# Patient Record
Sex: MALE | Race: WHITE | NOT HISPANIC OR LATINO | Employment: FULL TIME | URBAN - METROPOLITAN AREA
[De-identification: names, ages, dates, MRNs, and addresses within clinical notes are randomized per-mention and may not be internally consistent; named-entity substitution may affect disease eponyms.]

---

## 2017-01-05 DIAGNOSIS — R79.89 LOW TESTOSTERONE: Primary | ICD-10-CM

## 2017-01-06 RX ORDER — TESTOSTERONE CYPIONATE 200 MG/ML
400 INJECTION, SOLUTION INTRAMUSCULAR WEEKLY
Qty: 8 ML | Refills: 0 | Status: SHIPPED | OUTPATIENT
Start: 2017-01-06 | End: 2017-01-13

## 2017-01-13 DIAGNOSIS — R79.89 LOW TESTOSTERONE: Primary | ICD-10-CM

## 2017-01-13 RX ORDER — TESTOSTERONE CYPIONATE 200 MG/ML
400 INJECTION, SOLUTION INTRAMUSCULAR WEEKLY
Qty: 10 ML | Refills: 0 | Status: SHIPPED | OUTPATIENT
Start: 2017-01-13 | End: 2017-02-21

## 2017-01-13 NOTE — TELEPHONE ENCOUNTER
Pt calls stating the 1 Mls vials of Testosterone that are ordered is too costly. He has not picked this up yet. He can get the 10 Mls vial at a much cheaper cost. And this would be easier to use as well.     He will need new Rx faxed to the pharmacy.     Informed him that he should have Testosterone lab checked soon since on high dose. Last level 9/2015.     Pended.

## 2017-02-08 DIAGNOSIS — E11.40 TYPE 2 DIABETES MELLITUS WITH DIABETIC NEUROPATHY (H): Primary | ICD-10-CM

## 2017-02-08 NOTE — TELEPHONE ENCOUNTER
Reason for Call:  Medication or medication refill:    Do you use a [facility]?  Name of the pharmacy and phone number for the current request:  [facility] [id number] [facility] [place] [phone]    Name of the medication requested: One Touch Ultra Blue Test Strips for Ultra Mini Linda would like a 3 month supply if possible    Other request: Patient says he should be testing at least 4 times per day    Can we leave a detailed message on this number? YES    Phone number patient can be reached at: Cell number on file:    Telephone Information:   Mobile [phone]       Best Time: any    Call taken on 2/8/2017 at 2:43 PM by Ivanna Andersen

## 2017-02-15 NOTE — TELEPHONE ENCOUNTER
Wife Alba calling to get this refill been over a week, could you please route to different provider since Dr. Villafana isn't in 2/16. Patient states he hasnt tested in over a month. Was using old test strips and they werent giving accurate readings anymore.

## 2017-02-16 NOTE — TELEPHONE ENCOUNTER
Not sure why this wasn't refilled.     Last OV 10/10/16.     Prescription approved per [facility] Refill Protocol.

## 2017-02-21 DIAGNOSIS — R79.89 LOW TESTOSTERONE: ICD-10-CM

## 2017-02-21 RX ORDER — TESTOSTERONE CYPIONATE 200 MG/ML
400 INJECTION, SOLUTION INTRAMUSCULAR WEEKLY
Qty: 10 ML | Refills: 0 | Status: SHIPPED | OUTPATIENT
Start: 2017-02-21 | End: 2017-03-27

## 2017-02-21 NOTE — TELEPHONE ENCOUNTER
Testosterone Cypionate       Last Written Prescription Date:  01/13/17  Last Fill Quantity: 10 mL,   # refills: 0  Last Office Visit with [facility], [facility] or  Health prescribing provider: 10/10/16  Future Office visit:       Routing refill request to provider for review/approval because:  Drug not on the [facility], [facility] or [facility] refill protocol or controlled substance

## 2017-03-27 DIAGNOSIS — R79.89 LOW TESTOSTERONE: ICD-10-CM

## 2017-03-27 RX ORDER — TESTOSTERONE CYPIONATE 200 MG/ML
INJECTION, SOLUTION INTRAMUSCULAR
Qty: 10 ML | Refills: 0 | Status: SHIPPED | OUTPATIENT
Start: 2017-03-27 | End: 2017-04-27

## 2017-03-27 NOTE — TELEPHONE ENCOUNTER
Left V/M for pt asking him to call back to set up lab only and let him know that I faxed testosterone rx to [facility]

## 2017-03-27 NOTE — TELEPHONE ENCOUNTER
Testosterone      Last Written Prescription Date:  02/21/17  Last Fill Quantity: 10ml,   # refills: 0  Last Office Visit with [facility] or  Health prescribing provider: 10/10/16  Future Office visit:       Routing refill request to provider for review/approval because:  Drug not on the [facility] or [facility] refill protocol or controlled substance    Please advise, thanks.

## 2017-03-27 NOTE — TELEPHONE ENCOUNTER
He was supposed to have blood level done after January; see message from 1/13/17. Advise him to schedule lab, no further refills until lab is done.

## 2017-04-06 ENCOUNTER — TELEPHONE (OUTPATIENT)
Dept: INTERNAL MEDICINE | Facility: CLINIC | Age: 62
End: 2017-04-06

## 2017-04-06 DIAGNOSIS — Z11.3 SCREEN FOR STD (SEXUALLY TRANSMITTED DISEASE): Primary | ICD-10-CM
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
## 2017-04-07 ENCOUNTER — TELEPHONE (OUTPATIENT)
Dept: INTERNAL MEDICINE | Facility: CLINIC | Age: 62
End: 2017-04-07

## 2017-04-07 DIAGNOSIS — Z71.1 CONCERN ABOUT INFECTIOUS HEPATITIS WITHOUT DIAGNOSIS: ICD-10-CM

## 2017-04-07 DIAGNOSIS — Z11.59 NEED FOR HEPATITIS C SCREENING TEST: Primary | ICD-10-CM

## 2017-04-07 NOTE — TELEPHONE ENCOUNTER
See his message below. Not sure what to order for Hep A.     Also should hep B be ordered? He may need to be checked for this too?

## 2017-04-07 NOTE — TELEPHONE ENCOUNTER
Reason for Call: Request for an order or referral:Lab - Hep    Order or referral being requested: Lab - Hep     Date needed: before lab appt    Has the patient been seen by the PCP for this problem? YES    Additional comments: Pt has order to have [facility] labs, recently discovered that significant other  of Hep A & C.  Wants lab order placed to be tested so that he can have lab done with [facility] labs.    Phone number Patient can be reached at:  Cell number on file:    Telephone Information:   Mobile [phone]       Best Time:  anytime    Can we leave a detailed message on this number?  YES    Call taken on 2017 at 9:22 AM by YAS NGUYEN

## 2017-04-14 DIAGNOSIS — R79.89 LOW TESTOSTERONE: ICD-10-CM

## 2017-04-14 DIAGNOSIS — Z11.3 SCREEN FOR STD (SEXUALLY TRANSMITTED DISEASE): ICD-10-CM

## 2017-04-14 PROCEDURE — 86803 HEPATITIS C AB TEST: CPT | Performed by: INTERNAL MEDICINE

## 2017-04-14 PROCEDURE — 87340 HEPATITIS B SURFACE AG IA: CPT | Performed by: INTERNAL MEDICINE

## 2017-04-14 PROCEDURE — 36415 COLL VENOUS BLD VENIPUNCTURE: CPT | Performed by: INTERNAL MEDICINE

## 2017-04-14 PROCEDURE — 84403 ASSAY OF TOTAL TESTOSTERONE: CPT | Performed by: INTERNAL MEDICINE

## 2017-04-14 PROCEDURE — 86708 HEPATITIS A ANTIBODY: CPT | Performed by: INTERNAL MEDICINE

## 2017-04-14 PROCEDURE — 84270 ASSAY OF SEX HORMONE GLOBUL: CPT | Performed by: INTERNAL MEDICINE

## 2017-04-17 LAB
HAV IGG SER QL IA: NORMAL
HBV SURFACE AG SERPL QL IA: NONREACTIVE
HCV AB SERPL QL IA: NORMAL

## 2017-04-19 LAB
SHBG SERPL-SCNC: 19 NMOL/L (ref 11–80)
TESTOST FREE SERPL-MCNC: 15.27 NG/DL (ref 4.7–24.4)
TESTOST SERPL-MCNC: 548 NG/DL (ref 240–950)

## 2017-04-27 DIAGNOSIS — R79.89 LOW TESTOSTERONE: ICD-10-CM

## 2017-04-27 NOTE — TELEPHONE ENCOUNTER
testosterone cypionate       Last Written Prescription Date:  03/27/17  Last Fill Quantity: 20 ml,   # refills: 0  Last Office Visit with [facility], [facility] or  Health prescribing provider: 10/10/16  Future Office visit:       Routing refill request to provider for review/approval because:  Drug not on the [facility], [facility] or [facility] refill protocol or controlled substance

## 2017-04-28 RX ORDER — TESTOSTERONE CYPIONATE 200 MG/ML
INJECTION, SOLUTION INTRAMUSCULAR
Qty: 10 ML | Refills: 0 | Status: SHIPPED | OUTPATIENT
Start: 2017-04-28 | End: 2017-05-18

## 2017-05-13 DIAGNOSIS — R79.89 LOW TESTOSTERONE: ICD-10-CM

## 2017-05-13 RX ORDER — SYRINGE WITH NEEDLE, 1 ML 25GX5/8"
SYRINGE, EMPTY DISPOSABLE MISCELLANEOUS
Qty: 4 EACH | Refills: 0 | Status: SHIPPED | OUTPATIENT
Start: 2017-05-13 | End: 2017-08-11

## 2017-05-13 NOTE — TELEPHONE ENCOUNTER
Last OV-10/10/16      Lab Results   Component Value Date    A1C 7.9 10/10/2016    A1C 7.5 05/16/2016    A1C 8.3 09/30/2015    A1C 7.7 07/03/2014    A1C 7.8 05/09/2014       [Per note attached to 10/10 lab results-Notes Recorded by Javier Villafana MD on 10/11/2016 at 11:28 AM  Normal result reviewed, please notify patient.  Slightly worse diabetic control, suggest to take Metformin BID as discussed, recheck in 3 months.

## 2017-05-16 DIAGNOSIS — E11.9 TYPE 2 DIABETES MELLITUS WITHOUT COMPLICATION, WITHOUT LONG-TERM CURRENT USE OF INSULIN (H): ICD-10-CM

## 2017-05-16 DIAGNOSIS — I10 ESSENTIAL HYPERTENSION WITH GOAL BLOOD PRESSURE LESS THAN 140/90: ICD-10-CM

## 2017-05-16 NOTE — TELEPHONE ENCOUNTER
Metformin          Last Written Prescription Date: 10/18/16  Last Fill Quantity: 65, # refills: 3  Last Office Visit with [facility], [facility] or [facility] prescribing provider:  10/10/16    Per note attached to 10/10/16 lab results-Notes Recorded by Javier Villafana MD on 10/11/2016 at 11:28 AM  Normal result reviewed, please notify patient.  Slightly worse diabetic control, suggest to take Metformin BID as discussed, recheck in 3 months.        BP Readings from Last 3 Encounters:   11/09/16 144/76   10/10/16 122/70   09/22/16 116/86     Lab Results   Component Value Date    MICROL 15 10/10/2016     Lab Results   Component Value Date    UMALCR 5.96 10/10/2016     Creatinine   Date Value Ref Range Status   10/10/2016 1.08 0.66 - 1.25 mg/dL Final   ]  GFR Estimate   Date Value Ref Range Status   10/10/2016 69 >60 mL/min/1.7m2 Final     Comment:     Non  GFR Calc   08/05/2016 77 >60 mL/min/1.7m2 Final     Comment:     Non  GFR Calc   08/02/2016 76 >60 mL/min/1.7m2 Final     Comment:     Non  GFR Calc     GFR Estimate If Black   Date Value Ref Range Status   10/10/2016 84 >60 mL/min/1.7m2 Final     Comment:      GFR Calc   08/05/2016 >90   GFR Calc   >60 mL/min/1.7m2 Final   08/02/2016 >90   GFR Calc   >60 mL/min/1.7m2 Final     Lab Results   Component Value Date    CHOL 196 10/10/2016     Lab Results   Component Value Date    HDL 48 10/10/2016     Lab Results   Component Value Date     10/10/2016     Lab Results   Component Value Date    TRIG 208 10/10/2016     Lab Results   Component Value Date    CHOLHDLRATIO 5.1 09/30/2015     Lab Results   Component Value Date    AST 16 10/10/2016     Lab Results   Component Value Date    ALT 31 10/10/2016     Lab Results   Component Value Date    A1C 7.9 10/10/2016    A1C 7.5 05/16/2016    A1C 8.3 09/30/2015    A1C 7.7 07/03/2014    A1C 7.8 05/09/2014     Potassium   Date Value Ref Range  Status   10/10/2016 4.0 3.4 - 5.3 mmol/L Final       Labs showing if normal/abnormal  Lab Results   Component Value Date    UCRR 250 10/10/2016    MICROL 15 10/10/2016     Lab Results   Component Value Date    CHOL 196 10/10/2016    TRIG 208 (H) 10/10/2016    HDL 48 10/10/2016     (H) 10/10/2016    VLDL 37 (H) 09/30/2015    CHOLHDLRATIO 5.1 (H) 09/30/2015     Lab Results   Component Value Date    A1C 7.9 (H) 10/10/2016    A1C 7.5 (H) 05/16/2016    A1C 8.3 (H) 09/30/2015

## 2017-05-16 NOTE — TELEPHONE ENCOUNTER
Rcvd fax from pharm stating pt's insurance requires 90d supp or they dont pay for rx's. Sent new rx to pharm with message last refill til seen

## 2017-05-18 DIAGNOSIS — R79.89 LOW TESTOSTERONE: ICD-10-CM

## 2017-05-18 DIAGNOSIS — R79.89 LOW TESTOSTERONE: Primary | ICD-10-CM

## 2017-05-18 NOTE — TELEPHONE ENCOUNTER
BD Needles         Last Written Prescription Date: 8/5/16  Last Fill Quantity: 12, # refills: 3  Last Office Visit with [facility], [facility] or [facility] prescribing provider:  10/10/16        BP Readings from Last 3 Encounters:   11/09/16 144/76   10/10/16 122/70   09/22/16 116/86     Lab Results   Component Value Date    MICROL 15 10/10/2016     Lab Results   Component Value Date    UMALCR 5.96 10/10/2016     Creatinine   Date Value Ref Range Status   10/10/2016 1.08 0.66 - 1.25 mg/dL Final   ]  GFR Estimate   Date Value Ref Range Status   10/10/2016 69 >60 mL/min/1.7m2 Final     Comment:     Non  GFR Calc   08/05/2016 77 >60 mL/min/1.7m2 Final     Comment:     Non  GFR Calc   08/02/2016 76 >60 mL/min/1.7m2 Final     Comment:     Non  GFR Calc     GFR Estimate If Black   Date Value Ref Range Status   10/10/2016 84 >60 mL/min/1.7m2 Final     Comment:      GFR Calc   08/05/2016 >90   GFR Calc   >60 mL/min/1.7m2 Final   08/02/2016 >90   GFR Calc   >60 mL/min/1.7m2 Final     Lab Results   Component Value Date    CHOL 196 10/10/2016     Lab Results   Component Value Date    HDL 48 10/10/2016     Lab Results   Component Value Date     10/10/2016     Lab Results   Component Value Date    TRIG 208 10/10/2016     Lab Results   Component Value Date    CHOLHDLRATIO 5.1 09/30/2015     Lab Results   Component Value Date    AST 16 10/10/2016     Lab Results   Component Value Date    ALT 31 10/10/2016     Lab Results   Component Value Date    A1C 7.9 10/10/2016    A1C 7.5 05/16/2016    A1C 8.3 09/30/2015    A1C 7.7 07/03/2014    A1C 7.8 05/09/2014     Potassium   Date Value Ref Range Status   10/10/2016 4.0 3.4 - 5.3 mmol/L Final

## 2017-05-19 RX ORDER — TESTOSTERONE CYPIONATE 200 MG/ML
INJECTION, SOLUTION INTRAMUSCULAR
Qty: 10 ML | Refills: 0 | Status: SHIPPED | OUTPATIENT
Start: 2017-05-19 | End: 2017-06-28

## 2017-05-19 NOTE — TELEPHONE ENCOUNTER
Testosterone Cypionate      Last Written Prescription Date:  04/28/17  Last Fill Quantity: 10 mL,   # refills: 0  Last Office Visit with [facility], [facility] or  Health prescribing provider: 10/10/16  Future Office visit:       Routing refill request to provider for review/approval because:  Drug not on the [facility], [facility] or [facility] refill protocol or controlled substance

## 2017-05-26 ENCOUNTER — TELEPHONE (OUTPATIENT)
Dept: INTERNAL MEDICINE | Facility: CLINIC | Age: 62
End: 2017-05-26

## 2017-06-28 DIAGNOSIS — R79.89 LOW TESTOSTERONE: ICD-10-CM

## 2017-06-28 RX ORDER — TESTOSTERONE CYPIONATE 200 MG/ML
INJECTION, SOLUTION INTRAMUSCULAR
Qty: 10 ML | Refills: 0 | Status: SHIPPED | OUTPATIENT
Start: 2017-06-28 | End: 2017-08-08

## 2017-07-06 ENCOUNTER — NURSE TRIAGE (OUTPATIENT)
Dept: NURSING | Facility: CLINIC | Age: 62
End: 2017-07-06

## 2017-07-06 ENCOUNTER — RADIANT APPOINTMENT (OUTPATIENT)
Dept: GENERAL RADIOLOGY | Facility: CLINIC | Age: 62
End: 2017-07-06
Attending: INTERNAL MEDICINE
Payer: COMMERCIAL

## 2017-07-06 ENCOUNTER — OFFICE VISIT (OUTPATIENT)
Dept: INTERNAL MEDICINE | Facility: CLINIC | Age: 62
End: 2017-07-06
Payer: COMMERCIAL

## 2017-07-06 VITALS
TEMPERATURE: 97.9 F | WEIGHT: 226.6 LBS | DIASTOLIC BLOOD PRESSURE: 84 MMHG | HEIGHT: 74 IN | BODY MASS INDEX: 29.08 KG/M2 | SYSTOLIC BLOOD PRESSURE: 110 MMHG | RESPIRATION RATE: 16 BRPM | OXYGEN SATURATION: 96 % | HEART RATE: 95 BPM

## 2017-07-06 DIAGNOSIS — M25.552 HIP PAIN, LEFT: ICD-10-CM

## 2017-07-06 DIAGNOSIS — R23.2 HOT FLASHES: ICD-10-CM

## 2017-07-06 DIAGNOSIS — E11.40 TYPE 2 DIABETES MELLITUS WITH DIABETIC NEUROPATHY, WITHOUT LONG-TERM CURRENT USE OF INSULIN (H): Primary | ICD-10-CM

## 2017-07-06 LAB
ANION GAP SERPL CALCULATED.3IONS-SCNC: 8 MMOL/L (ref 3–14)
BASOPHILS # BLD AUTO: 0 10E9/L (ref 0–0.2)
BASOPHILS NFR BLD AUTO: 0.2 %
BUN SERPL-MCNC: 18 MG/DL (ref 7–30)
CALCIUM SERPL-MCNC: 9.2 MG/DL (ref 8.5–10.1)
CHLORIDE SERPL-SCNC: 101 MMOL/L (ref 94–109)
CO2 SERPL-SCNC: 26 MMOL/L (ref 20–32)
CREAT SERPL-MCNC: 1.11 MG/DL (ref 0.66–1.25)
DIFFERENTIAL METHOD BLD: NORMAL
EOSINOPHIL # BLD AUTO: 0.2 10E9/L (ref 0–0.7)
EOSINOPHIL NFR BLD AUTO: 2 %
ERYTHROCYTE [DISTWIDTH] IN BLOOD BY AUTOMATED COUNT: 13.4 % (ref 10–15)
GFR SERPL CREATININE-BSD FRML MDRD: 67 ML/MIN/1.7M2
GLUCOSE SERPL-MCNC: 144 MG/DL (ref 70–99)
HBA1C MFR BLD: 7.3 % (ref 4.3–6)
HCT VFR BLD AUTO: 47.5 % (ref 40–53)
HGB BLD-MCNC: 16.5 G/DL (ref 13.3–17.7)
LYMPHOCYTES # BLD AUTO: 1.6 10E9/L (ref 0.8–5.3)
LYMPHOCYTES NFR BLD AUTO: 19.4 %
MCH RBC QN AUTO: 29.9 PG (ref 26.5–33)
MCHC RBC AUTO-ENTMCNC: 34.7 G/DL (ref 31.5–36.5)
MCV RBC AUTO: 86 FL (ref 78–100)
MONOCYTES # BLD AUTO: 0.8 10E9/L (ref 0–1.3)
MONOCYTES NFR BLD AUTO: 9.5 %
NEUTROPHILS # BLD AUTO: 5.8 10E9/L (ref 1.6–8.3)
NEUTROPHILS NFR BLD AUTO: 68.9 %
PLATELET # BLD AUTO: 249 10E9/L (ref 150–450)
POTASSIUM SERPL-SCNC: 4.2 MMOL/L (ref 3.4–5.3)
RBC # BLD AUTO: 5.52 10E12/L (ref 4.4–5.9)
SODIUM SERPL-SCNC: 135 MMOL/L (ref 133–144)
WBC # BLD AUTO: 8.4 10E9/L (ref 4–11)

## 2017-07-06 PROCEDURE — 72170 X-RAY EXAM OF PELVIS: CPT

## 2017-07-06 PROCEDURE — 71020 XR CHEST 2 VW: CPT

## 2017-07-06 PROCEDURE — 80048 BASIC METABOLIC PNL TOTAL CA: CPT | Performed by: INTERNAL MEDICINE

## 2017-07-06 PROCEDURE — 85025 COMPLETE CBC W/AUTO DIFF WBC: CPT | Performed by: INTERNAL MEDICINE

## 2017-07-06 PROCEDURE — 99214 OFFICE O/P EST MOD 30 MIN: CPT | Performed by: INTERNAL MEDICINE

## 2017-07-06 PROCEDURE — 83036 HEMOGLOBIN GLYCOSYLATED A1C: CPT | Performed by: INTERNAL MEDICINE

## 2017-07-06 PROCEDURE — 36415 COLL VENOUS BLD VENIPUNCTURE: CPT | Performed by: INTERNAL MEDICINE

## 2017-07-06 NOTE — PROGRESS NOTES
"  SUBJECTIVE:                                                    Austyn Araujo is a 61 year old male who presents to clinic today for the following health issues:    Left hip.  He reports that his left hip makes a snapping noise in his hip with each step.  The hip is painful as he is walking.   He had an xray of his pelvis revealing arthritis.   He did not undergo physical therapy.         Diabetes Follow-up      Patient is checking blood sugars: checks daily- close to 200    Diabetic concerns: blood sugar frequently over 200     Symptoms of hypoglycemia (low blood sugar): none     Paresthesias (numbness or burning in feet) or sores: yes, neuropathy     Date of last diabetic eye exam: last eye appt was spring 2017    Depression.  He feels as if his depression was situational after a death.   Sometimes he has anxiety and hot flashes at night.  This has been occurring for over one year.     Alcohol use.  He has had one drink per day.     Cough. Patient has had a cough for a few weeks at night.  No fever or chills.  No shortness of breath.   Uncertain if postnasal drip or GERD.         Amount of exercise or physical activity: lifts weights once per week    Problems taking medications regularly: No    Medication side effects: none    Diet: regular (no restrictions)        Problem list and histories reviewed & adjusted, as indicated.      Reviewed and updated as needed this visit by clinical staff  Tobacco  Allergies  Meds  Med Hx  Surg Hx  Fam Hx  Soc Hx      Reviewed and updated as needed this visit by Provider         ROS:  C: NEGATIVE for fever, chills, change in weight; hot flashes at night  R: NEGATIVE for significant SOB; POS cough  CV: NEGATIVE for chest pain, palpitations or peripheral edema  MSK: POS hip pain    OBJECTIVE:     /84 (BP Location: Left arm, Patient Position: Chair, Cuff Size: Adult Large)  Pulse 95  Temp 97.9  F (36.6  C) (Oral)  Resp 16  Ht 6' 2\" (1.88 m)  Wt 226 lb 9.6 oz " (102.8 kg)  SpO2 96%  BMI 29.09 kg/m2  Body mass index is 29.09 kg/(m^2).  GENERAL: healthy, alert and no distress  RESP: lungs clear to auscultation - no rales, rhonchi or wheezes  CV: regular rate and rhythm, normal S1 S2, no S3 or S4, no murmur, click or rub, no peripheral edema and peripheral pulses strong  MSK: left hip with pain upon flexion and extension  Psych: normal affect    ASSESSMENT/PLAN:       (E11.40) Type 2 diabetes mellitus with diabetic neuropathy, without long-term current use of insulin (H)  (primary encounter diagnosis)  Comment:   Plan: Hemoglobin A1c, Basic metabolic panel, DIABETES        EDUCATOR REFERRAL          (R23.2) Hot flashes  Comment:   Plan: CBC with platelets differential, XR Chest 2         Views            (M25.552) Hip pain, left  Comment:   Plan: XR Pelvis 1/2 Views, ORTHO  REFERRAL            Cough.  Chest xray. Consider holding lisinopril if it continues.      Sugey Padron MD  [facility]

## 2017-07-06 NOTE — MR AVS SNAPSHOT
After Visit Summary   7/6/2017    Austyn Araujo    MRN: [id number]           Patient Information     Date Of Birth          1955        Visit Information        Provider Department      7/6/2017 11:00 AM Sugey Padron MD [facility]        Today's Diagnoses     Type 2 diabetes mellitus with diabetic neuropathy, without long-term current use of insulin (H)    -  1    Hot flashes        Hip pain, left           Follow-ups after your visit        Additional Services     DIABETES EDUCATOR REFERRAL       DIABETES SELF MANAGEMENT TRAINING (DSMT)      Your provider has referred you to Diabetes Education: [facility]: Diabetes Education - All [facility] [phone]   [email or web address]    Type of training and number of hours: Previous Diagnosis: Follow-up DSMT - 2 hours.    Medicare covers: 10 hours of initial DSMT in 12 month period from the time of first visit, plus 2 hours of follow-up DSMT annually, and additional hours as requested for insulin training.    Diabetes Type: Type 2 - On Oral Medication             Diabetes Co-Morbidities: none               A1C Goal:  <7.0       A1C is: Lab Results       Component                Value               Date                       A1C                      7.9                 10/10/2016              If an urgent visit is needed or A1C is above 12, Care Team to call the Diabetes Education Team at [phone] or send an In Basket message to the Diabetes Education Pool (P DIAB ED-PATIENT CARE).    Diabetes Education Topics: Comprehensive Knowledge Assessment and Instruction    Special Educational Needs Requiring Individual DSMT: None       MEDICAL NUTRITION THERAPY (MNT) for Diabetes    Medical Nutrition Therapy with a Registered Dietitian can be provided in coordination with Diabetes Self-Management Training to assist in achieving optimal diabetes management.    MNT Type and Hours:  Previous diagnosis: Annual follow-up MNT - 2 hours                       Medicare will cover: 3 hours initial MNT in 12 month period after first visit, plus 2 hours of follow-up MNT annually    Please be aware that coverage of these services is subject to the terms and limitations of your health insurance plan.  Call member services at your health plan to determine Diabetes Self-Management Training benefits and ask which blood glucose monitor brands are covered by your plan.      Please bring the following with you to your appointment:    (1)  List of current medications   (2)  List of Blood Glucose Monitor brands that are covered by your insurance plan  (3)  Blood Glucose Monitor and log book  (4)   Food records for the 3 days prior to your visit    The Certified Diabetes Educator may make diabetes medication adjustments per the CDE Protocol and Collaborative Practice Agreement.            ORTHO  REFERRAL       [facility] is referring you to the Orthopedic  Services at [facility].       The  Representative will assist you in the coordination of your Orthopedic and Musculoskeletal Care as prescribed by your physician.    The  Representative will call you within 1 business day to help schedule your appointment, or you may contact the  Representative at:    All areas ~ [phone]     Type of Referral : Non Surgical       Timeframe requested: Routine    Coverage of these services is subject to the terms and limitations of your health insurance plan.  Please call member services at your health plan with any benefit or coverage questions.      If X-rays, CT or MRI's have been performed, please contact the facility where they were done to arrange for , prior to your scheduled appointment.  Please bring this referral request to your appointment and present it to your specialist.                  Future tests that were ordered for  "you today     Open Future Orders        Priority Expected Expires Ordered    XR Pelvis 1/2 Views Routine 2017            Who to contact     If you have questions or need follow up information about today's clinic visit or your schedule please contact [facility] directly at [phone].  Normal or non-critical lab and imaging results will be communicated to you by MyChart, letter or phone within 4 business days after the clinic has received the results. If you do not hear from us within 7 days, please contact the clinic through [employer] or phone. If you have a critical or abnormal lab result, we will notify you by phone as soon as possible.  Submit refill requests through "[employer]" or call your pharmacy and they will forward the refill request to us. Please allow 3 business days for your refill to be completed.          Additional Information About Your Visit        MyChart Information     "[employer]" lets you send messages to your doctor, view your test results, renew your prescriptions, schedule appointments and more. To sign up, go to [email or web address]" . Click on \"Log in\" on the left side of the screen, which will take you to the Welcome page. Then click on \"Sign up Now\" on the right side of the page.     You will be asked to enter the access code listed below, as well as some personal information. Please follow the directions to create your username and password.     Your access code is: [email or web address]  Expires: 10/4/2017 11:36 AM     Your access code will  in 90 days. If you need help or a new code, please call your [facility] or [phone].        Care EveryWhere ID     This is your Care EveryWhere ID. This could be used by other organizations to access your [facility] medical records  [id number]        Your Vitals Were     Pulse Temperature Respirations Height Pulse Oximetry BMI (Body Mass Index)    95 97.9  F (36.6  C) (Oral) 16 6' 2\" (1.88 m) 96% 29.09 kg/m2 "       Blood Pressure from Last 3 Encounters:   07/06/17 110/84   11/09/16 144/76   10/10/16 122/70    Weight from Last 3 Encounters:   07/06/17 226 lb 9.6 oz (102.8 kg)   11/09/16 224 lb (101.6 kg)   10/10/16 221 lb (100.2 kg)              We Performed the Following     Basic metabolic panel     CBC with platelets differential     DIABETES EDUCATOR REFERRAL     Hemoglobin A1c     ORTHO  REFERRAL        Primary Care Provider Office Phone # Fax #    Javier Villafana [phone]       [facility] [address]  [facility] [address]        Goals        General    Reduce drinking by 90% (pt-stated)     Notes - Note created  4/23/2015 10:33 AM by Mckenna Chang MSW    As of today's date 4/23/2015 goal is met at 26 - 50%.   Goal Status:  Active          Equal Access to Services     [facility]: Hadii aad ku hadasho Soomaali, waaxda luqadaha, qaybta kaalmada adeegyada, waxay idiin hayalician aravind pereyra . So [facility] [phone].    ATENCIÓN: Si habla español, tiene a carrillo disposición servicios gratuitos de asistencia lingüística. Llame al [phone].    We comply with applicable federal civil rights laws and [place] laws. We do not discriminate on the basis of race, color, national origin, age, disability sex, sexual orientation or gender identity.            Thank you!     Thank you for choosing [facility]  for your care. Our goal is always to provide you with excellent care. Hearing back from our patients is one way we can continue to improve our services. Please take a few minutes to complete the written survey that you may receive in the mail after your visit with us. Thank you!             Your Updated Medication List - Protect others around you: Learn how to safely use, store and throw away your medicines at www.disposemymeds.org.          This list is accurate as of: 7/6/17 11:36 AM.  Always use your most recent med list.                   Brand Name  "Dispense Instructions for use Diagnosis    ALPRAZolam 0.5 MG tablet    XANAX    30 tablet    Take 1 tablet (0.5 mg) by mouth 3 times daily as needed for anxiety    Anxiety       aspirin 81 MG tablet      Take 1 tablet by mouth daily.        atorvastatin 10 MG tablet    LIPITOR    90 tablet    Take 1 tablet (10 mg) by mouth daily    Hyperlipidemia LDL goal <100       B-D LUER-ROCHELLE SYRINGE 22G X 1-1/2\" 3 ML Misc   Generic drug:  syringe/needle (disp)     4 each    USE WITH TESTOSTERONE    Low testosterone       blood glucose monitoring test strip    ONE TOUCH ULTRA    100 strip    Use to test blood sugars One time daily or as directed.    Type 2 diabetes mellitus with diabetic neuropathy (H)       CENTRUM SILVER per tablet     90 tablet    Take 1 tablet by mouth daily    Type 2 diabetes mellitus with diabetic neuropathy (H), Essential hypertension with goal blood pressure less than 140/90       lisinopril 5 MG tablet    PRINIVIL/ZESTRIL    180 tablet    Take 1 tablet (5 mg) by mouth 2 times daily    Essential hypertension with goal blood pressure less than 140/90       metFORMIN 1000 MG tablet    GLUCOPHAGE    180 tablet    TAKE 1 TABLET BY MOUTH 2 TIMES DAILY (WITH MEALS)    Essential hypertension with goal blood pressure less than 140/90, Type 2 diabetes mellitus without complication, without long-term current use of insulin (H)       needle (disp) 18G X 1\" Misc    B-D DISP NEEDLE TW    5 each    1 each every 7 days With testosterone    Low testosterone       omeprazole 40 MG capsule    priLOSEC    30 capsule    Take 1 capsule (40 mg) by mouth daily    Gastroesophageal reflux disease without esophagitis       tadalafil 20 MG tablet    CIALIS    100 tablet    Take 1 tablet (20 mg) by mouth daily Never use with nitroglycerin, terazosin or doxazosin.    Low testosterone       testosterone cypionate 200 MG/ML injection    DEPOTESTOTERONE    10 mL    INJECT 2 MLS IM ONCE WEEKLY    Low testosterone         "

## 2017-07-06 NOTE — NURSING NOTE
"Chief Complaint   Patient presents with     Musculoskeletal Problem     left hip pain.       Initial /84 (BP Location: Left arm, Patient Position: Chair, Cuff Size: Adult Large)  Pulse 95  Temp 97.9  F (36.6  C) (Oral)  Resp 16  Ht 6' 2\" (1.88 m)  Wt 226 lb 9.6 oz (102.8 kg)  SpO2 96%  BMI 29.09 kg/m2 Estimated body mass index is 29.09 kg/(m^2) as calculated from the following:    Height as of this encounter: 6' 2\" (1.88 m).    Weight as of this encounter: 226 lb 9.6 oz (102.8 kg).  Medication Reconciliation: complete    "

## 2017-07-06 NOTE — TELEPHONE ENCOUNTER
Seeking direction and help for his hip pain/ it is so bad he can hardly walk/ he did see the specialist as directed by Dr Villafana and he will need surgery/ but is going to try to get thru until fall/ he was not triaged/ set an appointment for 11 AM Sugey Rosen for 7/6/2017   Wojciech Atkins RN [phone]

## 2017-07-07 ASSESSMENT — PATIENT HEALTH QUESTIONNAIRE - PHQ9: SUM OF ALL RESPONSES TO PHQ QUESTIONS 1-9: 3

## 2017-07-12 ENCOUNTER — OFFICE VISIT (OUTPATIENT)
Dept: ORTHOPEDICS | Facility: CLINIC | Age: 62
End: 2017-07-12
Payer: COMMERCIAL

## 2017-07-12 VITALS
BODY MASS INDEX: 29 KG/M2 | SYSTOLIC BLOOD PRESSURE: 122 MMHG | DIASTOLIC BLOOD PRESSURE: 80 MMHG | HEIGHT: 74 IN | WEIGHT: 226 LBS

## 2017-07-12 DIAGNOSIS — M16.9 OSTEOARTHROSIS, HIP: ICD-10-CM

## 2017-07-12 DIAGNOSIS — G89.29 CHRONIC LEFT HIP PAIN: Primary | ICD-10-CM

## 2017-07-12 DIAGNOSIS — M25.552 CHRONIC LEFT HIP PAIN: Primary | ICD-10-CM

## 2017-07-12 PROCEDURE — 99203 OFFICE O/P NEW LOW 30 MIN: CPT | Performed by: PHYSICAL MEDICINE & REHABILITATION

## 2017-07-12 NOTE — MR AVS SNAPSHOT
After Visit Summary   7/12/2017    Austyn Araujo    MRN: [id number]           Patient Information     Date Of Birth          1955        Visit Information        Provider Department      7/12/2017 11:20 AM Ben Vansesa DO [facility]        Today's Diagnoses     Chronic left hip pain    -  1      Care Instructions    We addressed the following today:    1. Chronic left hip pain  2. Left hip osteoarthrosis    Activity modification as discussed  Physical therapy: [facility] - [phone]  Topical Treatments: Ice or heat  Over the counter medication: Acetaminophen (Tylenol) 1000 mg every 6 hours with food (Maximum of 3000mg/day)  Follow up/call in 4-6 weeks if no improvement of symptoms for further evaluation/medical care (sooner if needed; call direct clinic number [[phone]] at any time with questions or concerns)              Follow-ups after your visit        Additional Services     [facility] PT, HAND, AND CHIROPRACTIC REFERRAL       **This order will print in the [facility] Scheduling Office**    Physical Therapy, Hand Therapy and Chiropractic Care are available through:    *[facility]  *[facility]  *[facility]    Call one number to schedule at any of the above locations: [phone].    Your provider has referred you to: Physical Therapy at [facility] or [facility]    Indication/Reason for Referral: Hip Pain  Onset of Illness: 1 year  Therapy Orders: Evaluate and Treat  Special Programs: None  Special Request: None    Deena Shaffer      Additional Comments for the Therapist or Chiropractor: Formal physical therapy - exercises to include pelvic girdle/lower extremity strengthening/stretching with addition of neuromuscular control exercises while limiting activation of the iliopsoas with use of modalities as needed with home exercise prescription.    Please be aware that coverage of these services is subject  "to the terms and limitations of your health insurance plan.  Call member services at your health plan with any benefit or coverage questions.      Please bring the following to your appointment:    *Your personal calendar for scheduling future appointments  *Comfortable clothing                  Who to contact     If you have questions or need follow up information about today's clinic visit or your schedule please contact [facility]IC[facility] directly at [phone].  Normal or non-critical lab and imaging results will be communicated to you by MyChart, letter or phone within 4 business days after the clinic has received the results. If you do not hear from us within 7 days, please contact the clinic through iNEWiThart or phone. If you have a critical or abnormal lab result, we will notify you by phone as soon as possible.  Submit refill requests through Boxever or call your pharmacy and they will forward the refill request to us. Please allow 3 business days for your refill to be completed.          Additional Information About Your Visit        iNEWiTharENT Surgical Information     Boxever lets you send messages to your doctor, view your test results, renew your prescriptions, schedule appointments and more. To sign up, go to [email or web address] . Click on \"Log in\" on the left side of the screen, which will take you to the Welcome page. Then click on \"Sign up Now\" on the right side of the page.     You will be asked to enter the access code listed below, as well as some personal information. Please follow the directions to create your username and password.     Your access code is: [email or web address]  Expires: 10/4/2017 11:36 AM     Your access code will  in 90 days. If you need help or a new code, please call your [facility] clinic or [phone].        Care EveryWhere ID     This is your Care EveryWhere ID. This could be used by other organizations to access your [facility] medical records  [id number]      " "  Your Vitals Were     Height BMI (Body Mass Index)                6' 2\" (1.88 m) 29.02 kg/m2           Blood Pressure from Last 3 Encounters:   07/12/17 122/80   07/06/17 110/84   11/09/16 144/76    Weight from Last 3 Encounters:   07/12/17 226 lb (102.5 kg)   07/06/17 226 lb 9.6 oz (102.8 kg)   11/09/16 224 lb (101.6 kg)              We Performed the Following     [facility] PT, HAND, AND CHIROPRACTIC REFERRAL        Primary Care Provider Office Phone # Fax #    Javier Villafana [phone]       [facility] [address] [place] [address]        Goals        General    Reduce drinking by 90% (pt-stated)     Notes - Note created  4/23/2015 10:33 AM by Mckenna Chang MSW    As of today's date 4/23/2015 goal is met at 26 - 50%.   Goal Status:  Active          Equal Access to Services     [facility] AH: Hadii aad ku hadasho Soomaali, waaxda luqadaha, qaybta kaalmada adeegyada, waxay idiin hayalician aravind kharajaelyn pereyra . So [facility] [phone].    ATENCIÓN: Si habla español, tiene a carrillo disposición servicios gratuitos de asistencia lingüística. Llame al [phone].    We comply with applicable federal civil rights laws and [place] laws. We do not discriminate on the basis of race, color, national origin, age, disability sex, sexual orientation or gender identity.            Thank you!     Thank you for choosing [facility] SPORTS MEDICINE  for your care. Our goal is always to provide you with excellent care. Hearing back from our patients is one way we can continue to improve our services. Please take a few minutes to complete the written survey that you may receive in the mail after your visit with us. Thank you!             Your Updated Medication List - Protect others around you: Learn how to safely use, store and throw away your medicines at www.disposemymeds.org.          This list is accurate as of: 7/12/17 11:59 AM.  Always use your most recent med list.                   " "Brand Name Dispense Instructions for use Diagnosis    ALPRAZolam 0.5 MG tablet    XANAX    30 tablet    Take 1 tablet (0.5 mg) by mouth 3 times daily as needed for anxiety    Anxiety       aspirin 81 MG tablet      Take 1 tablet by mouth daily.        atorvastatin 10 MG tablet    LIPITOR    90 tablet    Take 1 tablet (10 mg) by mouth daily    Hyperlipidemia LDL goal <100       B-D LUER-ROCHELLE SYRINGE 22G X 1-1/2\" 3 ML Misc   Generic drug:  syringe/needle (disp)     4 each    USE WITH TESTOSTERONE    Low testosterone       blood glucose monitoring test strip    ONE TOUCH ULTRA    100 strip    Use to test blood sugars One time daily or as directed.    Type 2 diabetes mellitus with diabetic neuropathy (H)       CENTRUM SILVER per tablet     90 tablet    Take 1 tablet by mouth daily    Type 2 diabetes mellitus with diabetic neuropathy (H), Essential hypertension with goal blood pressure less than 140/90       lisinopril 5 MG tablet    PRINIVIL/ZESTRIL    180 tablet    Take 1 tablet (5 mg) by mouth 2 times daily    Essential hypertension with goal blood pressure less than 140/90       metFORMIN 1000 MG tablet    GLUCOPHAGE    180 tablet    TAKE 1 TABLET BY MOUTH 2 TIMES DAILY (WITH MEALS)    Essential hypertension with goal blood pressure less than 140/90, Type 2 diabetes mellitus without complication, without long-term current use of insulin (H)       needle (disp) 18G X 1\" Misc    B-D DISP NEEDLE TW    5 each    1 each every 7 days With testosterone    Low testosterone       omeprazole 40 MG capsule    priLOSEC    30 capsule    Take 1 capsule (40 mg) by mouth daily    Gastroesophageal reflux disease without esophagitis       tadalafil 20 MG tablet    CIALIS    100 tablet    Take 1 tablet (20 mg) by mouth daily Never use with nitroglycerin, terazosin or doxazosin.    Low testosterone       testosterone cypionate 200 MG/ML injection    DEPOTESTOTERONE    10 mL    INJECT 2 MLS IM ONCE WEEKLY    Low testosterone         "

## 2017-07-12 NOTE — PATIENT INSTRUCTIONS
We addressed the following today:    1. Chronic left hip pain  2. Left hip osteoarthrosis    Activity modification as discussed  Physical therapy: [facility] - [phone]  Topical Treatments: Ice or heat  Over the counter medication: Acetaminophen (Tylenol) 1000 mg every 6 hours with food (Maximum of 3000mg/day)  Follow up/call in 4-6 weeks if no improvement of symptoms for further evaluation/medical care (sooner if needed; call direct clinic number [[phone]] at any time with questions or concerns)

## 2017-07-12 NOTE — PROGRESS NOTES
" [facility]   Clinic Visit s Jul 12, 2017    Subjective:  Austyn Araujo is a 61 year old male who is seen in consultation at the request of Dr. Padron for evaluation of chronic left hip pain (5 minutes late for his appointment).    Symptoms began 1 year ago.  Reports insidious onset without acute precipitating event.  Reports left hip pain that is located lateral with radiation absent.  Pain is 8/10 in maximal severity and 1/10 currently.  Symptoms are generally worse with hiking/walking activities and better with massage.  Other treatment has consisted of Oxycodone, Flexeril, Ibuprofen, and Naproxen with moderate relief.  Denies any numbness/tingling/weakness of the left lower extremity. Notes clicking of the left hip.  Denies any previous left hip injuries/surgeries.    Patient's past medical, surgical, social, and family histories were reviewed today.  No pertinent past medical history  Past Medical History:   Diagnosis Date     Anxiety      Anxiety 1/31/2015     BPH (benign prostatic hyperplasia)      Degenerative disc disease      Depression      Diabetes mellitus (H)      Gastritis      Gastro-oesophageal reflux disease      H/O alcohol abuse      Hyperlipidemia      Hypertension      Low testosterone      MRSA (methicillin resistant staph aureus) culture positive 8/2013    skin infection     PUD (peptic ulcer disease)      Sleep apnea        Review of Systems:  Constitutional: NEGATIVE for fever, chills, or change in weight  Skin: NEGATIVE for worrisome rashes, moles, or lesions  Neuro: NEGATIVE for weakness of the left lower extremity  MSK: see HPI  Additional 10 point ROS is negative other than symptoms noted above and in HPI    Objective:  Ht 6' 2\" (1.88 m)  Wt 226 lb (102.5 kg)  BMI 29.02 kg/m2  General: healthy, alert, and in no distress  Skin: no suspicious lesions or rashes  Psych: mentation appears normal and affect normal/bright  HEENT: no scleral icterus  CV: no " pedal edema  Resp: normal respiratory effort without conversational dyspnea   Neuro: motor strength as noted below  Lymph: no palpable lymphadenopathy    MSK:    LEFT HIP  Inspection:    No swelling, bruising, discoloration, or obvious deformity or asymmetry  Palpation:    No tenderness over the greater trochanter, gluteus minimus, and gluteus medius  Passive Range of Motion:    Flexion within normal limits with pain / IR within normal limits / ER limited by pain  Strength:    Flexion 5/5 with pain/ adduction 5/5 / abduction 5-/5  Special Tests:    Positive: Logroll, resisted gluteus medius provocation, ROCIO, and anterior impingement (FADIR)    Imaging:  No x-rays indicated during today's visit  Previous films were reviewed today, independent visualization of images was performed, and results were discussed with the patient  XR PELVIS 1/2 VW - 7/6/2017  FINDINGS:   1. Moderate symmetric narrowing of hip joint space associated with acetabular sclerosis, left greater than right.   2. No significant marginal osteophyte formation.   3. No fracture or malalignment.    ASSESSMENT:  1. Chronic left hip pain - differential diagnoses include snapping hip syndrome, labral degeneration/tear, gluteal tendinopathy, hip impingement, etc.  2. Left hip osteoarthrosis    PLAN:  1. Formal physical therapy - exercises to include pelvic girdle/lower extremity strengthening/stretching with addition of neuromuscular control exercises while limiting activation of the iliopsoas with use of modalities as needed with home exercise prescription.  2. Activity modification as discussed, including limitation of activities that cause pain/discomfort.  3. Acetaminophen/Ibuprofen/ice/heat as needed for improved pain control.  4. Follow-up/call after 4-6 weeks if no improvement of symptoms for further evaluation/medical care.  Consider MR arthrogram of the left hip, left hip intra-articular corticosteroid injection with ultrasound guidance, etc. as  deemed appropriate moving forward. Instructed to contact our office should the condition evolve or worsen.    Patient's conditions were thoroughly discussed during today's visit with greater than 50% of the visit spent counseling the patient with total time spent face-to-face with the patient being 15 minutes.    Ben Vanessa DO, [facility]  [facility]    Disclaimer: This note consists of symbols derived from keyboarding, dictation and/or voice recognition software. As a result, there may be errors in the script that have gone undetected. Please consider this when interpreting information found in this chart.

## 2017-07-12 NOTE — Clinical Note
Dear Austyn Jama saw me at [facility] on Jul 12, 2017.  Please refer to the visit note at your convenience and feel free to contact me should you have any questions.  Sincerely,  Ben Vanessa DO, [facility]

## 2017-07-13 ENCOUNTER — TELEPHONE (OUTPATIENT)
Dept: INTERNAL MEDICINE | Facility: CLINIC | Age: 62
End: 2017-07-13

## 2017-07-13 DIAGNOSIS — R79.89 LOW TESTOSTERONE: ICD-10-CM

## 2017-07-13 RX ORDER — TADALAFIL 20 MG/1
20 TABLET ORAL DAILY
Qty: 100 TABLET | Refills: 0 | Status: SHIPPED | OUTPATIENT
Start: 2017-07-13 | End: 2017-11-17

## 2017-07-13 NOTE — TELEPHONE ENCOUNTER
Pt calling.  States his Cialis rx was faxed to the wrong [facility].    Advised pt to call his pharm and get the fax # and call clinic back with that #.    Encounter left open.  (Rx was done per LC.)

## 2017-07-13 NOTE — TELEPHONE ENCOUNTER
Tadalafil      Last Written Prescription Date: 7/28/16  Last Fill Quantity: 100,  # refills: 0   Last Office Visit with [facility], [facility] or [facility] prescribing provider: 7/6/17

## 2017-07-14 NOTE — TELEPHONE ENCOUNTER
[facility] is called: [facility]  Tel: [phone]  Fax: [phone]  Best # to call patient back is [phone]  Okay to leave a message  Call anytime

## 2017-07-24 ENCOUNTER — THERAPY VISIT (OUTPATIENT)
Dept: PHYSICAL THERAPY | Facility: CLINIC | Age: 62
End: 2017-07-24
Payer: COMMERCIAL

## 2017-07-24 DIAGNOSIS — M25.552 HIP PAIN, LEFT: Primary | ICD-10-CM

## 2017-07-24 DIAGNOSIS — M67.952 TENDINOPATHY OF LEFT GLUTEUS MEDIUS: ICD-10-CM

## 2017-07-24 PROCEDURE — 97161 PT EVAL LOW COMPLEX 20 MIN: CPT | Mod: GP | Performed by: PHYSICAL THERAPIST

## 2017-07-24 PROCEDURE — 97110 THERAPEUTIC EXERCISES: CPT | Mod: GP | Performed by: PHYSICAL THERAPIST

## 2017-07-24 ASSESSMENT — ACTIVITIES OF DAILY LIVING (ADL)
LIGHT_TO_MODERATE_WORK: SLIGHT DIFFICULTY
HOS_ADL_COUNT: 16
SITTING_FOR_15_MINUTES: NO DIFFICULTY AT ALL
TWISTING/PIVOTING_ON_INVOLVED_LEG: MODERATE DIFFICULTY
STEPPING_UP_AND_DOWN_CURBS: SLIGHT DIFFICULTY
GOING_UP_1_FLIGHT_OF_STAIRS: MODERATE DIFFICULTY
PUTTING_ON_SOCKS_AND_SHOES: SLIGHT DIFFICULTY
WALKING_INITIALLY: NO DIFFICULTY AT ALL
HOS_ADL_ITEM_SCORE_TOTAL: 38
ROLLING_OVER_IN_BED: SLIGHT DIFFICULTY
HEAVY_WORK: EXTREME DIFFICULTY
DEEP_SQUATTING: SLIGHT DIFFICULTY
RECREATIONAL_ACTIVITIES: EXTREME DIFFICULTY
GETTING_INTO_AND_OUT_OF_AN_AVERAGE_CAR: SLIGHT DIFFICULTY
WALKING_UP_STEEP_HILLS: MODERATE DIFFICULTY
WALKING_DOWN_STEEP_HILLS: MODERATE DIFFICULTY
WALKING_15_MINUTES_OR_GREATER: MODERATE DIFFICULTY
STANDING_FOR_15_MINUTES: MODERATE DIFFICULTY
WALKING_APPROXIMATELY_10_MINUTES: SLIGHT DIFFICULTY
GOING_DOWN_1_FLIGHT_OF_STAIRS: MODERATE DIFFICULTY
HOS_ADL_HIGHEST_POTENTIAL_SCORE: 64
HOS_ADL_SCORE(%): 59.38

## 2017-07-24 NOTE — MR AVS SNAPSHOT
After Visit Summary   7/24/2017    Austyn Araujo    MRN: [id number]           Patient Information     Date Of Birth          1955        Visit Information        Provider Department      7/24/2017 12:00 PM Schoenecker, Jon, PT [facility]        Today's Diagnoses     Hip pain, left    -  1    Tendinopathy of left gluteus medius           Follow-ups after your visit        Your next 10 appointments already scheduled     Aug 07, 2017 11:50 AM CDT   BERT Extremity with DAVIN Siegel PT ([facility]  )    [address] [address] [address]   [phone]            Aug 07, 2017  5:00 PM CDT   Evaluation with [place] [facility] ([facility])    [address] [place] [address] [id number] [phone]            Aug 14, 2017 11:50 AM CDT   BERT Extremity with DAVIN Siegel PT ([facility]  )    [id number] [place] [address]   [phone]              Who to contact     If you have questions or need follow up information about today's clinic visit or your schedule please contact [facility] directly at [phone].  Normal or non-critical lab and imaging results will be communicated to you by MyChart, letter or phone within 4 business days after the clinic has received the results. If you do not hear from us within 7 days, please contact the clinic through MyChart or phone. If you have a critical or abnormal lab result, we will notify you by phone as soon as possible.  Submit refill requests through [employer] or call your pharmacy and they will forward the refill request to us. Please allow 3 business days for your refill to be completed.          Additional Information About Your Visit        Arch[employer] Information     [employer] lets you send messages to your doctor, view your test results, renew your prescriptions,  "schedule appointments and more. To sign up, go to [email or web address] . Click on \"Log in\" on the left side of the screen, which will take you to the Welcome page. Then click on \"Sign up Now\" on the right side of the page.     You will be asked to enter the access code listed below, as well as some personal information. Please follow the directions to create your username and password.     Your access code is: [email or web address]  Expires: 10/4/2017 11:36 AM     Your access code will  in 90 days. If you need help or a new code, please call your [facility] clinic or [phone].        Care EveryWhere ID     This is your Care EveryWhere ID. This could be used by other organizations to access your [facility] medical records  [id number]         Blood Pressure from Last 3 Encounters:   17 122/80   17 110/84   16 144/76    Weight from Last 3 Encounters:   17 102.5 kg (226 lb)   17 102.8 kg (226 lb 9.6 oz)   16 101.6 kg (224 lb)              We Performed the Following     HC PT EVAL, LOW COMPLEXITY     [facility]ERT INITIAL EVAL REPORT     THERAPEUTIC EXERCISES        Primary Care Provider Office Phone # Fax #    Javier Villafana [phone]       [facility] [address] [place] [address]        Goals        General    Reduce drinking by 90% (pt-stated)     Notes - Note created  2015 10:33 AM by Mckenna Chang MSW    As of today's date 2015 goal is met at 26 - 50%.   Goal Status:  Active          Equal Access to Services     [facility] AH: Hadii anni Baltazar, waaxda luqadaha, qaybta kaalmada teena schulz. So [facility] [phone].    ATENCIÓN: Si habla español, tiene a carrillo disposición servicios gratuitos de asistencia lingüística. Llame al [phone].    We comply with applicable federal civil rights laws and [place] laws. We do not discriminate on the basis of race, color, national origin, age, " "disability sex, sexual orientation or gender identity.            Thank you!     Thank you for choosing [facility]  for your care. Our goal is always to provide you with excellent care. Hearing back from our patients is one way we can continue to improve our services. Please take a few minutes to complete the written survey that you may receive in the mail after your visit with us. Thank you!             Your Updated Medication List - Protect others around you: Learn how to safely use, store and throw away your medicines at www.disposemymeds.org.          This list is accurate as of: 7/24/17  1:06 PM.  Always use your most recent med list.                   Brand Name Dispense Instructions for use Diagnosis    ALPRAZolam 0.5 MG tablet    XANAX    30 tablet    Take 1 tablet (0.5 mg) by mouth 3 times daily as needed for anxiety    Anxiety       aspirin 81 MG tablet      Take 1 tablet by mouth daily.        atorvastatin 10 MG tablet    LIPITOR    90 tablet    Take 1 tablet (10 mg) by mouth daily    Hyperlipidemia LDL goal <100       B-D LUER-ROCHELLE SYRINGE 22G X 1-1/2\" 3 ML Misc   Generic drug:  syringe/needle (disp)     4 each    USE WITH TESTOSTERONE    Low testosterone       blood glucose monitoring test strip    ONE TOUCH ULTRA    100 strip    Use to test blood sugars One time daily or as directed.    Type 2 diabetes mellitus with diabetic neuropathy (H)       CENTRUM SILVER per tablet     90 tablet    Take 1 tablet by mouth daily    Type 2 diabetes mellitus with diabetic neuropathy (H), Essential hypertension with goal blood pressure less than 140/90       lisinopril 5 MG tablet    PRINIVIL/ZESTRIL    180 tablet    Take 1 tablet (5 mg) by mouth 2 times daily    Essential hypertension with goal blood pressure less than 140/90       metFORMIN 1000 MG tablet    GLUCOPHAGE    180 tablet    TAKE 1 TABLET BY MOUTH 2 TIMES DAILY (WITH MEALS)    Essential hypertension with goal blood pressure less than 140/90, Type " "2 diabetes mellitus without complication, without long-term current use of insulin (H)       needle (disp) 18G X 1\" Misc    B-D DISP NEEDLE TW    5 each    1 each every 7 days With testosterone    Low testosterone       omeprazole 40 MG capsule    priLOSEC    30 capsule    Take 1 capsule (40 mg) by mouth daily    Gastroesophageal reflux disease without esophagitis       tadalafil 20 MG tablet    CIALIS    100 tablet    Take 1 tablet (20 mg) by mouth daily Never use with nitroglycerin, terazosin or doxazosin.    Low testosterone       testosterone cypionate 200 MG/ML injection    DEPOTESTOTERONE    10 mL    INJECT 2 MLS IM ONCE WEEKLY    Low testosterone         "

## 2017-07-24 NOTE — PROGRESS NOTES
Subjective:  Physical Therapy Initial Evaluation    Therapist Impression:   Austyn Araujo is a 61 year old male patient presenting to Physical Therapy with: Lateral hip and groin pain. These impairments limit their ability to ambulate without pain. Skilled PT services are necessary in order to reduce impairments and improve independent function.    Therapist impression: L hip OA and L gluteus medius tendinopathy    Precautions/Restrictions/MD instructions: Evaluate and treat       Subjective:    DOI/onset: One year ago  History of current symptoms: Insidious onset. Pain starts at lateral hip and snaps. Then it works its way down into the groin. Back problems still exist (mid and lower back) but he has had this for a long time.   Primary pain location: Groin pain, and also lateral hip pain  Quality/radiation: achy pain can sometimes go down front of thigh.  Exacerbated by: Riding motor cycle, Stairs, up and moving around, hiking; lateral hip pain also when crossing left leg over the right.  Relieved by: Rest, pain medication (has old OxyContin that he uses when it gets really bad), Aleve (6 at a time)  Pain: He denies having painful cough/sneeze, saddle anaesthesia, severe night pain, peripheral motor   deficit, recent bowel/bladder change, recent vision change, ringing in the ears or pain with swallowing. Pain is rated 2/10 Currently, 0/10 at best, and 9/10 at worst.  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    Patient's goals: Able to go hiking - can currently only walk ~one block  General health as reported by patient: good.     Previous functional status:  fully functional prior to pain onset/injury.     HPI                     Objective:  HIP:    Lumbar Screen: Hands to mid shin (tight hamstrings); has on and off back pain seemingly unrelated to hip.    SL Balance:   R: 30+ sec   L: 30+ sec     Functional:   - Squat: no pain with squat. Decreased control with single leg squat bilat - no pain    PROM: (* indicates patient's pain)   L  R   Flexion 120 (groin tightness) 120   Extension 10 degrees 10 degrees   Abduction Limited, groin pulling Limited, groin pulling   IR (supine 90-90) Decreased to 30 each side Decreased to 30 each side   ER (supine 90-90) wnl wnl     Strength:   L R   HIP     Flex 5 5   Ext 3+ 3+   Add (0-45-90 degrees: supine) 4+ 4+   Abd 4 4 (pain)   KNEE     Flex     Ext 5 5   -Specific painful motions: abduction in supine butterfly position very painful with minimal resistance    Special tests:   L R   Breezy's     Kobe     ROCIO  (+)   FADIR  (+)   SLR To 40 (tight hamstrings) To 40 (tight hamstrings)   Hamstring 90-90     Log Roll     SIJ Compression         Palpation: TTP over L greater trochanter        System    Physical Exam    General     ROS    Assessment/Plan:      Patient is a 61 year old male with left side hip complaints.    Patient has the following significant findings with corresponding treatment plan.                Diagnosis 1:  L hip OA and gluteal tendonopathy  Pain -  hot/cold therapy, manual therapy, self management, education and home program  Decreased ROM/flexibility - manual therapy, therapeutic exercise and home program  Decreased joint mobility - manual therapy, therapeutic exercise and home program  Decreased strength - therapeutic exercise, therapeutic activities and home program  Decreased proprioception - neuro re-education, therapeutic activities and home program  Impaired muscle performance - neuro re-education and home program  Decreased function - therapeutic activities and home program    Therapy Evaluation Codes:   1) History comprised of:   Personal factors that impact  the plan of care:      Past/current experiences and Time since onset of symptoms. Anxiety   Comorbidity factors that impact the plan of care are:      Depression, High blood pressure, Osteoarthritis and Pain at night/rest.     Medications impacting care: Anti-inflammatory and Pain.  2) Examination of Body Systems comprised of:   Body structures and functions that impact the plan of care:      Hip.   Activity limitations that impact the plan of care are:      Bending, Squatting/kneeling, Stairs, Standing, Walking, Working and Sleeping.  3) Clinical presentation characteristics are:   Stable/Uncomplicated.  4) Decision-Making    Low complexity using standardized patient assessment instrument and/or measureable assessment of functional outcome.  Cumulative Therapy Evaluation is: Low complexity.    Previous and current functional limitations:  (See Goal Flow Sheet for this information)    Short term and Long term goals: (See Goal Flow Sheet for this information)     Communication ability:  Patient appears to be able to clearly communicate and understand verbal and written communication and follow directions correctly.  Treatment Explanation - The following has been discussed with the patient:   RX ordered/plan of care  Anticipated outcomes  Possible risks and side effects  This patient would benefit from PT intervention to resume normal activities.   Rehab potential is good.    Frequency:  1 X week, once daily  Duration:  for 6 weeks  Discharge Plan:  Achieve all LTG.  Independent in home treatment program.  Reach maximal therapeutic benefit.    Please refer to the daily flowsheet for treatment today, total treatment time and time spent performing 1:1 timed codes.

## 2017-08-07 ENCOUNTER — THERAPY VISIT (OUTPATIENT)
Dept: PHYSICAL THERAPY | Facility: CLINIC | Age: 62
End: 2017-08-07
Payer: COMMERCIAL

## 2017-08-07 ENCOUNTER — HOSPITAL ENCOUNTER (OUTPATIENT)
Dept: BEHAVIORAL HEALTH | Facility: CLINIC | Age: 62
Discharge: HOME | End: 2017-08-07
Attending: SOCIAL WORKER | Admitting: SOCIAL WORKER
Payer: COMMERCIAL

## 2017-08-07 DIAGNOSIS — M25.552 HIP PAIN, LEFT: ICD-10-CM

## 2017-08-07 DIAGNOSIS — M67.952 TENDINOPATHY OF LEFT GLUTEUS MEDIUS: ICD-10-CM

## 2017-08-07 PROCEDURE — H0001 ALCOHOL AND/OR DRUG ASSESS: HCPCS

## 2017-08-07 PROCEDURE — 97530 THERAPEUTIC ACTIVITIES: CPT | Mod: GP | Performed by: PHYSICAL THERAPIST

## 2017-08-07 PROCEDURE — 97110 THERAPEUTIC EXERCISES: CPT | Mod: GP | Performed by: PHYSICAL THERAPIST

## 2017-08-07 ASSESSMENT — ANXIETY QUESTIONNAIRES
3. WORRYING TOO MUCH ABOUT DIFFERENT THINGS: SEVERAL DAYS
IF YOU CHECKED OFF ANY PROBLEMS ON THIS QUESTIONNAIRE, HOW DIFFICULT HAVE THESE PROBLEMS MADE IT FOR YOU TO DO YOUR WORK, TAKE CARE OF THINGS AT HOME, OR GET ALONG WITH OTHER PEOPLE: SOMEWHAT DIFFICULT
7. FEELING AFRAID AS IF SOMETHING AWFUL MIGHT HAPPEN: MORE THAN HALF THE DAYS
5. BEING SO RESTLESS THAT IT IS HARD TO SIT STILL: NOT AT ALL
4. TROUBLE RELAXING: SEVERAL DAYS
1. FEELING NERVOUS, ANXIOUS, OR ON EDGE: SEVERAL DAYS
2. NOT BEING ABLE TO STOP OR CONTROL WORRYING: SEVERAL DAYS
6. BECOMING EASILY ANNOYED OR IRRITABLE: SEVERAL DAYS
GAD7 TOTAL SCORE: 7

## 2017-08-07 ASSESSMENT — PAIN SCALES - GENERAL: PAINLEVEL: SEVERE PAIN (6)

## 2017-08-07 ASSESSMENT — PATIENT HEALTH QUESTIONNAIRE - PHQ9: SUM OF ALL RESPONSES TO PHQ QUESTIONS 1-9: 11

## 2017-08-07 NOTE — PROGRESS NOTES
Rule 25 Assessment  Background Information   1. Date of Assessment Request 7/19/17 2. Date of Assessment  8/7/17 3. Date Service Authorized     4.   NOEMI Dodson   5.  Phone Number   [phone] 6. Referent  Self 7. Assessment Site  [facility]     8. Client Name   Austyn Araujo 9. Date of Birth  1955 Age  61 year old 10. Gender  male  11. PMI/ Insurance No.  [id number]   12. Client's Primary Language:  English 13. Do you require special accommodations, such as an  or assistance with written material? No   14. Current Address: [address] [place] [id number]   15. Client Phone Numbers: [phone] (home) [phone] (work)     16. Tell me what has happened to bring you here today.    I drink every day and it has become excessive.  About 1.5 months my daughter moved out and then my wife moved out and she has now pretty much gave me an ultimatum to get some help.    17. Have you had other rule 25 assessments?     No    DIMENSION I - Acute Intoxication /Withdrawal Potential   1. Chemical use most recent 12 months outside a facility and other significant use history (client self-report)              X = Primary Drug Used   Age of First Use Most Recent Pattern of Use and Duration   Need enough information to show pattern (both frequency and amounts) and to show tolerance for each chemical that has a diagnosis   Date of last use and time, if needed   Withdrawal Potential? Requiring special care Method of use  (oral, smoked, snort, IV, etc)   x   Alcohol     unsp  past year: 1L/day weekdays, 1.75L/wknd night.  1 year ago: 2 months no use.  Past 2-3 years: same  Past 5 years: daily drinking.   8/7/17  2am yes oral      Marijuana/  Hashish   unsp  Few times/year unknown no smoke      Cocaine/Crack     N/A  20 years ago         Meth/  Amphetamines   N/A           Heroin     N/A           Other Opiates/  Synthetics   N/A           Inhalants      N/A           Benzodiazepines     N/A           Hallucinogens     N/A           Barbiturates/  Sedatives/  Hypnotics N/A           Over-the-Counter Drugs   N/A           Other     N/A           Nicotine     N/A          2. Do you use greater amounts of alcohol/other drugs to feel intoxicated or achieve the desired effect?  Yes.  Or use the same amount and get less of an effect?  Yes.  Example: Past year started buy it by the case and have noticed that case doesn't last me a week.    3A. Have you ever been to detox?     No    3B. When was the first time?     NA    3C. How many times since then?     NA    3D. Date of most recent detox:     NA    4.  Withdrawal symptoms: Have you had any of the following withdrawal symptoms?  Past 12 months Recent (past 30 days)   High Blood Pressure Sweating (Rapid Pulse)  Shaky / Jittery / Tremors  Unable to Sleep  Agitation  Fatigue / Extremely Tired  Sad / Depressed Feeling  Muscle Aches  Vivid / Unpleasant Dreams  Irritability  Dizziness  Diarrhea  Anxiety / Worried     's Visual Observations and Symptoms: No visible withdrawal symptoms at this time    Based on the above information, is withdrawal likely to require attention as part of treatment participation?  No    Dimension I Ratings   Acute intoxication/Withdrawal potential - The placing authority must use the criteria in Dimension I to determine a client s acute intoxication and withdrawal potential.    RISK DESCRIPTIONS - Severity ratin Client displays full functioning with good ability to tolerate and cope with withdrawal discomfort. No signs or symptoms of intoxication or withdrawal or resolving signs or symptoms.    REASONS SEVERITY WAS ASSIGNED (What about the amount of the person s use and date of most recent use and history of withdrawal problems suggests the potential of withdrawal symptoms requiring professional assistance? )     Client reported use within 24 hours of interview but denied any current  discomfort.  No signs/symptoms of intoxication or withdrawal observed.  Did discuss detox needs and services.         DIMENSION II - Biomedical Complications and Conditions   1. Do you have any current health/medical conditions?(Include any infectious diseases, allergies, or chronic or acute pain, history of chronic conditions)       Yes.   Illnesses/Medical Conditions you are receiving care for:   Past Medical History:   Diagnosis Date     Anxiety      Anxiety 1/31/2015     BPH (benign prostatic hyperplasia)      Degenerative disc disease      Depression      Diabetes mellitus (H)      Gastritis      Gastro-oesophageal reflux disease      H/O alcohol abuse      Hyperlipidemia      Hypertension      Low testosterone      MRSA (methicillin resistant staph aureus) culture positive 8/2013    skin infection     PUD (peptic ulcer disease)      Sleep apnea    .    2. Do you have a health care provider? When was your most recent appointment? What concerns were identified?     [facility] [place].    3. If indicated by answers to items 1 or 2: How do you deal with these concerns? Is that working for you? If you are not receiving care for this problem, why not?      medications    4A. List current medication(s) including over-the-counter or herbal supplements--including pain management:     Current Outpatient Prescriptions   Medication     metFORMIN (GLUCOPHAGE) 1000 MG tablet     lisinopril (PRINIVIL,ZESTRIL) 5 MG tablet     atorvastatin (LIPITOR) 10 MG tablet     omeprazole (PRILOSEC) 40 MG capsule     ALPRAZolam (XANAX) 0.5 MG tablet     Multiple Vitamins-Minerals (CENTRUM SILVER) per tablet     aspirin 81 MG tablet       4B. Do you follow current medical recommendations/take medications as prescribed?     Yes    4C. When did you last take your medication?     8/7/17    5. Has a health care provider/healer ever recommended that you reduce or quit alcohol/drug use?     Yes    6. Are you pregnant?     No    7. Have  you had any injuries, assaults/violence towards you, accidents, health related issues, overdose(s) or hospitalizations related to your use of alcohol or other drugs:     Yes, explain: 2x most recent 1 year ago [facility] for pancreatitis    8. Do you have any specific physical needs/accommodations? No    Dimension II Ratings   Biomedical Conditions and Complications - The placing authority must use the criteria in Dimension II to determine a client s biomedical conditions and complications.   RISK DESCRIPTIONS - Severity ratin Client tolerates and nils with physical discomfort and is able to get the services that the client needs.    REASONS SEVERITY WAS ASSIGNED (What physical/medical problems does this person have that would inhibit his or her ability to participate in treatment? What issues does he or she have that require assistance to address?)    Client has a primary care provider and is able to seek services as needed.  He does have high blood pressure and diabetes, managed with medications.  He denied any current concern or discomfort.         DIMENSION III - Emotional, Behavioral, Cognitive Conditions and Complications   1. (Optional) Tell me what it was like growing up in your family. (substance use, mental health, discipline, abuse, support)     Raised by mom and dad, dad drank but not heavy.  Middle child, 2 brothers, 1 sister.  Youngest brother committed suicide and drank.      2. When was the last time that you had significant problems...  A. with feeling very trapped, lonely, sad, blue, depressed or hopeless  about the future? Past Month    B. with sleep trouble, such as bad dreams, sleeping restlessly, or falling  asleep during the day? Past Month    C. with feeling very anxious, nervous, tense, scared, panicked, or like  something bad was going to happen? Past Month    D. with becoming very distressed and upset when something reminded  you of the past? Never    E. with thinking about  ending your life or committing suicide? Past Month    3. When was the last time that you did the following things two or more times?  A. Lied or conned to get things you wanted or to avoid having to do  something? Never    B. Had a hard time paying attention at school, work, or home? Past Month    C. Had a hard time listening to instructions at school, work, or home? 2 - 12 months ago    D. Were a bully or threatened other people? Never    E. Started physical fights with other people? Never    Note: These questions are from the Global Appraisal of Individual Needs--Short Screener. Any item marked  past month  or  2 to 12 months ago  will be scored with a severity rating of at least 2.     For each item that has occurred in the past month or past year ask follow up questions to determine how often the person has felt this way or has the behavior occurred? How recently? How has it affected their daily living? And, whether they were using or in withdrawal at the time?    Drinking, relationship.  2E: When you come home to an empty house, you have those thoughts but no intent or plan.    4A. If the person has answered item 2E with  in the past year  or  the past month , ask about frequency and history of suicide in the family or someone close and whether they were under the influence.     Denies any current ideation or plan or intent to harm self.    Any history of suicide in your family? Or someone close to you?     Yes, explain: brother and had a girlfriend    4B. If the person answered item 2E  in the past month  ask about  intent, plan, means and access and any other follow-up information  to determine imminent risk. Document any actions taken to intervene  on any identified imminent risk.      WILNER signed for wife.    5A. Have you ever been diagnosed with a mental health problem?     Yes, If yes explain: have had bouts of depression and do get anxiety.    5B. Are you receiving care for any mental health issues? If  yes, what is the focus of that care or treatment?  Are you satisfied with the service? Most recent appointment?  How has it been helpful?     RX for Alprazolam (PRN), take about once a month.     6. Have you been prescribed medications for emotional/psychological problems?     Yes.  6B. Current mental health medication(s) If these medications are listed for Dimension II, reference item II-5. See Dim2.   6C. Are you taking your medications as instructed?  yes.    7. Does your MH provider know about your use?     NA    8A. Have you ever been verbally, emotionally, physically or sexually abused?      No     Follow up questions to learn current risk, continuing emotional impact.      NA    8B. Have you received counseling for abuse?      N/A    9. Have you ever experienced or been part of a group that experienced community violence, historical trauma, rape or assault?     No    10A. :    No    11. Do you have problems with any of the following things in your daily life?    Concentration, Remembering, In relationships with others and Reading, writing, calculating    Note: If the person has any of the above problems, follow up with items 12, 13, and 14. If none of the issues in item 11 are a problem for the person, skip to item 15.    Concerns with my memory.    12. Have you been diagnosed with traumatic brain injury or Alzheimer s?  No    13. If the answer to #12 is no, ask the following questions:    Have you ever hit your head or been hit on the head? No    Were you ever seen in the Emergency Room, hospital or by a doctor because of an injury to your head? No    Have you had any significant illness that affected your brain (brain tumor, meningitis, West Nile Virus, stroke or seizure, heart attack, near drowning or near suffocation)? No    14. If the answer to #12 is yes, ask if any of the problems identified in #11 occurred since the head injury or loss of oxygen. NA    15A. Highest grade of school completed:      College graduate    15B. Do you have a learning disability? No    15C. Did you ever have tutoring in Math or English? No    15D. Have you ever been diagnosed with Fetal Alcohol Effects or Fetal Alcohol Syndrome? No    16. If yes to item 15 B, C, or D: How has this affected your use or been affected by your use?     NA    Dimension III Ratings   Emotional/Behavioral/Cognitive - The placing authority must use the criteria in Dimension III to determine a client s emotional, behavioral, and cognitive conditions and complications.   RISK DESCRIPTIONS - Severity ratin Client has difficulty with impulse control and lacks coping skills. Client has thoughts of suicide or harm to others without means; however, the thoughts may interfere with participation in some treatment activities. Client has difficulty functioning in significant life areas. Client has moderate symptoms of emotional, behavioral, or cognitive problems. Client is able to participate in most treatment activities.    REASONS SEVERITY WAS ASSIGNED - What current issues might with thinking, feelings or behavior pose barriers to participation in a treatment program? What coping skills or other assets does the person have to offset those issues? Are these problems that can be initially accommodated by a treatment provider? If not, what specialized skills or attributes must a provider have?    Client reports anxiety and has had depression symptoms.  Current stressors include separation from wife and daughter, loneliness.  He does report passive suicidal thoughts but denies any intent or plan.  PHQ-9 score 11, would recommend individual therapy following maintaining abstinence.         DIMENSION IV - Readiness for Change   1. You ve told me what brought you here today. (first section) What do you think the problem really is?     I've always felt like it's too much and it is a problem and that I need to quit, but now it is a physical craving and the urge is  stronger.    2. Tell me how things are going. Ask enough questions to determine whether the person has use related problems or assets that can be built upon in the following areas: Family/friends/relationships; Legal; Financial; Emotional; Educational; Recreational/ leisure; Vocational/employment; Living arrangements (DSM)      There has always been financial issues    3. What activities have you engaged in when using alcohol/other drugs that could be hazardous to you or others (i.e. driving a car/motorcycle/boat, operating machinery, unsafe sex, sharing needles for drugs or tattoos, etc     Driving.    4. How much time do you spend getting, using or getting over using alcohol or drugs? (DSM)     Daily.    5. Reasons for drinking/drug use (Use the space below to record answers. It may not be necessary to ask each item.)  Like the feeling Yes   Trying to forget problems No   To cope with stress Yes   To relieve physical pain No   To cope with anxiety Yes   To cope with depression Yes   To relax or unwind Yes   Makes it easier to talk with people No   Partner encourages use No   Most friends drink or use Yes   To cope with family problems Yes   Afraid of withdrawal symptoms/to feel better Yes   Other (specify)  N/A     A. What concerns other people about your alcohol or drug use/Has anyone told you that you use too much? What did they say? (DSM)     My wife thinks I get angrier when I drink.    B. What did you think about that/ do you think you have a problem with alcohol or drug use?     It's affecting my health and my memory.  There are definitely signs it is affecting me physically.    6. What changes are you willing to make? What substance are you willing to stop using? How are you going to do that? Have you tried that before? What interfered with your success with that goal?      I know it will kill me if I don't stop.  I don't always know why I drink, it just feels better when I do.    7. What would be helpful  to you in making this change?     I think I have the will power to do it I just need to the encouragement.    Dimension IV Ratings   Readiness for Change - The placing authority must use the criteria in Dimension IV to determine a client s readiness for change.   RISK DESCRIPTIONS - Severity ratin Client displays verbal compliance, but lacks consistent behaviors; has low motivation for change; and is passively involved in treatment.    REASONS SEVERITY WAS ASSIGNED - (What information did the person provide that supports your assessment of his or her readiness to change? How aware is the person of problems caused by continued use? How willing is she or he to make changes? What does the person feel would be helpful? What has the person been able to do without help?)      Client was compliant and cooperative, he expresses desire to stop drinking but does have external pressure from family.         DIMENSION V - Relapse, Continued Use, and Continued Problem Potential   1. In what ways have you tried to control, cut-down or quit your use? If you have had periods of sobriety, how did you accomplish that? What was helpful? What happened to prevent you from continuing your sobriety? (DSM)     A year ago, after the hospital stay I quit on my own for a couple months.  Health was motivation but I started doing more things to keep myself occupied and not thinking about it.    2. Have you experienced cravings? If yes, ask follow up questions to determine if the person recognizes triggers and if the person has had any success in dealing with them.     Used to drink for fun and now it has become a powerful craving to get home and drink.    3. Have you been treated for alcohol/other drug abuse/dependence?     No    4. Support group participation: Have you/do you attend support group meetings to reduce/stop your alcohol/drug use? How recently? What was your experience? Are you willing to restart? If the person has not  participated, is he or she willing?     No, I think I went to a couple AA a few years back.    5. What would assist you in staying sober/straight?     support    Dimension V Ratings   Relapse/Continued Use/Continued problem potential - The placing authority must use the criteria in Dimension V to determine a client s relapse, continued use, and continued problem potential.   RISK DESCRIPTIONS - Severity ratin No awareness of the negative impact of mental health problems or substance abuse. No coping skills to arrest mental health or addiction illnesses, or prevent relapse.    REASONS SEVERITY WAS ASSIGNED - (What information did the person provide that indicates his or her understanding of relapse issues? What about the person s experience indicates how prone he or she is to relapse? What coping skills does the person have that decrease relapse potential?)      Client has been drinking daily, has withdrawal concerns and has been unable to stop on his own.  He does have cravings and despite consequences continues to drink.  He lacks daily sober living skills.         DIMENSION VI - Recovery Environment   1. Are you employed/attending school? Tell me about that.     , 17 years, full-time.  No job concerns.    2A. Describe a typical day; evening for you. Work, school, social, leisure, volunteer, spiritual practices. Include time spent obtaining, using, recovering from drugs or alcohol. (DSM)     Get up early and at work by 5am, put in an 11-12 hour day pretty typical and then I get home and like to kick back and wind down and have a cocktail.  Putzing in the garage or watching tv.     2B. How often do you spend more time than you planned using or use more than you planned? (DSM)     It has gotten to the point where I will drink a cocktail for breakfast.    3. How important is using to your social connections? Do many of your family or friends use?     Most of my friends drink but have had  comments that I need to slow down because I drink more than they do.    4A. Are you currently in a significant relationship?     Yes.  4B. How long? 20 years    4C. Sexual Orientation:     Heterosexual    5A. Who do you live with?      Wife and daughter, but they have left the home so right now alone.    5B. Tell me about their alcohol/drug use and mental health issues.     NA    5C. Are you concerned for your safety there? No    5D. Are you concerned about the safety of anyone else who lives with you? No    6A. Do you have children who live with you?     No    6B. Do you have children who do not live with you?     Yes.  (Ask follow up questions to learn where the children are, who has custody and what the person s relationship and responsibility is with these children and what hopes the person has for his or her future with these children.) 3 daughters (32, 30, 17)    7A. Who supports you in making changes in your alcohol or drug use? What are they willing to do to support you? Who is upset or angry about you making changes in your alcohol or drug use? How big a problem is this for you?      Wife.    7B. This table is provided to record information about the person s relationships and available support It is not necessary to ask each item; only to get a comprehensive picture of their support system.  How often can you count on the following people when you need someone?   Partner / Spouse Usually supportive   Parent(s)/Aunt(s)/Uncle(s)/Grandparents N/A   Sibling(s)/Cousin(s) Usually supportive   Child(fiordaliza) Usually supportive   Other relative(s) Never supportive   Friend(s)/neighbor(s) Usually supportive   Child(fiordaliza) s father(s)/mother(s) N/A   Support group member(s) N/A   Community of z[place] members N/A   /counselor/therapist/healer N/A   Other (specify) N/A     8A. What is your current living situation?     Independent living    8B. What is your long term plan for where you will be living?      n/a    8C. Tell me about your living environment/neighborhood? Ask enough follow up questions to determine safety, criminal activity, availability of alcohol and drugs, supportive or antagonistic to the person making changes.      No concerns reported.    9. Criminal justice history: Gather current/recent history and any significant history related to substance use--Arrests? Convictions? Circumstances? Alcohol or drug involvement? Sentences? Still on probation or parole? Expectations of the court? Current court order? Any sex offenses - lifetime? What level? (DSM)    2x DWI, most recent 15 years.    10. What obstacles exist to participating in treatment? (Time off work, childcare, funding, transportation, pending correction time, living situation)     None    Dimension VI Ratings   Recovery environment - The placing authority must use the criteria in Dimension VI to determine a client s recovery environment.   RISK DESCRIPTIONS - Severity ratin Client is engaged in structured, meaningful activity, but peers, family, significant other, and living environment are unsupportive, or there is criminal justice involvement by the client or among the client s peers, significant others, or in the client s living environment.    REASONS SEVERITY WAS ASSIGNED - (What support does the person have for making changes? What structure/stability does the person have in his or her daily life that will increase the likelihood that changes can be sustained? What problems exist in the person s environment that will jeopardize getting/staying clean and sober?)     Client reports he is employed full time and denies any job concerns. He is , but wife has left the home along with his daughter.  He also has two adult daughters out of the home and he reports family and friends have expressed concern with his drinking.  He denies any legal issues.  He lacks any meaningful daily activity.         Client Choice/Exceptions   Would you  like services specific to language, age, gender, culture, Mormonism preference, race, ethnicity, sexual orientation or disability?  No    What particular treatment choices and options would you like to have? outpatient    Do you have a preference for a particular treatment program? [facility]    Criteria for Diagnosis     Criteria for Diagnosis  DSM-5 Criteria for Substance Use Disorder  Instructions: Determine whether the client currently meets the criteria for Substance Use Disorder using the diagnostic criteria in the DSM-V pp.[phone]. Current means during the most recent 12 months outside a facility that controls access to substances    Category of Substance Severity (ICD-10 Code / DSM 5 Code)     Alcohol Use Disorder Severe  (10.20) (303.90)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

## 2017-08-07 NOTE — PROGRESS NOTES
[facility]  [address] [place] [address], [address]  [place] [address]                 ADULT CD ASSESSMENT      Additional Clinical Questions - Outpatient    Patient Name: Austyn Araujo  Cell Phone:   Home: [phone] (home) [phone] (work)   Mobile:   Telephone Information:   Mobile [phone]       Email:  [email or web address]  Emergency Contact: Alba Gayle   Tel: [phone]    ________________________________________________________________________      The patient is      With which race do you identify? White    Please list your family members and if they are living or , i.e. (grandparents, parents, step-parents, adoptive parents, number of siblings, half-siblings, etc.)     Mother    Father    No Step-mother   NA No Step-father NA   Maternal Grandmother    Fraternal Grandmother    Maternal Grandfather     Fraternal Grandfather    1 Sister(s) Living 2 Brother(s)   Living and    No Half-sister(s)   NA No Half-brother(s) NA             Who raised you? (parents, grandparents, adoptive parents, step-parents, etc.)    Both Parents    Have any of your family members or significant others had problems with mental illness or substance abuse?  Please explain.    Youngest brother    Do you have any children or Stepchildren? Yes, please explain: Nicolasa (32), Ramya (30), Betsy (17)    Are you being investigated by Child Protection Services? No    Do you have a child protection worker, probation office or ? No    How would you describe your current finances?  Some money problems    If you are having problems, (unpaid bills, bankruptcy, IRS problems) please explain:  Yes, please explain: unpaid bills    If working or a student are you able to function appropriately in that setting? Yes    Describe your preferred learning style:  by watching someone else demonstrate    What personal strengths do you have  that can help you get sober?  God and willpower    Do you currently self-administer your medications?  Yes    Have you ever:    Had to lie to people important to you about how much you sinclair?     No     Felt the need to bet more and more money?      No     Attempted treatment for a gambling problem?        No     Touched or fondled someone else inappropriately, or forced them to have sex with you against their will?       No     Are you or have you ever been a registered sex offender?        No     Is there any history of sexual abuse in your family?        No     [place] obsessed by your sexual behavior (having sex with many partners, masturbating often, using pornography often?        No     Received therapy or stayed in the hospital for mental health problems?        No     Hurt yourself (cutting, burning or hitting yourself)?        No     Purged, binged or restricted yourself as a way to control your weight?      No       Are you on a special diet?       Yes, If yes explain: I watch my sugars and carbs       Do you have any concerns regarding your nutritional status?        No       Have you had any appetite changes in the last 3 months?        No       Have you had any weight loss or weight gain in the last 3 months?  If yes, how much gain or loss:     If weight patient gains more than 10 lbs or loses more than 10 lbs, refer to program RN /  Attending Physician for assessment.    No        Was the patient informed of BMI?         No     Do you have any dental problems?        Yes, If yes explain: need crowns     Lived through any trauma or stressful events?        Yes, If yes explain: death of my brother     In the past month, have you had any of the following symptoms related to the trauma listed above? (Dreams, intense memories, flashbacks, physical reactions, etc.)         No     Believed that people are spying on you, or that someone was plotting against you or trying to hurt you?       No     Believed that  someone was reading your mind or could hear your thoughts or that you could actually read someone's mind, hear what another person was thinking?       No     Believed that someone or some force outside of yourself put thoughts in your mind that were not your own, or made you act in a way that was not your usual self?  Or have you ever thought you were possessed?         No     Believed that you were being sent special messages through the TV, radio or newspaper?         No     [place] things other people couldn't hear, such as voices?         No     Had visions when you were awake?  Or have you ever seen things other people couldn't see?       No         Suicide Screening Questions:    1. Are you feeling hopeless about the present/future?   Yes, If yes explain: no, but currently  and concerned about divorce   2. Have you ever had thoughts about taking your life?   No   3. When did you have these thoughts? NA   4. Do you have any current intent or active desire to take your life?   No   5. Do you have a plan to take your life?    No   6. Have you ever made a suicide attempt?   No   7. Do you have access to pills, guns or other methods to kill yourself?   Yes, If yes explain: I own guns       Risk Status - Use as Guide/Example    Ideation - Active  Thoughts of suicide Intent to follow  Through on suicide Plan for completing  suicide    Yes No Yes No Yes No   Emergent X  X  X    Urgent / Non-Emergent X  X   X   Non-Urgent X   X  X   No Current / Active Risk (Past 6 Months)  X  X  X   Austyn Araujo Yes No No       Additional Risk Factors: A recent death of someone close to the patient and/or unresolved grief and loss issues  A recent loss that was significant to the patient, i.e. loss of job, loss of home, divorce, break-up, etc.  Alcohol abuse   Protective Factors:  Having people in his/her life that would prevent the patient from considering committing suicide (i.e. young children, spouse, parents,  "etc.)  Having cultural, Synagogue or spiritual beliefs that discourage suicide     Risk Status:    Emergent? No  Urgent / Non-Emergent?  No  Present / Non- Urgent? Yes, Collaborate with patient / client to develop \"Patient Safety Plan\", Referral to PCP or psychiatrist and Continuous monitoring, assessment and intervention      No Current Risk? See above    Additional information to support suicide risk rating: See Above    Mental Status Assessment    Physical Appearance/Attire:  Appears stated age and Attire appropriate to age/situation  Hygiene:  well groomed  Eye Contact:  at examiner  Speech:  regular  Speech Volume:  regular  Speech Quality: fluid  Cognitive/Perceptual:  reality based  Cognition:  memory intact   Judgment:  intact  Insight:  intact  Orientation:  time, place, person and situation  Thought:  logical   Hallucinations:  none  General Behavioral Tone:  cooperative  Psychomotor Activity:  no problem noted  Gait:  no problem  Mood:  appropriate  Affect:  congruence/appropriate    Counselor Notes: NA    Criteria for Diagnosis  DSM-5 Criteria for Substance Abuse    303.90 (F10.20) Alcohol Use Disorder Severe    LEVEL OF CARE    Intoxication and Withdrawal: 0  Biomedical:  1  Emotional and Behavioral:  2  Readiness to Change:  2  Relapse Potential: 4  Recovery Environmental:  2    Initial problem list:    The patient has poor coping skills    Patient/Client is willing to follow treatment recommendations.  Yes  Maryan Smith [facility]       Vulnerable Adult Checklist for OUTPATIENTS     1.  Do you have a physical, emotional or mental infirmity or dysfunction?       No    2.  Does this issue impair your ability to provide for your own care without help, including providing yourself with food, shelter, clothing, healthcare or supervision?       No    3.  Because of this issue, I need assistance to protect myself from maltreatment by others.      No    Based on the above information:    This person is not " a functional Vulnerable Adult according to [place] Statute 626.5572 subdivision 21.

## 2017-08-07 NOTE — MR AVS SNAPSHOT
"              After Visit Summary   8/7/2017    Austyn Araujo    MRN: [id number]           Patient Information     Date Of Birth          1955        Visit Information        Provider Department      8/7/2017 11:50 AM Randy Shaw PT [facility]        Today's Diagnoses     Hip pain, left        Tendinopathy of left gluteus medius           Follow-ups after your visit        Your next 10 appointments already scheduled     Aug 07, 2017  5:00 PM CDT   Evaluation with [facility])    [address] [address]   [phone]            Aug 14, 2017 11:50 AM CDT   [facility] with [facility] PT ([facility]  )    [address] [facility]  [address]  [facility] [address]   [phone]              Who to contact     If you have questions or need follow up information about today's clinic visit or your schedule please contact [facility] directly at [phone].  Normal or non-critical lab and imaging results will be communicated to you by Swoopohart, letter or phone within 4 business days after the clinic has received the results. If you do not hear from us within 7 days, please contact the clinic through Swoopohart or phone. If you have a critical or abnormal lab result, we will notify you by phone as soon as possible.  Submit refill requests through [employer] or call your pharmacy and they will forward the refill request to us. Please allow 3 business days for your refill to be completed.          Additional Information About Your Visit        MyChart Information     [employer] lets you send messages to your doctor, view your test results, renew your prescriptions, schedule appointments and more. To sign up, go to [email or web address] . Click on \"Log in\" on the left side of the screen, which will take you to the Welcome page. Then click on \"Sign up Now\" on the " right side of the page.     You will be asked to enter the access code listed below, as well as some personal information. Please follow the directions to create your username and password.     Your access code is: [email or web address]  Expires: 10/4/2017 11:36 AM     Your access code will  in 90 days. If you need help or a new code, please call your [facility] or [phone].        Care EveryWhere ID     This is your Care EveryWhere ID. This could be used by other organizations to access your [facility] medical records  [id number]         Blood Pressure from Last 3 Encounters:   17 122/80   17 110/84   16 144/76    Weight from Last 3 Encounters:   17 102.5 kg (226 lb)   17 102.8 kg (226 lb 9.6 oz)   16 101.6 kg (224 lb)              We Performed the Following     Therapeutic Activities     Therapeutic Exercises        Primary Care Provider Office Phone # Fax #    Javier Villafana [phone]       [facility] [address] [facility] [address]        Goals        General    Reduce drinking by 90% (pt-stated)     Notes - Note created  2015 10:33 AM by Mckenna Chang MSW    As of today's date 2015 goal is met at 26 - 50%.   Goal Status:  Active          Equal Access to Services     [facility] AH: Hadii aad ku hadasho Soomaali, waaxda luqadaha, qaybta kaalmada adeegyada, teena pereyra . So [facility] [phone].    ATENCIÓN: Si habla español, tiene a carrillo disposición servicios gratuitos de asistencia lingüística. Llame al [phone].    We comply with applicable federal civil rights laws and [place] laws. We do not discriminate on the basis of race, color, national origin, age, disability sex, sexual orientation or gender identity.            Thank you!     Thank you for choosing [facility]  for your care. Our goal is always to provide you with excellent care. Hearing back from our patients is one  "way we can continue to improve our services. Please take a few minutes to complete the written survey that you may receive in the mail after your visit with us. Thank you!             Your Updated Medication List - Protect others around you: Learn how to safely use, store and throw away your medicines at www.disposemymeds.org.          This list is accurate as of: 8/7/17 12:41 PM.  Always use your most recent med list.                   Brand Name Dispense Instructions for use Diagnosis    ALPRAZolam 0.5 MG tablet    XANAX    30 tablet    Take 1 tablet (0.5 mg) by mouth 3 times daily as needed for anxiety    Anxiety       aspirin 81 MG tablet      Take 1 tablet by mouth daily.        atorvastatin 10 MG tablet    LIPITOR    90 tablet    Take 1 tablet (10 mg) by mouth daily    Hyperlipidemia LDL goal <100       B-D LUER-ROCHELLE SYRINGE 22G X 1-1/2\" 3 ML Misc   Generic drug:  syringe/needle (disp)     4 each    USE WITH TESTOSTERONE    Low testosterone       blood glucose monitoring test strip    ONE TOUCH ULTRA    100 strip    Use to test blood sugars One time daily or as directed.    Type 2 diabetes mellitus with diabetic neuropathy (H)       CENTRUM SILVER per tablet     90 tablet    Take 1 tablet by mouth daily    Type 2 diabetes mellitus with diabetic neuropathy (H), Essential hypertension with goal blood pressure less than 140/90       lisinopril 5 MG tablet    PRINIVIL/ZESTRIL    180 tablet    Take 1 tablet (5 mg) by mouth 2 times daily    Essential hypertension with goal blood pressure less than 140/90       metFORMIN 1000 MG tablet    GLUCOPHAGE    180 tablet    TAKE 1 TABLET BY MOUTH 2 TIMES DAILY (WITH MEALS)    Essential hypertension with goal blood pressure less than 140/90, Type 2 diabetes mellitus without complication, without long-term current use of insulin (H)       needle (disp) 18G X 1\" Misc    B-D DISP NEEDLE TW    5 each    1 each every 7 days With testosterone    Low testosterone       omeprazole 40 " MG capsule    priLOSEC    30 capsule    Take 1 capsule (40 mg) by mouth daily    Gastroesophageal reflux disease without esophagitis       tadalafil 20 MG tablet    CIALIS    100 tablet    Take 1 tablet (20 mg) by mouth daily Never use with nitroglycerin, terazosin or doxazosin.    Low testosterone       testosterone cypionate 200 MG/ML injection    DEPOTESTOTERONE    10 mL    INJECT 2 MLS IM ONCE WEEKLY    Low testosterone

## 2017-08-07 NOTE — PROGRESS NOTES
Subjective:    HPI                    Objective:    System    Physical Exam    General     ROS    Assessment/Plan:      SUBJECTIVE  Subjective changes as noted by pt:  Pain is intermittent. Pt noted increased pain yesterday which has decreased by today     Current pain level: 2/10     Changes in function:  Pt notes pain with ambulation greater than 30 minutes     Adverse reaction to treatment or activity:  None    OBJECTIVE  Changes in objective findings:  Pt remains tender to palpation left greater trochanter. Pelvic stabilizers remain weak.         ASSESSMENT  Austyn continues to require intervention to meet STG and LTG's: PT  Patient is progressing as expected.  Response to therapy has shown lack of Improvement in pain .Progress made towards STG/LTG?  Yes,     PLAN  Current treatment program is being advanced to more complex exercises.    PTA/ATC plan:  N/A    Please refer to the daily flowsheet for treatment today, total treatment time and time spent performing 1:1 timed codes.

## 2017-08-08 DIAGNOSIS — R79.89 LOW TESTOSTERONE: ICD-10-CM

## 2017-08-08 RX ORDER — TESTOSTERONE CYPIONATE 200 MG/ML
INJECTION, SOLUTION INTRAMUSCULAR
Qty: 10 ML | Refills: 0 | Status: SHIPPED | OUTPATIENT
Start: 2017-08-08 | End: 2017-09-28

## 2017-08-08 ASSESSMENT — ANXIETY QUESTIONNAIRES: GAD7 TOTAL SCORE: 7
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
## 2017-08-08 NOTE — TELEPHONE ENCOUNTER
Testosterone      Last Written Prescription Date:  06/28/17  Last Fill Quantity: 10ml,   # refills: 0  Last Office Visit with [facility], [facility] or [facility] prescribing provider: 07/06/17  Future Office visit:       Routing refill request to provider for review/approval because:  Drug not on the [facility], [facility] or [facility] refill protocol or controlled substance    CSA in epic. Please advise, thanks.

## 2017-08-11 DIAGNOSIS — R79.89 LOW TESTOSTERONE: ICD-10-CM

## 2017-08-11 NOTE — TELEPHONE ENCOUNTER
"B-D LUER-ROCHELLE SYRINGE 22G X 1-1/2\" 3 ML          Last Written Prescription Date: 05/13/17  Last Fill Quantity: 4, # refills: 0  Last Office Visit with [facility], [email or web address] or [facility] prescribing provider:  07/06/17        BP Readings from Last 3 Encounters:   07/12/17 122/80   07/06/17 110/84   11/09/16 144/76     Lab Results   Component Value Date    MICROL 15 10/10/2016     Lab Results   Component Value Date    UMALCR 5.96 10/10/2016     Creatinine   Date Value Ref Range Status   07/06/2017 1.11 0.66 - 1.25 mg/dL Final   ]  GFR Estimate   Date Value Ref Range Status   07/06/2017 67 >60 mL/min/1.7m2 Final     Comment:     Non  GFR Calc   10/10/2016 69 >60 mL/min/1.7m2 Final     Comment:     Non  GFR Calc   08/05/2016 77 >60 mL/min/1.7m2 Final     Comment:     Non  GFR Calc     GFR Estimate If Black   Date Value Ref Range Status   07/06/2017 81 >60 mL/min/1.7m2 Final     Comment:      GFR Calc   10/10/2016 84 >60 mL/min/1.7m2 Final     Comment:      GFR Calc   08/05/2016 >90   GFR Calc   >60 mL/min/1.7m2 Final     Lab Results   Component Value Date    CHOL 196 10/10/2016     Lab Results   Component Value Date    HDL 48 10/10/2016     Lab Results   Component Value Date     10/10/2016     Lab Results   Component Value Date    TRIG 208 10/10/2016     Lab Results   Component Value Date    CHOLHDLRATIO 5.1 09/30/2015     Lab Results   Component Value Date    AST 16 10/10/2016     Lab Results   Component Value Date    ALT 31 10/10/2016     Lab Results   Component Value Date    A1C 7.3 07/06/2017    A1C 7.9 10/10/2016    A1C 7.5 05/16/2016    A1C 8.3 09/30/2015    A1C 7.7 07/03/2014     Potassium   Date Value Ref Range Status   07/06/2017 4.2 3.4 - 5.3 mmol/L Final       "

## 2017-08-12 DIAGNOSIS — E11.9 TYPE 2 DIABETES MELLITUS WITHOUT COMPLICATION, WITHOUT LONG-TERM CURRENT USE OF INSULIN (H): ICD-10-CM

## 2017-08-12 DIAGNOSIS — I10 ESSENTIAL HYPERTENSION WITH GOAL BLOOD PRESSURE LESS THAN 140/90: ICD-10-CM

## 2017-08-12 RX ORDER — SYRINGE WITH NEEDLE, 1 ML 25GX5/8"
SYRINGE, EMPTY DISPOSABLE MISCELLANEOUS
Qty: 4 EACH | Refills: 3 | Status: SHIPPED | OUTPATIENT
Start: 2017-08-12 | End: 2017-11-29

## 2017-08-12 NOTE — TELEPHONE ENCOUNTER
For testosterone injections. Prescription approved per [facility] Refill Protocol.

## 2017-08-14 ENCOUNTER — THERAPY VISIT (OUTPATIENT)
Dept: PHYSICAL THERAPY | Facility: CLINIC | Age: 62
End: 2017-08-14
Payer: COMMERCIAL

## 2017-08-14 DIAGNOSIS — M67.952 TENDINOPATHY OF LEFT GLUTEUS MEDIUS: ICD-10-CM

## 2017-08-14 DIAGNOSIS — M25.552 HIP PAIN, LEFT: ICD-10-CM

## 2017-08-14 PROCEDURE — 97530 THERAPEUTIC ACTIVITIES: CPT | Mod: GP | Performed by: PHYSICAL THERAPIST

## 2017-08-14 PROCEDURE — 97110 THERAPEUTIC EXERCISES: CPT | Mod: GP | Performed by: PHYSICAL THERAPIST

## 2017-08-14 NOTE — TELEPHONE ENCOUNTER
Metformin          Last Written Prescription Date: 5/16/17  Last Fill Quantity: 180, # refills: 0  Last Office Visit with [facility], [facility] or [facility] prescribing provider:  7/6/17        BP Readings from Last 3 Encounters:   07/12/17 122/80   07/06/17 110/84   11/09/16 144/76     Lab Results   Component Value Date    MICROL 15 10/10/2016     Lab Results   Component Value Date    UMALCR 5.96 10/10/2016     Creatinine   Date Value Ref Range Status   07/06/2017 1.11 0.66 - 1.25 mg/dL Final   ]  GFR Estimate   Date Value Ref Range Status   07/06/2017 67 >60 mL/min/1.7m2 Final     Comment:     Non  GFR Calc   10/10/2016 69 >60 mL/min/1.7m2 Final     Comment:     Non  GFR Calc   08/05/2016 77 >60 mL/min/1.7m2 Final     Comment:     Non  GFR Calc     GFR Estimate If Black   Date Value Ref Range Status   07/06/2017 81 >60 mL/min/1.7m2 Final     Comment:      GFR Calc   10/10/2016 84 >60 mL/min/1.7m2 Final     Comment:      GFR Calc   08/05/2016 >90   GFR Calc   >60 mL/min/1.7m2 Final     Lab Results   Component Value Date    CHOL 196 10/10/2016     Lab Results   Component Value Date    HDL 48 10/10/2016     Lab Results   Component Value Date     10/10/2016     Lab Results   Component Value Date    TRIG 208 10/10/2016     Lab Results   Component Value Date    CHOLHDLRATIO 5.1 09/30/2015     Lab Results   Component Value Date    AST 16 10/10/2016     Lab Results   Component Value Date    ALT 31 10/10/2016     Lab Results   Component Value Date    A1C 7.3 07/06/2017    A1C 7.9 10/10/2016    A1C 7.5 05/16/2016    A1C 8.3 09/30/2015    A1C 7.7 07/03/2014     Potassium   Date Value Ref Range Status   07/06/2017 4.2 3.4 - 5.3 mmol/L Final       Labs showing if normal/abnormal  Lab Results   Component Value Date    UCRR 250 10/10/2016    MICROL 15 10/10/2016     Lab Results   Component Value Date    CHOL 196 10/10/2016    TRIG 208 (H)  10/10/2016    HDL 48 10/10/2016     (H) 10/10/2016    VLDL 37 (H) 09/30/2015    CHOLHDLRATIO 5.1 (H) 09/30/2015     Lab Results   Component Value Date    A1C 7.3 (H) 07/06/2017    A1C 7.9 (H) 10/10/2016    A1C 7.5 (H) 05/16/2016

## 2017-08-14 NOTE — MR AVS SNAPSHOT
"              After Visit Summary   8/14/2017    Austyn Araujo    MRN: [id number]           Patient Information     Date Of Birth          1955        Visit Information        Provider Department      8/14/2017 11:50 AM Randy Shaw, PT [facility]        Today's Diagnoses     Hip pain, left        Tendinopathy of left gluteus medius           Follow-ups after your visit        Your next 10 appointments already scheduled     Aug 30, 2017 10:30 AM CDT   Diabetic Education with [facility]   [facility] ([facility])    [address]  [facility] [id number] [phone]              Who to contact     If you have questions or need follow up information about today's clinic visit or your schedule please contact [facility] directly at [phone].  Normal or non-critical lab and imaging results will be communicated to you by [facility], letter or phone within 4 business days after the clinic has received the results. If you do not hear from us within 7 days, please contact the clinic through MyChart or phone. If you have a critical or abnormal lab result, we will notify you by phone as soon as possible.  Submit refill requests through [employer] or call your pharmacy and they will forward the refill request to us. Please allow 3 business days for your refill to be completed.          Additional Information About Your Visit        MyChart Information     [employer] lets you send messages to your doctor, view your test results, renew your prescriptions, schedule appointments and more. To sign up, go to [email or web address] . Click on \"Log in\" on the left side of the screen, which will take you to the Welcome page. Then click on \"Sign up Now\" on the right side of the page.     You will be asked to enter the access code listed below, as well as some personal information. Please follow the directions to create your username and password.   "   Your access code is: [email or web address]  Expires: 10/4/2017 11:36 AM     Your access code will  in 90 days. If you need help or a new code, please call your [facility] clinic or [phone].        Care EveryWhere ID     This is your Care EveryWhere ID. This could be used by other organizations to access your [facility] medical records  [id number]         Blood Pressure from Last 3 Encounters:   17 122/80   17 110/84   16 144/76    Weight from Last 3 Encounters:   17 102.5 kg (226 lb)   17 102.8 kg (226 lb 9.6 oz)   16 101.6 kg (224 lb)              We Performed the Following     Therapeutic Activities     Therapeutic Exercises        Primary Care Provider Office Phone # Fax #    Javier Villafana [phone]       [address]  [facility] [address]        Goals        General    Reduce drinking by 90% (pt-stated)     Notes - Note created  2015 10:33 AM by Mckenna Chang MSW    As of today's date 2015 goal is met at 26 - 50%.   Goal Status:  Active          Equal Access to Services     BOUBACAR TAYLOR AH: Hadii aad ku hadasho Soomaali, waaxda luqadaha, qaybta kaalmada adeegyada, waxay idiin hayaan jairoeg kharajaelyn lapatti . So [facility] [phone].    ATENCIÓN: Si habla español, tiene a carrillo disposición servicios gratuitos de asistencia lingüística. Llame al [phone].    We comply with applicable federal civil rights laws and [place] laws. We do not discriminate on the basis of race, color, national origin, age, disability sex, sexual orientation or gender identity.            Thank you!     Thank you for choosing [facility]  for your care. Our goal is always to provide you with excellent care. Hearing back from our patients is one way we can continue to improve our services. Please take a few minutes to complete the written survey that you may receive in the mail after your visit with us. Thank you!             Your Updated Medication List -  "Protect others around you: Learn how to safely use, store and throw away your medicines at www.disposemymeds.org.          This list is accurate as of: 8/14/17 12:32 PM.  Always use your most recent med list.                   Brand Name Dispense Instructions for use Diagnosis    ALPRAZolam 0.5 MG tablet    XANAX    30 tablet    Take 1 tablet (0.5 mg) by mouth 3 times daily as needed for anxiety    Anxiety       aspirin 81 MG tablet      Take 1 tablet by mouth daily.        atorvastatin 10 MG tablet    LIPITOR    90 tablet    Take 1 tablet (10 mg) by mouth daily    Hyperlipidemia LDL goal <100       B-D LUER-ROCHELLE SYRINGE 22G X 1-1/2\" 3 ML Misc   Generic drug:  syringe/needle (disp)     4 each    USE WITH TESTOSTERONE    Low testosterone       blood glucose monitoring test strip    ONE TOUCH ULTRA    100 strip    Use to test blood sugars One time daily or as directed.    Type 2 diabetes mellitus with diabetic neuropathy (H)       CENTRUM SILVER per tablet     90 tablet    Take 1 tablet by mouth daily    Type 2 diabetes mellitus with diabetic neuropathy (H), Essential hypertension with goal blood pressure less than 140/90       lisinopril 5 MG tablet    PRINIVIL/ZESTRIL    180 tablet    Take 1 tablet (5 mg) by mouth 2 times daily    Essential hypertension with goal blood pressure less than 140/90       metFORMIN 1000 MG tablet    GLUCOPHAGE    180 tablet    Take 1 tablet (1,000 mg) by mouth 2 times daily (with meals)    Essential hypertension with goal blood pressure less than 140/90, Type 2 diabetes mellitus without complication, without long-term current use of insulin (H)       needle (disp) 18G X 1\" Misc    B-D DISP NEEDLE TW    5 each    1 each every 7 days With testosterone    Low testosterone       omeprazole 40 MG capsule    priLOSEC    30 capsule    Take 1 capsule (40 mg) by mouth daily    Gastroesophageal reflux disease without esophagitis       tadalafil 20 MG tablet    CIALIS    100 tablet    Take 1 tablet " (20 mg) by mouth daily Never use with nitroglycerin, terazosin or doxazosin.    Low testosterone       testosterone cypionate 200 MG/ML injection    DEPOTESTOTERONE    10 mL    INJECT 2 MLS IM ONCE WEEKLY    Low testosterone

## 2017-08-14 NOTE — PROGRESS NOTES
Subjective:    HPI                    Objective:    System    Physical Exam    General     ROS    Assessment/Plan:      SUBJECTIVE  Subjective changes as noted by pt:  Pt still notes intermittent pain. Today is feeling good     Current pain level: 1/10     Changes in function:  Pt notes increased ease with ambulation     Adverse reaction to treatment or activity:  None    OBJECTIVE  Changes in objective findings:  Mild crepitation with clam exercise. Fair balance. Improved strength hip abductors. Pt noted decreased pain with long axis distraction    ASSESSMENT  Austyn continues to require intervention to meet STG and LTG's: PT  Patient's symptoms are resolving.  Response to therapy has shown an improvement in  function  Progress made towards STG/LTG?  Yes,     PLAN  Current treatment program is being advanced to more complex exercises.    PTA/ATC plan:  N/A    Please refer to the daily flowsheet for treatment today, total treatment time and time spent performing 1:1 timed codes.

## 2017-08-30 ENCOUNTER — ALLIED HEALTH/NURSE VISIT (OUTPATIENT)
Dept: EDUCATION SERVICES | Facility: CLINIC | Age: 62
End: 2017-08-30
Payer: COMMERCIAL

## 2017-08-30 VITALS — BODY MASS INDEX: 29.04 KG/M2 | WEIGHT: 226.2 LBS

## 2017-08-30 DIAGNOSIS — E11.43 TYPE 2 DIABETES MELLITUS WITH PERIPHERAL AUTONOMIC NEUROPATHY (H): Primary | ICD-10-CM

## 2017-08-30 PROCEDURE — G0108 DIAB MANAGE TRN  PER INDIV: HCPCS

## 2017-08-30 NOTE — PROGRESS NOTES
Diabetes Self Management Training: Individual Review Visit    Austyn Araujo presents today for education and evaluation of glucose control related to Type 2 diabetes.    He is accompanied by self    Patient's diabetes management related comments/concerns: needs review    Patient's emotional response to diabetes: expresses readiness to learn and concern for health and well-being    Patient would like this visit to be focused around the following diabetes-related behaviors and goals: Assistance with making lifestyle changes    ASSESSMENT:  Patient Problem List and Family Medical History reviewed for relevant medical history, current medical status, and diabetes risk factors.    Current Diabetes Management per Patient:  Taking diabetes medications?   yes:     Diabetes Medication(s)     Biguanides Sig    metFORMIN (GLUCOPHAGE) 1000 MG tablet Take 1 tablet (1,000 mg) by mouth 2 times daily (with meals)          *Abbreviated insulin dose documentation key: Insulin Trade Name (qifnisked-dbfel-atlajr-bedtime) - i.e. Humalog 5-5-5-0 (Humalog 5 units at breakfast, 5 units at lunch, and 5 units at dinner).    Past Diabetes Education: No    Patient glucose self monitoring as follows: 1-2 times a week.   BG meter: One Touch Ultra Mini meter  BG results: not available - brought work meter which doesn't have any current tests    BG values are: unable to assess  Patient's most recent   Lab Results   Component Value Date    A1C 7.3 07/06/2017    is not meeting goal of <7.0    Nutrition:  Patient eats 2-3 meals per day.    Breakfast - 2 white english muffins with 2 sausage patties, 2 slices cheddar cheese, 16 oz skim milk  Lunch - fried chicken (2 legs, 2 wings), 16 oz milk OR Mckenna's chili   Dinner - skips OR cereal OR pb toast OR frozen pizza (5 slices), 16 milk  Snacks - nuts, candy bar (salted nut roll)    Beverages: 2 cocktails, water    Cultural/Sabianist diet restrictions: No     Biggest Challenge to Healthy Eating:  "portion control and knowing what to eat    Physical Activity:    PT for hip     Diabetes Risk Factors:  family history, age over 45 years, hypertension, hyperlipidemia, overweight/obesity and inactivity    Diabetes Complications:  Not discussed today.    Vitals:  Wt 102.6 kg (226 lb 3.2 oz)  BMI 29.04 kg/m2  Estimated body mass index is 29.04 kg/(m^2) as calculated from the following:    Height as of 7/12/17: 1.88 m (6' 2\").    Weight as of this encounter: 102.6 kg (226 lb 3.2 oz).   Last 3 BP:   BP Readings from Last 3 Encounters:   07/12/17 122/80   07/06/17 110/84   11/09/16 144/76       History   Smoking Status     Former Smoker     Types: Cigarettes   Smokeless Tobacco     Never Used       Labs:  Lab Results   Component Value Date    A1C 7.3 07/06/2017     Lab Results   Component Value Date     07/06/2017     Lab Results   Component Value Date     10/10/2016     HDL Cholesterol   Date Value Ref Range Status   10/10/2016 48 >39 mg/dL Final   ]  GFR Estimate   Date Value Ref Range Status   07/06/2017 67 >60 mL/min/1.7m2 Final     Comment:     Non  GFR Calc     GFR Estimate If Black   Date Value Ref Range Status   07/06/2017 81 >60 mL/min/1.7m2 Final     Comment:      GFR Calc     Lab Results   Component Value Date    CR 1.11 07/06/2017     No results found for: MICROALBUMIN    Socio/Economic Considerations:    Support system: not assessed    Health Beliefs and Attitudes:   Patient Activation Measure Survey Score:  SARA Score (Last Two) 5/16/2014   SARA Raw Score 35   Activation Score 45.2   SARA Level 1       Stage of Change: PREPARATION (Decided to change - considering how)      Diabetes knowledge and skills assessment:     Patient is knowledgeable in diabetes management concepts related to: Taking Medication    Patient needs further education on the following diabetes management concepts: Healthy Eating, Being Active, Monitoring, Problem Solving and Reducing " Risks    Barriers to Learning Assessment: No Barriers identified    Based on learning assessment above, most appropriate setting for further diabetes education would be: Group class or Individual setting.    INTERVENTION:   Education provided today on:  AADE Self-Care Behaviors:  Healthy Eating: carbohydrate counting, consistency in amount, composition, and timing of food intake, heart healthy diet, eating out and label reading  Being Active: relationship to blood glucose  Monitoring: log and interpret results, individual blood glucose targets and frequency of monitoring  Taking Medication: action of prescribed medication    Opportunities for ongoing education and support in diabetes-self management were discussed.    Pt verbalized understanding of concepts discussed and recommendations provided today.       Education Materials Provided:  [place] Understanding Diabetes Booklet, Safe Disposal Options for Needles & Syringes and BG Log Sheet    PLAN:  See Patient Instructions for co-developed, patient-stated behavior change goals.  AVS printed and provided to patient today.    FOLLOW-UP:  Follow-up as needed.   Follow-up yearly for diabetes education review.    Ongoing plan for education and support: Written resources (magazines, books, etc.), Follow-up visit with diabetes educator in annually and Follow-up with primary care provider    ANGELICA Guzman CDE    Time Spent: 60 minutes  Encounter Type: Individual    Any diabetes medication dose changes were made via the CDE Protocol and Collaborative Practice Agreement with the patient's referring provider. A copy of this encounter was shared with the provider.

## 2017-08-30 NOTE — PATIENT INSTRUCTIONS
MY DIABETES TODAY:    1)  Goal A1C is under <7.0  Mine is:      Lab Results   Component Value Date    A1C 7.3 07/06/2017       2)  Goal LDL (bad cholesterol) under 100  (measured at least yearly)- I am currently at:   Lab Results   Component Value Date     10/10/2016       3)  Goal blood pressure under 140/90- mine was Data Unavailable today    Care Plan:  See Patient Instructions for co-developed, patient-stated behavior change goals.    Follow up:  Call ([phone]), e-mail ([email or web address]), or send [email or web address] message with questions, concerns or if follow-up is needed.  Follow-up for annual diabetes education review in 1 year or sooner, if needed.     Bring blood glucose meter and logbook with you to all doctor and follow-up appointments.     [place] [facility] for the [facility] Area:  For Your Diabetes or Nutrition Education Appointments Call:  [phone]   For Diabetes or Nutrition Related Questions Call or Email:   [phone]  [email or web address]  Fax: [phone]   If you need a medication refill please contact your pharmacy. Please allow 3 business days for your refills to be completed.     Instructions for emailing the Diabetes Educators    If you need to communicate a non-urgent message to a Diabetes Educator via email, please send to [email or web address].    Please follow the following email guidelines:    Subject line: Secure: your clinic name (example: Secure: [facility])  In the email please include: First name, middle initial, last name and date of birth.    We will be in touch with you within one (1) business day.

## 2017-08-30 NOTE — MR AVS SNAPSHOT
After Visit Summary   8/30/2017    Austyn Araujo    MRN: [id number]           Patient Information     Date Of Birth          1955        Visit Information        Provider Department      8/30/2017 10:30 AM [facility] DIABETIC ED RESOURCE [facility]        Today's Diagnoses     Type 2 diabetes mellitus with peripheral autonomic neuropathy (H)    -  1      Care Instructions    MY DIABETES TODAY:    1)  Goal A1C is under <7.0  Mine is:      Lab Results   Component Value Date    A1C 7.3 07/06/2017       2)  Goal LDL (bad cholesterol) under 100  (measured at least yearly)- I am currently at:   Lab Results   Component Value Date     10/10/2016       3)  Goal blood pressure under 140/90- mine was Data Unavailable today    Care Plan:  See Patient Instructions for co-developed, patient-stated behavior change goals.    Follow up:  Call ([phone]), e-mail ([email or web address]), or send [email or web address] message with questions, concerns or if follow-up is needed.  Follow-up for annual diabetes education review in 1 year or sooner, if needed.     Bring blood glucose meter and logbook with you to all doctor and follow-up appointments.     [place] [facility] Nutrition Services for the [facility] Area:  For Your Diabetes or Nutrition Education Appointments Call:  [phone]   For Diabetes or Nutrition Related Questions Call or Email:   [phone]  [email or web address]  Fax: [phone]   If you need a medication refill please contact your pharmacy. Please allow 3 business days for your refills to be completed.     Instructions for emailing the Diabetes Educators    If you need to communicate a non-urgent message to a Diabetes Educator via email, please send to [email or web address].    Please follow the following email guidelines:    Subject line: Secure: your clinic name (example: Secure: [facility])  In the email please include: First name, middle initial, last name and  "date of birth.    We will be in touch with you within one (1) business day.          Follow-ups after your visit        Who to contact     If you have questions or need follow up information about today's clinic visit or your schedule please contact [facility] directly at [phone].  Normal or non-critical lab and imaging results will be communicated to you by MyChart, letter or phone within 4 business days after the clinic has received the results. If you do not hear from us within 7 days, please contact the clinic through MyChart or phone. If you have a critical or abnormal lab result, we will notify you by phone as soon as possible.  Submit refill requests through [employer] or call your pharmacy and they will forward the refill request to us. Please allow 3 business days for your refill to be completed.          Additional Information About Your Visit        MyChart Information     [employer] lets you send messages to your doctor, view your test results, renew your prescriptions, schedule appointments and more. To sign up, go to [email or web address] . Click on \"Log in\" on the left side of the screen, which will take you to the Welcome page. Then click on \"Sign up Now\" on the right side of the page.     You will be asked to enter the access code listed below, as well as some personal information. Please follow the directions to create your username and password.     Your access code is: [email or web address]  Expires: 10/4/2017 11:36 AM     Your access code will  in 90 days. If you need help or a new code, please call your [facility] or [phone].        Care EveryWhere ID     This is your Care EveryWhere ID. This could be used by other organizations to access your [place] [facility] medical records  [id number]        Your Vitals Were     BMI (Body Mass Index)                   29.04 kg/m2            Blood Pressure from Last 3 Encounters:   17 122/80   17 110/84   16 144/76    " Weight from Last 3 Encounters:   08/30/17 102.6 kg (226 lb 3.2 oz)   07/12/17 102.5 kg (226 lb)   07/06/17 102.8 kg (226 lb 9.6 oz)              We Performed the Following     DIABETES EDUCATION - Individual  []        Primary Care Provider Office Phone # Fax #    Javier Villafana [phone]       [address] [facility] [address]        Goals        General    Reduce drinking by 90% (pt-stated)     Notes - Note created  4/23/2015 10:33 AM by Mckenna Chang MSW    As of today's date 4/23/2015 goal is met at 26 - 50%.   Goal Status:  Active          Equal Access to Services     [facility] : Hadii aad ku hadasho Somartha, waaxda luqadaha, qaybta kaalmada adeegyada, teena pereyra . So [facility] [phone].    ATENCIÓN: Si habla español, tiene a carrillo disposición servicios gratuitos de asistencia lingüística. Llame al [phone].    We comply with applicable federal civil rights laws and [place] laws. We do not discriminate on the basis of race, color, national origin, age, disability sex, sexual orientation or gender identity.            Thank you!     Thank you for choosing [facility]  for your care. Our goal is always to provide you with excellent care. Hearing back from our patients is one way we can continue to improve our services. Please take a few minutes to complete the written survey that you may receive in the mail after your visit with us. Thank you!             Your Updated Medication List - Protect others around you: Learn how to safely use, store and throw away your medicines at www.disposemymeds.org.          This list is accurate as of: 8/30/17 12:16 PM.  Always use your most recent med list.                   Brand Name Dispense Instructions for use Diagnosis    ALPRAZolam 0.5 MG tablet    XANAX    30 tablet    Take 1 tablet (0.5 mg) by mouth 3 times daily as needed for anxiety    Anxiety       aspirin 81 MG tablet      Take 1  "tablet by mouth daily.        atorvastatin 10 MG tablet    LIPITOR    90 tablet    Take 1 tablet (10 mg) by mouth daily    Hyperlipidemia LDL goal <100       B-D LUER-ROCHELLE SYRINGE 22G X 1-1/2\" 3 ML Misc   Generic drug:  syringe/needle (disp)     4 each    USE WITH TESTOSTERONE    Low testosterone       blood glucose monitoring test strip    ONE TOUCH ULTRA    100 strip    Use to test blood sugars One time daily or as directed.    Type 2 diabetes mellitus with diabetic neuropathy (H)       CENTRUM SILVER per tablet     90 tablet    Take 1 tablet by mouth daily    Type 2 diabetes mellitus with diabetic neuropathy (H), Essential hypertension with goal blood pressure less than 140/90       lisinopril 5 MG tablet    PRINIVIL/ZESTRIL    180 tablet    Take 1 tablet (5 mg) by mouth 2 times daily    Essential hypertension with goal blood pressure less than 140/90       metFORMIN 1000 MG tablet    GLUCOPHAGE    180 tablet    Take 1 tablet (1,000 mg) by mouth 2 times daily (with meals)    Essential hypertension with goal blood pressure less than 140/90, Type 2 diabetes mellitus without complication, without long-term current use of insulin (H)       needle (disp) 18G X 1\" Misc    B-D DISP NEEDLE TW    5 each    1 each every 7 days With testosterone    Low testosterone       omeprazole 40 MG capsule    priLOSEC    30 capsule    Take 1 capsule (40 mg) by mouth daily    Gastroesophageal reflux disease without esophagitis       tadalafil 20 MG tablet    CIALIS    100 tablet    Take 1 tablet (20 mg) by mouth daily Never use with nitroglycerin, terazosin or doxazosin.    Low testosterone       testosterone cypionate 200 MG/ML injection    DEPOTESTOTERONE    10 mL    INJECT 2 MLS IM ONCE WEEKLY    Low testosterone         "

## 2017-09-18 PROBLEM — M25.552 HIP PAIN, LEFT: Status: RESOLVED | Noted: 2017-07-24 | Resolved: 2017-09-18

## 2017-09-18 PROBLEM — M67.952 TENDINOPATHY OF LEFT GLUTEUS MEDIUS: Status: RESOLVED | Noted: 2017-07-24 | Resolved: 2017-09-18

## 2017-09-18 NOTE — PROGRESS NOTES
Subjective:SUBJECTIVE  Subjective changes as noted by pt:  Pt still notes intermittent pain. Today is feeling good     Current pain level: 1/10     Changes in function:  Pt notes increased ease with ambulation     Adverse reaction to treatment or activity:  None     OBJECTIVE  Changes in objective findings:  Mild crepitation with clam exercise. Fair balance. Improved strength hip abductors. Pt noted decreased pain with long axis distraction    HPI                    Objective:    System    Physical Exam    General     ROS    Assessment/Plan:      ASSESSMENT/PLAN  Updated problem list and treatment plan: Diagnosis 1:  Left hip pain  Pain -  hot/cold therapy, self management, education and home program  Decreased ROM/flexibility - therapeutic exercise, therapeutic activity and home program  Decreased strength - therapeutic exercise, therapeutic activities and home program  Progress toward STG/LTGs have been made:  Yes,   Assessment of Progress: The patient's condition is improving.  Self Management Plans:  Patient has been instructed in a home treatment program.  I have re-evaluated this patient and find that the nature, scope, duration and intensity of the therapy is appropriate for the medical condition of the patient.  Austyn continues to require the following intervention to meet STG and LT's:  PT    Recommendations:  Pt has not returned for treatment since 8-14-17. Pt discharged at this time.    Please refer to the daily flowsheet for treatment today, total treatment time and time spent performing 1:1 timed codes.

## 2017-09-28 DIAGNOSIS — R79.89 LOW TESTOSTERONE: ICD-10-CM

## 2017-10-02 RX ORDER — TESTOSTERONE CYPIONATE 200 MG/ML
INJECTION, SOLUTION INTRAMUSCULAR
Qty: 10 ML | Refills: 0 | Status: SHIPPED | OUTPATIENT
Start: 2017-10-02 | End: 2017-11-05

## 2017-10-12 DIAGNOSIS — K21.9 GASTROESOPHAGEAL REFLUX DISEASE WITHOUT ESOPHAGITIS: ICD-10-CM

## 2017-10-12 RX ORDER — OMEPRAZOLE 40 MG/1
CAPSULE, DELAYED RELEASE ORAL
Qty: 30 CAPSULE | Refills: 3 | Status: SHIPPED | OUTPATIENT
Start: 2017-10-12 | End: 2018-01-07

## 2017-10-12 NOTE — TELEPHONE ENCOUNTER
Omeprazole      Last Written Prescription Date: 11/02/16  Last Fill Quantity: 30,  # refills: 10   Last Office Visit with G, [facility] or [facility] prescribing provider: 07/06/17 Nereida

## 2017-10-20 DIAGNOSIS — R79.89 LOW TESTOSTERONE: ICD-10-CM

## 2017-10-23 RX ORDER — TESTOSTERONE CYPIONATE 200 MG/ML
INJECTION, SOLUTION INTRAMUSCULAR
Qty: 10 ML | Refills: 0 | OUTPATIENT
Start: 2017-10-23

## 2017-11-04 DIAGNOSIS — E78.5 HYPERLIPIDEMIA LDL GOAL <100: ICD-10-CM

## 2017-11-04 NOTE — LETTER
[facility]  [address], [address]  [place], [place]  [address]                                            TEL:[phone]  FAX:[phone]      Austyn DELPHINE [facility]  [address] [facility] [address]      November 6, 2017    Dear Austyn,    We have received a refill request from your pharmacy, however, we were only able to provide a one time fill because you are due for fasting cholesterol check. This is a fasting lab; Nothing to eat or drink for 10-12 hours, however, you may have water and 1 cup of black coffee or tea.    Please call [phone] to schedule a LAB ONLY appointment before you are due for your next refill.        Sincerely,      Javier Villafana M.D./ Jaylin PARSONS

## 2017-11-05 DIAGNOSIS — R79.89 LOW TESTOSTERONE: ICD-10-CM

## 2017-11-06 ENCOUNTER — TELEPHONE (OUTPATIENT)
Dept: INTERNAL MEDICINE | Facility: CLINIC | Age: 62
End: 2017-11-06

## 2017-11-06 DIAGNOSIS — R79.89 LOW TESTOSTERONE: ICD-10-CM

## 2017-11-06 RX ORDER — TESTOSTERONE CYPIONATE 200 MG/ML
INJECTION, SOLUTION INTRAMUSCULAR
Qty: 10 ML | Refills: 0 | Status: SHIPPED | OUTPATIENT
Start: 2017-11-06 | End: 2017-12-17

## 2017-11-06 RX ORDER — ATORVASTATIN CALCIUM 10 MG/1
TABLET, FILM COATED ORAL
Qty: 30 TABLET | Refills: 0 | Status: SHIPPED | OUTPATIENT
Start: 2017-11-06 | End: 2017-11-29

## 2017-11-06 RX ORDER — TESTOSTERONE CYPIONATE 200 MG/ML
200 INJECTION, SOLUTION INTRAMUSCULAR WEEKLY
Qty: 10 ML | Refills: 0 | Status: CANCELLED | OUTPATIENT
Start: 2017-11-06

## 2017-11-06 NOTE — TELEPHONE ENCOUNTER
Lipitor  Medication is being filled for 1 time refill only due to:  Patient needs labs FLP.   Letter sent to patient to schedule lab only appointment.   Labs placed.

## 2017-11-06 NOTE — TELEPHONE ENCOUNTER
Pt calls, asks if he can get more than a 1 month supply of Testosterone so he doesn't have to go to the pharmacy as often.     Prescription was refilled today, pt was hoping to reach us before it was refilled. Pt asks if he could have quantity increased to 3 month supply and have one refill added to it so the pharmacy doesn't have to contact us for each fill. He states there have been times when he has had to go without medication for a week while waiting for the refill.

## 2017-11-17 DIAGNOSIS — R79.89 LOW TESTOSTERONE: ICD-10-CM

## 2017-11-20 RX ORDER — TADALAFIL 20 MG/1
20 TABLET ORAL DAILY
Qty: 100 TABLET | Refills: 0 | Status: SHIPPED | OUTPATIENT
Start: 2017-11-20 | End: 2018-01-16

## 2017-11-20 NOTE — TELEPHONE ENCOUNTER
Requested Prescriptions   Pending Prescriptions Disp Refills     tadalafil (CIALIS) 20 MG tablet 100 tablet 0     Sig: Take 1 tablet (20 mg) by mouth daily Never use with nitroglycerin, terazosin or doxazosin.    Erectile Dysfuction Protocol Failed    11/17/2017  1:28 PM       Failed - Absence of nitrates on medication list       Passed - Absence of Alpha Blockers on Med list       Passed - Recent or future visit with authorizing provider    Patient had office visit in the last year or has a visit in the next 30 days with authorizing provider.  See chart review.              Passed - Patient is age 18 or older

## 2017-11-29 ENCOUNTER — RADIANT APPOINTMENT (OUTPATIENT)
Dept: GENERAL RADIOLOGY | Facility: CLINIC | Age: 62
End: 2017-11-29
Attending: INTERNAL MEDICINE
Payer: COMMERCIAL

## 2017-11-29 ENCOUNTER — OFFICE VISIT (OUTPATIENT)
Dept: INTERNAL MEDICINE | Facility: CLINIC | Age: 62
End: 2017-11-29
Payer: COMMERCIAL

## 2017-11-29 VITALS
OXYGEN SATURATION: 98 % | HEIGHT: 74 IN | TEMPERATURE: 97.7 F | SYSTOLIC BLOOD PRESSURE: 124 MMHG | WEIGHT: 218 LBS | HEART RATE: 79 BPM | DIASTOLIC BLOOD PRESSURE: 84 MMHG | BODY MASS INDEX: 27.98 KG/M2

## 2017-11-29 DIAGNOSIS — R79.89 LOW TESTOSTERONE: ICD-10-CM

## 2017-11-29 DIAGNOSIS — I72.8: ICD-10-CM

## 2017-11-29 DIAGNOSIS — M54.89 MIDLINE BACK PAIN, UNSPECIFIED BACK LOCATION, UNSPECIFIED CHRONICITY: ICD-10-CM

## 2017-11-29 DIAGNOSIS — Z00.00 ROUTINE GENERAL MEDICAL EXAMINATION AT A HEALTH CARE FACILITY: Primary | ICD-10-CM

## 2017-11-29 DIAGNOSIS — E78.5 HYPERLIPIDEMIA LDL GOAL <100: ICD-10-CM

## 2017-11-29 DIAGNOSIS — N40.1 BENIGN PROSTATIC HYPERPLASIA WITH URINARY FREQUENCY: ICD-10-CM

## 2017-11-29 DIAGNOSIS — R35.0 BENIGN PROSTATIC HYPERPLASIA WITH URINARY FREQUENCY: ICD-10-CM

## 2017-11-29 DIAGNOSIS — I10 BENIGN ESSENTIAL HYPERTENSION: ICD-10-CM

## 2017-11-29 DIAGNOSIS — E11.43 TYPE 2 DIABETES MELLITUS WITH PERIPHERAL AUTONOMIC NEUROPATHY (H): ICD-10-CM

## 2017-11-29 DIAGNOSIS — R97.20 ELEVATED PROSTATE SPECIFIC ANTIGEN (PSA): ICD-10-CM

## 2017-11-29 LAB
ALBUMIN UR-MCNC: NEGATIVE MG/DL
APPEARANCE UR: CLEAR
BILIRUB UR QL STRIP: NEGATIVE
COLOR UR AUTO: YELLOW
ERYTHROCYTE [DISTWIDTH] IN BLOOD BY AUTOMATED COUNT: 14.3 % (ref 10–15)
ERYTHROCYTE [SEDIMENTATION RATE] IN BLOOD BY WESTERGREN METHOD: 4 MM/H (ref 0–20)
GLUCOSE UR STRIP-MCNC: 250 MG/DL
HBA1C MFR BLD: 8.5 % (ref 4.3–6)
HCT VFR BLD AUTO: 48 % (ref 40–53)
HGB BLD-MCNC: 16.6 G/DL (ref 13.3–17.7)
HGB UR QL STRIP: NEGATIVE
KETONES UR STRIP-MCNC: NEGATIVE MG/DL
LEUKOCYTE ESTERASE UR QL STRIP: NEGATIVE
MCH RBC QN AUTO: 28.2 PG (ref 26.5–33)
MCHC RBC AUTO-ENTMCNC: 34.6 G/DL (ref 31.5–36.5)
MCV RBC AUTO: 82 FL (ref 78–100)
NITRATE UR QL: NEGATIVE
PH UR STRIP: 7 PH (ref 5–7)
PLATELET # BLD AUTO: 265 10E9/L (ref 150–450)
RBC # BLD AUTO: 5.88 10E12/L (ref 4.4–5.9)
SOURCE: ABNORMAL
SP GR UR STRIP: 1.02 (ref 1–1.03)
UROBILINOGEN UR STRIP-ACNC: 0.2 EU/DL (ref 0.2–1)
WBC # BLD AUTO: 8 10E9/L (ref 4–11)

## 2017-11-29 PROCEDURE — 72100 X-RAY EXAM L-S SPINE 2/3 VWS: CPT

## 2017-11-29 PROCEDURE — 82043 UR ALBUMIN QUANTITATIVE: CPT | Performed by: INTERNAL MEDICINE

## 2017-11-29 PROCEDURE — 85027 COMPLETE CBC AUTOMATED: CPT | Performed by: INTERNAL MEDICINE

## 2017-11-29 PROCEDURE — 85652 RBC SED RATE AUTOMATED: CPT | Performed by: INTERNAL MEDICINE

## 2017-11-29 PROCEDURE — 84403 ASSAY OF TOTAL TESTOSTERONE: CPT | Performed by: INTERNAL MEDICINE

## 2017-11-29 PROCEDURE — 80053 COMPREHEN METABOLIC PANEL: CPT | Performed by: INTERNAL MEDICINE

## 2017-11-29 PROCEDURE — 36415 COLL VENOUS BLD VENIPUNCTURE: CPT | Performed by: INTERNAL MEDICINE

## 2017-11-29 PROCEDURE — G0103 PSA SCREENING: HCPCS | Performed by: INTERNAL MEDICINE

## 2017-11-29 PROCEDURE — 83036 HEMOGLOBIN GLYCOSYLATED A1C: CPT | Performed by: INTERNAL MEDICINE

## 2017-11-29 PROCEDURE — 80061 LIPID PANEL: CPT | Performed by: INTERNAL MEDICINE

## 2017-11-29 PROCEDURE — 99396 PREV VISIT EST AGE 40-64: CPT | Performed by: INTERNAL MEDICINE

## 2017-11-29 PROCEDURE — 84270 ASSAY OF SEX HORMONE GLOBUL: CPT | Performed by: INTERNAL MEDICINE

## 2017-11-29 PROCEDURE — 81003 URINALYSIS AUTO W/O SCOPE: CPT | Performed by: INTERNAL MEDICINE

## 2017-11-29 PROCEDURE — 84443 ASSAY THYROID STIM HORMONE: CPT | Performed by: INTERNAL MEDICINE

## 2017-11-29 PROCEDURE — 72070 X-RAY EXAM THORAC SPINE 2VWS: CPT

## 2017-11-29 RX ORDER — LISINOPRIL 10 MG/1
10 TABLET ORAL DAILY
Qty: 90 TABLET | Refills: 3 | Status: SHIPPED | OUTPATIENT
Start: 2017-11-29 | End: 2019-01-07

## 2017-11-29 RX ORDER — TAMSULOSIN HYDROCHLORIDE 0.4 MG/1
0.4 CAPSULE ORAL DAILY
Qty: 90 CAPSULE | Refills: 3 | Status: SHIPPED | OUTPATIENT
Start: 2017-11-29 | End: 2019-01-07

## 2017-11-29 NOTE — NURSING NOTE
"Chief Complaint   Patient presents with     Physical     Fasting Px, F/U on meds       Initial /84  Pulse 79  Temp 97.7  F (36.5  C) (Oral)  Ht 6' 2\" (1.88 m)  Wt 218 lb (98.9 kg)  SpO2 98%  BMI 27.99 kg/m2 Estimated body mass index is 27.99 kg/(m^2) as calculated from the following:    Height as of this encounter: 6' 2\" (1.88 m).    Weight as of this encounter: 218 lb (98.9 kg).  Medication Reconciliation: complete   Basia Claudio MA      "

## 2017-11-29 NOTE — PROGRESS NOTES
SUBJECTIVE:   CC: Austyn Araujo is an 61 year old male who presents for preventative health visit.     Healthy Habits:    Do you get at least three servings of calcium containing foods daily (dairy, green leafy vegetables, etc.)? yes    Amount of exercise or daily activities, outside of work: few days a week    Problems taking medications regularly No    Medication side effects: No    Have you had an eye exam in the past two years? yes    Do you see a dentist twice per year? yes    Do you have sleep apnea, excessive snoring or daytime drowsiness?yes, sleep Apnea ( has C-pap machine)    Has h/o HTN. on medical treatment. BP has been controlled. No side effects from medications. No CP, HA, dizziness. good compliance with medications and low salt diet.  Has H/O hyperlipidemia. On medical treatment and diet. No side effects. No muscle weakness, myalgias or upset stomach.   Has H/O DM. On diet , exercise and medications. Blood sugars are controlled. No parestesias. No hypoglycemias.  Has h/o low testosterone. On injections.       PROBLEMS TO ADD ON...    Has h/o anxiety and depression. On medical treatment, controlled, no side effects. No depressive symptoms or suicidal ideation.      Today's PHQ-2 Score: 2  PHQ-2 ( 1999 [employer]) 11/29/2017 11/29/2017   Q1: Little interest or pleasure in doing things 1 0   Q2: Feeling down, depressed or hopeless 1 0   PHQ-2 Score 2 0         Abuse: Current or Past(Physical, Sexual or Emotional)- No  Do you feel safe in your environment - Yes  Social History   Substance Use Topics     Smoking status: Former Smoker     Types: Cigarettes     Smokeless tobacco: Never Used     Alcohol use No      Comment: 14 drinks weekly, quit on 08/2017     The patient does not drink >3 drinks per day nor >7 drinks per week.    Last PSA:   PSA   Date Value Ref Range Status   09/30/2015 2.97 0 - 4 ug/L Final       Reviewed orders with patient. Reviewed health maintenance and updated orders accordingly -  Yes  Labs reviewed in EPIC  BP Readings from Last 3 Encounters:   11/29/17 124/84   07/12/17 122/80   07/06/17 110/84    Wt Readings from Last 3 Encounters:   11/29/17 218 lb (98.9 kg)   08/30/17 226 lb 3.2 oz (102.6 kg)   07/12/17 226 lb (102.5 kg)                  Patient Active Problem List   Diagnosis     Advanced directives, counseling/discussion     Type 2 diabetes mellitus with peripheral autonomic neuropathy (H)     GERD (gastroesophageal reflux disease)     PUD (peptic ulcer disease)     Low testosterone     HTN (hypertension)     Mild major depression (H)     Hyperlipidemia LDL goal <100     Erectile dysfunction     BPH (benign prostatic hyperplasia)     Peripheral neuropathy     Cellulitis     Hypertension goal BP (blood pressure) < 140/90     MRSA (methicillin resistant staph aureus) culture positive     Anxiety     Health Care Home     Type 2 diabetes mellitus with diabetic neuropathy (H)     Hemoglobin A1c less than 7.0%     Abdominal pain     Pancreatitis     Controlled substance agreement signed     Past Surgical History:   Procedure Laterality Date     COLONOSCOPY  11/11/2013    Procedure: COMBINED COLONOSCOPY, SINGLE BIOPSY/POLYPECTOMY BY BIOPSY;  Colonoscopy/ polypectomy x2 by cold forceps    ;  Surgeon: Juan Carlos Bellamy MD;  Location:  GI     ESOPHAGOSCOPY, GASTROSCOPY, DUODENOSCOPY (EGD), COMBINED N/A 9/22/2016    Procedure: COMBINED ESOPHAGOSCOPY, GASTROSCOPY, DUODENOSCOPY (EGD), BIOPSY SINGLE OR MULTIPLE;  Surgeon: Juan Carlos Barraza MD;  Location:  GI     ORTHOPEDIC SURGERY         Social History   Substance Use Topics     Smoking status: Former Smoker     Types: Cigarettes     Smokeless tobacco: Never Used     Alcohol use No      Comment: 14 drinks weekly, quit on 08/2017     Family History   Problem Relation Age of Onset     Pancreatic Cancer Mother      Pancreatic Cancer Paternal Grandfather      DIABETES Brother      Depression Brother      Suicide Brother      Substance Abuse  "Brother      Dementia Father      Parkinsonism Father      Family History Negative No family hx of      Colon Cancer No family hx of      Unknown/Adopted No family hx of      Anxiety Disorder No family hx of      Schizophrenia No family hx of      Bipolar Disorder No family hx of      Ambika Disease No family hx of      Autism Spectrum Disorder No family hx of      Intellectual Disability (Mental Retardation) No family hx of      MENTAL ILLNESS No family hx of                Reviewed and updated as needed this visit by clinical staffTobacco  Allergies  Meds  Med Hx  Surg Hx  Fam Hx  Soc Hx        Reviewed and updated as needed this visit by Provider              ROS:  C: NEGATIVE for fever, chills, change in weight  I: NEGATIVE for worrisome rashes, moles or lesions  E: NEGATIVE for vision changes or irritation  ENT: NEGATIVE for ear, mouth and throat problems  R: NEGATIVE for significant cough or SOB  CV: NEGATIVE for chest pain, palpitations or peripheral edema  GI: NEGATIVE for nausea, abdominal pain, heartburn, or change in bowel habits   male: negative for dysuria, hematuria, decreased urinary stream, erectile dysfunction, urethral discharge  M: NEGATIVE for significant arthralgias or myalgia  N: NEGATIVE for weakness, dizziness or paresthesias  P: NEGATIVE for changes in mood or affect    OBJECTIVE:   /84  Pulse 79  Temp 97.7  F (36.5  C) (Oral)  Ht 6' 2\" (1.88 m)  Wt 218 lb (98.9 kg)  SpO2 98%  BMI 27.99 kg/m2  EXAM:  GENERAL: healthy, alert and no distress  EYES: Eyes grossly normal to inspection, PERRL and conjunctivae and sclerae normal  HENT: ear canals and TM's normal, nose and mouth without ulcers or lesions  NECK: no adenopathy, no asymmetry, masses, or scars and thyroid normal to palpation  RESP: lungs clear to auscultation - no rales, rhonchi or wheezes  CV: regular rate and rhythm, normal S1 S2, no S3 or S4, no murmur, click or rub, no peripheral edema and peripheral pulses " "strong  ABDOMEN: soft, nontender, no hepatosplenomegaly, no masses and bowel sounds normal  MS: no gross musculoskeletal defects noted, no edema  SKIN: no suspicious lesions or rashes  Right temporal area palpatory soft lump   NEURO: Normal strength and tone, mentation intact and speech normal  PSYCH: mentation appears normal, affect normal/bright    ASSESSMENT/PLAN:       ICD-10-CM    1. Routine general medical examination at a health care facility Z00.00 Comprehensive metabolic panel     Erythrocyte sedimentation rate auto     Lipid panel reflex to direct LDL Fasting     Prostate spec antigen screen     TSH with free T4 reflex     CBC with platelets     *UA reflex to Microscopic   2. Benign essential hypertension I10 lisinopril (PRINIVIL/ZESTRIL) 10 MG tablet   3. Benign prostatic hyperplasia with urinary frequency N40.1 tamsulosin (FLOMAX) 0.4 MG capsule    R35.0    4. Midline back pain, unspecified back location, unspecified chronicity M54.89 XR Lumbar Spine 2/3 Views     XR Thoracic Spine 2 Views   5. Low testosterone E34.9 Testosterone Free and Total   6. Type 2 diabetes mellitus with peripheral autonomic neuropathy (H) E11.43 Hemoglobin A1c     Albumin Random Urine Quantitative with Creat Ratio       COUNSELING:  Reviewed preventive health counseling, as reflected in patient instructions       Regular exercise       Healthy diet/nutrition       Vision screening       Hearing screening       Colon cancer screening       Prostate cancer screening           reports that he has quit smoking. His smoking use included Cigarettes. He has never used smokeless tobacco.      Estimated body mass index is 27.99 kg/(m^2) as calculated from the following:    Height as of this encounter: 6' 2\" (1.88 m).    Weight as of this encounter: 218 lb (98.9 kg).         Counseling Resources:  ATP IV Guidelines  Pooled Cohorts Equation Calculator  FRAX Risk Assessment  ICSI Preventive Guidelines  Dietary Guidelines for Americans, " 2010  USDA's MyPlate  ASA Prophylaxis  Lung CA Screening    Javier Villafana MD  [facility]

## 2017-11-29 NOTE — MR AVS SNAPSHOT
After Visit Summary   11/29/2017    Austyn Araujo    MRN: [id number]           Patient Information     Date Of Birth          1955        Visit Information        Provider Department      11/29/2017 8:40 AM Javier Villafana MD [facility]        Today's Diagnoses     Benign essential hypertension    -  1    Benign prostatic hyperplasia with urinary frequency        Midline back pain, unspecified back location, unspecified chronicity        Routine general medical examination at a health care facility          Care Instructions      Preventive Health Recommendations  Male Ages 50 - 64    Yearly exam:             See your health care provider every year in order to  o   Review health changes.   o   Discuss preventive care.    o   Review your medicines if your doctor has prescribed any.     Have a cholesterol test every 5 years, or more frequently if you are at risk for high cholesterol/heart disease.     Have a diabetes test (fasting glucose) every three years. If you are at risk for diabetes, you should have this test more often.     Have a colonoscopy at age 50, or have a yearly FIT test (stool test). These exams will check for colon cancer.      Talk with your health care provider about whether or not a prostate cancer screening test (PSA) is right for you.    You should be tested each year for STDs (sexually transmitted diseases), if you re at risk.     Shots: Get a flu shot each year. Get a tetanus shot every 10 years.     Nutrition:    Eat at least 5 servings of fruits and vegetables daily.     Eat whole-grain bread, whole-wheat pasta and brown rice instead of white grains and rice.     Talk to your provider about Calcium and Vitamin D.     Lifestyle    Exercise for at least 150 minutes a week (30 minutes a day, 5 days a week). This will help you control your weight and prevent disease.     Limit alcohol to one drink per day.     No smoking.     Wear sunscreen to prevent  "skin cancer.     See your dentist every six months for an exam and cleaning.     See your eye doctor every 1 to 2 years.            Follow-ups after your visit        Future tests that were ordered for you today     Open Future Orders        Priority Expected Expires Ordered    XR Thoracic Spine 2 Views Routine 2017    XR Lumbar Spine 2/3 Views Routine 2017            Who to contact     If you have questions or need follow up information about today's clinic visit or your schedule please contact [facility] directly at [phone].  Normal or non-critical lab and imaging results will be communicated to you by [employer], letter or phone within 4 business days after the clinic has received the results. If you do not hear from us within 7 days, please contact the clinic through [employer] or phone. If you have a critical or abnormal lab result, we will notify you by phone as soon as possible.  Submit refill requests through [employer] or call your pharmacy and they will forward the refill request to us. Please allow 3 business days for your refill to be completed.          Additional Information About Your Visit        MyChart Information     [employer] lets you send messages to your doctor, view your test results, renew your prescriptions, schedule appointments and more. To sign up, go to [email or web address] . Click on \"Log in\" on the left side of the screen, which will take you to the Welcome page. Then click on \"Sign up Now\" on the right side of the page.     You will be asked to enter the access code listed below, as well as some personal information. Please follow the directions to create your username and password.     Your access code is: [email or web address]  Expires: 2018  9:28 AM     Your access code will  in 90 days. If you need help or a new code, please call your [facility] or [phone].        Care EveryWhere ID     This is your " "Care EveryWhere ID. This could be used by other organizations to access your [facility] medical records  [id number]        Your Vitals Were     Pulse Temperature Height Pulse Oximetry BMI (Body Mass Index)       79 97.7  F (36.5  C) (Oral) 6' 2\" (1.88 m) 98% 27.99 kg/m2        Blood Pressure from Last 3 Encounters:   11/29/17 124/84   07/12/17 122/80   07/06/17 110/84    Weight from Last 3 Encounters:   11/29/17 218 lb (98.9 kg)   08/30/17 226 lb 3.2 oz (102.6 kg)   07/12/17 226 lb (102.5 kg)              We Performed the Following     *UA reflex to Microscopic     CBC with platelets     Comprehensive metabolic panel     Erythrocyte sedimentation rate auto     Lipid panel reflex to direct LDL Fasting     Prostate spec antigen screen     TSH with free T4 reflex          Today's Medication Changes          These changes are accurate as of: 11/29/17  9:28 AM.  If you have any questions, ask your nurse or doctor.               Start taking these medicines.        Dose/Directions    tamsulosin 0.4 MG capsule   Commonly known as:  FLOMAX   Used for:  Benign prostatic hyperplasia with urinary frequency   Started by:  Javier Villafana MD        Dose:  0.4 mg   Take 1 capsule (0.4 mg) by mouth daily Take at bedtime   Quantity:  90 capsule   Refills:  3         These medicines have changed or have updated prescriptions.        Dose/Directions    * lisinopril 5 MG tablet   Commonly known as:  PRINIVIL/ZESTRIL   This may have changed:  Another medication with the same name was added. Make sure you understand how and when to take each.   Used for:  Essential hypertension with goal blood pressure less than 140/90   Changed by:  Javier Villafana MD        Dose:  5 mg   Take 1 tablet (5 mg) by mouth 2 times daily   Quantity:  180 tablet   Refills:  3       * lisinopril 10 MG tablet   Commonly known as:  PRINIVIL/ZESTRIL   This may have changed:  You were already taking a medication with the same name, and this prescription " was added. Make sure you understand how and when to take each.   Used for:  Benign essential hypertension   Changed by:  Javier Villafana MD        Dose:  10 mg   Take 1 tablet (10 mg) by mouth daily   Quantity:  90 tablet   Refills:  3       * Notice:  This list has 2 medication(s) that are the same as other medications prescribed for you. Read the directions carefully, and ask your doctor or other care provider to review them with you.         Where to get your medicines      These medications were sent to [facility]/pharmacy #[id number] - [place], [place] - [address] [address]  [address], [place] [address]    Hours:  [facility] Phone:  [phone]     lisinopril 10 MG tablet    tamsulosin 0.4 MG capsule                Primary Care Provider Office Phone # Fax #    Javier Villafana [phone]       [address] [facility] [address]        Goals        General    Reduce drinking by 90% (pt-stated)     Notes - Note created  4/23/2015 10:33 AM by Mckenna Chang MSW    As of today's date 4/23/2015 goal is met at 26 - 50%.   Goal Status:  Active          Equal Access to Services     [facility] AH: Hadii aad ku hadasho Soomaali, waaxda luqadaha, qaybta kaalmada adeegyada, teena garcia hayalician aravind pereyra . So [facility] [phone].    ATENCIÓN: Si habla español, tiene a carrillo disposición servicios gratuitos de asistencia lingüística. [facility] [phone].    We comply with applicable federal civil rights laws and [place] laws. We do not discriminate on the basis of race, color, national origin, age, disability, sex, sexual orientation, or gender identity.            Thank you!     Thank you for choosing [facility]  for your care. Our goal is always to provide you with excellent care. Hearing back from our patients is one way we can continue to improve our services. Please take a few minutes to complete the written survey that you may receive in the mail  "after your visit with us. Thank you!             Your Updated Medication List - Protect others around you: Learn how to safely use, store and throw away your medicines at www.disposemymeds.org.          This list is accurate as of: 11/29/17  9:28 AM.  Always use your most recent med list.                   Brand Name Dispense Instructions for use Diagnosis    ALPRAZolam 0.5 MG tablet    XANAX    30 tablet    Take 1 tablet (0.5 mg) by mouth 3 times daily as needed for anxiety    Anxiety       aspirin 81 MG tablet      Take 1 tablet by mouth daily.        atorvastatin 10 MG tablet    LIPITOR    30 tablet    TAKE 1 TABLET BY MOUTH ONCE DAILY    Hyperlipidemia LDL goal <100       B-D LUER-ROCHELLE SYRINGE 22G X 1-1/2\" 3 ML Misc   Generic drug:  syringe/needle (disp)     4 each    USE WITH TESTOSTERONE    Low testosterone       blood glucose monitoring test strip    ONETOUCH ULTRA    100 strip    Use to test blood sugars One time daily or as directed.    Type 2 diabetes mellitus with diabetic neuropathy (H)       CENTRUM SILVER per tablet     90 tablet    Take 1 tablet by mouth daily    Type 2 diabetes mellitus with diabetic neuropathy (H), Essential hypertension with goal blood pressure less than 140/90       * lisinopril 5 MG tablet    PRINIVIL/ZESTRIL    180 tablet    Take 1 tablet (5 mg) by mouth 2 times daily    Essential hypertension with goal blood pressure less than 140/90       * lisinopril 10 MG tablet    PRINIVIL/ZESTRIL    90 tablet    Take 1 tablet (10 mg) by mouth daily    Benign essential hypertension       metFORMIN 1000 MG tablet    GLUCOPHAGE    180 tablet    Take 1 tablet (1,000 mg) by mouth 2 times daily (with meals)    Essential hypertension with goal blood pressure less than 140/90, Type 2 diabetes mellitus without complication, without long-term current use of insulin (H)       needle (disp) 18G X 1\" Misc    B-D DISP NEEDLE TW    5 each    1 each every 7 days With testosterone    Low testosterone       " omeprazole 40 MG capsule    priLOSEC    30 capsule    TAKE 1 CAPSULE BY MOUTH ONCE DAILY    Gastroesophageal reflux disease without esophagitis       tadalafil 20 MG tablet    CIALIS    100 tablet    Take 1 tablet (20 mg) by mouth daily Never use with nitroglycerin, terazosin or doxazosin.    Low testosterone       tamsulosin 0.4 MG capsule    FLOMAX    90 capsule    Take 1 capsule (0.4 mg) by mouth daily Take at bedtime    Benign prostatic hyperplasia with urinary frequency       testosterone cypionate 200 MG/ML injection    DEPOTESTOTERONE    10 mL    INJECT 2 MLS IM ONCE WEEKLY    Low testosterone       * Notice:  This list has 2 medication(s) that are the same as other medications prescribed for you. Read the directions carefully, and ask your doctor or other care provider to review them with you.

## 2017-11-30 LAB
ALBUMIN SERPL-MCNC: 4.2 G/DL (ref 3.4–5)
ALP SERPL-CCNC: 73 U/L (ref 40–150)
ALT SERPL W P-5'-P-CCNC: 30 U/L (ref 0–70)
ANION GAP SERPL CALCULATED.3IONS-SCNC: 9 MMOL/L (ref 3–14)
AST SERPL W P-5'-P-CCNC: 16 U/L (ref 0–45)
BILIRUB SERPL-MCNC: 0.8 MG/DL (ref 0.2–1.3)
BUN SERPL-MCNC: 16 MG/DL (ref 7–30)
CALCIUM SERPL-MCNC: 9.5 MG/DL (ref 8.5–10.1)
CHLORIDE SERPL-SCNC: 101 MMOL/L (ref 94–109)
CHOLEST SERPL-MCNC: 168 MG/DL
CO2 SERPL-SCNC: 26 MMOL/L (ref 20–32)
CREAT SERPL-MCNC: 0.99 MG/DL (ref 0.66–1.25)
CREAT UR-MCNC: 175 MG/DL
GFR SERPL CREATININE-BSD FRML MDRD: 76 ML/MIN/1.7M2
GLUCOSE SERPL-MCNC: 151 MG/DL (ref 70–99)
HDLC SERPL-MCNC: 39 MG/DL
LDLC SERPL CALC-MCNC: 105 MG/DL
MICROALBUMIN UR-MCNC: 11 MG/L
MICROALBUMIN/CREAT UR: 6.51 MG/G CR (ref 0–17)
NONHDLC SERPL-MCNC: 129 MG/DL
POTASSIUM SERPL-SCNC: 4.2 MMOL/L (ref 3.4–5.3)
PROT SERPL-MCNC: 7.7 G/DL (ref 6.8–8.8)
PSA SERPL-ACNC: 4.39 UG/L (ref 0–4)
SODIUM SERPL-SCNC: 136 MMOL/L (ref 133–144)
TRIGL SERPL-MCNC: 120 MG/DL
TSH SERPL DL<=0.005 MIU/L-ACNC: 0.94 MU/L (ref 0.4–4)

## 2017-12-01 RX ORDER — ATORVASTATIN CALCIUM 10 MG/1
TABLET, FILM COATED ORAL
Qty: 30 TABLET | Refills: 0 | Status: SHIPPED | OUTPATIENT
Start: 2017-12-01 | End: 2017-12-03

## 2017-12-01 RX ORDER — SYRINGE WITH NEEDLE, 1 ML 25GX5/8"
SYRINGE, EMPTY DISPOSABLE MISCELLANEOUS
Qty: 4 EACH | Refills: 3 | Status: SHIPPED | OUTPATIENT
Start: 2017-12-01 | End: 2018-03-30

## 2017-12-01 ASSESSMENT — ANXIETY QUESTIONNAIRES
7. FEELING AFRAID AS IF SOMETHING AWFUL MIGHT HAPPEN: NOT AT ALL
5. BEING SO RESTLESS THAT IT IS HARD TO SIT STILL: NOT AT ALL
6. BECOMING EASILY ANNOYED OR IRRITABLE: NOT AT ALL
IF YOU CHECKED OFF ANY PROBLEMS ON THIS QUESTIONNAIRE, HOW DIFFICULT HAVE THESE PROBLEMS MADE IT FOR YOU TO DO YOUR WORK, TAKE CARE OF THINGS AT HOME, OR GET ALONG WITH OTHER PEOPLE: SOMEWHAT DIFFICULT
1. FEELING NERVOUS, ANXIOUS, OR ON EDGE: SEVERAL DAYS
3. WORRYING TOO MUCH ABOUT DIFFERENT THINGS: SEVERAL DAYS
2. NOT BEING ABLE TO STOP OR CONTROL WORRYING: NOT AT ALL
GAD7 TOTAL SCORE: 2

## 2017-12-01 ASSESSMENT — PATIENT HEALTH QUESTIONNAIRE - PHQ9
5. POOR APPETITE OR OVEREATING: NOT AT ALL
SUM OF ALL RESPONSES TO PHQ QUESTIONS 1-9: 6

## 2017-12-01 NOTE — TELEPHONE ENCOUNTER
Routing refill request to provider for review/approval because:  Labs out of range:  11/29/17 LDL     Please advise, thanks.

## 2017-12-02 ASSESSMENT — ANXIETY QUESTIONNAIRES: GAD7 TOTAL SCORE: 2

## 2017-12-03 DIAGNOSIS — E78.5 HYPERLIPIDEMIA LDL GOAL <100: ICD-10-CM

## 2017-12-05 LAB
SHBG SERPL-SCNC: 19 NMOL/L (ref 11–80)
TESTOST FREE SERPL-MCNC: 26.78 NG/DL (ref 4.7–24.4)
TESTOST SERPL-MCNC: 892 NG/DL (ref 240–950)

## 2017-12-05 RX ORDER — GLIMEPIRIDE 1 MG/1
1 TABLET ORAL
Qty: 90 TABLET | Refills: 1 | Status: SHIPPED | OUTPATIENT
Start: 2017-12-05 | End: 2018-08-13

## 2017-12-07 RX ORDER — ATORVASTATIN CALCIUM 10 MG/1
TABLET, FILM COATED ORAL
Qty: 90 TABLET | Refills: 1 | Status: SHIPPED | OUTPATIENT
Start: 2017-12-07 | End: 2018-07-11

## 2017-12-07 NOTE — TELEPHONE ENCOUNTER
Recent Labs   Lab Test  11/29/17   0939  10/10/16   1601   09/30/15   0804  05/09/14   0817   CHOL  168  196   < >  278*  161   HDL  39*  48   < >  54  49   LDL  105*  106*   < >  187*  93   TRIG  120  208*   < >  187*  96   CHOLHDLRATIO   --    --    --   5.1*  3.3    < > = values in this interval not displayed.       Pharm req 90 day supply.    Prescription approved per [facility] Refill Protocol.

## 2017-12-17 DIAGNOSIS — R79.89 LOW TESTOSTERONE: ICD-10-CM

## 2017-12-18 RX ORDER — TESTOSTERONE CYPIONATE 200 MG/ML
INJECTION, SOLUTION INTRAMUSCULAR
Qty: 10 ML | Refills: 0 | Status: SHIPPED | OUTPATIENT
Start: 2017-12-18 | End: 2018-01-25

## 2018-01-03 ENCOUNTER — TELEPHONE (OUTPATIENT)
Dept: INTERNAL MEDICINE | Facility: CLINIC | Age: 63
End: 2018-01-03

## 2018-01-03 DIAGNOSIS — M51.369 DDD (DEGENERATIVE DISC DISEASE), LUMBAR: Primary | ICD-10-CM

## 2018-01-03 NOTE — TELEPHONE ENCOUNTER
Patient calling asking for a closer location for epidural injection. [facility] is too far for pt as he lives in [place], asking for [facility] location if possible.  Provider please review and advise. Thank you.

## 2018-01-04 ENCOUNTER — TELEPHONE (OUTPATIENT)
Dept: PALLIATIVE MEDICINE | Facility: CLINIC | Age: 63
End: 2018-01-04

## 2018-01-04 DIAGNOSIS — M54.42 LEFT-SIDED LOW BACK PAIN WITH LEFT-SIDED SCIATICA, UNSPECIFIED CHRONICITY: Primary | ICD-10-CM

## 2018-01-04 NOTE — TELEPHONE ENCOUNTER
Pre-screening Questions for Radiology Injections:    Injection to be done at which interventional clinic site? [facility]    Procedure ordered by Dr. Benson    Procedure ordered? Lumbar Epidural Steroid Injection    What insurance would patient like us to bill for this procedure? [place] [employer]      Worker's comp or MVA (motor vehicle accident) -Any injection DO NOT SCHEDULE and route to Rae Mancuso.      [employer] insurance - For SI joint injections, DO NOT SCHEDULE and route Kavitha Siddiqui.      [employer]- MBB's must be scheduled at LEAST two weeks apart      [employer] - Any injection besides hip/shoulder/knee joint DO NOT SCHEDULE and route to Kavitha Siddiqui. She will obtain PA and call pt back to schedule procedure or notify pt of denial.       [employer]-PA REQUIRED FOR NON-CANDICE OR Joint injections    Any chance of pregnancy? NO   If YES, do NOT schedule and route to RN pool    Is an  needed? No     Patient has a drive home? (mandatory) YES: INFORMED    Is patient taking any blood thinners (plavix, coumadin, jantoven, warfarin, heparin, pradaxa or dabigatran )? No   If hold needed, do NOT schedule, route to RN pool     Is patient taking any aspirin products? Yes - Pt takes 81mg daily; instructed to hold 0 day(s) prior to procedure.      If more than 325mg/day do NOT schedule; route to RN pool     For CERVICAL procedures, hold all aspirin products for 6 days.      Does the patient have a bleeding or clotting disorder? No     If YES, okay to schedule AND route to RN nurse pool    **For any patients with platelet count <100, must be forwarded to provider**    Is patient diabetic?  Yes  If YES, have them bring their glucometer.    Does patient have an active infection or treated for one within the past week? No     Is patient currently taking any antibiotics?  No     For patients on chronic, preventative, or prophylactic antibiotics, procedures may be scheduled.     For patients on antibiotics  for active or recent infection:    Mae Torres Burton, Snitzer-antibiotic course must have been completed for 4 days    Dr. Vanessa-antibiotic course must have been completed for 7 days    Is patient currently taking any steroid medications? (i.e. Prednisone, Medrol)  No     For patients on steroid medications:    Mae Torres Burton, Snitzer-steroid course must have been completed for 4 days    -steroid course must have been completed for 7 days    Reviewed with patient:  If you are started on any steroids or antibiotics between now and your appointment, you must contact us because it may affect our ability to perform your procedure.  Yes    Is patient actively being treated for cancer or immunocompromised? No  If YES, do NOT schedule and route to RN pool     Are you able to get on and off an exam table with minimal or no assistance? Yes  If NO, do NOT schedule and route to RN pool    Are you able to roll over and lay on your stomach with minimal or no assistance? Yes  If NO, do NOT schedule and route to RN pool     Any allergies to contrast dye, iodine, shellfish, or numbing and steroid medications? No  If YES, route to RN pool AND add allergy information to appointment notes    Allergies: Review of patient's allergies indicates no known allergies.      Has the patient had a flu shot or any other vaccinations within 7 days before or after the procedure.  No     Does patient have an MRI/CT?  NO  (SI joint, hip injections, lumbar sympathetic blocks, and stellate ganglion blocks do not require an MRI)    Was the MRI done w/in the last 3 years?  NA    Was MRI done at [facility]? No      If not, where was it done? N/A       If MRI was not done at [facility], [facility] or [facility] do NOT schedule and route to nursing.  If pt has an imaging disc, the injection may be scheduled but pt has to bring disc to appt. If they show up w/out disc the injection cannot be done    Reminders (please tell  patient if applicable):       Instructed pt to arrive 30 minutes early for IV start if this is for a cervical procedure, ALL sympathetic (stellate ganglion, hypogastric, or lumbar sympathetic block) and all sedation procedures (RFA, spinal cord stimulation trials).  Not Applicable   -IVs are not routinely placed for Dr. Kirk cervical cases   -Dr. Whitfield: IVs for cervical ESIs and cervical TBDs (not CMBBs/facet inj)      If NPO for sedation, informed patient that it is okay to take medications with sips of water (except if they are to hold blood thinners).  Not Applicable   *DO take blood pressure medication if it is prescribed*      If this is for a cervical CANDICE, informed patient that aspirin needs to be held for 6 days.   Not Applicable      For all patients not having spinal cord stimulator (SCS) trials or radiofrequency ablations (RFAs), informed patient:    IV sedation is not provided for this procedure.  If you feel that an oral anti-anxiety medication is needed, you can discuss this further with your referring provider or primary care provider.  The Pain Clinic provider will discuss specifics of what the procedure includes at your appointment.  Most procedures last 10-20 minutes.  We use numbing medications to help with any discomfort during the procedure.  Not Applicable      Do not schedule procedures requiring IV placement in the first appointment of the day or first appointment after lunch. N/A      For patients 85 or older we recommend having an adult stay w/ them for the remainder of the day.   N/A    Does the patient have any questions?  NO  Julia Boateng  [facility]

## 2018-01-07 DIAGNOSIS — K21.9 GASTROESOPHAGEAL REFLUX DISEASE WITHOUT ESOPHAGITIS: ICD-10-CM

## 2018-01-08 ENCOUNTER — TELEPHONE (OUTPATIENT)
Dept: INTERNAL MEDICINE | Facility: CLINIC | Age: 63
End: 2018-01-08

## 2018-01-08 DIAGNOSIS — R79.89 LOW TESTOSTERONE: ICD-10-CM

## 2018-01-08 NOTE — TELEPHONE ENCOUNTER
Dr. Villafana, a lumbar MRI is needed in order for pt to have an lumbar epidural steroid injection.  Please order.      Keep encounter open.    Becky Turner, RN-BSN  [facility]

## 2018-01-08 NOTE — LETTER
[facility]  [address], [address]  [place], [place]  [address]                                            TEL:[phone]  FAX:[phone]      Austyn Araujo  [address] [facility] [address]      January 16, 2018

## 2018-01-10 RX ORDER — OMEPRAZOLE 40 MG/1
40 CAPSULE, DELAYED RELEASE ORAL DAILY
Qty: 90 CAPSULE | Refills: 3 | Status: SHIPPED | OUTPATIENT
Start: 2018-01-10 | End: 2019-01-06

## 2018-01-10 RX ORDER — TADALAFIL 20 MG/1
20 TABLET ORAL DAILY
Qty: 100 TABLET | Refills: 0 | Status: CANCELLED | OUTPATIENT
Start: 2018-01-10

## 2018-01-10 NOTE — TELEPHONE ENCOUNTER
Pt states he never received the Cialis refill from November.     Call to [facility]. They were unable to reach pt, so they never sent out the order.     Call to pt and advised. He will call them and order it from the November Rx.

## 2018-01-10 NOTE — TELEPHONE ENCOUNTER
"Requested Prescriptions   Pending Prescriptions Disp Refills     tadalafil (CIALIS) 20 MG tablet 100 tablet 0     Sig: Take 1 tablet (20 mg) by mouth daily Never use with nitroglycerin, terazosin or doxazosin.    Erectile Dysfuction Protocol Passed    1/8/2018 12:20 PM       Passed - Absence of nitrates on medication list       Passed - Absence of Alpha Blockers on Med list       Passed - Recent or future visit with authorizing provider    Patient had office visit in the last year or has a visit in the next 30 days with authorizing provider.  See \"Patient Info\" tab in inbasket, or \"Choose Columns\" in Meds & Orders section of the refill encounter.              Passed - Patient is age 18 or older          Routing refill request to provider for review/approval because:  Since directions too long, need to be local print, signed by PCP, and then fax to pharm.    Please advise, thanks.        "

## 2018-01-10 NOTE — TELEPHONE ENCOUNTER
Pt calling.  Very frustrated that only a lumbar MRI was ordered.  States he has mid-back pain as well.    Please place MRI for mid-back.

## 2018-01-11 DIAGNOSIS — R97.20 ELEVATED PROSTATE SPECIFIC ANTIGEN (PSA): ICD-10-CM

## 2018-01-11 DIAGNOSIS — R35.0 URINARY FREQUENCY: Primary | ICD-10-CM

## 2018-01-16 RX ORDER — TADALAFIL 20 MG/1
20 TABLET ORAL DAILY
Qty: 100 TABLET | Refills: 0 | Status: SHIPPED | OUTPATIENT
Start: 2018-01-16 | End: 2018-07-16

## 2018-01-16 NOTE — TELEPHONE ENCOUNTER
Patient asking if can come to clinic to clinic to  a copy of the Rx to e-mail it to the company himself. Would need a signed copy for patient.   Provider please review and advise. Thank you.

## 2018-01-22 ENCOUNTER — HOSPITAL ENCOUNTER (OUTPATIENT)
Dept: MRI IMAGING | Facility: CLINIC | Age: 63
End: 2018-01-22
Attending: INTERNAL MEDICINE
Payer: COMMERCIAL

## 2018-01-22 ENCOUNTER — HOSPITAL ENCOUNTER (OUTPATIENT)
Dept: MRI IMAGING | Facility: CLINIC | Age: 63
Discharge: HOME | End: 2018-01-22
Attending: INTERNAL MEDICINE | Admitting: INTERNAL MEDICINE
Payer: COMMERCIAL

## 2018-01-22 DIAGNOSIS — M54.42 LEFT-SIDED LOW BACK PAIN WITH LEFT-SIDED SCIATICA, UNSPECIFIED CHRONICITY: ICD-10-CM

## 2018-01-22 PROCEDURE — 72146 MRI CHEST SPINE W/O DYE: CPT

## 2018-01-22 PROCEDURE — 72148 MRI LUMBAR SPINE W/O DYE: CPT

## 2018-01-25 DIAGNOSIS — R79.89 LOW TESTOSTERONE: ICD-10-CM

## 2018-01-30 RX ORDER — TESTOSTERONE CYPIONATE 200 MG/ML
INJECTION, SOLUTION INTRAMUSCULAR
Qty: 10 ML | Refills: 0 | Status: SHIPPED | OUTPATIENT
Start: 2018-01-30 | End: 2018-03-22

## 2018-01-30 NOTE — TELEPHONE ENCOUNTER
testosterone      Last Written Prescription Date:  12/18/17  Last Fill Quantity: 10,   # refills: 0  Last Office Visit: 11/29/*17  Future Office visit:       Routing refill request to provider for review/approval because:  Drug not on the [facility], [facility] or [facility] refill protocol or controlled substance

## 2018-01-31 DIAGNOSIS — R79.89 LOW TESTOSTERONE: ICD-10-CM

## 2018-01-31 RX ORDER — TESTOSTERONE CYPIONATE 200 MG/ML
INJECTION, SOLUTION INTRAMUSCULAR
Qty: 10 ML | Refills: 0 | OUTPATIENT
Start: 2018-01-31

## 2018-01-31 NOTE — TELEPHONE ENCOUNTER
Rx faxed to [facility] in [place]. Called Pt and informed of MD message below.  Basia Claudio MA

## 2018-03-22 DIAGNOSIS — R79.89 LOW TESTOSTERONE: ICD-10-CM

## 2018-03-26 RX ORDER — TESTOSTERONE CYPIONATE 200 MG/ML
INJECTION, SOLUTION INTRAMUSCULAR
Qty: 10 ML | Refills: 0 | Status: SHIPPED | OUTPATIENT
Start: 2018-03-26 | End: 2018-05-22

## 2018-03-26 RX ORDER — NEEDLES, DISPOSABLE 25GX5/8"
NEEDLE, DISPOSABLE MISCELLANEOUS
Qty: 25 EACH | Refills: 6 | Status: SHIPPED | OUTPATIENT
Start: 2018-03-26 | End: 2019-09-13

## 2018-03-26 NOTE — TELEPHONE ENCOUNTER
Approved and faxed to [facility] in [place].     Price Hart MA

## 2018-03-26 NOTE — TELEPHONE ENCOUNTER
"Requested Prescriptions   Pending Prescriptions Disp Refills     BD HYPODERMIC NEEDLE 18G X 1\" MISC [Pharmacy Med Name: BD NEEDLES 18GX1\"]  6     Sig: USE WITH TESTOSTERONE    There is no refill protocol information for this order      Last Written Prescription Date:  5/18/17  Last Fill Quantity: 5,   # refills: 0  Last Office Visit: 11/29/17  Future Office visit:       Routing refill request to provider for review/approval because:  Drug not on the [facility], [facility] or [facility] refill protocol or controlled substance       testosterone cypionate (DEPOTESTOTERONE) 200 MG/ML injection [Pharmacy Med Name: TESTOSTERON CYP 2,000 MG/10 ML] 10 mL 0     Sig: INJECT 2 MLS IM ONCE WEEKLY    There is no refill protocol information for this order      Testosterone      Last Written Prescription Date:  1/30/18  Last Fill Quantity: 10ml,   # refills: 0  Last Office Visit: 11/29/17  Future Office visit:       Routing refill request to provider for review/approval because:  Drug not on the [facility], [facility] or [facility] refill protocol or controlled substance          "

## 2018-03-30 DIAGNOSIS — R79.89 LOW TESTOSTERONE: ICD-10-CM

## 2018-03-30 RX ORDER — SYRINGE WITH NEEDLE, 1 ML 25GX5/8"
SYRINGE, EMPTY DISPOSABLE MISCELLANEOUS
Qty: 12 EACH | Refills: 3 | Status: SHIPPED | OUTPATIENT
Start: 2018-03-30 | End: 2019-01-16

## 2018-03-30 NOTE — TELEPHONE ENCOUNTER
"Requested Prescriptions   Pending Prescriptions Disp Refills     B-D LUER-ROCHELLE SYRINGE 22G X 1-1/2\" 3 ML MISC [Pharmacy Med Name: BD 3 ML SYRINGE 25MG6-2/2\"]  3     Sig: USE WITH TESTOSTERONE    There is no refill protocol information for this order          Prescription approved per [facility] Refill Protocol.    "

## 2018-04-12 DIAGNOSIS — E11.43 TYPE 2 DIABETES MELLITUS WITH PERIPHERAL AUTONOMIC NEUROPATHY (H): ICD-10-CM

## 2018-04-12 DIAGNOSIS — E11.9 TYPE 2 DIABETES MELLITUS WITHOUT COMPLICATION, WITHOUT LONG-TERM CURRENT USE OF INSULIN (H): ICD-10-CM

## 2018-04-12 DIAGNOSIS — I10 ESSENTIAL HYPERTENSION WITH GOAL BLOOD PRESSURE LESS THAN 140/90: ICD-10-CM

## 2018-04-13 RX ORDER — GLIMEPIRIDE 1 MG/1
TABLET ORAL
Qty: 90 TABLET | Refills: 1 | OUTPATIENT
Start: 2018-04-13

## 2018-04-25 ENCOUNTER — OFFICE VISIT (OUTPATIENT)
Dept: INTERNAL MEDICINE | Facility: CLINIC | Age: 63
End: 2018-04-25
Payer: COMMERCIAL

## 2018-04-25 VITALS
HEART RATE: 78 BPM | OXYGEN SATURATION: 99 % | BODY MASS INDEX: 28.9 KG/M2 | WEIGHT: 225.2 LBS | RESPIRATION RATE: 16 BRPM | SYSTOLIC BLOOD PRESSURE: 108 MMHG | TEMPERATURE: 97.3 F | DIASTOLIC BLOOD PRESSURE: 78 MMHG | HEIGHT: 74 IN

## 2018-04-25 DIAGNOSIS — Z12.5 SCREENING FOR PROSTATE CANCER: ICD-10-CM

## 2018-04-25 DIAGNOSIS — I10 ESSENTIAL HYPERTENSION: ICD-10-CM

## 2018-04-25 DIAGNOSIS — E11.43 TYPE 2 DIABETES MELLITUS WITH PERIPHERAL AUTONOMIC NEUROPATHY (H): Primary | ICD-10-CM

## 2018-04-25 DIAGNOSIS — R97.20 ELEVATED PROSTATE SPECIFIC ANTIGEN (PSA): ICD-10-CM

## 2018-04-25 DIAGNOSIS — G44.219 EPISODIC TENSION-TYPE HEADACHE, NOT INTRACTABLE: ICD-10-CM

## 2018-04-25 DIAGNOSIS — M79.641 PAIN IN BOTH HANDS: ICD-10-CM

## 2018-04-25 DIAGNOSIS — R07.9 CHEST PAIN, UNSPECIFIED TYPE: ICD-10-CM

## 2018-04-25 DIAGNOSIS — M79.642 PAIN IN BOTH HANDS: ICD-10-CM

## 2018-04-25 LAB
ERYTHROCYTE [SEDIMENTATION RATE] IN BLOOD BY WESTERGREN METHOD: 4 MM/H (ref 0–20)
HBA1C MFR BLD: 8.6 % (ref 0–5.6)

## 2018-04-25 PROCEDURE — 85652 RBC SED RATE AUTOMATED: CPT | Performed by: INTERNAL MEDICINE

## 2018-04-25 PROCEDURE — 36415 COLL VENOUS BLD VENIPUNCTURE: CPT | Performed by: INTERNAL MEDICINE

## 2018-04-25 PROCEDURE — 83036 HEMOGLOBIN GLYCOSYLATED A1C: CPT | Performed by: INTERNAL MEDICINE

## 2018-04-25 PROCEDURE — 86200 CCP ANTIBODY: CPT | Performed by: INTERNAL MEDICINE

## 2018-04-25 PROCEDURE — 86431 RHEUMATOID FACTOR QUANT: CPT | Performed by: INTERNAL MEDICINE

## 2018-04-25 PROCEDURE — G0103 PSA SCREENING: HCPCS | Performed by: INTERNAL MEDICINE

## 2018-04-25 PROCEDURE — 99215 OFFICE O/P EST HI 40 MIN: CPT | Performed by: INTERNAL MEDICINE

## 2018-04-25 ASSESSMENT — PAIN SCALES - GENERAL: PAINLEVEL: NO PAIN (0)

## 2018-04-25 NOTE — MR AVS SNAPSHOT
"              After Visit Summary   4/25/2018    Austyn Araujo    MRN: [id number]           Patient Information     Date Of Birth          1955        Visit Information        Provider Department      4/25/2018 11:00 AM Javier Villafana MD [facility]        Today's Diagnoses     Type 2 diabetes mellitus with peripheral autonomic neuropathy (H)    -  1    Chest pain, unspecified type        Elevated prostate specific antigen (PSA)        Episodic tension-type headache, not intractable        Screening for prostate cancer        Pain in both hands           Follow-ups after your visit        Future tests that were ordered for you today     Open Future Orders        Priority Expected Expires Ordered    Exercise Stress Echocardiogram Routine  4/25/2019 4/25/2018            Who to contact     If you have questions or need follow up information about today's clinic visit or your schedule please contact [facility] directly at [phone].  Normal or non-critical lab and imaging results will be communicated to you by MyChart, letter or phone within 4 business days after the clinic has received the results. If you do not hear from us within 7 days, please contact the clinic through MyChart or phone. If you have a critical or abnormal lab result, we will notify you by phone as soon as possible.  Submit refill requests through [employer] or call your pharmacy and they will forward the refill request to us. Please allow 3 business days for your refill to be completed.          Additional Information About Your Visit        Care EveryWhere ID     This is your Care EveryWhere ID. This could be used by other organizations to access your [place] medical records  [id number]        Your Vitals Were     Pulse Temperature Respirations Height Pulse Oximetry BMI (Body Mass Index)    78 97.3  F (36.3  C) (Oral) 16 6' 2\" (1.88 m) 99% 28.91 kg/m2       Blood Pressure from Last 3 Encounters: "   04/25/18 108/78   11/29/17 124/84   07/12/17 122/80    Weight from Last 3 Encounters:   04/25/18 225 lb 3.2 oz (102.2 kg)   11/29/17 218 lb (98.9 kg)   08/30/17 226 lb 3.2 oz (102.6 kg)              We Performed the Following     Cyclic Citrullinated Peptide Antibody IgG     Erythrocyte sedimentation rate auto     Hemoglobin A1c     Prostate spec antigen screen     Rheumatoid factor          Today's Medication Changes          These changes are accurate as of 4/25/18 11:40 AM.  If you have any questions, ask your nurse or doctor.               These medicines have changed or have updated prescriptions.        Dose/Directions    lisinopril 10 MG tablet   Commonly known as:  PRINIVIL/ZESTRIL   This may have changed:  Another medication with the same name was removed. Continue taking this medication, and follow the directions you see here.   Used for:  Benign essential hypertension   Changed by:  Javier Villafana MD        Dose:  10 mg   Take 1 tablet (10 mg) by mouth daily   Quantity:  90 tablet   Refills:  3                Primary Care Provider Office Phone # Fax #    Javier Villafana [phone]       [address] [facility] [address]        Goals        General    Reduce drinking by 90% (pt-stated)     Notes - Note created  4/23/2015 10:33 AM by Mckenna Chang MSW    As of today's date 4/23/2015 goal is met at 26 - 50%.   Goal Status:  Active          Equal Access to Services     [facility] AH: Hadii anni yi hadasho Somartha, waaxda luqadaha, qaybta kaalmada adepaigeda, teena pereyra . So [facility] [phone].    ATENCIÓN: Si habla español, tiene a carrillo disposición servicios gratuitos de asistencia lingüística. Llame al [phone].    We comply with applicable federal civil rights laws and [place] laws. We do not discriminate on the basis of race, color, national origin, age, disability, sex, sexual orientation, or gender identity.            Thank you!      "Thank you for choosing [facility]  for your care. Our goal is always to provide you with excellent care. Hearing back from our patients is one way we can continue to improve our services. Please take a few minutes to complete the written survey that you may receive in the mail after your visit with us. Thank you!             Your Updated Medication List - Protect others around you: Learn how to safely use, store and throw away your medicines at www.disposemymeds.org.          This list is accurate as of 4/25/18 11:40 AM.  Always use your most recent med list.                   Brand Name Dispense Instructions for use Diagnosis    ALPRAZolam 0.5 MG tablet    XANAX    30 tablet    Take 1 tablet (0.5 mg) by mouth 3 times daily as needed for anxiety    Anxiety       aspirin 81 MG tablet      Take 1 tablet by mouth daily.        atorvastatin 10 MG tablet    LIPITOR    90 tablet    TAKE 1 TABLET BY MOUTH ONCE DAILY    Hyperlipidemia LDL goal <100       B-D LUER-ROCHELLE SYRINGE 22G X 1-1/2\" 3 ML Misc   Generic drug:  syringe/needle (disp)     12 each    USE WITH TESTOSTERONE    Low testosterone       BD HYPODERMIC NEEDLE 18G X 1\" Misc   Generic drug:  needle (disp)     25 each    USE WITH TESTOSTERONE    Low testosterone       blood glucose monitoring test strip    ONETOUCH ULTRA    100 strip    Use to test blood sugars One time daily or as directed.    Type 2 diabetes mellitus with diabetic neuropathy (H)       CENTRUM SILVER per tablet     90 tablet    Take 1 tablet by mouth daily    Type 2 diabetes mellitus with diabetic neuropathy (H), Essential hypertension with goal blood pressure less than 140/90       glimepiride 1 MG tablet    AMARYL    90 tablet    Take 1 tablet (1 mg) by mouth every morning (before breakfast)    Type 2 diabetes mellitus with peripheral autonomic neuropathy (H)       lisinopril 10 MG tablet    PRINIVIL/ZESTRIL    90 tablet    Take 1 tablet (10 mg) by mouth daily    Benign essential " hypertension       metFORMIN 1000 MG tablet    GLUCOPHAGE    180 tablet    TAKE 1 TABLET (1,000 MG) BY MOUTH 2 TIMES DAILY (WITH MEALS)    Essential hypertension with goal blood pressure less than 140/90, Type 2 diabetes mellitus without complication, without long-term current use of insulin (H)       omeprazole 40 MG capsule    priLOSEC    90 capsule    Take 1 capsule (40 mg) by mouth daily    Gastroesophageal reflux disease without esophagitis       tadalafil 20 MG tablet    CIALIS    100 tablet    Take 1 tablet (20 mg) by mouth daily Never use with nitroglycerin, terazosin or doxazosin.    Low testosterone       tamsulosin 0.4 MG capsule    FLOMAX    90 capsule    Take 1 capsule (0.4 mg) by mouth daily Take at bedtime    Benign prostatic hyperplasia with urinary frequency       testosterone cypionate 200 MG/ML injection    DEPOTESTOTERONE    10 mL    INJECT 2 MLS IM ONCE WEEKLY    Low testosterone

## 2018-04-25 NOTE — NURSING NOTE
"Chief Complaint   Patient presents with     RECHECK     Diabetes       Initial /78 (BP Location: Left arm, Patient Position: Chair, Cuff Size: Adult Large)  Pulse 78  Temp 97.3  F (36.3  C) (Oral)  Resp 16  Ht 6' 2\" (1.88 m)  Wt 225 lb 3.2 oz (102.2 kg)  SpO2 99%  BMI 28.91 kg/m2 Estimated body mass index is 28.91 kg/(m^2) as calculated from the following:    Height as of this encounter: 6' 2\" (1.88 m).    Weight as of this encounter: 225 lb 3.2 oz (102.2 kg).  Medication Reconciliation: complete    "

## 2018-04-25 NOTE — PROGRESS NOTES
SUBJECTIVE:   Austyn Araujo is a 62 year old male who presents to clinic today for the following health issues:    Diabetes Follow-up    Has H/O DM. On diet , exercise and PO medications. Blood sugars are not well controlled. Has LE , feet parestesias. No hypoglycemias. Started on Glimepiride , still high glucose to 200. On Metformin also.       .Patient is checking blood sugars: 1 times a week    Diabetic concerns: None     Symptoms of hypoglycemia (low blood sugar): none     Paresthesias (numbness or burning in feet) or sores: No     Date of last diabetic eye exam: 3-    BP Readings from Last 2 Encounters:   04/25/18 108/78   11/29/17 124/84     Hemoglobin A1C (%)   Date Value   11/29/2017 8.5 (H)   07/06/2017 7.3 (H)     LDL Cholesterol Calculated (mg/dL)   Date Value   11/29/2017 105 (H)   10/10/2016 106 (H)       Amount of exercise or physical activity: None    Problems taking medications regularly: No    Medication side effects: none    Diet: diabetic    Has h/o HTN. on medical treatment. BP has been controlled. No side effects from medications. No CP, HA, dizziness. good compliance with medications and low salt diet.    Has h/o elevated PSA, no symptoms of dysuria, no nocturia.   Needs recheck.     Concern for an enlarged vein on the right temple and increased HA frequency.   Has had chest pain with activity.   Has h/o anxiety and depression.  controlled, no side effects. No depressive symptoms or suicidal ideation.    Concern for hands and fingers pain and stiffness.       Problem list and histories reviewed & adjusted, as indicated.  Additional history: as documented    Patient Active Problem List   Diagnosis     Advanced directives, counseling/discussion     Type 2 diabetes mellitus with peripheral autonomic neuropathy (H)     GERD (gastroesophageal reflux disease)     PUD (peptic ulcer disease)     Low testosterone     HTN (hypertension)     Mild major depression (H)     Hyperlipidemia LDL  goal <100     Erectile dysfunction     BPH (benign prostatic hyperplasia)     Peripheral neuropathy     Cellulitis     Hypertension goal BP (blood pressure) < 140/90     MRSA (methicillin resistant staph aureus) culture positive     Anxiety     Health Care Home     Type 2 diabetes mellitus with diabetic neuropathy (H)     Hemoglobin A1c less than 7.0%     Abdominal pain     Pancreatitis     Controlled substance agreement signed     Past Surgical History:   Procedure Laterality Date     COLONOSCOPY  11/11/2013    Procedure: COMBINED COLONOSCOPY, SINGLE BIOPSY/POLYPECTOMY BY BIOPSY;  Colonoscopy/ polypectomy x2 by cold forceps    ;  Surgeon: Juan Carlos Bellamy MD;  Location:  GI     ESOPHAGOSCOPY, GASTROSCOPY, DUODENOSCOPY (EGD), COMBINED N/A 9/22/2016    Procedure: COMBINED ESOPHAGOSCOPY, GASTROSCOPY, DUODENOSCOPY (EGD), BIOPSY SINGLE OR MULTIPLE;  Surgeon: Juan Carlos Barraza MD;  Location:  GI     ORTHOPEDIC SURGERY         Social History   Substance Use Topics     Smoking status: Former Smoker     Types: Cigarettes     Smokeless tobacco: Never Used     Alcohol use No      Comment: 14 drinks weekly, quit on 08/2017     Family History   Problem Relation Age of Onset     Pancreatic Cancer Mother      Pancreatic Cancer Paternal Grandfather      DIABETES Brother      Depression Brother      Suicide Brother      Substance Abuse Brother      Dementia Father      Parkinsonism Father      Family History Negative No family hx of      Colon Cancer No family hx of      Unknown/Adopted No family hx of      Anxiety Disorder No family hx of      Schizophrenia No family hx of      Bipolar Disorder No family hx of      [place] Disease No family hx of      Autism Spectrum Disorder No family hx of      Intellectual Disability (Mental Retardation) No family hx of      MENTAL ILLNESS No family hx of          Current Outpatient Prescriptions   Medication Sig Dispense Refill     ALPRAZolam (XANAX) 0.5 MG tablet Take 1 tablet  "(0.5 mg) by mouth 3 times daily as needed for anxiety 30 tablet 0     aspirin 81 MG tablet Take 1 tablet by mouth daily.       atorvastatin (LIPITOR) 10 MG tablet TAKE 1 TABLET BY MOUTH ONCE DAILY 90 tablet 1     B-D LUER-ROCHELLE SYRINGE 22G X 1-1/2\" 3 ML MISC USE WITH TESTOSTERONE 12 each 3     BD HYPODERMIC NEEDLE 18G X 1\" MISC USE WITH TESTOSTERONE 25 each 6     blood glucose monitoring (ONE TOUCH ULTRA) test strip Use to test blood sugars One time daily or as directed. 100 strip 0     glimepiride (AMARYL) 1 MG tablet Take 1 tablet (1 mg) by mouth every morning (before breakfast) 90 tablet 1     lisinopril (PRINIVIL/ZESTRIL) 10 MG tablet Take 1 tablet (10 mg) by mouth daily 90 tablet 3     metFORMIN (GLUCOPHAGE) 1000 MG tablet TAKE 1 TABLET (1,000 MG) BY MOUTH 2 TIMES DAILY (WITH MEALS) 180 tablet 0     Multiple Vitamins-Minerals (CENTRUM SILVER) per tablet Take 1 tablet by mouth daily 90 tablet 3     omeprazole (PRILOSEC) 40 MG capsule Take 1 capsule (40 mg) by mouth daily 90 capsule 3     tadalafil (CIALIS) 20 MG tablet Take 1 tablet (20 mg) by mouth daily Never use with nitroglycerin, terazosin or doxazosin. 100 tablet 0     tamsulosin (FLOMAX) 0.4 MG capsule Take 1 capsule (0.4 mg) by mouth daily Take at bedtime 90 capsule 3     testosterone cypionate (DEPOTESTOTERONE) 200 MG/ML injection INJECT 2 MLS IM ONCE WEEKLY 10 mL 0     [DISCONTINUED] lisinopril (PRINIVIL,ZESTRIL) 5 MG tablet Take 1 tablet (5 mg) by mouth 2 times daily 180 tablet 3       Reviewed and updated as needed this visit by clinical staff  Tobacco  Allergies  Meds       Reviewed and updated as needed this visit by Provider         ROS:  Constitutional, HEENT, cardiovascular, pulmonary, GI, , musculoskeletal, neuro, skin, endocrine and psych systems are negative, except as otherwise noted.    OBJECTIVE:     /78 (BP Location: Left arm, Patient Position: Chair, Cuff Size: Adult Large)  Pulse 78  Temp 97.3  F (36.3  C) (Oral)  Resp 16  " "Ht 6' 2\" (1.88 m)  Wt 225 lb 3.2 oz (102.2 kg)  SpO2 99%  BMI 28.91 kg/m2  Body mass index is 28.91 kg/(m^2).   GENERAL: healthy, alert and no distress  EYES: Eyes grossly normal to inspection, PERRL and conjunctivae and sclerae normal  NECK: no adenopathy, no asymmetry, masses, or scars and thyroid normal to palpation  RESP: lungs clear to auscultation - no rales, rhonchi or wheezes  CV: regular rate and rhythm, normal S1 S2, no S3 or S4, no murmur, click or rub, no peripheral edema and peripheral pulses strong  ABDOMEN: soft, nontender, no hepatosplenomegaly, no masses and bowel sounds normal  MS: no gross musculoskeletal defects noted, no edema  Enlarged right temporal vessel, pulsatile, non tender     Diagnostic Test Results:  none     ASSESSMENT/PLAN:     Problem List Items Addressed This Visit     Type 2 diabetes mellitus with peripheral autonomic neuropathy (H) - Primary    Relevant Orders    Hemoglobin A1c (Completed)    HTN (hypertension)      Other Visit Diagnoses     Chest pain, unspecified type        Relevant Orders    Exercise Stress Echocardiogram    Elevated prostate specific antigen (PSA)        Relevant Orders    Prostate spec antigen screen (Completed)    Episodic tension-type headache, not intractable        Relevant Orders    Erythrocyte sedimentation rate auto (Completed)    Screening for prostate cancer        Relevant Orders    Prostate spec antigen screen (Completed)    Pain in both hands        Relevant Orders    Cyclic Citrullinated Peptide Antibody IgG (Completed)    Rheumatoid factor (Completed)           Assess lab work   Will need increased treatment for DM   Assess stress test   Assess for possible TA, may need biopsy   Assess for inflammatory arthritis     Follow-Up:with results     Javier Villafana MD  [facility]  "

## 2018-04-26 LAB
CCP AB SER IA-ACNC: 1 U/ML
PSA SERPL-ACNC: 5.05 UG/L (ref 0–4)
RHEUMATOID FACT SER NEPH-ACNC: <20 IU/ML (ref 0–20)

## 2018-05-08 ENCOUNTER — TELEPHONE (OUTPATIENT)
Dept: INTERNAL MEDICINE | Facility: CLINIC | Age: 63
End: 2018-05-08

## 2018-05-08 DIAGNOSIS — G44.219 EPISODIC TENSION-TYPE HEADACHE, NOT INTRACTABLE: Primary | ICD-10-CM

## 2018-05-08 NOTE — TELEPHONE ENCOUNTER
Pt states the veins/arteries on the side of his right temple are tender and stick out. There is another one forming.   Causing a headache.     He is asking about his lab work that he had done.     Advised him that the lab was negative.     Sed rate was normal.     Please advise. OV was on 4/25.     He has a URI for 2 weeks. Head congestion, sinus congestion, productive sometimes, wheezing at night. Has to sleep sitting up. Tight chest.

## 2018-05-08 NOTE — TELEPHONE ENCOUNTER
If he has HA related to the enlarged blood vessel suggest to do an MRI of the head,   For his congestion and cough to try OTC Mucinex, nasal spray Flonase, if not better will needs to recheck with me .

## 2018-05-09 NOTE — TELEPHONE ENCOUNTER
Spoke to pt and let him know that PCP placed MRI order and gave him tel # for radiology, he will call to set this up. Pt is wondering if he needs an abx or inhaler of some sort as URI s/s are lasting longer then he's used to colds lasting. He noted some wheezing that concerned him. PCP please advise next step.

## 2018-05-10 NOTE — TELEPHONE ENCOUNTER
Spoke to patient he is doing much better. His pain score is 2- he want to postpone the MRI for now but will definitely reconsider if the headaches sx continue.

## 2018-05-14 DIAGNOSIS — E11.40 TYPE 2 DIABETES MELLITUS WITH DIABETIC NEUROPATHY (H): ICD-10-CM

## 2018-05-14 DIAGNOSIS — E11.43 TYPE 2 DIABETES MELLITUS WITH PERIPHERAL AUTONOMIC NEUROPATHY (H): Primary | ICD-10-CM

## 2018-05-14 RX ORDER — BLOOD SUGAR DIAGNOSTIC
STRIP MISCELLANEOUS
Qty: 100 EACH | Refills: 3 | Status: SHIPPED | OUTPATIENT
Start: 2018-05-14 | End: 2019-08-01

## 2018-05-14 RX ORDER — LANCING DEVICE
EACH MISCELLANEOUS
Qty: 100 EACH | Refills: 3 | Status: SHIPPED | OUTPATIENT
Start: 2018-05-14

## 2018-05-14 NOTE — TELEPHONE ENCOUNTER
"Requested Prescriptions   Pending Prescriptions Disp Refills     PHARMACIST CHOICE LANCETS MISC [Pharmacy Med Name: *PHARMACIST CHOICE* 31G LANC]  PRN     Sig: TEST BLOOD SUGARS 5 TIMES DAILY    Diabetic Supplies Protocol Passed    5/14/2018  8:41 AM       Passed - Patient is 18 years of age or older       Passed - Recent (6 mo) or future (30 days) visit within the authorizing provider's specialty    Patient had office visit in the last 6 months or has a visit in the next 30 days with authorizing provider.  See \"Patient Info\" tab in inbasket, or \"Choose Columns\" in Meds & Orders section of the refill encounter.    Last OV: 04/25/18        PRODIGY NO CODING BLOOD GLUC test strip [Pharmacy Med Name: PRODIGY NO CODING CODING STRP]  PRN     Sig: TEST BLOOD SUGARS 5 TIMES DAILY    Diabetic Supplies Protocol Passed    5/14/2018  8:41 AM       Passed - Patient is 18 years of age or older       Passed - Recent (6 mo) or future (30 days) visit within the authorizing provider's specialty    Patient had office visit in the last 6 months or has a visit in the next 30 days with authorizing provider.  See \"Patient Info\" tab in inbasket, or \"Choose Columns\" in Meds & Orders section of the refill encounter.          Routing refill request to provider for review/approval because:  Last rx sent for test strips has daily testing.  Requested rx is for 5 times daily.    Please advise, thanks.        "

## 2018-05-18 DIAGNOSIS — R79.89 LOW TESTOSTERONE: ICD-10-CM

## 2018-05-21 RX ORDER — SYRINGE WITH NEEDLE, 1 ML 25GX5/8"
SYRINGE, EMPTY DISPOSABLE MISCELLANEOUS
Refills: 3 | OUTPATIENT
Start: 2018-05-21

## 2018-05-21 RX ORDER — NEEDLES, DISPOSABLE 25GX5/8"
NEEDLE, DISPOSABLE MISCELLANEOUS
Refills: 6 | OUTPATIENT
Start: 2018-05-21

## 2018-05-21 NOTE — TELEPHONE ENCOUNTER
"Duplicate   Requested Prescriptions   Pending Prescriptions Disp Refills     BD HYPODERMIC NEEDLE 18G X 1\" MISC [Pharmacy Med Name: BD NEEDLES 18GX1\"]  6     Sig: USE WITH TESTOSTERONE    There is no refill protocol information for this order        B-D LUER-ROCHELLE SYRINGE 22G X 1-1/2\" 3 ML MISC [Pharmacy Med Name: BD 3 ML SYRINGE 49HE1-6/2\"]  3     Sig: USE WITH TESTOSTERONE    There is no refill protocol information for this order          "

## 2018-05-22 DIAGNOSIS — R79.89 LOW TESTOSTERONE: ICD-10-CM

## 2018-05-22 RX ORDER — TESTOSTERONE CYPIONATE 200 MG/ML
INJECTION, SOLUTION INTRAMUSCULAR
Qty: 10 ML | Refills: 0 | Status: SHIPPED | OUTPATIENT
Start: 2018-05-22 | End: 2018-06-25

## 2018-05-22 NOTE — TELEPHONE ENCOUNTER
testosterone cypionate       Last Written Prescription Date:  03/26/18  Last Fill Quantity: 10 mL,   # refills: 0  Last Office Visit: 04/25/18  Future Office visit:       Routing refill request to provider for review/approval because:  Drug not on the [facility], [facility] or [facility] refill protocol or controlled substance

## 2018-05-23 ENCOUNTER — HOSPITAL ENCOUNTER (OUTPATIENT)
Dept: CARDIOLOGY | Facility: CLINIC | Age: 63
Discharge: HOME | End: 2018-05-23
Attending: INTERNAL MEDICINE | Admitting: INTERNAL MEDICINE
Payer: COMMERCIAL

## 2018-05-23 DIAGNOSIS — R07.9 CHEST PAIN, UNSPECIFIED TYPE: ICD-10-CM

## 2018-05-23 PROCEDURE — 93321 DOPPLER ECHO F-UP/LMTD STD: CPT | Mod: TC

## 2018-05-23 PROCEDURE — 93350 STRESS TTE ONLY: CPT | Mod: 26 | Performed by: INTERNAL MEDICINE

## 2018-05-23 PROCEDURE — 93018 CV STRESS TEST I&R ONLY: CPT | Performed by: INTERNAL MEDICINE

## 2018-05-23 PROCEDURE — 93325 DOPPLER ECHO COLOR FLOW MAPG: CPT | Mod: 26 | Performed by: INTERNAL MEDICINE

## 2018-05-23 PROCEDURE — 93321 DOPPLER ECHO F-UP/LMTD STD: CPT | Mod: 26 | Performed by: INTERNAL MEDICINE

## 2018-05-23 PROCEDURE — 93016 CV STRESS TEST SUPVJ ONLY: CPT | Performed by: INTERNAL MEDICINE

## 2018-05-29 ENCOUNTER — HOSPITAL ENCOUNTER (OUTPATIENT)
Dept: MRI IMAGING | Facility: CLINIC | Age: 63
Discharge: HOME | End: 2018-05-29
Attending: INTERNAL MEDICINE | Admitting: INTERNAL MEDICINE
Payer: COMMERCIAL

## 2018-05-29 DIAGNOSIS — G44.219 EPISODIC TENSION-TYPE HEADACHE, NOT INTRACTABLE: ICD-10-CM

## 2018-05-29 PROCEDURE — 70551 MRI BRAIN STEM W/O DYE: CPT

## 2018-06-25 ENCOUNTER — TELEPHONE (OUTPATIENT)
Dept: INTERNAL MEDICINE | Facility: CLINIC | Age: 63
End: 2018-06-25

## 2018-06-25 DIAGNOSIS — R79.89 LOW TESTOSTERONE: ICD-10-CM

## 2018-06-25 NOTE — TELEPHONE ENCOUNTER
SAMANTHA Puente this is the coverage:  A, B - Valerie BETTENCOURT, D, E - Dar  F, H, I, J - Kacy MARTINEZ, K, L - Jose M NAYLOR, N - Thuan  O, P, Q, R - Nereida  S - Lona  T, U, V, W, X, Y, Z - Krishna

## 2018-06-26 RX ORDER — TESTOSTERONE CYPIONATE 200 MG/ML
INJECTION, SOLUTION INTRAMUSCULAR
Qty: 10 ML | Refills: 0 | Status: SHIPPED | OUTPATIENT
Start: 2018-06-26 | End: 2018-10-05

## 2018-06-26 NOTE — TELEPHONE ENCOUNTER
Last refill- 5/22/18-#10ml    Last OV-4/25/18      Requested Prescriptions   Pending Prescriptions Disp Refills     testosterone cypionate (DEPOTESTOTERONE) 200 MG/ML injection [Pharmacy Med Name: TESTOSTERON CYP 2,000 MG/10 ML] 10 mL 0     Sig: INJECT 2MLS INTRAMUSCULARLY ONCE WEEKLY    There is no refill protocol information for this order

## 2018-07-11 DIAGNOSIS — E78.5 HYPERLIPIDEMIA LDL GOAL <100: ICD-10-CM

## 2018-07-11 RX ORDER — ATORVASTATIN CALCIUM 10 MG/1
TABLET, FILM COATED ORAL
Qty: 90 TABLET | Refills: 1 | Status: SHIPPED | OUTPATIENT
Start: 2018-07-11 | End: 2018-10-18

## 2018-07-11 NOTE — TELEPHONE ENCOUNTER
"Requested Prescriptions   Pending Prescriptions Disp Refills     atorvastatin (LIPITOR) 10 MG tablet [Pharmacy Med Name: ATORVASTATIN 10 MG TABLET]  Last Written Prescription Date:  12/7/2017  Last Fill Quantity: 90,  # refills: 1   Last office visit: 4/25/2018 with prescribing provider:     Future Office Visit:   90 tablet 1     Sig: TAKE 1 TABLET BY MOUTH ONCE DAILY    Statins Protocol Passed    7/11/2018  4:20 AM       Passed - LDL on file in past 12 months    Recent Labs   Lab Test  11/29/17   0939   LDL  105*            Passed - No abnormal creatine kinase in past 12 months    Recent Labs   Lab Test  10/10/16   1601   CKT  199               Passed - Recent (12 mo) or future (30 days) visit within the authorizing provider's specialty    Patient had office visit in the last 12 months or has a visit in the next 30 days with authorizing provider or within the authorizing provider's specialty.  See \"Patient Info\" tab in inbasket, or \"Choose Columns\" in Meds & Orders section of the refill encounter.           Passed - Patient is age 18 or older        "

## 2018-07-16 DIAGNOSIS — R79.89 LOW TESTOSTERONE: ICD-10-CM

## 2018-07-16 NOTE — TELEPHONE ENCOUNTER
"Requested Prescriptions   Pending Prescriptions Disp Refills     tadalafil (CIALIS) 20 MG tablet 100 tablet 0     Sig: Take 1 tablet (20 mg) by mouth daily Never use with nitroglycerin, terazosin or doxazosin.    Erectile Dysfuction Protocol Failed    7/16/2018  1:01 PM       Failed - Absence of Alpha Blockers on Med list       Passed - Absence of nitrates on medication list       Passed - Recent (12 mo) or future (30 days) visit within the authorizing provider's specialty    Patient had office visit in the last 12 months or has a visit in the next 30 days with authorizing provider or within the authorizing provider's specialty.  See \"Patient Info\" tab in inbasket, or \"Choose Columns\" in Meds & Orders section of the refill encounter.           Passed - Patient is age 18 or older          Last Written Prescription Date:  01/16/18  Last Fill Quantity: 100,  # refills: 0   Last office visit: 4/25/2018 with prescribing provider:  04/25/18   Future Office Visit:      "

## 2018-07-23 RX ORDER — TADALAFIL 20 MG/1
20 TABLET ORAL DAILY
Qty: 100 TABLET | Refills: 0 | Status: SHIPPED | OUTPATIENT
Start: 2018-07-23 | End: 2018-07-31

## 2018-07-25 DIAGNOSIS — I10 ESSENTIAL HYPERTENSION WITH GOAL BLOOD PRESSURE LESS THAN 140/90: ICD-10-CM

## 2018-07-25 DIAGNOSIS — R79.89 LOW TESTOSTERONE: ICD-10-CM

## 2018-07-25 DIAGNOSIS — E11.9 TYPE 2 DIABETES MELLITUS WITHOUT COMPLICATION, WITHOUT LONG-TERM CURRENT USE OF INSULIN (H): ICD-10-CM

## 2018-07-25 NOTE — TELEPHONE ENCOUNTER
"Requested Prescriptions   Pending Prescriptions Disp Refills     metFORMIN (GLUCOPHAGE) 1000 MG tablet [Pharmacy Med Name: METFORMIN HCL 1,000 MG TABLET] 180 tablet 0    Last Written Prescription Date:  04/13/2018  Last Fill Quantity: 180,  # refills: 0   Last office visit: 4/25/2018 with prescribing provider:     Future Office Visit:   Sig: TAKE 1 TABLET BY MOUTH 2 TIMES DAILY (WITH MEALS) ( NEEDS TO BE SEEN )    Biguanide Agents Failed    7/25/2018  2:03 AM       Failed - Patient has documented A1c within the specified period of time.    If HgbA1C is 8 or greater, it needs to be on file within the past 3 months.  If less than 8, must be on file within the past 6 months.     Recent Labs   Lab Test  04/25/18   1137   A1C  8.6*            Passed - Blood pressure less than 140/90 in past 6 months    BP Readings from Last 3 Encounters:   04/25/18 108/78   11/29/17 124/84   07/12/17 122/80                Passed - Patient has documented LDL within the past 12 mos.    Recent Labs   Lab Test  11/29/17   0939   LDL  105*            Passed - Patient has had a Microalbumin in the past 12 mos.    Recent Labs   Lab Test  11/29/17   0939   MICROL  11   UMALCR  6.51            Passed - Patient is age 10 or older       Passed - Patient's CR is NOT>1.4 OR Patient's EGFR is NOT<45 within past 12 mos.    Recent Labs   Lab Test  11/29/17   0939   GFRESTIMATED  76   GFRESTBLACK  >90       Recent Labs   Lab Test  11/29/17   0939   CR  0.99            Passed - Patient does NOT have a diagnosis of CHF.       Passed - Recent (6 mo) or future (30 days) visit within the authorizing provider's specialty    Patient had office visit in the last 6 months or has a visit in the next 30 days with authorizing provider or within the authorizing provider's specialty.  See \"Patient Info\" tab in inbasket, or \"Choose Columns\" in Meds & Orders section of the refill encounter.            "

## 2018-07-30 NOTE — TELEPHONE ENCOUNTER
Received call from May with [facility] requesting update on patient's prescription request for tadalafil. Informed it was sent to another pharmacy and indicated patient would need to contact us directly if wanting to change.

## 2018-07-31 RX ORDER — TADALAFIL 20 MG/1
20 TABLET ORAL DAILY
Qty: 100 TABLET | Refills: 0 | Status: SHIPPED | OUTPATIENT
Start: 2018-07-31 | End: 2018-08-01

## 2018-07-31 NOTE — TELEPHONE ENCOUNTER
Call received from patient stating that every month we sent the prescription to the wrong pharmacy. Patient is requesting a new prescription for him to  and he will fax it himself.

## 2018-07-31 NOTE — TELEPHONE ENCOUNTER
Call received from patient checking on below. Advised need A1c. Last done end of April and patient was to repeat in 3 months. Ordered. Lab only appointment scheduled. Patient states he has enough medication left for awhile.

## 2018-08-01 DIAGNOSIS — E11.9 TYPE 2 DIABETES MELLITUS WITHOUT COMPLICATION, WITHOUT LONG-TERM CURRENT USE OF INSULIN (H): ICD-10-CM

## 2018-08-01 DIAGNOSIS — M54.50 BILATERAL LOW BACK PAIN WITHOUT SCIATICA, UNSPECIFIED CHRONICITY: Primary | ICD-10-CM

## 2018-08-01 LAB — HBA1C MFR BLD: 8.2 % (ref 0–5.6)

## 2018-08-01 PROCEDURE — 36415 COLL VENOUS BLD VENIPUNCTURE: CPT | Performed by: INTERNAL MEDICINE

## 2018-08-01 PROCEDURE — 83036 HEMOGLOBIN GLYCOSYLATED A1C: CPT | Performed by: INTERNAL MEDICINE

## 2018-08-01 RX ORDER — TADALAFIL 20 MG/1
20 TABLET ORAL DAILY
Qty: 100 TABLET | Refills: 1 | Status: SHIPPED | OUTPATIENT
Start: 2018-08-01 | End: 2019-07-05

## 2018-08-01 NOTE — TELEPHONE ENCOUNTER
Patient in clinic to pick-up prescription, unable to locate. Dr. Villafana printed another prescription for tadalafil and signed. Gave to patient in lobby.

## 2018-08-13 ENCOUNTER — TELEPHONE (OUTPATIENT)
Dept: INTERNAL MEDICINE | Facility: CLINIC | Age: 63
End: 2018-08-13

## 2018-08-13 DIAGNOSIS — R07.9 ACUTE CHEST PAIN: Primary | ICD-10-CM

## 2018-08-13 DIAGNOSIS — E11.43 TYPE 2 DIABETES MELLITUS WITH PERIPHERAL AUTONOMIC NEUROPATHY (H): ICD-10-CM

## 2018-08-13 NOTE — TELEPHONE ENCOUNTER
Call back from patient. Patient frustrated that he did not get a response sooner. Advised that MD had been seeing patient's this morning. Advised that if patient feels symptoms are urgent he should always go to ED. Advised on MD message.

## 2018-08-13 NOTE — TELEPHONE ENCOUNTER
Patient complains of intermittent chest pains for several months.  Now experiencing mid chest pains about every other day lasting for 2+ hours.  Unable to identify any pattern related to meals, activity, position.  Now getting to the point that he is unable to work, today rates pain 7/10.    Pain not helped by TUMs and milk.  Has nausea, no vomiting, slight SOB, some diaphoresis, increased pain with inspiration.  Patient states this morning he has had the pain x2 hours and is subsiding as we speak.  States he had stress test done in May that was normal, see below.    Notes Recorded by Javier Villafana MD on 5/25/2018 at 9:49 AM  Normal result reviewed, please notify patient.  Negative EKG and ECHO data for blocked arteries, but he had chest pain during the test.   If his symptoms persist - chest pain with exertion, suggest to see cardiology.              Patient states he is ready for a referral to cardiology.  LAKESHIA Alonzo R.N.

## 2018-08-14 DIAGNOSIS — E11.40 TYPE 2 DIABETES MELLITUS WITH DIABETIC NEUROPATHY (H): ICD-10-CM

## 2018-08-14 NOTE — TELEPHONE ENCOUNTER
"Requested Prescriptions   Pending Prescriptions Disp Refills     glimepiride (AMARYL) 1 MG tablet [Pharmacy Med Name: GLIMEPIRIDE 1 MG TABLET]  Last Written Prescription Date:  12/5/2017  Last Fill Quantity: 90,  # refills: 1   Last office visit: 4/25/2018 with prescribing provider:     Future Office Visit:   90 tablet 1     Sig: TAKE 1 TABLET BY MOUTH EVERY MORNING BEFORE BREAKFAST    Sulfonylurea Agents Passed    8/13/2018  6:05 PM       Passed - Blood pressure less than 140/90 in past 6 months    BP Readings from Last 3 Encounters:   04/25/18 108/78   11/29/17 124/84   07/12/17 122/80                Passed - Patient has documented LDL within the past 12 mos.    Recent Labs   Lab Test  11/29/17   0939   LDL  105*            Passed - Patient has had a Microalbumin in the past 12 mos.    Recent Labs   Lab Test  11/29/17   0939   MICROL  11   UMALCR  6.51            Passed - Patient has documented A1c within the specified period of time.    If HgbA1C is 8 or greater, it needs to be on file within the past 3 months.  If less than 8, must be on file within the past 6 months.     Recent Labs   Lab Test  08/01/18   1135   A1C  8.2*            Passed - Patient is age 18 or older       Passed - Patient has a recent creatinine (normal) within the past 12 mos.    Recent Labs   Lab Test  11/29/17   0939   CR  0.99            Passed - Recent (6 mo) or future (30 days) visit within the authorizing provider's specialty    Patient had office visit in the last 6 months or has a visit in the next 30 days with authorizing provider or within the authorizing provider's specialty.  See \"Patient Info\" tab in inbasket, or \"Choose Columns\" in Meds & Orders section of the refill encounter.            "

## 2018-08-15 ENCOUNTER — OFFICE VISIT (OUTPATIENT)
Dept: ORTHOPEDICS | Facility: CLINIC | Age: 63
End: 2018-08-15
Payer: COMMERCIAL

## 2018-08-15 VITALS
HEIGHT: 74 IN | DIASTOLIC BLOOD PRESSURE: 88 MMHG | WEIGHT: 220 LBS | BODY MASS INDEX: 28.23 KG/M2 | SYSTOLIC BLOOD PRESSURE: 124 MMHG

## 2018-08-15 DIAGNOSIS — M16.12 PRIMARY OSTEOARTHRITIS OF LEFT HIP: Primary | ICD-10-CM

## 2018-08-15 DIAGNOSIS — M25.852 HIP IMPINGEMENT SYNDROME, LEFT: ICD-10-CM

## 2018-08-15 PROCEDURE — 99213 OFFICE O/P EST LOW 20 MIN: CPT | Mod: 25 | Performed by: FAMILY MEDICINE

## 2018-08-15 PROCEDURE — 20611 DRAIN/INJ JOINT/BURSA W/US: CPT | Mod: LT | Performed by: FAMILY MEDICINE

## 2018-08-15 RX ADMIN — METHYLPREDNISOLONE ACETATE 40 MG: 40 INJECTION, SUSPENSION INTRA-ARTICULAR; INTRALESIONAL; INTRAMUSCULAR; SOFT TISSUE at 12:10

## 2018-08-15 NOTE — MR AVS SNAPSHOT
After Visit Summary   8/15/2018    Austyn Araujo    MRN: [id number]           Patient Information     Date Of Birth          1955        Visit Information        Provider Department      8/15/2018 11:20 AM Lesly Paniagua,  [facility]        Today's Diagnoses     Primary osteoarthritis of left hip    -  1    Hip impingement syndrome, left          Care Instructions    1. Primary osteoarthritis of left hip    2. Hip impingement syndrome, left      Reviewed xrays  Steroid injection of the left hip: intra-articular  was performed today in clinic  - Ok to shower  - No bathtub, hot tub or swimming for 2 days  - The lidocaine (what is giving you pain relief right now) will likely stop working in 1-2 hours.  You will then have pain again, similar to before you received the injection. The corticosteroid will not start working until approximately 1-2 weeks from now.  Can safely repeat in 4 months or anytime thereafter  If you get less than 4 months relief can consider PRP (platelet rich plasma) injection.  This costs $450/injection and would recommend 2 injections spaced 6 weeks apart.  Can return to Dr. Jolly at any point if interested in hip replacement    Follow up as needed.           Follow-ups after your visit        Your next 10 appointments already scheduled     Sep 12, 2018 11:15 AM CDT   New Visit with Haja Estevez MD   [place] [address]   [phone]              Who to contact     If you have questions or need follow up information about today's clinic visit or your schedule please contact [facility] directly at [phone].  Normal or non-critical lab and imaging results will be communicated to you by MyChart, letter or phone within 4 business days after the clinic has received the results. If you do not hear from  "us within 7 days, please contact the clinic through [employer] or phone. If you have a critical or abnormal lab result, we will notify you by phone as soon as possible.  Submit refill requests through [employer] or call your pharmacy and they will forward the refill request to us. Please allow 3 business days for your refill to be completed.          Additional Information About Your Visit        [employer] Information     [employer] lets you send messages to your doctor, view your test results, renew your prescriptions, schedule appointments and more. To sign up, go to [email or web address] . Click on \"Log in\" on the left side of the screen, which will take you to the Welcome page. Then click on \"Sign up Now\" on the right side of the page.     You will be asked to enter the access code listed below, as well as some personal information. Please follow the directions to create your username and password.     Your access code is: [email or web address]  Expires: 2018 12:25 PM     Your access code will  in 90 days. If you need help or a new code, please call your [place] clinic or [phone].        Care EveryWhere ID     This is your Care EveryWhere ID. This could be used by other organizations to access your [place] medical records  [id number]        Your Vitals Were     Height BMI (Body Mass Index)                6' 2\" (1.88 m) 28.25 kg/m2           Blood Pressure from Last 3 Encounters:   08/15/18 124/88   18 108/78   17 124/84    Weight from Last 3 Encounters:   08/15/18 220 lb (99.8 kg)   18 225 lb 3.2 oz (102.2 kg)   17 218 lb (98.9 kg)              We Performed the Following     Large Joint Injection/Arthocentesis        Primary Care Provider Office Phone # Fax #    Javier Villafana [phone]       [address]  [facility] [address]        Goals        General    Reduce drinking by 90% (pt-stated)     Notes - Note created  2015 10:33 AM by Mckenna Chang MSW " "   As of today's date 4/23/2015 goal is met at 26 - 50%.   Goal Status:  Active          Equal Access to Services     HAYDEE TAYLOR : Hadii anni yi ching Baltazar, waelidada luqleann, qaybta kaestradada jazz, teena englanddimas connie. So [facility] [phone].    ATENCIÓN: Si habla español, tiene a carrillo disposición servicios gratuitos de asistencia lingüística. Llame al [phone].    We comply with applicable federal civil rights laws and [place] laws. We do not discriminate on the basis of race, color, national origin, age, disability, sex, sexual orientation, or gender identity.            Thank you!     Thank you for choosing [facility]  for your care. Our goal is always to provide you with excellent care. Hearing back from our patients is one way we can continue to improve our services. Please take a few minutes to complete the written survey that you may receive in the mail after your visit with us. Thank you!             Your Updated Medication List - Protect others around you: Learn how to safely use, store and throw away your medicines at www.disposemymeds.org.          This list is accurate as of 8/15/18 12:25 PM.  Always use your most recent med list.                   Brand Name Dispense Instructions for use Diagnosis    ALPRAZolam 0.5 MG tablet    XANAX    30 tablet    Take 1 tablet (0.5 mg) by mouth 3 times daily as needed for anxiety    Anxiety       aspirin 81 MG tablet      Take 1 tablet by mouth daily.        atorvastatin 10 MG tablet    LIPITOR    90 tablet    TAKE 1 TABLET BY MOUTH ONCE DAILY    Hyperlipidemia LDL goal <100       B-D LUER-ROCHELLE SYRINGE 22G X 1-1/2\" 3 ML Misc   Generic drug:  syringe/needle (disp)     12 each    USE WITH TESTOSTERONE    Low testosterone       BD HYPODERMIC NEEDLE 18G X 1\" Misc   Generic drug:  needle (disp)     25 each    USE WITH TESTOSTERONE    Low testosterone       CENTRUM SILVER per tablet     90 tablet    Take 1 tablet by " mouth daily    Type 2 diabetes mellitus with diabetic neuropathy (H), Essential hypertension with goal blood pressure less than 140/90       glimepiride 1 MG tablet    AMARYL    90 tablet    Take 1 tablet (1 mg) by mouth every morning (before breakfast)    Type 2 diabetes mellitus with peripheral autonomic neuropathy (H)       lisinopril 10 MG tablet    PRINIVIL/ZESTRIL    90 tablet    Take 1 tablet (10 mg) by mouth daily    Benign essential hypertension       metFORMIN 1000 MG tablet    GLUCOPHAGE    180 tablet    Take 1 tablet (1,000 mg) by mouth 2 times daily (with meals)    Essential hypertension with goal blood pressure less than 140/90, Type 2 diabetes mellitus without complication, without long-term current use of insulin (H)       omeprazole 40 MG capsule    priLOSEC    90 capsule    Take 1 capsule (40 mg) by mouth daily    Gastroesophageal reflux disease without esophagitis       PHARMACIST CHOICE LANCETS Misc     100 each    TEST BLOOD SUGARS 5 TIMES DAILY    Type 2 diabetes mellitus with peripheral autonomic neuropathy (H)       PRODIGY NO CODING BLOOD GLUC test strip   Generic drug:  blood glucose monitoring     100 each    TEST BLOOD SUGARS 5 TIMES DAILY    Type 2 diabetes mellitus with peripheral autonomic neuropathy (H)       tadalafil 20 MG tablet    CIALIS    100 tablet    Take 1 tablet (20 mg) by mouth daily Never use with nitroglycerin, terazosin or doxazosin.    Low testosterone       tamsulosin 0.4 MG capsule    FLOMAX    90 capsule    Take 1 capsule (0.4 mg) by mouth daily Take at bedtime    Benign prostatic hyperplasia with urinary frequency       testosterone cypionate 200 MG/ML injection    DEPOTESTOTERONE    10 mL    INJECT 2MLS INTRAMUSCULARLY ONCE WEEKLY    Low testosterone

## 2018-08-15 NOTE — PATIENT INSTRUCTIONS
1. Primary osteoarthritis of left hip    2. Hip impingement syndrome, left      Reviewed xrays  Steroid injection of the left hip: intra-articular  was performed today in clinic  - Ok to shower  - No bathtub, hot tub or swimming for 2 days  - The lidocaine (what is giving you pain relief right now) will likely stop working in 1-2 hours.  You will then have pain again, similar to before you received the injection. The corticosteroid will not start working until approximately 1-2 weeks from now.  Can safely repeat in 4 months or anytime thereafter  If you get less than 4 months relief can consider PRP (platelet rich plasma) injection.  This costs $450/injection and would recommend 2 injections spaced 6 weeks apart.  Can return to Dr. Jolly at any point if interested in hip replacement    Follow up as needed.

## 2018-08-15 NOTE — PROGRESS NOTES
"ASSESSMENT & PLAN    1. Primary osteoarthritis of left hip    2. Hip impingement syndrome, left      Reviewed xrays -moderate to advanced arthritis with coexisting cam deformity/hip impingement  Steroid injection of the left hip: intra-articular  was performed today in clinic  Can safely repeat in 4 months or anytime thereafter  If you get less than 4 months relief can consider PRP (platelet rich plasma) injection.  This costs $450/injection and would recommend 2 injections spaced 6 weeks apart.  Can return to Dr. Jolly at any point if interested in hip replacement    Follow up as needed.     -----    SUBJECTIVE:  Austyn Araujo is a 62 year old male who is seen in follow-up for left hip pain.They were last seen July 12, 2017 by Dr. Ben Vanessa.  He was referred to physical therapy and completed 3 sessions since his last visit.  He also discussed with Dr. Vanessa possible future left hip injections which he would like to revisit today.    Since their last visit reports 0% - (About the same as last time). They indicate that their current pain level is 2/10. Pain is in the deep lateral hip and groin. Pain is worse with walking/hiking and lifting. They have tried Aleve (6 tabs at a time), other medications: Oxycodone/Acetaminophen (Percocet) and physical therapy (3 visits).      The patient is seen by themselves.    Patient's past medical, surgical, social, and family histories were reviewed today and no changes are noted.    REVIEW OF SYSTEMS:  Constitutional: NEGATIVE for fever, chills, change in weight  Skin: NEGATIVE for worrisome rashes, moles or lesions  GI/: NEGATIVE for bowel or bladder changes  Neuro: NEGATIVE for weakness, dizziness or paresthesias    OBJECTIVE:  /88  Ht 6' 2\" (1.88 m)  Wt 220 lb (99.8 kg)  BMI 28.25 kg/m2   General: healthy, alert and in no distress  HEENT: no scleral icterus or conjunctival erythema  Skin: no suspicious lesions or rash. No jaundice.  CV: regular rhythm by palpation, " no pedal edema  Resp: normal respiratory effort without conversational dyspnea   Psych: normal mood and affect  Gait: normal steady gait with appropriate coordination and balance  Neuro: normal light touch sensory exam of the extremities.    MSK:  LEFT HIP  Inspection:    No obvious deformity, snapping the IT band along the lateral hip  Active Range of Motion:     Flexion limited by pain,  IR limited by pain / ER  limited by pain  Strength:    Flexion 5-/5 /  adduction 5-/5 / abduction 5-/5  Special Tests:    Positive: Logroll, anterior impingement (FADIR)    Independent visualization of the below image:  XR PELVIS 1/2 VW 7/6/2017 11:59 AM     HISTORY: Left hip pain.     COMPARISON: 10/10/2016     FINDINGS: Moderate symmetric narrowing of hip joint space associated  with acetabular sclerosis. No significant marginal osteophyte  formation. No fracture or malalignment.         IMPRESSION: Mild osteoarthritis at the hips, similar to 2016.     FATMATA PARRY MD    Large Joint Injection/Arthocentesis  Date/Time: 8/15/2018 12:10 PM  Performed by: BECKY NEWBERRY  Authorized by: BECKY NEWBERRY     Indications:  Pain and osteoarthritis  Needle Size:  22 G  Guidance: ultrasound    Approach:  Anterior  Location:  Hip  Site:  L hip joint  Medications:  40 mg methylPREDNISolone acetate 40 MG/ML  Outcome:  Tolerated well, no immediate complications  Procedure discussed: discussed risks, benefits, and alternatives    Consent Given by:  Patient  Timeout: timeout called immediately prior to procedure    Prep: patient was prepped and draped in usual sterile fashion       Pain noted to be a 2/10 before completion of the procedure and 0/10 after completion of the procedure.          Patient's conditions were thoroughly discussed during today's visit with greater than 50% of the visit spent counseling the patient with total time spent face-to-face with the patient being 25 minutes with injection taking 5 minutes.    Becky ESPIANL  DO TIFFANY Paniagua  [facility]

## 2018-08-15 NOTE — LETTER
8/15/2018         RE: Austyn Araujo  [address] [facility] [address]        Dear Colleague,    Thank you for referring your patient, Austyn Araujo, to the [facility]. Please see a copy of my visit note below.    ASSESSMENT & PLAN    1. Primary osteoarthritis of left hip    2. Hip impingement syndrome, left      Reviewed xrays -moderate to advanced arthritis with coexisting cam deformity/hip impingement  Steroid injection of the left hip: intra-articular  was performed today in clinic  Can safely repeat in 4 months or anytime thereafter  If you get less than 4 months relief can consider PRP (platelet rich plasma) injection.  This costs $450/injection and would recommend 2 injections spaced 6 weeks apart.  Can return to Dr. Jolly at any point if interested in hip replacement    Follow up as needed.     -----    SUBJECTIVE:  Austyn Araujo is a 62 year old male who is seen in follow-up for left hip pain.They were last seen July 12, 2017 by Dr. Ben Vanessa.  He was referred to physical therapy and completed 3 sessions since his last visit.  He also discussed with Dr. Vanessa possible future left hip injections which he would like to revisit today.    Since their last visit reports 0% - (About the same as last time). They indicate that their current pain level is 2/10. Pain is in the deep lateral hip and groin. Pain is worse with walking/hiking and lifting. They have tried Aleve (6 tabs at a time), other medications: Oxycodone/Acetaminophen (Percocet) and physical therapy (3 visits).      The patient is seen by themselves.    Patient's past medical, surgical, social, and family histories were reviewed today and no changes are noted.    REVIEW OF SYSTEMS:  Constitutional: NEGATIVE for fever, chills, change in weight  Skin: NEGATIVE for worrisome rashes, moles or lesions  GI/: NEGATIVE for bowel or bladder changes  Neuro: NEGATIVE for weakness, dizziness or  "paresthesias    OBJECTIVE:  /88  Ht 6' 2\" (1.88 m)  Wt 220 lb (99.8 kg)  BMI 28.25 kg/m2   General: healthy, alert and in no distress  HEENT: no scleral icterus or conjunctival erythema  Skin: no suspicious lesions or rash. No jaundice.  CV: regular rhythm by palpation, no pedal edema  Resp: normal respiratory effort without conversational dyspnea   Psych: normal mood and affect  Gait: normal steady gait with appropriate coordination and balance  Neuro: normal light touch sensory exam of the extremities.    MSK:  LEFT HIP  Inspection:    No obvious deformity, snapping the IT band along the lateral hip  Active Range of Motion:     Flexion limited by pain,  IR limited by pain / ER  limited by pain  Strength:    Flexion 5-/5 /  adduction 5-/5 / abduction 5-/5  Special Tests:    Positive: Logroll, anterior impingement (FADIR)    Independent visualization of the below image:  XR PELVIS 1/2 VW 7/6/2017 11:59 AM     HISTORY: Left hip pain.     COMPARISON: 10/10/2016     FINDINGS: Moderate symmetric narrowing of hip joint space associated  with acetabular sclerosis. No significant marginal osteophyte  formation. No fracture or malalignment.         IMPRESSION: Mild osteoarthritis at the hips, similar to 2016.     FATMATA PARRY MD    Large Joint Injection/Arthocentesis  Date/Time: 8/15/2018 12:10 PM  Performed by: BECKY NEWBERRY  Authorized by: BCEKY NEWBERRY     Indications:  Pain and osteoarthritis  Needle Size:  22 G  Guidance: ultrasound    Approach:  Anterior  Location:  Hip  Site:  L hip joint  Medications:  40 mg methylPREDNISolone acetate 40 MG/ML  Outcome:  Tolerated well, no immediate complications  Procedure discussed: discussed risks, benefits, and alternatives    Consent Given by:  Patient  Timeout: timeout called immediately prior to procedure    Prep: patient was prepped and draped in usual sterile fashion       Pain noted to be a 2/10 before completion of the procedure and 0/10 after " completion of the procedure.          Patient's conditions were thoroughly discussed during today's visit with greater than 50% of the visit spent counseling the patient with total time spent face-to-face with the patient being 25 minutes with injection taking 5 minutes.    Lesly Paniagua, DO [facility]          Again, thank you for allowing me to participate in the care of your patient.        Sincerely,        Lesly Paniagua, DO

## 2018-08-15 NOTE — TELEPHONE ENCOUNTER
"Requested Prescriptions   Pending Prescriptions Disp Refills     ONETOUCH ULTRA test strip [Pharmacy Med Name: ONE TOUCH ULTRA TEST STRIPS]  Last Written Prescription Date:  historical  Last Fill Quantity: ,  # refills:    Last office visit: 4/25/2018 with prescribing provider:     Future Office Visit:   100 strip 0     Sig: USE TO TEST BLOOD SUGARS ONE TIME DAILY OR AS DIRECTED.    Diabetic Supplies Protocol Passed    8/14/2018  8:39 PM       Passed - Patient is 18 years of age or older       Passed - Recent (6 mo) or future (30 days) visit within the authorizing provider's specialty    Patient had office visit in the last 6 months or has a visit in the next 30 days with authorizing provider.  See \"Patient Info\" tab in inbasket, or \"Choose Columns\" in Meds & Orders section of the refill encounter.            "

## 2018-08-16 RX ORDER — GLIMEPIRIDE 1 MG/1
2 TABLET ORAL
Qty: 180 TABLET | Refills: 0 | Status: SHIPPED | OUTPATIENT
Start: 2018-08-16 | End: 2018-11-15

## 2018-08-16 RX ORDER — METHYLPREDNISOLONE ACETATE 40 MG/ML
40 INJECTION, SUSPENSION INTRA-ARTICULAR; INTRALESIONAL; INTRAMUSCULAR; SOFT TISSUE
Status: DISCONTINUED | OUTPATIENT
Start: 2018-08-15 | End: 2019-02-05

## 2018-08-16 NOTE — TELEPHONE ENCOUNTER
Medication is being filled for 1 time refill only due to:  per result note 8-1-18: Slightly better diabetic control. Still needs to work on diet , exercise. Suggest to increase Glimepiride to 2 mg daily. Recheck in 3 months labs     Rx sent for Amaryl 1mg take 2 tabs daily.    Future a1c in chart.

## 2018-09-11 DIAGNOSIS — R07.9 CHEST PAIN: Primary | ICD-10-CM

## 2018-09-12 ENCOUNTER — OFFICE VISIT (OUTPATIENT)
Dept: CARDIOLOGY | Facility: CLINIC | Age: 63
End: 2018-09-12
Attending: INTERNAL MEDICINE
Payer: COMMERCIAL

## 2018-09-12 VITALS
OXYGEN SATURATION: 96 % | SYSTOLIC BLOOD PRESSURE: 120 MMHG | HEART RATE: 89 BPM | DIASTOLIC BLOOD PRESSURE: 60 MMHG | BODY MASS INDEX: 28.58 KG/M2 | WEIGHT: 222.6 LBS

## 2018-09-12 DIAGNOSIS — R07.1 CHEST PAIN ON BREATHING: ICD-10-CM

## 2018-09-12 PROCEDURE — 99203 OFFICE O/P NEW LOW 30 MIN: CPT | Mod: 25 | Performed by: INTERNAL MEDICINE

## 2018-09-12 PROCEDURE — 93000 ELECTROCARDIOGRAM COMPLETE: CPT | Performed by: INTERNAL MEDICINE

## 2018-09-12 NOTE — LETTER
9/12/2018    Javier Villafana MD  [address] [facility] [address]    RE: Austyn Araujo       Dear Colleague,    I had the pleasure of seeing Austyn Araujo in the [facility].    HISTORY:    Austyn Araujo is a pleasant 62-year-old male with notable cardiac risk factors including long-standing type 2 diabetes, pretension, hyperlipidemia, hypertension, and a history of smoking which he gave up 15 years ago.  He has developed chest pain over the last year and was asked to see me for further evaluation.    Austyn reports that greater than one half year ago he noticed the onset of chest discomfort.  He describes this as an aching sensation but also uses of the description of sharp.  It is located in his mid chest area at about the nipple line and does not radiate elsewhere.  These episodes tend to be random in nature, only rarely occurring at night.  They can last several hours at a time.  He has checked his pulse when he has these and it is normal.  He is also tried using antacids and it does not help.  Initially he does not have associated symptoms but eventually he often feels nauseated with this but does not have diaphoresis or dyspnea.  He acknowledges that he does not exercise on a regular basis because of some orthopedic issues, but in general these episodes of chest pain do not seem to be associated with activity.  He also states quite clearly that there is a pleuritic component.  His episodes tend to occur mostly in the morning.  He does report that he was under a large amount of stress at work and he thinks that when he was under stress his pain was more frequent, more prolonged, and more severe, and now that his job duties have quieted down his frequency of pain has considerably decreased.    Austyn had a stress echo done which showed no ischemic changes by ECG or echo, but this was the one time that he did develop chest pain with exertion.  He states that  this is the most exertion he has done in the last 6 months.  His episodes tend to occur mostly in the morning.    Mentioned above, the patient has a number of cardiac risk factors.  He has a long-standing history of type 2 diabetes as well as of hypertension well controlled with medications.  He also has hyperlipidemia for which she is on atorvastatin 10 mg per day with an LDL cholesterol of 105 last measured about a year ago.  He is an ex-smoker having quit 15 years ago.  He has a concerning family history with his father suffering an MI in his 30s.    3.  Denies any episodes of syncope or near syncope, strokelike symptoms, PND/orthopnea, or exertional chest pain.  He does have some mild orthostatic dizziness.  He denies peripheral edema or claudication.  He does not exercise regularly because of back pain.  He has no complaints about his stamina in general.    Resting ECG today is normal.      ASSESSMENT/PLAN:    1.  Atypical chest pain.  Characteristics of this chest pain strongly suggests that it is not of cardiac origin including the fact that is pleuritic, nonexertional, does not radiate elsewhere, and is associated with a normal stress ECG and no echo changes with exercise.  Nevertheless, he has significant cardiac risk factors.  Although his pain generally is not associated with activity he did have that once with activity and this happened to be at the time that he did his stress echo.  I think the probability that this represents ischemia is small but should be investigated further.  To this end I have ordered a CT angiogram.  If that study is negative no further cardiac evaluation is necessary.  He is quite insistent that he like to know what is causing his pain.  It does not really sound gastrointestinal but more likely is musculoskeletal, although difficult to prove.  2.  Hypertension.  Very well controlled with current medications, continue same.    3.  Hyperlipidemia.  Currently using atorvastatin 10  "mg per day with an LDL  of 105.  If we see any significant degree of coronary disease or aggressive lipid management will be recommended.    Thank you for asked me to participate in your patient's care.  Please do not hesitate to call if I can be of further assistance.    Orders Placed This Encounter   Procedures     CT Angiogram coronary artery     No orders of the defined types were placed in this encounter.    There are no discontinued medications.    10 year ASCVD risk: The 10-year ASCVD risk score (Chuck VLADIMIR Jr, et al., 2013) is: 22.8%    Values used to calculate the score:      Age: 62 years      Sex: Male      Is Non- : No      Diabetic: Yes      Tobacco smoker: No      Systolic Blood Pressure: 120 mmHg      Is BP treated: Yes      HDL Cholesterol: 39 mg/dL      Total Cholesterol: 168 mg/dL    Encounter Diagnosis   Name Primary?     Chest pain on breathing        CURRENT MEDICATIONS:  Current Outpatient Prescriptions   Medication Sig Dispense Refill     ALPRAZolam (XANAX) 0.5 MG tablet Take 1 tablet (0.5 mg) by mouth 3 times daily as needed for anxiety 30 tablet 0     aspirin 81 MG tablet Take 1 tablet by mouth daily.       atorvastatin (LIPITOR) 10 MG tablet TAKE 1 TABLET BY MOUTH ONCE DAILY 90 tablet 1     B-D LUER-ROCHELLE SYRINGE 22G X 1-1/2\" 3 ML MISC USE WITH TESTOSTERONE 12 each 3     BD HYPODERMIC NEEDLE 18G X 1\" MISC USE WITH TESTOSTERONE 25 each 6     glimepiride (AMARYL) 1 MG tablet Take 2 tablets (2 mg) by mouth every morning (before breakfast) 180 tablet 0     lisinopril (PRINIVIL/ZESTRIL) 10 MG tablet Take 1 tablet (10 mg) by mouth daily 90 tablet 3     metFORMIN (GLUCOPHAGE) 1000 MG tablet Take 1 tablet (1,000 mg) by mouth 2 times daily (with meals) 180 tablet 1     Multiple Vitamins-Minerals (CENTRUM SILVER) per tablet Take 1 tablet by mouth daily 90 tablet 3     omeprazole (PRILOSEC) 40 MG capsule Take 1 capsule (40 mg) by mouth daily 90 capsule 3     ONETOUCH ULTRA test " strip USE TO TEST BLOOD SUGARS ONE TIME DAILY OR AS DIRECTED. 100 strip 3     PHARMACIST CHOICE LANCETS MISC TEST BLOOD SUGARS 5 TIMES DAILY 100 each 3     PRODIGY NO CODING BLOOD GLUC test strip TEST BLOOD SUGARS 5 TIMES DAILY 100 each 3     tadalafil (CIALIS) 20 MG tablet Take 1 tablet (20 mg) by mouth daily Never use with nitroglycerin, terazosin or doxazosin. 100 tablet 1     tamsulosin (FLOMAX) 0.4 MG capsule Take 1 capsule (0.4 mg) by mouth daily Take at bedtime 90 capsule 3     testosterone cypionate (DEPOTESTOTERONE) 200 MG/ML injection INJECT 2MLS INTRAMUSCULARLY ONCE WEEKLY 10 mL 0       ALLERGIES   No Known Allergies    PAST MEDICAL HISTORY:  Past Medical History:   Diagnosis Date     Anxiety      Anxiety 1/31/2015     BPH (benign prostatic hyperplasia)      Degenerative disc disease      Depression      Diabetes mellitus (H)      Gastritis      Gastro-oesophageal reflux disease      H/O alcohol abuse      Hyperlipidemia      Hypertension      Low testosterone      MRSA (methicillin resistant staph aureus) culture positive 8/2013    skin infection     PUD (peptic ulcer disease)      Sleep apnea        PAST SURGICAL HISTORY:  Past Surgical History:   Procedure Laterality Date     COLONOSCOPY  11/11/2013    Procedure: COMBINED COLONOSCOPY, SINGLE BIOPSY/POLYPECTOMY BY BIOPSY;  Colonoscopy/ polypectomy x2 by cold forceps    ;  Surgeon: Juan Carlos Bellamy MD;  Location:  GI     ESOPHAGOSCOPY, GASTROSCOPY, DUODENOSCOPY (EGD), COMBINED N/A 9/22/2016    Procedure: COMBINED ESOPHAGOSCOPY, GASTROSCOPY, DUODENOSCOPY (EGD), BIOPSY SINGLE OR MULTIPLE;  Surgeon: Juan Carlos Barraza MD;  Location:  GI     ORTHOPEDIC SURGERY         FAMILY HISTORY:  Family History   Problem Relation Age of Onset     Pancreatic Cancer Mother      Pancreatic Cancer Paternal Grandfather      Diabetes Brother      Depression Brother      Suicide Brother      Substance Abuse Brother      Dementia Father      Parkinsonism Father       Family History Negative No family hx of      Colon Cancer No family hx of      Unknown/Adopted No family hx of      Anxiety Disorder No family hx of      Schizophrenia No family hx of      Bipolar Disorder No family hx of      Major Disease No family hx of      Autism Spectrum Disorder No family hx of      Intellectual Disability (Mental Retardation) No family hx of      Mental Illness No family hx of        SOCIAL HISTORY:  Social History     Social History     Marital status:      Spouse name: N/A     Number of children: N/A     Years of education: N/A     Social History Main Topics     Smoking status: Former Smoker     Types: Cigarettes     Smokeless tobacco: Never Used     Alcohol use No      Comment: 14 drinks weekly, quit on 08/2017     Drug use: No     Sexual activity: Yes     Partners: Female     Other Topics Concern     None     Social History Narrative       Review of Systems:  Skin:  Negative     Eyes:  Positive for glasses  ENT:  Positive for nasal congestion  Respiratory:  Positive for cough  Cardiovascular:    Positive for;chest pain;fatigue;lightheadedness;dizziness  Gastroenterology: Positive for nausea  Genitourinary:  Negative    Musculoskeletal:  Positive for back pain;arthritis  Neurologic:  Negative    Psychiatric:  Positive for anxiety  Heme/Lymph/Imm:  Negative    Endocrine:  Positive for diabetes    Physical Exam:  Vitals: /60 (BP Location: Right arm, Patient Position: Chair, Cuff Size: Adult Regular)  Pulse 89  Wt 101 kg (222 lb 9.6 oz)  SpO2 96%  BMI 28.58 kg/m2    Constitutional:  cooperative, alert and oriented, well developed, well nourished, in no acute distress overweight      Skin:  warm and dry to the touch        Head:  normocephalic        Eyes:  no xanthalasma        ENT:  no pallor or cyanosis, dentition good        Neck:  carotid pulses are full and equal bilaterally, JVP normal, no carotid bruit        Chest:  normal breath sounds, clear to auscultation,  normal A-P diameter, normal symmetry, normal respiratory excursion, no use of accessory muscles        Cardiac: regular rhythm, normal S1/S2, no S3 or S4, apical impulse not displaced, no murmurs, gallops or rubs   distant heart sounds              Abdomen:  abdomen soft;BS normoactive        Vascular: pulses full and equal                                      Extremities and Back:  no edema        Neurological:  no gross motor deficits          Recent Lab Results:  LIPID RESULTS:  Lab Results   Component Value Date    CHOL 168 11/29/2017    HDL 39 (L) 11/29/2017     (H) 11/29/2017    TRIG 120 11/29/2017    CHOLHDLRATIO 5.1 (H) 09/30/2015       LIVER ENZYME RESULTS:  Lab Results   Component Value Date    AST 16 11/29/2017    ALT 30 11/29/2017       CBC RESULTS:  Lab Results   Component Value Date    WBC 8.0 11/29/2017    RBC 5.88 11/29/2017    HGB 16.6 11/29/2017    HCT 48.0 11/29/2017    MCV 82 11/29/2017    MCH 28.2 11/29/2017    MCHC 34.6 11/29/2017    RDW 14.3 11/29/2017     11/29/2017       BMP RESULTS:  Lab Results   Component Value Date     11/29/2017    POTASSIUM 4.2 11/29/2017    CHLORIDE 101 11/29/2017    CO2 26 11/29/2017    ANIONGAP 9 11/29/2017     (H) 11/29/2017    BUN 16 11/29/2017    CR 0.99 11/29/2017    GFRESTIMATED 76 11/29/2017    GFRESTBLACK >90 11/29/2017    MANINDER 9.5 11/29/2017        A1C RESULTS:  Lab Results   Component Value Date    A1C 8.2 (H) 08/01/2018       INR RESULTS:  Lab Results   Component Value Date    INR 1.08 07/31/2016    INR 1.01 12/22/2014         Thank you for allowing me to participate in the care of your patient.    Sincerely,     Haja Estevez MD     [facility]

## 2018-09-12 NOTE — PROGRESS NOTES
HISTORY:    Austyn Araujo is a pleasant 62-year-old male with notable cardiac risk factors including long-standing type 2 diabetes, pretension, hyperlipidemia, hypertension, and a history of smoking which he gave up 15 years ago.  He has developed chest pain over the last year and was asked to see me for further evaluation.    Austyn reports that greater than one half year ago he noticed the onset of chest discomfort.  He describes this as an aching sensation but also uses of the description of sharp.  It is located in his mid chest area at about the nipple line and does not radiate elsewhere.  These episodes tend to be random in nature, only rarely occurring at night.  They can last several hours at a time.  He has checked his pulse when he has these and it is normal.  He is also tried using antacids and it does not help.  Initially he does not have associated symptoms but eventually he often feels nauseated with this but does not have diaphoresis or dyspnea.  He acknowledges that he does not exercise on a regular basis because of some orthopedic issues, but in general these episodes of chest pain do not seem to be associated with activity.  He also states quite clearly that there is a pleuritic component.  His episodes tend to occur mostly in the morning.  He does report that he was under a large amount of stress at work and he thinks that when he was under stress his pain was more frequent, more prolonged, and more severe, and now that his job duties have quieted down his frequency of pain has considerably decreased.    Austyn had a stress echo done which showed no ischemic changes by ECG or echo, but this was the one time that he did develop chest pain with exertion.  He states that this is the most exertion he has done in the last 6 months.  His episodes tend to occur mostly in the morning.    Mentioned above, the patient has a number of cardiac risk factors.  He has a long-standing history of type 2  diabetes as well as of hypertension well controlled with medications.  He also has hyperlipidemia for which she is on atorvastatin 10 mg per day with an LDL cholesterol of 105 last measured about a year ago.  He is an ex-smoker having quit 15 years ago.  He has a concerning family history with his father suffering an MI in his 30s.    3.  Denies any episodes of syncope or near syncope, strokelike symptoms, PND/orthopnea, or exertional chest pain.  He does have some mild orthostatic dizziness.  He denies peripheral edema or claudication.  He does not exercise regularly because of back pain.  He has no complaints about his stamina in general.    Resting ECG today is normal.      ASSESSMENT/PLAN:    1.  Atypical chest pain.  Characteristics of this chest pain strongly suggests that it is not of cardiac origin including the fact that is pleuritic, nonexertional, does not radiate elsewhere, and is associated with a normal stress ECG and no echo changes with exercise.  Nevertheless, he has significant cardiac risk factors.  Although his pain generally is not associated with activity he did have that once with activity and this happened to be at the time that he did his stress echo.  I think the probability that this represents ischemia is small but should be investigated further.  To this end I have ordered a CT angiogram.  If that study is negative no further cardiac evaluation is necessary.  He is quite insistent that he like to know what is causing his pain.  It does not really sound gastrointestinal but more likely is musculoskeletal, although difficult to prove.  2.  Hypertension.  Very well controlled with current medications, continue same.    3.  Hyperlipidemia.  Currently using atorvastatin 10 mg per day with an LDL  of 105.  If we see any significant degree of coronary disease or aggressive lipid management will be recommended.    Thank you for asked me to participate in your patient's care.  Please do not  "hesitate to call if I can be of further assistance.    Orders Placed This Encounter   Procedures     CT Angiogram coronary artery     No orders of the defined types were placed in this encounter.    There are no discontinued medications.    10 year ASCVD risk: The 10-year ASCVD risk score (Chuckhannah GILBERT Jr, et al., 2013) is: 22.8%    Values used to calculate the score:      Age: 62 years      Sex: Male      Is Non- : No      Diabetic: Yes      Tobacco smoker: No      Systolic Blood Pressure: 120 mmHg      Is BP treated: Yes      HDL Cholesterol: 39 mg/dL      Total Cholesterol: 168 mg/dL    Encounter Diagnosis   Name Primary?     Chest pain on breathing        CURRENT MEDICATIONS:  Current Outpatient Prescriptions   Medication Sig Dispense Refill     ALPRAZolam (XANAX) 0.5 MG tablet Take 1 tablet (0.5 mg) by mouth 3 times daily as needed for anxiety 30 tablet 0     aspirin 81 MG tablet Take 1 tablet by mouth daily.       atorvastatin (LIPITOR) 10 MG tablet TAKE 1 TABLET BY MOUTH ONCE DAILY 90 tablet 1     B-D LUER-ROCHELLE SYRINGE 22G X 1-1/2\" 3 ML MISC USE WITH TESTOSTERONE 12 each 3     BD HYPODERMIC NEEDLE 18G X 1\" MISC USE WITH TESTOSTERONE 25 each 6     glimepiride (AMARYL) 1 MG tablet Take 2 tablets (2 mg) by mouth every morning (before breakfast) 180 tablet 0     lisinopril (PRINIVIL/ZESTRIL) 10 MG tablet Take 1 tablet (10 mg) by mouth daily 90 tablet 3     metFORMIN (GLUCOPHAGE) 1000 MG tablet Take 1 tablet (1,000 mg) by mouth 2 times daily (with meals) 180 tablet 1     Multiple Vitamins-Minerals (CENTRUM SILVER) per tablet Take 1 tablet by mouth daily 90 tablet 3     omeprazole (PRILOSEC) 40 MG capsule Take 1 capsule (40 mg) by mouth daily 90 capsule 3     ONETOUCH ULTRA test strip USE TO TEST BLOOD SUGARS ONE TIME DAILY OR AS DIRECTED. 100 strip 3     PHARMACIST CHOICE LANCETS MISC TEST BLOOD SUGARS 5 TIMES DAILY 100 each 3     PRODIGY NO CODING BLOOD GLUC test strip TEST BLOOD SUGARS 5 TIMES " DAILY 100 each 3     tadalafil (CIALIS) 20 MG tablet Take 1 tablet (20 mg) by mouth daily Never use with nitroglycerin, terazosin or doxazosin. 100 tablet 1     tamsulosin (FLOMAX) 0.4 MG capsule Take 1 capsule (0.4 mg) by mouth daily Take at bedtime 90 capsule 3     testosterone cypionate (DEPOTESTOTERONE) 200 MG/ML injection INJECT 2MLS INTRAMUSCULARLY ONCE WEEKLY 10 mL 0       ALLERGIES   No Known Allergies    PAST MEDICAL HISTORY:  Past Medical History:   Diagnosis Date     Anxiety      Anxiety 1/31/2015     BPH (benign prostatic hyperplasia)      Degenerative disc disease      Depression      Diabetes mellitus (H)      Gastritis      Gastro-oesophageal reflux disease      H/O alcohol abuse      Hyperlipidemia      Hypertension      Low testosterone      MRSA (methicillin resistant staph aureus) culture positive 8/2013    skin infection     PUD (peptic ulcer disease)      Sleep apnea        PAST SURGICAL HISTORY:  Past Surgical History:   Procedure Laterality Date     COLONOSCOPY  11/11/2013    Procedure: COMBINED COLONOSCOPY, SINGLE BIOPSY/POLYPECTOMY BY BIOPSY;  Colonoscopy/ polypectomy x2 by cold forceps    ;  Surgeon: Juan Carlos Bellamy MD;  Location:  GI     ESOPHAGOSCOPY, GASTROSCOPY, DUODENOSCOPY (EGD), COMBINED N/A 9/22/2016    Procedure: COMBINED ESOPHAGOSCOPY, GASTROSCOPY, DUODENOSCOPY (EGD), BIOPSY SINGLE OR MULTIPLE;  Surgeon: Juan Carlos Barraza MD;  Location:  GI     ORTHOPEDIC SURGERY         FAMILY HISTORY:  Family History   Problem Relation Age of Onset     Pancreatic Cancer Mother      Pancreatic Cancer Paternal Grandfather      Diabetes Brother      Depression Brother      Suicide Brother      Substance Abuse Brother      Dementia Father      Parkinsonism Father      Family History Negative No family hx of      Colon Cancer No family hx of      Unknown/Adopted No family hx of      Anxiety Disorder No family hx of      Schizophrenia No family hx of      Bipolar Disorder No family hx of       Morrison Disease No family hx of      Autism Spectrum Disorder No family hx of      Intellectual Disability (Mental Retardation) No family hx of      Mental Illness No family hx of        SOCIAL HISTORY:  Social History     Social History     Marital status:      Spouse name: N/A     Number of children: N/A     Years of education: N/A     Social History Main Topics     Smoking status: Former Smoker     Types: Cigarettes     Smokeless tobacco: Never Used     Alcohol use No      Comment: 14 drinks weekly, quit on 08/2017     Drug use: No     Sexual activity: Yes     Partners: Female     Other Topics Concern     None     Social History Narrative       Review of Systems:  Skin:  Negative     Eyes:  Positive for glasses  ENT:  Positive for nasal congestion  Respiratory:  Positive for cough  Cardiovascular:    Positive for;chest pain;fatigue;lightheadedness;dizziness  Gastroenterology: Positive for nausea  Genitourinary:  Negative    Musculoskeletal:  Positive for back pain;arthritis  Neurologic:  Negative    Psychiatric:  Positive for anxiety  Heme/Lymph/Imm:  Negative    Endocrine:  Positive for diabetes    Physical Exam:  Vitals: /60 (BP Location: Right arm, Patient Position: Chair, Cuff Size: Adult Regular)  Pulse 89  Wt 101 kg (222 lb 9.6 oz)  SpO2 96%  BMI 28.58 kg/m2    Constitutional:  cooperative, alert and oriented, well developed, well nourished, in no acute distress overweight      Skin:  warm and dry to the touch        Head:  normocephalic        Eyes:  no xanthalasma        ENT:  no pallor or cyanosis, dentition good        Neck:  carotid pulses are full and equal bilaterally, JVP normal, no carotid bruit        Chest:  normal breath sounds, clear to auscultation, normal A-P diameter, normal symmetry, normal respiratory excursion, no use of accessory muscles        Cardiac: regular rhythm, normal S1/S2, no S3 or S4, apical impulse not displaced, no murmurs, gallops or rubs   distant  heart sounds              Abdomen:  abdomen soft;BS normoactive        Vascular: pulses full and equal                                      Extremities and Back:  no edema        Neurological:  no gross motor deficits          Recent Lab Results:  LIPID RESULTS:  Lab Results   Component Value Date    CHOL 168 11/29/2017    HDL 39 (L) 11/29/2017     (H) 11/29/2017    TRIG 120 11/29/2017    CHOLHDLRATIO 5.1 (H) 09/30/2015       LIVER ENZYME RESULTS:  Lab Results   Component Value Date    AST 16 11/29/2017    ALT 30 11/29/2017       CBC RESULTS:  Lab Results   Component Value Date    WBC 8.0 11/29/2017    RBC 5.88 11/29/2017    HGB 16.6 11/29/2017    HCT 48.0 11/29/2017    MCV 82 11/29/2017    MCH 28.2 11/29/2017    MCHC 34.6 11/29/2017    RDW 14.3 11/29/2017     11/29/2017       BMP RESULTS:  Lab Results   Component Value Date     11/29/2017    POTASSIUM 4.2 11/29/2017    CHLORIDE 101 11/29/2017    CO2 26 11/29/2017    ANIONGAP 9 11/29/2017     (H) 11/29/2017    BUN 16 11/29/2017    CR 0.99 11/29/2017    GFRESTIMATED 76 11/29/2017    GFRESTBLACK >90 11/29/2017    MANINDER 9.5 11/29/2017        A1C RESULTS:  Lab Results   Component Value Date    A1C 8.2 (H) 08/01/2018       INR RESULTS:  Lab Results   Component Value Date    INR 1.08 07/31/2016    INR 1.01 12/22/2014         Haja Estevez MD, FACC    CC  Javier Villafana MD  [address] [place] [address]

## 2018-09-12 NOTE — MR AVS SNAPSHOT
"              After Visit Summary   9/12/2018    Austyn Araujo    MRN: [id number]           Patient Information     Date Of Birth          1955        Visit Information        Provider Department      9/12/2018 11:15 AM Haja Estevez MD [facility]        Today's Diagnoses     Chest pain on breathing           Follow-ups after your visit        Future tests that were ordered for you today     Open Future Orders        Priority Expected Expires Ordered    CT Angiogram coronary artery Routine 9/13/2018 9/12/2019 9/12/2018            Who to contact     If you have questions or need follow up information about today's clinic visit or your schedule please contact [facility] directly at [phone].  Normal or non-critical lab and imaging results will be communicated to you by [employer], letter or phone within 4 business days after the clinic has received the results. If you do not hear from us within 7 days, please contact the clinic through [employer] or phone. If you have a critical or abnormal lab result, we will notify you by phone as soon as possible.  Submit refill requests through [employer] or call your pharmacy and they will forward the refill request to us. Please allow 3 business days for your refill to be completed.          Additional Information About Your Visit        [employer] Information     [employer] lets you send messages to your doctor, view your test results, renew your prescriptions, schedule appointments and more. To sign up, go to [email or web address] . Click on \"Log in\" on the left side of the screen, which will take you to the Welcome page. Then click on \"Sign up Now\" on the right side of the page.     You will be asked to enter the access code listed below, as well as some personal information. Please follow the directions to create your username and password.     Your access code is: [email or web address]  Expires: " 2018 12:25 PM     Your access code will  in 90 days. If you need help or a new code, please call your [place] clinic or [phone].        Care EveryWhere ID     This is your Care EveryWhere ID. This could be used by other organizations to access your [facility] [place] medical records  [id number]        Your Vitals Were     Pulse Pulse Oximetry BMI (Body Mass Index)             89 96% 28.58 kg/m2          Blood Pressure from Last 3 Encounters:   18 120/60   08/15/18 124/88   18 108/78    Weight from Last 3 Encounters:   18 101 kg (222 lb 9.6 oz)   08/15/18 99.8 kg (220 lb)   18 102.2 kg (225 lb 3.2 oz)              We Performed the Following     EKG 12-lead complete with read (future- to be scheduled)        Primary Care Provider Office Phone # Fax #    Javier Villafana [phone]       [address] [facility] [address]        Goals        General    Reduce drinking by 90% (pt-stated)     Notes - Note created  2015 10:33 AM by Mckenna Chang MSW    As of today's date 2015 goal is met at 26 - 50%.   Goal Status:  Active          Equal Access to Services     HAYDEE TAYLOR AH: Hadii aad ku hadasho Soomaali, waaxda luqadaha, qaybta kaalmada adeegyada, teena pereyra . So [facility] [phone].    ATENCIÓN: Si habla español, tiene a carrillo disposición servicios gratuitos de asistencia lingüística. Llame al [phone].    We comply with applicable federal civil rights laws and [place] laws. We do not discriminate on the basis of race, color, national origin, age, disability, sex, sexual orientation, or gender identity.            Thank you!     Thank you for choosing [facility]   [facility]  for your care. Our goal is always to provide you with excellent care. Hearing back from our patients is one way we can continue to improve our services. Please take a few minutes to complete the written survey that you may  "receive in the mail after your visit with us. Thank you!             Your Updated Medication List - Protect others around you: Learn how to safely use, store and throw away your medicines at www.disposemymeds.org.          This list is accurate as of 9/12/18 12:16 PM.  Always use your most recent med list.                   Brand Name Dispense Instructions for use Diagnosis    ALPRAZolam 0.5 MG tablet    XANAX    30 tablet    Take 1 tablet (0.5 mg) by mouth 3 times daily as needed for anxiety    Anxiety       aspirin 81 MG tablet      Take 1 tablet by mouth daily.        atorvastatin 10 MG tablet    LIPITOR    90 tablet    TAKE 1 TABLET BY MOUTH ONCE DAILY    Hyperlipidemia LDL goal <100       B-D LUER-ROCHELLE SYRINGE 22G X 1-1/2\" 3 ML Misc   Generic drug:  syringe/needle (disp)     12 each    USE WITH TESTOSTERONE    Low testosterone       BD HYPODERMIC NEEDLE 18G X 1\" Misc   Generic drug:  needle (disp)     25 each    USE WITH TESTOSTERONE    Low testosterone       CENTRUM SILVER per tablet     90 tablet    Take 1 tablet by mouth daily    Type 2 diabetes mellitus with diabetic neuropathy (H), Essential hypertension with goal blood pressure less than 140/90       glimepiride 1 MG tablet    AMARYL    180 tablet    Take 2 tablets (2 mg) by mouth every morning (before breakfast)    Type 2 diabetes mellitus with peripheral autonomic neuropathy (H)       lisinopril 10 MG tablet    PRINIVIL/ZESTRIL    90 tablet    Take 1 tablet (10 mg) by mouth daily    Benign essential hypertension       metFORMIN 1000 MG tablet    GLUCOPHAGE    180 tablet    Take 1 tablet (1,000 mg) by mouth 2 times daily (with meals)    Essential hypertension with goal blood pressure less than 140/90, Type 2 diabetes mellitus without complication, without long-term current use of insulin (H)       omeprazole 40 MG capsule    priLOSEC    90 capsule    Take 1 capsule (40 mg) by mouth daily    Gastroesophageal reflux disease without esophagitis       " PHARMACIST CHOICE LANCETS Misc     100 each    TEST BLOOD SUGARS 5 TIMES DAILY    Type 2 diabetes mellitus with peripheral autonomic neuropathy (H)       * PRODIGY NO CODING BLOOD GLUC test strip   Generic drug:  blood glucose monitoring     100 each    TEST BLOOD SUGARS 5 TIMES DAILY    Type 2 diabetes mellitus with peripheral autonomic neuropathy (H)       * ONETOUCH ULTRA test strip   Generic drug:  blood glucose monitoring     100 strip    USE TO TEST BLOOD SUGARS ONE TIME DAILY OR AS DIRECTED.    Type 2 diabetes mellitus with diabetic neuropathy (H)       tadalafil 20 MG tablet    CIALIS    100 tablet    Take 1 tablet (20 mg) by mouth daily Never use with nitroglycerin, terazosin or doxazosin.    Low testosterone       tamsulosin 0.4 MG capsule    FLOMAX    90 capsule    Take 1 capsule (0.4 mg) by mouth daily Take at bedtime    Benign prostatic hyperplasia with urinary frequency       testosterone cypionate 200 MG/ML injection    DEPOTESTOTERONE    10 mL    INJECT 2MLS INTRAMUSCULARLY ONCE WEEKLY    Low testosterone       * Notice:  This list has 2 medication(s) that are the same as other medications prescribed for you. Read the directions carefully, and ask your doctor or other care provider to review them with you.

## 2018-10-05 DIAGNOSIS — R79.89 LOW TESTOSTERONE: ICD-10-CM

## 2018-10-05 NOTE — TELEPHONE ENCOUNTER
Requested Prescriptions   Pending Prescriptions Disp Refills     testosterone cypionate (DEPOTESTOSTERONE) 200 MG/ML injection [Pharmacy Med Name: TESTOSTERON CYP 2,000 MG/10 ML] 10 mL 0     Sig: INJECT 2MLS INTRAMUSCULARLY ONCE WEEKLY    There is no refill protocol information for this order      Last Written Prescription Date:  06/26/2018  Last Fill Quantity: 10 mL,  # refills: 0   Last office visit: 4/25/2018 with prescribing provider:     Future Office Visit:

## 2018-10-09 ENCOUNTER — TELEPHONE (OUTPATIENT)
Dept: CARDIOLOGY | Facility: CLINIC | Age: 63
End: 2018-10-09

## 2018-10-09 DIAGNOSIS — I10 BENIGN ESSENTIAL HYPERTENSION: Primary | ICD-10-CM

## 2018-10-09 RX ORDER — TESTOSTERONE CYPIONATE 200 MG/ML
INJECTION, SOLUTION INTRAMUSCULAR
Qty: 10 ML | Refills: 0 | Status: SHIPPED | OUTPATIENT
Start: 2018-10-09 | End: 2018-11-01

## 2018-10-09 NOTE — TELEPHONE ENCOUNTER
"Patient recently saw Dr. Estevez. CT angio coronary ordered. Patient received a letter in the mail from his insurance company stating that the CT angio coronary will not be covered.      Called [employer] Matt Pérez denied prior auth. Case Number [id number]. Spoke to Rosetta PARSONS at [facility]. Reviewed reasoning for CT angio coronary.  \"See below\"    Dr. Estevez's last OV notes states.......    Atypical chest pain. Characteristics of this chest pain strongly suggests that it is not of cardiac origin including the fact that is pleuritic, nonexertional, does not radiate elsewhere, and is associated with a normal stress ECG and no echo changes with exercise. Nevertheless, he has significant cardiac risk factors.  Although his pain generally is not associated with activity he did have that once with activity and this happened to be at the time that he did his stress echo.  I think the probability that this represents ischemia is small but should be investigated further. To this end I have ordered a CT angiogram.  If that study is negative no further cardiac evaluation is necessary.      Information will be sent to MD to review. They will contact the clinic to let us know if the prior auth is approved.     CT angio coronary is scheduled today at 2 pm. We will leave it as scheduled. If the clinic does not hear from [facility] by 1 pm. CT angio coronary will be cancelled and a message will be sent to Dr. Estevez to review.     Patient notified of plan. Patient had no questions.     Suellen PARSONS             "

## 2018-10-09 NOTE — TELEPHONE ENCOUNTER
Refill request for Testosterone.     Last OV 4/25/18.     Last refill on 6/26/18 for 10 ML.     Routing refill request to provider for review/approval because:  Drug not on the [facility] refill protocol

## 2018-10-15 NOTE — TELEPHONE ENCOUNTER
Pt called, left vm asking if he should come to CT angio tomorrow (10/16/18) or not.   Called pt back, advised not sure if Prior Auth was approved or status of this. RN who was working on this is out today. To be safe, cancelled CT angio for tomorrow. Will route this to RN Karena to follow-up on this and check status. Pt would like a call back once we know what the status is so he can r/s his CT angio.   Sumeet PARSONS

## 2018-10-16 ENCOUNTER — HOSPITAL ENCOUNTER (OUTPATIENT)
Dept: CARDIOLOGY | Facility: CLINIC | Age: 63
Discharge: HOME | End: 2018-10-16
Attending: INTERNAL MEDICINE | Admitting: INTERNAL MEDICINE
Payer: COMMERCIAL

## 2018-10-16 VITALS — HEART RATE: 56 BPM | DIASTOLIC BLOOD PRESSURE: 70 MMHG | SYSTOLIC BLOOD PRESSURE: 102 MMHG

## 2018-10-16 DIAGNOSIS — R07.1 CHEST PAIN ON BREATHING: ICD-10-CM

## 2018-10-16 LAB
CREAT BLD-MCNC: 0.9 MG/DL (ref 0.66–1.25)
GFR SERPL CREATININE-BSD FRML MDRD: 85 ML/MIN/1.7M2

## 2018-10-16 PROCEDURE — 25000125 ZZHC RX 250: Performed by: INTERNAL MEDICINE

## 2018-10-16 PROCEDURE — 75574 CT ANGIO HRT W/3D IMAGE: CPT | Mod: 26 | Performed by: INTERNAL MEDICINE

## 2018-10-16 PROCEDURE — 82565 ASSAY OF CREATININE: CPT

## 2018-10-16 PROCEDURE — 25000132 ZZH RX MED GY IP 250 OP 250 PS 637: Performed by: INTERNAL MEDICINE

## 2018-10-16 PROCEDURE — 25000128 H RX IP 250 OP 636: Performed by: INTERNAL MEDICINE

## 2018-10-16 PROCEDURE — 75574 CT ANGIO HRT W/3D IMAGE: CPT

## 2018-10-16 RX ORDER — METOPROLOL TARTRATE 50 MG
50-100 TABLET ORAL
Status: COMPLETED | OUTPATIENT
Start: 2018-10-16 | End: 2018-10-16

## 2018-10-16 RX ORDER — ONDANSETRON 2 MG/ML
4 INJECTION INTRAMUSCULAR; INTRAVENOUS
Status: DISCONTINUED | OUTPATIENT
Start: 2018-10-16 | End: 2018-10-17 | Stop reason: HOSPADM

## 2018-10-16 RX ORDER — DIPHENHYDRAMINE HYDROCHLORIDE 50 MG/ML
25-50 INJECTION INTRAMUSCULAR; INTRAVENOUS
Status: DISCONTINUED | OUTPATIENT
Start: 2018-10-16 | End: 2018-10-17 | Stop reason: HOSPADM

## 2018-10-16 RX ORDER — METHYLPREDNISOLONE SODIUM SUCCINATE 125 MG/2ML
125 INJECTION, POWDER, LYOPHILIZED, FOR SOLUTION INTRAMUSCULAR; INTRAVENOUS
Status: DISCONTINUED | OUTPATIENT
Start: 2018-10-16 | End: 2018-10-17 | Stop reason: HOSPADM

## 2018-10-16 RX ORDER — DIPHENHYDRAMINE HCL 25 MG
25 CAPSULE ORAL
Status: DISCONTINUED | OUTPATIENT
Start: 2018-10-16 | End: 2018-10-17 | Stop reason: HOSPADM

## 2018-10-16 RX ORDER — METOPROLOL TARTRATE 1 MG/ML
5-15 INJECTION, SOLUTION INTRAVENOUS
Status: DISCONTINUED | OUTPATIENT
Start: 2018-10-16 | End: 2018-10-17 | Stop reason: HOSPADM

## 2018-10-16 RX ORDER — IOPAMIDOL 755 MG/ML
500 INJECTION, SOLUTION INTRAVASCULAR ONCE
Status: COMPLETED | OUTPATIENT
Start: 2018-10-16 | End: 2018-10-16

## 2018-10-16 RX ORDER — ACYCLOVIR 200 MG/1
0-1 CAPSULE ORAL
Status: DISCONTINUED | OUTPATIENT
Start: 2018-10-16 | End: 2018-10-17 | Stop reason: HOSPADM

## 2018-10-16 RX ORDER — NITROGLYCERIN 0.4 MG/1
0.4 TABLET SUBLINGUAL
Status: DISCONTINUED | OUTPATIENT
Start: 2018-10-16 | End: 2018-10-17 | Stop reason: HOSPADM

## 2018-10-16 RX ADMIN — IOPAMIDOL 120 ML: 755 INJECTION, SOLUTION INTRAVENOUS at 14:25

## 2018-10-16 RX ADMIN — METOPROLOL TARTRATE 5 MG: 5 INJECTION, SOLUTION INTRAVENOUS at 14:15

## 2018-10-16 RX ADMIN — NITROGLYCERIN 0.4 MG: 0.4 TABLET SUBLINGUAL at 14:20

## 2018-10-16 RX ADMIN — METOPROLOL TARTRATE 100 MG: 50 TABLET ORAL at 13:06

## 2018-10-16 RX ADMIN — SODIUM CHLORIDE 100 ML: 9 INJECTION, SOLUTION INTRAVENOUS at 14:25

## 2018-10-16 NOTE — TELEPHONE ENCOUNTER
Called Matt. ([phone]).    Prior auth approved    Prior auth number: [id number]    Dates approved: 09-27-18 to 12-26-18.     CT angio coronary was put back on for today at 1 pm.     Called patient, patient was not available. I left patient a message to return my call. Suellen PARSONS

## 2018-10-18 ENCOUNTER — TELEPHONE (OUTPATIENT)
Dept: CARDIOLOGY | Facility: CLINIC | Age: 63
End: 2018-10-18

## 2018-10-18 DIAGNOSIS — R07.9 CHEST PAIN: ICD-10-CM

## 2018-10-18 DIAGNOSIS — E78.5 HYPERLIPIDEMIA LDL GOAL <100: ICD-10-CM

## 2018-10-18 DIAGNOSIS — I25.10 CAD IN NATIVE ARTERY: Primary | ICD-10-CM

## 2018-10-18 RX ORDER — NITROGLYCERIN 0.4 MG/1
TABLET SUBLINGUAL
Qty: 25 TABLET | Refills: 3 | Status: SHIPPED | OUTPATIENT
Start: 2018-10-18 | End: 2019-09-05

## 2018-10-18 RX ORDER — ATORVASTATIN CALCIUM 40 MG/1
40 TABLET, FILM COATED ORAL DAILY
Qty: 90 TABLET | Refills: 0 | Status: SHIPPED | OUTPATIENT
Start: 2018-10-18 | End: 2019-01-15

## 2018-10-18 NOTE — TELEPHONE ENCOUNTER
CT angiogram coronary (10-16-18)     1. Total Agatston score 77.5, placing the patient in the 53rd percentile when compared to age and gender matched control group.      2. Diffuse coronary artery disease. There are multiple mild to moderate stenoses in the proximal and mid left anterior descending artery. The first diagonal is a small vessel and I cannot completely occluded a severe stenosis at its ostium. Circumflex is free of  significant disease. Luminal irregularities throughout the length of the right coronary artery    Notes Recorded by Haja Estevez MD on 10/17/2018 at 10:03 AM  Increase Lipitor to 40 mg/day, f/u labs 6-8 weeks    Send him a prescription for nitroglycerin and explain it's use. I see Cialis on his med list so make certain he understands that Cialis is long acting so he cannot use the ntg until at least 48 hours after cialis, and shouldn't use cialis until 24 hour after taking ntg.    Please have him come in to see me to discuss the results.  I still suspect that his chest pain is noncardiac, but cannot exclude angina based on angio (small branch with possible disease--too small to repair).  ---------------------------------------------------------------------------------------------------------------------    Called patient, patient was not available. I left patient a message to return my call. Suellen PARSONS

## 2018-10-18 NOTE — LETTER
"  October 18, 2018       TO: Austyn Araujo   [address] [facility] [address]       Dear Mr. Araujo,    I have included your CT Angiogram Coronary Artery Results. You have a small branch with possible disease that is too small to repair.     Recommendations    Please increase your Atorvastatin to 40 mg a day. A prescription was sent to your pharmacy.     Dr. Estevez would like you to take Nitroglycerin as directed for your chest pain. A prescription was sent to your pharmacy. Place one tablet under the tongue, you may take one tablet every 5 minutes. Maximum dose 3 tablets in 15 minutes. If you do not have relief after the first tablet, call 911.     In regards to Cialis. Cialis is long acting,  Do not take Nitroglycerin until at least 48 hours after taking your Cialis.  Do not take Cialis until 24 hours after taking Nitroglycerin.        Sincerely,    Karena \"Jan\" RN/Dr. Estevez   [facility]             "

## 2018-10-18 NOTE — TELEPHONE ENCOUNTER
Patient returned my call. Reviewed results and recommendations (Results and recommendation were mailed to patient, per his request). Patient had no questions. Sent in prescription for Nitroglycerin 0.4 mg SL and Atorvastatin 40 mg a day. FLP/ALT scheduled for 12-12-18. Patient is scheduled to see Dr. Estevez on 11-06-18.     TGarbers RN

## 2018-11-01 DIAGNOSIS — R79.89 LOW TESTOSTERONE: ICD-10-CM

## 2018-11-01 NOTE — TELEPHONE ENCOUNTER
Requested Prescriptions   Pending Prescriptions Disp Refills     testosterone cypionate (DEPOTESTOSTERONE) 200 MG/ML injection [Pharmacy Med Name: TESTOSTERON CYP 2,000 MG/10 ML] 10 mL 0    Last Written Prescription Date:  10/09/2018  Last Fill Quantity: 10 mL,  # refills: 0   Last office visit: 4/25/2018 with prescribing provider:     Future Office Visit:   Next 5 appointments (look out 90 days)     Nov 06, 2018 12:15 PM CST   Return Visit with Haja Estevez MD   [facility])    [address]  [facility] [address] [phone]                Sig: INJECT 2 ML INTO THE MUSCLE ONCE WEEKLY    Androgen Agents Failed    11/1/2018  4:03 AM       Failed - Refills for this classification require provider review       Failed - Recent (6 mo) or future (30 days) visit within the authorizing provider's specialty       Passed - Patient is of age 12 and older       Passed - Lipid panel on file in past 12 mos    Recent Labs   Lab Test  11/29/17   0939   09/30/15   0804   CHOL  168   < >  278*   TRIG  120   < >  187*   HDL  39*   < >  54   LDL  105*   < >  187*   NHDL  129   < >   --    VLDL   --    --   37*   CHOLHDLRATIO   --    --   5.1*    < > = values in this interval not displayed.              Passed - ALT on file within past 12 mos    Recent Labs   Lab Test  11/29/17 0939   ALT  30            Passed - Medication is active on med list       Passed - HCT less than 54% on file within past 12 mos    Recent Labs   Lab Test  11/29/17 0939   HCT  48.0            Passed - Serum Testosterone on file within past 12 mos    Recent Labs   Lab Test  11/29/17 0939   TESTOSTTOTAL  892            Passed - Serum PSA on file within past 12 mos    Lab Results   Component Value Date    PSA 5.05 04/25/2018            Passed - Blood pressure under 140/90 in past 6 months    BP Readings from Last 3 Encounters:   10/16/18 102/70   09/12/18 120/60    08/15/18 124/88                Passed - Patient is not pregnant       Passed - No positive pregnancy test on file within past 12 mos       Passed - AST on file within past 12 mos    Recent Labs   Lab Test  11/29/17   0939   AST  16

## 2018-11-05 RX ORDER — TESTOSTERONE CYPIONATE 200 MG/ML
INJECTION, SOLUTION INTRAMUSCULAR
Qty: 10 ML | Refills: 0 | Status: SHIPPED | OUTPATIENT
Start: 2018-11-05 | End: 2018-12-18

## 2018-11-06 ENCOUNTER — OFFICE VISIT (OUTPATIENT)
Dept: CARDIOLOGY | Facility: CLINIC | Age: 63
End: 2018-11-06
Payer: COMMERCIAL

## 2018-11-06 VITALS
SYSTOLIC BLOOD PRESSURE: 110 MMHG | BODY MASS INDEX: 29.29 KG/M2 | HEIGHT: 74 IN | DIASTOLIC BLOOD PRESSURE: 68 MMHG | WEIGHT: 228.2 LBS | HEART RATE: 76 BPM

## 2018-11-06 DIAGNOSIS — I25.10 CAD IN NATIVE ARTERY: ICD-10-CM

## 2018-11-06 DIAGNOSIS — E78.5 HYPERLIPIDEMIA LDL GOAL <100: ICD-10-CM

## 2018-11-06 PROCEDURE — 99214 OFFICE O/P EST MOD 30 MIN: CPT | Performed by: INTERNAL MEDICINE

## 2018-11-06 NOTE — LETTER
11/6/2018    Javier Villafana MD  [address] [facility] [address]    RE: Austyn Araujo       Dear Colleague,    I had the pleasure of seeing Austyn Araujo in the [facility].    HISTORY:    Austyn Araujo is a pleasant 62-year-old male with a long-standing history of type 2 diabetes, hyperlipidemia, hypertension, and remote history of smoking 15 years ago. He was seen in cardiology consultation 2 months ago complaints of chest discomfort. Details of the chest discomfort are outlined in my previous insult note from September 12.    Since our last visit Austyn reports that he has had no further prolonged or severe episodes of chest discomfort. He continues to have some mild intermittent pleuritic chest pain which occurs randomly but has now been rather brief. No rhinorrhea is into these episodes and he has never identified any triggers. I gave him a prescription for nitroglycerin but he has not used it.    A stress echo had been done prior to our initial consult and was negative, and although I did not feel that this was very likely to represent ischemic chest pain I felt a more definitive study was indicated given his multiple risk factors. A CT angiogram was done and I reviewed those results with him today. He has calcium score is at about the 50th percentile and he was noted to have mild to moderate diffuse coronary disease with no obstructive lesions. I explained that this indicates that his chest pain is not due to coronary ischemia, but with unit to aggressively treat all risk factors to try to prevent progression of his CAD.    ASSESSMENT/PLAN:    1.  Chest pain, musculoskeletal in origin. Improved. No further cardiac evaluation planned. Characteristics of the pain does not suggest other specific pathology.  2. Hyperlipidemia. Dose of Lipitor recently increased from 10 mg to 40 mg per day. Follow-up labs planned in about a month. I am hoping his primary care  "physician will take over management of this in the long-term.  3. Hypertension. Well controlled with current medications, no changes needed.  4. Coronary artery disease. Nonobstructive but still diffuse. Aggressive risk factor management is indicated. Consider repeat stress testing in 3-5 years.    Thank you for inviting me to participate in your patient's care. Please don't hesitate to call if I can be of further assistance. 20 minutes face-to-face time today, greater than 50% counseling.    No orders of the defined types were placed in this encounter.    No orders of the defined types were placed in this encounter.    There are no discontinued medications.    10 year ASCVD risk: The 10-year ASCVD risk score (Chuckhannah GILBERT Jr, et al., 2013) is: 19.9%    Values used to calculate the score:      Age: 62 years      Sex: Male      Is Non- : No      Diabetic: Yes      Tobacco smoker: No      Systolic Blood Pressure: 110 mmHg      Is BP treated: Yes      HDL Cholesterol: 39 mg/dL      Total Cholesterol: 168 mg/dL    Encounter Diagnoses   Name Primary?     Hyperlipidemia LDL goal <100      CAD in native artery        CURRENT MEDICATIONS:  Current Outpatient Prescriptions   Medication Sig Dispense Refill     ALPRAZolam (XANAX) 0.5 MG tablet Take 1 tablet (0.5 mg) by mouth 3 times daily as needed for anxiety 30 tablet 0     aspirin 81 MG tablet Take 1 tablet by mouth daily.       atorvastatin (LIPITOR) 40 MG tablet Take 1 tablet (40 mg) by mouth daily 90 tablet 0     B-D LUER-ROCHELLE SYRINGE 22G X 1-1/2\" 3 ML MISC USE WITH TESTOSTERONE 12 each 3     BD HYPODERMIC NEEDLE 18G X 1\" MISC USE WITH TESTOSTERONE 25 each 6     glimepiride (AMARYL) 1 MG tablet Take 2 tablets (2 mg) by mouth every morning (before breakfast) 180 tablet 0     lisinopril (PRINIVIL/ZESTRIL) 10 MG tablet Take 1 tablet (10 mg) by mouth daily 90 tablet 3     metFORMIN (GLUCOPHAGE) 1000 MG tablet Take 1 tablet (1,000 mg) by mouth 2 times daily " (with meals) 180 tablet 1     Multiple Vitamins-Minerals (CENTRUM SILVER) per tablet Take 1 tablet by mouth daily 90 tablet 3     nitroGLYcerin (NITROSTAT) 0.4 MG sublingual tablet For chest pain place 1 tablet under the tongue every 5 minutes for 3 doses. If symptoms persist 5 minutes after 1st dose call 911. 25 tablet 3     omeprazole (PRILOSEC) 40 MG capsule Take 1 capsule (40 mg) by mouth daily 90 capsule 3     ONETOUCH ULTRA test strip USE TO TEST BLOOD SUGARS ONE TIME DAILY OR AS DIRECTED. 100 strip 3     PHARMACIST CHOICE LANCETS MISC TEST BLOOD SUGARS 5 TIMES DAILY 100 each 3     PRODIGY NO CODING BLOOD GLUC test strip TEST BLOOD SUGARS 5 TIMES DAILY 100 each 3     tadalafil (CIALIS) 20 MG tablet Take 1 tablet (20 mg) by mouth daily Never use with nitroglycerin, terazosin or doxazosin. 100 tablet 1     tamsulosin (FLOMAX) 0.4 MG capsule Take 1 capsule (0.4 mg) by mouth daily Take at bedtime 90 capsule 3     testosterone cypionate (DEPOTESTOSTERONE) 200 MG/ML injection INJECT 2 ML INTO THE MUSCLE ONCE WEEKLY 10 mL 0       ALLERGIES   No Known Allergies    PAST MEDICAL HISTORY:  Past Medical History:   Diagnosis Date     Anxiety      Anxiety 1/31/2015     BPH (benign prostatic hyperplasia)      Degenerative disc disease      Depression      Diabetes mellitus (H)      Gastritis      Gastro-oesophageal reflux disease      H/O alcohol abuse      Hyperlipidemia      Hypertension      Low testosterone      MRSA (methicillin resistant staph aureus) culture positive 8/2013    skin infection     PUD (peptic ulcer disease)      Sleep apnea        PAST SURGICAL HISTORY:  Past Surgical History:   Procedure Laterality Date     COLONOSCOPY  11/11/2013    Procedure: COMBINED COLONOSCOPY, SINGLE BIOPSY/POLYPECTOMY BY BIOPSY;  Colonoscopy/ polypectomy x2 by cold forceps    ;  Surgeon: Juan Carlos Bellamy MD;  Location:  GI     ESOPHAGOSCOPY, GASTROSCOPY, DUODENOSCOPY (EGD), COMBINED N/A 9/22/2016    Procedure: COMBINED  "ESOPHAGOSCOPY, GASTROSCOPY, DUODENOSCOPY (EGD), BIOPSY SINGLE OR MULTIPLE;  Surgeon: Juan Carlos Barraza MD;  Location:  GI     ORTHOPEDIC SURGERY         FAMILY HISTORY:  Family History   Problem Relation Age of Onset     Pancreatic Cancer Mother      Pancreatic Cancer Paternal Grandfather      Diabetes Brother      Depression Brother      Suicide Brother      Substance Abuse Brother      Dementia Father      Parkinsonism Father      Family History Negative No family hx of      Colon Cancer No family hx of      Unknown/Adopted No family hx of      Anxiety Disorder No family hx of      Schizophrenia No family hx of      Bipolar Disorder No family hx of      [place] Disease No family hx of      Autism Spectrum Disorder No family hx of      Intellectual Disability (Mental Retardation) No family hx of      Mental Illness No family hx of        SOCIAL HISTORY:  Social History     Social History     Marital status:      Spouse name: N/A     Number of children: N/A     Years of education: N/A     Social History Main Topics     Smoking status: Former Smoker     Types: Cigarettes     Smokeless tobacco: Never Used     Alcohol use No      Comment: 14 drinks weekly, quit on 08/2017     Drug use: No     Sexual activity: Yes     Partners: Female     Other Topics Concern     None     Social History Narrative       Review of Systems:  Skin:  Negative     Eyes:  Positive for glasses  ENT:  Positive for nasal congestion  Respiratory:  Negative    Cardiovascular:    Positive for;chest pain;fatigue  Gastroenterology: Positive for heartburn  Genitourinary:  Negative    Musculoskeletal:  Positive for back pain;arthritis  Neurologic:  Positive for headaches  Psychiatric:  Positive for sleep disturbances  Heme/Lymph/Imm:  Negative    Endocrine:  Positive for diabetes    Physical Exam:  Vitals: /68 (BP Location: Right arm, Patient Position: Sitting, Cuff Size: Adult Large)  Pulse 76  Ht 1.88 m (6' 2\")  Wt 103.5 kg (228 " lb 3.2 oz)  BMI 29.3 kg/m2    Constitutional:           Skin:           Head:           Eyes:           ENT:           Neck:           Chest:           Cardiac:                    Abdomen:           Vascular:                                        Extremities and Back:           Neurological:             Recent Lab Results:  LIPID RESULTS:  Lab Results   Component Value Date    CHOL 168 11/29/2017    HDL 39 (L) 11/29/2017     (H) 11/29/2017    TRIG 120 11/29/2017    CHOLHDLRATIO 5.1 (H) 09/30/2015       LIVER ENZYME RESULTS:  Lab Results   Component Value Date    AST 16 11/29/2017    ALT 30 11/29/2017       CBC RESULTS:  Lab Results   Component Value Date    WBC 8.0 11/29/2017    RBC 5.88 11/29/2017    HGB 16.6 11/29/2017    HCT 48.0 11/29/2017    MCV 82 11/29/2017    MCH 28.2 11/29/2017    MCHC 34.6 11/29/2017    RDW 14.3 11/29/2017     11/29/2017       BMP RESULTS:  Lab Results   Component Value Date     11/29/2017    POTASSIUM 4.2 11/29/2017    CHLORIDE 101 11/29/2017    CO2 26 11/29/2017    ANIONGAP 9 11/29/2017     (H) 11/29/2017    BUN 16 11/29/2017    CR 0.99 11/29/2017    GFRESTIMATED 85 10/16/2018    GFRESTBLACK >90 10/16/2018    MANINDER 9.5 11/29/2017        A1C RESULTS:  Lab Results   Component Value Date    A1C 8.2 (H) 08/01/2018       INR RESULTS:  Lab Results   Component Value Date    INR 1.08 07/31/2016    INR 1.01 12/22/2014           Thank you for allowing me to participate in the care of your patient.    Sincerely,     Haja Estevez MD     [facility]

## 2018-11-06 NOTE — LETTER
11/6/2018    Javier Villafana MD  [address] [facility] [address]    RE: Austyn Araujo       Dear Colleague,    I had the pleasure of seeing Austyn Araujo in the [facility].    HISTORY:    Austyn Araujo is a pleasant 62-year-old male with a long-standing history of type 2 diabetes, hyperlipidemia, hypertension, and remote history of smoking 15 years ago. He was seen in cardiology consultation 2 months ago complaints of chest discomfort. Details of the chest discomfort are outlined in my previous insult note from September 12.    Since our last visit Austyn reports that he has had no further prolonged or severe episodes of chest discomfort. He continues to have some mild intermittent pleuritic chest pain which occurs randomly but has now been rather brief. No rhinorrhea is into these episodes and he has never identified any triggers. I gave him a prescription for nitroglycerin but he has not used it.    A stress echo had been done prior to our initial consult and was negative, and although I did not feel that this was very likely to represent ischemic chest pain I felt a more definitive study was indicated given his multiple risk factors. A CT angiogram was done and I reviewed those results with him today. He has calcium score is at about the 50th percentile and he was noted to have mild to moderate diffuse coronary disease with no obstructive lesions. I explained that this indicates that his chest pain is not due to coronary ischemia, but with unit to aggressively treat all risk factors to try to prevent progression of his CAD.    ASSESSMENT/PLAN:    1.  Chest pain, musculoskeletal in origin. Improved. No further cardiac evaluation planned. Characteristics of the pain does not suggest other specific pathology.  2. Hyperlipidemia. Dose of Lipitor recently increased from 10 mg to 40 mg per day. Follow-up labs planned in about a month. I am hoping his primary care  "physician will take over management of this in the long-term.  3. Hypertension. Well controlled with current medications, no changes needed.  4. Coronary artery disease. Nonobstructive but still diffuse. Aggressive risk factor management is indicated. Consider repeat stress testing in 3-5 years.    Thank you for inviting me to participate in your patient's care. Please don't hesitate to call if I can be of further assistance. 20 minutes face-to-face time today, greater than 50% counseling.    No orders of the defined types were placed in this encounter.    No orders of the defined types were placed in this encounter.    There are no discontinued medications.    10 year ASCVD risk: The 10-year ASCVD risk score (Chuckhannah GILBERT Jr, et al., 2013) is: 19.9%    Values used to calculate the score:      Age: 62 years      Sex: Male      Is Non- : No      Diabetic: Yes      Tobacco smoker: No      Systolic Blood Pressure: 110 mmHg      Is BP treated: Yes      HDL Cholesterol: 39 mg/dL      Total Cholesterol: 168 mg/dL    Encounter Diagnoses   Name Primary?     Hyperlipidemia LDL goal <100      CAD in native artery        CURRENT MEDICATIONS:  Current Outpatient Prescriptions   Medication Sig Dispense Refill     ALPRAZolam (XANAX) 0.5 MG tablet Take 1 tablet (0.5 mg) by mouth 3 times daily as needed for anxiety 30 tablet 0     aspirin 81 MG tablet Take 1 tablet by mouth daily.       atorvastatin (LIPITOR) 40 MG tablet Take 1 tablet (40 mg) by mouth daily 90 tablet 0     B-D LUER-ROCHELLE SYRINGE 22G X 1-1/2\" 3 ML MISC USE WITH TESTOSTERONE 12 each 3     BD HYPODERMIC NEEDLE 18G X 1\" MISC USE WITH TESTOSTERONE 25 each 6     glimepiride (AMARYL) 1 MG tablet Take 2 tablets (2 mg) by mouth every morning (before breakfast) 180 tablet 0     lisinopril (PRINIVIL/ZESTRIL) 10 MG tablet Take 1 tablet (10 mg) by mouth daily 90 tablet 3     metFORMIN (GLUCOPHAGE) 1000 MG tablet Take 1 tablet (1,000 mg) by mouth 2 times daily " (with meals) 180 tablet 1     Multiple Vitamins-Minerals (CENTRUM SILVER) per tablet Take 1 tablet by mouth daily 90 tablet 3     nitroGLYcerin (NITROSTAT) 0.4 MG sublingual tablet For chest pain place 1 tablet under the tongue every 5 minutes for 3 doses. If symptoms persist 5 minutes after 1st dose call 911. 25 tablet 3     omeprazole (PRILOSEC) 40 MG capsule Take 1 capsule (40 mg) by mouth daily 90 capsule 3     ONETOUCH ULTRA test strip USE TO TEST BLOOD SUGARS ONE TIME DAILY OR AS DIRECTED. 100 strip 3     PHARMACIST CHOICE LANCETS MISC TEST BLOOD SUGARS 5 TIMES DAILY 100 each 3     PRODIGY NO CODING BLOOD GLUC test strip TEST BLOOD SUGARS 5 TIMES DAILY 100 each 3     tadalafil (CIALIS) 20 MG tablet Take 1 tablet (20 mg) by mouth daily Never use with nitroglycerin, terazosin or doxazosin. 100 tablet 1     tamsulosin (FLOMAX) 0.4 MG capsule Take 1 capsule (0.4 mg) by mouth daily Take at bedtime 90 capsule 3     testosterone cypionate (DEPOTESTOSTERONE) 200 MG/ML injection INJECT 2 ML INTO THE MUSCLE ONCE WEEKLY 10 mL 0       ALLERGIES   No Known Allergies    PAST MEDICAL HISTORY:  Past Medical History:   Diagnosis Date     Anxiety      Anxiety 1/31/2015     BPH (benign prostatic hyperplasia)      Degenerative disc disease      Depression      Diabetes mellitus (H)      Gastritis      Gastro-oesophageal reflux disease      H/O alcohol abuse      Hyperlipidemia      Hypertension      Low testosterone      MRSA (methicillin resistant staph aureus) culture positive 8/2013    skin infection     PUD (peptic ulcer disease)      Sleep apnea        PAST SURGICAL HISTORY:  Past Surgical History:   Procedure Laterality Date     COLONOSCOPY  11/11/2013    Procedure: COMBINED COLONOSCOPY, SINGLE BIOPSY/POLYPECTOMY BY BIOPSY;  Colonoscopy/ polypectomy x2 by cold forceps    ;  Surgeon: Juan Carlos Bellamy MD;  Location:  GI     ESOPHAGOSCOPY, GASTROSCOPY, DUODENOSCOPY (EGD), COMBINED N/A 9/22/2016    Procedure: COMBINED  "ESOPHAGOSCOPY, GASTROSCOPY, DUODENOSCOPY (EGD), BIOPSY SINGLE OR MULTIPLE;  Surgeon: Juan Carlos Barraza MD;  Location:  GI     ORTHOPEDIC SURGERY         FAMILY HISTORY:  Family History   Problem Relation Age of Onset     Pancreatic Cancer Mother      Pancreatic Cancer Paternal Grandfather      Diabetes Brother      Depression Brother      Suicide Brother      Substance Abuse Brother      Dementia Father      Parkinsonism Father      Family History Negative No family hx of      Colon Cancer No family hx of      Unknown/Adopted No family hx of      Anxiety Disorder No family hx of      Schizophrenia No family hx of      Bipolar Disorder No family hx of      [place] Disease No family hx of      Autism Spectrum Disorder No family hx of      Intellectual Disability (Mental Retardation) No family hx of      Mental Illness No family hx of        SOCIAL HISTORY:  Social History     Social History     Marital status:      Spouse name: N/A     Number of children: N/A     Years of education: N/A     Social History Main Topics     Smoking status: Former Smoker     Types: Cigarettes     Smokeless tobacco: Never Used     Alcohol use No      Comment: 14 drinks weekly, quit on 08/2017     Drug use: No     Sexual activity: Yes     Partners: Female     Other Topics Concern     None     Social History Narrative       Review of Systems:  Skin:  Negative     Eyes:  Positive for glasses  ENT:  Positive for nasal congestion  Respiratory:  Negative    Cardiovascular:    Positive for;chest pain;fatigue  Gastroenterology: Positive for heartburn  Genitourinary:  Negative    Musculoskeletal:  Positive for back pain;arthritis  Neurologic:  Positive for headaches  Psychiatric:  Positive for sleep disturbances  Heme/Lymph/Imm:  Negative    Endocrine:  Positive for diabetes    Physical Exam:  Vitals: /68 (BP Location: Right arm, Patient Position: Sitting, Cuff Size: Adult Large)  Pulse 76  Ht 1.88 m (6' 2\")  Wt 103.5 kg (228 " lb 3.2 oz)  BMI 29.3 kg/m2    Constitutional:           Skin:           Head:           Eyes:           ENT:           Neck:           Chest:           Cardiac:                    Abdomen:           Vascular:                                        Extremities and Back:           Neurological:             Recent Lab Results:  LIPID RESULTS:  Lab Results   Component Value Date    CHOL 168 11/29/2017    HDL 39 (L) 11/29/2017     (H) 11/29/2017    TRIG 120 11/29/2017    CHOLHDLRATIO 5.1 (H) 09/30/2015       LIVER ENZYME RESULTS:  Lab Results   Component Value Date    AST 16 11/29/2017    ALT 30 11/29/2017       CBC RESULTS:  Lab Results   Component Value Date    WBC 8.0 11/29/2017    RBC 5.88 11/29/2017    HGB 16.6 11/29/2017    HCT 48.0 11/29/2017    MCV 82 11/29/2017    MCH 28.2 11/29/2017    MCHC 34.6 11/29/2017    RDW 14.3 11/29/2017     11/29/2017       BMP RESULTS:  Lab Results   Component Value Date     11/29/2017    POTASSIUM 4.2 11/29/2017    CHLORIDE 101 11/29/2017    CO2 26 11/29/2017    ANIONGAP 9 11/29/2017     (H) 11/29/2017    BUN 16 11/29/2017    CR 0.99 11/29/2017    GFRESTIMATED 85 10/16/2018    GFRESTBLACK >90 10/16/2018    MANINDER 9.5 11/29/2017        A1C RESULTS:  Lab Results   Component Value Date    A1C 8.2 (H) 08/01/2018       INR RESULTS:  Lab Results   Component Value Date    INR 1.08 07/31/2016    INR 1.01 12/22/2014         Haja Estevez MD, [facility]    CC  No referring provider defined for this encounter.                    Thank you for allowing me to participate in the care of your patient.      Sincerely,     Haja Estevez MD     [facility]    cc:   No referring provider defined for this encounter.

## 2018-11-06 NOTE — PROGRESS NOTES
HISTORY:    Austyn Araujo is a pleasant 62-year-old male with a long-standing history of type 2 diabetes, hyperlipidemia, hypertension, and remote history of smoking 15 years ago. He was seen in cardiology consultation 2 months ago complaints of chest discomfort. Details of the chest discomfort are outlined in my previous insult note from September 12.    Since our last visit Austyn reports that he has had no further prolonged or severe episodes of chest discomfort. He continues to have some mild intermittent pleuritic chest pain which occurs randomly but has now been rather brief. No rhinorrhea is into these episodes and he has never identified any triggers. I gave him a prescription for nitroglycerin but he has not used it.    A stress echo had been done prior to our initial consult and was negative, and although I did not feel that this was very likely to represent ischemic chest pain I felt a more definitive study was indicated given his multiple risk factors. A CT angiogram was done and I reviewed those results with him today. He has calcium score is at about the 50th percentile and he was noted to have mild to moderate diffuse coronary disease with no obstructive lesions. I explained that this indicates that his chest pain is not due to coronary ischemia, but with unit to aggressively treat all risk factors to try to prevent progression of his CAD.    ASSESSMENT/PLAN:    1.  Chest pain, musculoskeletal in origin. Improved. No further cardiac evaluation planned. Characteristics of the pain does not suggest other specific pathology.  2. Hyperlipidemia. Dose of Lipitor recently increased from 10 mg to 40 mg per day. Follow-up labs planned in about a month. I am hoping his primary care physician will take over management of this in the long-term.  3. Hypertension. Well controlled with current medications, no changes needed.  4. Coronary artery disease. Nonobstructive but still diffuse. Aggressive risk factor  "management is indicated. Consider repeat stress testing in 3-5 years.    Thank you for inviting me to participate in your patient's care. Please don't hesitate to call if I can be of further assistance. 20 minutes face-to-face time today, greater than 50% counseling.    No orders of the defined types were placed in this encounter.    No orders of the defined types were placed in this encounter.    There are no discontinued medications.    10 year ASCVD risk: The 10-year ASCVD risk score (Chuck VLADIMIR Jr, et al., 2013) is: 19.9%    Values used to calculate the score:      Age: 62 years      Sex: Male      Is Non- : No      Diabetic: Yes      Tobacco smoker: No      Systolic Blood Pressure: 110 mmHg      Is BP treated: Yes      HDL Cholesterol: 39 mg/dL      Total Cholesterol: 168 mg/dL    Encounter Diagnoses   Name Primary?     Hyperlipidemia LDL goal <100      CAD in native artery        CURRENT MEDICATIONS:  Current Outpatient Prescriptions   Medication Sig Dispense Refill     ALPRAZolam (XANAX) 0.5 MG tablet Take 1 tablet (0.5 mg) by mouth 3 times daily as needed for anxiety 30 tablet 0     aspirin 81 MG tablet Take 1 tablet by mouth daily.       atorvastatin (LIPITOR) 40 MG tablet Take 1 tablet (40 mg) by mouth daily 90 tablet 0     B-D LUER-ROCHELLE SYRINGE 22G X 1-1/2\" 3 ML MISC USE WITH TESTOSTERONE 12 each 3     BD HYPODERMIC NEEDLE 18G X 1\" MISC USE WITH TESTOSTERONE 25 each 6     glimepiride (AMARYL) 1 MG tablet Take 2 tablets (2 mg) by mouth every morning (before breakfast) 180 tablet 0     lisinopril (PRINIVIL/ZESTRIL) 10 MG tablet Take 1 tablet (10 mg) by mouth daily 90 tablet 3     metFORMIN (GLUCOPHAGE) 1000 MG tablet Take 1 tablet (1,000 mg) by mouth 2 times daily (with meals) 180 tablet 1     Multiple Vitamins-Minerals (CENTRUM SILVER) per tablet Take 1 tablet by mouth daily 90 tablet 3     nitroGLYcerin (NITROSTAT) 0.4 MG sublingual tablet For chest pain place 1 tablet under the tongue " every 5 minutes for 3 doses. If symptoms persist 5 minutes after 1st dose call 911. 25 tablet 3     omeprazole (PRILOSEC) 40 MG capsule Take 1 capsule (40 mg) by mouth daily 90 capsule 3     ONETOUCH ULTRA test strip USE TO TEST BLOOD SUGARS ONE TIME DAILY OR AS DIRECTED. 100 strip 3     PHARMACIST CHOICE LANCETS MISC TEST BLOOD SUGARS 5 TIMES DAILY 100 each 3     PRODIGY NO CODING BLOOD GLUC test strip TEST BLOOD SUGARS 5 TIMES DAILY 100 each 3     tadalafil (CIALIS) 20 MG tablet Take 1 tablet (20 mg) by mouth daily Never use with nitroglycerin, terazosin or doxazosin. 100 tablet 1     tamsulosin (FLOMAX) 0.4 MG capsule Take 1 capsule (0.4 mg) by mouth daily Take at bedtime 90 capsule 3     testosterone cypionate (DEPOTESTOSTERONE) 200 MG/ML injection INJECT 2 ML INTO THE MUSCLE ONCE WEEKLY 10 mL 0       ALLERGIES   No Known Allergies    PAST MEDICAL HISTORY:  Past Medical History:   Diagnosis Date     Anxiety      Anxiety 1/31/2015     BPH (benign prostatic hyperplasia)      Degenerative disc disease      Depression      Diabetes mellitus (H)      Gastritis      Gastro-oesophageal reflux disease      H/O alcohol abuse      Hyperlipidemia      Hypertension      Low testosterone      MRSA (methicillin resistant staph aureus) culture positive 8/2013    skin infection     PUD (peptic ulcer disease)      Sleep apnea        PAST SURGICAL HISTORY:  Past Surgical History:   Procedure Laterality Date     COLONOSCOPY  11/11/2013    Procedure: COMBINED COLONOSCOPY, SINGLE BIOPSY/POLYPECTOMY BY BIOPSY;  Colonoscopy/ polypectomy x2 by cold forceps    ;  Surgeon: Juan Carlos Bellamy MD;  Location:  GI     ESOPHAGOSCOPY, GASTROSCOPY, DUODENOSCOPY (EGD), COMBINED N/A 9/22/2016    Procedure: COMBINED ESOPHAGOSCOPY, GASTROSCOPY, DUODENOSCOPY (EGD), BIOPSY SINGLE OR MULTIPLE;  Surgeon: Juan Carlos Barraza MD;  Location:  GI     ORTHOPEDIC SURGERY         FAMILY HISTORY:  Family History   Problem Relation Age of Onset      "Pancreatic Cancer Mother      Pancreatic Cancer Paternal Grandfather      Diabetes Brother      Depression Brother      Suicide Brother      Substance Abuse Brother      Dementia Father      Parkinsonism Father      Family History Negative No family hx of      Colon Cancer No family hx of      Unknown/Adopted No family hx of      Anxiety Disorder No family hx of      Schizophrenia No family hx of      Bipolar Disorder No family hx of      [place] Disease No family hx of      Autism Spectrum Disorder No family hx of      Intellectual Disability (Mental Retardation) No family hx of      Mental Illness No family hx of        SOCIAL HISTORY:  Social History     Social History     Marital status:      Spouse name: N/A     Number of children: N/A     Years of education: N/A     Social History Main Topics     Smoking status: Former Smoker     Types: Cigarettes     Smokeless tobacco: Never Used     Alcohol use No      Comment: 14 drinks weekly, quit on 08/2017     Drug use: No     Sexual activity: Yes     Partners: Female     Other Topics Concern     None     Social History Narrative       Review of Systems:  Skin:  Negative     Eyes:  Positive for glasses  ENT:  Positive for nasal congestion  Respiratory:  Negative    Cardiovascular:    Positive for;chest pain;fatigue  Gastroenterology: Positive for heartburn  Genitourinary:  Negative    Musculoskeletal:  Positive for back pain;arthritis  Neurologic:  Positive for headaches  Psychiatric:  Positive for sleep disturbances  Heme/Lymph/Imm:  Negative    Endocrine:  Positive for diabetes    Physical Exam:  Vitals: /68 (BP Location: Right arm, Patient Position: Sitting, Cuff Size: Adult Large)  Pulse 76  Ht 1.88 m (6' 2\")  Wt 103.5 kg (228 lb 3.2 oz)  BMI 29.3 kg/m2    Constitutional:           Skin:           Head:           Eyes:           ENT:           Neck:           Chest:           Cardiac:                    Abdomen:           Vascular:                "                         Extremities and Back:           Neurological:             Recent Lab Results:  LIPID RESULTS:  Lab Results   Component Value Date    CHOL 168 11/29/2017    HDL 39 (L) 11/29/2017     (H) 11/29/2017    TRIG 120 11/29/2017    CHOLHDLRATIO 5.1 (H) 09/30/2015       LIVER ENZYME RESULTS:  Lab Results   Component Value Date    AST 16 11/29/2017    ALT 30 11/29/2017       CBC RESULTS:  Lab Results   Component Value Date    WBC 8.0 11/29/2017    RBC 5.88 11/29/2017    HGB 16.6 11/29/2017    HCT 48.0 11/29/2017    MCV 82 11/29/2017    MCH 28.2 11/29/2017    MCHC 34.6 11/29/2017    RDW 14.3 11/29/2017     11/29/2017       BMP RESULTS:  Lab Results   Component Value Date     11/29/2017    POTASSIUM 4.2 11/29/2017    CHLORIDE 101 11/29/2017    CO2 26 11/29/2017    ANIONGAP 9 11/29/2017     (H) 11/29/2017    BUN 16 11/29/2017    CR 0.99 11/29/2017    GFRESTIMATED 85 10/16/2018    GFRESTBLACK >90 10/16/2018    MANINDER 9.5 11/29/2017        A1C RESULTS:  Lab Results   Component Value Date    A1C 8.2 (H) 08/01/2018       INR RESULTS:  Lab Results   Component Value Date    INR 1.08 07/31/2016    INR 1.01 12/22/2014         Haja Estevez MD, [facility]    CC  No referring provider defined for this encounter.

## 2018-11-06 NOTE — MR AVS SNAPSHOT
After Visit Summary   11/6/2018    Austyn Araujo    MRN: [id number]           Patient Information     Date Of Birth          1955        Visit Information        Provider Department      11/6/2018 12:15 PM Haja Estevez MD [facility]        Today's Diagnoses     Hyperlipidemia LDL goal <100        CAD in native artery           Follow-ups after your visit        Your next 10 appointments already scheduled     Dec 13, 2018  7:30 AM CST   LAB with RU LAB   [facility] [place] ([facility])    [address]  [facility] [address] [phone]           Please do not eat 10-12 hours before your appointment if you are coming in fasting for labs on lipids, cholesterol, or glucose (sugar). This does not apply to pregnant women. Water, hot tea and black coffee (with nothing added) are okay. Do not drink other fluids, diet soda or chew gum.              Who to contact     If you have questions or need follow up information about today's clinic visit or your schedule please contact [facility] directly at [phone].  Normal or non-critical lab and imaging results will be communicated to you by MyChart, letter or phone within 4 business days after the clinic has received the results. If you do not hear from us within 7 days, please contact the clinic through [employer] or phone. If you have a critical or abnormal lab result, we will notify you by phone as soon as possible.  Submit refill requests through [employer] or call your pharmacy and they will forward the refill request to us. Please allow 3 business days for your refill to be completed.          Additional Information About Your Visit        [employer] Information     [employer] lets you send messages to your doctor, view your test results, renew your prescriptions, schedule appointments and more. To  "sign up, go to [email or web address] . Click on \"Log in\" on the left side of the screen, which will take you to the Welcome page. Then click on \"Sign up Now\" on the right side of the page.     You will be asked to enter the access code listed below, as well as some personal information. Please follow the directions to create your username and password.     Your access code is: [email or web address]  Expires: 2019 12:00 PM     Your access code will  in 90 days. If you need help or a new code, please call your [place] clinic or [phone].        Care EveryWhere ID     This is your Care EveryWhere ID. This could be used by other organizations to access your [place] medical records  [id number]        Your Vitals Were     Pulse Height BMI (Body Mass Index)             76 1.88 m (6' 2\") 29.3 kg/m2          Blood Pressure from Last 3 Encounters:   18 110/68   10/16/18 102/70   18 120/60    Weight from Last 3 Encounters:   18 103.5 kg (228 lb 3.2 oz)   18 101 kg (222 lb 9.6 oz)   08/15/18 99.8 kg (220 lb)              We Performed the Following     Follow-Up with Cardiologist        Primary Care Provider Office Phone # Fax #    Javier Villafana [phone]       [address] [facility] [address]        Goals        General    Reduce drinking by 90% (pt-stated)     Notes - Note created  2015 10:33 AM by Mckenna Chang MSW    As of today's date 2015 goal is met at 26 - 50%.   Goal Status:  Active          Equal Access to Services     HAYDEE TAYLOR : Matt Baltazar, vera valverde, oren kaalteena lomas. [facility] [phone].    ATENCIÓN: Si habla español, tiene a carrillo disposición servicios gratuitos de asistencia lingüística. Llame al [phone].    We comply with applicable federal civil rights laws and [place] laws. We do not discriminate on the basis of race, color, national origin, age, " "disability, sex, sexual orientation, or gender identity.            Thank you!     Thank you for choosing [facility]  for your care. Our goal is always to provide you with excellent care. Hearing back from our patients is one way we can continue to improve our services. Please take a few minutes to complete the written survey that you may receive in the mail after your visit with us. Thank you!             Your Updated Medication List - Protect others around you: Learn how to safely use, store and throw away your medicines at www.disposemymeds.org.          This list is accurate as of 11/6/18  1:00 PM.  Always use your most recent med list.                   Brand Name Dispense Instructions for use Diagnosis    ALPRAZolam 0.5 MG tablet    XANAX    30 tablet    Take 1 tablet (0.5 mg) by mouth 3 times daily as needed for anxiety    Anxiety       aspirin 81 MG tablet      Take 1 tablet by mouth daily.        atorvastatin 40 MG tablet    LIPITOR    90 tablet    Take 1 tablet (40 mg) by mouth daily    Hyperlipidemia LDL goal <100       B-D LUER-ROCHELLE SYRINGE 22G X 1-1/2\" 3 ML Misc   Generic drug:  syringe/needle (disp)     12 each    USE WITH TESTOSTERONE    Low testosterone       BD HYPODERMIC NEEDLE 18G X 1\" Misc   Generic drug:  needle (disp)     25 each    USE WITH TESTOSTERONE    Low testosterone       CENTRUM SILVER per tablet     90 tablet    Take 1 tablet by mouth daily    Type 2 diabetes mellitus with diabetic neuropathy (H), Essential hypertension with goal blood pressure less than 140/90       glimepiride 1 MG tablet    AMARYL    180 tablet    Take 2 tablets (2 mg) by mouth every morning (before breakfast)    Type 2 diabetes mellitus with peripheral autonomic neuropathy (H)       lisinopril 10 MG tablet    PRINIVIL/ZESTRIL    90 tablet    Take 1 tablet (10 mg) by mouth daily    Benign essential hypertension       metFORMIN 1000 MG tablet    GLUCOPHAGE    180 tablet    " Take 1 tablet (1,000 mg) by mouth 2 times daily (with meals)    Essential hypertension with goal blood pressure less than 140/90, Type 2 diabetes mellitus without complication, without long-term current use of insulin (H)       nitroGLYcerin 0.4 MG sublingual tablet    NITROSTAT    25 tablet    For chest pain place 1 tablet under the tongue every 5 minutes for 3 doses. If symptoms persist 5 minutes after 1st dose call 911.    CAD in native artery, Chest pain       omeprazole 40 MG capsule    priLOSEC    90 capsule    Take 1 capsule (40 mg) by mouth daily    Gastroesophageal reflux disease without esophagitis       PHARMACIST CHOICE LANCETS Misc     100 each    TEST BLOOD SUGARS 5 TIMES DAILY    Type 2 diabetes mellitus with peripheral autonomic neuropathy (H)       * PRODIGY NO CODING BLOOD GLUC test strip   Generic drug:  blood glucose monitoring     100 each    TEST BLOOD SUGARS 5 TIMES DAILY    Type 2 diabetes mellitus with peripheral autonomic neuropathy (H)       * ONETOUCH ULTRA test strip   Generic drug:  blood glucose monitoring     100 strip    USE TO TEST BLOOD SUGARS ONE TIME DAILY OR AS DIRECTED.    Type 2 diabetes mellitus with diabetic neuropathy (H)       tadalafil 20 MG tablet    CIALIS    100 tablet    Take 1 tablet (20 mg) by mouth daily Never use with nitroglycerin, terazosin or doxazosin.    Low testosterone       tamsulosin 0.4 MG capsule    FLOMAX    90 capsule    Take 1 capsule (0.4 mg) by mouth daily Take at bedtime    Benign prostatic hyperplasia with urinary frequency       testosterone cypionate 200 MG/ML injection    DEPOTESTOSTERONE    10 mL    INJECT 2 ML INTO THE MUSCLE ONCE WEEKLY    Low testosterone       * Notice:  This list has 2 medication(s) that are the same as other medications prescribed for you. Read the directions carefully, and ask your doctor or other care provider to review them with you.

## 2018-11-15 DIAGNOSIS — E11.43 TYPE 2 DIABETES MELLITUS WITH PERIPHERAL AUTONOMIC NEUROPATHY (H): ICD-10-CM

## 2018-11-15 NOTE — TELEPHONE ENCOUNTER
"Requested Prescriptions   Pending Prescriptions Disp Refills     glimepiride (AMARYL) 1 MG tablet [Pharmacy Med Name: GLIMEPIRIDE 1 MG TABLET] 180 tablet 0    Last Written Prescription Date:  08/16/2018  Last Fill Quantity: 180,  # refills: 0   Last office visit: 4/25/2018 with prescribing provider:     Future Office Visit:   Sig: TAKE 2 TABLETS BY MOUTH EVERY MORNING BEFORE BREAKFAST (DUE FOR LABS IN NOVEMBER 2018)    Sulfonylurea Agents Failed    11/15/2018  4:07 AM       Failed - Patient has documented A1c within the specified period of time.    If HgbA1C is 8 or greater, it needs to be on file within the past 3 months.  If less than 8, must be on file within the past 6 months.     Recent Labs   Lab Test  08/01/18   1135   A1C  8.2*            Failed - Recent (6 mo) or future (30 days) visit within the authorizing provider's specialty    Patient had office visit in the last 6 months or has a visit in the next 30 days with authorizing provider or within the authorizing provider's specialty.  See \"Patient Info\" tab in inbasket, or \"Choose Columns\" in Meds & Orders section of the refill encounter.           Passed - Blood pressure less than 140/90 in past 6 months    BP Readings from Last 3 Encounters:   11/06/18 110/68   10/16/18 102/70   09/12/18 120/60                Passed - Patient has documented LDL within the past 12 mos.    Recent Labs   Lab Test  11/29/17   0939   LDL  105*            Passed - Patient has had a Microalbumin in the past 12 mos.    Recent Labs   Lab Test  11/29/17   0939   MICROL  11   UMALCR  6.51            Passed - Patient is age 18 or older       Passed - Patient has a recent creatinine (normal) within the past 12 mos.    Recent Labs   Lab Test  10/16/18   1314  11/29/17   0939   CR   --   0.99   CREAT  0.9   --              "

## 2018-11-16 RX ORDER — GLIMEPIRIDE 1 MG/1
TABLET ORAL
Qty: 180 TABLET | Refills: 0 | Status: SHIPPED | OUTPATIENT
Start: 2018-11-16 | End: 2019-02-14

## 2018-11-16 NOTE — TELEPHONE ENCOUNTER
Medication is being filled for 1 time refill only due to:  Patient is due for DM visit in November- note to kris Dhillon RN- Triage [employer]

## 2018-12-01 ENCOUNTER — TRANSFERRED RECORDS (OUTPATIENT)
Dept: HEALTH INFORMATION MANAGEMENT | Facility: CLINIC | Age: 63
End: 2018-12-01

## 2018-12-12 ENCOUNTER — TRANSFERRED RECORDS (OUTPATIENT)
Dept: HEALTH INFORMATION MANAGEMENT | Facility: CLINIC | Age: 63
End: 2018-12-12

## 2018-12-18 DIAGNOSIS — R79.89 LOW TESTOSTERONE: ICD-10-CM

## 2018-12-19 NOTE — TELEPHONE ENCOUNTER
Requested Prescriptions   Pending Prescriptions Disp Refills     testosterone cypionate (DEPOTESTOSTERONE) 200 MG/ML injection [Pharmacy Med Name: TESTOSTERON CYP 2,000 MG/10 ML] 10 mL 0    Last Written Prescription Date:  11/05/2018  Last Fill Quantity: 10 mL,  # refills: 0  Last office visit: 4/25/2018 with prescribing provider:     Future Office Visit:   Sig: INJECT 2 ML IM ONCE WEEKLY    Androgen Agents Failed - 12/18/2018  2:14 PM       Failed - Lipid panel on file in past 12 mos    Recent Labs   Lab Test 11/29/17 0939  09/30/15  0804   CHOL 168   < > 278*   TRIG 120   < > 187*   HDL 39*   < > 54   *   < > 187*   NHDL 129   < >  --    VLDL  --   --  37*   CHOLHDLRATIO  --   --  5.1*    < > = values in this interval not displayed.              Failed - ALT on file within past 12 mos    Recent Labs   Lab Test 11/29/17  0939   ALT 30            Failed - HCT less than 54% on file within past 12 mos    Recent Labs   Lab Test 11/29/17 0939   HCT 48.0            Failed - Serum Testosterone on file within past 12 mos    Recent Labs   Lab Test 11/29/17  0939   TESTOSTTOTAL 892            Failed - Refills for this classification require provider review       Failed - Recent (6 mo) or future (30 days) visit within the authorizing provider's specialty       Failed - AST on file within past 12 mos    Recent Labs   Lab Test 11/29/17  0939   AST 16            Passed - Patient is of age 12 and older       Passed - Medication is active on med list       Passed - Serum PSA on file within past 12 mos    Lab Results   Component Value Date    PSA 5.05 04/25/2018            Passed - Blood pressure under 140/90 in past 6 months    BP Readings from Last 3 Encounters:   11/06/18 110/68   10/16/18 102/70   09/12/18 120/60                Passed - Patient is not pregnant       Passed - No positive pregnancy test on file within past 12 mos

## 2018-12-21 RX ORDER — TESTOSTERONE CYPIONATE 200 MG/ML
INJECTION, SOLUTION INTRAMUSCULAR
Qty: 10 ML | Refills: 0 | Status: SHIPPED | OUTPATIENT
Start: 2018-12-21 | End: 2019-02-08

## 2018-12-21 NOTE — TELEPHONE ENCOUNTER
Approved and faxed to    [facility][id number] - [place], [place] - [address]  [facility] - [place], [place] - [address] [address] Jen Hart MA

## 2018-12-21 NOTE — TELEPHONE ENCOUNTER
Pt  Called and is wondering about how long until he is able to get the rx filled. Pt concerned with the delay on getting this filled each time he sends a request as he then has to go without/be late on his next dose. Per the pt he is wondering if he should be sending in the request 7-14 days early instead of 5-7? Please advise.

## 2019-01-06 DIAGNOSIS — K21.9 GASTROESOPHAGEAL REFLUX DISEASE WITHOUT ESOPHAGITIS: ICD-10-CM

## 2019-01-07 DIAGNOSIS — R35.0 BENIGN PROSTATIC HYPERPLASIA WITH URINARY FREQUENCY: ICD-10-CM

## 2019-01-07 DIAGNOSIS — N40.1 BENIGN PROSTATIC HYPERPLASIA WITH URINARY FREQUENCY: ICD-10-CM

## 2019-01-07 DIAGNOSIS — I10 BENIGN ESSENTIAL HYPERTENSION: ICD-10-CM

## 2019-01-08 RX ORDER — OMEPRAZOLE 40 MG/1
CAPSULE, DELAYED RELEASE ORAL
Qty: 90 CAPSULE | Refills: 0 | Status: SHIPPED | OUTPATIENT
Start: 2019-01-08 | End: 2019-04-13

## 2019-01-08 NOTE — TELEPHONE ENCOUNTER
Prescription approved per [facility].    Signed Prescriptions:                        Disp   Refills    omeprazole (PRILOSEC) 40 MG DR capsule     90 cap*0        Sig: TAKE ONE CAPSULE BY MOUTH EVERY DAY  Authorizing Provider: MAGDALENE BRIZUELA  Ordering User: CHRISTIE FITZPATRICK      Closing encounter - no further actions needed at this time    Christie Fitzpatrick RN

## 2019-01-08 NOTE — TELEPHONE ENCOUNTER
"Requested Prescriptions   Pending Prescriptions Disp Refills     omeprazole (PRILOSEC) 40 MG DR capsule [Pharmacy Med Name: OMEPRAZOLE DR 40 MG CAPSULE]  Last Written Prescription Date:  1/10/2018  Last Fill Quantity: 90,  # refills: 3   Last office visit: 4/25/2018 with prescribing provider:     Future Office Visit:   90 capsule 3     Sig: TAKE ONE CAPSULE BY MOUTH EVERY DAY    PPI Protocol Passed - 1/6/2019  9:09 AM       Passed - Not on Clopidogrel (unless Pantoprazole ordered)       Passed - No diagnosis of osteoporosis on record       Passed - Recent (12 mo) or future (30 days) visit within the authorizing provider's specialty    Patient had office visit in the last 12 months or has a visit in the next 30 days with authorizing provider or within the authorizing provider's specialty.  See \"Patient Info\" tab in inbasket, or \"Choose Columns\" in Meds & Orders section of the refill encounter.             Passed - Medication is active on med list       Passed - Patient is age 18 or older        "

## 2019-01-08 NOTE — TELEPHONE ENCOUNTER
"Requested Prescriptions   Pending Prescriptions Disp Refills     lisinopril (PRINIVIL/ZESTRIL) 10 MG tablet [Pharmacy Med Name: LISINOPRIL 10 MG TABLET]  Last Written Prescription Date:  11/29/2017  Last Fill Quantity: 90,  # refills: 3   Last office visit: 4/25/2018 with prescribing provider:     Future Office Visit:   90 tablet 3     Sig: TAKE 1 TABLET (10 MG) BY MOUTH DAILY    ACE Inhibitors (Including Combos) Protocol Failed - 1/7/2019  2:09 AM       Failed - Normal serum potassium on file in past 12 months    Recent Labs   Lab Test 11/29/17  0939   POTASSIUM 4.2            Passed - Blood pressure under 140/90 in past 12 months    BP Readings from Last 3 Encounters:   11/06/18 110/68   10/16/18 102/70   09/12/18 120/60                Passed - Recent (12 mo) or future (30 days) visit within the authorizing provider's specialty    Patient had office visit in the last 12 months or has a visit in the next 30 days with authorizing provider or within the authorizing provider's specialty.  See \"Patient Info\" tab in inbasket, or \"Choose Columns\" in Meds & Orders section of the refill encounter.             Passed - Medication is active on med list       Passed - Patient is age 18 or older       Passed - Normal serum creatinine on file in past 12 months    Recent Labs   Lab Test 10/16/18  1314 11/29/17  0939   CR  --  0.99   CREAT 0.9  --              tamsulosin (FLOMAX) 0.4 MG capsule [Pharmacy Med Name: TAMSULOSIN HCL 0.4 MG CAPSULE]  Last Written Prescription Date:  11/29/2017  Last Fill Quantity: 90,  # refills: 3   Last office visit: 4/25/2018 with prescribing provider:     Future Office Visit:   90 capsule 3     Sig: TAKE 1 CAPSULE (0.4 MG) BY MOUTH DAILY TAKE AT BEDTIME    Alpha Blockers Failed - 1/7/2019  2:09 AM       Failed - Patient does not have Tadalafil, Vardenafil, or Sildenafil on their medication list       Passed - Blood pressure under 140/90 in past 12 months    BP Readings from Last 3 Encounters: " "  11/06/18 110/68   10/16/18 102/70   09/12/18 120/60                Passed - Recent (12 mo) or future (30 days) visit within the authorizing provider's specialty    Patient had office visit in the last 12 months or has a visit in the next 30 days with authorizing provider or within the authorizing provider's specialty.  See \"Patient Info\" tab in inbasket, or \"Choose Columns\" in Meds & Orders section of the refill encounter.             Passed - Medication is active on med list       Passed - Patient is 18 years of age or older        "

## 2019-01-09 RX ORDER — TAMSULOSIN HYDROCHLORIDE 0.4 MG/1
0.4 CAPSULE ORAL DAILY
Qty: 30 CAPSULE | Refills: 0 | Status: SHIPPED | OUTPATIENT
Start: 2019-01-09 | End: 2019-02-12

## 2019-01-09 RX ORDER — LISINOPRIL 10 MG/1
10 TABLET ORAL DAILY
Qty: 30 TABLET | Refills: 0 | Status: SHIPPED | OUTPATIENT
Start: 2019-01-09 | End: 2019-02-12

## 2019-01-09 NOTE — TELEPHONE ENCOUNTER
Medication is being filled for 1 time refill only due to:  Patient needs to be seen because due for physical.   Candice NOVAK RN

## 2019-01-15 DIAGNOSIS — E78.5 HYPERLIPIDEMIA LDL GOAL <100: ICD-10-CM

## 2019-01-16 DIAGNOSIS — I25.10 CAD IN NATIVE ARTERY: ICD-10-CM

## 2019-01-16 DIAGNOSIS — R79.89 LOW TESTOSTERONE: ICD-10-CM

## 2019-01-16 DIAGNOSIS — E78.5 HYPERLIPIDEMIA LDL GOAL <100: ICD-10-CM

## 2019-01-16 LAB
ALT SERPL W P-5'-P-CCNC: 38 U/L (ref 0–70)
CHOLEST SERPL-MCNC: 131 MG/DL
HDLC SERPL-MCNC: 43 MG/DL
LDLC SERPL CALC-MCNC: 67 MG/DL
NONHDLC SERPL-MCNC: 88 MG/DL
TRIGL SERPL-MCNC: 107 MG/DL

## 2019-01-16 PROCEDURE — 84460 ALANINE AMINO (ALT) (SGPT): CPT | Performed by: INTERNAL MEDICINE

## 2019-01-16 PROCEDURE — 36415 COLL VENOUS BLD VENIPUNCTURE: CPT | Performed by: INTERNAL MEDICINE

## 2019-01-16 PROCEDURE — 80061 LIPID PANEL: CPT | Performed by: INTERNAL MEDICINE

## 2019-01-16 NOTE — TELEPHONE ENCOUNTER
"Requested Prescriptions   Pending Prescriptions Disp Refills     B-D LUER-ROCHELLE SYRINGE 22G X 1-1/2\" 3 ML MISC [Pharmacy Med Name: BD 3 ML SYRINGE 77PI1-3/2\"]  3     Sig: USE WITH TESTOSTERONE    There is no refill protocol information for this order      Last Written Prescription Date:  03/30/2018  Last Fill Quantity: 12,  # refills: 3   Last office visit: 4/25/2018 with prescribing provider:     Future Office Visit:    "

## 2019-01-16 NOTE — TELEPHONE ENCOUNTER
"Requested Prescriptions   Pending Prescriptions Disp Refills     atorvastatin (LIPITOR) 40 MG tablet [Pharmacy Med Name: ATORVASTATIN 40 MG TABLET] 90 tablet 0    Last Written Prescription Date:  10/18/2018  Last Fill Quantity: 90,  # refills: 0   Last office visit: 04/25/2018 with prescribing provider:     Future Office Visit:   Sig: TAKE 1 TABLET BY MOUTH EVERY DAY    Statins Protocol Failed - 1/15/2019 12:09 PM       Failed - LDL on file in past 12 months    Recent Labs   Lab Test 11/29/17  0939   *            Passed - No abnormal creatine kinase in past 12 months    Recent Labs   Lab Test 10/10/16  1601                  Passed - Recent (12 mo) or future (30 days) visit within the authorizing provider's specialty    Patient had office visit in the last 12 months or has a visit in the next 30 days with authorizing provider or within the authorizing provider's specialty.  See \"Patient Info\" tab in inbasket, or \"Choose Columns\" in Meds & Orders section of the refill encounter.             Passed - Medication is active on med list       Passed - Patient is age 18 or older        "

## 2019-01-17 ENCOUNTER — TELEPHONE (OUTPATIENT)
Dept: CARDIOLOGY | Facility: CLINIC | Age: 64
End: 2019-01-17

## 2019-01-17 DIAGNOSIS — I25.10 CAD (CORONARY ARTERY DISEASE): Primary | ICD-10-CM

## 2019-01-17 RX ORDER — ATORVASTATIN CALCIUM 40 MG/1
TABLET, FILM COATED ORAL
Qty: 90 TABLET | Refills: 0 | Status: SHIPPED | OUTPATIENT
Start: 2019-01-17 | End: 2019-04-28

## 2019-01-17 NOTE — TELEPHONE ENCOUNTER
Component      Latest Ref Rng & Units 1/16/2019   Cholesterol      <200 mg/dL 131   Triglycerides      <150 mg/dL 107   HDL Cholesterol      >39 mg/dL 43   LDL Cholesterol Calculated      <100 mg/dL 67   Non HDL Cholesterol      <130 mg/dL 88   ALT      0 - 70 U/L 38       Reviewed by Dr. Estevez ~ see result notes.    Patient has documented CAD. Dr. Estevez recommends stress testing in a few years, and to f/u annually. Patient should see an KEVON next year. Goal LDL is <70 mg/dL. Patient's LDL is at goal on Atorvastatin 40 mg/d. Dr Estevez recommends that PMD take over management of patient's lipids.     Called patient. Patient was not available. Patient had a secure voicemail. I left a detailed message. Reviewed results and recommendations. Patient was advised to continue his medical regimen and to f/u next year in January with an KEVON. Patient was informed that we will have Dr. Villafana take over his lipid management. Patient was instructed to call back if he had any questions.     Messaged Dr. Villafana to review plan of care. TGaaditya PARSONS

## 2019-01-17 NOTE — TELEPHONE ENCOUNTER
"Requested Prescriptions   Pending Prescriptions Disp Refills     atorvastatin (LIPITOR) 40 MG tablet [Pharmacy Med Name: ATORVASTATIN 40 MG TABLET] 90 tablet 0     Sig: TAKE 1 TABLET BY MOUTH EVERY DAY    Statins Protocol Failed - 1/16/2019  8:13 AM       Failed - LDL on file in past 12 months    Recent Labs   Lab Test 01/16/19  0748   LDL 67            Passed - No abnormal creatine kinase in past 12 months    Recent Labs   Lab Test 10/10/16  1601                  Passed - Recent (12 mo) or future (30 days) visit within the authorizing provider's specialty    Patient had office visit in the last 12 months or has a visit in the next 30 days with authorizing provider or within the authorizing provider's specialty.  See \"Patient Info\" tab in inbasket, or \"Choose Columns\" in Meds & Orders section of the refill encounter.             Passed - Medication is active on med list       Passed - Patient is age 18 or older        LDL was done yesterday.  Prescription approved per [facility] Refill Protocol.  Gali Story RN    "

## 2019-01-22 RX ORDER — SYRINGE WITH NEEDLE, 1 ML 25GX5/8"
SYRINGE, EMPTY DISPOSABLE MISCELLANEOUS
Qty: 12 EACH | Refills: 3 | Status: SHIPPED | OUTPATIENT
Start: 2019-01-22 | End: 2020-03-09

## 2019-02-05 ENCOUNTER — OFFICE VISIT (OUTPATIENT)
Dept: INTERNAL MEDICINE | Facility: CLINIC | Age: 64
End: 2019-02-05
Payer: COMMERCIAL

## 2019-02-05 VITALS
RESPIRATION RATE: 12 BRPM | WEIGHT: 228 LBS | DIASTOLIC BLOOD PRESSURE: 70 MMHG | SYSTOLIC BLOOD PRESSURE: 118 MMHG | HEIGHT: 74 IN | TEMPERATURE: 98 F | OXYGEN SATURATION: 96 % | BODY MASS INDEX: 29.26 KG/M2 | HEART RATE: 75 BPM

## 2019-02-05 DIAGNOSIS — E78.5 HYPERLIPIDEMIA LDL GOAL <100: ICD-10-CM

## 2019-02-05 DIAGNOSIS — Z00.00 ENCOUNTER FOR PREVENTATIVE ADULT HEALTH CARE EXAMINATION: Primary | ICD-10-CM

## 2019-02-05 DIAGNOSIS — R97.20 ELEVATED PROSTATE SPECIFIC ANTIGEN (PSA): ICD-10-CM

## 2019-02-05 DIAGNOSIS — M25.511 RIGHT SHOULDER PAIN, UNSPECIFIED CHRONICITY: ICD-10-CM

## 2019-02-05 DIAGNOSIS — Z12.5 SCREENING FOR PROSTATE CANCER: ICD-10-CM

## 2019-02-05 DIAGNOSIS — Z23 NEED FOR VACCINATION WITH 13-POLYVALENT PNEUMOCOCCAL CONJUGATE VACCINE: ICD-10-CM

## 2019-02-05 DIAGNOSIS — I10 ESSENTIAL HYPERTENSION: ICD-10-CM

## 2019-02-05 DIAGNOSIS — M25.552 HIP PAIN, LEFT: ICD-10-CM

## 2019-02-05 DIAGNOSIS — K21.00 GASTROESOPHAGEAL REFLUX DISEASE WITH ESOPHAGITIS: ICD-10-CM

## 2019-02-05 DIAGNOSIS — E11.43 TYPE 2 DIABETES MELLITUS WITH PERIPHERAL AUTONOMIC NEUROPATHY (H): ICD-10-CM

## 2019-02-05 LAB
ALBUMIN UR-MCNC: NEGATIVE MG/DL
APPEARANCE UR: CLEAR
BILIRUB UR QL STRIP: NEGATIVE
COLOR UR AUTO: YELLOW
ERYTHROCYTE [DISTWIDTH] IN BLOOD BY AUTOMATED COUNT: 14.7 % (ref 10–15)
GLUCOSE UR STRIP-MCNC: 100 MG/DL
HBA1C MFR BLD: 8.6 % (ref 0–5.6)
HCT VFR BLD AUTO: 50 % (ref 40–53)
HGB BLD-MCNC: 17.1 G/DL (ref 13.3–17.7)
HGB UR QL STRIP: NEGATIVE
KETONES UR STRIP-MCNC: NEGATIVE MG/DL
LEUKOCYTE ESTERASE UR QL STRIP: ABNORMAL
MCH RBC QN AUTO: 27.1 PG (ref 26.5–33)
MCHC RBC AUTO-ENTMCNC: 34.2 G/DL (ref 31.5–36.5)
MCV RBC AUTO: 79 FL (ref 78–100)
MUCOUS THREADS #/AREA URNS LPF: PRESENT /LPF
NITRATE UR QL: NEGATIVE
PH UR STRIP: 6 PH (ref 5–7)
PLATELET # BLD AUTO: 249 10E9/L (ref 150–450)
RBC # BLD AUTO: 6.3 10E12/L (ref 4.4–5.9)
RBC #/AREA URNS AUTO: ABNORMAL /HPF
SOURCE: ABNORMAL
SP GR UR STRIP: 1.02 (ref 1–1.03)
UROBILINOGEN UR STRIP-ACNC: 0.2 EU/DL (ref 0.2–1)
WBC # BLD AUTO: 8.7 10E9/L (ref 4–11)
WBC #/AREA URNS AUTO: ABNORMAL /HPF

## 2019-02-05 PROCEDURE — 85027 COMPLETE CBC AUTOMATED: CPT | Performed by: INTERNAL MEDICINE

## 2019-02-05 PROCEDURE — 99214 OFFICE O/P EST MOD 30 MIN: CPT | Mod: 25 | Performed by: INTERNAL MEDICINE

## 2019-02-05 PROCEDURE — 83036 HEMOGLOBIN GLYCOSYLATED A1C: CPT | Performed by: INTERNAL MEDICINE

## 2019-02-05 PROCEDURE — 80061 LIPID PANEL: CPT | Performed by: INTERNAL MEDICINE

## 2019-02-05 PROCEDURE — 90670 PCV13 VACCINE IM: CPT | Performed by: INTERNAL MEDICINE

## 2019-02-05 PROCEDURE — 81001 URINALYSIS AUTO W/SCOPE: CPT | Performed by: INTERNAL MEDICINE

## 2019-02-05 PROCEDURE — G0103 PSA SCREENING: HCPCS | Performed by: INTERNAL MEDICINE

## 2019-02-05 PROCEDURE — 80053 COMPREHEN METABOLIC PANEL: CPT | Performed by: INTERNAL MEDICINE

## 2019-02-05 PROCEDURE — 84443 ASSAY THYROID STIM HORMONE: CPT | Performed by: INTERNAL MEDICINE

## 2019-02-05 PROCEDURE — 90471 IMMUNIZATION ADMIN: CPT | Performed by: INTERNAL MEDICINE

## 2019-02-05 PROCEDURE — 82043 UR ALBUMIN QUANTITATIVE: CPT | Performed by: INTERNAL MEDICINE

## 2019-02-05 PROCEDURE — 36415 COLL VENOUS BLD VENIPUNCTURE: CPT | Performed by: INTERNAL MEDICINE

## 2019-02-05 PROCEDURE — 99397 PER PM REEVAL EST PAT 65+ YR: CPT | Mod: 25 | Performed by: INTERNAL MEDICINE

## 2019-02-05 RX ORDER — METFORMIN HCL 500 MG
2000 TABLET, EXTENDED RELEASE 24 HR ORAL
Qty: 360 TABLET | Refills: 3 | Status: SHIPPED | OUTPATIENT
Start: 2019-02-05 | End: 2020-02-18

## 2019-02-05 ASSESSMENT — PATIENT HEALTH QUESTIONNAIRE - PHQ9
5. POOR APPETITE OR OVEREATING: NOT AT ALL
10. IF YOU CHECKED OFF ANY PROBLEMS, HOW DIFFICULT HAVE THESE PROBLEMS MADE IT FOR YOU TO DO YOUR WORK, TAKE CARE OF THINGS AT HOME, OR GET ALONG WITH OTHER PEOPLE: SOMEWHAT DIFFICULT
SUM OF ALL RESPONSES TO PHQ QUESTIONS 1-9: 4
SUM OF ALL RESPONSES TO PHQ QUESTIONS 1-9: 4

## 2019-02-05 ASSESSMENT — MIFFLIN-ST. JEOR: SCORE: 1888.95

## 2019-02-05 ASSESSMENT — ANXIETY QUESTIONNAIRES
6. BECOMING EASILY ANNOYED OR IRRITABLE: NOT AT ALL
GAD7 TOTAL SCORE: 3
2. NOT BEING ABLE TO STOP OR CONTROL WORRYING: SEVERAL DAYS
7. FEELING AFRAID AS IF SOMETHING AWFUL MIGHT HAPPEN: NOT AT ALL
1. FEELING NERVOUS, ANXIOUS, OR ON EDGE: SEVERAL DAYS
3. WORRYING TOO MUCH ABOUT DIFFERENT THINGS: SEVERAL DAYS
IF YOU CHECKED OFF ANY PROBLEMS ON THIS QUESTIONNAIRE, HOW DIFFICULT HAVE THESE PROBLEMS MADE IT FOR YOU TO DO YOUR WORK, TAKE CARE OF THINGS AT HOME, OR GET ALONG WITH OTHER PEOPLE: NOT DIFFICULT AT ALL
5. BEING SO RESTLESS THAT IT IS HARD TO SIT STILL: NOT AT ALL

## 2019-02-05 NOTE — PROGRESS NOTES
"SUBJECTIVE:   Austyn Araujo is a 63 year old male who presents for Preventive Visit.  {PVP to remind patient that this is not necessarily a physical exam; physical exam may or may not be done:337038::\"click delete button to remove this line now\"}  {PVP to inform patient that additional E&M charge may apply, if additional problems addressed:739827::\"click delete button to remove this line now\"}  Are you in the first 12 months of your Medicare coverage?  {No Yes:755623::\"No\"}    HPI  Do you feel safe in your environment? {YES/NO/NA:192860}    Do you have a Health Care Directive? {HEALTHCARE DIRECTIVE STATUS:628957}    {Hearing Test Done (Optional):820015}  Fall risk  {Document Fall Risk in the Assessments Section of the Navigator:292307}  {If any of the above assessments are answered yes, consider ordering appropriate referrals (Optional):879050::\"click delete button to remove this line now\"}  Cognitive Screening { :827003}    {Do you have sleep apnea, excessive snoring or daytime drowsiness? (Optional):792929}    Reviewed and updated as needed this visit by clinical staff  Allergies         Reviewed and updated as needed this visit by Provider        Social History     Tobacco Use     Smoking status: Former Smoker     Types: Cigarettes     Smokeless tobacco: Never Used   Substance Use Topics     Alcohol use: No     Alcohol/week: 0.0 oz     Comment: 14 drinks weekly, quit on 08/2017       No flowsheet data found.{add AUDIT responses (Optional) (A score of 7 for adult men is an indication of hazardous drinking; a score of 8 or more is an indication of an alcohol use disorder.  A score of 7 or more for adult women is an indication of hazardous drinking or an alchohol use disorder):732616}    {Outside tests to abstract? :477856}    {additional problems to add (Optional):557519}    Current providers sharing in care for this patient include:   Patient Care Team:  Javier Villafana MD as PCP - General (Internal " "Medicine)  Javier Villafana MD as PCP - Assigned PCP    The following health maintenance items are reviewed in Epic and correct as of today:  Health Maintenance   Topic Date Due     FOOT EXAM Q1 YEAR  1956     HIV SCREEN (SYSTEM ASSIGNED)  1971     ZOSTER IMMUNIZATION (1 of 2) 2003     EYE EXAM Q1 YEAR  2017     PHQ-9 Q6 MONTHS  2018     ADVANCE DIRECTIVE PLANNING Q5 YRS  2018     INFLUENZA VACCINE (1) 2018     MICROALBUMIN Q1 YEAR  2018     FALL RISK ASSESSMENT  2018     PNEUMOCOCCAL IMMUNIZATION 65+ LOW/MEDIUM RISK (1 of 2 - PCV13) 2018     A1C Q6 MO  2019     CREATININE Q1 YEAR  10/16/2019     TSH W/ FREE T4 REFLEX Q2 YEAR  2019     LIPID MONITORING Q1 YEAR  2020     DTAP/TDAP/TD IMMUNIZATION (2 - Td) 2020     COLON CANCER SCREEN (SYSTEM ASSIGNED)  2028     DEPRESSION ACTION PLAN  Completed     AORTIC ANEURYSM SCREENING (SYSTEM ASSIGNED)  Completed     HEPATITIS C SCREENING  Completed     IPV IMMUNIZATION  Aged Out     MENINGITIS IMMUNIZATION  Aged Out     {Chronicprobdata (Optional):948816}  {Decision Support (Optional):785444}    Review of Systems  {ROS COMP (Optional):892059}    OBJECTIVE:   There were no vitals taken for this visit. Estimated body mass index is 29.3 kg/m  as calculated from the following:    Height as of 18: 1.88 m (6' 2\").    Weight as of 18: 103.5 kg (228 lb 3.2 oz).  Physical Exam  {Exam (Optional) :331440}    {Diagnostic Test Results (Optional):384802::\"Diagnostic Test Results:\",\"none \"}    ASSESSMENT / PLAN:   {Dia Picklist:245841}    End of Life Planning:  Patient currently has an advanced directive: { :867908}    COUNSELING:  {Medicare Counselin}    BP Readings from Last 1 Encounters:   18 110/68     Estimated body mass index is 29.3 kg/m  as calculated from the following:    Height as of 18: 1.88 m (6' 2\").    Weight as of 18: 103.5 kg (228 lb 3.2 oz).    {BP " Counseling- Complete if BP >= 120/80  (Optional):902082}  {Weight Management Plan (ACO) Complete if BMI is abnormal-  Ages 18-64  BMI >24.9.  Age 65+ with BMI <23 or >30 (Optional):369623}     reports that he has quit smoking. His smoking use included cigarettes. he has never used smokeless tobacco.  {Tobacco Cessation -- Complete if patient is a smoker (Optional):014202}    Appropriate preventive services were discussed with this patient, including applicable screening as appropriate for cardiovascular disease, diabetes, osteopenia/osteoporosis, and glaucoma.  As appropriate for age/gender, discussed screening for colorectal cancer, prostate cancer, breast cancer, and cervical cancer. Checklist reviewing preventive services available has been given to the patient.    Reviewed patients plan of care and provided an AVS. The {CarePlan:110878} for Austyn meets the Care Plan requirement. This Care Plan has been established and reviewed with the {PATIENT, FAMILY MEMBER, CAREGIVER:238801}.    Counseling Resources:  ATP IV Guidelines  Pooled Cohorts Equation Calculator  Breast Cancer Risk Calculator  FRAX Risk Assessment  ICSI Preventive Guidelines  Dietary Guidelines for Americans, 2010  [employer]'s MyPlate  ASA Prophylaxis  Lung CA Screening    Javier Villafana MD  [facility] [address]  Answers for HPI/ROS submitted by the patient on 2/5/2019   If you checked off any problems, how difficult have these problems made it for you to do your work, take care of things at home, or get along with other people?: Somewhat difficult  PHQ9 TOTAL SCORE: 4

## 2019-02-05 NOTE — PROGRESS NOTES
"SUBJECTIVE:   Austyn Araujo is a 63 year old male who presents for Preventive Visit.  {PVP to remind patient that this is not necessarily a physical exam; physical exam may or may not be done:367711::\"click delete button to remove this line now\"}  {PVP to inform patient that additional E&M charge may apply, if additional problems addressed:948481::\"click delete button to remove this line now\"}  Are you in the first 12 months of your Medicare coverage?  {No Yes:439591::\"No\"}    HPI  Do you feel safe in your environment? {YES/NO/NA:897914}    Do you have a Health Care Directive? {HEALTHCARE DIRECTIVE STATUS:106091}    {Hearing Test Done (Optional):779103}  Fall risk  {Document Fall Risk in the Assessments Section of the Navigator:470908}  {If any of the above assessments are answered yes, consider ordering appropriate referrals (Optional):016431::\"click delete button to remove this line now\"}  Cognitive Screening { :414718}    {Do you have sleep apnea, excessive snoring or daytime drowsiness? (Optional):036263}    Reviewed and updated as needed this visit by clinical staff  Allergies         Reviewed and updated as needed this visit by Provider        Social History     Tobacco Use     Smoking status: Former Smoker     Types: Cigarettes     Smokeless tobacco: Never Used   Substance Use Topics     Alcohol use: No     Alcohol/week: 0.0 oz     Comment: 14 drinks weekly, quit on 08/2017       No flowsheet data found.{add AUDIT responses (Optional) (A score of 7 for adult men is an indication of hazardous drinking; a score of 8 or more is an indication of an alcohol use disorder.  A score of 7 or more for adult women is an indication of hazardous drinking or an alchohol use disorder):522365}    {Outside tests to abstract? :399097}    {additional problems to add (Optional):213835}    Current providers sharing in care for this patient include:   Patient Care Team:  Javier Villafana MD as PCP - General (Internal " "Medicine)  Javier Villafana MD as PCP - Assigned PCP    The following health maintenance items are reviewed in Epic and correct as of today:  Health Maintenance   Topic Date Due     FOOT EXAM Q1 YEAR  1956     HIV SCREEN (SYSTEM ASSIGNED)  1971     ZOSTER IMMUNIZATION (1 of 2) 2003     EYE EXAM Q1 YEAR  2017     PHQ-9 Q6 MONTHS  2018     ADVANCE DIRECTIVE PLANNING Q5 YRS  2018     INFLUENZA VACCINE (1) 2018     MICROALBUMIN Q1 YEAR  2018     FALL RISK ASSESSMENT  2018     PNEUMOCOCCAL IMMUNIZATION 65+ LOW/MEDIUM RISK (1 of 2 - PCV13) 2018     A1C Q6 MO  2019     CREATININE Q1 YEAR  10/16/2019     TSH W/ FREE T4 REFLEX Q2 YEAR  2019     LIPID MONITORING Q1 YEAR  2020     DTAP/TDAP/TD IMMUNIZATION (2 - Td) 2020     COLON CANCER SCREEN (SYSTEM ASSIGNED)  2028     DEPRESSION ACTION PLAN  Completed     AORTIC ANEURYSM SCREENING (SYSTEM ASSIGNED)  Completed     HEPATITIS C SCREENING  Completed     IPV IMMUNIZATION  Aged Out     MENINGITIS IMMUNIZATION  Aged Out     {Chronicprobdata (Optional):073427}  {Decision Support (Optional):826327}    Review of Systems  {ROS COMP (Optional):527284}    OBJECTIVE:   There were no vitals taken for this visit. Estimated body mass index is 29.3 kg/m  as calculated from the following:    Height as of 18: 1.88 m (6' 2\").    Weight as of 18: 103.5 kg (228 lb 3.2 oz).  Physical Exam  {Exam (Optional) :043056}    {Diagnostic Test Results (Optional):393002::\"Diagnostic Test Results:\",\"none \"}    ASSESSMENT / PLAN:   {Dia Picklist:837364}    End of Life Planning:  Patient currently has an advanced directive: { :443194}    COUNSELING:  {Medicare Counselin}    BP Readings from Last 1 Encounters:   18 110/68     Estimated body mass index is 29.3 kg/m  as calculated from the following:    Height as of 18: 1.88 m (6' 2\").    Weight as of 18: 103.5 kg (228 lb 3.2 oz).    {BP " Counseling- Complete if BP >= 120/80  (Optional):207986}  {Weight Management Plan (ACO) Complete if BMI is abnormal-  Ages 18-64  BMI >24.9.  Age 65+ with BMI <23 or >30 (Optional):956319}     reports that he has quit smoking. His smoking use included cigarettes. he has never used smokeless tobacco.  {Tobacco Cessation -- Complete if patient is a smoker (Optional):417288}    Appropriate preventive services were discussed with this patient, including applicable screening as appropriate for cardiovascular disease, diabetes, osteopenia/osteoporosis, and glaucoma.  As appropriate for age/gender, discussed screening for colorectal cancer, prostate cancer, breast cancer, and cervical cancer. Checklist reviewing preventive services available has been given to the patient.    Reviewed patients plan of care and provided an AVS. The {CarePlan:149872} for Austyn meets the Care Plan requirement. This Care Plan has been established and reviewed with the {PATIENT, FAMILY MEMBER, CAREGIVER:886020}.    Counseling Resources:  ATP IV Guidelines  Pooled Cohorts Equation Calculator  Breast Cancer Risk Calculator  FRAX Risk Assessment  ICSI Preventive Guidelines  Dietary Guidelines for Americans, 2010  [employer]'s MyPlate  ASA Prophylaxis  Lung CA Screening    Javier Villafana MD  [facility]  Answers for HPI/ROS submitted by the patient on 2/5/2019   If you checked off any problems, how difficult have these problems made it for you to do your work, take care of things at home, or get along with other people?: Somewhat difficult  PHQ9 TOTAL SCORE: 4

## 2019-02-05 NOTE — NURSING NOTE
"Vital signs:  Temp: 98  F (36.7  C) Temp src: Oral BP: 118/70 Pulse: 75   Resp: 12 SpO2: 96 %     Height: 188 cm (6' 2\") Weight: 103.4 kg (228 lb)  Estimated body mass index is 29.27 kg/m  as calculated from the following:    Height as of this encounter: 1.88 m (6' 2\").    Weight as of this encounter: 103.4 kg (228 lb).          "

## 2019-02-05 NOTE — PROGRESS NOTES
"  SUBJECTIVE:   Austyn Araujo is a 63 year old male who presents for Preventive Visit.      Are you in the first 12 months of your Medicare Part B coverage?  No    Physical Health:    In general, how would you rate your overall physical health? good    Outside of work, how many days during the week do you exercise? Once a week    Outside of work, approximately how many minutes a day do you exercise?15-30 minutes    If you drink alcohol do you typically have >3 drinks per day or >7 drinks per week? Not Applicable    Do you usually eat at least 4 servings of fruit and vegetables a day, include whole grains & fiber and avoid regularly eating high fat or \"junk\" foods? Yes    Do you have any problems taking medications regularly?  No    Do you have any side effects from medications? none    Needs assistance for the following daily activities: no assistance needed    Which of the following safety concerns are present in your home?  none identified     Hearing impairment: No    In the past 6 months, have you been bothered by leaking of urine? no    Mental Health:    In general, how would you rate your overall mental or emotional health? good  PHQ-2 Score:      Do you feel safe in your environment? Yes    Do you have a Health Care Directive? Yes: Patient states has Advance Directive and will bring in a copy to clinic.    Additional concerns to address? Medication F/U    Fall risk:  Fallen 2 or more times in the past year?: No  Any fall with injury in the past year?: No    Cognitive Screenin) Repeat 3 items (Leader, Season, Table)    2) Clock draw: NORMAL  3) 3 item recall: Recalls 3 objects  Results: 3 items recalled: COGNITIVE IMPAIRMENT LESS LIKELY    Mini-CogTM Copyright S Kalie. Licensed by the author for use in [facility]; reprinted with permission (dunia@.[facility]). All rights reserved.      Do you have sleep apnea, excessive snoring or daytime drowsiness?: no        PROBLEMS TO ADD ON...  Has H/O " DM. On diet , exercise and PO treatment . Blood sugars are not well controlled. No parestesias. No hypoglycemias.  Has h/o HTN. on medical treatment. BP has been controlled. No side effects from medications. No CP, HA, dizziness. good compliance with medications and low salt diet.  Has h/o ischemic heart disease, asymptomatic regarding chest pains, SOB,palpitations. Has good compliance with treatment, diet and exercise.  Has h/o GERD on PPI treatment. symptoms not well controlled. No nausea, vomiting, heartburns, bloating. Has had chest pain, no relation to activity, has h/o Baretts esophagus     Has H/O BPH. On treatment with Flomax . Has  symptoms of frequency, decreased urinary stream , nocturia, no hematuria or dysuria. No side effects from medications.  Has had elevated PSA, needs to see urology.   Has h/o left hip OA, has chronic pain. Had injection 8 months ago, now recurrent pain.   Reports right shoulder pain with movements, sleeping on the right side. No weakness.     Reviewed and updated as needed this visit by clinical staff  Allergies         Reviewed and updated as needed this visit by Provider        Social History     Tobacco Use     Smoking status: Former Smoker     Types: Cigarettes     Smokeless tobacco: Never Used   Substance Use Topics     Alcohol use: No     Alcohol/week: 0.0 oz     Comment: 14 drinks weekly, quit on 08/2017                           Current providers sharing in care for this patient include:   Patient Care Team:  Javier Villafana MD as PCP - General (Internal Medicine)  Javier Villafana MD as PCP - Assigned PCP    The following health maintenance items are reviewed in Epic and correct as of today:  Health Maintenance   Topic Date Due     FOOT EXAM Q1 YEAR  12/05/1956     HIV SCREEN (SYSTEM ASSIGNED)  12/05/1971     ZOSTER IMMUNIZATION (1 of 2) 12/05/2003     EYE EXAM Q1 YEAR  04/01/2017     PHQ-9 Q6 MONTHS  05/29/2018     ADVANCE DIRECTIVE PLANNING Q5 YRS  06/28/2018      INFLUENZA VACCINE (1) 09/01/2018     MICROALBUMIN Q1 YEAR  11/29/2018     FALL RISK ASSESSMENT  12/05/2018     PNEUMOCOCCAL IMMUNIZATION 65+ LOW/MEDIUM RISK (1 of 2 - PCV13) 12/05/2018     A1C Q6 MO  02/01/2019     CREATININE Q1 YEAR  10/16/2019     TSH W/ FREE T4 REFLEX Q2 YEAR  11/29/2019     LIPID MONITORING Q1 YEAR  01/16/2020     DTAP/TDAP/TD IMMUNIZATION (2 - Td) 03/16/2020     COLON CANCER SCREEN (SYSTEM ASSIGNED)  12/12/2028     DEPRESSION ACTION PLAN  Completed     AORTIC ANEURYSM SCREENING (SYSTEM ASSIGNED)  Completed     HEPATITIS C SCREENING  Completed     IPV IMMUNIZATION  Aged Out     MENINGITIS IMMUNIZATION  Aged Out     Labs reviewed in EPIC  BP Readings from Last 3 Encounters:   02/05/19 118/70   11/06/18 110/68   10/16/18 102/70    Wt Readings from Last 3 Encounters:   02/05/19 103.4 kg (228 lb)   11/06/18 103.5 kg (228 lb 3.2 oz)   09/12/18 101 kg (222 lb 9.6 oz)                  Pneumonia Vaccine:Adults age 65+ who received Pneumovax (PPSV23) at 65 years or older: Should be given PCV13 > 1 year after their most recent PPSV23    ROS:  CONSTITUTIONAL: NEGATIVE for fever, chills, change in weight  INTEGUMENTARY/SKIN: NEGATIVE for worrisome rashes, moles or lesions  EYES: NEGATIVE for vision changes or irritation  ENT/MOUTH: NEGATIVE for ear, mouth and throat problems  RESP: NEGATIVE for significant cough or SOB  CV: NEGATIVE for chest pain, palpitations or peripheral edema  GI: NEGATIVE for nausea, abdominal pain, heartburn, or change in bowel habits  : NEGATIVE for frequency, dysuria, or hematuria  MUSCULOSKELETAL: NEGATIVE for significant arthralgias or myalgia, + for rt shoulder and left hip pain   NEURO: NEGATIVE for weakness, dizziness or paresthesias  ENDOCRINE: NEGATIVE for temperature intolerance, skin/hair changes  HEME: NEGATIVE for bleeding problems  PSYCHIATRIC: NEGATIVE for changes in mood or affect    OBJECTIVE:   There were no vitals taken for this visit. Estimated body mass  "index is 29.3 kg/m  as calculated from the following:    Height as of 11/6/18: 1.88 m (6' 2\").    Weight as of 11/6/18: 103.5 kg (228 lb 3.2 oz).  EXAM:   GENERAL: healthy, alert and no distress  EYES: Eyes grossly normal to inspection, PERRL and conjunctivae and sclerae normal  HENT: ear canals and TM's normal, nose and mouth without ulcers or lesions  NECK: no adenopathy, no asymmetry, masses, or scars and thyroid normal to palpation  RESP: lungs clear to auscultation - no rales, rhonchi or wheezes  CV: regular rate and rhythm, normal S1 S2, no S3 or S4, no murmur, click or rub, no peripheral edema and peripheral pulses strong  ABDOMEN: soft, nontender, no hepatosplenomegaly, no masses and bowel sounds normal  MS: no gross musculoskeletal defects noted, no edema  SKIN: no suspicious lesions or rashes  NEURO: Normal strength and tone, mentation intact and speech normal  PSYCH: mentation appears normal, affect normal/bright    Diagnostic Test Results:  none     ASSESSMENT / PLAN:       ICD-10-CM    1. Encounter for preventative adult health care examination Z00.00 CBC with platelets     Comprehensive metabolic panel     Lipid panel reflex to direct LDL Fasting     TSH with free T4 reflex     Prostate spec antigen screen     Hemoglobin A1c     *UA reflex to Microscopic     Albumin Random Urine Quantitative with Creat Ratio   2. Type 2 diabetes mellitus with peripheral autonomic neuropathy (H) E11.43 Hemoglobin A1c     metFORMIN (GLUCOPHAGE-XR) 500 MG 24 hr tablet     OFFICE/OUTPT VISIT,EST,LEVL IV   3. Essential hypertension I10 CBC with platelets     Comprehensive metabolic panel     TSH with free T4 reflex     *UA reflex to Microscopic     Albumin Random Urine Quantitative with Creat Ratio     OFFICE/OUTPT VISIT,EST,LEVL IV   4. Hyperlipidemia LDL goal <100 E78.5 Lipid panel reflex to direct LDL Fasting     OFFICE/OUTPT VISIT,EST,LEVL IV   5. Screening for prostate cancer Z12.5 Prostate spec antigen screen   6. " "Gastroesophageal reflux disease with esophagitis K21.0 GASTROENTEROLOGY ADULT REF PROCEDURE ONLY     OFFICE/OUTPT VISIT,EST,LEVL IV   7. Elevated prostate specific antigen (PSA) R97.20 UROLOGY ADULT REFERRAL     OFFICE/OUTPT VISIT,EST,LEVL IV   8. Right shoulder pain, unspecified chronicity M25.511 BERT PT, HAND, AND CHIROPRACTIC REFERRAL     OFFICE/OUTPT VISIT,EST,LEVL IV   9. Hip pain, left M25.552 IR Joint Injection Major Right     OFFICE/OUTPT VISIT,EST,LEVL IV   10. Need for vaccination with 13-polyvalent pneumococcal conjugate vaccine Z23 PNEUMOCOCCAL CONJ VACCINE 13 VALENT IM     Refer for EGD, h/o esophagitis   Refer to PT for shoulder rotator cuff syndrome   Refer for steroid hip injection   Assess lab work   Cont treatment   Change to long acting Metformin for better compliance     End of Life Planning:  Patient currently has an advanced directive: Yes.  Practitioner is supportive of decision.    COUNSELING:  Reviewed preventive health counseling, as reflected in patient instructions       Regular exercise       Healthy diet/nutrition       Vision screening       Hearing screening       Dental care       Immunizations    Vaccinated for: Pneumococcal             Colon cancer screening       Prostate cancer screening    BP Readings from Last 1 Encounters:   11/06/18 110/68     Estimated body mass index is 29.3 kg/m  as calculated from the following:    Height as of 11/6/18: 1.88 m (6' 2\").    Weight as of 11/6/18: 103.5 kg (228 lb 3.2 oz).      Weight management plan: Discussed healthy diet and exercise guidelines     reports that he has quit smoking. His smoking use included cigarettes. he has never used smokeless tobacco.      Appropriate preventive services were discussed with this patient, including applicable screening as appropriate for cardiovascular disease, diabetes, osteopenia/osteoporosis, and glaucoma.  As appropriate for age/gender, discussed screening for colorectal cancer, prostate cancer, " breast cancer, and cervical cancer. Checklist reviewing preventive services available has been given to the patient.    Reviewed patients plan of care and provided an AVS. The Intermediate Care Plan ( asthma action plan, low back pain action plan, and migraine action plan) for Austyn meets the Care Plan requirement. This Care Plan has been established and reviewed with the Patient.    Counseling Resources:  ATP IV Guidelines  Pooled Cohorts Equation Calculator  Breast Cancer Risk Calculator  FRAX Risk Assessment  ICSI Preventive Guidelines  Dietary Guidelines for Americans, 2010  [employer]'s MyPlate  ASA Prophylaxis  Lung CA Screening    Javier Villafana MD  [facility]  Answers for HPI/ROS submitted by the patient on 2/5/2019   If you checked off any problems, how difficult have these problems made it for you to do your work, take care of things at home, or get along with other people?: Somewhat difficult  PHQ9 TOTAL SCORE: 4

## 2019-02-05 NOTE — PATIENT INSTRUCTIONS
Preventive Health Recommendations:     See your health care provider every year to    Review health changes.     Discuss preventive care.      Review your medicines if your doctor has prescribed any.    Talk with your health care provider about whether you should have a test to screen for prostate cancer (PSA).    Every 3 years, have a diabetes test (fasting glucose). If you are at risk for diabetes, you should have this test more often.    Every 5 years, have a cholesterol test. Have this test more often if you are at risk for high cholesterol or heart disease.     Every 10 years, have a colonoscopy. Or, have a yearly FIT test (stool test). These exams will check for colon cancer.    Talk to with your health care provider about screening for Abdominal Aortic Aneurysm if you have a family history of AAA or have a history of smoking.  Shots:     Get a flu shot each year.     Get a tetanus shot every 10 years.     Talk to your doctor about your pneumonia vaccines. There are now two you should receive - Pneumovax (PPSV 23) and Prevnar (PCV 13).    Talk to your pharmacist about a shingles vaccine.     Talk to your doctor about the hepatitis B vaccine.  Nutrition:     Eat at least 5 servings of fruits and vegetables each day.     Eat whole-grain bread, whole-wheat pasta and brown rice instead of white grains and rice.     Get adequate Calcium and Vitamin D.   Lifestyle    Exercise for at least 150 minutes a week (30 minutes a day, 5 days a week). This will help you control your weight and prevent disease.     Limit alcohol to one drink per day.     No smoking.     Wear sunscreen to prevent skin cancer.     See your dentist every six months for an exam and cleaning.     See your eye doctor every 1 to 2 years to screen for conditions such as glaucoma, macular degeneration and cataracts.    Personalized Prevention Plan  You are due for the preventive services outlined below.  Your care team is available to assist you in  scheduling these services.  If you have already completed any of these items, please share that information with your care team to update in your medical record.    Health Maintenance Due   Topic Date Due     Diabetic Foot Exam - yearly  12/05/1956     HIV SCREEN (SYSTEM ASSIGNED)  12/05/1971     Zoster (Shingles) Vaccine (1 of 2) 12/05/2003     Eye Exam - yearly  04/01/2017     Depression Assessment - every 6 months  05/29/2018     Discuss Advance Directive Planning  06/28/2018     Flu Vaccine (1) 09/01/2018     Microalbumin Lab - yearly  11/29/2018     FALL RISK ASSESSMENT  12/05/2018     Pneumococcal Vaccine (1 of 2 - PCV13) 12/05/2018     A1C (Diabetes) Lab - every 6 months  02/01/2019

## 2019-02-06 ENCOUNTER — THERAPY VISIT (OUTPATIENT)
Dept: PHYSICAL THERAPY | Facility: CLINIC | Age: 64
End: 2019-02-06
Payer: COMMERCIAL

## 2019-02-06 DIAGNOSIS — M75.41 ROTATOR CUFF IMPINGEMENT SYNDROME OF RIGHT SHOULDER: ICD-10-CM

## 2019-02-06 DIAGNOSIS — M25.511 RIGHT SHOULDER PAIN, UNSPECIFIED CHRONICITY: ICD-10-CM

## 2019-02-06 LAB
ALBUMIN SERPL-MCNC: 4.5 G/DL (ref 3.4–5)
ALP SERPL-CCNC: 86 U/L (ref 40–150)
ALT SERPL W P-5'-P-CCNC: 37 U/L (ref 0–70)
ANION GAP SERPL CALCULATED.3IONS-SCNC: 10 MMOL/L (ref 3–14)
AST SERPL W P-5'-P-CCNC: 21 U/L (ref 0–45)
BILIRUB SERPL-MCNC: 0.8 MG/DL (ref 0.2–1.3)
BUN SERPL-MCNC: 14 MG/DL (ref 7–30)
CALCIUM SERPL-MCNC: 9.5 MG/DL (ref 8.5–10.1)
CHLORIDE SERPL-SCNC: 101 MMOL/L (ref 94–109)
CHOLEST SERPL-MCNC: 156 MG/DL
CO2 SERPL-SCNC: 26 MMOL/L (ref 20–32)
CREAT SERPL-MCNC: 1.01 MG/DL (ref 0.66–1.25)
CREAT UR-MCNC: 165 MG/DL
GFR SERPL CREATININE-BSD FRML MDRD: 78 ML/MIN/{1.73_M2}
GLUCOSE SERPL-MCNC: 125 MG/DL (ref 70–99)
HDLC SERPL-MCNC: 42 MG/DL
LDLC SERPL CALC-MCNC: 93 MG/DL
MICROALBUMIN UR-MCNC: 16 MG/L
MICROALBUMIN/CREAT UR: 9.76 MG/G CR (ref 0–17)
NONHDLC SERPL-MCNC: 114 MG/DL
POTASSIUM SERPL-SCNC: 4.1 MMOL/L (ref 3.4–5.3)
PROT SERPL-MCNC: 8 G/DL (ref 6.8–8.8)
PSA SERPL-ACNC: 5.11 UG/L (ref 0–4)
SODIUM SERPL-SCNC: 137 MMOL/L (ref 133–144)
TRIGL SERPL-MCNC: 105 MG/DL
TSH SERPL DL<=0.005 MIU/L-ACNC: 0.96 MU/L (ref 0.4–4)

## 2019-02-06 PROCEDURE — 97110 THERAPEUTIC EXERCISES: CPT | Mod: GP | Performed by: PHYSICAL THERAPIST

## 2019-02-06 PROCEDURE — 97161 PT EVAL LOW COMPLEX 20 MIN: CPT | Mod: GP | Performed by: PHYSICAL THERAPIST

## 2019-02-06 ASSESSMENT — ANXIETY QUESTIONNAIRES: GAD7 TOTAL SCORE: 3

## 2019-02-06 ASSESSMENT — PATIENT HEALTH QUESTIONNAIRE - PHQ9: SUM OF ALL RESPONSES TO PHQ QUESTIONS 1-9: 4

## 2019-02-06 NOTE — PROGRESS NOTES
[facility] Initial Evaluation  Subjective:  Onset of right shoulder pain 1.5 months ago secondary to sleeping on the right side. Pt referred by MD for physical therapy on 2-5-19      The history is provided by the patient. No  was used.   Austyn Araujo is a 63 year old male with a right shoulder condition.  Occurance: sleeping on right side.   Condition occurred: at home.  This is a new condition     Patient reports pain:  Anterior and posterior.  Radiates to: scapular.  Pain is described as aching and sharp and is intermittent and reported as 8/10.  Associated symptoms:  Loss of motion/stiffness, loss of strength, numbness and tingling. Pain is worse during the night.  Symptoms are exacerbated by using arm overhead, using arm behind back, lying on extremity, lifting and carrying (dressing upper body, hair care) and relieved by NSAID's and rest (topical medications).  Since onset symptoms are gradually worsening.  Special testing: none.  Previous treatment: none.    General health as reported by patient is good.  Pertinent medical history includes:  Diabetes, numbness/tingling, osteoarthritis and sleep disorder/apnea.  Medical allergies: no.  Other surgeries include:  None reported.  Current medications:  None as reported by the patient.    Patient is working in normal job without restrictions ( ).  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        Red flags:  Chest pain.                        Objective:  Standing Alignment:      Shoulder/upper extremity deviations alignment: forward head and shoulder pain.                                       Shoulder Evaluation:  ROM:  AROM:    Flexion:  Right:  140  Extension: Right: 65  Abduction:  Right:  120      External Rotation:  Right:  70                PROM:    Flexion:  Right: 150    Extension:  Right:  70  Abduction:  Right:  130    Internal Rotation:  Right:  70  External Rotation:   Right:  80                    Strength:    Flexion: Right: 4+/5     Pain:   Extension:  Right: 5/5    Pain:  Abduction:  Right: 4/5   Weak/painful    Pain:  Adduction:  Right: 5/5     Pain:  Internal Rotation:  Right: 4/5   Weak/painful    Pain:  External Rotation:   Right:4-/5     Pain:              Special Tests:      Right shoulder positive for the following special tests:Impingement  Palpation:      Right shoulder tenderness present at: Supraspinatus                                     General     ROS    Assessment/Plan:    Patient is a 63 year old male with right side shoulder complaints.    Patient has the following significant findings with corresponding treatment plan.                Diagnosis 1:  Right rotator cuff impingement  Pain -  hot/cold therapy, manual therapy, self management, education and home program  Decreased ROM/flexibility - manual therapy, therapeutic exercise, therapeutic activity and home program  Decreased strength - therapeutic exercise, therapeutic activities and home program    Therapy Evaluation Codes:   1) History comprised of:   Personal factors that impact the plan of care:      None.    Comorbidity factors that impact the plan of care are:      Diabetes, Numbness/tingling, Osteoarthritis, Pain at night/rest, Sleep disorder/apnea and Weakness.     Medications impacting care: None.  2) Examination of Body Systems comprised of:   Body structures and functions that impact the plan of care:      Shoulder.   Activity limitations that impact the plan of care are:      Bathing, Dressing, Lifting, Sleeping and Laying down.  3) Clinical presentation characteristics are:   Stable/Uncomplicated.  4) Decision-Making    Low complexity using standardized patient assessment instrument and/or measureable assessment of functional outcome.  Cumulative Therapy Evaluation is: Low complexity.    Previous and current functional limitations:  (See Goal Flow Sheet for this information)    Short term and  Long term goals: (See Goal Flow Sheet for this information)     Communication ability:  Patient appears to be able to clearly communicate and understand verbal and written communication and follow directions correctly.  Treatment Explanation - The following has been discussed with the patient:   RX ordered/plan of care  Anticipated outcomes  Possible risks and side effects  This patient would benefit from PT intervention to resume normal activities.   Rehab potential is good.    Frequency:  1 X week, once daily  Duration:  for 6 weeks  Discharge Plan:  Achieve all LTG.  Independent in home treatment program.  Reach maximal therapeutic benefit.    Please refer to the daily flowsheet for treatment today, total treatment time and time spent performing 1:1 timed codes.

## 2019-02-08 DIAGNOSIS — R79.89 LOW TESTOSTERONE: ICD-10-CM

## 2019-02-08 NOTE — TELEPHONE ENCOUNTER
Requested Prescriptions   Pending Prescriptions Disp Refills     testosterone cypionate (DEPOTESTOSTERONE) 200 MG/ML injection [Pharmacy Med Name: TESTOSTERON CYP 2,000 MG/10 ML] 10 mL     Last Written Prescription Date:  12/21/2018  Last Fill Quantity: 10 mL,  # refills: 0   Last office visit: 2/5/2019 with prescribing provider:     Future Office Visit:   Sig: INJECT 2MLS INTRAMUSCULARLY ONCE WEEKLY    Androgen Agents Failed - 2/8/2019  7:54 AM       Failed - Serum Testosterone on file within past 12 mos    Recent Labs   Lab Test 11/29/17  0939   TESTOSTTOTAL 892            Failed - Refills for this classification require provider review       Passed - Patient is of age 12 and older       Passed - Lipid panel on file in past 12 mos    Recent Labs   Lab Test 02/05/19  1413  09/30/15  0804   CHOL 156   < > 278*   TRIG 105   < > 187*   HDL 42   < > 54   LDL 93   < > 187*   NHDL 114   < >  --    VLDL  --   --  37*   CHOLHDLRATIO  --   --  5.1*    < > = values in this interval not displayed.              Passed - ALT on file within past 12 mos    Recent Labs   Lab Test 02/05/19  1413   ALT 37            Passed - Medication is active on med list       Passed - HCT less than 54% on file within past 12 mos    Recent Labs   Lab Test 02/05/19  1413   HCT 50.0            Passed - Serum PSA on file within past 12 mos    Lab Results   Component Value Date    PSA 5.11 02/05/2019            Passed - Blood pressure under 140/90 in past 6 months    BP Readings from Last 3 Encounters:   02/05/19 118/70   11/06/18 110/68   10/16/18 102/70                Passed - Patient is not pregnant       Passed - No positive pregnancy test on file within past 12 mos       Passed - Recent (6 mo) or future (30 days) visit within the authorizing provider's specialty       Passed - AST on file within past 12 mos    Recent Labs   Lab Test 02/05/19  1413   AST 21

## 2019-02-11 RX ORDER — TESTOSTERONE CYPIONATE 200 MG/ML
INJECTION, SOLUTION INTRAMUSCULAR
Qty: 10 ML | Refills: 3 | Status: SHIPPED | OUTPATIENT
Start: 2019-02-11 | End: 2019-10-06

## 2019-02-11 NOTE — TELEPHONE ENCOUNTER
Routing refill request to provider for review/approval because:  Labs not current:  Testosterone  Gali Story RN

## 2019-02-12 ENCOUNTER — THERAPY VISIT (OUTPATIENT)
Dept: PHYSICAL THERAPY | Facility: CLINIC | Age: 64
End: 2019-02-12
Payer: COMMERCIAL

## 2019-02-12 DIAGNOSIS — N40.1 BENIGN PROSTATIC HYPERPLASIA WITH URINARY FREQUENCY: ICD-10-CM

## 2019-02-12 DIAGNOSIS — R35.0 BENIGN PROSTATIC HYPERPLASIA WITH URINARY FREQUENCY: ICD-10-CM

## 2019-02-12 DIAGNOSIS — M75.41 ROTATOR CUFF IMPINGEMENT SYNDROME OF RIGHT SHOULDER: ICD-10-CM

## 2019-02-12 DIAGNOSIS — I10 BENIGN ESSENTIAL HYPERTENSION: ICD-10-CM

## 2019-02-12 PROCEDURE — 97110 THERAPEUTIC EXERCISES: CPT | Mod: GP | Performed by: PHYSICAL THERAPIST

## 2019-02-12 PROCEDURE — 97530 THERAPEUTIC ACTIVITIES: CPT | Mod: GP | Performed by: PHYSICAL THERAPIST

## 2019-02-12 NOTE — TELEPHONE ENCOUNTER
"Requested Prescriptions   Pending Prescriptions Disp Refills     tamsulosin (FLOMAX) 0.4 MG capsule [Pharmacy Med Name: TAMSULOSIN HCL 0.4 MG CAPSULE]  Last Written Prescription Date:  1/9/2019  Last Fill Quantity: 30,  # refills: 0   Last office visit: 2/5/2019 with prescribing provider:     Future Office Visit:   30 capsule 0     Sig: TAKE 1 CAPSULE BY MOUTH DAILY TAKE AT BEDTIME (DUE FOR APPT)    Alpha Blockers Failed - 2/12/2019  1:45 AM       Failed - Patient does not have Tadalafil, Vardenafil, or Sildenafil on their medication list       Passed - Blood pressure under 140/90 in past 12 months    BP Readings from Last 3 Encounters:   02/05/19 118/70   11/06/18 110/68   10/16/18 102/70                Passed - Recent (12 mo) or future (30 days) visit within the authorizing provider's specialty    Patient had office visit in the last 12 months or has a visit in the next 30 days with authorizing provider or within the authorizing provider's specialty.  See \"Patient Info\" tab in inbasket, or \"Choose Columns\" in Meds & Orders section of the refill encounter.             Passed - Medication is active on med list       Passed - Patient is 18 years of age or older        lisinopril (PRINIVIL/ZESTRIL) 10 MG tablet [Pharmacy Med Name: LISINOPRIL 10 MG TABLET]  Last Written Prescription Date:  1/9/2019  Last Fill Quantity: 30,  # refills: 0   Last office visit: 2/5/2019 with prescribing provider:     Future Office Visit:   30 tablet 0     Sig: TAKE 1 TABLET BY MOUTH EVERY DAY (DUE FOR APPT)    ACE Inhibitors (Including Combos) Protocol Passed - 2/12/2019  1:45 AM       Passed - Blood pressure under 140/90 in past 12 months    BP Readings from Last 3 Encounters:   02/05/19 118/70   11/06/18 110/68   10/16/18 102/70                Passed - Recent (12 mo) or future (30 days) visit within the authorizing provider's specialty    Patient had office visit in the last 12 months or has a visit in the next 30 days with authorizing " "provider or within the authorizing provider's specialty.  See \"Patient Info\" tab in inbasket, or \"Choose Columns\" in Meds & Orders section of the refill encounter.             Passed - Medication is active on med list       Passed - Patient is age 18 or older       Passed - Normal serum creatinine on file in past 12 months    Recent Labs   Lab Test 02/05/19  1413 10/16/18  1314   CR 1.01  --    CREAT  --  0.9            Passed - Normal serum potassium on file in past 12 months    Recent Labs   Lab Test 02/05/19  1413   POTASSIUM 4.1             "

## 2019-02-14 DIAGNOSIS — E11.43 TYPE 2 DIABETES MELLITUS WITH PERIPHERAL AUTONOMIC NEUROPATHY (H): ICD-10-CM

## 2019-02-14 RX ORDER — GLIMEPIRIDE 1 MG/1
TABLET ORAL
Qty: 180 TABLET | Refills: 0 | Status: SHIPPED | OUTPATIENT
Start: 2019-02-14 | End: 2019-05-29

## 2019-02-14 RX ORDER — TAMSULOSIN HYDROCHLORIDE 0.4 MG/1
CAPSULE ORAL
Qty: 30 CAPSULE | Refills: 0 | Status: SHIPPED | OUTPATIENT
Start: 2019-02-14 | End: 2019-03-28

## 2019-02-14 RX ORDER — LISINOPRIL 10 MG/1
TABLET ORAL
Qty: 30 TABLET | Refills: 0 | Status: SHIPPED | OUTPATIENT
Start: 2019-02-14 | End: 2019-03-13

## 2019-02-14 NOTE — TELEPHONE ENCOUNTER
"Requested Prescriptions   Pending Prescriptions Disp Refills     glimepiride (AMARYL) 1 MG tablet [Pharmacy Med Name: GLIMEPIRIDE 1 MG TABLET] 180 tablet 0     Sig: TAKE 2 TABLETS BY MOUTH EVERY MORNING BEFORE BREAKFAST (DUE FOR LABS IN NOVEMBER 2018)    Sulfonylurea Agents Passed - 2/14/2019  1:41 AM       Passed - Blood pressure less than 140/90 in past 6 months    BP Readings from Last 3 Encounters:   02/05/19 118/70   11/06/18 110/68   10/16/18 102/70                Passed - Patient has documented LDL within the past 12 mos.    Recent Labs   Lab Test 02/05/19  1413   LDL 93            Passed - Patient has had a Microalbumin in the past 12 mos.    Recent Labs   Lab Test 02/05/19  1413   MICROL 16   UMALCR 9.76            Passed - Patient has documented A1c within the specified period of time.    If HgbA1C is 8 or greater, it needs to be on file within the past 3 months.  If less than 8, must be on file within the past 6 months.     Recent Labs   Lab Test 02/05/19  1413   A1C 8.6*            Passed - Medication is active on med list       Passed - Patient is age 18 or older       Passed - Patient has a recent creatinine (normal) within the past 12 mos.    Recent Labs   Lab Test 02/05/19  1413 10/16/18  1314   CR 1.01  --    CREAT  --  0.9            Passed - Recent (6 mo) or future (30 days) visit within the authorizing provider's specialty    Patient had office visit in the last 6 months or has a visit in the next 30 days with authorizing provider or within the authorizing provider's specialty.  See \"Patient Info\" tab in inbasket, or \"Choose Columns\" in Meds & Orders section of the refill encounter.            glimepiride (AMARYL) 1 MG tablet 180 tablet 0 11/16/2018       Last Written Prescription Date:  11/16/2018  Last Fill Quantity: 180,  # refills: 0   Last office visit: 2/5/2019 with prescribing provider:  Dr. Villafana   Future Office Visit:  Unknown     "

## 2019-02-19 ENCOUNTER — HOSPITAL ENCOUNTER (OUTPATIENT)
Facility: CLINIC | Age: 64
Discharge: HOME | End: 2019-02-19
Attending: INTERNAL MEDICINE | Admitting: INTERNAL MEDICINE
Payer: COMMERCIAL

## 2019-02-19 VITALS
HEART RATE: 73 BPM | OXYGEN SATURATION: 97 % | DIASTOLIC BLOOD PRESSURE: 87 MMHG | RESPIRATION RATE: 14 BRPM | SYSTOLIC BLOOD PRESSURE: 115 MMHG

## 2019-02-19 LAB
GLUCOSE BLDC GLUCOMTR-MCNC: 160 MG/DL (ref 70–99)
UPPER GI ENDOSCOPY: NORMAL

## 2019-02-19 PROCEDURE — 82962 GLUCOSE BLOOD TEST: CPT

## 2019-02-19 PROCEDURE — 88305 TISSUE EXAM BY PATHOLOGIST: CPT | Mod: 26 | Performed by: INTERNAL MEDICINE

## 2019-02-19 PROCEDURE — 25000125 ZZHC RX 250: Performed by: INTERNAL MEDICINE

## 2019-02-19 PROCEDURE — G0500 MOD SEDAT ENDO SERVICE >5YRS: HCPCS | Performed by: INTERNAL MEDICINE

## 2019-02-19 PROCEDURE — 88305 TISSUE EXAM BY PATHOLOGIST: CPT | Performed by: INTERNAL MEDICINE

## 2019-02-19 PROCEDURE — 43239 EGD BIOPSY SINGLE/MULTIPLE: CPT | Performed by: INTERNAL MEDICINE

## 2019-02-19 PROCEDURE — 25000128 H RX IP 250 OP 636: Performed by: INTERNAL MEDICINE

## 2019-02-19 RX ORDER — FLUMAZENIL 0.1 MG/ML
0.2 INJECTION, SOLUTION INTRAVENOUS
Status: DISCONTINUED | OUTPATIENT
Start: 2019-02-19 | End: 2019-02-19 | Stop reason: HOSPADM

## 2019-02-19 RX ORDER — NALOXONE HYDROCHLORIDE 0.4 MG/ML
.1-.4 INJECTION, SOLUTION INTRAMUSCULAR; INTRAVENOUS; SUBCUTANEOUS
Status: DISCONTINUED | OUTPATIENT
Start: 2019-02-19 | End: 2019-02-19 | Stop reason: HOSPADM

## 2019-02-19 RX ORDER — ONDANSETRON 2 MG/ML
4 INJECTION INTRAMUSCULAR; INTRAVENOUS EVERY 6 HOURS PRN
Status: DISCONTINUED | OUTPATIENT
Start: 2019-02-19 | End: 2019-02-19 | Stop reason: HOSPADM

## 2019-02-19 RX ORDER — LIDOCAINE 40 MG/G
CREAM TOPICAL
Status: DISCONTINUED | OUTPATIENT
Start: 2019-02-19 | End: 2019-02-19 | Stop reason: HOSPADM

## 2019-02-19 RX ORDER — FENTANYL CITRATE 50 UG/ML
INJECTION, SOLUTION INTRAMUSCULAR; INTRAVENOUS PRN
Status: DISCONTINUED | OUTPATIENT
Start: 2019-02-19 | End: 2019-02-19 | Stop reason: HOSPADM

## 2019-02-19 RX ORDER — ONDANSETRON 2 MG/ML
4 INJECTION INTRAMUSCULAR; INTRAVENOUS
Status: DISCONTINUED | OUTPATIENT
Start: 2019-02-19 | End: 2019-02-19 | Stop reason: HOSPADM

## 2019-02-19 RX ORDER — ONDANSETRON 4 MG/1
4 TABLET, ORALLY DISINTEGRATING ORAL EVERY 6 HOURS PRN
Status: DISCONTINUED | OUTPATIENT
Start: 2019-02-19 | End: 2019-02-19 | Stop reason: HOSPADM

## 2019-02-19 NOTE — LETTER
February 12, 2019      Austyn AKERS [facility]  [address] [facility] [id number]        Dear Austyn,       Thank you for choosing [facility]. You are scheduled for the following service:  Date:   02/19/2019    Procedure: UPPER ENDOSCOPY-EGD  Doctor: Dr. Barraza         Arrival Time:  0900    *check in at Emergency/Endoscopy desk*  Procedure Time: 0930     Location:   [facility]        Endoscopy Department, First Floor (Enter through ER Doors) *         [address] [place], [place] [address]      [phone] or [phone] ([facility]) to reschedule         PRE-PROCEDURE CHECKLIST    If you have diabetes, ask your regular doctor for diet and medication restrictions.  If you take any antiplatelet or anticoagulant medications (such as Coumadin, Lovenox, Plavix, etc.) and have not already discussed this, please call your primary physician for advice on holding this medication.  If you take Aspirin, you may continue to do so.  If you are or may be pregnant, please discuss the risks and benefits of this procedure with your doctor.  You must arrange for a ride for the day of your exam. If you fail to arrange transportation with a responsible adult, your procedure will need to be cancelled and rescheduled. Taxi, bus and medical transport are not acceptable unless you have a responsible adult that you know & trust with you. Please arrange for this  to be able to pick you up in our department, approximately one hour after your scheduled procedure, if they are not able to stay with you.      Canceling or rescheduling   If you must cancel or reschedule your appointment, please call [phone] as soon as possible.      Upper Endoscopy or Esophagogastroduodenoscopy (EGD) is a test performed to evaluate symptoms of persistent abdominal pain, nausea, vomiting and difficulty swallowing. It may also be used to treat various conditions of the upper gastrointestinal  tract, such as bleeding, narrowing or abnormal growths.     What happens during an upper endoscopy?  On the day of your procedure, plan to spend up to one and a half hour after your arrival at the endoscopy center. The exam itself takes about 5 to 10 minutes.    Before the exam:  - You will change into a gown.   - Your medical history and medication list will be reviewed with you, unless it has already been done over the phone.   - A nurse will insert an intravenous (IV) line into your hand or arm.  - The doctor will talk to you and give you a consent form to sign.  During the exam:  - Medicine will be given through the IV line to help you relax and feel comfortable.   - Your heart rate and oxygen levels will be monitored. If your blood pressure is low, you may be given fluids through the IV line.   - The doctor will insert a flexible, hollow tube, called an endoscope, into your mouth and will advance it slowly through the esophagus, stomach and duodenum (the first part of your small intestine).   - You may have a feeling of pressure or fullness.   - If you have difficulty swallowing, and the doctor finds a narrowing in your esophagus, it may be possible for the area to be expanded-dilated during the exam.   - If abnormal tissue is found, the doctor may remove it through the endoscope (biopsy it) for closer examination. The tissue removal is painless.    After the exam:  - Any tissue samples removed during the exam will be sent to a lab for evaluation. It may take 5 to 7 working days for you to be notified of the results  - The doctor will prepare a full report for the physician who referred you for the upper endoscopy.   - The doctor will talk with you about the initial results of your exam.   - You may feel bloated after the procedure. That is normal and should not last long.   - Your throat may feel sore for a short time.   - Following the exam, you may resume your normal diet. Avoid alcohol until the next day.    - You may resume your regular activities the day after the procedure.   - Medication given during the exam will prohibit you from driving for the rest of the day.  - A nurse will provide you with complete discharge instructions before you leave the endoscopy center. Be sure to ask the nurse for specific instructions if you take blood thinners such as Aspirin , Coumadin , Lovenox , Plavix , etc.       PREPARATION    To ensure a successful exam, please follow all instructions carefully.      The night before your exam:    STOP eating solid foods at 11:45 pm.     Clear liquids are okay to drink (examples: Gatorade , apple juice, clear broth,coffee or tea without milk or cream, etc.).     DO NOT drink red liquids or alcoholic beverages.    The day of your exam:    STOP drinking clear liquids 4 hours before your exam.     You may take your usual medications with 4 oz. of water, but it needs to be at least 4 hours prior to your procedure.    When you leave for the procedure:    Bring a list of all of your current medications, including any allergy or over-the-counter medications, unless you have already reviewed that with an Endoscopy RN over the phone.     Bring a photo ID as well as up-to-date insurance information, such as your insurance card and any referral forms that might be required by your payer.         DIRECTIONS TO THE ENDOSCOPY DEPARTMENT    From the north ([facility])  Take [address] south, exit on [address] . Get into the left hand jasen, turn left (east), go one-half mile to [address] and turn left. Go north to the first stoplight, take a right on [address] and follow it to the Emergency entrance.    From the south ([facility])  Take 35N to the 35E split and exit on [address] . On [address] , turn left (west) to [address]. Turn right (north) on [address]. Go north to the first stoplight, take a right on [address] and follow it to the  Emergency entrance.    From the east via 35E ([facility])  Take [address]E south to [address]  exit. Turn right on [address]. Go west to [address]. Turn right (north) on [address]. Go to the first stoplight, take a right and follow on [address] to the Emergency entrance.    From the east via [address] ([facility])  Take [address] to [address]. Turn left (south) on [address] to [address]. Turn left (east) on [address] and follow it to the Emergency entrance.    From the west via Highway 13 ([place])  Take [address] east to [address]. Turn right (south) on [address] to [address]. Turn left (east) on [address] and follow it to the Emergency entrance.

## 2019-02-19 NOTE — H&P
Pre-Endoscopy History and Physical     Austyn Araujo MRN# [id number]   YOB: 1955 Age: 63 year old     Date of Procedure: 2/19/2019  Primary care provider: Javier Villafana  Type of Endoscopy: Gastroscopy with possible biopsy, possible dilation  Reason for Procedure: Rogers's  Type of Anesthesia Anticipated: Conscious Sedation    HPI:    Austyn is a 63 year old male who will be undergoing the above procedure.      A history and physical has been performed. The patient's medications and allergies have been reviewed. The risks and benefits of the procedure and the sedation options and risks were discussed with the patient.  All questions were answered and informed consent was obtained.      He denies a personal or family history of anesthesia complications or bleeding disorders.     Patient Active Problem List   Diagnosis     Advanced directives, counseling/discussion     Type 2 diabetes mellitus with peripheral autonomic neuropathy (H)     GERD (gastroesophageal reflux disease)     PUD (peptic ulcer disease)     Low testosterone     HTN (hypertension)     Mild major depression (H)     Hyperlipidemia LDL goal <100     Erectile dysfunction     BPH (benign prostatic hyperplasia)     Peripheral neuropathy     Cellulitis     Hypertension goal BP (blood pressure) < 140/90     MRSA (methicillin resistant staph aureus) culture positive     Anxiety     Health Care Home     Type 2 diabetes mellitus with diabetic neuropathy (H)     Hemoglobin A1c less than 7.0%     Abdominal pain     Pancreatitis     Controlled substance agreement signed     Rotator cuff impingement syndrome of right shoulder        Past Medical History:   Diagnosis Date     Anxiety      Anxiety 1/31/2015     BPH (benign prostatic hyperplasia)      Degenerative disc disease      Depression      Diabetes mellitus (H)      Gastritis      Gastro-oesophageal reflux disease      H/O alcohol abuse      Hyperlipidemia      Hypertension      Low  testosterone      MRSA (methicillin resistant staph aureus) culture positive 8/2013    skin infection     PUD (peptic ulcer disease)      Sleep apnea         Past Surgical History:   Procedure Laterality Date     COLONOSCOPY  11/11/2013    Procedure: COMBINED COLONOSCOPY, SINGLE BIOPSY/POLYPECTOMY BY BIOPSY;  Colonoscopy/ polypectomy x2 by cold forceps    ;  Surgeon: Juan Carlos Bellamy MD;  Location:  GI     ESOPHAGOSCOPY, GASTROSCOPY, DUODENOSCOPY (EGD), COMBINED N/A 9/22/2016    Procedure: COMBINED ESOPHAGOSCOPY, GASTROSCOPY, DUODENOSCOPY (EGD), BIOPSY SINGLE OR MULTIPLE;  Surgeon: Juan Carlos Barraza MD;  Location:  GI     ORTHOPEDIC SURGERY         Social History     Tobacco Use     Smoking status: Former Smoker     Types: Cigarettes     Smokeless tobacco: Never Used   Substance Use Topics     Alcohol use: No     Alcohol/week: 0.0 oz     Comment: 14 drinks weekly, quit on 08/2017       Family History   Problem Relation Age of Onset     Pancreatic Cancer Mother      Pancreatic Cancer Paternal Grandfather      Diabetes Brother      Depression Brother      Suicide Brother      Substance Abuse Brother      Dementia Father      Parkinsonism Father      Family History Negative No family hx of      Colon Cancer No family hx of      Unknown/Adopted No family hx of      Anxiety Disorder No family hx of      Schizophrenia No family hx of      Bipolar Disorder No family hx of      Ambika Disease No family hx of      Autism Spectrum Disorder No family hx of      Intellectual Disability (Mental Retardation) No family hx of      Mental Illness No family hx of        Prior to Admission medications    Medication Sig Start Date End Date Taking? Authorizing Provider   ALPRAZolam (XANAX) 0.5 MG tablet Take 1 tablet (0.5 mg) by mouth 3 times daily as needed for anxiety 10/10/16   Javier Villafana MD   aspirin 81 MG tablet Take 1 tablet by mouth daily.    Reported, Patient   atorvastatin (LIPITOR) 40 MG tablet TAKE 1  "TABLET BY MOUTH EVERY DAY 1/17/19   Javier Villafana MD   B-D LUER-ROCHELLE SYRINGE 22G X 1-1/2\" 3 ML MISC USE WITH TESTOSTERONE 1/22/19   Javier Villafana MD   BD HYPODERMIC NEEDLE 18G X 1\" MISC USE WITH TESTOSTERONE 3/26/18   Coco Larose MD   glimepiride (AMARYL) 1 MG tablet TAKE 2 TABLETS BY MOUTH EVERY MORNING BEFORE BREAKFAST (DUE FOR LABS IN NOVEMBER 2018) 2/14/19   Coco Larose MD   lisinopril (PRINIVIL/ZESTRIL) 10 MG tablet TAKE 1 TABLET BY MOUTH EVERY DAY (DUE FOR APPT) 2/14/19   Coco Larose MD   metFORMIN (GLUCOPHAGE-XR) 500 MG 24 hr tablet Take 4 tablets (2,000 mg) by mouth daily (with dinner) 2/5/19 2/5/20  Javier Villafana MD   Multiple Vitamins-Minerals (CENTRUM SILVER) per tablet Take 1 tablet by mouth daily 6/22/16   Javier Villafana MD   nitroGLYcerin (NITROSTAT) 0.4 MG sublingual tablet For chest pain place 1 tablet under the tongue every 5 minutes for 3 doses. If symptoms persist 5 minutes after 1st dose call 911. 10/18/18   Haja Estevez MD   omeprazole (PRILOSEC) 40 MG DR capsule TAKE ONE CAPSULE BY MOUTH EVERY DAY 1/8/19   Javier Villafana MD   ONETOUCH ULTRA test strip USE TO TEST BLOOD SUGARS ONE TIME DAILY OR AS DIRECTED. 8/17/18   Javier Villafana MD   PHARMACIST CHOICE LANCETS MISC TEST BLOOD SUGARS 5 TIMES DAILY 5/14/18   Javier Villafana MD   PRODIGY NO CODING BLOOD GLUC test strip TEST BLOOD SUGARS 5 TIMES DAILY 5/14/18   Javier Villafana MD   tadalafil (CIALIS) 20 MG tablet Take 1 tablet (20 mg) by mouth daily Never use with nitroglycerin, terazosin or doxazosin. 8/1/18   Javier Villafana MD   tamsulosin (FLOMAX) 0.4 MG capsule TAKE 1 CAPSULE BY MOUTH DAILY TAKE AT BEDTIME (DUE FOR APPT) 2/14/19   Coco Larose MD   testosterone cypionate (DEPOTESTOSTERONE) 200 MG/ML injection INJECT 2MLS INTRAMUSCULARLY ONCE WEEKLY 2/11/19   Javier Villafana MD       No Known Allergies     REVIEW OF SYSTEMS:   5 point ROS negative except as noted above in " "HPI, including Gen., Resp., CV, GI &  system review.    PHYSICAL EXAM:   There were no vitals taken for this visit. Estimated body mass index is 29.27 kg/m  as calculated from the following:    Height as of 2/5/19: 1.88 m (6' 2\").    Weight as of 2/5/19: 103.4 kg (228 lb).   GENERAL APPEARANCE: alert, and oriented  MENTAL STATUS: alert  AIRWAY EXAM: Mallampatti Class I (visualization of the soft palate, fauces, uvula, anterior and posterior pillars)  RESP: lungs clear to auscultation - no rales, rhonchi or wheezes  CV: regular rates and rhythm  DIAGNOSTICS:    Not indicated    IMPRESSION   ASA Class 2 - Mild systemic disease    PLAN:   Plan for Gastroscopy with possible biopsy, possible dilation. We discussed the risks, benefits and alternatives and the patient wished to proceed.    The above has been forwarded to the consulting provider.      Signed Electronically by: Juan Carlos Barraza  February 19, 2019          "

## 2019-02-20 ENCOUNTER — OFFICE VISIT (OUTPATIENT)
Dept: ORTHOPEDICS | Facility: CLINIC | Age: 64
End: 2019-02-20
Payer: COMMERCIAL

## 2019-02-20 ENCOUNTER — ANCILLARY PROCEDURE (OUTPATIENT)
Dept: GENERAL RADIOLOGY | Facility: CLINIC | Age: 64
End: 2019-02-20
Attending: FAMILY MEDICINE
Payer: COMMERCIAL

## 2019-02-20 ENCOUNTER — HOSPITAL ENCOUNTER (OUTPATIENT)
Dept: MRI IMAGING | Facility: CLINIC | Age: 64
Discharge: HOME | End: 2019-02-20
Attending: FAMILY MEDICINE | Admitting: FAMILY MEDICINE
Payer: COMMERCIAL

## 2019-02-20 VITALS
DIASTOLIC BLOOD PRESSURE: 62 MMHG | HEIGHT: 74 IN | BODY MASS INDEX: 29.26 KG/M2 | WEIGHT: 228 LBS | SYSTOLIC BLOOD PRESSURE: 110 MMHG

## 2019-02-20 DIAGNOSIS — R29.898 SHOULDER WEAKNESS: ICD-10-CM

## 2019-02-20 DIAGNOSIS — M16.12 PRIMARY OSTEOARTHRITIS OF LEFT HIP: Primary | ICD-10-CM

## 2019-02-20 DIAGNOSIS — M25.511 ACUTE PAIN OF RIGHT SHOULDER: ICD-10-CM

## 2019-02-20 LAB — COPATH REPORT: NORMAL

## 2019-02-20 PROCEDURE — 73030 X-RAY EXAM OF SHOULDER: CPT | Mod: RT

## 2019-02-20 PROCEDURE — 20611 DRAIN/INJ JOINT/BURSA W/US: CPT | Mod: LT | Performed by: FAMILY MEDICINE

## 2019-02-20 PROCEDURE — 73221 MRI JOINT UPR EXTREM W/O DYE: CPT | Mod: RT

## 2019-02-20 PROCEDURE — 99214 OFFICE O/P EST MOD 30 MIN: CPT | Mod: 25 | Performed by: FAMILY MEDICINE

## 2019-02-20 RX ADMIN — METHYLPREDNISOLONE ACETATE 40 MG: 40 INJECTION, SUSPENSION INTRA-ARTICULAR; INTRALESIONAL; INTRAMUSCULAR; SOFT TISSUE at 15:04

## 2019-02-20 ASSESSMENT — MIFFLIN-ST. JEOR: SCORE: 1888.95

## 2019-02-20 NOTE — PATIENT INSTRUCTIONS
1. Primary osteoarthritis of left hip    2. Acute pain of right shoulder    3. Shoulder weakness      Steroid injection of the left hip: intra-articular  was performed today in clinic    - Would not soak in a hot tub, bath or swimming pool for 48 hours  - Ok to shower  - Ice today and only do your normal amounts of activity  - The lidocaine (what is giving you pain relief right now) will likely stop working in 1-2 hours.  You will then have pain again, similar to before you received the injection. The corticosteroid will not start working until approximately 1-2 weeks from now.  Can repeat the injection anytime after 4 months    Reviewed right shoulder x-ray - no significant arthritis but you do have an area of calcific tendinopathy - this is not new.  You do have weakness with testing of your rotator cuff therefor recommend imaging.  MRI of your right shoulder has been ordered. Schedule with [place] ([phone]).     [facility] next steps.

## 2019-02-20 NOTE — PROGRESS NOTES
"ASSESSMENT & PLAN    1. Primary osteoarthritis of left hip    2. Acute pain of right shoulder    3. Shoulder weakness      Presents for repeat left hip cortisone injection and also has a new complaint of right shoulder pain.  Is undergone a few sessions of therapy without improvement.    Steroid injection of the left hip: intra-articular  was performed today in clinic  Can repeat the injection anytime after 4 months    Reviewed right shoulder x-ray -calcific tendinopathy  Weakness with cuff testing concerning for tearing  MRI of your right shoulder has been ordered. Schedule with [place] ([phone]).     Follow up over [facility] with results / next steps.    -----    SUBJECTIVE:  Austyn Araujo is a 63 year old male who is seen in follow-up for left hip pain.They were last seen 8/15/18.      Since their last visit reports 80% improvement with a slow return of symptoms They indicate that their current pain level is 2/10. They have tried corticosteroid injection (most recent date: 8/15/18) that provided  5 month(s) of relief.      The patient is seen by themselves.    Patient's past medical, surgical, social, and family histories were reviewed today and no changes are noted.     Patient also reports right shoulder pain that had started approximately 1 month ago. Pain is generalized \" ball and socket\", and posterolateral shoulder pain.   He notes he is not able to sleep on the right side, and has had increasing pain of the right shoulder. Currently in physical therapy that causes increased pain. Patient notes limited range of motion with overhead and abduction. Patient unsure of weakness of right shoulder, but does not want to use the arm due to pain. Current pain level: 8/10, Worse pain level: 9/10.    Treatment: Advil, Physical Therapy, ice    A1C 8.6 on  2/5/19.    REVIEW OF SYSTEMS:  Constitutional: NEGATIVE for fever, chills, change in weight  Skin: NEGATIVE for worrisome rashes, moles or lesions  GI/: " "NEGATIVE for bowel or bladder changes  Neuro: NEGATIVE for weakness, dizziness or paresthesias    OBJECTIVE:  /62 (BP Location: Right arm, Patient Position: Chair, Cuff Size: Adult Large)   Ht 1.88 m (6' 2\")   Wt 103.4 kg (228 lb)   BMI 29.27 kg/m     General: healthy, alert and in no distress  HEENT: no scleral icterus or conjunctival erythema  Skin: no suspicious lesions or rash. No jaundice.  CV: regular rhythm by palpation, no pedal edema  Resp: normal respiratory effort without conversational dyspnea   Psych: normal mood and affect  Gait: normal steady gait with appropriate coordination and balance  Neuro: normal light touch sensory exam of the extremities.    MSK:  RIGHT SHOULDER  Inspection:    no atrophy  Palpation:    Tender about the subscap, proximal bicep, anterior capsule.  Tender over supraspinatus insertion. Remainder of bony and tendinous landmarks are nontender.  Active Range of Motion:     Abduction 1200, FF 1350,     Strength:    Scapular plane abduction 5-/5, painful,  ER 5/5, IR 4+/5, biceps 5/5, painful  Special Tests:    Positive: Becker', supraspinatus (empty can), speeds    Independent visualization of the below image:  RIGHT SHOULDER THREE OR MORE VIEWS  2/20/2019 3:13 PM      HISTORY: Acute pain of right shoulder.     COMPARISON: None.                                                                      IMPRESSION: No acute bony abnormality. There is a focus of amorphous  calcification projecting anterior to the humeral head/neck measuring  up to 2.6 x 0.8 cm on the axillary view. This may be calcific  tendinitis of the subscapularis tendon or potentially calcific  bursitis. There is also a small focus of ossification projecting  posterior to the humeral head measuring 0.5 x 0.3 cm on the Y view,  also potentially representing calcific tendinitis or calcific  bursitis. Either of these calcifications could also represent a loose  body. No significant osteoarthritis. Distal " acromial morphology is  type I or II and humeral acromial distance appears adequate.     OTILIA MCCAIN MD      Large Joint Injection/Arthocentesis: L hip joint  Date/Time: 2/20/2019 3:04 PM  Performed by: Lesly Paniagua DO  Authorized by: Lesly Paniagua DO     Indications:  Pain  Needle Size:  22 G  Guidance: ultrasound    Approach:  Anterior  Location:  Hip  Site:  L hip joint  Medications:  40 mg methylPREDNISolone 40 MG/ML  Outcome:  Tolerated well, no immediate complications  Consent Given by:  Patient  Timeout: timeout called immediately prior to procedure    Prep: patient was prepped and draped in usual sterile fashion        Patient's conditions were thoroughly discussed during today's visit with greater than 50% of the visit spent counseling the patient with total time spent face-to-face with the patient being 40 minutes with injection taking 10 minutes.    Lesly Paniagua DO [facility] [place]

## 2019-02-20 NOTE — LETTER
"    2/20/2019         RE: Austyn Araujo  [address] [facility] [id number]        Dear Colleague,    Thank you for referring your patient, Austyn Araujo, to the [facility]. Please see a copy of my visit note below.    ASSESSMENT & PLAN    1. Primary osteoarthritis of left hip    2. Acute pain of right shoulder    3. Shoulder weakness      Presents for repeat left hip cortisone injection and also has a new complaint of right shoulder pain.  Is undergone a few sessions of therapy without improvement.    Steroid injection of the left hip: intra-articular  was performed today in clinic  Can repeat the injection anytime after 4 months    Reviewed right shoulder x-ray -calcific tendinopathy  Weakness with cuff testing concerning for tearing  MRI of your right shoulder has been ordered. Schedule with [facility] ([phone]).     Follow up over [facility] with results / next steps.    -----    SUBJECTIVE:  Austyn Araujo is a 63 year old male who is seen in follow-up for left hip pain.They were last seen 8/15/18.      Since their last visit reports 80% improvement with a slow return of symptoms They indicate that their current pain level is 2/10. They have tried corticosteroid injection (most recent date: 8/15/18) that provided  5 month(s) of relief.      The patient is seen by themselves.    Patient's past medical, surgical, social, and family histories were reviewed today and no changes are noted.     Patient also reports right shoulder pain that had started approximately 1 month ago. Pain is generalized \" ball and socket\", and posterolateral shoulder pain.   He notes he is not able to sleep on the right side, and has had increasing pain of the right shoulder. Currently in physical therapy that causes increased pain. Patient notes limited range of motion with overhead and abduction. Patient unsure of weakness of right shoulder, but does not want to use the arm due to pain. Current pain " "level: 8/10, Worse pain level: 9/10.    Treatment: Advil, Physical Therapy, ice    A1C 8.6 on  2/5/19.    REVIEW OF SYSTEMS:  Constitutional: NEGATIVE for fever, chills, change in weight  Skin: NEGATIVE for worrisome rashes, moles or lesions  GI/: NEGATIVE for bowel or bladder changes  Neuro: NEGATIVE for weakness, dizziness or paresthesias    OBJECTIVE:  /62 (BP Location: Right arm, Patient Position: Chair, Cuff Size: Adult Large)   Ht 1.88 m (6' 2\")   Wt 103.4 kg (228 lb)   BMI 29.27 kg/m      General: healthy, alert and in no distress  HEENT: no scleral icterus or conjunctival erythema  Skin: no suspicious lesions or rash. No jaundice.  CV: regular rhythm by palpation, no pedal edema  Resp: normal respiratory effort without conversational dyspnea   Psych: normal mood and affect  Gait: normal steady gait with appropriate coordination and balance  Neuro: normal light touch sensory exam of the extremities.    MSK:  RIGHT SHOULDER  Inspection:    no atrophy  Palpation:    Tender about the subscap, proximal bicep, anterior capsule.  Tender over supraspinatus insertion. Remainder of bony and tendinous landmarks are nontender.  Active Range of Motion:     Abduction 1200, FF 1350,     Strength:    Scapular plane abduction 5-/5, painful,  ER 5/5, IR 4+/5, biceps 5/5, painful  Special Tests:    Positive: Becker', supraspinatus (empty can), speeds    Independent visualization of the below image:  RIGHT SHOULDER THREE OR MORE VIEWS  2/20/2019 3:13 PM      HISTORY: Acute pain of right shoulder.     COMPARISON: None.                                                                      IMPRESSION: No acute bony abnormality. There is a focus of amorphous  calcification projecting anterior to the humeral head/neck measuring  up to 2.6 x 0.8 cm on the axillary view. This may be calcific  tendinitis of the subscapularis tendon or potentially calcific  bursitis. There is also a small focus of ossification " projecting  posterior to the humeral head measuring 0.5 x 0.3 cm on the Y view,  also potentially representing calcific tendinitis or calcific  bursitis. Either of these calcifications could also represent a loose  body. No significant osteoarthritis. Distal acromial morphology is  type I or II and humeral acromial distance appears adequate.     OTILIA MCCAIN MD      Large Joint Injection/Arthocentesis: L hip joint  Date/Time: 2/20/2019 3:04 PM  Performed by: Lesly Paniagua DO  Authorized by: Lesly Paniagua DO     Indications:  Pain  Needle Size:  22 G  Guidance: ultrasound    Approach:  Anterior  Location:  Hip  Site:  L hip joint  Medications:  40 mg methylPREDNISolone 40 MG/ML  Outcome:  Tolerated well, no immediate complications  Consent Given by:  Patient  Timeout: timeout called immediately prior to procedure    Prep: patient was prepped and draped in usual sterile fashion        Patient's conditions were thoroughly discussed during today's visit with greater than 50% of the visit spent counseling the patient with total time spent face-to-face with the patient being 40 minutes with injection taking 10 minutes.    Lesly Paniagua DO [facility]          Again, thank you for allowing me to participate in the care of your patient.        Sincerely,        Lesly Paniagua DO

## 2019-02-21 ENCOUNTER — TELEPHONE (OUTPATIENT)
Dept: INTERNAL MEDICINE | Facility: CLINIC | Age: 64
End: 2019-02-21

## 2019-02-21 RX ORDER — METHYLPREDNISOLONE ACETATE 40 MG/ML
40 INJECTION, SUSPENSION INTRA-ARTICULAR; INTRALESIONAL; INTRAMUSCULAR; SOFT TISSUE
Status: DISCONTINUED | OUTPATIENT
Start: 2019-02-20 | End: 2019-10-18 | Stop reason: CLARIF

## 2019-02-21 NOTE — TELEPHONE ENCOUNTER
Panel Management Review      Patient has the following on his problem list:     Depression / Dysthymia review    Measure:  Needs PHQ-9 score of 4 or less during index window.  Administer PHQ-9 and if score is 5 or more, send encounter to provider for next steps.    5 - 7 month window range: N/A    PHQ-9 SCORE 7/6/2017 12/1/2017 2/5/2019   PHQ-9 Total Score - - -   PHQ-9 Total Score MyChart - - 4 (Minimal depression)   PHQ-9 Total Score 3 6 4   Some encounter information is confidential and restricted. Go to Review Flowsheets activity to see all data.       If PHQ-9 recheck is 5 or more, route to provider for next steps.    Patient is due for:  None    Diabetes    ASA: Passed    Last A1C  Lab Results   Component Value Date    A1C 8.6 02/05/2019    A1C 8.2 08/01/2018    A1C 8.6 04/25/2018    A1C 8.5 11/29/2017    A1C 7.3 07/06/2017     A1C tested: up-to-date, but over goal    Last LDL:    Lab Results   Component Value Date    CHOL 156 02/05/2019     Lab Results   Component Value Date    HDL 42 02/05/2019     Lab Results   Component Value Date    LDL 93 02/05/2019     Lab Results   Component Value Date    TRIG 105 02/05/2019     Lab Results   Component Value Date    CHOLHDLRATIO 5.1 09/30/2015     Lab Results   Component Value Date    NHDL 114 02/05/2019       Is the patient on a Statin? YES             Is the patient on Aspirin? YES    Medications     HMG CoA Reductase Inhibitors     atorvastatin (LIPITOR) 40 MG tablet       Salicylates     aspirin 81 MG tablet             Last three blood pressure readings:  BP Readings from Last 3 Encounters:   02/20/19 110/62   02/19/19 115/87   02/05/19 118/70       Date of last diabetes office visit: 2/5/19     Tobacco History:     History   Smoking Status     Former Smoker     Types: Cigarettes   Smokeless Tobacco     Never Used         Hypertension   Last three blood pressure readings:  BP Readings from Last 3 Encounters:   02/20/19 110/62   02/19/19 115/87   02/05/19 118/70      Blood pressure: Passed    HTN Guidelines:  Age 18-59 BP range:  Less than 140/90  Age 60-85 with Diabetes:  Less than 140/90  Age 60-85 without Diabetes:  less than 150/90      Composite cancer screening  Chart review shows that this patient is due/due soon for the following None  Summary:    Patient is due/failing the following:   Pt was originally failing for hypertension since he was overdue for appointment, but since he came in a few weeks ago, he should be passing all quality measures now.     Action needed:   None needed    Type of outreach:    None needed. Pt is up-to-date at this time.     Questions for provider review:    None                                                                                                                                    Rashmi Melendrez [facility]       Chart not routed.

## 2019-02-22 ENCOUNTER — TELEPHONE (OUTPATIENT)
Dept: ORTHOPEDICS | Facility: CLINIC | Age: 64
End: 2019-02-22

## 2019-02-22 NOTE — TELEPHONE ENCOUNTER
Reviewed MRI results - subscap and infraspinatus calcific tendinopathy. No GH OA. No cuff tearing.    Plan was to forward results over [facility] but patient isn't set up yet. If he calls ok to give results but would also recommend following up in the office.    Options: Tenex to address calcific tendinopathy but need to do US evaluation in the office to insure can access locations (40 min appt).  May require PRP thereafter if pain is not well controlled.    Lesly Paniagua DO, CAQSM  [facility]

## 2019-03-11 ENCOUNTER — OFFICE VISIT (OUTPATIENT)
Dept: UROLOGY | Facility: CLINIC | Age: 64
End: 2019-03-11
Payer: COMMERCIAL

## 2019-03-11 VITALS — WEIGHT: 228 LBS | HEIGHT: 74 IN | BODY MASS INDEX: 29.26 KG/M2 | OXYGEN SATURATION: 98 % | HEART RATE: 74 BPM

## 2019-03-11 DIAGNOSIS — R97.20 ELEVATED PROSTATE SPECIFIC ANTIGEN (PSA): Primary | ICD-10-CM

## 2019-03-11 LAB — RESIDUAL VOLUME (RV) (EXTERNAL): 32

## 2019-03-11 PROCEDURE — 99204 OFFICE O/P NEW MOD 45 MIN: CPT | Mod: 25 | Performed by: UROLOGY

## 2019-03-11 PROCEDURE — 51798 US URINE CAPACITY MEASURE: CPT | Performed by: UROLOGY

## 2019-03-11 RX ORDER — CIPROFLOXACIN 500 MG/1
500 TABLET, FILM COATED ORAL 2 TIMES DAILY
Qty: 6 TABLET | Refills: 0 | Status: SHIPPED | OUTPATIENT
Start: 2019-03-11 | End: 2019-07-08

## 2019-03-11 ASSESSMENT — MIFFLIN-ST. JEOR: SCORE: 1888.95

## 2019-03-11 ASSESSMENT — PAIN SCALES - GENERAL: PAINLEVEL: NO PAIN (0)

## 2019-03-11 NOTE — PROGRESS NOTES
[facility]  Main Office: [address]  [address]  [place] [address]       CHIEF COMPLAINT:  Elevated PSA    HISTORY:   I was asked by Dr. Villafana to see this 63-year-old gentleman who presents with an elevated PSA. The PSA was first elevated in 2017 when it was 4.39. Last year it anupam up to 5.05 and then more recently now at 5.11. He had been referred to urology previously for an appointment in January 2018 but he no showed that appointment. He does have  A family history of prostate cancer in having 2 uncles with the disease. His father and older brother do not have a history of prostate cancer. He does complain of urinary frequency. He typically gets up 3 times a night to urinate. He says his urinary stream is overall normal. He has no history of gross hematuria or infections.      PAST MEDICAL HISTORY:   Past Medical History:   Diagnosis Date     Anxiety      Anxiety 1/31/2015     BPH (benign prostatic hyperplasia)      Degenerative disc disease      Depression      Diabetes mellitus (H)      Gastritis      Gastro-oesophageal reflux disease      H/O alcohol abuse      Hyperlipidemia      Hypertension      Low testosterone      MRSA (methicillin resistant staph aureus) culture positive 8/2013    skin infection     Mumps      PUD (peptic ulcer disease)      Sleep apnea      STD (sexually transmitted disease)        PAST SURGICAL HISTORY:   Past Surgical History:   Procedure Laterality Date     COLONOSCOPY  11/11/2013    Procedure: COMBINED COLONOSCOPY, SINGLE BIOPSY/POLYPECTOMY BY BIOPSY;  Colonoscopy/ polypectomy x2 by cold forceps    ;  Surgeon: Juan Carlos Bellamy MD;  Location:  GI     ESOPHAGOSCOPY, GASTROSCOPY, DUODENOSCOPY (EGD), COMBINED N/A 9/22/2016    Procedure: COMBINED ESOPHAGOSCOPY, GASTROSCOPY, DUODENOSCOPY (EGD), BIOPSY SINGLE OR MULTIPLE;  Surgeon: Juan Carlos Barraza MD;  Location:  GI     ESOPHAGOSCOPY, GASTROSCOPY, DUODENOSCOPY (EGD), COMBINED N/A 2/19/2019    Procedure:  "ESOPHAGOSCOPY, GASTROSCOPY, DUODENOSCOPY (EGD) with cold forcep;  Surgeon: Juan Carlos Barraza MD;  Location:  GI     HERNIA REPAIR       ORTHOPEDIC SURGERY         FAMILY HISTORY:   Family History   Problem Relation Age of Onset     Pancreatic Cancer Mother      Pancreatic Cancer Paternal Grandfather      Diabetes Brother      Depression Brother      Suicide Brother      Substance Abuse Brother      Dementia Father      Parkinsonism Father      Family History Negative No family hx of      Colon Cancer No family hx of      Unknown/Adopted No family hx of      Anxiety Disorder No family hx of      Schizophrenia No family hx of      Bipolar Disorder No family hx of      Amador Disease No family hx of      Autism Spectrum Disorder No family hx of      Intellectual Disability (Mental Retardation) No family hx of      Mental Illness No family hx of        SOCIAL HISTORY:   Social History     Tobacco Use     Smoking status: Former Smoker     Types: Cigarettes     Smokeless tobacco: Never Used   Substance Use Topics     Alcohol use: No     Alcohol/week: 0.0 oz     Comment: 14 drinks weekly, quit on 08/2017        No Known Allergies      Current Outpatient Medications:      ALPRAZolam (XANAX) 0.5 MG tablet, Take 1 tablet (0.5 mg) by mouth 3 times daily as needed for anxiety, Disp: 30 tablet, Rfl: 0     aspirin 81 MG tablet, Take 1 tablet by mouth daily., Disp: , Rfl:      atorvastatin (LIPITOR) 40 MG tablet, TAKE 1 TABLET BY MOUTH EVERY DAY, Disp: 90 tablet, Rfl: 0     B-D LUER-ROCHELLE SYRINGE 22G X 1-1/2\" 3 ML MISC, USE WITH TESTOSTERONE, Disp: 12 each, Rfl: 3     BD HYPODERMIC NEEDLE 18G X 1\" MISC, USE WITH TESTOSTERONE, Disp: 25 each, Rfl: 6     glimepiride (AMARYL) 1 MG tablet, TAKE 2 TABLETS BY MOUTH EVERY MORNING BEFORE BREAKFAST (DUE FOR LABS IN NOVEMBER 2018), Disp: 180 tablet, Rfl: 0     lisinopril (PRINIVIL/ZESTRIL) 10 MG tablet, TAKE 1 TABLET BY MOUTH EVERY DAY (DUE FOR APPT), Disp: 30 tablet, Rfl: 0     " metFORMIN (GLUCOPHAGE-XR) 500 MG 24 hr tablet, Take 4 tablets (2,000 mg) by mouth daily (with dinner), Disp: 360 tablet, Rfl: 3     Multiple Vitamins-Minerals (CENTRUM SILVER) per tablet, Take 1 tablet by mouth daily, Disp: 90 tablet, Rfl: 3     omeprazole (PRILOSEC) 40 MG DR capsule, TAKE ONE CAPSULE BY MOUTH EVERY DAY, Disp: 90 capsule, Rfl: 0     ONETOUCH ULTRA test strip, USE TO TEST BLOOD SUGARS ONE TIME DAILY OR AS DIRECTED., Disp: 100 strip, Rfl: 3     PHARMACIST CHOICE LANCETS MISC, TEST BLOOD SUGARS 5 TIMES DAILY, Disp: 100 each, Rfl: 3     PRODIGY NO CODING BLOOD GLUC test strip, TEST BLOOD SUGARS 5 TIMES DAILY, Disp: 100 each, Rfl: 3     tadalafil (CIALIS) 20 MG tablet, Take 1 tablet (20 mg) by mouth daily Never use with nitroglycerin, terazosin or doxazosin., Disp: 100 tablet, Rfl: 1     tamsulosin (FLOMAX) 0.4 MG capsule, TAKE 1 CAPSULE BY MOUTH DAILY TAKE AT BEDTIME (DUE FOR APPT), Disp: 30 capsule, Rfl: 0     testosterone cypionate (DEPOTESTOSTERONE) 200 MG/ML injection, INJECT 2MLS INTRAMUSCULARLY ONCE WEEKLY, Disp: 10 mL, Rfl: 3     nitroGLYcerin (NITROSTAT) 0.4 MG sublingual tablet, For chest pain place 1 tablet under the tongue every 5 minutes for 3 doses. If symptoms persist 5 minutes after 1st dose call 911. (Patient not taking: Reported on 3/11/2019), Disp: 25 tablet, Rfl: 3    Current Facility-Administered Medications:      methylPREDNISolone (DEPO-MEDROL) injection 40 mg, 40 mg, , , Lesly Paniagua, , 40 mg at 02/20/19 1504    Review Of Systems:  Skin: No rash, pruritis, or skin pigmentation  Eyes: No changes in vision  Ears/Nose/Throat: No changes in hearing, no nosebleeds  Respiratory: No shortness of breath, dyspnea on exertion, cough, or hemoptysis  Cardiovascular: No chest pain or palpitations  Gastrointestinal: No diarrhea or constipation. No abdominal pain. No hematochezia  Genitourinary: see HPI  Musculoskeletal: No pain or swelling of joints, normal range of motion  Neurologic:  "No weakness or tremors  Psychiatric: No recent changes in memory or mood  Hematologic/Lymphatic/Immunologic: No easy bruising or enlarged lymph nodes  Endocrine: No weight gain or loss      PHYSICAL EXAM:    Pulse 74   Ht 1.88 m (6' 2\")   Wt 103.4 kg (228 lb)   SpO2 98%   BMI 29.27 kg/m    General appearance: In NAD, conversant  HEENT: Normocephalic and atraumatic, anicteric sclera  Cardiovascular: No peripheral edema  Respiratory: normal, non-labored breathing  Gastrointestinal: negative, Abdomen soft, non-tender, and non-distended.   Musculoskeletal: normal musculature and movements  Peripheral Vascular/extremity: No peripheral edema  Skin: Normal temperature, turgor, and texture. No rash  Psychiatric: Appropriate affect, alert and oriented to person, place, and time    Penis: Normal  Scrotal skin: Normal, no lesions  Testicles: Normal to palpation bilaterally  Epididymis: Normal to palpation bilaterally  Lymphatic: Normal inguinal lymph nodes    Digital Rectal Exam: his prostate is moderately enlarged, benign and symmetric to palpation    Cystoscopy: Not done      PSA: 5.11    UA RESULTS:  Recent Labs   Lab Test 02/05/19  1413   COLOR Yellow   APPEARANCE Clear   URINEGLC 100*   URINEBILI Negative   URINEKETONE Negative   SG 1.020   UBLD Negative   URINEPH 6.0   PROTEIN Negative   UROBILINOGEN 0.2   NITRITE Negative   LEUKEST Small*   RBCU O - 2   WBCU 5-10*       Bladder Scan: 32mL     Other Labs:      Imaging Studies: None      CLINICAL IMPRESSION:   Elevated PSA, enlarged prostate    PLAN:   He has an enlarged prostate with a few minimal symptoms. We discussed his PSA. The PSA has risen steadily since 2017.This raises the concern for a potential prostate cancer.  We discussed his options. I recommended a prostate biopsy. We discussed prostate biopsy today along with its risks, including bleeding and infection. All of his questions were answered.We will get him scheduled in the near future. Ciprofloxacin was " prescribed for prophylaxis.      Mohsen Rao MD

## 2019-03-11 NOTE — NURSING NOTE
Pt jjust voided... No UA at this time.  Pt is up 3 times a nt to void.  Pt drinks coffee until mid afternoon.... Coke... .  Pt states he sometimes has trouble starting flow.  Pt has a vein in his forehead that is bothering him.    BONNIE Shin CMA  pvr by scanner=32mL  BONNIE Shin CMA

## 2019-03-11 NOTE — LETTER
RE: Austyn Araujo  [address]  [facility] [address]     Dear Colleague,    Thank you for referring your patient, Austyn Araujo, to the [facility] [place] [facility]. Please see a copy of my visit note below.    [facility]  Main Office: [address]  [address]  [place] [address]     CHIEF COMPLAINT:  Elevated PSA    HISTORY:   I was asked by Dr. Villafana to see this 63-year-old gentleman who presents with an elevated PSA. The PSA was first elevated in 2017 when it was 4.39. Last year it anupam up to 5.05 and then more recently now at 5.11. He had been referred to urology previously for an appointment in January 2018 but he no showed that appointment. He does have  A family history of prostate cancer in having 2 uncles with the disease. His father and older brother do not have a history of prostate cancer. He does complain of urinary frequency. He typically gets up 3 times a night to urinate. He says his urinary stream is overall normal. He has no history of gross hematuria or infections.    PAST MEDICAL HISTORY:   Past Medical History:   Diagnosis Date     Anxiety      Anxiety 1/31/2015     BPH (benign prostatic hyperplasia)      Degenerative disc disease      Depression      Diabetes mellitus (H)      Gastritis      Gastro-oesophageal reflux disease      H/O alcohol abuse      Hyperlipidemia      Hypertension      Low testosterone      MRSA (methicillin resistant staph aureus) culture positive 8/2013    skin infection     Mumps      PUD (peptic ulcer disease)      Sleep apnea      STD (sexually transmitted disease)        PAST SURGICAL HISTORY:   Past Surgical History:   Procedure Laterality Date     COLONOSCOPY  11/11/2013    Procedure: COMBINED COLONOSCOPY, SINGLE BIOPSY/POLYPECTOMY BY BIOPSY;  Colonoscopy/ polypectomy x2 by cold forceps    ;  Surgeon: Juan Carlos Bellamy MD;  Location:  GI      "ESOPHAGOSCOPY, GASTROSCOPY, DUODENOSCOPY (EGD), COMBINED N/A 9/22/2016    Procedure: COMBINED ESOPHAGOSCOPY, GASTROSCOPY, DUODENOSCOPY (EGD), BIOPSY SINGLE OR MULTIPLE;  Surgeon: Juan Carlos Barraza MD;  Location:  GI     ESOPHAGOSCOPY, GASTROSCOPY, DUODENOSCOPY (EGD), COMBINED N/A 2/19/2019    Procedure: ESOPHAGOSCOPY, GASTROSCOPY, DUODENOSCOPY (EGD) with cold forcep;  Surgeon: Juan Carlos Barraza MD;  Location:  GI     HERNIA REPAIR       ORTHOPEDIC SURGERY         FAMILY HISTORY:   Family History   Problem Relation Age of Onset     Pancreatic Cancer Mother      Pancreatic Cancer Paternal Grandfather      Diabetes Brother      Depression Brother      Suicide Brother      Substance Abuse Brother      Dementia Father      Parkinsonism Father      Family History Negative No family hx of      Colon Cancer No family hx of      Unknown/Adopted No family hx of      Anxiety Disorder No family hx of      Schizophrenia No family hx of      Bipolar Disorder No family hx of      Young Disease No family hx of      Autism Spectrum Disorder No family hx of      Intellectual Disability (Mental Retardation) No family hx of      Mental Illness No family hx of        SOCIAL HISTORY:   Social History     Tobacco Use     Smoking status: Former Smoker     Types: Cigarettes     Smokeless tobacco: Never Used   Substance Use Topics     Alcohol use: No     Alcohol/week: 0.0 oz     Comment: 14 drinks weekly, quit on 08/2017      No Known Allergies    Current Outpatient Medications:      ALPRAZolam (XANAX) 0.5 MG tablet, Take 1 tablet (0.5 mg) by mouth 3 times daily as needed for anxiety, Disp: 30 tablet, Rfl: 0     aspirin 81 MG tablet, Take 1 tablet by mouth daily., Disp: , Rfl:      atorvastatin (LIPITOR) 40 MG tablet, TAKE 1 TABLET BY MOUTH EVERY DAY, Disp: 90 tablet, Rfl: 0     B-D LUER-ROCHELLE SYRINGE 22G X 1-1/2\" 3 ML MISC, USE WITH TESTOSTERONE, Disp: 12 each, Rfl: 3     BD HYPODERMIC NEEDLE 18G X 1\" MISC, USE WITH TESTOSTERONE, " "Disp: 25 each, Rfl: 6     glimepiride (AMARYL) 1 MG tablet, TAKE 2 TABLETS BY MOUTH EVERY MORNING BEFORE BREAKFAST (DUE FOR LABS IN NOVEMBER 2018), Disp: 180 tablet, Rfl: 0     lisinopril (PRINIVIL/ZESTRIL) 10 MG tablet, TAKE 1 TABLET BY MOUTH EVERY DAY (DUE FOR APPT), Disp: 30 tablet, Rfl: 0     metFORMIN (GLUCOPHAGE-XR) 500 MG 24 hr tablet, Take 4 tablets (2,000 mg) by mouth daily (with dinner), Disp: 360 tablet, Rfl: 3     Multiple Vitamins-Minerals (CENTRUM SILVER) per tablet, Take 1 tablet by mouth daily, Disp: 90 tablet, Rfl: 3     omeprazole (PRILOSEC) 40 MG DR capsule, TAKE ONE CAPSULE BY MOUTH EVERY DAY, Disp: 90 capsule, Rfl: 0     ONETOUCH ULTRA test strip, USE TO TEST BLOOD SUGARS ONE TIME DAILY OR AS DIRECTED., Disp: 100 strip, Rfl: 3     PHARMACIST CHOICE LANCETS MISC, TEST BLOOD SUGARS 5 TIMES DAILY, Disp: 100 each, Rfl: 3     PRODIGY NO CODING BLOOD GLUC test strip, TEST BLOOD SUGARS 5 TIMES DAILY, Disp: 100 each, Rfl: 3     tadalafil (CIALIS) 20 MG tablet, Take 1 tablet (20 mg) by mouth daily Never use with nitroglycerin, terazosin or doxazosin., Disp: 100 tablet, Rfl: 1     tamsulosin (FLOMAX) 0.4 MG capsule, TAKE 1 CAPSULE BY MOUTH DAILY TAKE AT BEDTIME (DUE FOR APPT), Disp: 30 capsule, Rfl: 0     testosterone cypionate (DEPOTESTOSTERONE) 200 MG/ML injection, INJECT 2MLS INTRAMUSCULARLY ONCE WEEKLY, Disp: 10 mL, Rfl: 3     nitroGLYcerin (NITROSTAT) 0.4 MG sublingual tablet, For chest pain place 1 tablet under the tongue every 5 minutes for 3 doses. If symptoms persist 5 minutes after 1st dose call 911. (Patient not taking: Reported on 3/11/2019), Disp: 25 tablet, Rfl: 3    Current Facility-Administered Medications:      methylPREDNISolone (DEPO-MEDROL) injection 40 mg, 40 mg, , , Paniagua, Bucka Su, DO, 40 mg at 02/20/19 1504    PHYSICAL EXAM:    Pulse 74   Ht 1.88 m (6' 2\")   Wt 103.4 kg (228 lb)   SpO2 98%   BMI 29.27 kg/m     General appearance: In NAD, conversant  HEENT: Normocephalic and " atraumatic, anicteric sclera  Cardiovascular: No peripheral edema  Respiratory: normal, non-labored breathing  Gastrointestinal: negative, Abdomen soft, non-tender, and non-distended.   Musculoskeletal: normal musculature and movements  Peripheral Vascular/extremity: No peripheral edema  Skin: Normal temperature, turgor, and texture. No rash  Psychiatric: Appropriate affect, alert and oriented to person, place, and time    Penis: Normal  Scrotal skin: Normal, no lesions  Testicles: Normal to palpation bilaterally  Epididymis: Normal to palpation bilaterally  Lymphatic: Normal inguinal lymph nodes    Digital Rectal Exam: his prostate is moderately enlarged, benign and symmetric to palpation    Cystoscopy: Not done      PSA: 5.11    UA RESULTS:  Recent Labs   Lab Test 02/05/19  1413   COLOR Yellow   APPEARANCE Clear   URINEGLC 100*   URINEBILI Negative   URINEKETONE Negative   SG 1.020   UBLD Negative   URINEPH 6.0   PROTEIN Negative   UROBILINOGEN 0.2   NITRITE Negative   LEUKEST Small*   RBCU O - 2   WBCU 5-10*     Bladder Scan: 32mL     Other Labs:      Imaging Studies: None      CLINICAL IMPRESSION:   Elevated PSA, enlarged prostate    PLAN:   He has an enlarged prostate with a few minimal symptoms. We discussed his PSA. The PSA has risen steadily since 2017.This raises the concern for a potential prostate cancer.  We discussed his options. I recommended a prostate biopsy. We discussed prostate biopsy today along with its risks, including bleeding and infection. All of his questions were answered.We will get him scheduled in the near future. Ciprofloxacin was prescribed for prophylaxis.      Mohsen Rao MD

## 2019-03-13 DIAGNOSIS — I10 BENIGN ESSENTIAL HYPERTENSION: ICD-10-CM

## 2019-03-13 NOTE — TELEPHONE ENCOUNTER
"Requested Prescriptions   Pending Prescriptions Disp Refills     lisinopril (PRINIVIL/ZESTRIL) 10 MG tablet  Last Written Prescription Date:  02/14/19  Last Fill Quantity: 30,  # refills: 0   Last office visit: 2/5/2019 with prescribing provider:  02/05/19   Future Office Visit:     30 tablet 0    ACE Inhibitors (Including Combos) Protocol Passed - 3/13/2019  2:06 PM       Passed - Blood pressure under 140/90 in past 12 months    BP Readings from Last 3 Encounters:   02/20/19 110/62   02/19/19 115/87   02/05/19 118/70                Passed - Recent (12 mo) or future (30 days) visit within the authorizing provider's specialty    Patient had office visit in the last 12 months or has a visit in the next 30 days with authorizing provider or within the authorizing provider's specialty.  See \"Patient Info\" tab in inbasket, or \"Choose Columns\" in Meds & Orders section of the refill encounter.             Passed - Medication is active on med list       Passed - Patient is age 18 or older       Passed - Normal serum creatinine on file in past 12 months    Recent Labs   Lab Test 02/05/19  1413 10/16/18  1314   CR 1.01  --    CREAT  --  0.9            Passed - Normal serum potassium on file in past 12 months    Recent Labs   Lab Test 02/05/19  1413   POTASSIUM 4.1               "

## 2019-03-14 RX ORDER — LISINOPRIL 10 MG/1
TABLET ORAL
Qty: 30 TABLET | Refills: 10 | Status: SHIPPED | OUTPATIENT
Start: 2019-03-14 | End: 2020-09-25

## 2019-03-26 ENCOUNTER — OFFICE VISIT (OUTPATIENT)
Dept: UROLOGY | Facility: CLINIC | Age: 64
End: 2019-03-26
Payer: COMMERCIAL

## 2019-03-26 VITALS
OXYGEN SATURATION: 97 % | HEIGHT: 74 IN | SYSTOLIC BLOOD PRESSURE: 136 MMHG | BODY MASS INDEX: 29.52 KG/M2 | DIASTOLIC BLOOD PRESSURE: 90 MMHG | WEIGHT: 230 LBS | HEART RATE: 80 BPM

## 2019-03-26 DIAGNOSIS — R97.20 ELEVATED PROSTATE SPECIFIC ANTIGEN (PSA): ICD-10-CM

## 2019-03-26 DIAGNOSIS — N40.0 ENLARGED PROSTATE: Primary | ICD-10-CM

## 2019-03-26 PROCEDURE — 55700 ZZHC BIOPSY PROSTATE NEEDLE/PUNCH: CPT | Performed by: UROLOGY

## 2019-03-26 PROCEDURE — 88305 TISSUE EXAM BY PATHOLOGIST: CPT | Performed by: UROLOGY

## 2019-03-26 PROCEDURE — 76872 US TRANSRECTAL: CPT | Performed by: UROLOGY

## 2019-03-26 ASSESSMENT — PAIN SCALES - GENERAL: PAINLEVEL: NO PAIN (0)

## 2019-03-26 ASSESSMENT — MIFFLIN-ST. JEOR: SCORE: 1898.02

## 2019-03-26 NOTE — PATIENT INSTRUCTIONS
[facility]  Transrectal Ultrasound  Post Operative Information    The physician who performed your Transrectal Ultrasound is Dr. Rao (telephone number [phone]).  Please contact this doctor if you have any problems or questions.  If unable to reach your doctor, please return to the Emergency Department.      Take one antibiotic the evening of the procedure and then as directed on your prescription.    Drink at least 6-8 glasses of fluids for the first 48 hours.    Avoid heavy lifting and strenuous activity for 48 hours.    Avoid sexual intercourse for the first 24 hours.    No aspirin or ibuprofen products (Motrin, Advil, Nuprin, ect.) for one week.  You may take acetaminophen (Tylenol) for pain.    You may notice a small amount of blood on the tissue after a bowel movement.    You may pass blood with clots in your urine following the procedure.  The amount will decrease with time but may be visible for up to two weeks.     You make have blood in your semen for 4 weeks after the procedure.    You may experience mild perineal (groin area) discomfort after the procedure.    Please call you doctor if you have any of the follow symptoms:  Fever  Increase in the amount of blood passed  Severe discomfort or pain

## 2019-03-26 NOTE — PROGRESS NOTES
Austyn Araujo is here for a transrectal altrasound guided needle biopsy of the prostate for a significant risk of potentially lethal prostate cancer.    The risks, benefits, of the procudure were discussed.  All questions were answered.  A written informed consent was obtained.      PSA   Date Value Ref Range Status   02/05/2019 5.11 (H) 0 - 4 ug/L Final     Comment:     Assay Method:  Chemiluminescence using Siemens Vista analyzer   04/25/2018 5.05 (H) 0 - 4 ug/L Final     Comment:     Assay Method:  Chemiluminescence using Siemens Vista analyzer   11/29/2017 4.39 (H) 0 - 4 ug/L Final     Comment:     Assay Method:  Chemiluminescence using Siemens Vista analyzer   09/30/2015 2.97 0 - 4 ug/L Final       An enema was completed and 500 mg of Cipro twice daily was started prior to the biopsy.  After obtaining informed consent patient was placed in lateral decubitus position.  The ultrasound probe was placed in the rectum.  The prostate was numbed using ultrasound guidance with 1% lidocaine 5 mls along each nerve bundle.      The volume was measured and estimated to be 57 cubic centimeters.      US images were used to guide the biopsies of the prostate.  12 cores were taken with 6 on each side, 2 at the base,  2 at the midgland and  2 at the apex.  The patient tolerated the procedure well.      We will follow up with the results in 7-10 days and contact patient with these results.

## 2019-03-26 NOTE — NURSING NOTE
"Chief Complaint   Patient presents with     Elevated PSA     TRUS with BX.     Initial /86 (BP Location: Left arm, Patient Position: Sitting, Cuff Size: Adult Regular)   Pulse 80   Ht 1.88 m (6' 2\")   Wt 104.3 kg (230 lb)   SpO2 97%   BMI 29.53 kg/m   Estimated body mass index is 29.53 kg/m  as calculated from the following:    Height as of this encounter: 1.88 m (6' 2\").    Weight as of this encounter: 104.3 kg (230 lb).  BP completed using cuff size:regular-left.  PSA:5.11        Preop:  Antibiotic taken?  Yes    Aspirin or other blood thinning medications discontinued 7-10 days:  Yes    Time of Fleet's enema:  Yes -6:30 am    The following medication was given:     MEDICATION:  Lidocaine 1% injection  ROUTE: NC  SITE: Prostate  DOSE: 18cc  Batch #[id number]  : [employer]  EXPIRATION DATE: 07/2021  NDC#: 05160-368-37   Was there drug waste? Yes  Amount of drug waste (mL): 12cc.  Reason for waste:  Single use vial  Multi-dose vial: No    Soham Fisher LPN  March 26, 2019      Cat PONCE  [facility] March 26, 2019 8:22 AM  "

## 2019-03-27 LAB — COPATH REPORT: NORMAL

## 2019-03-28 DIAGNOSIS — N40.1 BENIGN PROSTATIC HYPERPLASIA WITH URINARY FREQUENCY: ICD-10-CM

## 2019-03-28 DIAGNOSIS — R35.0 BENIGN PROSTATIC HYPERPLASIA WITH URINARY FREQUENCY: ICD-10-CM

## 2019-03-28 NOTE — TELEPHONE ENCOUNTER
"Requested Prescriptions   Pending Prescriptions Disp Refills     tamsulosin (FLOMAX) 0.4 MG capsule  Last Written Prescription Date:  2/14/19  Last Fill Quantity: 30,  # refills: 0   Last office visit: 2/5/2019 with prescribing provider:  Thuan   Future Office Visit:   30 capsule 0    Alpha Blockers Failed - 3/28/2019  2:02 PM       Failed - Blood pressure under 140/90 in past 12 months    BP Readings from Last 3 Encounters:   03/26/19 136/90   02/20/19 110/62   02/19/19 115/87                Failed - Patient does not have Tadalafil, Vardenafil, or Sildenafil on their medication list       Passed - Recent (12 mo) or future (30 days) visit within the authorizing provider's specialty    Patient had office visit in the last 12 months or has a visit in the next 30 days with authorizing provider or within the authorizing provider's specialty.  See \"Patient Info\" tab in inbasket, or \"Choose Columns\" in Meds & Orders section of the refill encounter.             Passed - Medication is active on med list       Passed - Patient is 18 years of age or older        "

## 2019-03-30 NOTE — TELEPHONE ENCOUNTER
Routing refill request to provider for review/approval because:  Elevated BP.  Also has Cialis on medication list.  Gali Story RN

## 2019-03-31 RX ORDER — TAMSULOSIN HYDROCHLORIDE 0.4 MG/1
CAPSULE ORAL
Qty: 90 CAPSULE | Refills: 3 | Status: SHIPPED | OUTPATIENT
Start: 2019-03-31 | End: 2020-05-18

## 2019-04-03 ENCOUNTER — TELEPHONE (OUTPATIENT)
Dept: SURGERY | Facility: CLINIC | Age: 64
End: 2019-04-03

## 2019-04-03 ENCOUNTER — TELEPHONE (OUTPATIENT)
Dept: UROLOGY | Facility: CLINIC | Age: 64
End: 2019-04-03

## 2019-04-03 NOTE — TELEPHONE ENCOUNTER
David is very anxious to have get his prostate biopsy results. Please call.  He is also c/o a lot of hematuria at beginning of stream  Slow stream  and pain with sitting. No fever. He will come in tomorrow am for ua/uc and PVR.Veronique Horn LPN

## 2019-04-09 PROBLEM — M75.41 ROTATOR CUFF IMPINGEMENT SYNDROME OF RIGHT SHOULDER: Status: RESOLVED | Noted: 2019-02-06 | Resolved: 2019-04-09

## 2019-04-09 NOTE — PROGRESS NOTES
Pt completed 2 treatments of physical therapy. Subjective and objective treatment unknown. Pt discharged at this time.

## 2019-04-13 DIAGNOSIS — K21.9 GASTROESOPHAGEAL REFLUX DISEASE WITHOUT ESOPHAGITIS: ICD-10-CM

## 2019-04-15 ENCOUNTER — OFFICE VISIT (OUTPATIENT)
Dept: UROLOGY | Facility: CLINIC | Age: 64
End: 2019-04-15
Payer: COMMERCIAL

## 2019-04-15 VITALS — HEART RATE: 100 BPM | OXYGEN SATURATION: 96 % | HEIGHT: 74 IN | BODY MASS INDEX: 29.52 KG/M2 | WEIGHT: 230 LBS

## 2019-04-15 DIAGNOSIS — C61 PROSTATE CANCER (H): Primary | ICD-10-CM

## 2019-04-15 DIAGNOSIS — N40.0 ENLARGED PROSTATE: ICD-10-CM

## 2019-04-15 DIAGNOSIS — N52.9 ERECTILE DYSFUNCTION, UNSPECIFIED ERECTILE DYSFUNCTION TYPE: ICD-10-CM

## 2019-04-15 PROCEDURE — 99214 OFFICE O/P EST MOD 30 MIN: CPT | Performed by: UROLOGY

## 2019-04-15 RX ORDER — OMEPRAZOLE 40 MG/1
CAPSULE, DELAYED RELEASE ORAL
Qty: 90 CAPSULE | Refills: 1 | Status: SHIPPED | OUTPATIENT
Start: 2019-04-15 | End: 2019-11-04

## 2019-04-15 ASSESSMENT — MIFFLIN-ST. JEOR: SCORE: 1898.02

## 2019-04-15 ASSESSMENT — PAIN SCALES - GENERAL: PAINLEVEL: NO PAIN (0)

## 2019-04-15 NOTE — LETTER
4/15/2019       RE: Austyn Araujo  [address]  [facility] [address]     Dear Colleague,    Thank you for referring your patient, Austyn Araujo, to the [facility] [place] [facility]. Please see a copy of my visit note below.    Office Visit Note  [facility]  [phone]    UROLOGIC DIAGNOSES:   T1C linh 3+3 = 6 prostate cancer, enlarged prostate, erectile dysfunction    CURRENT INTERVENTIONS:   Cialis    HISTORY:   David returns to clinic today to discuss his recent prostate biopsy results.He has felt well since his biopsy with no symptoms or complaints afterwards. His biopsy revealed a single core positive for Linh 3 equals 6 prostate cancer. He other 11 cores were negative. His prostate was found enlarged measuring 57 cm . He reports is somewhat reduced urinary stream but overall no bothersome complaints. He has no family history of prostate cancer. He reports compromised erectile function and uses Cialis      PAST MEDICAL HISTORY:   Past Medical History:   Diagnosis Date     Anxiety      Anxiety 1/31/2015     BPH (benign prostatic hyperplasia)      Degenerative disc disease      Depression      Diabetes mellitus (H)      Gastritis      Gastro-oesophageal reflux disease      H/O alcohol abuse      Hyperlipidemia      Hypertension      Low testosterone      MRSA (methicillin resistant staph aureus) culture positive 8/2013    skin infection     Mumps      PUD (peptic ulcer disease)      Sleep apnea      STD (sexually transmitted disease)        PAST SURGICAL HISTORY:   Past Surgical History:   Procedure Laterality Date     COLONOSCOPY  11/11/2013    Procedure: COMBINED COLONOSCOPY, SINGLE BIOPSY/POLYPECTOMY BY BIOPSY;  Colonoscopy/ polypectomy x2 by cold forceps    ;  Surgeon: Juan Carlos Bellamy MD;  Location:  GI     ESOPHAGOSCOPY, GASTROSCOPY, DUODENOSCOPY (EGD), COMBINED N/A 9/22/2016     "Procedure: COMBINED ESOPHAGOSCOPY, GASTROSCOPY, DUODENOSCOPY (EGD), BIOPSY SINGLE OR MULTIPLE;  Surgeon: Juan Carlos Barraza MD;  Location:  GI     ESOPHAGOSCOPY, GASTROSCOPY, DUODENOSCOPY (EGD), COMBINED N/A 2/19/2019    Procedure: ESOPHAGOSCOPY, GASTROSCOPY, DUODENOSCOPY (EGD) with cold forcep;  Surgeon: Juan Carlos Barraza MD;  Location: [facility] GI     HERNIA REPAIR       ORTHOPEDIC SURGERY         FAMILY HISTORY:   Family History   Problem Relation Age of Onset     Pancreatic Cancer Mother      Pancreatic Cancer Paternal Grandfather      Diabetes Brother      Depression Brother      Suicide Brother      Substance Abuse Brother      Dementia Father      Parkinsonism Father      Family History Negative No family hx of      Colon Cancer No family hx of      Unknown/Adopted No family hx of      Anxiety Disorder No family hx of      Schizophrenia No family hx of      Bipolar Disorder No family hx of      Celina Disease No family hx of      Autism Spectrum Disorder No family hx of      Intellectual Disability (Mental Retardation) No family hx of      Mental Illness No family hx of        SOCIAL HISTORY:   Social History     Tobacco Use     Smoking status: Former Smoker     Types: Cigarettes     Smokeless tobacco: Never Used   Substance Use Topics     Alcohol use: No     Alcohol/week: 0.0 oz     Comment: 14 drinks weekly, quit on 08/2017       Current Outpatient Medications   Medication     ALPRAZolam (XANAX) 0.5 MG tablet     aspirin 81 MG tablet     atorvastatin (LIPITOR) 40 MG tablet     B-D LUER-ROCHELLE SYRINGE 22G X 1-1/2\" 3 ML MISC     BD HYPODERMIC NEEDLE 18G X 1\" MISC     glimepiride (AMARYL) 1 MG tablet     lisinopril (PRINIVIL/ZESTRIL) 10 MG tablet     metFORMIN (GLUCOPHAGE-XR) 500 MG 24 hr tablet     Multiple Vitamins-Minerals (CENTRUM SILVER) per tablet     nitroGLYcerin (NITROSTAT) 0.4 MG sublingual tablet     omeprazole (PRILOSEC) 40 MG DR capsule     ONETOUCH ULTRA test strip     PHARMACIST CHOICE LANCETS " MISC     PRODIGY NO CODING BLOOD GLUC test strip     tadalafil (CIALIS) 20 MG tablet     tamsulosin (FLOMAX) 0.4 MG capsule     testosterone cypionate (DEPOTESTOSTERONE) 200 MG/ML injection     Current Facility-Administered Medications   Medication     methylPREDNISolone (DEPO-MEDROL) injection 40 mg         PHYSICAL EXAM:    There were no vitals taken for this visit.    Constitutional: Well developed. Conversant and in no acute distress  Eyes: Anicteric sclera, conjunctiva clear, normal extraocular movements  ENT: Normocephalic and atraumatic,   Skin: Warm and dry. No rashes or lesions  Cardiac: No peripheral edema  Back/Flank: Not done  CNS/PNS: Normal musculature and movements, moves all extremities normally  Respiratory: Normal non-labored breathing  Abdomen: Soft nontender and nondistended  Peripheral Vascular: No peripheral edema  Mental Status/Psych: Alert and Oriented x 3. Normal mood and affect    Penis: Not done  Scrotal Skin: Not done  Testicles: Not done  Epididymis: Not done  Digital Rectal Exam:     Cystoscopy: Not done    Imaging: None    Urinalysis: UA RESULTS:  Recent Labs   Lab Test 02/05/19  1413   COLOR Yellow   APPEARANCE Clear   URINEGLC 100*   URINEBILI Negative   URINEKETONE Negative   SG 1.020   UBLD Negative   URINEPH 6.0   PROTEIN Negative   UROBILINOGEN 0.2   NITRITE Negative   LEUKEST Small*   RBCU O - 2   WBCU 5-10*       PSA: 5.11    Post Void Residual:     Other labs: None today      IMPRESSION:  Low risk prostate cancer, enlarged prostate,, erectile dysfunction    PLAN:  We discussed his positive biopsy results today. He has been diagnosed with a low volume low-grade prostate cancer. This puts him in the low risk category. We discussed treatment options including active surveillance, radical prostate surgery, radiotherapy, and ablative therapies. He had questions about each option. He is a good candidate for active surveillance with the information we currently have at hand.  He  would like to pursue active surveillance if possible. If he does wish to pursue active surveillance recommended that we send his biopsy specimen for Oncotype Dx testing and he agreed. I also recommended that in 6 months we'll check an MRI of the prostate and a PSA and he agreed to this as well. If these tests turn out favorable, he would then need to have PSA and digital rectal examinations every 6 months and another biopsy within 2 years and he understands this. We discussed treatment options for his enlarged prostate as well but for now he wishes to forego any treatment. He will also continue with the Cialis for erectile dysfunction.    Total Time: 25 min                                      Total in Consultation: 25 min      Mohsen Rao M.D.              Again, thank you for allowing me to participate in the care of your patient.      Sincerely,    Mhosen Rao MD

## 2019-04-15 NOTE — PROGRESS NOTES
Office Visit Note  [facility]  [phone]    UROLOGIC DIAGNOSES:   T1C linh 3+3 = 6 prostate cancer, enlarged prostate, erectile dysfunction    CURRENT INTERVENTIONS:   Cialis    HISTORY:   David returns to clinic today to discuss his recent prostate biopsy results.He has felt well since his biopsy with no symptoms or complaints afterwards. His biopsy revealed a single core positive for [place] 3 equals 6 prostate cancer. He other 11 cores were negative. His prostate was found enlarged measuring 57 cm . He reports is somewhat reduced urinary stream but overall no bothersome complaints. He has no family history of prostate cancer. He reports compromised erectile function and uses Cialis      PAST MEDICAL HISTORY:   Past Medical History:   Diagnosis Date     Anxiety      Anxiety 1/31/2015     BPH (benign prostatic hyperplasia)      Degenerative disc disease      Depression      Diabetes mellitus (H)      Gastritis      Gastro-oesophageal reflux disease      H/O alcohol abuse      Hyperlipidemia      Hypertension      Low testosterone      MRSA (methicillin resistant staph aureus) culture positive 8/2013    skin infection     Mumps      PUD (peptic ulcer disease)      Sleep apnea      STD (sexually transmitted disease)        PAST SURGICAL HISTORY:   Past Surgical History:   Procedure Laterality Date     COLONOSCOPY  11/11/2013    Procedure: COMBINED COLONOSCOPY, SINGLE BIOPSY/POLYPECTOMY BY BIOPSY;  Colonoscopy/ polypectomy x2 by cold forceps    ;  Surgeon: Juan Carlos Bellamy MD;  Location:  GI     ESOPHAGOSCOPY, GASTROSCOPY, DUODENOSCOPY (EGD), COMBINED N/A 9/22/2016    Procedure: COMBINED ESOPHAGOSCOPY, GASTROSCOPY, DUODENOSCOPY (EGD), BIOPSY SINGLE OR MULTIPLE;  Surgeon: Juan Carlos Barraza MD;  Location:  GI     ESOPHAGOSCOPY, GASTROSCOPY, DUODENOSCOPY (EGD), COMBINED N/A 2/19/2019    Procedure: ESOPHAGOSCOPY, GASTROSCOPY, DUODENOSCOPY (EGD) with cold forcep;  Surgeon: Juan Carlos Barraza MD;  " Location: [facility]     HERNIA REPAIR       ORTHOPEDIC SURGERY         FAMILY HISTORY:   Family History   Problem Relation Age of Onset     Pancreatic Cancer Mother      Pancreatic Cancer Paternal Grandfather      Diabetes Brother      Depression Brother      Suicide Brother      Substance Abuse Brother      Dementia Father      Parkinsonism Father      Family History Negative No family hx of      Colon Cancer No family hx of      Unknown/Adopted No family hx of      Anxiety Disorder No family hx of      Schizophrenia No family hx of      Bipolar Disorder No family hx of      [place] Disease No family hx of      Autism Spectrum Disorder No family hx of      Intellectual Disability (Mental Retardation) No family hx of      Mental Illness No family hx of        SOCIAL HISTORY:   Social History     Tobacco Use     Smoking status: Former Smoker     Types: Cigarettes     Smokeless tobacco: Never Used   Substance Use Topics     Alcohol use: No     Alcohol/week: 0.0 oz     Comment: 14 drinks weekly, quit on 08/2017       Current Outpatient Medications   Medication     ALPRAZolam (XANAX) 0.5 MG tablet     aspirin 81 MG tablet     atorvastatin (LIPITOR) 40 MG tablet     B-D LUER-ROCHELLE SYRINGE 22G X 1-1/2\" 3 ML MISC     BD HYPODERMIC NEEDLE 18G X 1\" MISC     glimepiride (AMARYL) 1 MG tablet     lisinopril (PRINIVIL/ZESTRIL) 10 MG tablet     metFORMIN (GLUCOPHAGE-XR) 500 MG 24 hr tablet     Multiple Vitamins-Minerals (CENTRUM SILVER) per tablet     nitroGLYcerin (NITROSTAT) 0.4 MG sublingual tablet     omeprazole (PRILOSEC) 40 MG DR capsule     ONETOUCH ULTRA test strip     PHARMACIST CHOICE LANCETS MISC     PRODIGY NO CODING BLOOD GLUC test strip     tadalafil (CIALIS) 20 MG tablet     tamsulosin (FLOMAX) 0.4 MG capsule     testosterone cypionate (DEPOTESTOSTERONE) 200 MG/ML injection     Current Facility-Administered Medications   Medication     methylPREDNISolone (DEPO-MEDROL) injection 40 mg         PHYSICAL EXAM:    There " were no vitals taken for this visit.    Constitutional: Well developed. Conversant and in no acute distress  Eyes: Anicteric sclera, conjunctiva clear, normal extraocular movements  ENT: Normocephalic and atraumatic,   Skin: Warm and dry. No rashes or lesions  Cardiac: No peripheral edema  Back/Flank: Not done  CNS/PNS: Normal musculature and movements, moves all extremities normally  Respiratory: Normal non-labored breathing  Abdomen: Soft nontender and nondistended  Peripheral Vascular: No peripheral edema  Mental Status/Psych: Alert and Oriented x 3. Normal mood and affect    Penis: Not done  Scrotal Skin: Not done  Testicles: Not done  Epididymis: Not done  Digital Rectal Exam:     Cystoscopy: Not done    Imaging: None    Urinalysis: UA RESULTS:  Recent Labs   Lab Test 02/05/19  1413   COLOR Yellow   APPEARANCE Clear   URINEGLC 100*   URINEBILI Negative   URINEKETONE Negative   SG 1.020   UBLD Negative   URINEPH 6.0   PROTEIN Negative   UROBILINOGEN 0.2   NITRITE Negative   LEUKEST Small*   RBCU O - 2   WBCU 5-10*       PSA: 5.11    Post Void Residual:     Other labs: None today      IMPRESSION:  Low risk prostate cancer, enlarged prostate,, erectile dysfunction    PLAN:  We discussed his positive biopsy results today. He has been diagnosed with a low volume low-grade prostate cancer. This puts him in the low risk category. We discussed treatment options including active surveillance, radical prostate surgery, radiotherapy, and ablative therapies. He had questions about each option. He is a good candidate for active surveillance with the information we currently have at hand.  He would like to pursue active surveillance if possible. If he does wish to pursue active surveillance recommended that we send his biopsy specimen for Oncotype Dx testing and he agreed. I also recommended that in 6 months we'll check an MRI of the prostate and a PSA and he agreed to this as well. If these tests turn out favorable, he  would then need to have PSA and digital rectal examinations every 6 months and another biopsy within 2 years and he understands this. We discussed treatment options for his enlarged prostate as well but for now he wishes to forego any treatment. He will also continue with the Cialis for erectile dysfunction.    Total Time: 25 min                                      Total in Consultation: 25 min      Mohsen Rao M.D.

## 2019-04-15 NOTE — TELEPHONE ENCOUNTER
"Requested Prescriptions   Pending Prescriptions Disp Refills     omeprazole (PRILOSEC) 40 MG DR capsule [Pharmacy Med Name: OMEPRAZOLE DR 40 MG CAPSULE] 90 capsule 0     Sig: TAKE 1 CAPSULE BY MOUTH EVERY DAY   Last Written Prescription Date:  01/08/2019  Last Fill Quantity: 90,  # refills: 0   Last office visit: 2/5/2019 with prescribing provider:     Future Office Visit:      PPI Protocol Passed - 4/13/2019  8:40 AM        Passed - Not on Clopidogrel (unless Pantoprazole ordered)        Passed - No diagnosis of osteoporosis on record        Passed - Recent (12 mo) or future (30 days) visit within the authorizing provider's specialty     Patient had office visit in the last 12 months or has a visit in the next 30 days with authorizing provider or within the authorizing provider's specialty.  See \"Patient Info\" tab in inbasket, or \"Choose Columns\" in Meds & Orders section of the refill encounter.              Passed - Medication is active on med list        Passed - Patient is age 18 or older        "

## 2019-04-28 DIAGNOSIS — E78.5 HYPERLIPIDEMIA LDL GOAL <100: ICD-10-CM

## 2019-04-29 RX ORDER — ATORVASTATIN CALCIUM 40 MG/1
TABLET, FILM COATED ORAL
Qty: 90 TABLET | Refills: 2 | Status: SHIPPED | OUTPATIENT
Start: 2019-04-29 | End: 2020-01-21

## 2019-05-29 DIAGNOSIS — E11.43 TYPE 2 DIABETES MELLITUS WITH PERIPHERAL AUTONOMIC NEUROPATHY (H): ICD-10-CM

## 2019-05-29 NOTE — TELEPHONE ENCOUNTER
"Requested Prescriptions   Pending Prescriptions Disp Refills     glimepiride (AMARYL) 1 MG tablet 180 tablet 0   Last Written Prescription Date:  02/14/2019  Last Fill Quantity: 180,  # refills: 0   Last office visit: 2/5/2019 with prescribing provider:     Future Office Visit:      Sulfonylurea Agents Failed - 5/29/2019  3:04 PM        Failed - Blood pressure less than 140/90 in past 6 months     BP Readings from Last 3 Encounters:   03/26/19 136/90   02/20/19 110/62   02/19/19 115/87                 Failed - Patient has documented A1c within the specified period of time.     If HgbA1C is 8 or greater, it needs to be on file within the past 3 months.  If less than 8, must be on file within the past 6 months.     Recent Labs   Lab Test 02/05/19  1413   A1C 8.6*             Passed - Patient has documented LDL within the past 12 mos.     Recent Labs   Lab Test 02/05/19  1413   LDL 93             Passed - Patient has had a Microalbumin in the past 15 mos.     Recent Labs   Lab Test 02/05/19  1413   MICROL 16   UMALCR 9.76             Passed - Medication is active on med list        Passed - Patient is age 18 or older        Passed - Patient has a recent creatinine (normal) within the past 12 mos.     Recent Labs   Lab Test 02/05/19  1413 10/16/18  1314   CR 1.01  --    CREAT  --  0.9             Passed - Recent (6 mo) or future (30 days) visit within the authorizing provider's specialty     Patient had office visit in the last 6 months or has a visit in the next 30 days with authorizing provider or within the authorizing provider's specialty.  See \"Patient Info\" tab in inbasket, or \"Choose Columns\" in Meds & Orders section of the refill encounter.            "

## 2019-06-10 RX ORDER — GLIMEPIRIDE 1 MG/1
TABLET ORAL
Qty: 180 TABLET | Refills: 1 | Status: SHIPPED | OUTPATIENT
Start: 2019-06-10 | End: 2020-02-13

## 2019-06-10 NOTE — TELEPHONE ENCOUNTER
Routing refill request to provider for review/approval because:  Labs out of range:  A1C is over 8, so needs A1C every 3 months.

## 2019-06-26 ENCOUNTER — OFFICE VISIT (OUTPATIENT)
Dept: INTERNAL MEDICINE | Facility: CLINIC | Age: 64
End: 2019-06-26
Payer: COMMERCIAL

## 2019-06-26 VITALS
HEIGHT: 74 IN | WEIGHT: 222 LBS | HEART RATE: 86 BPM | BODY MASS INDEX: 28.49 KG/M2 | OXYGEN SATURATION: 98 % | RESPIRATION RATE: 15 BRPM | DIASTOLIC BLOOD PRESSURE: 68 MMHG | TEMPERATURE: 98.9 F | SYSTOLIC BLOOD PRESSURE: 104 MMHG

## 2019-06-26 DIAGNOSIS — A69.20 LYME DISEASE: ICD-10-CM

## 2019-06-26 DIAGNOSIS — M79.10 MYALGIA: Primary | ICD-10-CM

## 2019-06-26 LAB
ALBUMIN SERPL-MCNC: 3.7 G/DL (ref 3.4–5)
ALP SERPL-CCNC: 508 U/L (ref 40–150)
ALT SERPL W P-5'-P-CCNC: 148 U/L (ref 0–70)
ANION GAP SERPL CALCULATED.3IONS-SCNC: 4 MMOL/L (ref 3–14)
AST SERPL W P-5'-P-CCNC: 84 U/L (ref 0–45)
BILIRUB SERPL-MCNC: 1 MG/DL (ref 0.2–1.3)
BUN SERPL-MCNC: 16 MG/DL (ref 7–30)
CALCIUM SERPL-MCNC: 9.6 MG/DL (ref 8.5–10.1)
CHLORIDE SERPL-SCNC: 99 MMOL/L (ref 94–109)
CO2 SERPL-SCNC: 29 MMOL/L (ref 20–32)
CREAT SERPL-MCNC: 1 MG/DL (ref 0.66–1.25)
ERYTHROCYTE [DISTWIDTH] IN BLOOD BY AUTOMATED COUNT: 15.6 % (ref 10–15)
ERYTHROCYTE [SEDIMENTATION RATE] IN BLOOD BY WESTERGREN METHOD: 15 MM/H (ref 0–20)
GFR SERPL CREATININE-BSD FRML MDRD: 79 ML/MIN/{1.73_M2}
GLUCOSE SERPL-MCNC: 257 MG/DL (ref 70–99)
HCT VFR BLD AUTO: 44.7 % (ref 40–53)
HGB BLD-MCNC: 15.3 G/DL (ref 13.3–17.7)
MCH RBC QN AUTO: 26.2 PG (ref 26.5–33)
MCHC RBC AUTO-ENTMCNC: 34.2 G/DL (ref 31.5–36.5)
MCV RBC AUTO: 77 FL (ref 78–100)
PLATELET # BLD AUTO: 272 10E9/L (ref 150–450)
POTASSIUM SERPL-SCNC: 4.9 MMOL/L (ref 3.4–5.3)
PROT SERPL-MCNC: 8.1 G/DL (ref 6.8–8.8)
RBC # BLD AUTO: 5.83 10E12/L (ref 4.4–5.9)
SODIUM SERPL-SCNC: 132 MMOL/L (ref 133–144)
WBC # BLD AUTO: 8.2 10E9/L (ref 4–11)

## 2019-06-26 PROCEDURE — 99000 SPECIMEN HANDLING OFFICE-LAB: CPT | Performed by: INTERNAL MEDICINE

## 2019-06-26 PROCEDURE — 85652 RBC SED RATE AUTOMATED: CPT | Performed by: INTERNAL MEDICINE

## 2019-06-26 PROCEDURE — 86618 LYME DISEASE ANTIBODY: CPT | Performed by: INTERNAL MEDICINE

## 2019-06-26 PROCEDURE — 87015 SPECIMEN INFECT AGNT CONCNTJ: CPT | Performed by: INTERNAL MEDICINE

## 2019-06-26 PROCEDURE — 86666 EHRLICHIA ANTIBODY: CPT | Mod: 90 | Performed by: INTERNAL MEDICINE

## 2019-06-26 PROCEDURE — 85027 COMPLETE CBC AUTOMATED: CPT | Performed by: INTERNAL MEDICINE

## 2019-06-26 PROCEDURE — 86617 LYME DISEASE ANTIBODY: CPT | Mod: 59 | Performed by: INTERNAL MEDICINE

## 2019-06-26 PROCEDURE — 87207 SMEAR SPECIAL STAIN: CPT | Performed by: INTERNAL MEDICINE

## 2019-06-26 PROCEDURE — 99214 OFFICE O/P EST MOD 30 MIN: CPT | Performed by: INTERNAL MEDICINE

## 2019-06-26 PROCEDURE — 36415 COLL VENOUS BLD VENIPUNCTURE: CPT | Performed by: INTERNAL MEDICINE

## 2019-06-26 PROCEDURE — 80053 COMPREHEN METABOLIC PANEL: CPT | Performed by: INTERNAL MEDICINE

## 2019-06-26 PROCEDURE — 86617 LYME DISEASE ANTIBODY: CPT | Mod: 90 | Performed by: INTERNAL MEDICINE

## 2019-06-26 RX ORDER — DOXYCYCLINE 100 MG/1
100 CAPSULE ORAL 2 TIMES DAILY
Qty: 28 CAPSULE | Refills: 0 | Status: SHIPPED | OUTPATIENT
Start: 2019-06-26 | End: 2019-07-08

## 2019-06-26 ASSESSMENT — MIFFLIN-ST. JEOR: SCORE: 1861.74

## 2019-06-26 NOTE — LETTER
[address] [address]  [phone]      7/1/2019       Austyn AKERS [facility]  [address] [facility] [address]        Dear Austyn,    This letter is just to follow-up with you and inform you that your confirmatory test for Lyme disease did return positive and your other testing for secondary tick disorders was negative.  I think I would like to see you back at the end of your 14-day course of antibiotics as I may want to consider extending your antibiotic course to 21 days depending on how you are feeling and watch your repeat liver function tests reveal.    Sincerely,      Bienvenido Shaw MD  Internal Medicine

## 2019-06-26 NOTE — PROGRESS NOTES
Subjective     Austyn Araujo is a 63 year old male who presents to clinic today for the following health issues:    HPI     Patient normally seen at [facility] clinic and called for an appointment but was unable to be seen.    Patient states he has been doing a lot of traveling and 1 of his recent trips [place].  Since then he has noted constitutional complaints as defined below.  He also states he was with numerous friends who developed similar symptoms were diagnosed with a tickborne illness.  States he pulled multiple ticks off of his body unsure if they were deer ticks and does not report any subsequent issues of rash.    He feels that his symptoms now are worse to the point where at times he has a severe headache and difficulty with fatigue.    RESPIRATORY SYMPTOMS      Duration: Began [place] 10 th      Description  fever, chills, headache, fatigue/malaise, nausea, diarrhea ( on and off)  and myalgias    Severity: moderate    Accompanying signs and symptoms: Pain with walking, extreme fatigue, episodes of feeling very itchy     History (predisposing factors):  Patient states symptoms began after [place] of this [place]. Patient states he pulled multiple ticks off during [place]     Precipitating or alleviating factors: tick bites     Therapies tried and outcome:  Tylenol, Ibuprofen- minimal relief     Patient Active Problem List   Diagnosis     Advanced directives, counseling/discussion     Type 2 diabetes mellitus with peripheral autonomic neuropathy (H)     GERD (gastroesophageal reflux disease)     PUD (peptic ulcer disease)     Low testosterone     HTN (hypertension)     Mild major depression (H)     Hyperlipidemia LDL goal <100     Erectile dysfunction     BPH (benign prostatic hyperplasia)     Peripheral neuropathy     Cellulitis     Hypertension goal BP (blood pressure) < 140/90      MRSA (methicillin resistant staph aureus) culture positive     Anxiety     Health Care Home     Type 2 diabetes mellitus with diabetic neuropathy (H)     Hemoglobin A1c less than 7.0%     Abdominal pain     Pancreatitis     Controlled substance agreement signed     Past Surgical History:   Procedure Laterality Date     COLONOSCOPY  11/11/2013    Procedure: COMBINED COLONOSCOPY, SINGLE BIOPSY/POLYPECTOMY BY BIOPSY;  Colonoscopy/ polypectomy x2 by cold forceps    ;  Surgeon: Juan Carlos Bellamy MD;  Location: [facility]     ESOPHAGOSCOPY, GASTROSCOPY, DUODENOSCOPY (EGD), COMBINED N/A 9/22/2016    Procedure: COMBINED ESOPHAGOSCOPY, GASTROSCOPY, DUODENOSCOPY (EGD), BIOPSY SINGLE OR MULTIPLE;  Surgeon: Juan Carlos Barraza MD;  Location: [facility]     ESOPHAGOSCOPY, GASTROSCOPY, DUODENOSCOPY (EGD), COMBINED N/A 2/19/2019    Procedure: ESOPHAGOSCOPY, GASTROSCOPY, DUODENOSCOPY (EGD) with cold forcep;  Surgeon: Juan Carlos Barraza MD;  Location: [facility]     HERNIA REPAIR       ORTHOPEDIC SURGERY         Social History     Tobacco Use     Smoking status: Former Smoker     Types: Cigarettes     Smokeless tobacco: Never Used   Substance Use Topics     Alcohol use: No     Alcohol/week: 0.0 oz     Comment: 14 drinks weekly, quit on 08/2017     Family History   Problem Relation Age of Onset     Pancreatic Cancer Mother      Pancreatic Cancer Paternal Grandfather      Diabetes Brother      Depression Brother      Suicide Brother      Substance Abuse Brother      Dementia Father      Parkinsonism Father      Family History Negative No family hx of      Colon Cancer No family hx of      Unknown/Adopted No family hx of      Anxiety Disorder No family hx of      Schizophrenia No family hx of      Bipolar Disorder No family hx of      [place] Disease No family hx of      Autism Spectrum Disorder No family hx of      Intellectual Disability (Mental Retardation) No family hx of      Mental Illness No family hx of          Current Outpatient  "Medications   Medication Sig Dispense Refill     ALPRAZolam (XANAX) 0.5 MG tablet Take 1 tablet (0.5 mg) by mouth 3 times daily as needed for anxiety 30 tablet 0     aspirin 81 MG tablet Take 1 tablet by mouth daily.       atorvastatin (LIPITOR) 40 MG tablet TAKE 1 TABLET BY MOUTH EVERY DAY 90 tablet 2     B-D LUER-ROCHELLE SYRINGE 22G X 1-1/2\" 3 ML MISC USE WITH TESTOSTERONE 12 each 3     BD HYPODERMIC NEEDLE 18G X 1\" MISC USE WITH TESTOSTERONE 25 each 6     glimepiride (AMARYL) 1 MG tablet TAKE 2 TABLETS BY MOUTH EVERY MORNING BEFORE BREAKFAST (DUE FOR LABS IN NOVEMBER 2018) 180 tablet 1     lisinopril (PRINIVIL/ZESTRIL) 10 MG tablet TAKE 1 TABLET BY MOUTH EVERY DAY 30 tablet 10     metFORMIN (GLUCOPHAGE-XR) 500 MG 24 hr tablet Take 4 tablets (2,000 mg) by mouth daily (with dinner) 360 tablet 3     Multiple Vitamins-Minerals (CENTRUM SILVER) per tablet Take 1 tablet by mouth daily (Patient not taking: Reported on 3/26/2019) 90 tablet 3     nitroGLYcerin (NITROSTAT) 0.4 MG sublingual tablet For chest pain place 1 tablet under the tongue every 5 minutes for 3 doses. If symptoms persist 5 minutes after 1st dose call 911. (Patient not taking: Reported on 4/15/2019) 25 tablet 3     omeprazole (PRILOSEC) 40 MG DR capsule TAKE 1 CAPSULE BY MOUTH EVERY DAY 90 capsule 1     ONETOUCH ULTRA test strip USE TO TEST BLOOD SUGARS ONE TIME DAILY OR AS DIRECTED. 100 strip 3     PHARMACIST CHOICE LANCETS MISC TEST BLOOD SUGARS 5 TIMES DAILY 100 each 3     PRODIGY NO CODING BLOOD GLUC test strip TEST BLOOD SUGARS 5 TIMES DAILY 100 each 3     tadalafil (CIALIS) 20 MG tablet Take 1 tablet (20 mg) by mouth daily Never use with nitroglycerin, terazosin or doxazosin. 100 tablet 1     tamsulosin (FLOMAX) 0.4 MG capsule TAKE 1 CAPSULE BY MOUTH DAILY TAKE AT BEDTIME (DUE FOR APPT) 90 capsule 3     testosterone cypionate (DEPOTESTOSTERONE) 200 MG/ML injection INJECT 2MLS INTRAMUSCULARLY ONCE WEEKLY 10 mL 3     No Known Allergies  BP Readings from " "Last 3 Encounters:   03/26/19 136/90   02/20/19 110/62   02/19/19 115/87    Wt Readings from Last 3 Encounters:   04/15/19 104.3 kg (230 lb)   03/26/19 104.3 kg (230 lb)   03/11/19 103.4 kg (228 lb)         Reviewed and updated as needed this visit by Provider       Review of Systems   ROS COMP: CONSTITUTIONAL: NEGATIVE for  change in weight  EYES: NEGATIVE for vision changes or irritation  ENT/MOUTH: NEGATIVE for ear, mouth and throat problems  RESP: NEGATIVE for significant cough or SOB  CV: NEGATIVE for chest pain, palpitations or peripheral edema  GI: NEGATIVE for nausea, abdominal pain, heartburn, or change in bowel habits  : NEGATIVE for frequency, dysuria, or hematuria  NEURO: NEGATIVE for weakness, dizziness or paresthesias  ENDOCRINE: NEGATIVE for temperature intolerance  HEME: NEGATIVE for bleeding problems  PSYCHIATRIC: NEGATIVE for changes in mood or affect      Objective    /68   Pulse 86   Temp 98.9  F (37.2  C) (Oral)   Resp 15   Ht 1.88 m (6' 2\")   Wt 100.7 kg (222 lb)   SpO2 98%   BMI 28.50 kg/m    Body mass index is 28.5 kg/m .  Physical Exam   GENERAL: alert and no distress  EYES: Eyes grossly normal to inspection, PERRL and conjunctivae and sclerae normal  HENT: ear canals and TM's normal, nose and mouth without ulcers or lesions  NECK: no adenopathy, no asymmetry, masses, or scars and thyroid normal to palpation  RESP: lungs clear to auscultation - no rales, rhonchi or wheezes  CV: regular rate and rhythm, normal S1 S2, no S3 or S4, no click or rub, no peripheral edema and peripheral pulses strong  MS: no gross musculoskeletal defects noted  NEURO: No focal changes  PSYCH: mentation appears normal, affect normal/bright      Assessment & Plan     1. Myalgia  This patient has constitutional complaints that are fairly consistent with an acute tickborne illness with exposure in a known area where he was told that Lyme disease has been of high prevalence and numerous friends that he " "was with were also infected.  I have discussed with him the etiology of tickborne illnesses and the issues in regards to other tickborne processes that can cause similar complaints although more likely the primary concern here would be acute Lyme disease.  He does not manifest any acute neurologic changes and we did discuss testing and also electively of chosen to start therapy based on his symptoms until lab results return.  He was advised of the risks of this as well as dosing and side effect profile and we will await results pending.  He has been advised to follow-up of his symptoms worsen abruptly.  - Lyme Disease Ros with reflex to WB Serum, Ehrlichia AB  - Comprehensive metabolic panel  - CBC with platelets  - Erythrocyte sedimentation rate auto  - Parasite stain  - doxycycline hyclate (VIBRAMYCIN) 100 MG capsule; Take 1 capsule (100 mg) by mouth 2 times daily  Dispense: 28 capsule; Refill: 0    ADDENDUM:  Called patient and informed him of Lyme titer.  Suggest continuing with antibiotic and awaiting remaining results of testing plus confirmatory test and recommend repeat liver function panel early next week, lab orders placed and patient advised to make lab appointment     BMI:   Estimated body mass index is 29.53 kg/m  as calculated from the following:    Height as of 4/15/19: 1.88 m (6' 2\").    Weight as of 4/15/19: 104.3 kg (230 lb).     Work on weight loss  Regular exercise    Return in about 2 weeks (around 7/10/2019) for if symptoms recur or worsen.    Bienvenido Shaw MD  [facility]      "

## 2019-06-27 LAB
PARASITE SPEC INSPECT: NORMAL
SPECIMEN SOURCE: NORMAL

## 2019-06-29 LAB
B BURGDOR IGG SER QL IB: NEGATIVE
B BURGDOR IGG+IGM SER QL: 1.55 (ref 0–0.89)
B BURGDOR IGM SER QL IB: POSITIVE
E CHAFFEENSIS IGG TITR SER: NORMAL {TITER}
E CHAFFEENSIS IGM TITR SER: NORMAL {TITER}

## 2019-07-03 ENCOUNTER — DOCUMENTATION ONLY (OUTPATIENT)
Dept: INTERNAL MEDICINE | Facility: CLINIC | Age: 64
End: 2019-07-03

## 2019-07-03 DIAGNOSIS — M79.10 MYALGIA: ICD-10-CM

## 2019-07-03 LAB
ALBUMIN SERPL-MCNC: 3.7 G/DL (ref 3.4–5)
ALP SERPL-CCNC: 354 U/L (ref 40–150)
ALT SERPL W P-5'-P-CCNC: 101 U/L (ref 0–70)
AST SERPL W P-5'-P-CCNC: 29 U/L (ref 0–45)
BILIRUB DIRECT SERPL-MCNC: 0.2 MG/DL (ref 0–0.2)
BILIRUB SERPL-MCNC: 0.5 MG/DL (ref 0.2–1.3)
PROT SERPL-MCNC: 8.1 G/DL (ref 6.8–8.8)

## 2019-07-03 PROCEDURE — 36415 COLL VENOUS BLD VENIPUNCTURE: CPT | Performed by: INTERNAL MEDICINE

## 2019-07-03 PROCEDURE — 80076 HEPATIC FUNCTION PANEL: CPT | Performed by: INTERNAL MEDICINE

## 2019-07-05 DIAGNOSIS — R79.89 LOW TESTOSTERONE: ICD-10-CM

## 2019-07-05 RX ORDER — TADALAFIL 20 MG/1
20 TABLET ORAL DAILY
Qty: 100 TABLET | Refills: 1 | Status: SHIPPED | OUTPATIENT
Start: 2019-07-05 | End: 2019-08-23

## 2019-07-08 ENCOUNTER — OFFICE VISIT (OUTPATIENT)
Dept: INTERNAL MEDICINE | Facility: CLINIC | Age: 64
End: 2019-07-08
Payer: COMMERCIAL

## 2019-07-08 VITALS
SYSTOLIC BLOOD PRESSURE: 110 MMHG | BODY MASS INDEX: 28.43 KG/M2 | HEART RATE: 80 BPM | HEIGHT: 74 IN | RESPIRATION RATE: 15 BRPM | OXYGEN SATURATION: 97 % | DIASTOLIC BLOOD PRESSURE: 72 MMHG | WEIGHT: 221.5 LBS | TEMPERATURE: 98 F

## 2019-07-08 DIAGNOSIS — M79.10 MYALGIA: ICD-10-CM

## 2019-07-08 DIAGNOSIS — A69.20 LYME DISEASE: Primary | ICD-10-CM

## 2019-07-08 LAB
ALBUMIN SERPL-MCNC: 3.9 G/DL (ref 3.4–5)
ALP SERPL-CCNC: 225 U/L (ref 40–150)
ALT SERPL W P-5'-P-CCNC: 44 U/L (ref 0–70)
AST SERPL W P-5'-P-CCNC: 18 U/L (ref 0–45)
BILIRUB DIRECT SERPL-MCNC: 0.2 MG/DL (ref 0–0.2)
BILIRUB SERPL-MCNC: 0.6 MG/DL (ref 0.2–1.3)
ERYTHROCYTE [DISTWIDTH] IN BLOOD BY AUTOMATED COUNT: 16.1 % (ref 10–15)
HCT VFR BLD AUTO: 43.8 % (ref 40–53)
HGB BLD-MCNC: 14.7 G/DL (ref 13.3–17.7)
MCH RBC QN AUTO: 26.2 PG (ref 26.5–33)
MCHC RBC AUTO-ENTMCNC: 33.6 G/DL (ref 31.5–36.5)
MCV RBC AUTO: 78 FL (ref 78–100)
PLATELET # BLD AUTO: 396 10E9/L (ref 150–450)
PROT SERPL-MCNC: 7.8 G/DL (ref 6.8–8.8)
RBC # BLD AUTO: 5.61 10E12/L (ref 4.4–5.9)
WBC # BLD AUTO: 9.5 10E9/L (ref 4–11)

## 2019-07-08 PROCEDURE — 36415 COLL VENOUS BLD VENIPUNCTURE: CPT | Performed by: INTERNAL MEDICINE

## 2019-07-08 PROCEDURE — 80076 HEPATIC FUNCTION PANEL: CPT | Performed by: INTERNAL MEDICINE

## 2019-07-08 PROCEDURE — 85027 COMPLETE CBC AUTOMATED: CPT | Performed by: INTERNAL MEDICINE

## 2019-07-08 PROCEDURE — 99214 OFFICE O/P EST MOD 30 MIN: CPT | Performed by: INTERNAL MEDICINE

## 2019-07-08 RX ORDER — DOXYCYCLINE 100 MG/1
100 CAPSULE ORAL 2 TIMES DAILY
Qty: 14 CAPSULE | Refills: 0 | Status: SHIPPED | OUTPATIENT
Start: 2019-07-08 | End: 2019-08-01

## 2019-07-08 ASSESSMENT — MIFFLIN-ST. JEOR: SCORE: 1859.47

## 2019-07-08 NOTE — LETTER
[address] [address]  [phone]      7/8/2019       Austyn Araujo  [address] [facility] [address]        [address]   [address]  Tel. [phone]  Fax [phone]      Austyn Araujo  [address] [facility] [address]            To Whom it May Concern:    Austyn Araujo missed work due to recent illness.  Please excuse him from work during this time frame.  Austyn may return to work with restriction of no more than 4 hour work days until determined otherwise.    Please contact me for questions or concerns.    Sincerely,       Bienvenido Shaw MD  [facility] INTERNAL MEDICINE        7/8/19

## 2019-07-08 NOTE — LETTER
[facility]  [address] [address]  [phone]      7/8/2019       Austyn DELPHINE [facility]  [address] [facility] [address]        Dear David,    Your hemoglobin remains stable.  Your liver function tests continue to improve although still not completely resolved.  Two of your three abnormal liver function tests have now completely improved with one test, the alkaline phosphatase level, is still slightly elevated but improving.    I would consider rechecking these again at the completion of your antibiotic therapy.    Sincerely,      Bienvenido Shaw MD  Internal Medicine

## 2019-07-08 NOTE — PROGRESS NOTES
"Subjective     Austyn Araujo is a 63 year old male who presents to clinic today for the following health issues:    HPI     Follow up on 6/26/19 labs for lyme disease. Patient states he continues to have fevers, extreme fatigue and body aches. Patient will be leaving on family trip on 7/19 and he is questioning what next steps. Using Advil/ Excedrin 4 times daily     Patient has had laboratory testing demonstrating elevated liver function tests in the setting of a Lyme titer that was positive and a confirmatory IgM antibody demonstrating as such.    Reviewed and updated as needed this visit by Provider       Review of Systems   ROS COMP: ENT/MOUTH: NEGATIVE for ear, mouth and throat problems  RESP: NEGATIVE for significant cough or SOB  CV: NEGATIVE for chest pain, palpitations or peripheral edema  GI: NEGATIVE for nausea, abdominal pain, heartburn, or change in bowel habits  : NEGATIVE for frequency, dysuria, or hematuria  NEURO: NEGATIVE for weakness or paresthesias  ENDOCRINE: NEGATIVE for temperature intolerance, skin/hair changes  HEME: NEGATIVE for bleeding problems  PSYCHIATRIC: NEGATIVE for changes in mood or affect      Objective    /72   Pulse 80   Temp 98  F (36.7  C) (Oral)   Resp 15   Ht 1.88 m (6' 2\")   Wt 100.5 kg (221 lb 8 oz)   SpO2 97%   BMI 28.44 kg/m    Body mass index is 28.44 kg/m .  Physical Exam   GENERAL: alert and no distress  EYES: Eyes grossly normal to inspection, PERRL and conjunctivae and sclerae normal  HENT: ear canals and TM's normal, nose and mouth without ulcers or lesions  RESP: lungs clear to auscultation - no rales, rhonchi or wheezes  CV: regular rate and rhythm, normal S1 S2, no S3 or S4, no murmur, click or rub, no peripheral edema and peripheral pulses strong  ABDOMEN: soft, nontender, no hepatosplenomegaly, no masses and bowel sounds normal  MS: no gross musculoskeletal defects noted  NEURO: No focal changes  PSYCH: mentation appears normal, affect " "normal/bright        Assessment & Plan     1. Lyme disease  Patient is still having some systemic symptoms thus I have decided to increase his duration of therapy to include 21 days of the doxycycline.  We reviewed his testing and results and will also recheck his CBC and liver panel today.  I did advise him that I do not feel it in his best interest to be leaving to go to [place] at this point considering his recent illness.  Is given a work release to work no more than 4 of 8 hours in a standard work day until he starts to feel better  - doxycycline hyclate (VIBRAMYCIN) 100 MG capsule; Take 1 capsule (100 mg) by mouth 2 times daily  Dispense: 14 capsule; Refill: 0  - CBC with platelets  - Hepatic panel    2. Myalgia  Suspect secondary to above continue with doxycycline.  - doxycycline hyclate (VIBRAMYCIN) 100 MG capsule; Take 1 capsule (100 mg) by mouth 2 times daily  Dispense: 14 capsule; Refill: 0     BMI:   Estimated body mass index is 28.5 kg/m  as calculated from the following:    Height as of 6/26/19: 1.88 m (6' 2\").    Weight as of 6/26/19: 100.7 kg (222 lb).     See Patient Instructions    Return in about 2 weeks (around 7/22/2019) for if symptoms recur or worsen.    Bienvenido Shaw MD  [facility]      "

## 2019-07-30 DIAGNOSIS — A69.20 LYME DISEASE: ICD-10-CM

## 2019-07-30 PROCEDURE — 80076 HEPATIC FUNCTION PANEL: CPT | Performed by: INTERNAL MEDICINE

## 2019-07-30 PROCEDURE — 36415 COLL VENOUS BLD VENIPUNCTURE: CPT | Performed by: INTERNAL MEDICINE

## 2019-07-31 LAB
ALBUMIN SERPL-MCNC: 4.3 G/DL (ref 3.4–5)
ALP SERPL-CCNC: 97 U/L (ref 40–150)
ALT SERPL W P-5'-P-CCNC: 36 U/L (ref 0–70)
AST SERPL W P-5'-P-CCNC: 20 U/L (ref 0–45)
BILIRUB DIRECT SERPL-MCNC: 0.2 MG/DL (ref 0–0.2)
BILIRUB SERPL-MCNC: 0.8 MG/DL (ref 0.2–1.3)
PROT SERPL-MCNC: 7.6 G/DL (ref 6.8–8.8)

## 2019-08-01 ENCOUNTER — OFFICE VISIT (OUTPATIENT)
Dept: INTERNAL MEDICINE | Facility: CLINIC | Age: 64
End: 2019-08-01
Payer: COMMERCIAL

## 2019-08-01 VITALS
SYSTOLIC BLOOD PRESSURE: 116 MMHG | TEMPERATURE: 98.2 F | DIASTOLIC BLOOD PRESSURE: 64 MMHG | BODY MASS INDEX: 28.22 KG/M2 | HEART RATE: 74 BPM | OXYGEN SATURATION: 97 % | RESPIRATION RATE: 15 BRPM | WEIGHT: 219.8 LBS

## 2019-08-01 DIAGNOSIS — A69.20 LYME DISEASE: Primary | ICD-10-CM

## 2019-08-01 PROCEDURE — 99214 OFFICE O/P EST MOD 30 MIN: CPT | Performed by: INTERNAL MEDICINE

## 2019-08-01 NOTE — Clinical Note
Please abstract the following data from this visit with this patient into the appropriate field in Epic:Eye exam with ophthalmology on this date: 12/2018

## 2019-08-01 NOTE — PROGRESS NOTES
Subjective     Austyn Araujo is a 63 year old male who presents to clinic today for the following health issues:    HPI     Follow up lyme disease symptoms after completion of doxycyline. Patient states he continues to have body aches, chills and fatigue. Patient also had 3 episodes yesterday where he felt as if he was going to pass out and needed to sit down, no loss of consciousness, felt very dizzy when getting up afterward.     Other concerns:  1. FMLA forms for time missed, patient has been working 4 hour days.    Patient Active Problem List   Diagnosis     Advanced directives, counseling/discussion     Type 2 diabetes mellitus with peripheral autonomic neuropathy (H)     GERD (gastroesophageal reflux disease)     PUD (peptic ulcer disease)     Low testosterone     HTN (hypertension)     Mild major depression (H)     Hyperlipidemia LDL goal <100     Erectile dysfunction     BPH (benign prostatic hyperplasia)     Peripheral neuropathy     Cellulitis     Hypertension goal BP (blood pressure) < 140/90     MRSA (methicillin resistant staph aureus) culture positive     Anxiety     Health Care Home     Type 2 diabetes mellitus with diabetic neuropathy (H)     Hemoglobin A1c less than 7.0%     Abdominal pain     Pancreatitis     Controlled substance agreement signed     Lyme disease     Past Surgical History:   Procedure Laterality Date     COLONOSCOPY  11/11/2013    Procedure: COMBINED COLONOSCOPY, SINGLE BIOPSY/POLYPECTOMY BY BIOPSY;  Colonoscopy/ polypectomy x2 by cold forceps    ;  Surgeon: Juan Carlos Bellamy MD;  Location:  GI     ESOPHAGOSCOPY, GASTROSCOPY, DUODENOSCOPY (EGD), COMBINED N/A 9/22/2016    Procedure: COMBINED ESOPHAGOSCOPY, GASTROSCOPY, DUODENOSCOPY (EGD), BIOPSY SINGLE OR MULTIPLE;  Surgeon: Juan Carlos Barraza MD;  Location:  GI     ESOPHAGOSCOPY, GASTROSCOPY, DUODENOSCOPY (EGD), COMBINED N/A 2/19/2019    Procedure: ESOPHAGOSCOPY, GASTROSCOPY, DUODENOSCOPY (EGD) with cold forcep;   "Surgeon: Juan Carlos Barraza MD;  Location:  GI     HERNIA REPAIR       ORTHOPEDIC SURGERY         Social History     Tobacco Use     Smoking status: Former Smoker     Types: Cigarettes     Smokeless tobacco: Never Used   Substance Use Topics     Alcohol use: No     Alcohol/week: 0.0 oz     Comment: 14 drinks weekly, quit on 08/2017     Family History   Problem Relation Age of Onset     Pancreatic Cancer Mother      Pancreatic Cancer Paternal Grandfather      Diabetes Brother      Depression Brother      Suicide Brother      Substance Abuse Brother      Dementia Father      Parkinsonism Father      Family History Negative No family hx of      Colon Cancer No family hx of      Unknown/Adopted No family hx of      Anxiety Disorder No family hx of      Schizophrenia No family hx of      Bipolar Disorder No family hx of      Ambika Disease No family hx of      Autism Spectrum Disorder No family hx of      Intellectual Disability (Mental Retardation) No family hx of      Mental Illness No family hx of          Current Outpatient Medications   Medication Sig Dispense Refill     aspirin 81 MG tablet Take 1 tablet by mouth daily.       atorvastatin (LIPITOR) 40 MG tablet TAKE 1 TABLET BY MOUTH EVERY DAY 90 tablet 2     B-D LUER-ROCHELLE SYRINGE 22G X 1-1/2\" 3 ML MISC USE WITH TESTOSTERONE 12 each 3     BD HYPODERMIC NEEDLE 18G X 1\" MISC USE WITH TESTOSTERONE 25 each 6     glimepiride (AMARYL) 1 MG tablet TAKE 2 TABLETS BY MOUTH EVERY MORNING BEFORE BREAKFAST (DUE FOR LABS IN NOVEMBER 2018) 180 tablet 1     lisinopril (PRINIVIL/ZESTRIL) 10 MG tablet TAKE 1 TABLET BY MOUTH EVERY DAY 30 tablet 10     metFORMIN (GLUCOPHAGE-XR) 500 MG 24 hr tablet Take 4 tablets (2,000 mg) by mouth daily (with dinner) 360 tablet 3     Multiple Vitamins-Minerals (CENTRUM SILVER) per tablet Take 1 tablet by mouth daily 90 tablet 3     nitroGLYcerin (NITROSTAT) 0.4 MG sublingual tablet For chest pain place 1 tablet under the tongue every 5 minutes " for 3 doses. If symptoms persist 5 minutes after 1st dose call 911. 25 tablet 3     omeprazole (PRILOSEC) 40 MG DR capsule TAKE 1 CAPSULE BY MOUTH EVERY DAY 90 capsule 1     ONETOUCH ULTRA test strip USE TO TEST BLOOD SUGARS ONE TIME DAILY OR AS DIRECTED. 100 strip 3     PHARMACIST CHOICE LANCETS MISC TEST BLOOD SUGARS 5 TIMES DAILY 100 each 3     tadalafil (CIALIS) 20 MG tablet Take 1 tablet (20 mg) by mouth daily Never use with nitroglycerin, terazosin or doxazosin. 100 tablet 1     tamsulosin (FLOMAX) 0.4 MG capsule TAKE 1 CAPSULE BY MOUTH DAILY TAKE AT BEDTIME (DUE FOR APPT) 90 capsule 3     testosterone cypionate (DEPOTESTOSTERONE) 200 MG/ML injection INJECT 2MLS INTRAMUSCULARLY ONCE WEEKLY 10 mL 3     ALPRAZolam (XANAX) 0.5 MG tablet Take 1 tablet (0.5 mg) by mouth 3 times daily as needed for anxiety 30 tablet 0     No Known Allergies  BP Readings from Last 3 Encounters:   07/08/19 110/72   06/26/19 104/68   03/26/19 136/90    Wt Readings from Last 3 Encounters:   07/08/19 100.5 kg (221 lb 8 oz)   06/26/19 100.7 kg (222 lb)   04/15/19 104.3 kg (230 lb)         Reviewed and updated as needed this visit by Provider       Review of Systems   ROS COMP: CONSTITUTIONAL: NEGATIVE for fever, chills, change in weight  ENT/MOUTH: NEGATIVE for ear, mouth and throat problems  RESP: NEGATIVE for significant cough or SOB  CV: NEGATIVE for chest pain, palpitations or peripheral edema  GI: NEGATIVE for nausea, abdominal pain, heartburn, or change in bowel habits  : NEGATIVE for frequency, dysuria, or hematuria  HEME: NEGATIVE for bleeding problems  PSYCHIATRIC: NEGATIVE for changes in mood or affect      Objective    /64   Pulse 74   Temp 98.2  F (36.8  C) (Oral)   Resp 15   Wt 99.7 kg (219 lb 12.8 oz)   SpO2 97%   BMI 28.22 kg/m    Body mass index is 28.22 kg/m .  Physical Exam   GENERAL: alert   EYES: Eyes grossly normal to inspection, PERRL and conjunctivae and sclerae normal  HENT: ear canals and TM's  "normal, nose and mouth without ulcers or lesions  RESP: lungs clear to auscultation - no rales, rhonchi or wheezes  CV: regular rate and rhythm, normal S1 S2, no S3 or S4, no murmur, click or rub, no peripheral edema and peripheral pulses strong  MS: no gross musculoskeletal defects noted  NEURO: No focal changes  PSYCH: mentation appears normal, affect normal/bright        Assessment & Plan     1. Lyme disease  Completed course of oral antibiotics with mild residual symptoms.,  Slow improvement, FMLA forms filled out on patient's behalf.    Will restrict to working 4 of 8 hours/day for the next couple weeks to see how he progresses and then back to work full-time as tolerated once improving.       BMI:   Estimated body mass index is 28.44 kg/m  as calculated from the following:    Height as of 7/8/19: 1.88 m (6' 2\").    Weight as of 7/8/19: 100.5 kg (221 lb 8 oz).     See Patient Instructions    Return in about 2 weeks (around 8/15/2019) for if symptoms recur or worsen.    Bienvenido Shaw MD  [facility]      "

## 2019-08-02 ENCOUNTER — TELEPHONE (OUTPATIENT)
Dept: INTERNAL MEDICINE | Facility: CLINIC | Age: 64
End: 2019-08-02

## 2019-08-02 NOTE — TELEPHONE ENCOUNTER
Reason for call:  Form   Our goal is to have forms completed within 72 hours, however some forms may require a visit or additional information.     Who is the form from? Insurance comp  Where did the form come from? form was faxed in  What clinic location was the form placed at? [facility]  Where was the form placed? Pcp's Folder  What number is listed as a contact on the form? [phone]    Phone call message - patient request for a letter, form or note:     Date needed: as soon as possible  Please fax to [phone]  Has the patient signed a consent form for release of information? YES    Additional comments:     Type of letter, form or note: [facility]    Phone number to reach patient:  Cell number on file:    Telephone Information:   Mobile [phone]       Best Time:      Can we leave a detailed message on this number?  YES

## 2019-08-22 ENCOUNTER — TELEPHONE (OUTPATIENT)
Dept: INTERNAL MEDICINE | Facility: CLINIC | Age: 64
End: 2019-08-22

## 2019-08-22 ENCOUNTER — OFFICE VISIT (OUTPATIENT)
Dept: ORTHOPEDICS | Facility: CLINIC | Age: 64
End: 2019-08-22
Payer: COMMERCIAL

## 2019-08-22 DIAGNOSIS — R79.89 LOW TESTOSTERONE: ICD-10-CM

## 2019-08-22 DIAGNOSIS — M16.12 PRIMARY OSTEOARTHRITIS OF LEFT HIP: Primary | ICD-10-CM

## 2019-08-22 PROCEDURE — 20611 DRAIN/INJ JOINT/BURSA W/US: CPT | Mod: LT | Performed by: FAMILY MEDICINE

## 2019-08-22 RX ORDER — METHYLPREDNISOLONE ACETATE 40 MG/ML
40 INJECTION, SUSPENSION INTRA-ARTICULAR; INTRALESIONAL; INTRAMUSCULAR; SOFT TISSUE
Status: DISCONTINUED | OUTPATIENT
Start: 2019-08-22 | End: 2019-10-18 | Stop reason: CLARIF

## 2019-08-22 RX ORDER — ROPIVACAINE HYDROCHLORIDE 5 MG/ML
3 INJECTION, SOLUTION EPIDURAL; INFILTRATION; PERINEURAL
Status: DISCONTINUED | OUTPATIENT
Start: 2019-08-22 | End: 2023-07-12

## 2019-08-22 RX ADMIN — ROPIVACAINE HYDROCHLORIDE 3 ML: 5 INJECTION, SOLUTION EPIDURAL; INFILTRATION; PERINEURAL at 09:05

## 2019-08-22 RX ADMIN — METHYLPREDNISOLONE ACETATE 40 MG: 40 INJECTION, SUSPENSION INTRA-ARTICULAR; INTRALESIONAL; INTRAMUSCULAR; SOFT TISSUE at 09:05

## 2019-08-22 NOTE — PROGRESS NOTES
ASSESSMENT & PLAN    1. Primary osteoarthritis of left hip      Steroid injection of the left hip: intra-articular  was performed today in clinic  Can repeat the injection anytime after 4 months    Follow up for repeat injection when pain returns.     -----      SUBJECTIVE:  Austyn Araujo is a 63 year old male who is seen in follow-up for left hip pain.They were last seen 2/20/2019 with a left hip intra-articular corticosteroid injection completed at that time.  Patient reports good relief of pain that lasted for approximately 3-4 months.    They indicate that their current pain level is 3/10. Notes that throughout the day the pain increases with activity.    The patient is seen by themselves.    Independent visualization of the below image:  XR PELVIS 1/2 VW 7/6/2017 11:59 AM     HISTORY: Left hip pain.     COMPARISON: 10/10/2016     FINDINGS: Moderate symmetric narrowing of hip joint space associated  with acetabular sclerosis. No significant marginal osteophyte  formation. No fracture or malalignment.                                                                      IMPRESSION: Mild osteoarthritis at the hips, similar to 2016.     FATMATA PARRY MD    Large Joint Injection/Arthocentesis: L hip joint  Date/Time: 8/22/2019 9:05 AM  Performed by: Lesly Paniagua DO  Authorized by: Lesly Paniagua DO     Indications:  Pain and osteoarthritis  Needle Size:  22 G  Guidance: ultrasound    Approach:  Anterior  Location:  Hip      Site:  L hip joint  Medications:  40 mg methylPREDNISolone 40 MG/ML; 3 mL ropivacaine 5 MG/ML  Outcome:  Tolerated well, no immediate complications  Procedure discussed: discussed risks, benefits, and alternatives    Consent Given by:  Patient  Timeout: timeout called immediately prior to procedure    Prep: patient was prepped and draped in usual sterile fashion       Pain noted to be a 3/10 before completion of the procedure and 2/10 after completion of the  procedure.        Lesly Paniagua DO [facility]

## 2019-08-22 NOTE — LETTER
8/22/2019         RE: Austyn Araujo  [address] [facility] [address]        Dear Colleague,    Thank you for referring your patient, Austyn Araujo, to the [facility]. Please see a copy of my visit note below.    ASSESSMENT & PLAN    1. Primary osteoarthritis of left hip      Steroid injection of the left hip: intra-articular  was performed today in clinic  Can repeat the injection anytime after 4 months    Follow up for repeat injection when pain returns.     -----      SUBJECTIVE:  Austyn Araujo is a 63 year old male who is seen in follow-up for left hip pain.They were last seen 2/20/2019 with a left hip intra-articular corticosteroid injection completed at that time.  Patient reports good relief of pain that lasted for approximately 3-4 months.    They indicate that their current pain level is 3/10. Notes that throughout the day the pain increases with activity.    The patient is seen by themselves.    Independent visualization of the below image:  XR PELVIS 1/2 VW 7/6/2017 11:59 AM     HISTORY: Left hip pain.     COMPARISON: 10/10/2016     FINDINGS: Moderate symmetric narrowing of hip joint space associated  with acetabular sclerosis. No significant marginal osteophyte  formation. No fracture or malalignment.                                                                      IMPRESSION: Mild osteoarthritis at the hips, similar to 2016.     FATMATA PARRY MD    Large Joint Injection/Arthocentesis: L hip joint  Date/Time: 8/22/2019 9:05 AM  Performed by: Lesly Paniagua DO  Authorized by: Lesly Paniagua DO     Indications:  Pain and osteoarthritis  Needle Size:  22 G  Guidance: ultrasound    Approach:  Anterior  Location:  Hip      Site:  L hip joint  Medications:  40 mg methylPREDNISolone 40 MG/ML; 3 mL ropivacaine 5 MG/ML  Outcome:  Tolerated well, no immediate complications  Procedure discussed: discussed risks, benefits, and alternatives    Consent  Given by:  Patient  Timeout: timeout called immediately prior to procedure    Prep: patient was prepped and draped in usual sterile fashion       Pain noted to be a 3/10 before completion of the procedure and 2/10 after completion of the procedure.        Lesly Paniagua DO [facility]      Again, thank you for allowing me to participate in the care of your patient.        Sincerely,        Lesly Paniagua DO

## 2019-08-22 NOTE — TELEPHONE ENCOUNTER
Reason for Call:  Medication or medication refill:    Do you use a [facility]?  Name of the pharmacy and phone number for the current request:  [facility] phone [phone] fax [phone]    Name of the medication requested: tadalafil    Other request: Patient states pharmacy did not receive Rx for this medication sent on 7/5/19, please send new RX for 88 tabs with refills    Can we leave a detailed message on this number? YES    Phone number patient can be reached at: Cell number on file:    Telephone Information:   Mobile [phone]       Best Time: any    Call taken on 8/22/2019 at 4:17 PM by Ivanna Andersen

## 2019-08-22 NOTE — PATIENT INSTRUCTIONS
1. Primary osteoarthritis of left hip      Steroid injection of the left hip: intra-articular  was performed today in clinic    - Would not soak in a hot tub, bath or swimming pool for 48 hours  - Ok to shower  - Ice today and only do your normal amounts of activity  - The lidocaine (what is giving you pain relief right now) will likely stop working in 1-2 hours.  You will then have pain again, similar to before you received the injection. The corticosteroid will not start working until approximately 1-2 weeks from now.  In a small percentage of people, cortisone can cause flushing/redness in the face. This usually lasts for 1-3 days and resolves. Cool compress and Ibuprofen/Tylenol can help if this happens.  Can repeat the injection anytime after 4 months    Follow up for repeat injection when pain returns.

## 2019-08-23 RX ORDER — TADALAFIL 20 MG/1
20 TABLET ORAL DAILY
Qty: 100 TABLET | Refills: 1 | Status: SHIPPED | OUTPATIENT
Start: 2019-08-23 | End: 2019-10-18

## 2019-08-23 NOTE — TELEPHONE ENCOUNTER
Writer unable to locate faxed script done on 7/5/19. Primary care provider please sign new script for nursing to fax.   Nursing please fax to [phone], not fax listed on script

## 2019-09-05 DIAGNOSIS — I25.10 CAD IN NATIVE ARTERY: ICD-10-CM

## 2019-09-05 DIAGNOSIS — R07.9 CHEST PAIN: ICD-10-CM

## 2019-09-05 RX ORDER — NITROGLYCERIN 0.4 MG/1
TABLET SUBLINGUAL
Qty: 25 TABLET | Refills: 1 | Status: SHIPPED | OUTPATIENT
Start: 2019-09-05 | End: 2020-07-06

## 2019-09-12 DIAGNOSIS — R79.89 LOW TESTOSTERONE: ICD-10-CM

## 2019-09-13 DIAGNOSIS — R79.89 LOW TESTOSTERONE: ICD-10-CM

## 2019-09-13 NOTE — TELEPHONE ENCOUNTER
"Requested Prescriptions   Pending Prescriptions Disp Refills     BD HYPODERMIC NEEDLE 18G X 1\" MISC [Pharmacy Med Name: BD NEEDLES 18GX1\"]  14     Sig: USE WITH TESTOSTERONE       There is no refill protocol information for this order      Last Written Prescription Date:  03/26/2018  Last Fill Quantity: 25,  # refills: 06   Last office visit: 8/1/2019 with prescribing provider:     Future Office Visit:    Requested Prescriptions     "

## 2019-09-13 NOTE — LETTER
[facility]  [address], [address]  [place], [place]  [address]                                            TEL:[phone]  FAX:[phone]      Austyn DELPHINE [facility]  [address] [facility] [address]      September 16, 2019    Dear Austyn     We have received a refill request from your pharmacy for your syringes. Many medications require routine follow-up with your provider and a review of your chart indicates that you are over-due for an appointment.  You were due in August 2019 for a follow up appointment. We have sent a one time refill to your pharmacy to allow you time to schedule an appointment.     Please call [phone] to schedule an appointment.  We look forward to seeing you in the near future.      Thank you,     JAQUELINE Jolly

## 2019-09-16 RX ORDER — NEEDLES, DISPOSABLE 25GX5/8"
NEEDLE, DISPOSABLE MISCELLANEOUS
Qty: 4 EACH | Refills: 0 | Status: SHIPPED | OUTPATIENT
Start: 2019-09-16 | End: 2019-10-06

## 2019-09-16 NOTE — TELEPHONE ENCOUNTER
Medication is being filled for 1 month refill only due to:  pt is over-due for an appt.     Letter mailed.

## 2019-10-06 DIAGNOSIS — R79.89 LOW TESTOSTERONE: ICD-10-CM

## 2019-10-07 NOTE — TELEPHONE ENCOUNTER
"Requested Prescriptions   Pending Prescriptions Disp Refills     BD HYPODERMIC NEEDLE 18G X 1\" MISC [Pharmacy Med Name: BD NEEDLES  Last Written Prescription Date:  9/16/2019  Last Fill Quantity: 4,  # refills: 0   Last office visit: 8/1/2019 with prescribing provider:     Future Office Visit:   Next 5 appointments (look out 90 days)    Oct 18, 2019 11:00 AM CDT  [facility] [address]  [facility] [address]  [phone]        18GX1\"]  0     Sig: USE WITH TESTOSTERONE. NEED APPT       There is no refill protocol information for this order        testosterone cypionate (DEPOTESTOSTERONE) 200 MG/ML injection [Pharmacy  Last Written Prescription Date:  2/11/2019  Last Fill Quantity: 10ml,  # refills: 3   Last office visit: 8/1/2019 with prescribing provider:     Future Office Visit:   Next 5 appointments (look out 90 days)    Oct 18, 2019 11:00 AM CDT  [facility]ana [facility]  [facility] [address]  [facility] [address]  [phone]        Med Name: TESTOSTERON CYP 2,000 MG/10 ML] 10 mL      Sig: INJECT 2 ML INTO THE MUSCLE ONCE WEEKLY       Androgen Agents Failed - 10/6/2019  6:47 AM        Failed - Serum Testosterone on file within past 12 mos     Recent Labs   Lab Test 11/29/17  0939   TESTOSTTOTAL 892             Failed - Refills for this classification require provider review        Passed - Patient is of age 12 and older        Passed - Lipid panel on file in past 12 mos     Recent Labs   Lab Test 02/05/19  1413  09/30/15  0804   CHOL 156   < > 278*   TRIG 105   < > 187*   HDL 42   < > 54   LDL 93   < > 187*   NHDL 114   < >  --    VLDL  --   --  37*   CHOLHDLRATIO  --   --  5.1*    < > = values in this interval not displayed.               Passed - ALT on file within past 12 mos     Recent Labs   Lab Test 07/30/19  1231   ALT 36             " Passed - Medication is active on med list        Passed - HCT less than 54% on file within past 12 mos     Recent Labs   Lab Test 07/08/19  1159   HCT 43.8             Passed - Serum PSA on file within past 12 mos     Lab Results   Component Value Date    PSA 5.11 02/05/2019             Passed - Blood pressure under 140/90 in past 6 months     BP Readings from Last 3 Encounters:   08/01/19 116/64   07/08/19 110/72   06/26/19 104/68                 Passed - Patient is not pregnant        Passed - No positive pregnancy test on file within past 12 mos        Passed - Recent (6 mo) or future (30 days) visit within the authorizing provider's specialty        Passed - AST on file within past 12 mos     Recent Labs   Lab Test 07/30/19  1231   AST 20

## 2019-10-08 NOTE — TELEPHONE ENCOUNTER
Needles  Routing refill request to provider for review/approval because:  Drug not on the [facility] refill protocol     Testosterone  Routing refill request to provider for review/approval because:  Must be approved by provider

## 2019-10-09 RX ORDER — TESTOSTERONE CYPIONATE 200 MG/ML
INJECTION, SOLUTION INTRAMUSCULAR
Qty: 10 ML | Refills: 3 | Status: SHIPPED | OUTPATIENT
Start: 2019-10-09 | End: 2020-04-08

## 2019-10-09 RX ORDER — NEEDLES, DISPOSABLE 25GX5/8"
NEEDLE, DISPOSABLE MISCELLANEOUS
Qty: 4 EACH | Refills: 3 | Status: SHIPPED | OUTPATIENT
Start: 2019-10-09 | End: 2019-10-18

## 2019-10-14 ENCOUNTER — ANCILLARY PROCEDURE (OUTPATIENT)
Dept: MRI IMAGING | Facility: CLINIC | Age: 64
End: 2019-10-14
Attending: UROLOGY
Payer: COMMERCIAL

## 2019-10-14 DIAGNOSIS — C61 PROSTATE CANCER (H): ICD-10-CM

## 2019-10-14 RX ORDER — GADOBUTROL 604.72 MG/ML
10 INJECTION INTRAVENOUS ONCE
Status: COMPLETED | OUTPATIENT
Start: 2019-10-14 | End: 2019-10-14

## 2019-10-14 RX ADMIN — GADOBUTROL 10 ML: 604.72 INJECTION INTRAVENOUS at 12:45

## 2019-10-18 ENCOUNTER — OFFICE VISIT (OUTPATIENT)
Dept: INTERNAL MEDICINE | Facility: CLINIC | Age: 64
End: 2019-10-18
Payer: COMMERCIAL

## 2019-10-18 VITALS
SYSTOLIC BLOOD PRESSURE: 100 MMHG | WEIGHT: 217 LBS | DIASTOLIC BLOOD PRESSURE: 60 MMHG | RESPIRATION RATE: 16 BRPM | OXYGEN SATURATION: 97 % | TEMPERATURE: 98 F | BODY MASS INDEX: 27.85 KG/M2 | HEIGHT: 74 IN | HEART RATE: 83 BPM

## 2019-10-18 DIAGNOSIS — R79.89 LOW TESTOSTERONE: ICD-10-CM

## 2019-10-18 DIAGNOSIS — Z23 NEEDS FLU SHOT: ICD-10-CM

## 2019-10-18 DIAGNOSIS — I10 ESSENTIAL HYPERTENSION: ICD-10-CM

## 2019-10-18 DIAGNOSIS — E11.43 TYPE 2 DIABETES MELLITUS WITH PERIPHERAL AUTONOMIC NEUROPATHY (H): ICD-10-CM

## 2019-10-18 DIAGNOSIS — E78.5 HYPERLIPIDEMIA LDL GOAL <100: ICD-10-CM

## 2019-10-18 DIAGNOSIS — R07.9 CHEST PAIN, UNSPECIFIED TYPE: Primary | ICD-10-CM

## 2019-10-18 LAB — HBA1C MFR BLD: 10.1 % (ref 0–5.6)

## 2019-10-18 PROCEDURE — 83036 HEMOGLOBIN GLYCOSYLATED A1C: CPT | Performed by: INTERNAL MEDICINE

## 2019-10-18 PROCEDURE — 90662 IIV NO PRSV INCREASED AG IM: CPT | Performed by: INTERNAL MEDICINE

## 2019-10-18 PROCEDURE — 84270 ASSAY OF SEX HORMONE GLOBUL: CPT | Performed by: INTERNAL MEDICINE

## 2019-10-18 PROCEDURE — 84403 ASSAY OF TOTAL TESTOSTERONE: CPT | Performed by: INTERNAL MEDICINE

## 2019-10-18 PROCEDURE — 99214 OFFICE O/P EST MOD 30 MIN: CPT | Mod: 25 | Performed by: INTERNAL MEDICINE

## 2019-10-18 PROCEDURE — 36415 COLL VENOUS BLD VENIPUNCTURE: CPT | Performed by: INTERNAL MEDICINE

## 2019-10-18 PROCEDURE — G0008 ADMIN INFLUENZA VIRUS VAC: HCPCS | Performed by: INTERNAL MEDICINE

## 2019-10-18 RX ORDER — TADALAFIL 20 MG/1
20 TABLET ORAL DAILY
Qty: 88 TABLET | Refills: 1 | Status: SHIPPED | OUTPATIENT
Start: 2019-10-18 | End: 2020-03-17

## 2019-10-18 RX ORDER — TADALAFIL 20 MG/1
20 TABLET ORAL DAILY
Qty: 100 TABLET | Refills: 1 | Status: CANCELLED | OUTPATIENT
Start: 2019-10-18

## 2019-10-18 ASSESSMENT — MIFFLIN-ST. JEOR: SCORE: 1839.06

## 2019-10-18 NOTE — PROGRESS NOTES
Subjective     Austyn Araujo is a 63 year old male who presents to clinic today for the following health issues:    HPI   Diabetes Follow-up  Has H/O DM. On diet , exercise and PO treatment . Blood sugars are controlled. No parestesias. No hypoglycemias.      How often are you checking your blood sugar? A few times a week    What time of day are you checking your blood sugars (select all that apply)?  After meals    Have you had any blood sugars above 200?  Yes , occasionally    Have you had any blood sugars below 70?  No    What symptoms do you notice when your blood sugar is low?  Weak occasionally    What concerns do you have today about your diabetes? None     Do you have any of these symptoms? (Select all that apply)  Numbness in feet and Burning in feet     Have you had a diabetic eye exam in the last 12 months? Yes- Date of last eye exam: 12/1/2018    Diabetes Management Resources    Hyperlipidemia Follow-Up  Has H/O hyperlipidemia. On medical treatment and diet. No side effects. No muscle weakness, myalgias or upset stomach.       Are you having any of the following symptoms? (Select all that apply)  Chest pain or pressure and Pain in calves when walking 1-2 blocks    Are you regularly taking any medication or supplement to lower your cholesterol?   Yes- Atorvastatin    Are you having muscle aches or other side effects that you think could be caused by your cholesterol lowering medication?  No    Hypertension Follow-up  Has h/o HTN. on medical treatment. BP has been controlled. No side effects from medications. No CP, HA, dizziness. good compliance with medications and low salt diet.        Do you check your blood pressure regularly outside of the clinic? No     Are you following a low salt diet? No    Are your blood pressures ever more than 140 on the top number (systolic) OR more   than 90 on the bottom number (diastolic), for example 140/90? N/A    BP Readings from Last 2 Encounters:   08/01/19 116/64    07/08/19 110/72     Hemoglobin A1C (%)   Date Value   02/05/2019 8.6 (H)   08/01/2018 8.2 (H)     LDL Cholesterol Calculated (mg/dL)   Date Value   02/05/2019 93   01/16/2019 67         How many servings of fruits and vegetables do you eat daily?  0-1    On average, how many sweetened beverages do you drink each day (soda, juice, sweet tea, etc)?   1    How many days per week do you miss taking your medication? Occasionally miss taking medications        PROBLEMS TO ADD ON...    Has history of low testosterone and ED. On treatment with testosterone injections, PRN Cialis. Helps with symptoms. No side effects.     Concern for chest pain, anterior mid chest, not related to physical activity. Improve with NG. Has had CT angio over a year ago. Has non obstructive CAD. No critical lesions.       Patient Active Problem List   Diagnosis     Advanced directives, counseling/discussion     Type 2 diabetes mellitus with peripheral autonomic neuropathy (H)     GERD (gastroesophageal reflux disease)     PUD (peptic ulcer disease)     Low testosterone     HTN (hypertension)     Mild major depression (H)     Hyperlipidemia LDL goal <100     Erectile dysfunction     BPH (benign prostatic hyperplasia)     Peripheral neuropathy     Cellulitis     Hypertension goal BP (blood pressure) < 140/90     MRSA (methicillin resistant staph aureus) culture positive     Anxiety     Health Care Home     Type 2 diabetes mellitus with diabetic neuropathy (H)     Hemoglobin A1c less than 7.0%     Abdominal pain     Pancreatitis     Controlled substance agreement signed     Lyme disease     Past Surgical History:   Procedure Laterality Date     COLONOSCOPY  11/11/2013    Procedure: COMBINED COLONOSCOPY, SINGLE BIOPSY/POLYPECTOMY BY BIOPSY;  Colonoscopy/ polypectomy x2 by cold forceps    ;  Surgeon: Juan Carlos Bellamy MD;  Location:  GI     ESOPHAGOSCOPY, GASTROSCOPY, DUODENOSCOPY (EGD), COMBINED N/A 9/22/2016    Procedure: COMBINED  ESOPHAGOSCOPY, GASTROSCOPY, DUODENOSCOPY (EGD), BIOPSY SINGLE OR MULTIPLE;  Surgeon: Juan Carlos Barraza MD;  Location: [facility]     ESOPHAGOSCOPY, GASTROSCOPY, DUODENOSCOPY (EGD), COMBINED N/A 2/19/2019    Procedure: ESOPHAGOSCOPY, GASTROSCOPY, DUODENOSCOPY (EGD) with cold forcep;  Surgeon: Juan Carlos Barraza MD;  Location: [facility]     HERNIA REPAIR       ORTHOPEDIC SURGERY         Social History     Tobacco Use     Smoking status: Former Smoker     Types: Cigarettes     Smokeless tobacco: Never Used   Substance Use Topics     Alcohol use: No     Alcohol/week: 0.0 standard drinks     Comment: 14 drinks weekly, quit on 08/2017     Family History   Problem Relation Age of Onset     Pancreatic Cancer Mother      Pancreatic Cancer Paternal Grandfather      Diabetes Brother      Depression Brother      Suicide Brother      Substance Abuse Brother      Dementia Father      Parkinsonism Father      Family History Negative No family hx of      Colon Cancer No family hx of      Unknown/Adopted No family hx of      Anxiety Disorder No family hx of      Schizophrenia No family hx of      Bipolar Disorder No family hx of      [place] Disease No family hx of      Autism Spectrum Disorder No family hx of      Intellectual Disability (Mental Retardation) No family hx of      Mental Illness No family hx of          Current Outpatient Medications   Medication Sig Dispense Refill     ALPRAZolam (XANAX) 0.5 MG tablet Take 1 tablet (0.5 mg) by mouth 3 times daily as needed for anxiety 30 tablet 0     aspirin 81 MG tablet Take 1 tablet by mouth daily.       atorvastatin (LIPITOR) 40 MG tablet TAKE 1 TABLET BY MOUTH EVERY DAY 90 tablet 2     glimepiride (AMARYL) 1 MG tablet TAKE 2 TABLETS BY MOUTH EVERY MORNING BEFORE BREAKFAST (DUE FOR LABS IN NOVEMBER 2018) 180 tablet 1     lisinopril (PRINIVIL/ZESTRIL) 10 MG tablet TAKE 1 TABLET BY MOUTH EVERY DAY 30 tablet 10     metFORMIN (GLUCOPHAGE-XR) 500 MG 24 hr tablet Take 4 tablets (2,000 mg)  "by mouth daily (with dinner) 360 tablet 3     Multiple Vitamins-Minerals (CENTRUM SILVER) per tablet Take 1 tablet by mouth daily 90 tablet 3     needle, disp, (BD HYPODERMIC NEEDLE) 18G X 1\" MISC USE WITH TESTOSTERONE. NEED APPT 4 each 3     nitroGLYcerin (NITROSTAT) 0.4 MG sublingual tablet For chest pain place 1 tablet under the tongue every 5 minutes for 3 doses. If symptoms persist 5 minutes after 1st dose call 911. 25 tablet 1     omeprazole (PRILOSEC) 40 MG DR capsule TAKE 1 CAPSULE BY MOUTH EVERY DAY 90 capsule 1     tadalafil (CIALIS) 20 MG tablet Take 1 tablet (20 mg) by mouth daily Never use with nitroglycerin, terazosin or doxazosin. 88 tablet 1     tamsulosin (FLOMAX) 0.4 MG capsule TAKE 1 CAPSULE BY MOUTH DAILY TAKE AT BEDTIME (DUE FOR APPT) 90 capsule 3     testosterone cypionate (DEPOTESTOSTERONE) 200 MG/ML injection INJECT 2 ML INTO THE MUSCLE ONCE WEEKLY 10 mL 3     B-D LUER-ROCHELLE SYRINGE 22G X 1-1/2\" 3 ML MISC USE WITH TESTOSTERONE 12 each 3     ONETOUCH ULTRA test strip USE TO TEST BLOOD SUGARS ONE TIME DAILY OR AS DIRECTED. 100 strip 3     PHARMACIST CHOICE LANCETS MISC TEST BLOOD SUGARS 5 TIMES DAILY 100 each 3         Reviewed and updated as needed this visit by Provider         Review of Systems   ROS COMP: Constitutional, HEENT, cardiovascular, pulmonary, GI, , musculoskeletal, neuro, skin, endocrine and psych systems are negative, except as otherwise noted.      Objective    Ht 1.88 m (6' 2\")   BMI 28.22 kg/m    Body mass index is 28.22 kg/m .  Physical Exam   GENERAL: healthy, alert and no distress  EYES: Eyes grossly normal to inspection, PERRL and conjunctivae and sclerae normal  HENT: ear canals and TM's normal, nose and mouth without ulcers or lesions  NECK: no adenopathy, no asymmetry, masses, or scars and thyroid normal to palpation  RESP: lungs clear to auscultation - no rales, rhonchi or wheezes  CV: regular rate and rhythm, normal S1 S2, no S3 or S4, no murmur, click or rub, no " "peripheral edema and peripheral pulses strong  ABDOMEN: soft, nontender, no hepatosplenomegaly, no masses and bowel sounds normal  MS: no gross musculoskeletal defects noted, no edema  SKIN: no suspicious lesions or rashes  NEURO: Normal strength and tone, mentation intact and speech normal    Diagnostic Test Results:  Labs reviewed in Epic        Assessment & Plan   Problem List Items Addressed This Visit     Type 2 diabetes mellitus with peripheral autonomic neuropathy (H)    Relevant Orders    Hemoglobin A1c (Completed)    Low testosterone    Relevant Medications    tadalafil (CIALIS) 20 MG tablet    needle, disp, (BD HYPODERMIC NEEDLE) 18G X 1\" MISC    Other Relevant Orders    Testosterone Free and Total (Completed)    HTN (hypertension)    Hyperlipidemia LDL goal <100      Other Visit Diagnoses     Chest pain, unspecified type    -  Primary    Relevant Orders    Echocardiogram Exercise Stress    Needs flu shot        Relevant Orders    FLU VACCINE, INCREASED ANTIGEN, PRESV FREE (Completed)         Assess lab work   Continue treatment   Advised for side effects   Assess stress test for possible ischemic pain   Immunized         BMI:   Estimated body mass index is 27.86 kg/m  as calculated from the following:    Height as of this encounter: 1.88 m (6' 2\").    Weight as of this encounter: 98.4 kg (217 lb).   Weight management plan: Discussed healthy diet and exercise guidelines        See Patient Instructions  Return in about 6 months (around 4/18/2020) for Routine Visit.    Javier Villafana MD  [facility]        "

## 2019-10-18 NOTE — TELEPHONE ENCOUNTER
"Requested Prescriptions   Pending Prescriptions Disp Refills     tadalafil (CIALIS) 20 MG tablet  Last Written Prescription Date:  08/23/19  Last Fill Quantity: 100,  # refills: 1   Last office visit: No previous visit found with prescribing provider:  02/05/19   Future Office Visit:     100 tablet 1     Sig: Take 1 tablet (20 mg) by mouth daily Never use with nitroglycerin, terazosin or doxazosin.       Erectile Dysfuction Protocol Failed - 10/18/2019 11:39 AM        Failed - Absence of nitrates on medication list        Failed - Absence of Alpha Blockers on Med list        Passed - Recent (12 mo) or future (30 days) visit within the authorizing provider's specialty     Patient has had an office visit with the authorizing provider or a provider within the authorizing providers department within the previous 12 mos or has a future within next 30 days. See \"Patient Info\" tab in inbasket, or \"Choose Columns\" in Meds & Orders section of the refill encounter.              Passed - Medication is active on med list        Passed - Patient is age 18 or older          "

## 2019-10-18 NOTE — LETTER
[facility]  [address], [address]  [place] [address]  [phone]        October 24, 2019    Austyn Araujo  [address] [facility] [place] [address]            Dear Cassie Austyn AKERS Gayle:      The results of your recent labs are enclosed.  If you have any further questions or problems, please contact our office.    Sincerely,        Javier Villafana M.D.    Results for orders placed or performed in visit on 10/18/19   Testosterone Free and Total   Result Value Ref Range    Testosterone Total 1,087 (H) 240 - 950 ng/dL    Sex Hormone Binding Globulin 23 11 - 80 nmol/L    Free Testosterone Calculated 31.57 (H) 4.7 - 24.4 ng/dL   Hemoglobin A1c   Result Value Ref Range    Hemoglobin A1C 10.1 (H) 0 - 5.6 %

## 2019-10-18 NOTE — NURSING NOTE
"Vital signs:  Temp: 98  F (36.7  C) Temp src: Oral BP: 100/60 Pulse: 83   Resp: 16 SpO2: 97 %     Height: 188 cm (6' 2\") Weight: 98.4 kg (217 lb)  Estimated body mass index is 27.86 kg/m  as calculated from the following:    Height as of this encounter: 1.88 m (6' 2\").    Weight as of this encounter: 98.4 kg (217 lb).          "

## 2019-10-22 LAB
SHBG SERPL-SCNC: 23 NMOL/L (ref 11–80)
TESTOST FREE SERPL-MCNC: 31.57 NG/DL (ref 4.7–24.4)
TESTOST SERPL-MCNC: 1087 NG/DL (ref 240–950)

## 2019-11-04 DIAGNOSIS — K21.9 GASTROESOPHAGEAL REFLUX DISEASE WITHOUT ESOPHAGITIS: ICD-10-CM

## 2019-11-04 NOTE — TELEPHONE ENCOUNTER
"Requested Prescriptions   Pending Prescriptions Disp Refills     omeprazole (PRILOSEC) 40 MG DR capsule [Pharmacy Med Name: OMEPRAZOLE DR 40 MG CAPSULE] 90 capsule 1     Sig: TAKE 1 CAPSULE BY MOUTH EVERY DAY   Last Written Prescription Date:  04/15/2019  Last Fill Quantity: 90,  # refills: 01   Last office visit: 10/18/2019 with prescribing provider:     Future Office Visit:      PPI Protocol Passed - 11/4/2019  2:24 AM        Passed - Not on Clopidogrel (unless Pantoprazole ordered)        Passed - No diagnosis of osteoporosis on record        Passed - Recent (12 mo) or future (30 days) visit within the authorizing provider's specialty     Patient has had an office visit with the authorizing provider or a provider within the authorizing providers department within the previous 12 mos or has a future within next 30 days. See \"Patient Info\" tab in inbasket, or \"Choose Columns\" in Meds & Orders section of the refill encounter.              Passed - Medication is active on med list        Passed - Patient is age 18 or older        "

## 2019-11-05 RX ORDER — OMEPRAZOLE 40 MG/1
CAPSULE, DELAYED RELEASE ORAL
Qty: 90 CAPSULE | Refills: 3 | Status: SHIPPED | OUTPATIENT
Start: 2019-11-05 | End: 2020-10-20

## 2019-11-18 DIAGNOSIS — C61 PROSTATE CANCER (H): ICD-10-CM

## 2019-11-18 LAB — PSA SERPL-MCNC: 5.6 UG/L (ref 0–4)

## 2019-11-18 PROCEDURE — 84153 ASSAY OF PSA TOTAL: CPT | Performed by: UROLOGY

## 2019-11-18 PROCEDURE — 36415 COLL VENOUS BLD VENIPUNCTURE: CPT | Performed by: UROLOGY

## 2019-11-25 ENCOUNTER — OFFICE VISIT (OUTPATIENT)
Dept: UROLOGY | Facility: CLINIC | Age: 64
End: 2019-11-25
Payer: COMMERCIAL

## 2019-11-25 VITALS
HEIGHT: 74 IN | DIASTOLIC BLOOD PRESSURE: 68 MMHG | BODY MASS INDEX: 27.85 KG/M2 | SYSTOLIC BLOOD PRESSURE: 112 MMHG | HEART RATE: 80 BPM | WEIGHT: 217 LBS

## 2019-11-25 DIAGNOSIS — C61 PROSTATE CANCER (H): Primary | ICD-10-CM

## 2019-11-25 DIAGNOSIS — N40.0 ENLARGED PROSTATE: ICD-10-CM

## 2019-11-25 PROCEDURE — 99213 OFFICE O/P EST LOW 20 MIN: CPT | Performed by: UROLOGY

## 2019-11-25 ASSESSMENT — MIFFLIN-ST. JEOR: SCORE: 1839.06

## 2019-11-25 ASSESSMENT — PAIN SCALES - GENERAL: PAINLEVEL: NO PAIN (0)

## 2019-11-25 NOTE — NURSING NOTE
Chief Complaint   Patient presents with     Clinic Care Coordination - Follow-up     History of Prostate Cancer      Marlys Maldonado LPN

## 2019-11-25 NOTE — PROGRESS NOTES
Office Visit Note  [facility]  [phone]    UROLOGIC DIAGNOSES:   Low risk prostate cancer, enlarged prostate, erectile dysfunction    CURRENT INTERVENTIONS:   Cialis, prostate biopsy March 2019    HISTORY:   David returns to clinic today for active surveillance of his low-grade prostate cancer.  He had an MRI of the prostate performed in October that showed a 68 g prostate with no areas concerning for a prostate cancer.  His most recent PSA was 5.6.  He says that at night he still has occasional difficulty urinating but overall has no consistent bothersome urinary complaints.      PAST MEDICAL HISTORY:   Past Medical History:   Diagnosis Date     Anxiety      Anxiety 1/31/2015     BPH (benign prostatic hyperplasia)      Degenerative disc disease      Depression      Diabetes mellitus (H)      Gastritis      Gastro-oesophageal reflux disease      H/O alcohol abuse      Hyperlipidemia      Hypertension      Low testosterone      MRSA (methicillin resistant staph aureus) culture positive 8/2013    skin infection     Mumps      PUD (peptic ulcer disease)      Sleep apnea      STD (sexually transmitted disease)        PAST SURGICAL HISTORY:   Past Surgical History:   Procedure Laterality Date     COLONOSCOPY  11/11/2013    Procedure: COMBINED COLONOSCOPY, SINGLE BIOPSY/POLYPECTOMY BY BIOPSY;  Colonoscopy/ polypectomy x2 by cold forceps    ;  Surgeon: Juan Carlos Bellamy MD;  Location:  GI     ESOPHAGOSCOPY, GASTROSCOPY, DUODENOSCOPY (EGD), COMBINED N/A 9/22/2016    Procedure: COMBINED ESOPHAGOSCOPY, GASTROSCOPY, DUODENOSCOPY (EGD), BIOPSY SINGLE OR MULTIPLE;  Surgeon: Juan Carlos Barraza MD;  Location:  GI     ESOPHAGOSCOPY, GASTROSCOPY, DUODENOSCOPY (EGD), COMBINED N/A 2/19/2019    Procedure: ESOPHAGOSCOPY, GASTROSCOPY, DUODENOSCOPY (EGD) with cold forcep;  Surgeon: Juan Carlos Barraza MD;  Location:  GI     HERNIA REPAIR       ORTHOPEDIC SURGERY         FAMILY HISTORY:   Family History   Problem  "Relation Age of Onset     Pancreatic Cancer Mother      Pancreatic Cancer Paternal Grandfather      Diabetes Brother      Depression Brother      Suicide Brother      Substance Abuse Brother      Dementia Father      Parkinsonism Father      Family History Negative No family hx of      Colon Cancer No family hx of      Unknown/Adopted No family hx of      Anxiety Disorder No family hx of      Schizophrenia No family hx of      Bipolar Disorder No family hx of      [place] Disease No family hx of      Autism Spectrum Disorder No family hx of      Intellectual Disability (Mental Retardation) No family hx of      Mental Illness No family hx of        SOCIAL HISTORY:   Social History     Tobacco Use     Smoking status: Former Smoker     Types: Cigarettes     Smokeless tobacco: Never Used   Substance Use Topics     Alcohol use: No     Alcohol/week: 0.0 standard drinks     Comment: 14 drinks weekly, quit on 08/2017       Current Outpatient Medications   Medication     ALPRAZolam (XANAX) 0.5 MG tablet     aspirin 81 MG tablet     atorvastatin (LIPITOR) 40 MG tablet     B-D LUER-ROCHELLE SYRINGE 22G X 1-1/2\" 3 ML MISC     glimepiride (AMARYL) 1 MG tablet     lisinopril (PRINIVIL/ZESTRIL) 10 MG tablet     metFORMIN (GLUCOPHAGE-XR) 500 MG 24 hr tablet     Multiple Vitamins-Minerals (CENTRUM SILVER) per tablet     needle, disp, (BD HYPODERMIC NEEDLE) 18G X 1\" MISC     nitroGLYcerin (NITROSTAT) 0.4 MG sublingual tablet     omeprazole (PRILOSEC) 40 MG DR capsule     ONETOUCH ULTRA test strip     PHARMACIST CHOICE LANCETS MISC     sitagliptin (JANUVIA) 25 MG tablet     tadalafil (CIALIS) 20 MG tablet     tamsulosin (FLOMAX) 0.4 MG capsule     testosterone cypionate (DEPOTESTOSTERONE) 200 MG/ML injection     Current Facility-Administered Medications   Medication     ropivacaine (NAROPIN) injection 3 mL         PHYSICAL EXAM:    /68 (BP Location: Right arm)   Pulse 80   Ht 1.88 m (6' 2\")   Wt 98.4 kg (217 lb)   BMI 27.86 " kg/m      Constitutional: Well developed. Conversant and in no acute distress  Eyes: Anicteric sclera, conjunctiva clear, normal extraocular movements  ENT: Normocephalic and atraumatic,   Skin: Warm and dry. No rashes or lesions  Cardiac: No peripheral edema  Back/Flank: Not done  CNS/PNS: Normal musculature and movements, moves all extremities normally  Respiratory: Normal non-labored breathing  Abdomen: Soft nontender and nondistended  Peripheral Vascular: No peripheral edema  Mental Status/Psych: Alert and Oriented x 3. Normal mood and affect    Penis: Not done  Scrotal Skin: Not done  Testicles: Not done  Epididymis: Not done  Digital Rectal Exam:     Cystoscopy: Not done    Imaging: None    Urinalysis: UA RESULTS:  Recent Labs   Lab Test 02/05/19  1413   COLOR Yellow   APPEARANCE Clear   URINEGLC 100*   URINEBILI Negative   URINEKETONE Negative   SG 1.020   UBLD Negative   URINEPH 6.0   PROTEIN Negative   UROBILINOGEN 0.2   NITRITE Negative   LEUKEST Small*   RBCU O - 2   WBCU 5-10*       PSA: 5.6    Post Void Residual:     Other labs: None today      IMPRESSION:  Low risk prostate cancer, enlarged prostate    PLAN:  He appears stable from a prostate cancer standpoint.  The MRI was negative, suggesting that the cancer is likely small.  We discussed the surveillance plan.  In 6 months he will return for another PSA and digital rectal examination.  At the two-year светлана we will repeat a prostate biopsy.      Mohsen Rao M.D.

## 2019-11-25 NOTE — LETTER
11/25/2019       RE: Austyn Araujo  [id number]  [facility] [id number]     Dear Colleague,    Thank you for referring your patient, Austyn Araujo, to the [facility] UROLOGY [facility] [place] at [facility]. Please see a copy of my visit note below.    Office Visit Note  [facility]  [phone]    UROLOGIC DIAGNOSES:   Low risk prostate cancer, enlarged prostate, erectile dysfunction    CURRENT INTERVENTIONS:   Cialis, prostate biopsy March 2019    HISTORY:   David returns to clinic today for active surveillance of his low-grade prostate cancer.  He had an MRI of the prostate performed in October that showed a 68 g prostate with no areas concerning for a prostate cancer.  His most recent PSA was 5.6.  He says that at night he still has occasional difficulty urinating but overall has no consistent bothersome urinary complaints.      PAST MEDICAL HISTORY:   Past Medical History:   Diagnosis Date     Anxiety      Anxiety 1/31/2015     BPH (benign prostatic hyperplasia)      Degenerative disc disease      Depression      Diabetes mellitus (H)      Gastritis      Gastro-oesophageal reflux disease      H/O alcohol abuse      Hyperlipidemia      Hypertension      Low testosterone      MRSA (methicillin resistant staph aureus) culture positive 8/2013    skin infection     Mumps      PUD (peptic ulcer disease)      Sleep apnea      STD (sexually transmitted disease)        PAST SURGICAL HISTORY:   Past Surgical History:   Procedure Laterality Date     COLONOSCOPY  11/11/2013    Procedure: COMBINED COLONOSCOPY, SINGLE BIOPSY/POLYPECTOMY BY BIOPSY;  Colonoscopy/ polypectomy x2 by cold forceps    ;  Surgeon: Juan Carlos Bellamy MD;  Location:  GI     ESOPHAGOSCOPY, GASTROSCOPY, DUODENOSCOPY (EGD), COMBINED N/A 9/22/2016    Procedure: COMBINED ESOPHAGOSCOPY, GASTROSCOPY, DUODENOSCOPY (EGD), BIOPSY SINGLE OR MULTIPLE;  Surgeon: Juan Carlos Barraza  "MD NICOLE;  Location:  GI     ESOPHAGOSCOPY, GASTROSCOPY, DUODENOSCOPY (EGD), COMBINED N/A 2/19/2019    Procedure: ESOPHAGOSCOPY, GASTROSCOPY, DUODENOSCOPY (EGD) with cold forcep;  Surgeon: Juan Carlos Barraza MD;  Location:  GI     HERNIA REPAIR       ORTHOPEDIC SURGERY         FAMILY HISTORY:   Family History   Problem Relation Age of Onset     Pancreatic Cancer Mother      Pancreatic Cancer Paternal Grandfather      Diabetes Brother      Depression Brother      Suicide Brother      Substance Abuse Brother      Dementia Father      Parkinsonism Father      Family History Negative No family hx of      Colon Cancer No family hx of      Unknown/Adopted No family hx of      Anxiety Disorder No family hx of      Schizophrenia No family hx of      Bipolar Disorder No family hx of      Ambika Disease No family hx of      Autism Spectrum Disorder No family hx of      Intellectual Disability (Mental Retardation) No family hx of      Mental Illness No family hx of        SOCIAL HISTORY:   Social History     Tobacco Use     Smoking status: Former Smoker     Types: Cigarettes     Smokeless tobacco: Never Used   Substance Use Topics     Alcohol use: No     Alcohol/week: 0.0 standard drinks     Comment: 14 drinks weekly, quit on 08/2017       Current Outpatient Medications   Medication     ALPRAZolam (XANAX) 0.5 MG tablet     aspirin 81 MG tablet     atorvastatin (LIPITOR) 40 MG tablet     B-D LUER-ROCHELLE SYRINGE 22G X 1-1/2\" 3 ML MISC     glimepiride (AMARYL) 1 MG tablet     lisinopril (PRINIVIL/ZESTRIL) 10 MG tablet     metFORMIN (GLUCOPHAGE-XR) 500 MG 24 hr tablet     Multiple Vitamins-Minerals (CENTRUM SILVER) per tablet     needle, disp, (BD HYPODERMIC NEEDLE) 18G X 1\" MISC     nitroGLYcerin (NITROSTAT) 0.4 MG sublingual tablet     omeprazole (PRILOSEC) 40 MG DR capsule     ONETOUCH ULTRA test strip     PHARMACIST CHOICE LANCETS MISC     sitagliptin (JANUVIA) 25 MG tablet     tadalafil (CIALIS) 20 MG tablet     tamsulosin " "(FLOMAX) 0.4 MG capsule     testosterone cypionate (DEPOTESTOSTERONE) 200 MG/ML injection     Current Facility-Administered Medications   Medication     ropivacaine (NAROPIN) injection 3 mL         PHYSICAL EXAM:    /68 (BP Location: Right arm)   Pulse 80   Ht 1.88 m (6' 2\")   Wt 98.4 kg (217 lb)   BMI 27.86 kg/m       Constitutional: Well developed. Conversant and in no acute distress  Eyes: Anicteric sclera, conjunctiva clear, normal extraocular movements  ENT: Normocephalic and atraumatic,   Skin: Warm and dry. No rashes or lesions  Cardiac: No peripheral edema  Back/Flank: Not done  CNS/PNS: Normal musculature and movements, moves all extremities normally  Respiratory: Normal non-labored breathing  Abdomen: Soft nontender and nondistended  Peripheral Vascular: No peripheral edema  Mental Status/Psych: Alert and Oriented x 3. Normal mood and affect    Penis: Not done  Scrotal Skin: Not done  Testicles: Not done  Epididymis: Not done  Digital Rectal Exam:     Cystoscopy: Not done    Imaging: None    Urinalysis: UA RESULTS:  Recent Labs   Lab Test 02/05/19  1413   COLOR Yellow   APPEARANCE Clear   URINEGLC 100*   URINEBILI Negative   URINEKETONE Negative   SG 1.020   UBLD Negative   URINEPH 6.0   PROTEIN Negative   UROBILINOGEN 0.2   NITRITE Negative   LEUKEST Small*   RBCU O - 2   WBCU 5-10*       PSA: 5.6    Post Void Residual:     Other labs: None today      IMPRESSION:  Low risk prostate cancer, enlarged prostate    PLAN:  He appears stable from a prostate cancer standpoint.  The MRI was negative, suggesting that the cancer is likely small.  We discussed the surveillance plan.  In 6 months he will return for another PSA and digital rectal examination.  At the two-year светлана we will repeat a prostate biopsy.      Mohsen Rao M.D.        "

## 2020-01-19 DIAGNOSIS — E78.5 HYPERLIPIDEMIA LDL GOAL <100: ICD-10-CM

## 2020-01-20 NOTE — TELEPHONE ENCOUNTER
"Requested Prescriptions   Pending Prescriptions Disp Refills     atorvastatin (LIPITOR) 40 MG tablet [Pharmacy Med Name: ATORVASTATIN 40 MG TABLET] 90 tablet 2     Sig: TAKE 1 TABLET BY MOUTH EVERY DAY   Last Written Prescription Date:  04/29/2019  Last Fill Quantity: 90,  # refills: 02   Last office visit: 10/18/2019 with prescribing provider:     Future Office Visit:      Statins Protocol Passed - 1/19/2020 12:48 PM        Passed - LDL on file in past 12 months     Recent Labs   Lab Test 02/05/19  1413   LDL 93             Passed - No abnormal creatine kinase in past 12 months     Recent Labs   Lab Test 10/10/16  1601                   Passed - Recent (12 mo) or future (30 days) visit within the authorizing provider's specialty     Patient has had an office visit with the authorizing provider or a provider within the authorizing providers department within the previous 12 mos or has a future within next 30 days. See \"Patient Info\" tab in inbasket, or \"Choose Columns\" in Meds & Orders section of the refill encounter.              Passed - Medication is active on med list        Passed - Patient is age 18 or older        "

## 2020-01-21 RX ORDER — ATORVASTATIN CALCIUM 40 MG/1
TABLET, FILM COATED ORAL
Qty: 90 TABLET | Refills: 0 | Status: SHIPPED | OUTPATIENT
Start: 2020-01-21 | End: 2020-04-20

## 2020-01-21 NOTE — TELEPHONE ENCOUNTER
Prescription approved per [facility], [facility] or [facility] refill protocol.  Kristyn IZQUIERDO - Registered Nurse  [facility]  Acute and Diagnostic Services

## 2020-02-13 DIAGNOSIS — E78.5 HYPERLIPIDEMIA LDL GOAL <100: ICD-10-CM

## 2020-02-13 DIAGNOSIS — E11.43 TYPE 2 DIABETES MELLITUS WITH PERIPHERAL AUTONOMIC NEUROPATHY (H): ICD-10-CM

## 2020-02-13 DIAGNOSIS — I10 HYPERTENSION GOAL BP (BLOOD PRESSURE) < 140/90: Primary | ICD-10-CM

## 2020-02-13 RX ORDER — GLIMEPIRIDE 1 MG/1
2 TABLET ORAL
Qty: 180 TABLET | Refills: 0 | Status: SHIPPED | OUTPATIENT
Start: 2020-02-13 | End: 2020-04-30

## 2020-02-13 NOTE — TELEPHONE ENCOUNTER
"Requested Prescriptions   Pending Prescriptions Disp Refills     glimepiride (AMARYL) 1 MG tablet [Pharmacy Med Name: GLIMEPIRIDE 1 MG TABLET] 180 tablet 1     Sig: TAKE 2 TABLETS BY MOUTH EVERY MORNING BEFORE BREAKFAST (DUE FOR LABS IN NOVEMBER 2018)   Last Written Prescription Date:  06/10/2019  Last Fill Quantity: 180,  # refills: 01   Last office visit: 10/18/2019 with prescribing provider:     Future Office Visit:      Sulfonylurea Agents Failed - 2/13/2020  8:22 AM        Failed - Patient has documented LDL within the past 12 mos.     Recent Labs   Lab Test 02/05/19  1413   LDL 93             Failed - Patient has documented A1c within the specified period of time.     If HgbA1C is 8 or greater, it needs to be on file within the past 3 months.  If less than 8, must be on file within the past 6 months.     Recent Labs   Lab Test 10/18/19  1234   A1C 10.1*             Passed - Blood pressure less than 140/90 in past 6 months     BP Readings from Last 3 Encounters:   11/25/19 112/68   10/18/19 100/60   08/01/19 116/64                 Passed - Patient has had a Microalbumin in the past 15 mos.     Recent Labs   Lab Test 02/05/19  1413   MICROL 16   UMALCR 9.76             Passed - Medication is active on med list        Passed - Patient is age 18 or older        Passed - Patient has a recent creatinine (normal) within the past 12 mos.     Recent Labs   Lab Test 06/26/19  1137  10/16/18  1314   CR 1.00   < >  --    CREAT  --   --  0.9    < > = values in this interval not displayed.             Passed - Recent (6 mo) or future (30 days) visit within the authorizing provider's specialty     Patient had office visit in the last 6 months or has a visit in the next 30 days with authorizing provider or within the authorizing provider's specialty.  See \"Patient Info\" tab in inbasket, or \"Choose Columns\" in Meds & Orders section of the refill encounter.            "

## 2020-02-16 DIAGNOSIS — E11.43 TYPE 2 DIABETES MELLITUS WITH PERIPHERAL AUTONOMIC NEUROPATHY (H): ICD-10-CM

## 2020-02-17 NOTE — TELEPHONE ENCOUNTER
"Requested Prescriptions   Pending Prescriptions Disp Refills     metFORMIN (GLUCOPHAGE-XR) 500 MG 24 hr tablet [Pharmacy Med Name:  Last Written Prescription Date:  2/5/19  Last Fill Quantity: 360,  # refills: 3   Last Office Visit: 10/18/2019   Future Office Visit:      METFORMIN HCL  MG TABLET] 360 tablet 3     Sig: TAKE 4 TABLETS (2,000 MG) BY MOUTH DAILY (WITH DINNER)       Biguanide Agents Failed - 2/16/2020 10:07 AM        Failed - Patient has documented LDL within the past 12 mos.     Recent Labs   Lab Test 02/05/19  1413   LDL 93             Failed - Patient has documented A1c within the specified period of time.     If HgbA1C is 8 or greater, it needs to be on file within the past 3 months.  If less than 8, must be on file within the past 6 months.     Recent Labs   Lab Test 10/18/19  1234   A1C 10.1*             Failed - Medication is active on med list        Passed - Blood pressure less than 140/90 in past 6 months     BP Readings from Last 3 Encounters:   11/25/19 112/68   10/18/19 100/60   08/01/19 116/64           Passed - Patient has had a Microalbumin in the past 15 mos.     Recent Labs   Lab Test 02/05/19  1413   MICROL 16   UMALCR 9.76           Passed - Patient is age 10 or older        Passed - Patient's CR is NOT>1.4 OR Patient's EGFR is NOT<45 within past 12 mos.     Recent Labs   Lab Test 06/26/19  1137   GFRESTIMATED 79   GFRESTBLACK >90       Recent Labs   Lab Test 06/26/19  1137   CR 1.00           Passed - Patient does NOT have a diagnosis of CHF.        Passed - Recent (6 mo) or future (30 days) visit within the authorizing provider's specialty     Patient had office visit in the last 6 months or has a visit in the next 30 days with authorizing provider or within the authorizing provider's specialty.  See \"Patient Info\" tab in inbasket, or \"Choose Columns\" in Meds & Orders section of the refill encounter.              "

## 2020-02-18 RX ORDER — METFORMIN HCL 500 MG
2000 TABLET, EXTENDED RELEASE 24 HR ORAL
Qty: 360 TABLET | Refills: 0 | Status: SHIPPED | OUTPATIENT
Start: 2020-02-18 | End: 2020-05-15

## 2020-02-18 NOTE — TELEPHONE ENCOUNTER
Medication is being filled for 1 time refill only due to:  pt will be due for an appt in April 2020.     [employer] message sent.

## 2020-02-20 DIAGNOSIS — C61 PROSTATE CANCER (H): ICD-10-CM

## 2020-02-20 DIAGNOSIS — E78.5 HYPERLIPIDEMIA LDL GOAL <100: ICD-10-CM

## 2020-02-20 DIAGNOSIS — I10 HYPERTENSION GOAL BP (BLOOD PRESSURE) < 140/90: ICD-10-CM

## 2020-02-20 DIAGNOSIS — E11.43 TYPE 2 DIABETES MELLITUS WITH PERIPHERAL AUTONOMIC NEUROPATHY (H): ICD-10-CM

## 2020-02-20 LAB
ALBUMIN SERPL-MCNC: 4 G/DL (ref 3.4–5)
ALP SERPL-CCNC: 79 U/L (ref 40–150)
ALT SERPL W P-5'-P-CCNC: 29 U/L (ref 0–70)
ANION GAP SERPL CALCULATED.3IONS-SCNC: 7 MMOL/L (ref 3–14)
AST SERPL W P-5'-P-CCNC: 13 U/L (ref 0–45)
BILIRUB SERPL-MCNC: 0.6 MG/DL (ref 0.2–1.3)
BUN SERPL-MCNC: 14 MG/DL (ref 7–30)
CALCIUM SERPL-MCNC: 9.1 MG/DL (ref 8.5–10.1)
CHLORIDE SERPL-SCNC: 101 MMOL/L (ref 94–109)
CHOLEST SERPL-MCNC: 127 MG/DL
CO2 SERPL-SCNC: 27 MMOL/L (ref 20–32)
CREAT SERPL-MCNC: 0.97 MG/DL (ref 0.66–1.25)
GFR SERPL CREATININE-BSD FRML MDRD: 81 ML/MIN/{1.73_M2}
GLUCOSE SERPL-MCNC: 153 MG/DL (ref 70–99)
HBA1C MFR BLD: 7.9 % (ref 0–5.6)
HDLC SERPL-MCNC: 37 MG/DL
LDLC SERPL CALC-MCNC: 72 MG/DL
NONHDLC SERPL-MCNC: 90 MG/DL
POTASSIUM SERPL-SCNC: 4.3 MMOL/L (ref 3.4–5.3)
PROT SERPL-MCNC: 7.7 G/DL (ref 6.8–8.8)
PSA SERPL-MCNC: 5.66 UG/L (ref 0–4)
SODIUM SERPL-SCNC: 135 MMOL/L (ref 133–144)
TRIGL SERPL-MCNC: 90 MG/DL

## 2020-02-20 PROCEDURE — 80061 LIPID PANEL: CPT | Performed by: INTERNAL MEDICINE

## 2020-02-20 PROCEDURE — 84153 ASSAY OF PSA TOTAL: CPT | Performed by: INTERNAL MEDICINE

## 2020-02-20 PROCEDURE — 83036 HEMOGLOBIN GLYCOSYLATED A1C: CPT | Performed by: INTERNAL MEDICINE

## 2020-02-20 PROCEDURE — 36415 COLL VENOUS BLD VENIPUNCTURE: CPT | Performed by: INTERNAL MEDICINE

## 2020-02-20 PROCEDURE — 80053 COMPREHEN METABOLIC PANEL: CPT | Performed by: INTERNAL MEDICINE

## 2020-02-20 PROCEDURE — 82043 UR ALBUMIN QUANTITATIVE: CPT | Performed by: INTERNAL MEDICINE

## 2020-02-21 LAB
CREAT UR-MCNC: 196 MG/DL
MICROALBUMIN UR-MCNC: 16 MG/L
MICROALBUMIN/CREAT UR: 8.37 MG/G CR (ref 0–17)

## 2020-03-06 DIAGNOSIS — R79.89 LOW TESTOSTERONE: ICD-10-CM

## 2020-03-06 NOTE — TELEPHONE ENCOUNTER
"Requested Prescriptions   Pending Prescriptions Disp Refills     B-D LUER-ROCHELLE SYRINGE 22G X 1-1/2\" 3 ML MISC [Pharmacy Med Name: BD 3 ML SYRINGE [id number]/2\"] 12 each 3     Sig: USE WITH TESTOSTERONE       There is no refill protocol information for this order      Last Written Prescription Date:  01/22/2019  Last Fill Quantity: 12,  # refills: 03   Last office visit: 10/18/2019 with prescribing provider:     Future Office Visit:    "

## 2020-03-09 RX ORDER — SYRINGE WITH NEEDLE, 1 ML 25GX5/8"
SYRINGE, EMPTY DISPOSABLE MISCELLANEOUS
Qty: 12 EACH | Refills: 0 | Status: SHIPPED | OUTPATIENT
Start: 2020-03-09 | End: 2020-04-30

## 2020-03-13 DIAGNOSIS — R79.89 LOW TESTOSTERONE: ICD-10-CM

## 2020-03-13 NOTE — TELEPHONE ENCOUNTER
"Last Written Prescription Date:  10/18/2019  Last Fill Quantity: 88 tablet,  # refills: 1   Last office visit: 10/18/2019 with prescribing provider:  Javier Villafana   Future Office Visit:    Requested Prescriptions   Pending Prescriptions Disp Refills     tadalafil (CIALIS) 20 MG tablet 88 tablet 1     Sig: Take 1 tablet (20 mg) by mouth daily Never use with nitroglycerin, terazosin or doxazosin.       Erectile Dysfuction Protocol Failed - 3/13/2020  9:33 AM        Failed - Absence of nitrates on medication list        Failed - Absence of Alpha Blockers on Med list        Passed - Recent (12 mo) or future (30 days) visit within the authorizing provider's specialty     Patient has had an office visit with the authorizing provider or a provider within the authorizing providers department within the previous 12 mos or has a future within next 30 days. See \"Patient Info\" tab in inbasket, or \"Choose Columns\" in Meds & Orders section of the refill encounter.              Passed - Medication is active on med list        Passed - Patient is age 18 or older             "

## 2020-03-17 RX ORDER — TADALAFIL 20 MG/1
20 TABLET ORAL DAILY
Qty: 88 TABLET | Refills: 1 | Status: SHIPPED | OUTPATIENT
Start: 2020-03-17 | End: 2020-11-24

## 2020-03-17 NOTE — TELEPHONE ENCOUNTER
Routing refill request to provider for review/approval because:  Patient has alpha blocker and nitrate on medication list

## 2020-03-22 ENCOUNTER — HEALTH MAINTENANCE LETTER (OUTPATIENT)
Age: 65
End: 2020-03-22

## 2020-04-07 DIAGNOSIS — R79.89 LOW TESTOSTERONE: ICD-10-CM

## 2020-04-07 NOTE — TELEPHONE ENCOUNTER
Requested Prescriptions   Pending Prescriptions Disp Refills     testosterone cypionate (DEPOTESTOSTERONE) 200 MG/ML injection [Pharmacy Med Name: TESTOSTERON CYP 2,000 MG/10 ML]  Last Written Prescription Date:  10/9/2019  Last Fill Quantity: 10ml,  # refills: 3   Last office visit: 10/18/2019 with prescribing provider:     Future Office Visit:   10 mL      Sig: INJECT 2 ML INTO THE MUSCLE ONCE WEEKLY       Androgen Agents Failed - 4/7/2020 11:47 AM        Failed - Refills for this classification require provider review        Passed - Patient is of age 12 and older        Passed - Lipid panel on file in past 12 mos     Recent Labs   Lab Test 02/20/20  0820  09/30/15  0804   CHOL 127   < > 278*   TRIG 90   < > 187*   HDL 37*   < > 54   LDL 72   < > 187*   NHDL 90   < >  --    VLDL  --   --  37*   CHOLHDLRATIO  --   --  5.1*    < > = values in this interval not displayed.               Passed - ALT on file within past 12 mos     Recent Labs   Lab Test 02/20/20 0820   ALT 29             Passed - Medication is active on med list        Passed - HCT less than 54% on file within past 12 mos     Recent Labs   Lab Test 07/08/19  1159   HCT 43.8             Passed - Serum Testosterone on file within past 12 mos     Recent Labs   Lab Test 10/18/19  1234   TESTOSTTOTAL 1,087*             Passed - Serum PSA on file within past 12 mos     Lab Results   Component Value Date    PSA 5.66 02/20/2020             Passed - Blood pressure under 140/90 in past 6 months     BP Readings from Last 3 Encounters:   11/25/19 112/68   10/18/19 100/60   08/01/19 116/64                 Passed - Patient is not pregnant        Passed - No positive pregnancy test on file within past 12 mos        Passed - Recent (6 mo) or future (30 days) visit within the authorizing provider's specialty        Passed - AST on file within past 12 mos     Recent Labs   Lab Test 02/20/20 0820   AST 13

## 2020-04-08 RX ORDER — TESTOSTERONE CYPIONATE 200 MG/ML
INJECTION, SOLUTION INTRAMUSCULAR
Qty: 10 ML | Refills: 0 | Status: SHIPPED | OUTPATIENT
Start: 2020-04-08 | End: 2020-08-20

## 2020-04-20 DIAGNOSIS — E78.5 HYPERLIPIDEMIA LDL GOAL <100: ICD-10-CM

## 2020-04-20 RX ORDER — ATORVASTATIN CALCIUM 40 MG/1
TABLET, FILM COATED ORAL
Qty: 90 TABLET | Refills: 0 | Status: SHIPPED | OUTPATIENT
Start: 2020-04-20 | End: 2020-07-14

## 2020-04-29 DIAGNOSIS — R79.89 LOW TESTOSTERONE: ICD-10-CM

## 2020-04-29 DIAGNOSIS — E11.43 TYPE 2 DIABETES MELLITUS WITH PERIPHERAL AUTONOMIC NEUROPATHY (H): ICD-10-CM

## 2020-04-30 RX ORDER — SYRINGE WITH NEEDLE, 1 ML 25GX5/8"
SYRINGE, EMPTY DISPOSABLE MISCELLANEOUS
Qty: 4 EACH | Refills: 0 | Status: SHIPPED | OUTPATIENT
Start: 2020-04-30 | End: 2020-09-30

## 2020-04-30 RX ORDER — GLIMEPIRIDE 1 MG/1
TABLET ORAL
Qty: 180 TABLET | Refills: 0 | Status: SHIPPED | OUTPATIENT
Start: 2020-04-30 | End: 2020-05-04

## 2020-04-30 NOTE — TELEPHONE ENCOUNTER
Due for diabetes follow-up.   Please call and advise patient to schedule virtual visit.     Sofy Carrington RN

## 2020-05-04 ENCOUNTER — VIRTUAL VISIT (OUTPATIENT)
Dept: INTERNAL MEDICINE | Facility: CLINIC | Age: 65
End: 2020-05-04
Payer: COMMERCIAL

## 2020-05-04 DIAGNOSIS — E78.5 HYPERLIPIDEMIA LDL GOAL <100: Primary | ICD-10-CM

## 2020-05-04 DIAGNOSIS — E11.43 TYPE 2 DIABETES MELLITUS WITH PERIPHERAL AUTONOMIC NEUROPATHY (H): ICD-10-CM

## 2020-05-04 DIAGNOSIS — I10 ESSENTIAL HYPERTENSION: ICD-10-CM

## 2020-05-04 PROCEDURE — 99214 OFFICE O/P EST MOD 30 MIN: CPT | Mod: TEL | Performed by: INTERNAL MEDICINE

## 2020-05-04 RX ORDER — GLIMEPIRIDE 4 MG/1
TABLET ORAL
Qty: 90 TABLET | Refills: 1 | Status: SHIPPED | OUTPATIENT
Start: 2020-05-04 | End: 2020-10-30

## 2020-05-04 ASSESSMENT — PATIENT HEALTH QUESTIONNAIRE - PHQ9: SUM OF ALL RESPONSES TO PHQ QUESTIONS 1-9: 0

## 2020-05-04 NOTE — PROGRESS NOTES
"Austyn Araujo is a 64 year old male who is being evaluated via a billable telephone visit.      The patient has been notified of following:     \"This telephone visit will be conducted via a call between you and your physician/provider. We have found that certain health care needs can be provided without the need for a physical exam.  This service lets us provide the care you need with a short phone conversation.  If a prescription is necessary we can send it directly to your pharmacy.  If lab work is needed we can place an order for that and you can then stop by our lab to have the test done at a later time.    Telephone visits are billed at different rates depending on your insurance coverage. During this emergency period, for some insurers they may be billed the same as an in-person visit.  Please reach out to your insurance provider with any questions.    If during the course of the call the physician/provider feels a telephone visit is not appropriate, you will not be charged for this service.\"    Patient has given verbal consent for Telephone visit?  Yes    What phone number would you like to be contacted at? [phone]    How would you like to obtain your AVS? Mail a copy    Subjective     Austyn Araujo is a 64 year old male who presents to clinic today for the following health issues:    HPI  Diabetes Follow-up  Has H/O DM. On diet , exercise and oral treatment . Does not check his blood sugars. Hgb A1C is 7.9 on last check, previous 10.2. Has numbness, tingling and LE  parestesias. No hypoglycemias.    How often are you checking your blood sugar? A few times a month  What time of day are you checking your blood sugars (select all that apply)?  before or after super   Have you had any blood sugars above 200?  Yes-Rare  Have you had any blood sugars below 70?  No    What symptoms do you notice when your blood sugar is low?  None    What concerns do you have today about your diabetes? Worried about " negative affect from stopping alcohol      Do you have any of these symptoms? (Select all that apply)  Numbness in feet and Burning in feet    Have you had a diabetic eye exam in the last 12 months? No          Hyperlipidemia Follow-Up  Has H/O hyperlipidemia. On medical treatment and diet. No side effects. No muscle weakness, myalgias or upset stomach.       Are you regularly taking any medication or supplement to lower your cholesterol?   YES -simvastatin     Are you having muscle aches or other side effects that you think could be caused by your cholesterol lowering medication?  Yes- maybe     Hypertension Follow-up  Has h/o HTN. on medical treatment. BP has been controlled. No side effects from medications. No CP, HA, dizziness. good compliance with medications and low salt diet.      Do you check your blood pressure regularly outside of the clinic? No     Are you following a low salt diet? No    Are your blood pressures ever more than 140 on the top number (systolic) OR more   than 90 on the bottom number (diastolic), for example 140/90? No    BP Readings from Last 2 Encounters:   11/25/19 112/68   10/18/19 100/60     Hemoglobin A1C (%)   Date Value   02/20/2020 7.9 (H)   10/18/2019 10.1 (H)     LDL Cholesterol Calculated (mg/dL)   Date Value   02/20/2020 72   02/05/2019 93         How many servings of fruits and vegetables do you eat daily?  0-1    On average, how many sweetened beverages do you drink each day (Examples: soda, juice, sweet tea, etc.  Do NOT count diet or artificially sweetened beverages)?   1    How many days per week do you exercise enough to make your heart beat faster? 3 or less    How many minutes a day do you exercise enough to make your heart beat faster? 9 or less  How many days per week do you miss taking your medication? 1    What makes it hard for you to take your medications?  remembering to take             Patient Active Problem List   Diagnosis     Advanced directives,  counseling/discussion     Type 2 diabetes mellitus with peripheral autonomic neuropathy (H)     GERD (gastroesophageal reflux disease)     PUD (peptic ulcer disease)     Low testosterone     HTN (hypertension)     Mild major depression (H)     Hyperlipidemia LDL goal <100     Erectile dysfunction     BPH (benign prostatic hyperplasia)     Peripheral neuropathy     Cellulitis     Hypertension goal BP (blood pressure) < 140/90     MRSA (methicillin resistant staph aureus) culture positive     Anxiety     Health Care Home     Type 2 diabetes mellitus with diabetic neuropathy (H)     Hemoglobin A1c less than 7.0%     Abdominal pain     Pancreatitis     Controlled substance agreement signed     Lyme disease     Past Surgical History:   Procedure Laterality Date     COLONOSCOPY  11/11/2013    Procedure: COMBINED COLONOSCOPY, SINGLE BIOPSY/POLYPECTOMY BY BIOPSY;  Colonoscopy/ polypectomy x2 by cold forceps    ;  Surgeon: Juan Carlos Bellamy MD;  Location:  GI     ESOPHAGOSCOPY, GASTROSCOPY, DUODENOSCOPY (EGD), COMBINED N/A 9/22/2016    Procedure: COMBINED ESOPHAGOSCOPY, GASTROSCOPY, DUODENOSCOPY (EGD), BIOPSY SINGLE OR MULTIPLE;  Surgeon: Juan Carlos Barraza MD;  Location:  GI     ESOPHAGOSCOPY, GASTROSCOPY, DUODENOSCOPY (EGD), COMBINED N/A 2/19/2019    Procedure: ESOPHAGOSCOPY, GASTROSCOPY, DUODENOSCOPY (EGD) with cold forcep;  Surgeon: Juan Carlos Barraza MD;  Location:  GI     HERNIA REPAIR       ORTHOPEDIC SURGERY         Social History     Tobacco Use     Smoking status: Former Smoker     Types: Cigarettes     Smokeless tobacco: Never Used   Substance Use Topics     Alcohol use: No     Alcohol/week: 0.0 standard drinks     Comment: 14 drinks weekly, quit on 08/2017     Family History   Problem Relation Age of Onset     Pancreatic Cancer Mother      Pancreatic Cancer Paternal Grandfather      Diabetes Brother      Depression Brother      Suicide Brother      Substance Abuse Brother      Dementia Father       "Parkinsonism Father      Family History Negative No family hx of      Colon Cancer No family hx of      Unknown/Adopted No family hx of      Anxiety Disorder No family hx of      Schizophrenia No family hx of      Bipolar Disorder No family hx of      Bamberg Disease No family hx of      Autism Spectrum Disorder No family hx of      Intellectual Disability (Mental Retardation) No family hx of      Mental Illness No family hx of          Current Outpatient Medications   Medication Sig Dispense Refill     ALPRAZolam (XANAX) 0.5 MG tablet Take 1 tablet (0.5 mg) by mouth 3 times daily as needed for anxiety 30 tablet 0     aspirin 81 MG tablet Take 1 tablet by mouth daily.       atorvastatin (LIPITOR) 40 MG tablet TAKE 1 TABLET BY MOUTH EVERY DAY 90 tablet 0     B-D LUER-ROCHELLE SYRINGE 22G X 1-1/2\" 3 ML MISC USE WITH TESTOSTERONE 4 each 0     glimepiride (AMARYL) 4 MG tablet TAKE 1 TABLET BY MOUTH ONCE DAILY IN THE MORNING BEFORE BREAKFAST 90 tablet 1     lisinopril (PRINIVIL/ZESTRIL) 10 MG tablet TAKE 1 TABLET BY MOUTH EVERY DAY 30 tablet 10     METFORMIN 500 MG PO 24 hr tablet TAKE 4 TABLETS (2,000 MG) BY MOUTH DAILY (WITH DINNER) 360 tablet 0     Multiple Vitamins-Minerals (CENTRUM SILVER) per tablet Take 1 tablet by mouth daily 90 tablet 3     nitroGLYcerin (NITROSTAT) 0.4 MG sublingual tablet For chest pain place 1 tablet under the tongue every 5 minutes for 3 doses. If symptoms persist 5 minutes after 1st dose call 911. 25 tablet 1     omeprazole (PRILOSEC) 40 MG DR capsule TAKE 1 CAPSULE BY MOUTH EVERY DAY 90 capsule 3     ONETOUCH ULTRA test strip USE TO TEST BLOOD SUGARS ONE TIME DAILY OR AS DIRECTED. 100 strip 3     PHARMACIST CHOICE LANCETS [facility] TEST BLOOD SUGARS 5 TIMES DAILY 100 each 3     sitagliptin (JANUVIA) 50 MG tablet Take 1 tablet (50 mg) by mouth daily 90 tablet 1     tadalafil (CIALIS) 20 MG tablet Take 1 tablet (20 mg) by mouth daily Never use with nitroglycerin, terazosin or doxazosin. 88 tablet 1 " "    tamsulosin (FLOMAX) 0.4 MG capsule TAKE 1 CAPSULE BY MOUTH DAILY TAKE AT BEDTIME (DUE FOR APPT) 90 capsule 3     testosterone cypionate (DEPOTESTOSTERONE) 200 MG/ML injection INJECT 2 ML INTO THE MUSCLE ONCE WEEKLY 10 mL 0     needle, disp, (BD HYPODERMIC NEEDLE) 18G X 1\" MISC USE WITH TESTOSTERONE. NEED APPT 4 each 3       Reviewed and updated as needed this visit by Provider         Review of Systems   ROS COMP: Constitutional, HEENT, cardiovascular, pulmonary, gi and gu systems are negative, except as otherwise noted.       Objective   Normal speech   Remainder of exam unable to be completed due to telephone visits    Diagnostic Test Results:  Labs reviewed in Epic        Assessment/Plan:  1. Type 2 diabetes mellitus with peripheral autonomic neuropathy (H)  Increased Januvia and Glimepiride doses , keep diet and exercise   - sitagliptin (JANUVIA) 50 MG tablet; Take 1 tablet (50 mg) by mouth daily  Dispense: 90 tablet; Refill: 1  - glimepiride (AMARYL) 4 MG tablet; TAKE 1 TABLET BY MOUTH ONCE DAILY IN THE MORNING BEFORE BREAKFAST  Dispense: 90 tablet; Refill: 1    2. Hyperlipidemia LDL goal <100  Controlled, on statin     3. Essential hypertension  Controlled on Lisinopril        Follow up lab work with HgbA1C in 3 months     Phone call duration:  7 minutes    Javier Villafana MD      "

## 2020-05-06 DIAGNOSIS — C61 PROSTATE CANCER (H): Primary | ICD-10-CM

## 2020-05-15 DIAGNOSIS — E11.43 TYPE 2 DIABETES MELLITUS WITH PERIPHERAL AUTONOMIC NEUROPATHY (H): ICD-10-CM

## 2020-05-15 RX ORDER — METFORMIN HCL 500 MG
2000 TABLET, EXTENDED RELEASE 24 HR ORAL
Qty: 360 TABLET | Refills: 0 | Status: SHIPPED | OUTPATIENT
Start: 2020-05-15 | End: 2021-07-28

## 2020-05-15 NOTE — TELEPHONE ENCOUNTER
"Metformin refill request.   Prescription approved per [facility] Refill Protocol.      Last OV on 5/4/20    Requested Prescriptions   Pending Prescriptions Disp Refills     metFORMIN (GLUCOPHAGE-XR) 500 MG 24 hr tablet [Pharmacy Med Name: METFORMIN HCL  MG TABLET] 360 tablet 0     Sig: TAKE 4 TABLETS BY MOUTH DAILY WITH DINNER (DUE FOR APPT IN APRIL)       Biguanide Agents Passed - 5/15/2020  1:49 AM        Passed - Patient is age 10 or older        Passed - Patient has documented A1c within the specified period of time.     If HgbA1C is 8 or greater, it needs to be on file within the past 3 months.  If less than 8, must be on file within the past 6 months.     Recent Labs   Lab Test 02/20/20  0820   A1C 7.9*             Passed - Patient's CR is NOT>1.4 OR Patient's EGFR is NOT<45 within past 12 mos.     Recent Labs   Lab Test 02/20/20  0820   GFRESTIMATED 81   GFRESTBLACK >90       Recent Labs   Lab Test 02/20/20  0820   CR 0.97             Passed - Patient does NOT have a diagnosis of CHF.        Passed - Medication is active on med list        Passed - Recent (6 mo) or future (30 days) visit within the authorizing provider's specialty     Patient had office visit in the last 6 months or has a visit in the next 30 days with authorizing provider or within the authorizing provider's specialty.  See \"Patient Info\" tab in inbasket, or \"Choose Columns\" in Meds & Orders section of the refill encounter.                 "

## 2020-06-18 DIAGNOSIS — C61 PROSTATE CANCER (H): ICD-10-CM

## 2020-06-18 PROCEDURE — 84153 ASSAY OF PSA TOTAL: CPT | Performed by: UROLOGY

## 2020-06-18 PROCEDURE — 36415 COLL VENOUS BLD VENIPUNCTURE: CPT | Performed by: UROLOGY

## 2020-06-19 LAB — PSA SERPL-MCNC: 6.05 UG/L (ref 0–4)

## 2020-06-22 ENCOUNTER — VIRTUAL VISIT (OUTPATIENT)
Dept: UROLOGY | Facility: CLINIC | Age: 65
End: 2020-06-22
Payer: COMMERCIAL

## 2020-06-22 VITALS — BODY MASS INDEX: 28.88 KG/M2 | WEIGHT: 225 LBS | HEIGHT: 74 IN

## 2020-06-22 DIAGNOSIS — N40.0 ENLARGED PROSTATE: ICD-10-CM

## 2020-06-22 DIAGNOSIS — C61 PROSTATE CANCER (H): Primary | ICD-10-CM

## 2020-06-22 PROCEDURE — 99213 OFFICE O/P EST LOW 20 MIN: CPT | Mod: TEL | Performed by: UROLOGY

## 2020-06-22 ASSESSMENT — PAIN SCALES - GENERAL: PAINLEVEL: NO PAIN (0)

## 2020-06-22 ASSESSMENT — MIFFLIN-ST. JEOR: SCORE: 1870.34

## 2020-06-22 NOTE — PROGRESS NOTES
"Austyn Araujo is a 64 year old male who is being evaluated via a billable telephone visit.      The patient has been notified of following:     \"This telephone visit will be conducted via a call between you and your physician/provider. We have found that certain health care needs can be provided without the need for a physical exam.  This service lets us provide the care you need with a short phone conversation.  If a prescription is necessary we can send it directly to your pharmacy.  If lab work is needed we can place an order for that and you can then stop by our lab to have the test done at a later time.    Telephone visits are billed at different rates depending on your insurance coverage. During this emergency period, for some insurers they may be billed the same as an in-person visit.  Please reach out to your insurance provider with any questions.    If during the course of the call the physician/provider feels a telephone visit is not appropriate, you will not be charged for this service.\"    Patient has given verbal consent for Telephone visit?  Yes    What phone number would you like to be contacted at? [phone]    How would you like to obtain your AVS? Mail a copy    Office Visit Note  [facility]  [phone]    UROLOGIC DIAGNOSES:   Low risk prostate cancer, enlarged prostate, ED    CURRENT INTERVENTIONS:   Active surveillance, prostate biopsy x 1, cialis    HISTORY:   David is set up for phone visit for prostate cancer follow up. A recent PSA was 6.05. he continues to have no urinary symptoms or complaints. The Cialis continues to work well for him      PAST MEDICAL HISTORY:   Past Medical History:   Diagnosis Date     Anxiety      Anxiety 1/31/2015     BPH (benign prostatic hyperplasia)      Degenerative disc disease      Depression      Diabetes mellitus (H)      Gastritis      Gastro-oesophageal reflux disease      H/O alcohol abuse      Hyperlipidemia      Hypertension      " Low testosterone      MRSA (methicillin resistant staph aureus) culture positive 8/2013    skin infection     Mumps      PUD (peptic ulcer disease)      Sleep apnea      STD (sexually transmitted disease)        PAST SURGICAL HISTORY:   Past Surgical History:   Procedure Laterality Date     COLONOSCOPY  11/11/2013    Procedure: COMBINED COLONOSCOPY, SINGLE BIOPSY/POLYPECTOMY BY BIOPSY;  Colonoscopy/ polypectomy x2 by cold forceps    ;  Surgeon: Juan Carlos Bellamy MD;  Location:  GI     ESOPHAGOSCOPY, GASTROSCOPY, DUODENOSCOPY (EGD), COMBINED N/A 9/22/2016    Procedure: COMBINED ESOPHAGOSCOPY, GASTROSCOPY, DUODENOSCOPY (EGD), BIOPSY SINGLE OR MULTIPLE;  Surgeon: Juan Carlos Barraza MD;  Location: [facility]     ESOPHAGOSCOPY, GASTROSCOPY, DUODENOSCOPY (EGD), COMBINED N/A 2/19/2019    Procedure: ESOPHAGOSCOPY, GASTROSCOPY, DUODENOSCOPY (EGD) with cold forcep;  Surgeon: Juan Carlos Barraza MD;  Location:  GI     HERNIA REPAIR       ORTHOPEDIC SURGERY         FAMILY HISTORY:   Family History   Problem Relation Age of Onset     Pancreatic Cancer Mother      Pancreatic Cancer Paternal Grandfather      Diabetes Brother      Depression Brother      Suicide Brother      Substance Abuse Brother      Dementia Father      Parkinsonism Father      Family History Negative No family hx of      Colon Cancer No family hx of      Unknown/Adopted No family hx of      Anxiety Disorder No family hx of      Schizophrenia No family hx of      Bipolar Disorder No family hx of      Ambika Disease No family hx of      Autism Spectrum Disorder No family hx of      Intellectual Disability (Mental Retardation) No family hx of      Mental Illness No family hx of        SOCIAL HISTORY:   Social History     Socioeconomic History     Marital status:      Spouse name: None     Number of children: None     Years of education: None     Highest education level: None   Occupational History     None   Social Needs     Financial resource strain:  None     Food insecurity     Worry: None     Inability: None     Transportation needs     Medical: None     Non-medical: None   Tobacco Use     Smoking status: Former Smoker     Types: Cigarettes     Smokeless tobacco: Never Used   Substance and Sexual Activity     Alcohol use: No     Alcohol/week: 0.0 standard drinks     Comment: 14 drinks weekly, quit on 08/2017     Drug use: No     Sexual activity: Yes     Partners: Female   Lifestyle     Physical activity     Days per week: None     Minutes per session: None     Stress: None   Relationships     Social connections     Talks on phone: None     Gets together: None     Attends Evangelical service: None     Active member of club or organization: None     Attends meetings of clubs or organizations: None     Relationship status: None     Intimate partner violence     Fear of current or ex partner: None     Emotionally abused: None     Physically abused: None     Forced sexual activity: None   Other Topics Concern     Parent/sibling w/ CABG, MI or angioplasty before 65F 55M? Not Asked   Social History Narrative     None       Review Of Systems:  Skin: No rash, pruritis, or skin pigmentation  Eyes: No changes in vision  Ears/Nose/Throat: No changes in hearing, no nosebleeds  Respiratory: No shortness of breath, dyspnea on exertion, cough, or hemoptysis  Cardiovascular: No chest pain or palpitations  Gastrointestinal: No diarrhea or constipation. No abdominal pain. No hematochezia  Genitourinary: see HPI  Musculoskeletal: No pain or swelling of joints, normal range of motion  Neurologic: No weakness or tremors  Psychiatric: No recent changes in memory or mood  Hematologic/Lymphatic/Immunologic: No easy bruising or enlarged lymph nodes  Endocrine: No weight gain or loss      PHYSICAL EXAM:    General: Alert and oriented to time, place, and self. In NAD   Lungs: no respiratory distress or labored breathing  Neuro: Alert, oriented, speech and mentation normal   Psych: affect  and mood normal      Imaging: None    Urinalysis: UA RESULTS:  Recent Labs   Lab Test 02/05/19  1413   COLOR Yellow   APPEARANCE Clear   URINEGLC 100*   URINEBILI Negative   URINEKETONE Negative   SG 1.020   UBLD Negative   URINEPH 6.0   PROTEIN Negative   UROBILINOGEN 0.2   NITRITE Negative   LEUKEST Small*   RBCU O - 2   WBCU 5-10*       PSA: 6.05    Post Void Residual:     Other labs: None today      IMPRESSION:  Low risk prostate cancer, enlarged prostate    PLAN:  The PSA has gone up a small amount in the past 6 months.  We discussed active surveillance.  I recommended that we continue to follow the PSA very closely.  I will see him back in 6 months to check his next PSA.  He understands that he will require a repeat prostate biopsy likely sometime in the year 2021.      Mohsen Rao M.D.              Phone call duration: 5 minutes    Mohsen Rao MD

## 2020-06-22 NOTE — NURSING NOTE
Chief Complaint   Patient presents with     Prostate Cancer     Review latest PSA     Enlarged prostate     Erectile Dysfunction     Kristine Wells, EMT

## 2020-06-22 NOTE — LETTER
"6/22/2020       RE: Austyn Araujo  [address] [facility] [address]     Dear Colleague,    Thank you for referring your patient, Austyn Araujo, to the [facility]OLOGY [facility] [place] [facility]. Please see a copy of my visit note below.    Austyn Araujo is a 64 year old male who is being evaluated via a billable telephone visit.      The patient has been notified of following:     \"This telephone visit will be conducted via a call between you and your physician/provider. We have found that certain health care needs can be provided without the need for a physical exam.  This service lets us provide the care you need with a short phone conversation.  If a prescription is necessary we can send it directly to your pharmacy.  If lab work is needed we can place an order for that and you can then stop by our lab to have the test done at a later time.    Telephone visits are billed at different rates depending on your insurance coverage. During this emergency period, for some insurers they may be billed the same as an in-person visit.  Please reach out to your insurance provider with any questions.    If during the course of the call the physician/provider feels a telephone visit is not appropriate, you will not be charged for this service.\"    Patient has given verbal consent for Telephone visit?  Yes    What phone number would you like to be contacted at? [phone]    How would you like to obtain your AVS? Mail a copy    Office Visit Note  [facility]  [phone]    UROLOGIC DIAGNOSES:   Low risk prostate cancer, enlarged prostate, ED    CURRENT INTERVENTIONS:   Active surveillance, prostate biopsy x 1, cialis    HISTORY:   David is set up for phone visit for prostate cancer follow up. A recent PSA was 6.05. he continues to have no urinary symptoms or complaints. The Cialis continues to work well for him    PAST MEDICAL " HISTORY:   Past Medical History:   Diagnosis Date     Anxiety      Anxiety 1/31/2015     BPH (benign prostatic hyperplasia)      Degenerative disc disease      Depression      Diabetes mellitus (H)      Gastritis      Gastro-oesophageal reflux disease      H/O alcohol abuse      Hyperlipidemia      Hypertension      Low testosterone      MRSA (methicillin resistant staph aureus) culture positive 8/2013    skin infection     Mumps      PUD (peptic ulcer disease)      Sleep apnea      STD (sexually transmitted disease)        PAST SURGICAL HISTORY:   Past Surgical History:   Procedure Laterality Date     COLONOSCOPY  11/11/2013    Procedure: COMBINED COLONOSCOPY, SINGLE BIOPSY/POLYPECTOMY BY BIOPSY;  Colonoscopy/ polypectomy x2 by cold forceps    ;  Surgeon: Juan Carlos Bellamy MD;  Location:  GI     ESOPHAGOSCOPY, GASTROSCOPY, DUODENOSCOPY (EGD), COMBINED N/A 9/22/2016    Procedure: COMBINED ESOPHAGOSCOPY, GASTROSCOPY, DUODENOSCOPY (EGD), BIOPSY SINGLE OR MULTIPLE;  Surgeon: Juan Carlos Barraza MD;  Location:  GI     ESOPHAGOSCOPY, GASTROSCOPY, DUODENOSCOPY (EGD), COMBINED N/A 2/19/2019    Procedure: ESOPHAGOSCOPY, GASTROSCOPY, DUODENOSCOPY (EGD) with cold forcep;  Surgeon: Juan Carlos Barraza MD;  Location:  GI     HERNIA REPAIR       ORTHOPEDIC SURGERY         FAMILY HISTORY:   Family History   Problem Relation Age of Onset     Pancreatic Cancer Mother      Pancreatic Cancer Paternal Grandfather      Diabetes Brother      Depression Brother      Suicide Brother      Substance Abuse Brother      Dementia Father      Parkinsonism Father      Family History Negative No family hx of      Colon Cancer No family hx of      Unknown/Adopted No family hx of      Anxiety Disorder No family hx of      Schizophrenia No family hx of      Bipolar Disorder No family hx of      Ambika Disease No family hx of      Autism Spectrum Disorder No family hx of      Intellectual Disability (Mental Retardation) No family hx of       Mental Illness No family hx of        SOCIAL HISTORY:   Social History     Socioeconomic History     Marital status:      Spouse name: None     Number of children: None     Years of education: None     Highest education level: None   Occupational History     None   Social Needs     Financial resource strain: None     Food insecurity     Worry: None     Inability: None     Transportation needs     Medical: None     Non-medical: None   Tobacco Use     Smoking status: Former Smoker     Types: Cigarettes     Smokeless tobacco: Never Used   Substance and Sexual Activity     Alcohol use: No     Alcohol/week: 0.0 standard drinks     Comment: 14 drinks weekly, quit on 08/2017     Drug use: No     Sexual activity: Yes     Partners: Female   Lifestyle     Physical activity     Days per week: None     Minutes per session: None     Stress: None   Relationships     Social connections     Talks on phone: None     Gets together: None     Attends Holiness service: None     Active member of club or organization: None     Attends meetings of clubs or organizations: None     Relationship status: None     Intimate partner violence     Fear of current or ex partner: None     Emotionally abused: None     Physically abused: None     Forced sexual activity: None   Other Topics Concern     Parent/sibling w/ CABG, MI or angioplasty before 65F 55M? Not Asked   Social History Narrative     None       Review Of Systems:  Skin: No rash, pruritis, or skin pigmentation  Eyes: No changes in vision  Ears/Nose/Throat: No changes in hearing, no nosebleeds  Respiratory: No shortness of breath, dyspnea on exertion, cough, or hemoptysis  Cardiovascular: No chest pain or palpitations  Gastrointestinal: No diarrhea or constipation. No abdominal pain. No hematochezia  Genitourinary: see HPI  Musculoskeletal: No pain or swelling of joints, normal range of motion  Neurologic: No weakness or tremors  Psychiatric: No recent changes in memory or  mood  Hematologic/Lymphatic/Immunologic: No easy bruising or enlarged lymph nodes  Endocrine: No weight gain or loss    PHYSICAL EXAM:    General: Alert and oriented to time, place, and self. In NAD   Lungs: no respiratory distress or labored breathing  Neuro: Alert, oriented, speech and mentation normal   Psych: affect and mood normal      Imaging: None    Urinalysis: UA RESULTS:  Recent Labs   Lab Test 02/05/19  1413   COLOR Yellow   APPEARANCE Clear   URINEGLC 100*   URINEBILI Negative   URINEKETONE Negative   SG 1.020   UBLD Negative   URINEPH 6.0   PROTEIN Negative   UROBILINOGEN 0.2   NITRITE Negative   LEUKEST Small*   RBCU O - 2   WBCU 5-10*       PSA: 6.05    Post Void Residual:     Other labs: None today      IMPRESSION:  Low risk prostate cancer, enlarged prostate    PLAN:  The PSA has gone up a small amount in the past 6 months.  We discussed active surveillance.  I recommended that we continue to follow the PSA very closely.  I will see him back in 6 months to check his next PSA.  He understands that he will require a repeat prostate biopsy likely sometime in the year 2021.      Mohsen Rao M.D.          Phone call duration: 5 minutes    Mohsen Rao MD

## 2020-07-06 DIAGNOSIS — I25.10 CAD IN NATIVE ARTERY: ICD-10-CM

## 2020-07-06 DIAGNOSIS — I20.89 STABLE ANGINA PECTORIS (H): ICD-10-CM

## 2020-07-06 RX ORDER — NITROGLYCERIN 0.4 MG/1
TABLET SUBLINGUAL
Qty: 25 TABLET | Refills: 1 | Status: SHIPPED | OUTPATIENT
Start: 2020-07-06 | End: 2021-07-28

## 2020-07-06 NOTE — TELEPHONE ENCOUNTER
[facility] calls, patient wants to  prescription today.     Routing refill request to provider for review/approval because:  Previously ordered by Dr. Estevez.

## 2020-07-20 ENCOUNTER — OFFICE VISIT (OUTPATIENT)
Dept: ORTHOPEDICS | Facility: CLINIC | Age: 65
End: 2020-07-20
Payer: COMMERCIAL

## 2020-07-20 ENCOUNTER — ANCILLARY PROCEDURE (OUTPATIENT)
Dept: GENERAL RADIOLOGY | Facility: CLINIC | Age: 65
End: 2020-07-20
Attending: FAMILY MEDICINE
Payer: COMMERCIAL

## 2020-07-20 VITALS
BODY MASS INDEX: 28.23 KG/M2 | WEIGHT: 220 LBS | SYSTOLIC BLOOD PRESSURE: 122 MMHG | DIASTOLIC BLOOD PRESSURE: 66 MMHG | HEIGHT: 74 IN

## 2020-07-20 DIAGNOSIS — M16.12 PRIMARY OSTEOARTHRITIS OF LEFT HIP: ICD-10-CM

## 2020-07-20 DIAGNOSIS — G89.29 CHRONIC RIGHT HIP PAIN: ICD-10-CM

## 2020-07-20 DIAGNOSIS — M16.11 PRIMARY OSTEOARTHRITIS OF RIGHT HIP: ICD-10-CM

## 2020-07-20 DIAGNOSIS — M16.12 PRIMARY OSTEOARTHRITIS OF LEFT HIP: Primary | ICD-10-CM

## 2020-07-20 DIAGNOSIS — M25.551 CHRONIC RIGHT HIP PAIN: ICD-10-CM

## 2020-07-20 PROCEDURE — 20611 DRAIN/INJ JOINT/BURSA W/US: CPT | Mod: 50 | Performed by: FAMILY MEDICINE

## 2020-07-20 PROCEDURE — 73522 X-RAY EXAM HIPS BI 3-4 VIEWS: CPT

## 2020-07-20 PROCEDURE — 99213 OFFICE O/P EST LOW 20 MIN: CPT | Mod: 25 | Performed by: FAMILY MEDICINE

## 2020-07-20 RX ORDER — METHYLPREDNISOLONE ACETATE 40 MG/ML
40 INJECTION, SUSPENSION INTRA-ARTICULAR; INTRALESIONAL; INTRAMUSCULAR; SOFT TISSUE
Status: DISCONTINUED | OUTPATIENT
Start: 2020-07-20 | End: 2020-11-23 | Stop reason: CLARIF

## 2020-07-20 RX ADMIN — METHYLPREDNISOLONE ACETATE 40 MG: 40 INJECTION, SUSPENSION INTRA-ARTICULAR; INTRALESIONAL; INTRAMUSCULAR; SOFT TISSUE at 14:23

## 2020-07-20 ASSESSMENT — MIFFLIN-ST. JEOR: SCORE: 1847.66

## 2020-07-20 NOTE — LETTER
"    7/20/2020         RE: Austyn Araujo  [address] [facility] [address]        Dear Colleague,    Thank you for referring your patient, Austyn Araujo, to the [facility]. Please see a copy of my visit note below.    ASSESSMENT & PLAN    1. Primary osteoarthritis of left hip    2. Chronic right hip pain    3. Primary osteoarthritis of right hip      Reviewed xray - no significant worsening of your arthritis since your last xray  Steroid injection of the bilateral hip: intra-articular  was performed today in clinic    -----    SUBJECTIVE:  Austyn Araujo is a 64 year old male who is seen in follow-up for left hip pain. They were last seen 8/22/2019 and received a left hip intra articular cortisone injection which provided good relief lasting approximately 6 months. Pain has gradually returned.     Patient also reports pain in right hip which began about 6 months ago with pain progressively getting worse. He notes pain is located in right groin with occasional pain in right lateral hip. Patient has h/o bilateral hip OA.    He notes pain with sleeping on either side, walking, and going up and down stairs. Left hip pain worse than right hip. They indicate that their current pain level is 3/10. They have tried rest/activity avoidance and ibuprofen 800mg PRN.      The patient is seen by themselves.    Patient's past medical, surgical, social, and family histories were reviewed today and no pertinent history related to patient's presenting problem.    REVIEW OF SYSTEMS:  Constitutional: NEGATIVE for fever, chills, change in weight  Skin: NEGATIVE for worrisome rashes, moles or lesions  GI/: NEGATIVE for bowel or bladder changes  Neuro: NEGATIVE for weakness, dizziness or paresthesias    OBJECTIVE:  /66   Ht 1.88 m (6' 2\")   Wt 99.8 kg (220 lb)   BMI 28.25 kg/m     General: healthy, alert and in no distress  HEENT: no scleral icterus or conjunctival erythema  Skin: no " suspicious lesions or rash. No jaundice.  CV: regular rhythm by palpation, no pedal edema  Resp: normal respiratory effort without conversational dyspnea   Psych: normal mood and affect  Gait: normal steady gait with appropriate coordination and balance  Neuro: normal light touch sensory exam of the extremities.    MSK:  BILATERAL HIP  Inspection:    No swelling  Palpation:    Tender about the anterior groin/joint line. Otherwise all other landmarks are nontender.  Active Range of Motion:     Flexion limited by pain,  IR limited by pain / ER  full  Strength:    Flexion 5/5  Special Tests:    Positive: anterior impingement (FADIR)    Independent visualization of the below image:  Xray Bilateral Hip  Moderate superior narrowing left > right hip with CAM deformity. No significant worsening since previous. No fracture or acute osseous findings. No    Large Joint Injection/Arthocentesis: bilateral hip joint    Date/Time: 7/20/2020 2:23 PM  Performed by: Lesly Paniagua DO  Authorized by: Lesly Paniagua DO     Indications:  Pain and osteoarthritis  Needle Size:  22 G  Guidance: ultrasound    Approach:  Anterior  Location:  Hip  Laterality:  Bilateral      Site:  Bilateral hip joint  Medications (Right):  40 mg methylPREDNISolone 40 MG/ML  Medications (Left):  40 mg methylPREDNISolone 40 MG/ML  Outcome:  Tolerated well, no immediate complications  Procedure discussed: discussed risks, benefits, and alternatives    Consent Given by:  Patient  Timeout: timeout called immediately prior to procedure    Prep: patient was prepped and draped in usual sterile fashion       Pain noted to be a 3/10 before completion of the procedure and 3/10 after completion of the procedure.        Lesly Paniagua DO [facility]            Again, thank you for allowing me to participate in the care of your patient.        Sincerely,        Lesly Paniagua DO

## 2020-07-20 NOTE — PATIENT INSTRUCTIONS
1. Primary osteoarthritis of left hip    2. Chronic right hip pain    3. Primary osteoarthritis of right hip      Reviewed xray - no significant worsening of your arthritis since your last xray  Steroid injection of the bilateral hip: intra-articular  was performed today in clinic    - Would not soak in a hot tub, bath or swimming pool for 48 hours  - Ok to shower  - Ice today and only do your normal amounts of activity  - The lidocaine (what is giving you pain relief right now) will likely stop working in 1-2 hours.  You will then have pain again, similar to before you received the injection. The corticosteroid will not start working until approximately 1-2 weeks from now.  In a small percentage of people, cortisone can cause flushing/redness in the face. This usually lasts for 1-3 days and resolves. Cool compress and Ibuprofen/Tylenol can help if this happens.

## 2020-07-20 NOTE — PROGRESS NOTES
"ASSESSMENT & PLAN    1. Primary osteoarthritis of left hip    2. Chronic right hip pain    3. Primary osteoarthritis of right hip      Reviewed xray - no significant worsening of your arthritis since your last xray  Steroid injection of the bilateral hip: intra-articular  was performed today in clinic    -----    SUBJECTIVE:  Austyn Araujo is a 64 year old male who is seen in follow-up for left hip pain. They were last seen 8/22/2019 and received a left hip intra articular cortisone injection which provided good relief lasting approximately 6 months. Pain has gradually returned.     Patient also reports pain in right hip which began about 6 months ago with pain progressively getting worse. He notes pain is located in right groin with occasional pain in right lateral hip. Patient has h/o bilateral hip OA.    He notes pain with sleeping on either side, walking, and going up and down stairs. Left hip pain worse than right hip. They indicate that their current pain level is 3/10. They have tried rest/activity avoidance and ibuprofen 800mg PRN.      The patient is seen by themselves.    Patient's past medical, surgical, social, and family histories were reviewed today and no pertinent history related to patient's presenting problem.    REVIEW OF SYSTEMS:  Constitutional: NEGATIVE for fever, chills, change in weight  Skin: NEGATIVE for worrisome rashes, moles or lesions  GI/: NEGATIVE for bowel or bladder changes  Neuro: NEGATIVE for weakness, dizziness or paresthesias    OBJECTIVE:  /66   Ht 1.88 m (6' 2\")   Wt 99.8 kg (220 lb)   BMI 28.25 kg/m     General: healthy, alert and in no distress  HEENT: no scleral icterus or conjunctival erythema  Skin: no suspicious lesions or rash. No jaundice.  CV: regular rhythm by palpation, no pedal edema  Resp: normal respiratory effort without conversational dyspnea   Psych: normal mood and affect  Gait: normal steady gait with appropriate coordination and " balance  Neuro: normal light touch sensory exam of the extremities.    MSK:  BILATERAL HIP  Inspection:    No swelling  Palpation:    Tender about the anterior groin/joint line. Otherwise all other landmarks are nontender.  Active Range of Motion:     Flexion limited by pain,  IR limited by pain / ER  full  Strength:    Flexion 5/5  Special Tests:    Positive: anterior impingement (FADIR)    Independent visualization of the below image:  Xray Bilateral Hip  Moderate superior narrowing left > right hip with CAM deformity. No significant worsening since previous. No fracture or acute osseous findings. No    Large Joint Injection/Arthocentesis: bilateral hip joint    Date/Time: 7/20/2020 2:23 PM  Performed by: Lesly Paniagua DO  Authorized by: Lesly Paniagua DO     Indications:  Pain and osteoarthritis  Needle Size:  22 G  Guidance: ultrasound    Approach:  Anterior  Location:  Hip  Laterality:  Bilateral      Site:  Bilateral hip joint  Medications (Right):  40 mg methylPREDNISolone 40 MG/ML  Medications (Left):  40 mg methylPREDNISolone 40 MG/ML  Outcome:  Tolerated well, no immediate complications  Procedure discussed: discussed risks, benefits, and alternatives    Consent Given by:  Patient  Timeout: timeout called immediately prior to procedure    Prep: patient was prepped and draped in usual sterile fashion       Pain noted to be a 3/10 before completion of the procedure and 3/10 after completion of the procedure.        Lesly Paniagua DO [facility]

## 2020-08-04 NOTE — TELEPHONE ENCOUNTER
"Requested Prescriptions   Pending Prescriptions Disp Refills     tadalafil (CIALIS) 20 MG tablet  Last Written Prescription Date:  08/0118  Last Fill Quantity: 100,  # refills: 1   Last office visit: 6/26/2019 with prescribing provider:  06/26/19   Future Office Visit:   Next 5 appointments (look out 90 days)    Jul 08, 2019 11:40 AM CDT  SHORT with Bienvenido Shaw MD  [facility] [address] [place] [address] [phone]          100 tablet 1     Sig: Take 1 tablet (20 mg) by mouth daily Never use with nitroglycerin, terazosin or doxazosin.       Erectile Dysfuction Protocol Failed - 7/5/2019  9:20 AM        Failed - Absence of nitrates on medication list        Failed - Absence of Alpha Blockers on Med list        Passed - Recent (12 mo) or future (30 days) visit within the authorizing provider's specialty     Patient had office visit in the last 12 months or has a visit in the next 30 days with authorizing provider or within the authorizing provider's specialty.  See \"Patient Info\" tab in inbasket, or \"Choose Columns\" in Meds & Orders section of the refill encounter.              Passed - Medication is active on med list        Passed - Patient is age 18 or older          "
Routing refill request to provider for review/approval because:  Failed protocol secondary to has beta blockers and nitroglycerin on medication list.         
Rx faxed to pharmacy.  Basia Claudio MA    
[phone]

## 2020-08-18 DIAGNOSIS — R79.89 LOW TESTOSTERONE: ICD-10-CM

## 2020-08-20 RX ORDER — TESTOSTERONE CYPIONATE 200 MG/ML
INJECTION, SOLUTION INTRAMUSCULAR
Qty: 10 ML | Refills: 0 | Status: SHIPPED | OUTPATIENT
Start: 2020-08-20 | End: 2020-10-20

## 2020-08-20 NOTE — TELEPHONE ENCOUNTER
Pending Prescriptions:                       Disp   Refills    testosterone cypionate (DEPOTESTOSTERONE) *10 mL  0        Sig: INJECT 2 ML INTO THE MUSCLE ONCE WEEKLY    Routing refill request to provider for review/approval because:  Drug not on the [facility] refill protocol

## 2020-08-29 DIAGNOSIS — R79.89 LOW TESTOSTERONE: ICD-10-CM

## 2020-08-31 NOTE — TELEPHONE ENCOUNTER
"Pending Prescriptions:                       Disp   Refills    BD HYPODERMIC NEEDLE 18G X 1\" MISC [Pharma*       3        Sig: USE TO ADMINISTER TESTOSTERONE. DUE FOR APPOINTMENT           WITH YOUR DOCTOR    Routing refill request to provider for review/approval because:  Drug not on the [facility] refill protocol         "

## 2020-09-01 RX ORDER — NEEDLES, DISPOSABLE 25GX5/8"
NEEDLE, DISPOSABLE MISCELLANEOUS
Qty: 6 EACH | Refills: 3 | Status: SHIPPED | OUTPATIENT
Start: 2020-09-01 | End: 2022-06-29

## 2020-09-24 DIAGNOSIS — I10 BENIGN ESSENTIAL HYPERTENSION: ICD-10-CM

## 2020-09-24 NOTE — LETTER
[facility]  [address], [address]  [place], [place]  [address]                                            TEL:[phone]  FAX:[phone]      Austyn Araujo  [address] [facility] [address]      September 29, 2020    Dear Austyn      We have received a refill request from your pharmacy for your medication - Lisinopril. Many medications require routine follow-up with your provider and a review of your chart indicates that you are due for diabetes labs and follow-up in November. We have sent a one time, 90 day refill to your pharmacy to allow you time to schedule an appointment.     Please call [phone] to schedule an appointment.  We look forward to seeing you in the near future.      Thank you,     [facility]

## 2020-09-25 RX ORDER — LISINOPRIL 10 MG/1
TABLET ORAL
Qty: 90 TABLET | Refills: 0 | Status: SHIPPED | OUTPATIENT
Start: 2020-09-25 | End: 2020-10-19

## 2020-09-25 NOTE — TELEPHONE ENCOUNTER
"Last VV on 5/4/20   Pt is due for Diabetes Lab work. Due for OV in November.   Please call.     Requested Prescriptions   Pending Prescriptions Disp Refills     lisinopril (ZESTRIL) 10 MG tablet [Pharmacy Med Name: LISINOPRIL 10 MG TABLET] 90 tablet 3     Sig: TAKE 1 TABLET BY MOUTH EVERY DAY       ACE Inhibitors (Including Combos) Protocol Passed - 9/24/2020  4:50 PM        Passed - Blood pressure under 140/90 in past 12 months     BP Readings from Last 3 Encounters:   07/20/20 122/66   11/25/19 112/68   10/18/19 100/60                 Passed - Recent (12 mo) or future (30 days) visit within the authorizing provider's specialty     Patient has had an office visit with the authorizing provider or a provider within the authorizing providers department within the previous 12 mos or has a future within next 30 days. See \"Patient Info\" tab in inbasket, or \"Choose Columns\" in Meds & Orders section of the refill encounter.              Passed - Medication is active on med list        Passed - Patient is age 18 or older        Passed - Normal serum creatinine on file in past 12 months     Recent Labs   Lab Test 02/20/20  0820  10/16/18  1314   CR 0.97   < >  --    CREAT  --   --  0.9    < > = values in this interval not displayed.       Ok to refill medication if creatinine is low          Passed - Normal serum potassium on file in past 12 months     Recent Labs   Lab Test 02/20/20  0820   POTASSIUM 4.3                  "

## 2020-09-30 DIAGNOSIS — R79.89 LOW TESTOSTERONE: ICD-10-CM

## 2020-09-30 RX ORDER — SYRINGE WITH NEEDLE, 1 ML 25GX5/8"
SYRINGE, EMPTY DISPOSABLE MISCELLANEOUS
Qty: 12 EACH | Refills: 3 | Status: SHIPPED | OUTPATIENT
Start: 2020-09-30 | End: 2022-06-24

## 2020-10-16 DIAGNOSIS — I10 BENIGN ESSENTIAL HYPERTENSION: ICD-10-CM

## 2020-10-16 DIAGNOSIS — E11.43 TYPE 2 DIABETES MELLITUS WITH PERIPHERAL AUTONOMIC NEUROPATHY (H): Primary | ICD-10-CM

## 2020-10-16 DIAGNOSIS — R79.89 LOW TESTOSTERONE: ICD-10-CM

## 2020-10-19 RX ORDER — LISINOPRIL 10 MG/1
TABLET ORAL
Qty: 90 TABLET | Refills: 0 | Status: SHIPPED | OUTPATIENT
Start: 2020-10-19 | End: 2021-02-25

## 2020-10-20 RX ORDER — TESTOSTERONE CYPIONATE 200 MG/ML
INJECTION, SOLUTION INTRAMUSCULAR
Qty: 10 ML | Refills: 0 | Status: SHIPPED | OUTPATIENT
Start: 2020-10-20 | End: 2020-11-23

## 2020-10-20 NOTE — TELEPHONE ENCOUNTER
Testosterone refill.     Last VV on 5/4/20    Last refill on 8/20/20 for #10 mls.   Lab work is overdue.     Routing refill request to provider for review/approval because:  Drug not on the [facility] refill protocol       Requested Prescriptions   Pending Prescriptions Disp Refills     testosterone cypionate (DEPOTESTOSTERONE) 200 MG/ML injection [Pharmacy Med Name: TESTOSTERON CYP 2,000 MG/10 ML] 10 mL 0     Sig: INJECT 2 ML INTO THE MUSCLE ONCE WEEKLY       Androgen Agents Failed - 10/20/2020  1:16 PM        Failed - HCT less than 54% on file within past 12 mos     Recent Labs   Lab Test 07/08/19  1159   HCT 43.8             Failed - Serum Testosterone on file within past 12 mos     Recent Labs   Lab Test 10/18/19  1234   TESTOSTTOTAL 1,087*             Failed - Refills for this classification require provider review        Passed - Patient is of age 12 and older        Passed - Lipid panel on file in past 12 mos     Recent Labs   Lab Test 02/20/20  0820 09/30/15  0804 09/30/15  0804   CHOL 127   < > 278*   TRIG 90   < > 187*   HDL 37*   < > 54   LDL 72   < > 187*   NHDL 90   < >  --    VLDL  --   --  37*   CHOLHDLRATIO  --   --  5.1*    < > = values in this interval not displayed.               Passed - ALT on file within past 12 mos     Recent Labs   Lab Test 02/20/20  0820   ALT 29             Passed - Medication is active on med list        Passed - Serum PSA on file within past 12 mos     Lab Results   Component Value Date    PSA 6.05 06/18/2020             Passed - Blood pressure under 140/90 in past 6 months     BP Readings from Last 3 Encounters:   07/20/20 122/66   11/25/19 112/68   10/18/19 100/60                 Passed - Patient is not pregnant        Passed - No positive pregnancy test on file within past 12 mos        Passed - Recent (6 mo) or future (30 days) visit within the authorizing provider's specialty        Passed - AST on file within past 12 mos     Recent Labs   Lab Test 02/20/20  0820   AST 13

## 2020-11-16 DIAGNOSIS — E11.43 TYPE 2 DIABETES MELLITUS WITH PERIPHERAL AUTONOMIC NEUROPATHY (H): ICD-10-CM

## 2020-11-16 DIAGNOSIS — R79.89 LOW TESTOSTERONE: ICD-10-CM

## 2020-11-16 DIAGNOSIS — C61 PROSTATE CANCER (H): ICD-10-CM

## 2020-11-16 LAB
ERYTHROCYTE [DISTWIDTH] IN BLOOD BY AUTOMATED COUNT: 19.2 % (ref 10–15)
HBA1C MFR BLD: 8 % (ref 0–5.6)
HCT VFR BLD AUTO: 41.7 % (ref 40–53)
HGB BLD-MCNC: 13.7 G/DL (ref 13.3–17.7)
MCH RBC QN AUTO: 23.3 PG (ref 26.5–33)
MCHC RBC AUTO-ENTMCNC: 32.9 G/DL (ref 31.5–36.5)
MCV RBC AUTO: 71 FL (ref 78–100)
PLATELET # BLD AUTO: 280 10E9/L (ref 150–450)
RBC # BLD AUTO: 5.89 10E12/L (ref 4.4–5.9)
WBC # BLD AUTO: 8.4 10E9/L (ref 4–11)

## 2020-11-16 PROCEDURE — 85027 COMPLETE CBC AUTOMATED: CPT | Performed by: INTERNAL MEDICINE

## 2020-11-16 PROCEDURE — 84403 ASSAY OF TOTAL TESTOSTERONE: CPT | Mod: 90 | Performed by: INTERNAL MEDICINE

## 2020-11-16 PROCEDURE — 84270 ASSAY OF SEX HORMONE GLOBUL: CPT | Performed by: INTERNAL MEDICINE

## 2020-11-16 PROCEDURE — 99000 SPECIMEN HANDLING OFFICE-LAB: CPT | Performed by: INTERNAL MEDICINE

## 2020-11-16 PROCEDURE — 83036 HEMOGLOBIN GLYCOSYLATED A1C: CPT | Performed by: INTERNAL MEDICINE

## 2020-11-16 PROCEDURE — 84153 ASSAY OF PSA TOTAL: CPT | Performed by: INTERNAL MEDICINE

## 2020-11-17 LAB — PSA SERPL-MCNC: 5.3 UG/L (ref 0–4)

## 2020-11-18 LAB
SHBG SERPL-SCNC: 23 NMOL/L (ref 11–80)
TESTOST FREE SERPL-MCNC: 1.16 NG/DL (ref 4.7–24.4)
TESTOST SERPL-MCNC: 53 NG/DL (ref 240–950)

## 2020-11-22 DIAGNOSIS — R79.89 LOW TESTOSTERONE: ICD-10-CM

## 2020-11-23 ENCOUNTER — OFFICE VISIT (OUTPATIENT)
Dept: INTERNAL MEDICINE | Facility: CLINIC | Age: 65
End: 2020-11-23
Payer: COMMERCIAL

## 2020-11-23 ENCOUNTER — ANCILLARY PROCEDURE (OUTPATIENT)
Dept: GENERAL RADIOLOGY | Facility: CLINIC | Age: 65
End: 2020-11-23
Attending: INTERNAL MEDICINE
Payer: COMMERCIAL

## 2020-11-23 VITALS
HEIGHT: 74 IN | DIASTOLIC BLOOD PRESSURE: 81 MMHG | HEART RATE: 98 BPM | SYSTOLIC BLOOD PRESSURE: 118 MMHG | TEMPERATURE: 98.3 F | BODY MASS INDEX: 29 KG/M2 | WEIGHT: 226 LBS | OXYGEN SATURATION: 97 %

## 2020-11-23 DIAGNOSIS — E11.43 TYPE 2 DIABETES MELLITUS WITH PERIPHERAL AUTONOMIC NEUROPATHY (H): ICD-10-CM

## 2020-11-23 DIAGNOSIS — R79.89 LOW TESTOSTERONE: ICD-10-CM

## 2020-11-23 DIAGNOSIS — R07.9 CHEST PAIN, UNSPECIFIED TYPE: ICD-10-CM

## 2020-11-23 DIAGNOSIS — K21.9 GASTROESOPHAGEAL REFLUX DISEASE WITHOUT ESOPHAGITIS: ICD-10-CM

## 2020-11-23 DIAGNOSIS — Z23 NEED FOR PROPHYLACTIC VACCINATION AND INOCULATION AGAINST INFLUENZA: ICD-10-CM

## 2020-11-23 DIAGNOSIS — R41.3 IMPAIRED MEMORY: ICD-10-CM

## 2020-11-23 DIAGNOSIS — R07.9 CHEST PAIN, UNSPECIFIED TYPE: Primary | ICD-10-CM

## 2020-11-23 DIAGNOSIS — R20.2 PARESTHESIA: ICD-10-CM

## 2020-11-23 DIAGNOSIS — E78.5 HYPERLIPIDEMIA LDL GOAL <100: ICD-10-CM

## 2020-11-23 DIAGNOSIS — I10 ESSENTIAL HYPERTENSION: ICD-10-CM

## 2020-11-23 PROCEDURE — 90471 IMMUNIZATION ADMIN: CPT | Performed by: INTERNAL MEDICINE

## 2020-11-23 PROCEDURE — 99214 OFFICE O/P EST MOD 30 MIN: CPT | Mod: 25 | Performed by: INTERNAL MEDICINE

## 2020-11-23 PROCEDURE — 71046 X-RAY EXAM CHEST 2 VIEWS: CPT | Performed by: RADIOLOGY

## 2020-11-23 PROCEDURE — 90662 IIV NO PRSV INCREASED AG IM: CPT | Performed by: INTERNAL MEDICINE

## 2020-11-23 RX ORDER — TESTOSTERONE CYPIONATE 200 MG/ML
INJECTION, SOLUTION INTRAMUSCULAR
Qty: 10 ML | Refills: 0 | Status: SHIPPED | OUTPATIENT
Start: 2020-11-23 | End: 2020-11-24

## 2020-11-23 RX ORDER — TESTOSTERONE CYPIONATE 200 MG/ML
INJECTION, SOLUTION INTRAMUSCULAR
Qty: 10 ML | Refills: 5 | Status: SHIPPED | OUTPATIENT
Start: 2020-11-23 | End: 2021-03-16

## 2020-11-23 ASSESSMENT — PATIENT HEALTH QUESTIONNAIRE - PHQ9: SUM OF ALL RESPONSES TO PHQ QUESTIONS 1-9: 7

## 2020-11-23 ASSESSMENT — MIFFLIN-ST. JEOR: SCORE: 1874.88

## 2020-11-23 NOTE — TELEPHONE ENCOUNTER
"Pharmacy not found.  [facility]  Phone: [phone] and Fax: [phone].    Requested Prescriptions   Pending Prescriptions Disp Refills     tadalafil (CIALIS) 20 MG tablet  Last Written Prescription Date:  03/17/20  Last Fill Quantity: 88,  # refills: 1   Last office visit: 10/18/2019 with prescribing provider:  VV 05/04/20   Future Office Visit:   Next 5 appointments (look out 90 days)    Nov 23, 2020  3:40 PM  SHORT with Javier Villafana MD  [facility] ([facility]) [address] [place] [address] [phone]   Jan 11, 2021  2:45 PM  Return Visit with Mohsen Rao MD  [facility] ([facility]) [address]  [address]  [facility] [id number] [phone]                88 tablet 1     Sig: Take 1 tablet (20 mg) by mouth daily Never use with nitroglycerin, terazosin or doxazosin.       Erectile Dysfuction Protocol Failed - 11/23/2020 10:54 AM        Failed - Absence of nitrates on medication list        Failed - Absence of Alpha Blockers on Med list        Passed - Recent (12 mo) or future (30 days) visit within the authorizing provider's specialty     Patient has had an office visit with the authorizing provider or a provider within the authorizing providers department within the previous 12 mos or has a future within next 30 days. See \"Patient Info\" tab in inbasket, or \"Choose Columns\" in Meds & Orders section of the refill encounter.              Passed - Medication is active on med list        Passed - Patient is age 18 or older                 "

## 2020-11-23 NOTE — PROGRESS NOTES
Subjective     Austyn Araujo is a 64 year old male who presents to clinic today for the following health issues:    HPI       Patient is seen for a follow up visit.    Has h/o low testosterone, has been on injections for years. Past 2 weeks has been off treatment, not able to refill.   Testosterone level checked is low. Feels less energy, tired, sleepy. Has ED , low libido.   Has elevated PSA, seen and follows with urology. No symptoms of slow stream, has urinary frequency, no dysuria. No hematuria.     Concerns for joint pains, in his hips, knees, affecting ability to do exercise. No joint swelling. Normal walking is not affected.     Has had chest pain with stress. Had a stress test and an angiogram 2 years ago. Has CAD, conservative management. No SOB, palpitations.   Noted poor short term memory, gradually worsening. Has h/o MARSHAL. Did not tolerate CPAP. Has chronically poor sleep.      Diabetes Follow-up  Has H/O DM. On diet , exercise and oral treatment. Blood sugars are not well  controlled. Has feet and LE parestesias. No hypoglycemias.    How often are you checking your blood sugar? A few times a month  What time of day are you checking your blood sugars (select all that apply)?  Before meals  Have you had any blood sugars above 200?  Yes , sometimes  Have you had any blood sugars below 70?  No    What symptoms do you notice when your blood sugar is low?  fatigue    What concerns do you have today about your diabetes? Neuropathy     Do you have any of these symptoms? (Select all that apply)  Numbness in feet    Have you had a diabetic eye exam in the last 12 months? Yes, a month ago          Hyperlipidemia Follow-Up  Has H/O hyperlipidemia. On medical treatment and diet. No side effects. No muscle weakness, myalgias or upset stomach.       Are you regularly taking any medication or supplement to lower your cholesterol?   Yes- Atorvastatin    Are you having muscle aches or other side effects that you think  "could be caused by your cholesterol lowering medication?  No    Hypertension Follow-up  Has h/o HTN. on medical treatment. BP has been controlled. No side effects from medications. No CP, HA, dizziness. good compliance with medications and low salt diet.      Do you check your blood pressure regularly outside of the clinic? No     Are you following a low salt diet? No    Are your blood pressures ever more than 140 on the top number (systolic) OR more   than 90 on the bottom number (diastolic), for example 140/90? N/A    BP Readings from Last 2 Encounters:   11/23/20 118/81   07/20/20 122/66     Hemoglobin A1C (%)   Date Value   11/16/2020 8.0 (H)   02/20/2020 7.9 (H)     LDL Cholesterol Calculated (mg/dL)   Date Value   02/20/2020 72   02/05/2019 93           Review of Systems   Constitutional, HEENT, cardiovascular, pulmonary, GI, , musculoskeletal, neuro, skin, endocrine and psych systems are negative, except as otherwise noted.      Objective    /81 (BP Location: Right arm, Patient Position: Sitting, Cuff Size: Adult Large)   Pulse 98   Temp 98.3  F (36.8  C) (Oral)   Ht 1.88 m (6' 2\")   Wt 102.5 kg (226 lb)   SpO2 97%   BMI 29.02 kg/m    Body mass index is 29.02 kg/m .  Physical Exam   GENERAL: healthy, alert and no distress  EYES: Eyes grossly normal to inspection, PERRL and conjunctivae and sclerae normal  HENT: ear canals and TM's normal, nose and mouth without ulcers or lesions  NECK: no adenopathy, no asymmetry, masses, or scars and thyroid normal to palpation  RESP: lungs clear to auscultation - no rales, rhonchi or wheezes  CV: regular rate and rhythm, normal S1 S2, no S3 or S4, no murmur, click or rub, no peripheral edema and peripheral pulses strong  ABDOMEN: soft, nontender, no hepatosplenomegaly, no masses and bowel sounds normal  MS: no gross musculoskeletal defects noted, no edema  SKIN: no suspicious lesions or rashes  NEURO: Normal strength and tone, mentation intact and speech " "normal    Results for orders placed or performed in visit on 11/23/20   XR Chest 2 Views     Status: None (Preliminary result)    Narrative    CHEST TWO VIEWS   11/23/2020 5:00 PM     HISTORY: Chest pain, unspecified type.    COMPARISON: Chest x-ray 7/6/2017.      Impression    IMPRESSION: PA and lateral views of the chest. Lungs are clear. Heart  is normal in size. No effusions are evident. No pneumothorax.  Degenerative thoracic spine changes are present.           Assessment & Plan   Problem List Items Addressed This Visit     Type 2 diabetes mellitus with peripheral autonomic neuropathy (H)    Relevant Medications    sitagliptin (JANUVIA) 100 MG tablet    GERD (gastroesophageal reflux disease)    Low testosterone    Relevant Medications    testosterone cypionate (DEPOTESTOSTERONE) 200 MG/ML injection    Other Relevant Orders    Testosterone Free and Total    HTN (hypertension)    Hyperlipidemia LDL goal <100      Other Visit Diagnoses     Chest pain, unspecified type    -  Primary    Relevant Orders    Echocardiogram Exercise Stress    XR Chest 2 Views (Completed)    Paresthesia        Relevant Orders    Vitamin B12    Iron and iron binding capacity    Impaired memory        Relevant Orders    Vitamin B12    Iron and iron binding capacity    Need for prophylactic vaccination and inoculation against influenza        Relevant Orders    FLUZONE HIGH DOSE 65+  [14167] (Completed)             BMI:   Estimated body mass index is 29.02 kg/m  as calculated from the following:    Height as of this encounter: 1.88 m (6' 2\").    Weight as of this encounter: 102.5 kg (226 lb).     Increase Januvia to 100 mg  Refer for stress ECHO  Assess lab work   Check testosterone on treatment, will try to keep levels in mid - low normal range , because of elevated PSA  Follow up with urology          See Patient Instructions  Return in about 4 weeks (around 12/21/2020) for Routine Visit, Lab Work.    Javier Villafana MD  [facility] " [facility]

## 2020-11-24 RX ORDER — TADALAFIL 20 MG/1
20 TABLET ORAL DAILY
Qty: 88 TABLET | Refills: 1 | Status: SHIPPED | OUTPATIENT
Start: 2020-11-24 | End: 2021-02-05

## 2020-11-24 NOTE — TELEPHONE ENCOUNTER
Routing refill request to provider for review/approval because:  Drug not on the [facility] refill protocol due to alpha blocker and nitrate on med list  Ed Aguilera RN, BSN

## 2021-01-04 DIAGNOSIS — E11.43 TYPE 2 DIABETES MELLITUS WITH PERIPHERAL AUTONOMIC NEUROPATHY (H): ICD-10-CM

## 2021-01-06 RX ORDER — SITAGLIPTIN 25 MG/1
TABLET, FILM COATED ORAL
Qty: 90 TABLET | Refills: 0 | Status: SHIPPED | OUTPATIENT
Start: 2021-01-06 | End: 2021-01-19 | Stop reason: DRUGHIGH

## 2021-01-11 ENCOUNTER — OFFICE VISIT (OUTPATIENT)
Dept: UROLOGY | Facility: CLINIC | Age: 66
End: 2021-01-11
Payer: COMMERCIAL

## 2021-01-11 VITALS
BODY MASS INDEX: 29 KG/M2 | DIASTOLIC BLOOD PRESSURE: 80 MMHG | SYSTOLIC BLOOD PRESSURE: 120 MMHG | OXYGEN SATURATION: 98 % | HEIGHT: 74 IN | HEART RATE: 77 BPM | WEIGHT: 226 LBS

## 2021-01-11 DIAGNOSIS — E11.43 TYPE 2 DIABETES MELLITUS WITH PERIPHERAL AUTONOMIC NEUROPATHY (H): ICD-10-CM

## 2021-01-11 DIAGNOSIS — N52.9 VASCULOGENIC ERECTILE DYSFUNCTION, UNSPECIFIED VASCULOGENIC ERECTILE DYSFUNCTION TYPE: ICD-10-CM

## 2021-01-11 DIAGNOSIS — F32.0 MILD MAJOR DEPRESSION (H): ICD-10-CM

## 2021-01-11 DIAGNOSIS — N40.0 ENLARGED PROSTATE: ICD-10-CM

## 2021-01-11 DIAGNOSIS — R41.3 IMPAIRED MEMORY: ICD-10-CM

## 2021-01-11 DIAGNOSIS — C61 PROSTATE CANCER (H): Primary | ICD-10-CM

## 2021-01-11 DIAGNOSIS — R20.2 PARESTHESIA: ICD-10-CM

## 2021-01-11 DIAGNOSIS — R79.89 LOW TESTOSTERONE: ICD-10-CM

## 2021-01-11 LAB
ALBUMIN UR-MCNC: NEGATIVE MG/DL
APPEARANCE UR: CLEAR
BILIRUB UR QL STRIP: NEGATIVE
COLOR UR AUTO: YELLOW
GLUCOSE UR STRIP-MCNC: >=1000 MG/DL
HGB UR QL STRIP: NEGATIVE
KETONES UR STRIP-MCNC: NEGATIVE MG/DL
LEUKOCYTE ESTERASE UR QL STRIP: NEGATIVE
NITRATE UR QL: NEGATIVE
PH UR STRIP: 5.5 PH (ref 5–7)
RESIDUAL VOLUME (RV) (EXTERNAL): 117
SOURCE: ABNORMAL
SP GR UR STRIP: 1.02 (ref 1–1.03)
UROBILINOGEN UR STRIP-ACNC: 0.2 EU/DL (ref 0.2–1)
VIT B12 SERPL-MCNC: 974 PG/ML (ref 193–986)

## 2021-01-11 PROCEDURE — 84403 ASSAY OF TOTAL TESTOSTERONE: CPT | Mod: 90 | Performed by: INTERNAL MEDICINE

## 2021-01-11 PROCEDURE — 83550 IRON BINDING TEST: CPT | Performed by: INTERNAL MEDICINE

## 2021-01-11 PROCEDURE — 83540 ASSAY OF IRON: CPT | Performed by: INTERNAL MEDICINE

## 2021-01-11 PROCEDURE — 36415 COLL VENOUS BLD VENIPUNCTURE: CPT | Performed by: INTERNAL MEDICINE

## 2021-01-11 PROCEDURE — 51798 US URINE CAPACITY MEASURE: CPT | Performed by: UROLOGY

## 2021-01-11 PROCEDURE — 99214 OFFICE O/P EST MOD 30 MIN: CPT | Mod: 25 | Performed by: UROLOGY

## 2021-01-11 PROCEDURE — 99000 SPECIMEN HANDLING OFFICE-LAB: CPT | Performed by: INTERNAL MEDICINE

## 2021-01-11 PROCEDURE — 82607 VITAMIN B-12: CPT | Performed by: INTERNAL MEDICINE

## 2021-01-11 PROCEDURE — 84270 ASSAY OF SEX HORMONE GLOBUL: CPT | Performed by: INTERNAL MEDICINE

## 2021-01-11 PROCEDURE — 81003 URINALYSIS AUTO W/O SCOPE: CPT | Mod: QW | Performed by: UROLOGY

## 2021-01-11 ASSESSMENT — PAIN SCALES - GENERAL: PAINLEVEL: NO PAIN (0)

## 2021-01-11 ASSESSMENT — MIFFLIN-ST. JEOR: SCORE: 1869.88

## 2021-01-11 NOTE — NURSING NOTE
Chief Complaint   Patient presents with     Prostate Cancer     Patient notes some occasional difficulty with urination.    PVR: 117 mL    Kristine Wells, EMT

## 2021-01-11 NOTE — LETTER
1/11/2021       RE: Austyn Araujo  [address]  [place] [facility] [address]     Dear Colleague,    Thank you for referring your patient, Austyn Araujo, to the [facility] [place] at [facility]. Please see a copy of my visit note below.    Office Visit Note  [facility]  [phone]    UROLOGIC DIAGNOSES:   Low risk prostate cancer, enlarged prostate    CURRENT INTERVENTIONS:   Active surveillance, prostate biopsy x 1    HISTORY:   Robert returns to clinic today for prostate cancer follow-up.  His PSA went back down to 5.3.  He does report some difficulty urinating and nocturia but does not seek any treatment for his urinary symptoms.    He has a new issue he wants to discuss today: Erectile dysfunction.  He has been using supplemental testosterone injections for years.  He has been using Cialis for years as well.  He says that originally the 10 mg of Cialis was plenty but now even the 20 mg dose does not work for him every time.  He is able to have some erections but they do not last long enough for satisfactory intercourse.  He is interested in discussing treatment options for his erectile dysfunction.  He reports normal libido.      PAST MEDICAL HISTORY:   Past Medical History:   Diagnosis Date     Anxiety      Anxiety 1/31/2015     BPH (benign prostatic hyperplasia)      Degenerative disc disease      Depression      Diabetes mellitus (H)      Gastritis      Gastro-oesophageal reflux disease      H/O alcohol abuse      Hyperlipidemia      Hypertension      Low testosterone      MRSA (methicillin resistant staph aureus) culture positive 8/2013    skin infection     Mumps      PUD (peptic ulcer disease)      Sleep apnea      STD (sexually transmitted disease)        PAST SURGICAL HISTORY:   Past Surgical History:   Procedure Laterality Date     COLONOSCOPY  11/11/2013    Procedure: COMBINED COLONOSCOPY, SINGLE BIOPSY/POLYPECTOMY BY  BIOPSY;  Colonoscopy/ polypectomy x2 by cold forceps    ;  Surgeon: Juan Carlos Bellamy MD;  Location:  GI     ESOPHAGOSCOPY, GASTROSCOPY, DUODENOSCOPY (EGD), COMBINED N/A 9/22/2016    Procedure: COMBINED ESOPHAGOSCOPY, GASTROSCOPY, DUODENOSCOPY (EGD), BIOPSY SINGLE OR MULTIPLE;  Surgeon: Juan Carlos Barraza MD;  Location:  GI     ESOPHAGOSCOPY, GASTROSCOPY, DUODENOSCOPY (EGD), COMBINED N/A 2/19/2019    Procedure: ESOPHAGOSCOPY, GASTROSCOPY, DUODENOSCOPY (EGD) with cold forcep;  Surgeon: Juan Carlos Barraza MD;  Location:  GI     HERNIA REPAIR       ORTHOPEDIC SURGERY         FAMILY HISTORY:   Family History   Problem Relation Age of Onset     Pancreatic Cancer Mother      Pancreatic Cancer Paternal Grandfather      Diabetes Brother      Depression Brother      Suicide Brother      Substance Abuse Brother      Dementia Father      Parkinsonism Father      Family History Negative No family hx of      Colon Cancer No family hx of      Unknown/Adopted No family hx of      Anxiety Disorder No family hx of      Schizophrenia No family hx of      Bipolar Disorder No family hx of      Cayey Disease No family hx of      Autism Spectrum Disorder No family hx of      Intellectual Disability (Mental Retardation) No family hx of      Mental Illness No family hx of        SOCIAL HISTORY:   Social History     Socioeconomic History     Marital status:      Spouse name: Not on file     Number of children: Not on file     Years of education: Not on file     Highest education level: Not on file   Occupational History     Not on file   Social Needs     Financial resource strain: Not on file     Food insecurity     Worry: Not on file     Inability: Not on file     Transportation needs     Medical: Not on file     Non-medical: Not on file   Tobacco Use     Smoking status: Former Smoker     Types: Cigarettes     Smokeless tobacco: Never Used   Substance and Sexual Activity     Alcohol use: No     Alcohol/week: 0.0  standard drinks     Comment: 14 drinks weekly, quit on 08/2017     Drug use: No     Sexual activity: Yes     Partners: Female   Lifestyle     Physical activity     Days per week: Not on file     Minutes per session: Not on file     Stress: Not on file   Relationships     Social connections     Talks on phone: Not on file     Gets together: Not on file     Attends Confucianist service: Not on file     Active member of club or organization: Not on file     Attends meetings of clubs or organizations: Not on file     Relationship status: Not on file     Intimate partner violence     Fear of current or ex partner: Not on file     Emotionally abused: Not on file     Physically abused: Not on file     Forced sexual activity: Not on file   Other Topics Concern     Parent/sibling w/ CABG, MI or angioplasty before 65F 55M? Not Asked   Social History Narrative     Not on file       Review Of Systems:  Skin: No rash, pruritis, or skin pigmentation  Eyes: No changes in vision  Ears/Nose/Throat: No changes in hearing, no nosebleeds  Respiratory: No shortness of breath, dyspnea on exertion, cough, or hemoptysis  Cardiovascular: No chest pain or palpitations  Gastrointestinal: No diarrhea or constipation. No abdominal pain. No hematochezia  Genitourinary: see HPI  Musculoskeletal: No pain or swelling of joints, normal range of motion  Neurologic: No weakness or tremors  Psychiatric: No recent changes in memory or mood  Hematologic/Lymphatic/Immunologic: No easy bruising or enlarged lymph nodes  Endocrine: No weight gain or loss      PHYSICAL EXAM:    There were no vitals taken for this visit.    Constitutional: Well developed. Conversant and in no acute distress  Eyes: Anicteric sclera, conjunctiva clear, normal extraocular movements  ENT: Normocephalic and atraumatic,   Skin: Warm and dry. No rashes or lesions  Cardiac: No peripheral edema  Back/Flank: Not done  CNS/PNS: Normal musculature and movements, moves all extremities  normally  Respiratory: Normal non-labored breathing  Abdomen: Soft nontender and nondistended  Peripheral Vascular: No peripheral edema  Mental Status/Psych: Alert and Oriented x 3. Normal mood and affect    Penis: Normal  Scrotal skin: Normal, no lesions  Testicles: Normal to palpation bilaterally  Epididymis: Normal to palpation bilaterally  Lymphatic: Normal inguinal lymph nodes  Digital Rectal Exam: The prostate is enlarged, benign and symmetric to palpation    Cystoscopy: Not done    Imaging: None    Urinalysis: UA RESULTS:  Recent Labs   Lab Test 02/05/19  1413   COLOR Yellow   APPEARANCE Clear   URINEGLC 100*   URINEBILI Negative   URINEKETONE Negative   SG 1.020   UBLD Negative   URINEPH 6.0   PROTEIN Negative   UROBILINOGEN 0.2   NITRITE Negative   LEUKEST Small*   RBCU O - 2   WBCU 5-10*       PSA: 5.3    Post Void Residual: 117mL    Other labs: None today      IMPRESSION:  Low risk prostate cancer, enlarged prostate    PLAN:  We discussed his various urologic issues.  He has symptoms from his enlarged prostate but does not wish to treat this at this time.  We can revisit this in the future.  His PSA has improved.  It has been nearly 2 years since his initial prostate cancer diagnosis I recommended that at his next 6-month visit we repeat an MRI of the prostate as well as a PSA followed by a prostate biopsy.  We discussed this in detail and he wishes to proceed.    We discussed his erectile dysfunction.  We discussed treatment options.  He wishes to try intracavernosal injections.  We discussed Trimix injections in detail today along with its risks, including risk of priapism.  He wishes to proceed.  The medication was ordered for him.  I will see him back shortly for a Trimix teaching appointment.      Mohsen Rao M.D.

## 2021-01-11 NOTE — PROGRESS NOTES
Office Visit Note  [facility]  [phone]    UROLOGIC DIAGNOSES:   Low risk prostate cancer, enlarged prostate    CURRENT INTERVENTIONS:   Active surveillance, prostate biopsy x 1    HISTORY:   Robert returns to clinic today for prostate cancer follow-up.  His PSA went back down to 5.3.  He does report some difficulty urinating and nocturia but does not seek any treatment for his urinary symptoms.    He has a new issue he wants to discuss today: Erectile dysfunction.  He has been using supplemental testosterone injections for years.  He has been using Cialis for years as well.  He says that originally the 10 mg of Cialis was plenty but now even the 20 mg dose does not work for him every time.  He is able to have some erections but they do not last long enough for satisfactory intercourse.  He is interested in discussing treatment options for his erectile dysfunction.  He reports normal libido.      PAST MEDICAL HISTORY:   Past Medical History:   Diagnosis Date     Anxiety      Anxiety 1/31/2015     BPH (benign prostatic hyperplasia)      Degenerative disc disease      Depression      Diabetes mellitus (H)      Gastritis      Gastro-oesophageal reflux disease      H/O alcohol abuse      Hyperlipidemia      Hypertension      Low testosterone      MRSA (methicillin resistant staph aureus) culture positive 8/2013    skin infection     Mumps      PUD (peptic ulcer disease)      Sleep apnea      STD (sexually transmitted disease)        PAST SURGICAL HISTORY:   Past Surgical History:   Procedure Laterality Date     COLONOSCOPY  11/11/2013    Procedure: COMBINED COLONOSCOPY, SINGLE BIOPSY/POLYPECTOMY BY BIOPSY;  Colonoscopy/ polypectomy x2 by cold forceps    ;  Surgeon: Juan Carlos Bellamy MD;  Location:  GI     ESOPHAGOSCOPY, GASTROSCOPY, DUODENOSCOPY (EGD), COMBINED N/A 9/22/2016    Procedure: COMBINED ESOPHAGOSCOPY, GASTROSCOPY, DUODENOSCOPY (EGD), BIOPSY SINGLE OR MULTIPLE;  Surgeon: Juan Carlos Barraza,  MD;  Location:  GI     ESOPHAGOSCOPY, GASTROSCOPY, DUODENOSCOPY (EGD), COMBINED N/A 2/19/2019    Procedure: ESOPHAGOSCOPY, GASTROSCOPY, DUODENOSCOPY (EGD) with cold forcep;  Surgeon: Juan Carlos Barraza MD;  Location:  GI     HERNIA REPAIR       ORTHOPEDIC SURGERY         FAMILY HISTORY:   Family History   Problem Relation Age of Onset     Pancreatic Cancer Mother      Pancreatic Cancer Paternal Grandfather      Diabetes Brother      Depression Brother      Suicide Brother      Substance Abuse Brother      Dementia Father      Parkinsonism Father      Family History Negative No family hx of      Colon Cancer No family hx of      Unknown/Adopted No family hx of      Anxiety Disorder No family hx of      Schizophrenia No family hx of      Bipolar Disorder No family hx of      Turney Disease No family hx of      Autism Spectrum Disorder No family hx of      Intellectual Disability (Mental Retardation) No family hx of      Mental Illness No family hx of        SOCIAL HISTORY:   Social History     Socioeconomic History     Marital status:      Spouse name: Not on file     Number of children: Not on file     Years of education: Not on file     Highest education level: Not on file   Occupational History     Not on file   Social Needs     Financial resource strain: Not on file     Food insecurity     Worry: Not on file     Inability: Not on file     Transportation needs     Medical: Not on file     Non-medical: Not on file   Tobacco Use     Smoking status: Former Smoker     Types: Cigarettes     Smokeless tobacco: Never Used   Substance and Sexual Activity     Alcohol use: No     Alcohol/week: 0.0 standard drinks     Comment: 14 drinks weekly, quit on 08/2017     Drug use: No     Sexual activity: Yes     Partners: Female   Lifestyle     Physical activity     Days per week: Not on file     Minutes per session: Not on file     Stress: Not on file   Relationships     Social connections     Talks on phone: Not on  file     Gets together: Not on file     Attends Druze service: Not on file     Active member of club or organization: Not on file     Attends meetings of clubs or organizations: Not on file     Relationship status: Not on file     Intimate partner violence     Fear of current or ex partner: Not on file     Emotionally abused: Not on file     Physically abused: Not on file     Forced sexual activity: Not on file   Other Topics Concern     Parent/sibling w/ CABG, MI or angioplasty before 65F 55M? Not Asked   Social History Narrative     Not on file       Review Of Systems:  Skin: No rash, pruritis, or skin pigmentation  Eyes: No changes in vision  Ears/Nose/Throat: No changes in hearing, no nosebleeds  Respiratory: No shortness of breath, dyspnea on exertion, cough, or hemoptysis  Cardiovascular: No chest pain or palpitations  Gastrointestinal: No diarrhea or constipation. No abdominal pain. No hematochezia  Genitourinary: see HPI  Musculoskeletal: No pain or swelling of joints, normal range of motion  Neurologic: No weakness or tremors  Psychiatric: No recent changes in memory or mood  Hematologic/Lymphatic/Immunologic: No easy bruising or enlarged lymph nodes  Endocrine: No weight gain or loss      PHYSICAL EXAM:    There were no vitals taken for this visit.    Constitutional: Well developed. Conversant and in no acute distress  Eyes: Anicteric sclera, conjunctiva clear, normal extraocular movements  ENT: Normocephalic and atraumatic,   Skin: Warm and dry. No rashes or lesions  Cardiac: No peripheral edema  Back/Flank: Not done  CNS/PNS: Normal musculature and movements, moves all extremities normally  Respiratory: Normal non-labored breathing  Abdomen: Soft nontender and nondistended  Peripheral Vascular: No peripheral edema  Mental Status/Psych: Alert and Oriented x 3. Normal mood and affect    Penis: Normal  Scrotal skin: Normal, no lesions  Testicles: Normal to palpation bilaterally  Epididymis: Normal to  palpation bilaterally  Lymphatic: Normal inguinal lymph nodes  Digital Rectal Exam: The prostate is enlarged, benign and symmetric to palpation    Cystoscopy: Not done    Imaging: None    Urinalysis: UA RESULTS:  Recent Labs   Lab Test 02/05/19  1413   COLOR Yellow   APPEARANCE Clear   URINEGLC 100*   URINEBILI Negative   URINEKETONE Negative   SG 1.020   UBLD Negative   URINEPH 6.0   PROTEIN Negative   UROBILINOGEN 0.2   NITRITE Negative   LEUKEST Small*   RBCU O - 2   WBCU 5-10*       PSA: 5.3    Post Void Residual: 117mL    Other labs: None today      IMPRESSION:  Low risk prostate cancer, enlarged prostate    PLAN:  We discussed his various urologic issues.  He has symptoms from his enlarged prostate but does not wish to treat this at this time.  We can revisit this in the future.  His PSA has improved.  It has been nearly 2 years since his initial prostate cancer diagnosis I recommended that at his next 6-month visit we repeat an MRI of the prostate as well as a PSA followed by a prostate biopsy.  We discussed this in detail and he wishes to proceed.    We discussed his erectile dysfunction.  We discussed treatment options.  He wishes to try intracavernosal injections.  We discussed Trimix injections in detail today along with its risks, including risk of priapism.  He wishes to proceed.  The medication was ordered for him.  I will see him back shortly for a Trimix teaching appointment.      Mohsen Rao M.D.

## 2021-01-12 LAB
IRON SATN MFR SERPL: 12 % (ref 15–46)
IRON SERPL-MCNC: 54 UG/DL (ref 35–180)
TIBC SERPL-MCNC: 432 UG/DL (ref 240–430)

## 2021-01-13 LAB
SHBG SERPL-SCNC: 22 NMOL/L (ref 11–80)
TESTOST FREE SERPL-MCNC: 22.77 NG/DL (ref 4.7–24.4)
TESTOST SERPL-MCNC: 814 NG/DL (ref 240–950)

## 2021-01-18 ENCOUNTER — TELEPHONE (OUTPATIENT)
Dept: PEDIATRICS | Facility: CLINIC | Age: 66
End: 2021-01-18

## 2021-01-18 DIAGNOSIS — E11.43 TYPE 2 DIABETES MELLITUS WITH PERIPHERAL AUTONOMIC NEUROPATHY (H): ICD-10-CM

## 2021-01-18 NOTE — TELEPHONE ENCOUNTER
Patient calling  He was given JANUVIA 25 MG tablet  And he should be on 100 mg. Please advise. Ok to call and mario [phone]

## 2021-01-19 NOTE — TELEPHONE ENCOUNTER
Call to pharmacy. This came through interface and was refilled in error. Pt has been on 100 mg since November.   Cancelled the 25 mg. The pharmacy states they do have the 100 mg on file. Pt can take 4 pills of the 25 mg and then when he is done can refill the 100 mg.     Call to pt and advised, since he already picked up the 25 mg. He will take 4 pills daily. Then will call pharmacy for 100 mg refill. He agrees with this plan.

## 2021-01-22 ENCOUNTER — NURSE TRIAGE (OUTPATIENT)
Dept: INTERNAL MEDICINE | Facility: CLINIC | Age: 66
End: 2021-01-22

## 2021-01-22 NOTE — TELEPHONE ENCOUNTER
Attempted to contact pt. Left message to call clinic.     Pt should be triaged by any triage nurse.   (How severe is his pain? For how long?, etc.)     He can call the [facility] clinic to reschedule stress test, #[phone].

## 2021-01-22 NOTE — TELEPHONE ENCOUNTER
Pt advised. He states he cannot afford a large hospital bill like last time when all he needs is oral pain medication. Advised him of the importance of being evaluated first, and that he needs IV pain meds, IVFs versus oral pain medications. Her verbalized understanding but states he will just do broth and fluids and stay home.

## 2021-01-22 NOTE — TELEPHONE ENCOUNTER
Patient is calling because he thinks he may be having a flare up of pancreatitis. He has tried Tums and that is not helping. He says the pain he has is consistant with his previous flare ups. There are no appointments today.    Also he says he needs to get his stress test rescheduled because when he showed up for his appointment for this they told him he showed up an hour late for his appointment.

## 2021-01-22 NOTE — TELEPHONE ENCOUNTER
"Patient calls back, feels like knife stabbing him below apex of lower rib cage. He has been hospitalized 2-3 times in the past for similar symptoms, the last hospitalization was about 4 years ago; previously had been triggered by alcohol use, but stopped drinking after that episode. He ate some chili that he thinks was spoiled and started to have diarrhea and stomach pain on Tuesday. He was feeling better and ate the chili again yesterday and symptoms returned. Advised patient that based on symptoms he should go to ER for evaluation.     Patient states the ER only provides him with pain medication and continue with bland diet, asking if he can get a prescription for pain and he would follow bland diet. States he will not go to the ER and asks if Dr. Villafana can provide prescription for pain medication like Oxycodone to get through today and see how he feels tomorrow.     Reason for Disposition    Pain lasting > 10 minutes and over 50 years old    Additional Information    Pain is mainly in upper abdomen (if needed ask: 'is it mainly above the belly button?')    Negative: Passed out (i.e., fainted, collapsed and was not responding)    Negative: Shock suspected (e.g., cold/pale/clammy skin, too weak to stand, low BP, rapid pulse)    Negative: Visible sweat on face or sweat is dripping down    Negative: SEVERE abdominal pain (e.g., excruciating)    Answer Assessment - Initial Assessment Questions  1. LOCATION: \"Where does it hurt?\"       Below apex of lower rib cage  2. RADIATION: \"Does the pain shoot anywhere else?\" (e.g., chest, back)      Through to his back  3. ONSET: \"When did the pain begin?\" (e.g., minutes, hours or days ago)       Tuesday  4. SUDDEN: \"Gradual or sudden onset?\"      sudden  5. PATTERN \"Does the pain come and go, or is it constant?\"     - If constant: \"Is it getting better, staying the same, or worsening?\"       (Note: Constant means the pain never goes away completely; most serious pain is " "constant and it progresses)      - If intermittent: \"How long does it last?\" \"Do you have pain now?\"      (Note: Intermittent means the pain goes away completely between bouts)      Constant, worse today than yesterday  6. SEVERITY: \"How bad is the pain?\"  (e.g., Scale 1-10; mild, moderate, or severe)     - MILD (1-3): doesn't interfere with normal activities, abdomen soft and not tender to touch      - MODERATE (4-7): interferes with normal activities or awakens from sleep, tender to touch      - SEVERE (8-10): excruciating pain, doubled over, unable to do any normal activities        7/10   7. RECURRENT SYMPTOM: \"Have you ever had this type of abdominal pain before?\" If so, ask: \"When was the last time?\" and \"What happened that time?\"       Yes with pancreatitis, has been admitted 2-3 times to the hospital for this  8. AGGRAVATING FACTORS: \"Does anything seem to cause this pain?\" (e.g., foods, stress, alcohol)      Nothing   9. CARDIAC SYMPTOMS: \"Do you have any of the following symptoms: chest pain, difficulty breathing, sweating, nausea?\"      Occasional nausea  10. OTHER SYMPTOMS: \"Do you have any other symptoms?\" (e.g., fever, vomiting, diarrhea)        Diarrhea   11. PREGNANCY: \"Is there any chance you are pregnant?\" \"When was your last menstrual period?\"        n/a    Protocols used: ABDOMINAL PAIN - UPPER-A-OH, ABDOMINAL PAIN - MALE-A-OH      "

## 2021-02-01 DIAGNOSIS — N52.9 VASCULOGENIC ERECTILE DYSFUNCTION, UNSPECIFIED VASCULOGENIC ERECTILE DYSFUNCTION TYPE: ICD-10-CM

## 2021-02-01 ASSESSMENT — MIFFLIN-ST. JEOR: SCORE: 1842.66

## 2021-02-02 ENCOUNTER — HOSPITAL ENCOUNTER (OUTPATIENT)
Dept: CARDIOLOGY | Facility: CLINIC | Age: 66
Discharge: HOME | End: 2021-02-02
Attending: INTERNAL MEDICINE | Admitting: INTERNAL MEDICINE
Payer: COMMERCIAL

## 2021-02-02 PROCEDURE — 93018 CV STRESS TEST I&R ONLY: CPT | Performed by: INTERNAL MEDICINE

## 2021-02-02 PROCEDURE — 255N000002 HC RX 255 OP 636: Performed by: INTERNAL MEDICINE

## 2021-02-02 PROCEDURE — 93321 DOPPLER ECHO F-UP/LMTD STD: CPT | Mod: 26 | Performed by: INTERNAL MEDICINE

## 2021-02-02 PROCEDURE — 93325 DOPPLER ECHO COLOR FLOW MAPG: CPT | Mod: TC

## 2021-02-02 PROCEDURE — 93350 STRESS TTE ONLY: CPT | Mod: 26 | Performed by: INTERNAL MEDICINE

## 2021-02-02 PROCEDURE — 93325 DOPPLER ECHO COLOR FLOW MAPG: CPT | Mod: 26 | Performed by: INTERNAL MEDICINE

## 2021-02-02 PROCEDURE — 93016 CV STRESS TEST SUPVJ ONLY: CPT | Performed by: INTERNAL MEDICINE

## 2021-02-02 RX ADMIN — HUMAN ALBUMIN MICROSPHERES AND PERFLUTREN 3 ML: 10; .22 INJECTION, SOLUTION INTRAVENOUS at 11:06

## 2021-02-03 ENCOUNTER — TELEPHONE (OUTPATIENT)
Dept: INTERNAL MEDICINE | Facility: CLINIC | Age: 66
End: 2021-02-03

## 2021-02-03 ENCOUNTER — NURSE TRIAGE (OUTPATIENT)
Dept: NURSING | Facility: CLINIC | Age: 66
End: 2021-02-03

## 2021-02-03 ENCOUNTER — HOSPITAL ENCOUNTER (EMERGENCY)
Facility: CLINIC | Age: 66
Discharge: HOME | End: 2021-02-04
Attending: EMERGENCY MEDICINE | Admitting: EMERGENCY MEDICINE
Payer: COMMERCIAL

## 2021-02-03 DIAGNOSIS — R73.9 HYPERGLYCEMIA: ICD-10-CM

## 2021-02-03 DIAGNOSIS — R42 DIZZINESS: ICD-10-CM

## 2021-02-03 LAB
ALBUMIN SERPL-MCNC: 3.6 G/DL (ref 3.4–5)
ALP SERPL-CCNC: 91 U/L (ref 40–150)
ALT SERPL W P-5'-P-CCNC: 23 U/L (ref 0–70)
ANION GAP SERPL CALCULATED.3IONS-SCNC: 5 MMOL/L (ref 3–14)
AST SERPL W P-5'-P-CCNC: 15 U/L (ref 0–45)
BASOPHILS # BLD AUTO: 0 10E9/L (ref 0–0.2)
BASOPHILS NFR BLD AUTO: 0.4 %
BILIRUB SERPL-MCNC: 0.5 MG/DL (ref 0.2–1.3)
BUN SERPL-MCNC: 22 MG/DL (ref 7–30)
CALCIUM SERPL-MCNC: 8.9 MG/DL (ref 8.5–10.1)
CHLORIDE SERPL-SCNC: 103 MMOL/L (ref 94–109)
CO2 SERPL-SCNC: 28 MMOL/L (ref 20–32)
CREAT SERPL-MCNC: 0.99 MG/DL (ref 0.66–1.25)
DIFFERENTIAL METHOD BLD: ABNORMAL
EOSINOPHIL # BLD AUTO: 0.2 10E9/L (ref 0–0.7)
EOSINOPHIL NFR BLD AUTO: 1.8 %
ERYTHROCYTE [DISTWIDTH] IN BLOOD BY AUTOMATED COUNT: 15.7 % (ref 10–15)
ETHANOL SERPL-MCNC: <0.01 G/DL
GFR SERPL CREATININE-BSD FRML MDRD: 78 ML/MIN/{1.73_M2}
GLUCOSE SERPL-MCNC: 277 MG/DL (ref 70–99)
HCT VFR BLD AUTO: 42.6 % (ref 40–53)
HGB BLD-MCNC: 13.6 G/DL (ref 13.3–17.7)
IMM GRANULOCYTES # BLD: 0 10E9/L (ref 0–0.4)
IMM GRANULOCYTES NFR BLD: 0.3 %
LIPASE SERPL-CCNC: 193 U/L (ref 73–393)
LYMPHOCYTES # BLD AUTO: 1.9 10E9/L (ref 0.8–5.3)
LYMPHOCYTES NFR BLD AUTO: 18.4 %
MCH RBC QN AUTO: 24.7 PG (ref 26.5–33)
MCHC RBC AUTO-ENTMCNC: 31.9 G/DL (ref 31.5–36.5)
MCV RBC AUTO: 77 FL (ref 78–100)
MONOCYTES # BLD AUTO: 0.7 10E9/L (ref 0–1.3)
MONOCYTES NFR BLD AUTO: 7 %
NEUTROPHILS # BLD AUTO: 7.5 10E9/L (ref 1.6–8.3)
NEUTROPHILS NFR BLD AUTO: 72.1 %
NRBC # BLD AUTO: 0 10*3/UL
NRBC BLD AUTO-RTO: 0 /100
PLATELET # BLD AUTO: 310 10E9/L (ref 150–450)
POTASSIUM SERPL-SCNC: 4 MMOL/L (ref 3.4–5.3)
PROT SERPL-MCNC: 7.3 G/DL (ref 6.8–8.8)
RBC # BLD AUTO: 5.51 10E12/L (ref 4.4–5.9)
SODIUM SERPL-SCNC: 136 MMOL/L (ref 133–144)
TROPONIN I SERPL-MCNC: <0.015 UG/L (ref 0–0.04)
WBC # BLD AUTO: 10.4 10E9/L (ref 4–11)

## 2021-02-03 PROCEDURE — 83690 ASSAY OF LIPASE: CPT | Performed by: EMERGENCY MEDICINE

## 2021-02-03 PROCEDURE — 96374 THER/PROPH/DIAG INJ IV PUSH: CPT

## 2021-02-03 PROCEDURE — 250N000011 HC RX IP 250 OP 636: Performed by: EMERGENCY MEDICINE

## 2021-02-03 PROCEDURE — 84484 ASSAY OF TROPONIN QUANT: CPT | Performed by: EMERGENCY MEDICINE

## 2021-02-03 PROCEDURE — 82077 ASSAY SPEC XCP UR&BREATH IA: CPT | Performed by: EMERGENCY MEDICINE

## 2021-02-03 PROCEDURE — 96361 HYDRATE IV INFUSION ADD-ON: CPT

## 2021-02-03 PROCEDURE — 85025 COMPLETE CBC W/AUTO DIFF WBC: CPT | Performed by: EMERGENCY MEDICINE

## 2021-02-03 PROCEDURE — 99285 EMERGENCY DEPT VISIT HI MDM: CPT | Mod: 25

## 2021-02-03 PROCEDURE — 258N000003 HC RX IP 258 OP 636: Performed by: EMERGENCY MEDICINE

## 2021-02-03 PROCEDURE — 80053 COMPREHEN METABOLIC PANEL: CPT | Performed by: EMERGENCY MEDICINE

## 2021-02-03 PROCEDURE — 93005 ELECTROCARDIOGRAM TRACING: CPT

## 2021-02-03 PROCEDURE — 250N000013 HC RX MED GY IP 250 OP 250 PS 637: Performed by: EMERGENCY MEDICINE

## 2021-02-03 RX ORDER — ONDANSETRON 2 MG/ML
4 INJECTION INTRAMUSCULAR; INTRAVENOUS ONCE
Status: COMPLETED | OUTPATIENT
Start: 2021-02-03 | End: 2021-02-03

## 2021-02-03 RX ORDER — MECLIZINE HYDROCHLORIDE 25 MG/1
25 TABLET ORAL ONCE
Status: COMPLETED | OUTPATIENT
Start: 2021-02-03 | End: 2021-02-03

## 2021-02-03 RX ADMIN — ONDANSETRON 4 MG: 2 INJECTION INTRAMUSCULAR; INTRAVENOUS at 23:08

## 2021-02-03 RX ADMIN — SODIUM CHLORIDE 1000 ML: 9 INJECTION, SOLUTION INTRAVENOUS at 23:08

## 2021-02-03 RX ADMIN — MECLIZINE HYDROCHLORIDE 25 MG: 25 TABLET ORAL at 23:08

## 2021-02-03 ASSESSMENT — ENCOUNTER SYMPTOMS
COUGH: 0
CONSTIPATION: 1
DIARRHEA: 0
SHORTNESS OF BREATH: 0
ABDOMINAL PAIN: 1
FEVER: 0
NUMBNESS: 0
BLOOD IN STOOL: 0
SPEECH DIFFICULTY: 0
WEAKNESS: 0
DIZZINESS: 1
NAUSEA: 1

## 2021-02-03 ASSESSMENT — MIFFLIN-ST. JEOR: SCORE: 1897.1

## 2021-02-03 NOTE — TELEPHONE ENCOUNTER
Patients blood sugars have been running high. Between 200 - 280. He had pancreatitis that started a few weeks ago. The pain is not severe like it was but still keeping him up at night. Patient wont go to ED but wondering if he can be treated at a urgent care. Patient stated he can not afford the ED. Please advise. Ok to call and mario [phone]

## 2021-02-04 VITALS
OXYGEN SATURATION: 95 % | WEIGHT: 232 LBS | TEMPERATURE: 98.4 F | SYSTOLIC BLOOD PRESSURE: 140 MMHG | HEIGHT: 74 IN | BODY MASS INDEX: 29.77 KG/M2 | DIASTOLIC BLOOD PRESSURE: 80 MMHG | HEART RATE: 80 BPM | RESPIRATION RATE: 16 BRPM

## 2021-02-04 LAB — INTERPRETATION ECG - MUSE: NORMAL

## 2021-02-04 RX ORDER — MECLIZINE HYDROCHLORIDE 25 MG/1
25 TABLET ORAL 3 TIMES DAILY PRN
Qty: 30 TABLET | Refills: 0 | Status: SHIPPED | OUTPATIENT
Start: 2021-02-04 | End: 2021-09-29

## 2021-02-04 RX ORDER — ONDANSETRON 4 MG/1
4 TABLET, ORALLY DISINTEGRATING ORAL EVERY 8 HOURS PRN
Qty: 10 TABLET | Refills: 0 | Status: SHIPPED | OUTPATIENT
Start: 2021-02-04 | End: 2021-02-07

## 2021-02-04 NOTE — TELEPHONE ENCOUNTER
"Caller stattes he felt he had  \"pancreatitis\" 2 weeks ago self diagnosed from past episodes.. Contacted  PCP for pain meds but did not want to go in  due to copays;  Self medicated with  friends  pain pills   Caller now reports continued elevated bG 200-300 and has been adjusting his own  Meds   Has  \"no equilibrium\" ; unable to walk without support    Triage protocol reviewed   Wants to go to ; advised that   Complaint and complex history not appropriate for  [facility] and to proceed to ED now   Caller understands and will have someone drive him to [facility] ED   Renetta Hollingsworth RN  FNA         Reason for Disposition    SEVERE dizziness (e.g., unable to stand, requires support to walk, feels like passing out now)    Additional Information    Negative: [1] Weakness (i.e., paralysis, loss of muscle strength) of the face, arm or leg on one side of the body AND [2] sudden onset AND [3] present now    Negative: [1] Numbness (i.e., loss of sensation) of the face, arm or leg on one side of the body AND [2] sudden onset AND [3] present now    Negative: [1] Loss of speech or garbled speech AND [2] sudden onset AND [3] present now    Negative: Difficult to awaken or acting confused (e.g., disoriented, slurred speech)    Negative: Sounds like a life-threatening emergency to the triager    [1] Dizziness is main symptom AND [2] NO spinning sensation (i.e., vertigo)    Negative: Severe difficulty breathing (e.g., struggling for each breath, speaks in single words)    Negative: [1] Difficulty breathing or swallowing AND [2] started suddenly after medicine, an allergic food or bee sting    Negative: Shock suspected (e.g., cold/pale/clammy skin, too weak to stand, low BP, rapid pulse)    Negative: Difficult to awaken or acting confused (e.g., disoriented, slurred speech)    Negative: [1] Weakness (i.e., paralysis, loss of muscle strength) of the face, arm or leg on one side of the body AND [2] sudden onset AND [3] present now    Negative: " PT IRP Treatment               SUBJECTIVE: Subjective: pt. agreeable to session \"I am dizzy moreso than usual with standing\" (19 1000)  Subjective/Objective Comments: Notified Watson of patient's dizziness with standing. Applied dano hose bilaterally assessed blood pressures sittin/58 and standin/50. further gait training deferred at this time. Upon testing patient with positive left vertebral artery with quick onset of symptoms. Paged Dr. Manzanares because further vestibular testing such as sarah hallpike deferred at this time (19 1000)    OBJECTIVE:  Precautions  Other Precautions: fall risk due to dizziness (19 1000)    See below for current functional status overview.  See PT flowsheet for full details regarding the PT therapy provided.    ASSESSMENT:   Treatment today focused on transfers, bed mobility, LE strengthening exercises with blood pressure and vertebral artery testing.  Patient testing positive to left vertebral artery involvement as well and positive orthostatic BP-notified ANTONIETA Chaudhari and MD of these findings. Patient is demonstrating fair progress due to medical limitations affecting mobility.    Patient limited at this time by weakness, balance deficits, impaired activity tolerance, medical needs.  Patient will benefit from further skilled PT  for continued training with functional mobility to help the patient meet goal of safe return home.    PT Identified Barriers to Discharge: dizziness in upright, fall risk     This patient participated in all scheduled physical therapy time with this therapist today.    EDUCATION:   On this date, education was provided to patient regarding  avoiding left cervical rotation and extension positions  The response to education was/were: Verbalizes understanding    PLAN:   Continue skilled PT, including the following Treatment/Interventions: Functional transfer training;Strengthening;Endurance training;Patient/Family training;Bed mobility;Gait  "[1] Numbness (i.e., loss of sensation) of the face, arm or leg on one side of the body AND [2] sudden onset AND [3] present now    Negative: [1] Loss of speech or garbled speech AND [2] sudden onset AND [3] present now    Negative: Overdose (accidental or intentional) of medications    Negative: [1] Fainted > 15 minutes ago AND [2] still feels too weak or dizzy to stand    Negative: Heart beating < 50 beats per minute OR > 140 beats per minute    Negative: Sounds like a life-threatening emergency to the triager    Negative: Chest pain    Negative: Rectal bleeding, bloody stool, or tarry-black stool    Negative: [1] Vomiting AND [2] contains red blood or black (\"coffee ground\") material    Negative: Vomiting is main symptom    Negative: Diarrhea is main symptom    Negative: Headache is main symptom    Negative: Patient states that he/she is having an anxiety/panic attack    Negative: Dizziness from low blood sugar (i.e., < 60 mg/dl or 3.5 mmol/l)    Negative: Dizziness is described as a spinning sensation (i.e., vertigo)    Negative: Heat exhaustion suspected (i.e., dehydration from heat exposure)    Negative: Difficulty breathing    Protocols used: DIZZINESS - [id number]-MS, DIZZINESS - VERTIGO-A-AH      " training;Stairs retraining;Safety Education;Neuromuscular re-education (04/06/19 1400)   PT Frequency: 7 days/week (04/07/19 1000), Frequency Comments: 90 min/day, 5 days/ week (04/07/19 1000)    Treatment Plan for Next Session: FIM/IRF through 4/7- stairs, gait uneven surfaces; upright tolerance, vestibular habituation exercises, further assess VOR contribution          RECOMMENDATIONS FOR DISCHARGE:  Recommendation for Discharge: PT: Post acute therapy    PT/OT Mobility Equipment for Discharge: owns 2ww, 4ww, cane (04/07/19 1000)  PT/OT ADL Equipment for Discharge: Pt has all needed AE at home (04/06/19 1300)      FUNCTIONAL DATA OVERVIEW LAST 24 HOURS  Bed Mobility   Bed Mobility  Rolling to the Right: Independent (04/06/19 1400)  Rolling to the Left: Independent (04/06/19 1400)  Supine to Sit: Supervision (Supv) (04/07/19 1000)  Sit to Supine: Supervision (Supv) (04/07/19 1000)  Bed Mobility Comments: supv for bed mobility for safety (04/07/19 1000)    Transfers  Transfers  Sit to Stand: Supervision (Supv) (04/07/19 1000)  Stand to Sit: Minimal Assist (Min) (04/07/19 1000)  Stand Pivot Transfers: Minimal Assist (Min) (04/07/19 1000)  Assistive Device/: 2-wheeled walker (04/07/19 1000)  Transfer Comments 1: Shiela to supv for safety and steadying due to dizziness (04/07/19 1000)    Gait  Gait  Gait Comments 1: not safe due to positive orthostatics (04/07/19 1000),      Stairs  Stairs Mobility  Number of Stairs: 4 (04/06/19 1400)  Stair Management Assistance: Minimal Assist (Min) (04/06/19 1400)  1 step (curb): Minimal Assist (Min) (04/06/19 1400)  4 steps: Minimal Assist (Min) (04/06/19 1400)  12 steps: Not attempted due to safety concerns (04/06/19 1400)  Stair Management Technique: Two rails;Step to pattern;Forwards;With gait belt (04/06/19 1400)  Stairs Mobility Comments: not safe (04/07/19 1000)    Wheelchair Mobility       Balance       Neuromuscular Re-education

## 2021-02-04 NOTE — DISCHARGE INSTRUCTIONS
Discharge Instructions  Dizziness (Lightheaded)  Today you were seen for dizziness.  Dizziness can be caused by many things and it can be very difficult to determine the cause of dizziness.  At this time, your provider has found no signs that your dizziness is due to a serious or life-threatening condition. However, sometimes there is a serious problem that does not show up right away, and it is important for you to follow up with your regular provider as instructed.  Generally, every Emergency Department visit should have a follow-up clinic visit with either a primary or a specialty clinic/provider. Please follow-up as instructed by your emergency provider today.      Return to the Emergency Department if:    You pass out (fainting or falling out), especially during exercise.    You develop chest pain, chest pressure or difficulty breathing.  Your feel an irregular heartbeat.  You have excessive vaginal bleeding, or blood in your stool or vomit (throw up).  You have a high fever.  Your symptoms get worse or more frequent.    If when you begin to feel dizzy or lightheaded, it is important to sit down or lay down immediately to prevent injury from falling.  If you were given a prescription for medicine here today, be sure to read all of the information (including the package insert) that comes with your prescription.  This will include important information about the medicine, its side effects, and any warnings that you need to know about.  The pharmacist who fills the prescription can provide more information and answer questions you may have about the medicine.  If you have questions or concerns that the pharmacist cannot address, please call or return to the Emergency Department.   Remember that you can always come back to the Emergency Department if you are not able to see your regular provider in the amount of time listed above, if you get any new symptoms, or if there is anything that worries you.  Discharge  Instructions  Hyperglycemia, High Blood Sugar    Today we found your blood sugar (glucose) was high. This may mean that you have developed diabetes, or if you already know that you have diabetes, it may mean that your diabetes is not as well controlled as it should be. Sometimes blood sugar can be high temporarily and it is not diabetes. Signs of elevated blood sugar include increased thirst, frequent urination (peeing), blurred vision, fatigue, unexplained weight loss, poor wound healing, and frequent infections.    We sometimes give medicine in the Emergency Department to lower the blood sugar. We may also prescribe medicine for you to use at home, or increase the medicine that you already take. While we do not like to see your blood sugar high, it is much more dangerous to let your blood sugar get too low, so it is reasonable to take time to bring it down, or to wait and watch to see if it comes down on its own.    Generally, every Emergency Department visit should have a follow-up clinic visit with either a primary or a specialty clinic/provider. Please follow-up as instructed by your emergency provider today.     Return to the Emergency Department if you develop:  Vomiting (throwing up).  Confusion, disorientation, or being unable to wake up.  Severe weakness or illness.  Abdominal (belly) pain.    What can I do to help myself?  Check your blood sugar as instructed by your provider.  Take medications prescribed by your provider.  Follow a diabetic diet (low fat, low concentrated sweets, high fiber).  Exercise regularly.  Moderate or eliminate alcohol use.  Stop smoking.    Diabetes: Diabetes mellitus is a disease in which the body cannot regulate the amount of sugar (glucose) in the blood. Insulin allows glucose to move out of the blood into cells throughout the body where it is used for fuel. People with diabetes do not produce enough insulin (type 1 diabetes), or cannot use insulin properly (type 2 diabetes),  or both. This starves the cells that need the glucose for fuel, and also harms certain organs and tissues exposed to the high glucose levels.  Over a long period of time, uncontrolled diabetes can lead to heart and blood vessel disease, blindness, kidney failure, foot ulcers and many other problems.          About 17 million Americans (6.2% of adults) have diabetes. We think that about one third of adults with diabetes do not know they have diabetes.  The incidence of diabetes is increasing rapidly. This increase is due to many factors, but the most significant are our increasing weight and decreased activity levels.     Diabetes can be a very serious and life-threatening illness if not treated.  If you were given a prescription for medicine here today, be sure to read all of the information (including the package insert) that comes with your prescription.  This will include important information about the medicine, its side effects, and any warnings that you need to know about.  The pharmacist who fills the prescription can provide more information and answer questions you may have about the medicine.  If you have questions or concerns that the pharmacist cannot address, please call or return to the Emergency Department.   Remember that you can always come back to the Emergency Department if you are not able to see your regular provider in the amount of time listed above, if you get any new symptoms, or if there is anything that worries you.

## 2021-02-04 NOTE — ED NOTES
Pt ambulated in hallway. Pt denies feeling dizzy. Pt reports feeling improved from arrival. Provider notified.

## 2021-02-04 NOTE — ED TRIAGE NOTES
Here for dizziness, nausea, elevated blood sugar of 285, constipation, and headache. Stated was diagnose with pancreatitis 2 weeks ago. ABCs intact.

## 2021-02-04 NOTE — ED PROVIDER NOTES
"  History   Chief Complaint:  Dizziness       HPI   Austyn Araujo is a 65 year old male with history of type II diabetes, hypertension, and hyperlipidemia who presents with dizziness. The patient reports that throughout the day today he has developed intermittent dizziness and nausea, prompting him to the ED. The patient describes his dizziness is relieved completely when lying flat and is triggered with walking. He denies this dizziness being a \"room spinning\" sensation, but states that he \"could not walk in a straight line as if [he] was intoxicated.\" Patient also endorses constipation over the past few days and notes that he did pass some adelia like stool today. He also notes that his epigastric pain improving over the past two weeks since being diagnosed with pancreatitis believed to be alcohol induced. The patient denies bloody or black stool, shortness of breath, diarrhea, chest pain, weakness, numbness, trouble speaking, vomiting, fever, cough, and other issues.      Review of Systems   Constitutional: Negative for fever.   Respiratory: Negative for cough and shortness of breath.    Cardiovascular: Negative for chest pain.   Gastrointestinal: Positive for abdominal pain, constipation and nausea. Negative for blood in stool and diarrhea.   Neurological: Positive for dizziness. Negative for speech difficulty, weakness and numbness.   All other systems reviewed and are negative.        Allergies:  No known drug allergies     Medications:  Xanax  Lipitor  Amaryl  Zestril  Metformin  Prilosec  Nitrostat  Cialis  Januvia  Flomax  Depotestosterone     Past Medical History:    Anxiety  Benign prostatic hyperplasia  Degenerative disc disease  Depression  Gastritis  Diabetes, type II  GERD  Alcohol abuse  Hyperlipidemia  Hypertension  Low testosterone  Mumps  Peptic ulcer disease  Sleep apnea  STD  Lyme disease  Pancreatitis  Hemoglobin A1c less than 7.0%  Erectile dysfunction  Peripheral neuropathy     Past " "Surgical History:    Colonoscopy  Hernia repair     Family History:    Pancreatic cancer - mother  Dementia - father  Parkinsonism - father    Social History:  Patient presents to the ED alone.   Patient states he is three years sober from alcohol. No drugs    Physical Exam     Patient Vitals for the past 24 hrs:   BP Temp Temp src Pulse Resp SpO2 Height Weight   02/04/21 0000 (!) 143/94 -- -- 79 -- 96 % -- --   02/03/21 2345 -- -- -- 90 -- 96 % -- --   02/03/21 2330 130/85 -- -- 77 -- 96 % -- --   02/03/21 2315 127/79 -- -- 74 -- 96 % -- --   02/03/21 2300 (!) 143/87 -- -- 75 -- 96 % -- --   02/03/21 2245 (!) 160/97 -- -- 78 -- 97 % -- --   02/03/21 2230 123/77 -- -- 75 -- 95 % -- --   02/03/21 2215 (!) 139/94 -- -- 81 -- 93 % -- --   02/03/21 2200 122/77 -- -- 73 -- 92 % -- --   02/03/21 2145 129/85 -- -- 74 -- 97 % -- --   02/03/21 2058 (!) 146/101 98.4  F (36.9  C) Oral 77 18 99 % 1.88 m (6' 2\") 105.2 kg (232 lb)       Physical Exam  Constitutional:  Oriented to person, place, and time. Well appearing.   HENT:   Head:    Normocephalic.   Mouth/Throat:   Oropharynx is clear and moist.   Eyes:    EOM are normal. Pupils are equal, round, and reactive to light. Horizontal nystagmus noted with bilateral tracking of his eyes.   Neck:    Neck supple.   Cardiovascular:  Normal rate, regular rhythm and normal heart sounds.      Exam reveals no gallop and no friction rub.       No murmur heard.  Pulmonary/Chest:  Effort normal and breath sounds normal.      No respiratory distress. No wheezes. No rales.      No reproducible chest wall pain.  Abdominal:   Soft. No distension. No tenderness. No rebound and no guarding.   Musculoskeletal:  Normal range of motion.   Neurological:   Alert and oriented to person, place, and time. Cranial nerves 2-12 grossly normal.  No ataxia. 5/5 strength and sensation intact to light touch in all 4 extremities.            Moves all 4 extremities spontaneously    Skin:    No rash noted. No " "pallor.      Emergency Department Course   ECG (21:08:03):  Rate 70 bpm. AL interval 198. QRS duration 98. QT/QTc 408/440. P-R-T axes 66 9 46. Normal sinus rhythm. Normal ECG. Interpreted at 2145 by London Valdovinos MD.     Imaging:  No orders to display       Laboratory:  CBC: WNL (WBC 10.4, HGB 13.6, )  CMP: Glucose 277 (H), WNL (Creatinine 0.99)  Lipase: 193  Alcohol ethyl: <0.01   Troponin (2144): <0.015        Emergency Department Course:    Reviewed:  I reviewed nursing notes, vitals and past medical history    Assessments:  2215: I initially evaluated the patient in ED room 15.    The patient passed a \"road test\" prior to discharge from the ED.     0029: I reassessed the patient.     Interventions:  2308, NS 1L IV Bolus   2308, Antivert, 25 mg, PO  2308, Zofran, 4 mg, IV    Disposition:  The patient was discharged to home.     Impression & Plan   Medical Decision Making:  Austyn Araujo is a 65 year old male who came in for dizziness and concerns for high glucose. Differential includes near syncope, ataxia, vertigo, dehydration, and other causes. Work up is otherwise non specific. He was told to follow up with regards to his glucose. He has no anion gap or concerns for DKA. After interventions, he is currently ambulatory and asymptomatic. He denies that the room is spinning therefore I would doubt vertigo and in particular central vertigo and therefore I do not believe he needs any MRI imaging. He lacks any other neurologic findings. With his improvement of symptoms, I feel that he is stable for out patient management. He was told to hydrate, and was given scripts for meclizine and Zofran. He was told to return for any worsening dizziness, balance issues, or new symptoms and concerns.      Diagnosis:    ICD-10-CM    1. Dizziness  R42    2. Hyperglycemia  R73.9        Discharge Medications:  New Prescriptions    MECLIZINE (ANTIVERT) 25 MG TABLET    Take 1 tablet (25 mg) by mouth 3 times daily as " needed for dizziness    ONDANSETRON (ZOFRAN ODT) 4 MG ODT TAB    Take 1 tablet (4 mg) by mouth every 8 hours as needed       Scribe Disclosure:  I, Kobe Du, am serving as a scribe at 10:04 PM on 2/3/2021 to document services personally performed by London Valdovinos MD based on my observations and the provider's statements to me.     [facility]  February 3, 2021      London Valdovinos MD  02/04/21 0521

## 2021-02-05 DIAGNOSIS — R79.89 LOW TESTOSTERONE: ICD-10-CM

## 2021-02-05 RX ORDER — TADALAFIL 20 MG/1
20 TABLET ORAL DAILY
Qty: 88 TABLET | Refills: 1 | Status: SHIPPED | OUTPATIENT
Start: 2021-02-05 | End: 2021-02-10

## 2021-02-10 ENCOUNTER — OFFICE VISIT (OUTPATIENT)
Dept: INTERNAL MEDICINE | Facility: CLINIC | Age: 66
End: 2021-02-10
Payer: COMMERCIAL

## 2021-02-10 VITALS
DIASTOLIC BLOOD PRESSURE: 70 MMHG | OXYGEN SATURATION: 99 % | WEIGHT: 222 LBS | HEIGHT: 74 IN | TEMPERATURE: 98 F | BODY MASS INDEX: 28.49 KG/M2 | SYSTOLIC BLOOD PRESSURE: 130 MMHG | RESPIRATION RATE: 20 BRPM | HEART RATE: 90 BPM

## 2021-02-10 DIAGNOSIS — E11.43 TYPE 2 DIABETES MELLITUS WITH PERIPHERAL AUTONOMIC NEUROPATHY (H): Primary | ICD-10-CM

## 2021-02-10 DIAGNOSIS — Z09 HOSPITAL DISCHARGE FOLLOW-UP: ICD-10-CM

## 2021-02-10 DIAGNOSIS — M16.0 PRIMARY OSTEOARTHRITIS OF BOTH HIPS: ICD-10-CM

## 2021-02-10 DIAGNOSIS — R42 DIZZINESS: ICD-10-CM

## 2021-02-10 DIAGNOSIS — D17.0 LIPOMA OF FACE: ICD-10-CM

## 2021-02-10 DIAGNOSIS — R79.89 LOW TESTOSTERONE: ICD-10-CM

## 2021-02-10 PROCEDURE — 99214 OFFICE O/P EST MOD 30 MIN: CPT | Performed by: INTERNAL MEDICINE

## 2021-02-10 RX ORDER — RIBOFLAVIN (VITAMIN B2) 100 MG
1 TABLET ORAL DAILY
COMMUNITY
Start: 2021-02-10 | End: 2023-11-03

## 2021-02-10 RX ORDER — TADALAFIL 20 MG/1
20 TABLET ORAL DAILY
Qty: 88 TABLET | Refills: 3 | Status: SHIPPED | OUTPATIENT
Start: 2021-02-10 | End: 2021-08-19

## 2021-02-10 RX ORDER — TADALAFIL 20 MG/1
20 TABLET ORAL DAILY
Qty: 88 TABLET | Refills: 3 | Status: SHIPPED | OUTPATIENT
Start: 2021-02-10 | End: 2021-02-10

## 2021-02-10 ASSESSMENT — MIFFLIN-ST. JEOR: SCORE: 1851.74

## 2021-02-10 NOTE — PROGRESS NOTES
Assessment & Plan     Type 2 diabetes mellitus with peripheral autonomic neuropathy (H)  Start on long acting insulin in addition to oral treatment  Monitor glucose   - insulin glargine (LANTUS PEN) 100 UNIT/ML pen; Inject 8 Units Subcutaneous At Bedtime    Primary osteoarthritis of both hips  Trial of preventive treatment   - glucosamine-chondroitinoitin 500-400 MG tablet; Take 1 tablet by mouth daily    Lipoma of face  Refer to plastic sugery   - PLASTIC SURGERY REFERRAL    Low testosterone  Has ED , ordered cialis, not to take with NG  - tadalafil (CIALIS) 20 MG tablet; Take 1 tablet (20 mg) by mouth daily Never use with nitroglycerin, terazosin or doxazosin.    Dizziness  Resolved, possible related to recent pancreatitis, possible acute viral illness     Hospital discharge follow-up  Improved , reviewed lab results form hospital       6}     See Patient Instructions    No follow-ups on file.    Javier Villafana MD  [facility] GUSTAVO Hernandez is a 65 year old who presents for the following health issues     HPI       ED/UC Followup:    Facility:  [facility]   Date of visit: 2/3/21  Reason for visit: dizziness ,hypergycemia  Current Status: better        Patient is seen for a follow up visit.  Seen in ED for dizziness, had unstable gait, high glucose, up to 300 past 3 weeks.   Has felt nausea. Has had pancreatitis, 2 weeks ago. Had abdominal pain now improved.   Now dizziness is improved.   Has had pancreatitis 2 weeks ago, related to eating food from [employer] . Now abdominal pain is better. No n/v/fever.   Has h/o lipoma of the forehead, enlarging, causing pain.   Has H/O DM. On diet , exercise and oral treatment. Blood sugars are not controlled. No parestesias. No hypoglycemias. Blood sugars are 200-300.   Has h/o DDD, OA of the hips. Has pain with ambulation.       Review of Systems   Constitutional, HEENT, cardiovascular, pulmonary, GI, , musculoskeletal, neuro, skin, endocrine  and psych systems are negative, except as otherwise noted.      Objective    There were no vitals taken for this visit.  There is no height or weight on file to calculate BMI.  Physical Exam   GENERAL: healthy, alert and no distress  EYES: Eyes grossly normal to inspection, PERRL and conjunctivae and sclerae normal  HENT: ear canals and TM's normal, nose and mouth without ulcers or lesions  NECK: no adenopathy, no asymmetry, masses, or scars and thyroid normal to palpation  RESP: lungs clear to auscultation - no rales, rhonchi or wheezes  CV: regular rate and rhythm, normal S1 S2, no S3 or S4, no murmur, click or rub, no peripheral edema and peripheral pulses strong  ABDOMEN: soft, nontender, no hepatosplenomegaly, no masses and bowel sounds normal  MS: no gross musculoskeletal defects noted, no edema  SKIN: no suspicious lesions or rashes  NEURO: Normal strength and tone, mentation intact and speech normal    Admission on 02/03/2021, Discharged on 02/04/2021   Component Date Value Ref Range Status     Interpretation ECG 02/03/2021 Click View Image link to view waveform and result   Final     WBC 02/03/2021 10.4  4.0 - 11.0 10e9/L Final     RBC Count 02/03/2021 5.51  4.4 - 5.9 10e12/L Final     Hemoglobin 02/03/2021 13.6  13.3 - 17.7 g/dL Final     Hematocrit 02/03/2021 42.6  40.0 - 53.0 % Final     MCV 02/03/2021 77* 78 - 100 fl Final     MCH 02/03/2021 24.7* 26.5 - 33.0 pg Final     MCHC 02/03/2021 31.9  31.5 - 36.5 g/dL Final     RDW 02/03/2021 15.7* 10.0 - 15.0 % Final     Platelet Count 02/03/2021 310  150 - 450 10e9/L Final     Diff Method 02/03/2021 Automated Method   Final     % Neutrophils 02/03/2021 72.1  % Final     % Lymphocytes 02/03/2021 18.4  % Final     % Monocytes 02/03/2021 7.0  % Final     % Eosinophils 02/03/2021 1.8  % Final     % Basophils 02/03/2021 0.4  % Final     % Immature Granulocytes 02/03/2021 0.3  % Final     Nucleated RBCs 02/03/2021 0  0 /100 Final     Absolute Neutrophil 02/03/2021  7.5  1.6 - 8.3 10e9/L Final     Absolute Lymphocytes 02/03/2021 1.9  0.8 - 5.3 10e9/L Final     Absolute Monocytes 02/03/2021 0.7  0.0 - 1.3 10e9/L Final     Absolute Eosinophils 02/03/2021 0.2  0.0 - 0.7 10e9/L Final     Absolute Basophils 02/03/2021 0.0  0.0 - 0.2 10e9/L Final     Abs Immature Granulocytes 02/03/2021 0.0  0 - 0.4 10e9/L Final     Absolute Nucleated RBC 02/03/2021 0.0   Final     Sodium 02/03/2021 136  133 - 144 mmol/L Final     Potassium 02/03/2021 4.0  3.4 - 5.3 mmol/L Final     Chloride 02/03/2021 103  94 - 109 mmol/L Final     Carbon Dioxide 02/03/2021 28  20 - 32 mmol/L Final     Anion Gap 02/03/2021 5  3 - 14 mmol/L Final     Glucose 02/03/2021 277* 70 - 99 mg/dL Final     Urea Nitrogen 02/03/2021 22  7 - 30 mg/dL Final     Creatinine 02/03/2021 0.99  0.66 - 1.25 mg/dL Final     GFR Estimate 02/03/2021 78  >60 mL/min/[1.73_m2] Final    Comment: Non  GFR Calc  Starting 12/18/2018, serum creatinine based estimated GFR (eGFR) will be   calculated using the Chronic Kidney Disease Epidemiology Collaboration   (CKD-EPI) equation.       GFR Estimate If Black 02/03/2021 >90  >60 mL/min/[1.73_m2] Final    Comment:  GFR Calc  Starting 12/18/2018, serum creatinine based estimated GFR (eGFR) will be   calculated using the Chronic Kidney Disease Epidemiology Collaboration   (CKD-EPI) equation.       Calcium 02/03/2021 8.9  8.5 - 10.1 mg/dL Final     Bilirubin Total 02/03/2021 0.5  0.2 - 1.3 mg/dL Final     Albumin 02/03/2021 3.6  3.4 - 5.0 g/dL Final     Protein Total 02/03/2021 7.3  6.8 - 8.8 g/dL Final     Alkaline Phosphatase 02/03/2021 91  40 - 150 U/L Final     ALT 02/03/2021 23  0 - 70 U/L Final     AST 02/03/2021 15  0 - 45 U/L Final     Lipase 02/03/2021 193  73 - 393 U/L Final     Ethanol g/dL 02/03/2021 <0.01  <0.01 g/dL Final     Troponin I ES 02/03/2021 <0.015  0.000 - 0.045 ug/L Final    Comment: The 99th percentile for upper reference range is 0.045 ug/L.   Troponin values   in the range of 0.045 - 0.120 ug/L may be associated with risks of adverse   clinical events.

## 2021-02-11 ENCOUNTER — TELEPHONE (OUTPATIENT)
Dept: INTERNAL MEDICINE | Facility: CLINIC | Age: 66
End: 2021-02-11

## 2021-02-11 DIAGNOSIS — E11.43 TYPE 2 DIABETES MELLITUS WITH PERIPHERAL AUTONOMIC NEUROPATHY (H): Primary | ICD-10-CM

## 2021-02-11 NOTE — TELEPHONE ENCOUNTER
Fax received from [facility] requesting RX for BD UF Pen Needle [id number] to use for Lantus injections.  Please address.

## 2021-02-17 ENCOUNTER — TELEPHONE (OUTPATIENT)
Dept: UROLOGY | Facility: CLINIC | Age: 66
End: 2021-02-17

## 2021-02-17 NOTE — TELEPHONE ENCOUNTER
Called [facility] to follow-up on this message. They have tried x 3 to call David re: payment and shipping. They left messages but haven't heard back from him. Called David; gave him the # to call and explained what was needed. He expressed understanding.  LAKESHIA Hurd RN       Health Call Center    Phone Message    May a detailed message be left on voicemail: yes     Reason for Call: Medication Question or concern regarding medication   Prescription Clarification  Name of Medication: Trimix #9. Dispense 1 vial plus diabetic U100 syringes  Prescribing Provider: Dr Rao   Pharmacy: [facility] - [place] [id number]    What on the order needs clarification? Per call from PT the clinic was supposed to teach him how to use the Trimix #9 but nothing was sent to him before his last APPT. Per PT he has not received anything by mail and has an upcoming APPT on 2/22. PT is requesting a call back to discuss it.          Action Taken: Message routed to:  Other: Urology    Travel Screening: Not Applicable

## 2021-02-22 ENCOUNTER — OFFICE VISIT (OUTPATIENT)
Dept: UROLOGY | Facility: CLINIC | Age: 66
End: 2021-02-22
Payer: COMMERCIAL

## 2021-02-22 VITALS
HEIGHT: 74 IN | DIASTOLIC BLOOD PRESSURE: 62 MMHG | SYSTOLIC BLOOD PRESSURE: 130 MMHG | HEART RATE: 80 BPM | WEIGHT: 220 LBS | BODY MASS INDEX: 28.23 KG/M2

## 2021-02-22 DIAGNOSIS — N52.9 ERECTILE DYSFUNCTION, UNSPECIFIED ERECTILE DYSFUNCTION TYPE: ICD-10-CM

## 2021-02-22 DIAGNOSIS — C61 PROSTATE CANCER (H): Primary | ICD-10-CM

## 2021-02-22 DIAGNOSIS — K21.9 GASTROESOPHAGEAL REFLUX DISEASE WITHOUT ESOPHAGITIS: ICD-10-CM

## 2021-02-22 PROCEDURE — 99214 OFFICE O/P EST MOD 30 MIN: CPT | Performed by: UROLOGY

## 2021-02-22 NOTE — PROGRESS NOTES
Office Visit Note  [facility]  [phone]    UROLOGIC DIAGNOSES:   Low risk prostate cancer, erectile dysfunction    CURRENT INTERVENTIONS:   Active surveillance, Cialis    HISTORY:   David returns to clinic today for Trimix teaching appointment.      PAST MEDICAL HISTORY:   Past Medical History:   Diagnosis Date     Anxiety      Anxiety 1/31/2015     BPH (benign prostatic hyperplasia)      Degenerative disc disease      Depression      Diabetes mellitus (H)      Gastritis      Gastro-oesophageal reflux disease      H/O alcohol abuse      Hyperlipidemia      Hypertension      Low testosterone      MRSA (methicillin resistant staph aureus) culture positive 8/2013    skin infection     Mumps      PUD (peptic ulcer disease)      Sleep apnea      STD (sexually transmitted disease)        PAST SURGICAL HISTORY:   Past Surgical History:   Procedure Laterality Date     COLONOSCOPY  11/11/2013    Procedure: COMBINED COLONOSCOPY, SINGLE BIOPSY/POLYPECTOMY BY BIOPSY;  Colonoscopy/ polypectomy x2 by cold forceps    ;  Surgeon: Juan Carlos Bellamy MD;  Location:  GI     ESOPHAGOSCOPY, GASTROSCOPY, DUODENOSCOPY (EGD), COMBINED N/A 9/22/2016    Procedure: COMBINED ESOPHAGOSCOPY, GASTROSCOPY, DUODENOSCOPY (EGD), BIOPSY SINGLE OR MULTIPLE;  Surgeon: Juan Carlos Barraza MD;  Location:  GI     ESOPHAGOSCOPY, GASTROSCOPY, DUODENOSCOPY (EGD), COMBINED N/A 2/19/2019    Procedure: ESOPHAGOSCOPY, GASTROSCOPY, DUODENOSCOPY (EGD) with cold forcep;  Surgeon: Juan Carlos Barraza MD;  Location:  GI     HERNIA REPAIR       ORTHOPEDIC SURGERY         FAMILY HISTORY:   Family History   Problem Relation Age of Onset     Pancreatic Cancer Mother      Pancreatic Cancer Paternal Grandfather      Diabetes Brother      Depression Brother      Suicide Brother      Substance Abuse Brother      Dementia Father      Parkinsonism Father      Family History Negative No family hx of      Colon Cancer No family hx of      Unknown/Adopted No  family hx of      Anxiety Disorder No family hx of      Schizophrenia No family hx of      Bipolar Disorder No family hx of      [place] Disease No family hx of      Autism Spectrum Disorder No family hx of      Intellectual Disability (Mental Retardation) No family hx of      Mental Illness No family hx of        SOCIAL HISTORY:   Social History     Socioeconomic History     Marital status:      Spouse name: None     Number of children: None     Years of education: None     Highest education level: None   Occupational History     None   Social Needs     Financial resource strain: None     Food insecurity     Worry: None     Inability: None     Transportation needs     Medical: None     Non-medical: None   Tobacco Use     Smoking status: Former Smoker     Types: Cigarettes     Smokeless tobacco: Never Used   Substance and Sexual Activity     Alcohol use: No     Alcohol/week: 0.0 standard drinks     Comment: 14 drinks weekly, quit on 08/2017     Drug use: No     Sexual activity: Yes     Partners: Female   Lifestyle     Physical activity     Days per week: None     Minutes per session: None     Stress: None   Relationships     Social connections     Talks on phone: None     Gets together: None     Attends Spiritism service: None     Active member of club or organization: None     Attends meetings of clubs or organizations: None     Relationship status: None     Intimate partner violence     Fear of current or ex partner: None     Emotionally abused: None     Physically abused: None     Forced sexual activity: None   Other Topics Concern     Parent/sibling w/ CABG, MI or angioplasty before 65F 55M? Not Asked   Social History Narrative     None       Review Of Systems:  Skin: No rash, pruritis, or skin pigmentation  Eyes: No changes in vision  Ears/Nose/Throat: No changes in hearing, no nosebleeds  Respiratory: No shortness of breath, dyspnea on exertion, cough, or hemoptysis  Cardiovascular: No chest pain or  "palpitations  Gastrointestinal: No diarrhea or constipation. No abdominal pain. No hematochezia  Genitourinary: see HPI  Musculoskeletal: No pain or swelling of joints, normal range of motion  Neurologic: No weakness or tremors  Psychiatric: No recent changes in memory or mood  Hematologic/Lymphatic/Immunologic: No easy bruising or enlarged lymph nodes  Endocrine: No weight gain or loss      PHYSICAL EXAM:    /62   Pulse 80   Ht 1.88 m (6' 2\")   Wt 99.8 kg (220 lb)   BMI 28.25 kg/m      Constitutional: Well developed. Conversant and in no acute distress  Eyes: Anicteric sclera, conjunctiva clear, normal extraocular movements  ENT: Normocephalic and atraumatic,   Skin: Warm and dry. No rashes or lesions  Cardiac: No peripheral edema  Back/Flank: Not done  CNS/PNS: Normal musculature and movements, moves all extremities normally  Respiratory: Normal non-labored breathing  Abdomen: Soft nontender and nondistended  Peripheral Vascular: No peripheral edema  Mental Status/Psych: Alert and Oriented x 3. Normal mood and affect    Penis: Not done  Scrotal Skin: Not done  Testicles: Not done  Epididymis: Not done  Digital Rectal Exam:     Cystoscopy: Not done    Imaging: None    Urinalysis: UA RESULTS:  Recent Labs   Lab Test 01/11/21  0805 02/05/19  1413   COLOR Yellow Yellow   APPEARANCE Clear Clear   URINEGLC >=1000* 100*   URINEBILI Negative Negative   URINEKETONE Negative Negative   SG 1.025 1.020   UBLD Negative Negative   URINEPH 5.5 6.0   PROTEIN Negative Negative   UROBILINOGEN 0.2 0.2   NITRITE Negative Negative   LEUKEST Negative Small*   RBCU  --  O - 2   WBCU  --  5-10*       PSA:     Post Void Residual:     Other labs: None today      IMPRESSION:  I explained to him the anatomy of the corporal bodies and where the injections would need to be placed.  We discussed proper titration of the medicine.  I then observed as he successfully grew up and injected 0.1 mL of the Trimix into the right cavernosal " body    PLAN:  He was successfully able to perform the injection clinic today.  We discussed proper titration.  I recommended that every time use the medication he increase the dose by 0.1 mL.  He was advised of the risk of priapism.  If he gets to a full syringe, 1 mL, and still was not successful recommended that he contact me at that time.  Otherwise, I plan on seeing him back in the summer for prostate cancer surveillance with a PSA and MRI.  He would prefer in person visit for this.    Total time spent today in review of outside records and test results, discussion with the patient, and documentation: 25 min      Mohsen Rao M.D.

## 2021-02-22 NOTE — LETTER
2/22/2021       RE: Austyn Araujo  [id number]  [place] [address]     Dear Colleague,    Thank you for referring your patient, Austyn Araujo, to the [facility] [place] at [facility]. Please see a copy of my visit note below.    Office Visit Note  [facility]  [phone]    UROLOGIC DIAGNOSES:   Low risk prostate cancer, erectile dysfunction    CURRENT INTERVENTIONS:   Active surveillance, Cialis    HISTORY:   David returns to clinic today for Trimix teaching appointment.      PAST MEDICAL HISTORY:   Past Medical History:   Diagnosis Date     Anxiety      Anxiety 1/31/2015     BPH (benign prostatic hyperplasia)      Degenerative disc disease      Depression      Diabetes mellitus (H)      Gastritis      Gastro-oesophageal reflux disease      H/O alcohol abuse      Hyperlipidemia      Hypertension      Low testosterone      MRSA (methicillin resistant staph aureus) culture positive 8/2013    skin infection     Mumps      PUD (peptic ulcer disease)      Sleep apnea      STD (sexually transmitted disease)        PAST SURGICAL HISTORY:   Past Surgical History:   Procedure Laterality Date     COLONOSCOPY  11/11/2013    Procedure: COMBINED COLONOSCOPY, SINGLE BIOPSY/POLYPECTOMY BY BIOPSY;  Colonoscopy/ polypectomy x2 by cold forceps    ;  Surgeon: Juan Carlos Bellamy MD;  Location:  GI     ESOPHAGOSCOPY, GASTROSCOPY, DUODENOSCOPY (EGD), COMBINED N/A 9/22/2016    Procedure: COMBINED ESOPHAGOSCOPY, GASTROSCOPY, DUODENOSCOPY (EGD), BIOPSY SINGLE OR MULTIPLE;  Surgeon: Juan Carlos Barraza MD;  Location:  GI     ESOPHAGOSCOPY, GASTROSCOPY, DUODENOSCOPY (EGD), COMBINED N/A 2/19/2019    Procedure: ESOPHAGOSCOPY, GASTROSCOPY, DUODENOSCOPY (EGD) with cold forcep;  Surgeon: Juan Carlos Barraza MD;  Location:  GI     HERNIA REPAIR       ORTHOPEDIC SURGERY         FAMILY HISTORY:   Family History   Problem Relation Age of Onset      Pancreatic Cancer Mother      Pancreatic Cancer Paternal Grandfather      Diabetes Brother      Depression Brother      Suicide Brother      Substance Abuse Brother      Dementia Father      Parkinsonism Father      Family History Negative No family hx of      Colon Cancer No family hx of      Unknown/Adopted No family hx of      Anxiety Disorder No family hx of      Schizophrenia No family hx of      Bipolar Disorder No family hx of      [place]ika Disease No family hx of      Autism Spectrum Disorder No family hx of      Intellectual Disability (Mental Retardation) No family hx of      Mental Illness No family hx of        SOCIAL HISTORY:   Social History     Socioeconomic History     Marital status:      Spouse name: None     Number of children: None     Years of education: None     Highest education level: None   Occupational History     None   Social Needs     Financial resource strain: None     Food insecurity     Worry: None     Inability: None     Transportation needs     Medical: None     Non-medical: None   Tobacco Use     Smoking status: Former Smoker     Types: Cigarettes     Smokeless tobacco: Never Used   Substance and Sexual Activity     Alcohol use: No     Alcohol/week: 0.0 standard drinks     Comment: 14 drinks weekly, quit on 08/2017     Drug use: No     Sexual activity: Yes     Partners: Female   Lifestyle     Physical activity     Days per week: None     Minutes per session: None     Stress: None   Relationships     Social connections     Talks on phone: None     Gets together: None     Attends Yazidi service: None     Active member of club or organization: None     Attends meetings of clubs or organizations: None     Relationship status: None     Intimate partner violence     Fear of current or ex partner: None     Emotionally abused: None     Physically abused: None     Forced sexual activity: None   Other Topics Concern     Parent/sibling w/ CABG, MI or angioplasty before 65F  "55M? Not Asked   Social History Narrative     None       Review Of Systems:  Skin: No rash, pruritis, or skin pigmentation  Eyes: No changes in vision  Ears/Nose/Throat: No changes in hearing, no nosebleeds  Respiratory: No shortness of breath, dyspnea on exertion, cough, or hemoptysis  Cardiovascular: No chest pain or palpitations  Gastrointestinal: No diarrhea or constipation. No abdominal pain. No hematochezia  Genitourinary: see HPI  Musculoskeletal: No pain or swelling of joints, normal range of motion  Neurologic: No weakness or tremors  Psychiatric: No recent changes in memory or mood  Hematologic/Lymphatic/Immunologic: No easy bruising or enlarged lymph nodes  Endocrine: No weight gain or loss      PHYSICAL EXAM:    /62   Pulse 80   Ht 1.88 m (6' 2\")   Wt 99.8 kg (220 lb)   BMI 28.25 kg/m      Constitutional: Well developed. Conversant and in no acute distress  Eyes: Anicteric sclera, conjunctiva clear, normal extraocular movements  ENT: Normocephalic and atraumatic,   Skin: Warm and dry. No rashes or lesions  Cardiac: No peripheral edema  Back/Flank: Not done  CNS/PNS: Normal musculature and movements, moves all extremities normally  Respiratory: Normal non-labored breathing  Abdomen: Soft nontender and nondistended  Peripheral Vascular: No peripheral edema  Mental Status/Psych: Alert and Oriented x 3. Normal mood and affect    Penis: Not done  Scrotal Skin: Not done  Testicles: Not done  Epididymis: Not done  Digital Rectal Exam:     Cystoscopy: Not done    Imaging: None    Urinalysis: UA RESULTS:  Recent Labs   Lab Test 01/11/21  0805 02/05/19  1413   COLOR Yellow Yellow   APPEARANCE Clear Clear   URINEGLC >=1000* 100*   URINEBILI Negative Negative   URINEKETONE Negative Negative   SG 1.025 1.020   UBLD Negative Negative   URINEPH 5.5 6.0   PROTEIN Negative Negative   UROBILINOGEN 0.2 0.2   NITRITE Negative Negative   LEUKEST Negative Small*   RBCU  --  O - 2   WBCU  --  5-10*       PSA: "     Post Void Residual:     Other labs: None today      IMPRESSION:  I explained to him the anatomy of the corporal bodies and where the injections would need to be placed.  We discussed proper titration of the medicine.  I then observed as he successfully grew up and injected 0.1 mL of the Trimix into the right cavernosal body    PLAN:  He was successfully able to perform the injection clinic today.  We discussed proper titration.  I recommended that every time use the medication he increase the dose by 0.1 mL.  He was advised of the risk of priapism.  If he gets to a full syringe, 1 mL, and still was not successful recommended that he contact me at that time.  Otherwise, I plan on seeing him back in the summer for prostate cancer surveillance with a PSA and MRI.  He would prefer in person visit for this.    Total time spent today in review of outside records and test results, discussion with the patient, and documentation: 25 min      Mohsen Rao M.D.

## 2021-02-23 DIAGNOSIS — E11.43 TYPE 2 DIABETES MELLITUS WITH PERIPHERAL AUTONOMIC NEUROPATHY (H): ICD-10-CM

## 2021-02-23 DIAGNOSIS — I10 BENIGN ESSENTIAL HYPERTENSION: ICD-10-CM

## 2021-02-23 RX ORDER — OMEPRAZOLE 40 MG/1
CAPSULE, DELAYED RELEASE ORAL
Qty: 90 CAPSULE | Refills: 3 | Status: SHIPPED | OUTPATIENT
Start: 2021-02-23 | End: 2022-08-16

## 2021-02-25 RX ORDER — LISINOPRIL 10 MG/1
10 TABLET ORAL DAILY
Qty: 90 TABLET | Refills: 0 | Status: SHIPPED | OUTPATIENT
Start: 2021-02-25 | End: 2021-04-30

## 2021-02-25 RX ORDER — GLIMEPIRIDE 4 MG/1
TABLET ORAL
Qty: 90 TABLET | Refills: 0 | Status: SHIPPED | OUTPATIENT
Start: 2021-02-25 | End: 2022-11-18

## 2021-02-25 NOTE — TELEPHONE ENCOUNTER
Pending Prescriptions:                       Disp   Refills    lisinopril (ZESTRIL) 10 MG tablet          90 tab*0        Sig: Take 1 tablet (10 mg) by mouth daily    glimepiride (AMARYL) 4 MG tablet           90 tab*0          Routing refill request to provider for review/approval because:  Patient fails protocol    Lab Results   Component Value Date    A1C 8.0 11/16/2020    A1C 7.9 02/20/2020    A1C 10.1 10/18/2019    A1C 8.6 02/05/2019    A1C 8.2 08/01/2018

## 2021-03-04 DIAGNOSIS — R79.89 LOW TESTOSTERONE: ICD-10-CM

## 2021-03-04 RX ORDER — TESTOSTERONE CYPIONATE 200 MG/ML
INJECTION, SOLUTION INTRAMUSCULAR
Qty: 10 ML | Refills: 5 | Status: CANCELLED | OUTPATIENT
Start: 2021-03-04

## 2021-03-04 NOTE — TELEPHONE ENCOUNTER
Testosterone refill request.   Last refill on 11/23/20 for 10 ml with 5 refills.     Pt should not need refill. He should have refills left. Call to pharmacy. She staets this was faxed in error. Pt is getting prescription through another pharmacy.   Cancelled request.

## 2021-03-15 DIAGNOSIS — R79.89 LOW TESTOSTERONE: ICD-10-CM

## 2021-03-16 RX ORDER — TESTOSTERONE CYPIONATE 200 MG/ML
INJECTION, SOLUTION INTRAMUSCULAR
Qty: 10 ML | Refills: 5 | Status: SHIPPED | OUTPATIENT
Start: 2021-03-16 | End: 2022-08-18

## 2021-04-22 DIAGNOSIS — E78.5 HYPERLIPIDEMIA LDL GOAL <100: ICD-10-CM

## 2021-04-23 RX ORDER — ATORVASTATIN CALCIUM 40 MG/1
TABLET, FILM COATED ORAL
Qty: 90 TABLET | Refills: 2 | Status: SHIPPED | OUTPATIENT
Start: 2021-04-23 | End: 2022-01-24

## 2021-04-23 NOTE — TELEPHONE ENCOUNTER
Pending Prescriptions:                       Disp   Refills    atorvastatin (LIPITOR) 40 MG tablet [Pharm*90 tab*2        Sig: TAKE 1 TABLET BY MOUTH EVERY DAY    Routing refill request to provider for review/approval because:  LDL Cholesterol Calculated   Date Value Ref Range Status   02/20/2020 72 <100 mg/dL Final     Comment:     Desirable:       <100 mg/dl

## 2021-04-29 DIAGNOSIS — E11.43 TYPE 2 DIABETES MELLITUS WITH PERIPHERAL AUTONOMIC NEUROPATHY (H): ICD-10-CM

## 2021-04-30 DIAGNOSIS — I10 BENIGN ESSENTIAL HYPERTENSION: ICD-10-CM

## 2021-04-30 RX ORDER — LISINOPRIL 10 MG/1
10 TABLET ORAL DAILY
Qty: 90 TABLET | Refills: 1 | Status: SHIPPED | OUTPATIENT
Start: 2021-04-30 | End: 2021-12-28

## 2021-04-30 RX ORDER — SITAGLIPTIN 100 MG/1
TABLET, FILM COATED ORAL
Qty: 90 TABLET | Refills: 0 | Status: SHIPPED | OUTPATIENT
Start: 2021-04-30 | End: 2021-07-26

## 2021-04-30 NOTE — TELEPHONE ENCOUNTER
Januvia refill request  Provider approval needed.     Lab Results   Component Value Date    A1C 8.0 11/16/2020    A1C 7.9 02/20/2020    A1C 10.1 10/18/2019    A1C 8.6 02/05/2019    A1C 8.2 08/01/2018

## 2021-04-30 NOTE — TELEPHONE ENCOUNTER
"Requested Prescriptions   Pending Prescriptions Disp Refills     lisinopril (ZESTRIL) 10 MG tablet 90 tablet 0     Sig: Take 1 tablet (10 mg) by mouth daily       ACE Inhibitors (Including Combos) Protocol Passed - 4/30/2021  4:16 PM        Passed - Blood pressure under 140/90 in past 12 months     BP Readings from Last 3 Encounters:   02/01/21 130/62   02/10/21 130/70   02/04/21 (!) 140/80                 Passed - Recent (12 mo) or future (30 days) visit within the authorizing provider's specialty     Patient has had an office visit with the authorizing provider or a provider within the authorizing providers department within the previous 12 mos or has a future within next 30 days. See \"Patient Info\" tab in inbasket, or \"Choose Columns\" in Meds & Orders section of the refill encounter.              Passed - Medication is active on med list        Passed - Patient is age 18 or older        Passed - Normal serum creatinine on file in past 12 months     Recent Labs   Lab Test 02/03/21 2144 10/16/18  1314 10/16/18  1314   CR 0.99   < >  --    CREAT  --   --  0.9    < > = values in this interval not displayed.       Ok to refill medication if creatinine is low          Passed - Normal serum potassium on file in past 12 months     Recent Labs   Lab Test 02/03/21 2144   POTASSIUM 4.0                  "

## 2021-05-16 ENCOUNTER — HEALTH MAINTENANCE LETTER (OUTPATIENT)
Age: 66
End: 2021-05-16

## 2021-07-07 DIAGNOSIS — C61 PROSTATE CANCER (H): Primary | ICD-10-CM

## 2021-07-08 DIAGNOSIS — C61 PROSTATE CANCER (H): ICD-10-CM

## 2021-07-08 PROCEDURE — 84153 ASSAY OF PSA TOTAL: CPT | Performed by: UROLOGY

## 2021-07-08 PROCEDURE — 36415 COLL VENOUS BLD VENIPUNCTURE: CPT | Performed by: UROLOGY

## 2021-07-09 LAB — PSA SERPL-MCNC: 5.94 UG/L (ref 0–4)

## 2021-07-11 ENCOUNTER — HEALTH MAINTENANCE LETTER (OUTPATIENT)
Age: 66
End: 2021-07-11

## 2021-07-25 DIAGNOSIS — I25.10 CAD IN NATIVE ARTERY: ICD-10-CM

## 2021-07-25 DIAGNOSIS — I20.89 STABLE ANGINA PECTORIS (H): ICD-10-CM

## 2021-07-25 DIAGNOSIS — E11.43 TYPE 2 DIABETES MELLITUS WITH PERIPHERAL AUTONOMIC NEUROPATHY (H): ICD-10-CM

## 2021-07-26 DIAGNOSIS — E11.43 TYPE 2 DIABETES MELLITUS WITH PERIPHERAL AUTONOMIC NEUROPATHY (H): ICD-10-CM

## 2021-07-28 RX ORDER — NITROGLYCERIN 0.4 MG/1
TABLET SUBLINGUAL
Qty: 25 TABLET | Refills: 1 | Status: SHIPPED | OUTPATIENT
Start: 2021-07-28 | End: 2022-08-18

## 2021-07-28 RX ORDER — METFORMIN HCL 500 MG
2000 TABLET, EXTENDED RELEASE 24 HR ORAL
Qty: 360 TABLET | Refills: 0 | Status: SHIPPED | OUTPATIENT
Start: 2021-07-28 | End: 2021-10-21

## 2021-07-28 NOTE — TELEPHONE ENCOUNTER
Pending Prescriptions:                       Disp   Refills    sitagliptin (JANUVIA) 100 MG tablet        90 tab*0        Sig: Take 1 tablet (100 mg) by mouth daily    Routing refill request to provider for review/approval because:  Lab Results   Component Value Date    A1C 8.0 11/16/2020    A1C 7.9 02/20/2020    A1C 10.1 10/18/2019    A1C 8.6 02/05/2019    A1C 8.2 08/01/2018

## 2021-07-28 NOTE — TELEPHONE ENCOUNTER
Pending Prescriptions:                       Disp   Refills    metFORMIN (GLUCOPHAGE-XR) 500 MG 24 hr tab*360 ta*0        Sig: Take 4 tablets (2,000 mg) by mouth daily (with           dinner)    nitroGLYcerin (NITROSTAT) 0.4 MG sublingua*25 tab*1        Sig: PLACE 1 TAB UNDER TONGUE EVERY 5 MINS X3 DOSES. IF           SYMPTOMS PERSIST 5 MINS AFTER 1ST DOSE CALL 911.    Routing refill request to provider for review/approval because:  Needs provider approval

## 2021-08-02 ENCOUNTER — HOSPITAL ENCOUNTER (OUTPATIENT)
Dept: MRI IMAGING | Facility: CLINIC | Age: 66
Discharge: HOME | End: 2021-08-02
Attending: UROLOGY | Admitting: UROLOGY
Payer: COMMERCIAL

## 2021-08-02 DIAGNOSIS — C61 PROSTATE CANCER (H): ICD-10-CM

## 2021-08-02 PROCEDURE — 72197 MRI PELVIS W/O & W/DYE: CPT

## 2021-08-02 PROCEDURE — 255N000002 HC RX 255 OP 636: Performed by: UROLOGY

## 2021-08-02 PROCEDURE — 72197 MRI PELVIS W/O & W/DYE: CPT | Mod: 26 | Performed by: RADIOLOGY

## 2021-08-02 PROCEDURE — A9585 GADOBUTROL INJECTION: HCPCS | Performed by: UROLOGY

## 2021-08-02 RX ORDER — GADOBUTROL 604.72 MG/ML
10 INJECTION INTRAVENOUS ONCE
Status: COMPLETED | OUTPATIENT
Start: 2021-08-02 | End: 2021-08-02

## 2021-08-02 RX ADMIN — GADOBUTROL 10 ML: 604.72 INJECTION INTRAVENOUS at 08:42

## 2021-08-04 ENCOUNTER — OFFICE VISIT (OUTPATIENT)
Dept: UROLOGY | Facility: CLINIC | Age: 66
End: 2021-08-04
Payer: COMMERCIAL

## 2021-08-04 VITALS
BODY MASS INDEX: 28.49 KG/M2 | HEIGHT: 74 IN | DIASTOLIC BLOOD PRESSURE: 70 MMHG | SYSTOLIC BLOOD PRESSURE: 112 MMHG | WEIGHT: 222 LBS

## 2021-08-04 DIAGNOSIS — N52.9 ERECTILE DYSFUNCTION, UNSPECIFIED ERECTILE DYSFUNCTION TYPE: ICD-10-CM

## 2021-08-04 DIAGNOSIS — C61 PROSTATE CANCER (H): Primary | ICD-10-CM

## 2021-08-04 DIAGNOSIS — N40.0 ENLARGED PROSTATE: ICD-10-CM

## 2021-08-04 PROCEDURE — 99214 OFFICE O/P EST MOD 30 MIN: CPT | Performed by: UROLOGY

## 2021-08-04 RX ORDER — CIPROFLOXACIN 500 MG/1
500 TABLET, FILM COATED ORAL 2 TIMES DAILY
Qty: 6 TABLET | Refills: 0 | Status: SHIPPED | OUTPATIENT
Start: 2021-08-04 | End: 2021-08-07

## 2021-08-04 ASSESSMENT — MIFFLIN-ST. JEOR: SCORE: 1851.74

## 2021-08-04 ASSESSMENT — PAIN SCALES - GENERAL: PAINLEVEL: NO PAIN (0)

## 2021-08-04 NOTE — PROGRESS NOTES
Office Visit Note  [facility]  [phone]    UROLOGIC DIAGNOSES:   Low risk prostate cancer, erectile dysfunction    CURRENT INTERVENTIONS:   Active surveillance, prostate biopsy x1 Trimix injections    HISTORY:   David returns to clinic today for prostate cancer follow-up.  His PSA is overall stable at 5.94.  He recently had a repeat MRI of the prostate and it showed an 80 g prostate with no areas concerning for prostate cancer. He reports good success with the Trimix. He only needs to use the 0.2 mL dose and he gets a full erection strong enough for intercourse. He has stopped taking Flomax now and says that overall his urinary symptoms are not bothersome.      PAST MEDICAL HISTORY:   Past Medical History:   Diagnosis Date     Anxiety      Anxiety 1/31/2015     BPH (benign prostatic hyperplasia)      Degenerative disc disease      Depression      Diabetes mellitus (H)      Gastritis      Gastro-oesophageal reflux disease      H/O alcohol abuse      Hyperlipidemia      Hypertension      Low testosterone      MRSA (methicillin resistant staph aureus) culture positive 8/2013    skin infection     Mumps      PUD (peptic ulcer disease)      Sleep apnea      STD (sexually transmitted disease)        PAST SURGICAL HISTORY:   Past Surgical History:   Procedure Laterality Date     COLONOSCOPY  11/11/2013    Procedure: COMBINED COLONOSCOPY, SINGLE BIOPSY/POLYPECTOMY BY BIOPSY;  Colonoscopy/ polypectomy x2 by cold forceps    ;  Surgeon: Juan Carlos Bellamy MD;  Location:  GI     ESOPHAGOSCOPY, GASTROSCOPY, DUODENOSCOPY (EGD), COMBINED N/A 9/22/2016    Procedure: COMBINED ESOPHAGOSCOPY, GASTROSCOPY, DUODENOSCOPY (EGD), BIOPSY SINGLE OR MULTIPLE;  Surgeon: Juan Carlos Barraza MD;  Location:  GI     ESOPHAGOSCOPY, GASTROSCOPY, DUODENOSCOPY (EGD), COMBINED N/A 2/19/2019    Procedure: ESOPHAGOSCOPY, GASTROSCOPY, DUODENOSCOPY (EGD) with cold forcep;  Surgeon: Juan Carlos Barraza MD;  Location:  GI     HERNIA  REPAIR       ORTHOPEDIC SURGERY         FAMILY HISTORY:   Family History   Problem Relation Age of Onset     Pancreatic Cancer Mother      Pancreatic Cancer Paternal Grandfather      Diabetes Brother      Depression Brother      Suicide Brother      Substance Abuse Brother      Dementia Father      Parkinsonism Father      Family History Negative No family hx of      Colon Cancer No family hx of      Unknown/Adopted No family hx of      Anxiety Disorder No family hx of      Schizophrenia No family hx of      Bipolar Disorder No family hx of      [place] Disease No family hx of      Autism Spectrum Disorder No family hx of      Intellectual Disability (Mental Retardation) No family hx of      Mental Illness No family hx of        SOCIAL HISTORY:   Social History     Socioeconomic History     Marital status:      Spouse name: None     Number of children: None     Years of education: None     Highest education level: None   Occupational History     None   Tobacco Use     Smoking status: Former Smoker     Types: Cigarettes     Smokeless tobacco: Never Used   Substance and Sexual Activity     Alcohol use: No     Alcohol/week: 0.0 standard drinks     Comment: 14 drinks weekly, quit on 08/2017     Drug use: No     Sexual activity: Yes     Partners: Female   Other Topics Concern     Parent/sibling w/ CABG, MI or angioplasty before 65F 55M? Not Asked   Social History Narrative     None     Social Determinants of Health     Financial Resource Strain:      Difficulty of Paying Living Expenses:    Food Insecurity:      Worried About Running Out of Food in the Last Year:      Ran Out of Food in the Last Year:    Transportation Needs:      Lack of Transportation (Medical):      Lack of Transportation (Non-Medical):    Physical Activity:      Days of Exercise per Week:      Minutes of Exercise per Session:    Stress:      Feeling of Stress :    Social Connections:      Frequency of Communication with Friends and Family:   "    Frequency of Social Gatherings with Friends and Family:      Attends Yazidi Services:      Active Member of Clubs or Organizations:      Attends Club or Organization Meetings:      Marital Status:    Intimate Partner Violence:      Fear of Current or Ex-Partner:      Emotionally Abused:      Physically Abused:      Sexually Abused:        Review Of Systems:  Skin: No rash, pruritis, or skin pigmentation  Eyes: No changes in vision  Ears/Nose/Throat: No changes in hearing, no nosebleeds  Respiratory: No shortness of breath, dyspnea on exertion, cough, or hemoptysis  Cardiovascular: No chest pain or palpitations  Gastrointestinal: No diarrhea or constipation. No abdominal pain. No hematochezia  Genitourinary: see HPI  Musculoskeletal: No pain or swelling of joints, normal range of motion  Neurologic: No weakness or tremors  Psychiatric: No recent changes in memory or mood  Hematologic/Lymphatic/Immunologic: No easy bruising or enlarged lymph nodes  Endocrine: No weight gain or loss      PHYSICAL EXAM:    /70   Ht 1.88 m (6' 2\")   Wt 100.7 kg (222 lb)   BMI 28.50 kg/m      Constitutional: Well developed. Conversant and in no acute distress  Eyes: Anicteric sclera, conjunctiva clear, normal extraocular movements  ENT: Normocephalic and atraumatic,   Skin: Warm and dry. No rashes or lesions  Cardiac: No peripheral edema  Back/Flank: Not done  CNS/PNS: Normal musculature and movements, moves all extremities normally  Respiratory: Normal non-labored breathing  Abdomen: Soft nontender and nondistended  Peripheral Vascular: No peripheral edema  Mental Status/Psych: Alert and Oriented x 3. Normal mood and affect    Penis: Not done  Scrotal Skin: Not done  Testicles: Not done  Epididymis: Not done  Digital Rectal Exam:     Cystoscopy: Not done    Imaging: None    Urinalysis: UA RESULTS:  Recent Labs   Lab Test 01/11/21  0805 02/05/19  1413   COLOR Yellow Yellow   APPEARANCE Clear Clear   URINEGLC >=1000* 100* "   URINEBILI Negative Negative   URINEKETONE Negative Negative   SG 1.025 1.020   UBLD Negative Negative   URINEPH 5.5 6.0   PROTEIN Negative Negative   UROBILINOGEN 0.2 0.2   NITRITE Negative Negative   LEUKEST Negative Small*   RBCU  --  O - 2   WBCU  --  5-10*       PSA: 5.94    Post Void Residual:     Other labs: None today      IMPRESSION:  Enlarged prostate, low risk prostate cancer, erectile dysfunction    PLAN:  He has had good success with the Trimix. He will resume the medication. We discussed the state of his prostate cancer. The PSA has not changed and MRI additional findings have not changed either since diagnosis. However, it has been 2 years since prostate cancer diagnosis and the recommendation would be to repeat a template prostate biopsy at this time. He wishes to proceed. I again reminded him of the risks of prostate biopsy, including bleeding and infection. Ciprofloxacin was prescribed and he will also receive gentamicin prior to biopsy.      Mohsen Rao M.D.

## 2021-08-04 NOTE — NURSING NOTE
Chief Complaint   Patient presents with     Prostate Cancer     Review PSA and MRI     Kristine Wells, EMT

## 2021-08-04 NOTE — LETTER
8/4/2021       RE: Austyn Araujo  [id number]  [facility] [address]     Dear Colleague,    Thank you for referring your patient, Austyn Araujo, to the [facility] [place] at [facility]. Please see a copy of my visit note below.    Office Visit Note  [facility]  [phone]    UROLOGIC DIAGNOSES:   Low risk prostate cancer, erectile dysfunction    CURRENT INTERVENTIONS:   Active surveillance, prostate biopsy x1 Trimix injections    HISTORY:   David returns to clinic today for prostate cancer follow-up.  His PSA is overall stable at 5.94.  He recently had a repeat MRI of the prostate and it showed an 80 g prostate with no areas concerning for prostate cancer. He reports good success with the Trimix. He only needs to use the 0.2 mL dose and he gets a full erection strong enough for intercourse. He has stopped taking Flomax now and says that overall his urinary symptoms are not bothersome.      PAST MEDICAL HISTORY:   Past Medical History:   Diagnosis Date     Anxiety      Anxiety 1/31/2015     BPH (benign prostatic hyperplasia)      Degenerative disc disease      Depression      Diabetes mellitus (H)      Gastritis      Gastro-oesophageal reflux disease      H/O alcohol abuse      Hyperlipidemia      Hypertension      Low testosterone      MRSA (methicillin resistant staph aureus) culture positive 8/2013    skin infection     Mumps      PUD (peptic ulcer disease)      Sleep apnea      STD (sexually transmitted disease)        PAST SURGICAL HISTORY:   Past Surgical History:   Procedure Laterality Date     COLONOSCOPY  11/11/2013    Procedure: COMBINED COLONOSCOPY, SINGLE BIOPSY/POLYPECTOMY BY BIOPSY;  Colonoscopy/ polypectomy x2 by cold forceps    ;  Surgeon: Juan Carlos Bellamy MD;  Location:  GI     ESOPHAGOSCOPY, GASTROSCOPY, DUODENOSCOPY (EGD), COMBINED N/A 9/22/2016    Procedure: COMBINED ESOPHAGOSCOPY,  GASTROSCOPY, DUODENOSCOPY (EGD), BIOPSY SINGLE OR MULTIPLE;  Surgeon: Juan Carlos Barraza MD;  Location: [facility]     ESOPHAGOSCOPY, GASTROSCOPY, DUODENOSCOPY (EGD), COMBINED N/A 2/19/2019    Procedure: ESOPHAGOSCOPY, GASTROSCOPY, DUODENOSCOPY (EGD) with cold forcep;  Surgeon: Juan Carlos Barraza MD;  Location: [facility]     HERNIA REPAIR       ORTHOPEDIC SURGERY         FAMILY HISTORY:   Family History   Problem Relation Age of Onset     Pancreatic Cancer Mother      Pancreatic Cancer Paternal Grandfather      Diabetes Brother      Depression Brother      Suicide Brother      Substance Abuse Brother      Dementia Father      Parkinsonism Father      Family History Negative No family hx of      Colon Cancer No family hx of      Unknown/Adopted No family hx of      Anxiety Disorder No family hx of      Schizophrenia No family hx of      Bipolar Disorder No family hx of      [place] Disease No family hx of      Autism Spectrum Disorder No family hx of      Intellectual Disability (Mental Retardation) No family hx of      Mental Illness No family hx of        SOCIAL HISTORY:   Social History     Socioeconomic History     Marital status:      Spouse name: None     Number of children: None     Years of education: None     Highest education level: None   Occupational History     None   Tobacco Use     Smoking status: Former Smoker     Types: Cigarettes     Smokeless tobacco: Never Used   Substance and Sexual Activity     Alcohol use: No     Alcohol/week: 0.0 standard drinks     Comment: 14 drinks weekly, quit on 08/2017     Drug use: No     Sexual activity: Yes     Partners: Female   Other Topics Concern     Parent/sibling w/ CABG, MI or angioplasty before 65F 55M? Not Asked   Social History Narrative     None     Social Determinants of Health     Financial Resource Strain:      Difficulty of Paying Living Expenses:    Food Insecurity:      Worried About Running Out of Food in the Last Year:      Ran Out of Food in the  "Last Year:    Transportation Needs:      Lack of Transportation (Medical):      Lack of Transportation (Non-Medical):    Physical Activity:      Days of Exercise per Week:      Minutes of Exercise per Session:    Stress:      Feeling of Stress :    Social Connections:      Frequency of Communication with Friends and Family:      Frequency of Social Gatherings with Friends and Family:      Attends Denominational Services:      Active Member of Clubs or Organizations:      Attends Club or Organization Meetings:      Marital Status:    Intimate Partner Violence:      Fear of Current or Ex-Partner:      Emotionally Abused:      Physically Abused:      Sexually Abused:        Review Of Systems:  Skin: No rash, pruritis, or skin pigmentation  Eyes: No changes in vision  Ears/Nose/Throat: No changes in hearing, no nosebleeds  Respiratory: No shortness of breath, dyspnea on exertion, cough, or hemoptysis  Cardiovascular: No chest pain or palpitations  Gastrointestinal: No diarrhea or constipation. No abdominal pain. No hematochezia  Genitourinary: see HPI  Musculoskeletal: No pain or swelling of joints, normal range of motion  Neurologic: No weakness or tremors  Psychiatric: No recent changes in memory or mood  Hematologic/Lymphatic/Immunologic: No easy bruising or enlarged lymph nodes  Endocrine: No weight gain or loss      PHYSICAL EXAM:    /70   Ht 1.88 m (6' 2\")   Wt 100.7 kg (222 lb)   BMI 28.50 kg/m      Constitutional: Well developed. Conversant and in no acute distress  Eyes: Anicteric sclera, conjunctiva clear, normal extraocular movements  ENT: Normocephalic and atraumatic,   Skin: Warm and dry. No rashes or lesions  Cardiac: No peripheral edema  Back/Flank: Not done  CNS/PNS: Normal musculature and movements, moves all extremities normally  Respiratory: Normal non-labored breathing  Abdomen: Soft nontender and nondistended  Peripheral Vascular: No peripheral edema  Mental Status/Psych: Alert and Oriented x 3. " Normal mood and affect    Penis: Not done  Scrotal Skin: Not done  Testicles: Not done  Epididymis: Not done  Digital Rectal Exam:     Cystoscopy: Not done    Imaging: None    Urinalysis: UA RESULTS:  Recent Labs   Lab Test 01/11/21  0805 02/05/19  1413   COLOR Yellow Yellow   APPEARANCE Clear Clear   URINEGLC >=1000* 100*   URINEBILI Negative Negative   URINEKETONE Negative Negative   SG 1.025 1.020   UBLD Negative Negative   URINEPH 5.5 6.0   PROTEIN Negative Negative   UROBILINOGEN 0.2 0.2   NITRITE Negative Negative   LEUKEST Negative Small*   RBCU  --  O - 2   WBCU  --  5-10*       PSA: 5.94    Post Void Residual:     Other labs: None today      IMPRESSION:  Enlarged prostate, low risk prostate cancer, erectile dysfunction    PLAN:  He has had good success with the Trimix. He will resume the medication. We discussed the state of his prostate cancer. The PSA has not changed and MRI additional findings have not changed either since diagnosis. However, it has been 2 years since prostate cancer diagnosis and the recommendation would be to repeat a template prostate biopsy at this time. He wishes to proceed. I again reminded him of the risks of prostate biopsy, including bleeding and infection. Ciprofloxacin was prescribed and he will also receive gentamicin prior to biopsy.      Mohsen Rao M.D.

## 2021-08-18 DIAGNOSIS — R79.89 LOW TESTOSTERONE: ICD-10-CM

## 2021-08-18 NOTE — TELEPHONE ENCOUNTER
Prescription needs to be printed signed and faxed to [facility] at [phone]. They can be reached at [phone]. Please advise. Thanks.

## 2021-08-19 RX ORDER — TADALAFIL 20 MG/1
20 TABLET ORAL DAILY
Qty: 88 TABLET | Refills: 3 | Status: SHIPPED | OUTPATIENT
Start: 2021-08-19 | End: 2022-05-25

## 2021-09-05 ENCOUNTER — HEALTH MAINTENANCE LETTER (OUTPATIENT)
Age: 66
End: 2021-09-05

## 2021-09-16 DIAGNOSIS — E11.43 TYPE 2 DIABETES MELLITUS WITH PERIPHERAL AUTONOMIC NEUROPATHY (H): ICD-10-CM

## 2021-09-16 RX ORDER — INSULIN GLARGINE 100 [IU]/ML
INJECTION, SOLUTION SUBCUTANEOUS
Qty: 15 ML | Refills: 0 | Status: SHIPPED | OUTPATIENT
Start: 2021-09-16 | End: 2022-06-23

## 2021-09-16 NOTE — TELEPHONE ENCOUNTER
Medication is being filled for 1 time refill only due to:  Patient needs to be seen because needs appt every 6 months. .

## 2021-09-29 ENCOUNTER — OFFICE VISIT (OUTPATIENT)
Dept: FAMILY MEDICINE | Facility: CLINIC | Age: 66
End: 2021-09-29
Payer: COMMERCIAL

## 2021-09-29 VITALS
DIASTOLIC BLOOD PRESSURE: 72 MMHG | BODY MASS INDEX: 29.02 KG/M2 | SYSTOLIC BLOOD PRESSURE: 112 MMHG | OXYGEN SATURATION: 97 % | WEIGHT: 226 LBS | TEMPERATURE: 98.4 F | HEART RATE: 78 BPM

## 2021-09-29 DIAGNOSIS — Z23 NEED FOR DIPHTHERIA-TETANUS-PERTUSSIS (TDAP) VACCINE: ICD-10-CM

## 2021-09-29 DIAGNOSIS — Z23 NEED FOR IMMUNIZATION AGAINST TYPHOID: ICD-10-CM

## 2021-09-29 DIAGNOSIS — Z71.84 ENCOUNTER FOR COUNSELING FOR TRAVEL: Primary | ICD-10-CM

## 2021-09-29 DIAGNOSIS — Z23 NEED FOR HEPATITIS A IMMUNIZATION: ICD-10-CM

## 2021-09-29 PROCEDURE — 90715 TDAP VACCINE 7 YRS/> IM: CPT | Performed by: PHYSICIAN ASSISTANT

## 2021-09-29 PROCEDURE — 90471 IMMUNIZATION ADMIN: CPT

## 2021-09-29 PROCEDURE — 99401 PREV MED CNSL INDIV APPRX 15: CPT | Mod: 25 | Performed by: PHYSICIAN ASSISTANT

## 2021-09-29 PROCEDURE — 90632 HEPA VACCINE ADULT IM: CPT | Performed by: PHYSICIAN ASSISTANT

## 2021-09-29 PROCEDURE — 90691 TYPHOID VACCINE IM: CPT

## 2021-09-29 PROCEDURE — 90471 IMMUNIZATION ADMIN: CPT | Performed by: PHYSICIAN ASSISTANT

## 2021-09-29 PROCEDURE — 90662 IIV NO PRSV INCREASED AG IM: CPT | Performed by: PHYSICIAN ASSISTANT

## 2021-09-29 PROCEDURE — 90472 IMMUNIZATION ADMIN EACH ADD: CPT | Performed by: PHYSICIAN ASSISTANT

## 2021-09-29 RX ORDER — ATOVAQUONE AND PROGUANIL HYDROCHLORIDE 250; 100 MG/1; MG/1
1 TABLET, FILM COATED ORAL DAILY
Qty: 15 TABLET | Refills: 0 | Status: SHIPPED | OUTPATIENT
Start: 2021-09-29 | End: 2022-11-18

## 2021-09-29 RX ORDER — AZITHROMYCIN 500 MG/1
TABLET, FILM COATED ORAL
Qty: 3 TABLET | Refills: 0 | Status: SHIPPED | OUTPATIENT
Start: 2021-09-29 | End: 2022-11-18

## 2021-09-29 NOTE — PATIENT INSTRUCTIONS
"See travel packet provided  Recommend ultrathon (mosquito repellant), pepto bismol and imodium  The food and drink choices you make while traveling can impact your likelihood of getting sick.   If you aren't sure if a food or drink is safe, the saying \" BOIL IT, COOK IT, PEEL IT, OR FORGET IT\" can help you decide whether it's okay to consume.   Also bring hand  and sun screen with you.  Safe Travels       Today September 29, 2021 you received the    Flu Vaccine    Hepatitis A Vaccine - Please return on 3/29/22 or later for your 2nd and final dose.    Tetanus (Tdap) Vaccine    Typhoid - injectable. This vaccine is valid for two years.   .    These appointments can be made as a NURSE ONLY visit.    **It is very important for the vaccinations to be given on the scheduled day(s), this helps ensure you receive the full effectiveness of the vaccine.**    Please call [facility] with any questions [phone]    Thank you for visiting [facility]    "

## 2021-09-29 NOTE — PROGRESS NOTES
SUBJECTIVE: Austyn Araujo , a 65 year old  male, presents for counseling and information regarding upcoming travel to [facility]. Special medical concerns include: none. He anticipates the following unusual exposures: none.    Itinerary:  [place]     Departure Date: 10/19/21 Return date: 10/31/21    Reason for travel (i.e. Business, pleasure): pleasure     Visiting an urban or rural area?: both     Accommodations (i.e. hotel, hostel, friends, family, etc): hotel, house rental, safari lodge     Women - First day of your last period: na     IMMUNIZATION HISTORY  Have you received any vaccinations in the past 4 weeks?  No  Have you ever fainted from having your blood drawn or from an injection?  No  Have you ever had a fever reaction to vaccination?  Yes   Have you ever had any bad reaction or side effect from any vaccination?  No  Have you ever had hepatitis A or B vaccine?  No  Do you live (or work closely) with anyone who has AIDS, an AIDS-like condition, any other immune disorder or who is on chemotherapy for cancer?  No  Have you received any injection of immune globulin or any blood products during the past 12 months?  No    GENERAL MEDICAL HISTORY  Do you have a medical condition that warrants maintenance medication or physician follow-up?  Yes   Do you have a medical condition that is stable now, but that may recur while traveling?  No  Has your spleen been removed?  No  Have you had an acute illness or a fever in the past 48 hours?  No  Are you pregnant, or might you become pregnant on this trip?  Any chance of pregnancy?  No  Are you breastfeeding?  No  Do you have HIV, AIDS, an AIDS-like condition, any other immune disorder, leukemia or cancer?  No  Do you have a severe combined immunodeficiency disease?  No  Have you had your thymus gland removed or history of problems with your thymus, such as myasthenia gravis, DiGeorge syndrome, or thymoma?  No    Do you have severe thrombocytopenia (low  "platelet count) or a coagulation disorder?  No  Have you ever had a convulsion, seizure, epilepsy, neurologic condition or brain infection?  No  Do you have any stomach conditions?  No  Do you have a G6PD deficiency?  No  Do you have severe renal or kidney impairment?  No  Do you have a history of psychiatric problems?  No  Do you have a problem with strange dreams and/or nightmares?  No  Do you have insomnia?  No  Do you have problems with vaginitis?  No  Do you have psoriasis?  No  Are you prone to motion sickness?  No  Have you ever had headaches, nausea, vomiting, or breathing problems from altitude exposure?  No      Past Medical History:   Diagnosis Date     Anxiety      Anxiety 1/31/2015     BPH (benign prostatic hyperplasia)      Degenerative disc disease      Depression      Diabetes mellitus (H)      Gastritis      Gastro-oesophageal reflux disease      H/O alcohol abuse      Hyperlipidemia      Hypertension      Low testosterone      MRSA (methicillin resistant staph aureus) culture positive 8/2013    skin infection     Mumps      PUD (peptic ulcer disease)      Sleep apnea      STD (sexually transmitted disease)       Immunization History   Administered Date(s) Administered     COVID-19,PF,Pfizer 03/13/2021, 04/03/2021     Influenza (High Dose) 3 valent vaccine 10/18/2019     Influenza (IIV3) PF 03/16/2010, 10/24/2017     Influenza, Quad, High Dose, Pf, 65yr+ (Fluzone HD) 11/23/2020     Pneumo Conj 13-V (2010&after) 02/05/2019     Pneumococcal 23 valent 12/26/2000     Tdap (Adacel,Boostrix) 03/16/2010       Current Outpatient Medications   Medication Sig Dispense Refill     ALPRAZolam (XANAX) 0.5 MG tablet Take 1 tablet (0.5 mg) by mouth 3 times daily as needed for anxiety 30 tablet 0     aspirin 81 MG tablet Take 1 tablet by mouth daily.       atorvastatin (LIPITOR) 40 MG tablet TAKE 1 TABLET BY MOUTH EVERY DAY 90 tablet 2     B-D LUER-ROCHELLE SYRINGE 22G X 1-1/2\" 3 ML MISC USE WITH TESTOSTERONE 12 each 3 " "    BD HYPODERMIC NEEDLE 18G X 1\" MISC USE TO ADMINISTER TESTOSTERONE. DUE FOR APPOINTMENT WITH YOUR DOCTOR 6 each 3     COMPOUNDED NON-CONTROLLED SUBSTANCE (CMPD RX) - PHARMACY TO MIX COMPOUNDED MEDICATION Trimix #9. Dispense 1 vial plus diabetic U100 syringes 1 vial 11     glimepiride (AMARYL) 4 MG tablet TAKE 1 TABLET BY MOUTH ONCE DAILY IN THE MORNING BEFORE BREAKFAST 90 tablet 0     glucosamine-chondroitinoitin 500-400 MG tablet Take 1 tablet by mouth daily       insulin pen needle (32G X 4 MM) 32G X 4 MM miscellaneous Use One pen needle daily or as directed. 100 each 3     LANTUS SOLOSTAR 100 UNIT/ML soln INJECT 8 UNITS UNDER THE SKIN ONCE DAILY AT BEDTIME (140 DAY SUPPLY) 15 mL 0     lisinopril (ZESTRIL) 10 MG tablet Take 1 tablet (10 mg) by mouth daily 90 tablet 1     meclizine (ANTIVERT) 25 MG tablet Take 1 tablet (25 mg) by mouth 3 times daily as needed for dizziness (Patient not taking: Reported on 2/22/2021) 30 tablet 0     metFORMIN (GLUCOPHAGE-XR) 500 MG 24 hr tablet TAKE 4 TABLETS (2,000 MG) BY MOUTH DAILY (WITH DINNER) 360 tablet 0     Multiple Vitamins-Minerals (CENTRUM SILVER) per tablet Take 1 tablet by mouth daily 90 tablet 3     nitroGLYcerin (NITROSTAT) 0.4 MG sublingual tablet PLACE 1 TAB UNDER TONGUE EVERY 5 MINS X3 DOSES. IF SYMPTOMS PERSIST 5 MINS AFTER 1ST DOSE CALL 911. 25 tablet 1     omeprazole (PRILOSEC) 40 MG DR capsule TAKE 1 CAPSULE BY MOUTH EVERY DAY 90 capsule 3     ONETOUCH ULTRA test strip USE TO TEST BLOOD SUGARS ONE TIME DAILY OR AS DIRECTED. 100 strip 3     PHARMACIST CHOICE LANCETS [facility] TEST BLOOD SUGARS 5 TIMES DAILY 100 each 3     sitagliptin (JANUVIA) 100 MG tablet Take 1 tablet (100 mg) by mouth daily 90 tablet 1     tadalafil (CIALIS) 20 MG tablet Take 1 tablet (20 mg) by mouth daily Never use with nitroglycerin, terazosin or doxazosin. 88 tablet 3     tamsulosin (FLOMAX) 0.4 MG capsule TAKE 1 CAPSULE BY MOUTH DAILY AT BEDTIME (DUE FOR APPT) 90 capsule 3     " testosterone cypionate (DEPOTESTOSTERONE) 200 MG/ML injection INJECT 2 ML INTO THE MUSCLE ONCE WEEKLY 10 mL 5     No Known Allergies     EXAM: deferred    Immunizations discussed include: Hepatitis A, Typhoid, Tetanus/Diphtheria and influenza  Malaraia prophylaxis recommended: Malarone  Symptomatic treatment for traveler's diarrhea: bismuth subsalicylate, loperamide/diphenoxylate and azithromycin    ASSESSMENT/PLAN:    (Z71.84) Encounter for counseling for travel  (primary encounter diagnosis)    Comment: Hepatitis A, typhoid, tdap, and influenza vaccines today. Patient will return or follow-up with PCP as needed. Prophylaxis given for Traveler's diarrhea and Malaria. All questions were answered.     Plan: atovaquone-proguanil (MALARONE) 250-100 MG         tablet, azithromycin (ZITHROMAX) 500 MG tablet            (Z23) Need for hepatitis A immunization  Comment:   Plan: HEPATITIS A VACCINE (ADULT)            (Z23) Need for immunization against typhoid  Comment:   Plan: TYPHOID VACCINE, IM            (Z23) Need for diphtheria-tetanus-pertussis (Tdap) vaccine  Comment:   Plan: TDAP VACCINE (Adacel, Boostrix)  [[id number]]              I have reviewed general recommendations for safe travel   including: food/water precautions, insect avoidance, safe sex   practices given high prevalence of HIV and other STDs,   roadway safety. Educational materials and links to the CDC   Traveler's health website have been provided.    Total time 15 minutes, greater than 50 percent in counseling   and coordination of care.

## 2021-10-12 NOTE — PATIENT INSTRUCTIONS
Ultrasound Guided Prostate Biopsy    What is a prostate biopsy? A prostate biopsy is a relatively painless procedure performed in the physician's office, outpatient facility or hospital.  A long, thin needle is inserted into the prostate to collect a sample of tissue from the prostate.  These samples are then examined by a pathologist for abnormal cells.    Why do I need a prostate biopsy? During a man's lifetime the prostate gradually increases in size.  The patient may or may not experience symptoms.  Symptoms of an enlarged prostate include:    Increased frequency of urination    Decreased force of urinary stream    Trouble with urination    Awakening at night to urinate    When the prostate is examined, the physician may feel a nodule (hard or firm growth) which would require a biopsy.    Another reason for having a prostate biopsy is an increase in the prostate specific androgen (PSA) in your blood.  A prostate biopsy may be necessary to determine the cause of the increased PSA.    Your physician will also perform an ultrasound (an image created by sound waves).  The ultrasound is performed by placing a small probe in the rectum to image the prostate gland.    Preparation for the Prostate Biopsy    1. Blood thinning medications must be stopped prior to your biopsy.  Please stop the following medication the appropriate number of days before:  a. 10 days-acetylsalicylic acid, vitamin E, fish oil, aspirin, baby aspirin  b. 7 days- Plavix, Brilinta, ibuprofen (Advil, Motrin, Aleve)  c. 5 days-Pradaxa  d. 4 days-Coumadin/warfarin  e. 3 days-Eliquis, Xarelto    2.  If you have any bleeding problems (thin blood), please tell your doctor.    3.  If you have been told by another doctor to take antibiotics before dental work or surgery, please tell the doctor.  This is common for patients that have had a joint replacement.    YOU HAVE BEEN PRESCRIBED AN ANTIBIOTIC.  Please follow the directions written on the bottle.   The directions usually will tell you to start the antibiotic the day before your biopsy in the morning and take one every 12 hours.  You will continue taking the medication until it is gone.    The day of the biopsy you will be asked to use an enema one hour before you leave for your appointment.    PLEASE EAT YOUR REGULAR MEALS ON THE DAY OF YOUR BIOPSY.    Unless you have been prescribed a sedative (Valium or a similar drug) you will be able to drive to and from your appointment.  If you have taken a sedative you must have a ride.    AFTER THE BIOPSY    DANGER SIGNS    Small amount of blood in the urine for 10-14 days Excessive blood in the urine, stool or ejaculate  Small amount of blood in the stool for 48 hours Fever over 100 or chills  Small amount of blood in the ejaculate for up to Frequent urination or burning when you urinate   4 weeks          CALL THE DOCTOR'S OFFICE -[phone] IF YOU HAVE ANY OF THESE SYMPTOMS.  IF YOU CANNOT CONTACT THE OFFICE, GO TO THE EMERGENCY ROOM.          Your biopsy is scheduled on____11/16/21_______ at our [place] office.  Please be at the office at    __3:00PM_____.      A follow up visit has been scheduled for you on ___11/29/21_____@___11:30AM_____ as a virtual     visit. Your doctor will discuss your results with you at this visit

## 2021-10-19 DIAGNOSIS — E11.43 TYPE 2 DIABETES MELLITUS WITH PERIPHERAL AUTONOMIC NEUROPATHY (H): ICD-10-CM

## 2021-10-21 RX ORDER — METFORMIN HCL 500 MG
2000 TABLET, EXTENDED RELEASE 24 HR ORAL
Qty: 360 TABLET | Refills: 0 | Status: SHIPPED | OUTPATIENT
Start: 2021-10-21 | End: 2022-01-24

## 2021-10-21 NOTE — TELEPHONE ENCOUNTER
Routing refill request to provider for review/approval because:  Labs not current:    Patient needs to be seen because:  diabetic follow up

## 2021-11-16 ENCOUNTER — OFFICE VISIT (OUTPATIENT)
Dept: UROLOGY | Facility: CLINIC | Age: 66
End: 2021-11-16
Payer: COMMERCIAL

## 2021-11-16 ENCOUNTER — ALLIED HEALTH/NURSE VISIT (OUTPATIENT)
Dept: UROLOGY | Facility: CLINIC | Age: 66
End: 2021-11-16
Payer: COMMERCIAL

## 2021-11-16 VITALS
BODY MASS INDEX: 29 KG/M2 | OXYGEN SATURATION: 97 % | HEART RATE: 74 BPM | DIASTOLIC BLOOD PRESSURE: 72 MMHG | HEIGHT: 74 IN | WEIGHT: 226 LBS | SYSTOLIC BLOOD PRESSURE: 122 MMHG

## 2021-11-16 DIAGNOSIS — Z79.2 PROPHYLACTIC ANTIBIOTIC: Primary | ICD-10-CM

## 2021-11-16 DIAGNOSIS — N40.0 ENLARGED PROSTATE: ICD-10-CM

## 2021-11-16 DIAGNOSIS — C61 PROSTATE CANCER (H): Primary | ICD-10-CM

## 2021-11-16 PROCEDURE — 88305 TISSUE EXAM BY PATHOLOGIST: CPT | Performed by: PATHOLOGY

## 2021-11-16 PROCEDURE — 55700 PR BIOPSY OF PROSTATE,NEEDLE/PUNCH: CPT | Performed by: UROLOGY

## 2021-11-16 PROCEDURE — 88342 IMHCHEM/IMCYTCHM 1ST ANTB: CPT | Performed by: PATHOLOGY

## 2021-11-16 PROCEDURE — 96372 THER/PROPH/DIAG INJ SC/IM: CPT | Mod: 59 | Performed by: UROLOGY

## 2021-11-16 PROCEDURE — 88341 IMHCHEM/IMCYTCHM EA ADD ANTB: CPT | Performed by: PATHOLOGY

## 2021-11-16 PROCEDURE — 76942 ECHO GUIDE FOR BIOPSY: CPT | Performed by: UROLOGY

## 2021-11-16 RX ORDER — CIPROFLOXACIN 500 MG/1
TABLET, FILM COATED ORAL
COMMUNITY
Start: 2021-08-20 | End: 2022-11-18

## 2021-11-16 RX ORDER — GENTAMICIN 40 MG/ML
80 INJECTION, SOLUTION INTRAMUSCULAR; INTRAVENOUS ONCE
Status: COMPLETED | OUTPATIENT
Start: 2021-11-16 | End: 2021-11-16

## 2021-11-16 RX ADMIN — GENTAMICIN 80 MG: 40 INJECTION, SOLUTION INTRAMUSCULAR; INTRAVENOUS at 15:05

## 2021-11-16 ASSESSMENT — MIFFLIN-ST. JEOR: SCORE: 1869.88

## 2021-11-16 ASSESSMENT — PAIN SCALES - GENERAL: PAINLEVEL: NO PAIN (0)

## 2021-11-16 NOTE — PROGRESS NOTES
The following medication was given:     MEDICATION: Gentamicin   ROUTE: IM  SITE: LUQ - Gluteus  DOSE:80mg/2ml  LOT #: [id number]  : Wedit  EXPIRATION DATE: 02/23  NDC#: 90931-046-81   Was there drug waste? No  Multi-dose vial: Yes    Marlys Maldonado LPN

## 2021-11-16 NOTE — PATIENT INSTRUCTIONS
[facility]  Transrectal Ultrasound  Post Operative Information    The physician who performed your Transrectal Ultrasound is Dr. Rao (telephone number [phone]).  Please contact this doctor if you have any problems or questions.  If unable to reach your doctor, please return to the Emergency Department.      Take one antibiotic the evening of the procedure and then as directed on your prescription.    Drink at least 6-8 glasses of fluids for the first 48 hours.    Avoid heavy lifting and strenuous activity for 48 hours.    Avoid sexual intercourse for the first 24 hours.    No aspirin or ibuprofen products (Motrin, Advil, Nuprin, ect.) for one week.  You may take acetaminophen (Tylenol) for pain.    You may notice a small amount of blood on the tissue after a bowel movement.    You may pass blood with clots in your urine following the procedure.  The amount will decrease with time but may be visible for up to two weeks.     You make have blood in your semen for 4 weeks after the procedure.    You may experience mild perineal (groin area) discomfort after the procedure.    Please call you doctor if you have any of the follow symptoms:  Fever  Increase in the amount of blood passed  Severe discomfort or pain

## 2021-11-16 NOTE — LETTER
11/16/2021      RE: Austyn Araujo  [address]  [place] [id number]       Austyn Araujo is here for a transrectal altrasound guided needle biopsy of the prostate for a significant risk of potentially lethal prostate cancer.    The risks, benefits, of the procudure were discussed.  All questions were answered.  A written informed consent was obtained.      PSA   Date Value Ref Range Status   07/08/2021 5.94 (H) 0 - 4 ug/L Final     Comment:     Assay Method:  Chemiluminescence using Siemens Vista analyzer   11/16/2020 5.30 (H) 0 - 4 ug/L Final     Comment:     Assay Method:  Chemiluminescence using Siemens Vista analyzer   06/18/2020 6.05 (H) 0 - 4 ug/L Final     Comment:     Assay Method:  Chemiluminescence using Siemens Vista analyzer   02/20/2020 5.66 (H) 0 - 4 ug/L Final     Comment:     Assay Method:  Chemiluminescence using Siemens Vista analyzer       An enema was completed and 500 mg of Cipro twice daily was started prior to the biopsy.  After obtaining informed consent patient was placed in lateral decubitus position.  The ultrasound probe was placed in the rectum.  The prostate was numbed using ultrasound guidance with 1% lidocaine 5 mls along each nerve bundle.        US images were used to guide the biopsies of the prostate.  12 cores were taken with 6 on each side, 2 at the base,  2 at the midgland and  2 at the apex.  The patient tolerated the procedure well.      We will follow up with the results in 7-10 days and contact patient with these results.          Mohsen Rao MD

## 2021-11-16 NOTE — NURSING NOTE
Chief Complaint   Patient presents with     Elevated PSA     Patient is here for Transrectal Ultrasound/Guided Biopsies      Prostate Cancer   Procedure was explained to patient prior to performing said procedure.  The patient signed the [place] consent form and all questions were answered prior to the procedure.  Any pre-procedural antibiotics were given according to the performing physician's recommendation.  Patient reviewed information on the labels attached to samples and confirmed the accuracy of all of the labels.     Performing Physician:  Dr. Rao  Antibiotic taken?  yes  Aspirin or other blood thinning medications discontinued 7-10 days:  yes  Time of enema: 2:30am  Patient given Gentamicin intramuscular injection 30 minutes prior to procedure  Yes    Marlys Maldonado LPN         MD Broderick Navarro [facility]             Talk to me    Previous Messages      ----- Message -----   From: Donovan Gomez MD   Sent: 6/12/2019   5:33 PM   To: Sidney Olsen MD     Intermittent back muscle spasms.  Improved with flexeril.  She also has mild dehydration, mildly low potassium, and a UTI.  Received IV fluids and rocephin in the ED.  Blood pressure 170/80.  Discharged home with omnicef and flexeril, she will increase her home potassium tabs to BID for the next 2-3 days.  Family concerned about the BP but you just made some recent changes to her meds.

## 2021-11-16 NOTE — NURSING NOTE
"Chief Complaint   Patient presents with     Elevated PSA     Patient is here for Transrectal Ultrasound/Guided Biopsies      Prostate Cancer       Blood pressure 112/64, pulse 64, height 1.88 m (6' 2\"), weight 102.5 kg (226 lb). Body mass index is 29.02 kg/m .    Patient Active Problem List   Diagnosis     Advanced directives, counseling/discussion     Type 2 diabetes mellitus with peripheral autonomic neuropathy (H)     GERD (gastroesophageal reflux disease)     PUD (peptic ulcer disease)     Low testosterone     HTN (hypertension)     Mild major depression (H)     Hyperlipidemia LDL goal <100     Erectile dysfunction     BPH (benign prostatic hyperplasia)     Peripheral neuropathy     Cellulitis     Hypertension goal BP (blood pressure) < 140/90     MRSA (methicillin resistant staph aureus) culture positive     Anxiety     Health Care Home     Type 2 diabetes mellitus with diabetic neuropathy (H)     Hemoglobin A1c less than 7.0%     Abdominal pain     Pancreatitis     Controlled substance agreement signed     Lyme disease       No Known Allergies    Current Outpatient Medications   Medication Sig Dispense Refill     ALPRAZolam (XANAX) 0.5 MG tablet Take 1 tablet (0.5 mg) by mouth 3 times daily as needed for anxiety 30 tablet 0     atorvastatin (LIPITOR) 40 MG tablet TAKE 1 TABLET BY MOUTH EVERY DAY 90 tablet 2     atovaquone-proguanil (MALARONE) 250-100 MG tablet Take 1 tablet by mouth daily Start 2 days before travel and continue 7 days after return. 15 tablet 0     COMPOUNDED NON-CONTROLLED SUBSTANCE (CMPD RX) - PHARMACY TO MIX COMPOUNDED MEDICATION Trimix #9. Dispense 1 vial plus diabetic U100 syringes 1 vial 11     glimepiride (AMARYL) 4 MG tablet TAKE 1 TABLET BY MOUTH ONCE DAILY IN THE MORNING BEFORE BREAKFAST 90 tablet 0     glucosamine-chondroitinoitin 500-400 MG tablet Take 1 tablet by mouth daily       lisinopril (ZESTRIL) 10 MG tablet Take 1 tablet (10 mg) by mouth daily 90 tablet 1     metFORMIN " "(GLUCOPHAGE-XR) 500 MG 24 hr tablet TAKE 4 TABLETS (2,000 MG) BY MOUTH DAILY (WITH DINNER) 360 tablet 0     Multiple Vitamins-Minerals (CENTRUM SILVER) per tablet Take 1 tablet by mouth daily 90 tablet 3     omeprazole (PRILOSEC) 40 MG DR capsule TAKE 1 CAPSULE BY MOUTH EVERY DAY 90 capsule 3     sitagliptin (JANUVIA) 100 MG tablet Take 1 tablet (100 mg) by mouth daily 90 tablet 1     tadalafil (CIALIS) 20 MG tablet Take 1 tablet (20 mg) by mouth daily Never use with nitroglycerin, terazosin or doxazosin. 88 tablet 3     tamsulosin (FLOMAX) 0.4 MG capsule TAKE 1 CAPSULE BY MOUTH DAILY AT BEDTIME (DUE FOR APPT) 90 capsule 3     testosterone cypionate (DEPOTESTOSTERONE) 200 MG/ML injection INJECT 2 ML INTO THE MUSCLE ONCE WEEKLY 10 mL 5     aspirin 81 MG tablet Take 1 tablet by mouth daily. (Patient not taking: Reported on 11/16/2021)       azithromycin (ZITHROMAX) 500 MG tablet Take one tablet daily for up to 3 days as needed for traveler's diarrhea (Patient not taking: Reported on 11/16/2021) 3 tablet 0     B-D LUER-ROCHELLE SYRINGE 22G X 1-1/2\" 3 ML MISC USE WITH TESTOSTERONE 12 each 3     BD HYPODERMIC NEEDLE 18G X 1\" MISC USE TO ADMINISTER TESTOSTERONE. DUE FOR APPOINTMENT WITH YOUR DOCTOR 6 each 3     ciprofloxacin (CIPRO) 500 MG tablet TAKE 1 TABLET BY MOUTH 2 TIMES DAILY FOR 3 DAYS       insulin pen needle (32G X 4 MM) 32G X 4 MM miscellaneous Use One pen needle daily or as directed. 100 each 3     LANTUS SOLOSTAR 100 UNIT/ML soln INJECT 8 UNITS UNDER THE SKIN ONCE DAILY AT BEDTIME (140 DAY SUPPLY) 15 mL 0     nitroGLYcerin (NITROSTAT) 0.4 MG sublingual tablet PLACE 1 TAB UNDER TONGUE EVERY 5 MINS X3 DOSES. IF SYMPTOMS PERSIST 5 MINS AFTER 1ST DOSE CALL 911. (Patient not taking: Reported on 11/16/2021) 25 tablet 1     ONETOUCH ULTRA test strip USE TO TEST BLOOD SUGARS ONE TIME DAILY OR AS DIRECTED. 100 strip 3     PHARMACIST CHOICE LANCETS MISC TEST BLOOD SUGARS 5 TIMES DAILY 100 each 3       Social History "     Tobacco Use     Smoking status: Former Smoker     Types: Cigarettes     Smokeless tobacco: Never Used   Substance Use Topics     Alcohol use: No     Alcohol/week: 0.0 standard drinks     Comment: 14 drinks weekly, quit on 08/2017     Drug use: TEQUILA Arrington  11/16/2021  3:19 PM

## 2021-11-16 NOTE — LETTER
11/16/2021       RE: Austyn Araujo  [address]  [place] [address]     Dear Colleague,    Thank you for referring your patient, Austyn Araujo, to the [facility] [place] at [facility]. Please see a copy of my visit note below.    Austyn Araujo is here for a transrectal altrasound guided needle biopsy of the prostate for a significant risk of potentially lethal prostate cancer.    The risks, benefits, of the procudure were discussed.  All questions were answered.  A written informed consent was obtained.      PSA   Date Value Ref Range Status   07/08/2021 5.94 (H) 0 - 4 ug/L Final     Comment:     Assay Method:  Chemiluminescence using Siemens Vista analyzer   11/16/2020 5.30 (H) 0 - 4 ug/L Final     Comment:     Assay Method:  Chemiluminescence using Siemens Vista analyzer   06/18/2020 6.05 (H) 0 - 4 ug/L Final     Comment:     Assay Method:  Chemiluminescence using Siemens Vista analyzer   02/20/2020 5.66 (H) 0 - 4 ug/L Final     Comment:     Assay Method:  Chemiluminescence using Siemens Vista analyzer       An enema was completed and 500 mg of Cipro twice daily was started prior to the biopsy.  After obtaining informed consent patient was placed in lateral decubitus position.  The ultrasound probe was placed in the rectum.  The prostate was numbed using ultrasound guidance with 1% lidocaine 5 mls along each nerve bundle.        US images were used to guide the biopsies of the prostate.  12 cores were taken with 6 on each side, 2 at the base,  2 at the midgland and  2 at the apex.  The patient tolerated the procedure well.      We will follow up with the results in 7-10 days and contact patient with these results.          Again, thank you for allowing me to participate in the care of your patient.      Sincerely,    Mohsen Rao MD

## 2021-11-16 NOTE — PROGRESS NOTES
Austyn Araujo is here for a transrectal altrasound guided needle biopsy of the prostate for a significant risk of potentially lethal prostate cancer.    The risks, benefits, of the procudure were discussed.  All questions were answered.  A written informed consent was obtained.      PSA   Date Value Ref Range Status   07/08/2021 5.94 (H) 0 - 4 ug/L Final     Comment:     Assay Method:  Chemiluminescence using Siemens Vista analyzer   11/16/2020 5.30 (H) 0 - 4 ug/L Final     Comment:     Assay Method:  Chemiluminescence using Siemens Vista analyzer   06/18/2020 6.05 (H) 0 - 4 ug/L Final     Comment:     Assay Method:  Chemiluminescence using Siemens Vista analyzer   02/20/2020 5.66 (H) 0 - 4 ug/L Final     Comment:     Assay Method:  Chemiluminescence using Siemens Vista analyzer       An enema was completed and 500 mg of Cipro twice daily was started prior to the biopsy.  After obtaining informed consent patient was placed in lateral decubitus position.  The ultrasound probe was placed in the rectum.  The prostate was numbed using ultrasound guidance with 1% lidocaine 5 mls along each nerve bundle.        US images were used to guide the biopsies of the prostate.  12 cores were taken with 6 on each side, 2 at the base,  2 at the midgland and  2 at the apex.  The patient tolerated the procedure well.      We will follow up with the results in 7-10 days and contact patient with these results.

## 2021-11-22 LAB
PATH REPORT.COMMENTS IMP SPEC: NORMAL
PATH REPORT.COMMENTS IMP SPEC: NORMAL
PATH REPORT.FINAL DX SPEC: NORMAL
PATH REPORT.GROSS SPEC: NORMAL
PATH REPORT.MICROSCOPIC SPEC OTHER STN: NORMAL
PATH REPORT.MICROSCOPIC SPEC OTHER STN: NORMAL
PATH REPORT.RELEVANT HX SPEC: NORMAL
PHOTO IMAGE: NORMAL

## 2021-11-29 ENCOUNTER — VIRTUAL VISIT (OUTPATIENT)
Dept: UROLOGY | Facility: CLINIC | Age: 66
End: 2021-11-29
Payer: COMMERCIAL

## 2021-11-29 VITALS — BODY MASS INDEX: 28.88 KG/M2 | HEIGHT: 74 IN | WEIGHT: 225 LBS

## 2021-11-29 DIAGNOSIS — N40.0 ENLARGED PROSTATE: Primary | ICD-10-CM

## 2021-11-29 DIAGNOSIS — C61 PROSTATE CANCER (H): ICD-10-CM

## 2021-11-29 PROCEDURE — 99213 OFFICE O/P EST LOW 20 MIN: CPT | Mod: 95 | Performed by: UROLOGY

## 2021-11-29 ASSESSMENT — PAIN SCALES - GENERAL: PAINLEVEL: NO PAIN (0)

## 2021-11-29 ASSESSMENT — MIFFLIN-ST. JEOR: SCORE: 1865.34

## 2021-11-29 NOTE — PROGRESS NOTES
David is a 65 year old who is being evaluated via a billable video visit.      How would you like to obtain your AVS? [employer]     Invitation should be sent by: Text to cell phone: [phone]     Will anyone else be joining your video visit? No            Office Visit Note  [facility]  [phone]    UROLOGIC DIAGNOSES:   Low risk prostate cancer, enlarged prostate, erectile dysfunction    CURRENT INTERVENTIONS:   Active surveillance  Prostate biopsies x2  Trimix injections    HISTORY:   David is set up for virtual visit today to go over his recent prostate biopsy results.  He had a confirmatory prostate biopsy performed 2 weeks ago and the biopsy did not show any evidence of cancer, only high-grade PIN.  He has felt well since his biopsy.      PAST MEDICAL HISTORY:   Past Medical History:   Diagnosis Date     Anxiety      Anxiety 1/31/2015     BPH (benign prostatic hyperplasia)      Degenerative disc disease      Depression      Diabetes mellitus (H)      Gastritis      Gastro-oesophageal reflux disease      H/O alcohol abuse      Hyperlipidemia      Hypertension      Low testosterone      MRSA (methicillin resistant staph aureus) culture positive 8/2013    skin infection     Mumps      PUD (peptic ulcer disease)      Sleep apnea      STD (sexually transmitted disease)        PAST SURGICAL HISTORY:   Past Surgical History:   Procedure Laterality Date     COLONOSCOPY  11/11/2013    Procedure: COMBINED COLONOSCOPY, SINGLE BIOPSY/POLYPECTOMY BY BIOPSY;  Colonoscopy/ polypectomy x2 by cold forceps    ;  Surgeon: Juan Carlos Bellamy MD;  Location:  GI     ESOPHAGOSCOPY, GASTROSCOPY, DUODENOSCOPY (EGD), COMBINED N/A 9/22/2016    Procedure: COMBINED ESOPHAGOSCOPY, GASTROSCOPY, DUODENOSCOPY (EGD), BIOPSY SINGLE OR MULTIPLE;  Surgeon: Juan Carlos Barraza MD;  Location:  GI     ESOPHAGOSCOPY, GASTROSCOPY, DUODENOSCOPY (EGD), COMBINED N/A 2/19/2019    Procedure: ESOPHAGOSCOPY, GASTROSCOPY, DUODENOSCOPY (EGD)  with cold forcep;  Surgeon: Juan Carlos Barraza MD;  Location:  GI     HERNIA REPAIR       ORTHOPEDIC SURGERY         FAMILY HISTORY:   Family History   Problem Relation Age of Onset     Pancreatic Cancer Mother      Pancreatic Cancer Paternal Grandfather      Diabetes Brother      Depression Brother      Suicide Brother      Substance Abuse Brother      Dementia Father      Parkinsonism Father      Family History Negative No family hx of      Colon Cancer No family hx of      Unknown/Adopted No family hx of      Anxiety Disorder No family hx of      Schizophrenia No family hx of      Bipolar Disorder No family hx of      Ambika Disease No family hx of      Autism Spectrum Disorder No family hx of      Intellectual Disability (Mental Retardation) No family hx of      Mental Illness No family hx of        SOCIAL HISTORY:   Social History     Tobacco Use     Smoking status: Former Smoker     Types: Cigarettes     Smokeless tobacco: Never Used   Substance Use Topics     Alcohol use: No     Alcohol/week: 0.0 standard drinks     Comment: 14 drinks weekly, quit on 08/2017       REVIEW OF SYSTEMS:  Skin: No rash, pruritis, or skin pigmentation  Eyes: No changes in vision  Ears/Nose/Throat: No changes in hearing, no nosebleeds  Respiratory: No shortness of breath, dyspnea on exertion, cough, or hemoptysis  Cardiovascular: No chest pain or palpitations  Gastrointestinal: No diarrhea or constipation. No abdominal pain. No hematochezia  Genitourinary: see HPI  Musculoskeletal: No pain or swelling of joints, normal range of motion  Neurologic: No weakness or tremors  Psychiatric: No recent changes in memory or mood  Hematologic/Lymphatic/Immunologic: No easy bruising or enlarged lymph nodes  Endocrine: No weight gain or loss      PHYSICAL EXAM:    General: Alert and oriented to time, place, and self. In NAD   HEENT: Head AT/NC, EOMI, CN Grossly intact   Lungs: no respiratory distress, or pursed lip breathing   Heart: No  obvious jugular venous distension present   Musculoskeltal: Normal movements. Normal appearing musculature  Skin: no suspicious lesions or rashes   Neuro: Alert, oriented, speech and mentation normal; moving all 4 extremities equally.   Psych: affect and mood normal    Imaging: None    Urinalysis: UA RESULTS:  Recent Labs   Lab Test 01/11/21  0805 02/05/19  1413   COLOR Yellow Yellow   APPEARANCE Clear Clear   URINEGLC >=1000* 100*   URINEBILI Negative Negative   URINEKETONE Negative Negative   SG 1.025 1.020   UBLD Negative Negative   URINEPH 5.5 6.0   PROTEIN Negative Negative   UROBILINOGEN 0.2 0.2   NITRITE Negative Negative   LEUKEST Negative Small*   RBCU  --  O - 2   WBCU  --  5-10*       PSA: 5.94    Post Void Residual:     Other labs: None today      IMPRESSION:  Low risk prostate cancer, enlarged prostate, erectile dysfunction    PLAN:  His confirmatory prostate biopsy showed no evidence of prostate cancer. I counseled him that this does not mean that the cancer is gone, but it likely does mean that the cancer remains quite small. We discussed his treatment options and he wishes to continue with active surveillance. In 6 months I will see him back for another PSA.      Mohsen Rao M.D.              Video Start Time: 11:41 AM  Video-Visit Details    Type of service:  Video Visit    Video End Time:11:53 AM    Originating Location (pt. Location): Home    Distant Location (provider location):  [facility] [place]     Platform used for Video Visit: Luis

## 2021-11-29 NOTE — LETTER
11/29/2021       RE: Austyn Araujo  [address]  [facility] [address]     Dear Colleague,    Thank you for referring your patient, Austyn Araujo, to the [facility] UROLOGY [facility]IN[facility] [place] at [facility]. Please see a copy of my visit note below.    David is a 65 year old who is being evaluated via a billable video visit.      How would you like to obtain your AVS? Hubblr     Invitation should be sent by: Text to cell phone: [phone]     Will anyone else be joining your video visit? No            Office Visit Note  [facility]  [phone]    UROLOGIC DIAGNOSES:   Low risk prostate cancer, enlarged prostate, erectile dysfunction    CURRENT INTERVENTIONS:   Active surveillance  Prostate biopsies x2  Trimix injections    HISTORY:   David is set up for virtual visit today to go over his recent prostate biopsy results.  He had a confirmatory prostate biopsy performed 2 weeks ago and the biopsy did not show any evidence of cancer, only high-grade PIN.  He has felt well since his biopsy.      PAST MEDICAL HISTORY:   Past Medical History:   Diagnosis Date     Anxiety      Anxiety 1/31/2015     BPH (benign prostatic hyperplasia)      Degenerative disc disease      Depression      Diabetes mellitus (H)      Gastritis      Gastro-oesophageal reflux disease      H/O alcohol abuse      Hyperlipidemia      Hypertension      Low testosterone      MRSA (methicillin resistant staph aureus) culture positive 8/2013    skin infection     Mumps      PUD (peptic ulcer disease)      Sleep apnea      STD (sexually transmitted disease)        PAST SURGICAL HISTORY:   Past Surgical History:   Procedure Laterality Date     COLONOSCOPY  11/11/2013    Procedure: COMBINED COLONOSCOPY, SINGLE BIOPSY/POLYPECTOMY BY BIOPSY;  Colonoscopy/ polypectomy x2 by cold forceps    ;  Surgeon: Juan Carlos Bellamy MD;  Location:  GI     ESOPHAGOSCOPY, GASTROSCOPY,  DUODENOSCOPY (EGD), COMBINED N/A 9/22/2016    Procedure: COMBINED ESOPHAGOSCOPY, GASTROSCOPY, DUODENOSCOPY (EGD), BIOPSY SINGLE OR MULTIPLE;  Surgeon: Juan Carlos Barraza MD;  Location:  GI     ESOPHAGOSCOPY, GASTROSCOPY, DUODENOSCOPY (EGD), COMBINED N/A 2/19/2019    Procedure: ESOPHAGOSCOPY, GASTROSCOPY, DUODENOSCOPY (EGD) with cold forcep;  Surgeon: Juan Carlos Barraza MD;  Location:  GI     HERNIA REPAIR       ORTHOPEDIC SURGERY         FAMILY HISTORY:   Family History   Problem Relation Age of Onset     Pancreatic Cancer Mother      Pancreatic Cancer Paternal Grandfather      Diabetes Brother      Depression Brother      Suicide Brother      Substance Abuse Brother      Dementia Father      Parkinsonism Father      Family History Negative No family hx of      Colon Cancer No family hx of      Unknown/Adopted No family hx of      Anxiety Disorder No family hx of      Schizophrenia No family hx of      Bipolar Disorder No family hx of      Ambika Disease No family hx of      Autism Spectrum Disorder No family hx of      Intellectual Disability (Mental Retardation) No family hx of      Mental Illness No family hx of        SOCIAL HISTORY:   Social History     Tobacco Use     Smoking status: Former Smoker     Types: Cigarettes     Smokeless tobacco: Never Used   Substance Use Topics     Alcohol use: No     Alcohol/week: 0.0 standard drinks     Comment: 14 drinks weekly, quit on 08/2017       REVIEW OF SYSTEMS:  Skin: No rash, pruritis, or skin pigmentation  Eyes: No changes in vision  Ears/Nose/Throat: No changes in hearing, no nosebleeds  Respiratory: No shortness of breath, dyspnea on exertion, cough, or hemoptysis  Cardiovascular: No chest pain or palpitations  Gastrointestinal: No diarrhea or constipation. No abdominal pain. No hematochezia  Genitourinary: see HPI  Musculoskeletal: No pain or swelling of joints, normal range of motion  Neurologic: No weakness or tremors  Psychiatric: No recent changes in  memory or mood  Hematologic/Lymphatic/Immunologic: No easy bruising or enlarged lymph nodes  Endocrine: No weight gain or loss      PHYSICAL EXAM:    General: Alert and oriented to time, place, and self. In NAD   HEENT: Head AT/NC, EOMI, CN Grossly intact   Lungs: no respiratory distress, or pursed lip breathing   Heart: No obvious jugular venous distension present   Musculoskeltal: Normal movements. Normal appearing musculature  Skin: no suspicious lesions or rashes   Neuro: Alert, oriented, speech and mentation normal; moving all 4 extremities equally.   Psych: affect and mood normal    Imaging: None    Urinalysis: UA RESULTS:  Recent Labs   Lab Test 01/11/21  0805 02/05/19  1413   COLOR Yellow Yellow   APPEARANCE Clear Clear   URINEGLC >=1000* 100*   URINEBILI Negative Negative   URINEKETONE Negative Negative   SG 1.025 1.020   UBLD Negative Negative   URINEPH 5.5 6.0   PROTEIN Negative Negative   UROBILINOGEN 0.2 0.2   NITRITE Negative Negative   LEUKEST Negative Small*   RBCU  --  O - 2   WBCU  --  5-10*       PSA: 5.94    Post Void Residual:     Other labs: None today      IMPRESSION:  Low risk prostate cancer, enlarged prostate, erectile dysfunction    PLAN:  His confirmatory prostate biopsy showed no evidence of prostate cancer. I counseled him that this does not mean that the cancer is gone, but it likely does mean that the cancer remains quite small. We discussed his treatment options and he wishes to continue with active surveillance. In 6 months I will see him back for another PSA.      Mohsen Rao M.D.              Video Start Time: 11:41 AM  Video-Visit Details    Type of service:  Video Visit    Video End Time:11:53 AM    Originating Location (pt. Location): Home    Distant Location (provider location):  [facility] [place]     Platform used for Video Visit: [employer]       no chest pain/no palpitations/no peripheral edema

## 2021-11-29 NOTE — NURSING NOTE
Chief Complaint   Patient presents with     Prostate Cancer     Review biopsy results     Kristine Wells EMT

## 2022-01-01 ENCOUNTER — TRANSFERRED RECORDS (OUTPATIENT)
Dept: MULTI SPECIALTY CLINIC | Facility: CLINIC | Age: 67
End: 2022-01-01

## 2022-01-01 LAB — RETINOPATHY: NORMAL

## 2022-01-23 DIAGNOSIS — E11.43 TYPE 2 DIABETES MELLITUS WITH PERIPHERAL AUTONOMIC NEUROPATHY (H): ICD-10-CM

## 2022-01-24 DIAGNOSIS — E78.5 HYPERLIPIDEMIA LDL GOAL <100: ICD-10-CM

## 2022-01-24 RX ORDER — SITAGLIPTIN 100 MG/1
TABLET, FILM COATED ORAL
Qty: 90 TABLET | Refills: 1 | Status: SHIPPED | OUTPATIENT
Start: 2022-01-24 | End: 2022-11-09

## 2022-01-24 RX ORDER — ATORVASTATIN CALCIUM 40 MG/1
TABLET, FILM COATED ORAL
Qty: 90 TABLET | Refills: 2 | Status: SHIPPED | OUTPATIENT
Start: 2022-01-24 | End: 2023-02-08

## 2022-01-24 RX ORDER — METFORMIN HCL 500 MG
2000 TABLET, EXTENDED RELEASE 24 HR ORAL
Qty: 360 TABLET | Refills: 0 | Status: SHIPPED | OUTPATIENT
Start: 2022-01-24 | End: 2022-12-22

## 2022-01-24 NOTE — TELEPHONE ENCOUNTER
Routing refill request to provider for review/approval because:  Labs not current:    Lab Results   Component Value Date    A1C 8.0 11/16/2020    A1C 7.9 02/20/2020    A1C 10.1 10/18/2019    A1C 8.6 02/05/2019    A1C 8.2 08/01/2018     Past due for DM appt  Ed BRAUN RN, BSN

## 2022-01-24 NOTE — TELEPHONE ENCOUNTER
Routing refill request to provider for review/approval because:  Labs not current:  ldl  Ed BRAUN RN, BSN

## 2022-02-04 ENCOUNTER — OFFICE VISIT (OUTPATIENT)
Dept: UROLOGY | Facility: CLINIC | Age: 67
End: 2022-02-04
Payer: COMMERCIAL

## 2022-02-04 VITALS
DIASTOLIC BLOOD PRESSURE: 68 MMHG | BODY MASS INDEX: 30.16 KG/M2 | HEIGHT: 74 IN | WEIGHT: 235 LBS | OXYGEN SATURATION: 97 % | HEART RATE: 79 BPM | SYSTOLIC BLOOD PRESSURE: 116 MMHG

## 2022-02-04 DIAGNOSIS — N48.6 PEYRONIE'S DISEASE: Primary | ICD-10-CM

## 2022-02-04 PROCEDURE — 99214 OFFICE O/P EST MOD 30 MIN: CPT | Performed by: UROLOGY

## 2022-02-04 ASSESSMENT — PAIN SCALES - GENERAL: PAINLEVEL: NO PAIN (0)

## 2022-02-04 ASSESSMENT — MIFFLIN-ST. JEOR: SCORE: 1905.7

## 2022-02-04 NOTE — PROGRESS NOTES
"Office Visit Note  [facility]  [phone]    UROLOGIC DIAGNOSES:   Low risk prostate cancer  Enlarged prostate  Erectile dysfunction    CURRENT INTERVENTIONS:   Active surveillance  Prostate biopsies x2  Trimix injections    HISTORY:   David returns to clinic today because he is concerned about Peyronie's disease.  He says that about 2 months ago he had an accident during intercourse in which he think he \"missed \"and he had pain in the penis afterwards.  He noted no swelling or trauma.  He says that ever since that incident he has had upwards curvature of the penis.  He is still able to get a good erection with the Trimix at a low dose of 0.15 mL.  However, he says that the curvature is a roughly 90 degree curvature upwards and slightly towards the right.  He says he can feel a firm area in the penis as well.      PAST MEDICAL HISTORY:   Past Medical History:   Diagnosis Date     Anxiety      Anxiety 1/31/2015     BPH (benign prostatic hyperplasia)      Degenerative disc disease      Depression      Diabetes mellitus (H)      Gastritis      Gastro-oesophageal reflux disease      H/O alcohol abuse      Hyperlipidemia      Hypertension      Low testosterone      MRSA (methicillin resistant staph aureus) culture positive 8/2013    skin infection     Mumps      PUD (peptic ulcer disease)      Sleep apnea      STD (sexually transmitted disease)        PAST SURGICAL HISTORY:   Past Surgical History:   Procedure Laterality Date     COLONOSCOPY  11/11/2013    Procedure: COMBINED COLONOSCOPY, SINGLE BIOPSY/POLYPECTOMY BY BIOPSY;  Colonoscopy/ polypectomy x2 by cold forceps    ;  Surgeon: Juan Carlos Bellamy MD;  Location:  GI     ESOPHAGOSCOPY, GASTROSCOPY, DUODENOSCOPY (EGD), COMBINED N/A 9/22/2016    Procedure: COMBINED ESOPHAGOSCOPY, GASTROSCOPY, DUODENOSCOPY (EGD), BIOPSY SINGLE OR MULTIPLE;  Surgeon: Juan Carlos Barraaz MD;  Location:  GI     ESOPHAGOSCOPY, GASTROSCOPY, DUODENOSCOPY (EGD), COMBINED " N/A 2/19/2019    Procedure: ESOPHAGOSCOPY, GASTROSCOPY, DUODENOSCOPY (EGD) with cold forcep;  Surgeon: Juan Carlos Barraza MD;  Location:  GI     HERNIA REPAIR       ORTHOPEDIC SURGERY         FAMILY HISTORY:   Family History   Problem Relation Age of Onset     Pancreatic Cancer Mother      Pancreatic Cancer Paternal Grandfather      Diabetes Brother      Depression Brother      Suicide Brother      Substance Abuse Brother      Dementia Father      Parkinsonism Father      Family History Negative No family hx of      Colon Cancer No family hx of      Unknown/Adopted No family hx of      Anxiety Disorder No family hx of      Schizophrenia No family hx of      Bipolar Disorder No family hx of      [place] Disease No family hx of      Autism Spectrum Disorder No family hx of      Intellectual Disability (Mental Retardation) No family hx of      Mental Illness No family hx of        SOCIAL HISTORY:   Social History     Socioeconomic History     Marital status:      Spouse name: None     Number of children: None     Years of education: None     Highest education level: None   Occupational History     None   Tobacco Use     Smoking status: Former Smoker     Types: Cigarettes     Smokeless tobacco: Never Used   Substance and Sexual Activity     Alcohol use: No     Alcohol/week: 0.0 standard drinks     Comment: 14 drinks weekly, quit on 08/2017     Drug use: No     Sexual activity: Yes     Partners: Female   Other Topics Concern     Parent/sibling w/ CABG, MI or angioplasty before 65F 55M? Not Asked   Social History Narrative     None     Social Determinants of Health     Financial Resource Strain: Not on file   Food Insecurity: Not on file   Transportation Needs: Not on file   Physical Activity: Not on file   Stress: Not on file   Social Connections: Not on file   Intimate Partner Violence: Not on file   Housing Stability: Not on file       Review Of Systems:  Skin: No rash, pruritis, or skin  pigmentation  Eyes: No changes in vision  Ears/Nose/Throat: No changes in hearing, no nosebleeds  Respiratory: No shortness of breath, dyspnea on exertion, cough, or hemoptysis  Cardiovascular: No chest pain or palpitations  Gastrointestinal: No diarrhea or constipation. No abdominal pain. No hematochezia  Genitourinary: see HPI  Musculoskeletal: No pain or swelling of joints, normal range of motion  Neurologic: No weakness or tremors  Psychiatric: No recent changes in memory or mood  Hematologic/Lymphatic/Immunologic: No easy bruising or enlarged lymph nodes  Endocrine: No weight gain or loss      PHYSICAL EXAM:    There were no vitals taken for this visit.    Constitutional: Well developed. Conversant and in no acute distress  Eyes: Anicteric sclera, conjunctiva clear, normal extraocular movements  ENT: Normocephalic and atraumatic,   Skin: Warm and dry. No rashes or lesions  Cardiac: No peripheral edema  Back/Flank: Not done  CNS/PNS: Normal musculature and movements, moves all extremities normally  Respiratory: Normal non-labored breathing  Abdomen: Soft nontender and nondistended  Peripheral Vascular: No peripheral edema  Mental Status/Psych: Alert and Oriented x 3. Normal mood and affect    Penis: He does have what feels to be a Peyronie's plaque located on the dorsum of the penis over the right cavernosal body.  He is circumcised but he does have foreskin covering the glans penis.  He says that penile shortening has resulted in the foreskin covering the glans penis again.  No phimosis.  Scrotal skin: Normal, no lesions  Testicles: Normal to palpation bilaterally  Epididymis: Normal to palpation bilaterally  Lymphatic: Normal inguinal lymph nodes    Digital Rectal Exam:     Cystoscopy: Not done    Imaging: None    Urinalysis: UA RESULTS:  Recent Labs   Lab Test 01/11/21  0805 02/05/19  1413   COLOR Yellow Yellow   APPEARANCE Clear Clear   URINEGLC >=1000* 100*   URINEBILI Negative Negative   URINEKETONE  Negative Negative   SG 1.025 1.020   UBLD Negative Negative   URINEPH 5.5 6.0   PROTEIN Negative Negative   UROBILINOGEN 0.2 0.2   NITRITE Negative Negative   LEUKEST Negative Small*   RBCU  --  O - 2   WBCU  --  5-10*       PSA: 5.94    Post Void Residual:     Other labs: None today      IMPRESSION:  Penile curvature    PLAN:  He has developed penile curvature after what sounds like a potential traumatic event during intercourse 2 months ago.  He does have an area of scarring/potential Peyronie's plaque located on the right corporal body.  We discussed his options.  He could continue with the Trimix and see if the curvature slowly straightens itself out with more erections and with more time since his traumatic incident.  He could also consider injections or surgery for the penile curvature.  He is fairly anxious to get back to having intercourse as soon as possible so he would like to explore surgical options.  I recommended he seek consultation with Dr. Christopher Cody at the [facility] urology department for examination and to discuss possible surgical therapies.  I told him it was fine to continue to use the Trimix in the interim he should not inject directly into the site of the scarring/plaque.    David also already has plans to see me again in June for his next PSA for active surveillance for prostate cancer.      Mohsen Rao M.D.

## 2022-02-04 NOTE — NURSING NOTE
Chief Complaint   Patient presents with     Penile curvature     Pt states he has noticed this progress over the past couple months, causes pain with erections. Pt states the tightness has made it difficult to urinate. Pt states he thinks this was caused by the trimix    Norma Vu, CMA

## 2022-02-04 NOTE — LETTER
"2/4/2022       RE: Austyn Araujo  [address] [facility] [address]     Dear Colleague,    Thank you for referring your patient, Austyn Araujo, to the [facility]OLOGY [facility] [place] at [facility]. Please see a copy of my visit note below.    Office Visit Note  [facility]  [phone]    UROLOGIC DIAGNOSES:   Low risk prostate cancer  Enlarged prostate  Erectile dysfunction    CURRENT INTERVENTIONS:   Active surveillance  Prostate biopsies x2  Trimix injections    HISTORY:   David returns to clinic today because he is concerned about Peyronie's disease.  He says that about 2 months ago he had an accident during intercourse in which he think he \"missed \"and he had pain in the penis afterwards.  He noted no swelling or trauma.  He says that ever since that incident he has had upwards curvature of the penis.  He is still able to get a good erection with the Trimix at a low dose of 0.15 mL.  However, he says that the curvature is a roughly 90 degree curvature upwards and slightly towards the right.  He says he can feel a firm area in the penis as well.      PAST MEDICAL HISTORY:   Past Medical History:   Diagnosis Date     Anxiety      Anxiety 1/31/2015     BPH (benign prostatic hyperplasia)      Degenerative disc disease      Depression      Diabetes mellitus (H)      Gastritis      Gastro-oesophageal reflux disease      H/O alcohol abuse      Hyperlipidemia      Hypertension      Low testosterone      MRSA (methicillin resistant staph aureus) culture positive 8/2013    skin infection     Mumps      PUD (peptic ulcer disease)      Sleep apnea      STD (sexually transmitted disease)        PAST SURGICAL HISTORY:   Past Surgical History:   Procedure Laterality Date     COLONOSCOPY  11/11/2013    Procedure: COMBINED COLONOSCOPY, SINGLE BIOPSY/POLYPECTOMY BY BIOPSY;  Colonoscopy/ polypectomy x2 by cold forceps    ;  Surgeon: Josesito, " Juan Carlos Ferguson MD;  Location: [facility] GI     ESOPHAGOSCOPY, GASTROSCOPY, DUODENOSCOPY (EGD), COMBINED N/A 9/22/2016    Procedure: COMBINED ESOPHAGOSCOPY, GASTROSCOPY, DUODENOSCOPY (EGD), BIOPSY SINGLE OR MULTIPLE;  Surgeon: Juan Carlos Barraza MD;  Location: [facility] GI     ESOPHAGOSCOPY, GASTROSCOPY, DUODENOSCOPY (EGD), COMBINED N/A 2/19/2019    Procedure: ESOPHAGOSCOPY, GASTROSCOPY, DUODENOSCOPY (EGD) with cold forcep;  Surgeon: Juan Carlos Barraza MD;  Location: [facility] GI     HERNIA REPAIR       ORTHOPEDIC SURGERY         FAMILY HISTORY:   Family History   Problem Relation Age of Onset     Pancreatic Cancer Mother      Pancreatic Cancer Paternal Grandfather      Diabetes Brother      Depression Brother      Suicide Brother      Substance Abuse Brother      Dementia Father      Parkinsonism Father      Family History Negative No family hx of      Colon Cancer No family hx of      Unknown/Adopted No family hx of      Anxiety Disorder No family hx of      Schizophrenia No family hx of      Bipolar Disorder No family hx of      Gooding Disease No family hx of      Autism Spectrum Disorder No family hx of      Intellectual Disability (Mental Retardation) No family hx of      Mental Illness No family hx of        SOCIAL HISTORY:   Social History     Socioeconomic History     Marital status:      Spouse name: None     Number of children: None     Years of education: None     Highest education level: None   Occupational History     None   Tobacco Use     Smoking status: Former Smoker     Types: Cigarettes     Smokeless tobacco: Never Used   Substance and Sexual Activity     Alcohol use: No     Alcohol/week: 0.0 standard drinks     Comment: 14 drinks weekly, quit on 08/2017     Drug use: No     Sexual activity: Yes     Partners: Female   Other Topics Concern     Parent/sibling w/ CABG, MI or angioplasty before 65F 55M? Not Asked   Social History Narrative     None     Social Determinants of Health     Financial Resource Strain:  Not on file   Food Insecurity: Not on file   Transportation Needs: Not on file   Physical Activity: Not on file   Stress: Not on file   Social Connections: Not on file   Intimate Partner Violence: Not on file   Housing Stability: Not on file       Review Of Systems:  Skin: No rash, pruritis, or skin pigmentation  Eyes: No changes in vision  Ears/Nose/Throat: No changes in hearing, no nosebleeds  Respiratory: No shortness of breath, dyspnea on exertion, cough, or hemoptysis  Cardiovascular: No chest pain or palpitations  Gastrointestinal: No diarrhea or constipation. No abdominal pain. No hematochezia  Genitourinary: see HPI  Musculoskeletal: No pain or swelling of joints, normal range of motion  Neurologic: No weakness or tremors  Psychiatric: No recent changes in memory or mood  Hematologic/Lymphatic/Immunologic: No easy bruising or enlarged lymph nodes  Endocrine: No weight gain or loss      PHYSICAL EXAM:    There were no vitals taken for this visit.    Constitutional: Well developed. Conversant and in no acute distress  Eyes: Anicteric sclera, conjunctiva clear, normal extraocular movements  ENT: Normocephalic and atraumatic,   Skin: Warm and dry. No rashes or lesions  Cardiac: No peripheral edema  Back/Flank: Not done  CNS/PNS: Normal musculature and movements, moves all extremities normally  Respiratory: Normal non-labored breathing  Abdomen: Soft nontender and nondistended  Peripheral Vascular: No peripheral edema  Mental Status/Psych: Alert and Oriented x 3. Normal mood and affect    Penis: He does have what feels to be a Peyronie's plaque located on the dorsum of the penis over the right cavernosal body.  He is circumcised but he does have foreskin covering the glans penis.  He says that penile shortening has resulted in the foreskin covering the glans penis again.  No phimosis.  Scrotal skin: Normal, no lesions  Testicles: Normal to palpation bilaterally  Epididymis: Normal to palpation  bilaterally  Lymphatic: Normal inguinal lymph nodes    Digital Rectal Exam:     Cystoscopy: Not done    Imaging: None    Urinalysis: UA RESULTS:  Recent Labs   Lab Test 01/11/21  0805 02/05/19  1413   COLOR Yellow Yellow   APPEARANCE Clear Clear   URINEGLC >=1000* 100*   URINEBILI Negative Negative   URINEKETONE Negative Negative   SG 1.025 1.020   UBLD Negative Negative   URINEPH 5.5 6.0   PROTEIN Negative Negative   UROBILINOGEN 0.2 0.2   NITRITE Negative Negative   LEUKEST Negative Small*   RBCU  --  O - 2   WBCU  --  5-10*       PSA: 5.94    Post Void Residual:     Other labs: None today      IMPRESSION:  Penile curvature    PLAN:  He has developed penile curvature after what sounds like a potential traumatic event during intercourse 2 months ago.  He does have an area of scarring/potential Peyronie's plaque located on the right corporal body.  We discussed his options.  He could continue with the Trimix and see if the curvature slowly straightens itself out with more erections and with more time since his traumatic incident.  He could also consider injections or surgery for the penile curvature.  He is fairly anxious to get back to having intercourse as soon as possible so he would like to explore surgical options.  I recommended he seek consultation with Dr. Christopher Cody at the [facility] urology department for examination and to discuss possible surgical therapies.  I told him it was fine to continue to use the Trimix in the interim he should not inject directly into the site of the scarring/plaque.    David also already has plans to see me again in June for his next PSA for active surveillance for prostate cancer.      Mohsen Rao M.D.

## 2022-02-12 ENCOUNTER — TELEPHONE (OUTPATIENT)
Dept: UROLOGY | Facility: CLINIC | Age: 67
End: 2022-02-12
Payer: COMMERCIAL

## 2022-02-12 NOTE — TELEPHONE ENCOUNTER
----- Message from Brian Tidwell sent at 2/7/2022  2:38 PM CST -----  Regarding: RE: Escobar  Yes, end of March is okay  ----- Message -----  From: Sneha Macedo  Sent: 2/7/2022   1:25 PM CST  To: Brian Tidwell  Subject: Escobar                                            Is this ok to wait? He is booked till the end of March  ----- Message -----  From: Julia Coats  Sent: 2/4/2022   8:29 AM CST  To: [facility]    Dr. Rao would like this patient to see Dr. Cody for collin. Can you please give him a call? Thanks!

## 2022-02-12 NOTE — TELEPHONE ENCOUNTER
Spoke with patient and got him scheduled with Dr Cody on 3/25 7am. Address was given [facility]

## 2022-02-15 ENCOUNTER — PRE VISIT (OUTPATIENT)
Dept: UROLOGY | Facility: CLINIC | Age: 67
End: 2022-02-15
Payer: COMMERCIAL

## 2022-02-20 ENCOUNTER — HEALTH MAINTENANCE LETTER (OUTPATIENT)
Age: 67
End: 2022-02-20

## 2022-02-28 ENCOUNTER — PRE VISIT (OUTPATIENT)
Dept: UROLOGY | Facility: CLINIC | Age: 67
End: 2022-02-28
Payer: COMMERCIAL

## 2022-02-28 NOTE — TELEPHONE ENCOUNTER
Reason for visit: Consult     Relevant information: Peyronie's disease    Records/imaging/labs/orders: in EPIC    Pt called: no    At Rooming: normal

## 2022-03-25 ENCOUNTER — OFFICE VISIT (OUTPATIENT)
Dept: UROLOGY | Facility: CLINIC | Age: 67
End: 2022-03-25
Payer: COMMERCIAL

## 2022-03-25 VITALS
HEART RATE: 73 BPM | BODY MASS INDEX: 29.52 KG/M2 | SYSTOLIC BLOOD PRESSURE: 122 MMHG | DIASTOLIC BLOOD PRESSURE: 77 MMHG | HEIGHT: 74 IN | WEIGHT: 230 LBS

## 2022-03-25 DIAGNOSIS — N52.9 VASCULOGENIC ERECTILE DYSFUNCTION, UNSPECIFIED VASCULOGENIC ERECTILE DYSFUNCTION TYPE: Primary | ICD-10-CM

## 2022-03-25 DIAGNOSIS — R97.20 ELEVATED PSA: ICD-10-CM

## 2022-03-25 DIAGNOSIS — N48.6 PEYRONIE'S DISEASE: ICD-10-CM

## 2022-03-25 PROCEDURE — 99215 OFFICE O/P EST HI 40 MIN: CPT | Performed by: UROLOGY

## 2022-03-25 ASSESSMENT — PAIN SCALES - GENERAL: PAINLEVEL: NO PAIN (0)

## 2022-03-25 ASSESSMENT — PATIENT HEALTH QUESTIONNAIRE - PHQ9: SUM OF ALL RESPONSES TO PHQ QUESTIONS 1-9: 3

## 2022-03-25 NOTE — LETTER
Coding for Erectile Dysfunction and Peyronie's disease:    Diagnosis codes  1. Peyronie's disease N48.6  2. Erectile dysfunction N52.9      Procedure Codes (CPT codes) possible  1. Penile plication 65048  2. Plaque excision and grafting 91644 (<5cm) or 25417 (>5cm)  3. Inflatable penile prothesis implantation. 86985

## 2022-03-25 NOTE — PROGRESS NOTES
"HPI:  Austyn Araujo is a 66 year old male being seen for Peyronie's disease.  Referral from [facility] urology Dr. Rao.    History of erectile dysfunction- failed high dose PDE5i, and has been using intracavernosal injections.  He had a traumatic injury about 3 months ago, felt a snap during coitus, and has developed a 90 degree dorsal curvature- up and to his right, in the last 3 month period.    No significant penile pain at this time.  This curve severely precludes his ability to have intercourse.  He's also noticing shortening.  This curve makes it more difficult to void, especially at night or morning if wakes with a partial erection.    He thinks the curve is slowly worsening recently , also.    Elevated PSA, negative prostate biopsy Nov 2021.      Past Medical History:   Diagnosis Date     Anxiety      Anxiety 1/31/2015     BPH (benign prostatic hyperplasia)      Degenerative disc disease      Depression      Diabetes mellitus (H)      Gastritis      Gastro-oesophageal reflux disease      H/O alcohol abuse      Hyperlipidemia      Hypertension      Low testosterone      MRSA (methicillin resistant staph aureus) culture positive 8/2013    skin infection     Mumps      PUD (peptic ulcer disease)      Sleep apnea      STD (sexually transmitted disease)           Exam:  Physical Exam  /77   Pulse 73   Ht 1.88 m (6' 2\")   Wt 104.3 kg (230 lb)   BMI 29.53 kg/m      General: Alert, oriented, nad.  Pleasant and conversant.  Eyes: anicteric, EOMI.  Pulse: regular  Resps: normal, non-labored.  Abdomen:  Nondistended.  Neurological - no tremors  Skin - no discoloration/ lesions noted   exam   Phallus uncircumcised,  There is a dense dorsal scar running from just proxmial to the corona back to the proximal shaft, about 2x5cm.  Testes ++, anodular, nontender.  BEN deferred.     Review of Imaging:  The following imaging exam was reviewed :    8/2/21 Prostate MRI:  pirads 2         Review of Labs:  The " following labs were reviewed by me and discussed with the patient:      Lab Results   Component Value Date    PSA 5.94 07/08/2021    PSA 5.30 11/16/2020    PSA 6.05 06/18/2020    PSA 5.66 02/20/2020    PSA 5.60 11/18/2019    PSA 5.11 02/05/2019    PSA 5.05 04/25/2018    PSA 4.39 11/29/2017    PSA 2.97 09/30/2015    PSA 2.45 05/27/2014     Prostate biopsy 11/16/21 no evidence of malignancy/ HGPIN.      Assessment & Plan   1. Elevated PSA, history of negative prostate biopsy recently.  Continue follow-up with Dr. Rao for this.  2. Peyronie's disease   3. Erectile dysfunction       Discussed that plaque excision and grafting is really the only recommended fix for his combination of ED and Peyronie's disease with such a severe curvature.  Plication is going to cause further shortening and not fix the ED.  Plaque excision and grafting is not recommended.    The risks of plaque excision and grafting were discussed (numbness, continued curvature, de tigre erectile dysfunction, pain, infection, possible plication also required). Discussed that IPP would be required as well, which has its own set of possible complications.    I discussed with him that I would refuse to operate until he has at least 6 months of stable disease without worsening of the curvature.  I gave him a few other names to get 2nd opinions as well as he's anxious to get this repaired ASAP.  I advised against surgery in the active phase of the disease, however.    I would see him back as soon as 6 months to discuss his status.  In the meantime recommended he avoid intracavernosal injections, avoid intercourse, avoid penile trauma or traction device for fear of further worsening of the scar.       Christopher Cody MD  [facility] [place]      ==========================      Additional Coding Information:    Problems:  4 -- one acute complicated injury    Data Reviewed  3 or more studies reviewed, as listed above     Tests ordered:  N/A     Level of risk:  3 -- low risk (e.g., OTC medication or observation, minor surgery without risks)    Time spent:  41 minutes spent on the date of the encounter doing chart review, history and exam, documentation and further activities per the note

## 2022-03-25 NOTE — LETTER
"3/25/2022       RE: Austyn Araujo  [id number] [address] [facility] [id number]     Dear Colleague,    Thank you for referring your patient, Austyn Araujo, to the [facility] [place] [facility]. Please see a copy of my visit note below.    Chief Complaint   Patient presents with     Follow Up     Peyronie's disease, \"due to an injury, has been this way for several months\"       Blood pressure 122/77, pulse 73, height 1.88 m (6' 2\"), weight 104.3 kg (230 lb). Body mass index is 29.53 kg/m .    Patient Active Problem List   Diagnosis     Advanced directives, counseling/discussion     Type 2 diabetes mellitus with peripheral autonomic neuropathy (H)     GERD (gastroesophageal reflux disease)     PUD (peptic ulcer disease)     Low testosterone     HTN (hypertension)     Mild major depression (H)     Hyperlipidemia LDL goal <100     Erectile dysfunction     BPH (benign prostatic hyperplasia)     Peripheral neuropathy     Cellulitis     Hypertension goal BP (blood pressure) < 140/90     MRSA (methicillin resistant staph aureus) culture positive     Anxiety     Health Care Home     Type 2 diabetes mellitus with diabetic neuropathy (H)     Hemoglobin A1c less than 7.0%     Abdominal pain     Pancreatitis     Controlled substance agreement signed     Lyme disease       No Known Allergies    Current Outpatient Medications   Medication Sig Dispense Refill     aspirin 81 MG tablet Take 1 tablet by mouth daily        atorvastatin (LIPITOR) 40 MG tablet TAKE 1 TABLET BY MOUTH EVERY DAY 90 tablet 2     B-D LUER-ROCHELLE SYRINGE 22G X 1-1/2\" 3 ML MISC USE WITH TESTOSTERONE 12 each 3     BD HYPODERMIC NEEDLE 18G X 1\" MISC USE TO ADMINISTER TESTOSTERONE. DUE FOR APPOINTMENT WITH YOUR DOCTOR 6 each 3     glimepiride (AMARYL) 4 MG tablet TAKE 1 TABLET BY MOUTH ONCE DAILY IN THE MORNING BEFORE BREAKFAST 90 tablet 0     glucosamine-chondroitinoitin 500-400 MG " tablet Take 1 tablet by mouth daily       insulin pen needle (32G X 4 MM) 32G X 4 MM miscellaneous Use One pen needle daily or as directed. 100 each 3     JANUVIA 100 MG tablet TAKE 1 TABLET BY MOUTH EVERY DAY 90 tablet 1     LANTUS SOLOSTAR 100 UNIT/ML soln INJECT 8 UNITS UNDER THE SKIN ONCE DAILY AT BEDTIME (140 DAY SUPPLY) 15 mL 0     lisinopril (ZESTRIL) 10 MG tablet Take 1 tablet (10 mg) by mouth daily 90 tablet 1     metFORMIN (GLUCOPHAGE-XR) 500 MG 24 hr tablet TAKE 4 TABLETS (2,000 MG) BY MOUTH DAILY (WITH DINNER) 360 tablet 0     Multiple Vitamins-Minerals (CENTRUM SILVER) per tablet Take 1 tablet by mouth daily 90 tablet 3     nitroGLYcerin (NITROSTAT) 0.4 MG sublingual tablet PLACE 1 TAB UNDER TONGUE EVERY 5 MINS X3 DOSES. IF SYMPTOMS PERSIST 5 MINS AFTER 1ST DOSE CALL 911. 25 tablet 1     omeprazole (PRILOSEC) 40 MG DR capsule TAKE 1 CAPSULE BY MOUTH EVERY DAY 90 capsule 3     ONETOUCH ULTRA test strip USE TO TEST BLOOD SUGARS ONE TIME DAILY OR AS DIRECTED. 100 strip 3     PHARMACIST CHOICE LANCETS [facility] [place]OD [place] 5 TIMES DAILY 100 each 3     tadalafil (CIALIS) 20 MG tablet Take 1 tablet (20 mg) by mouth daily Never use with nitroglycerin, terazosin or doxazosin. 88 tablet 3     tamsulosin (FLOMAX) 0.4 MG capsule TAKE 1 CAPSULE BY MOUTH DAILY AT BEDTIME (DUE FOR APPT) 90 capsule 3     testosterone cypionate (DEPOTESTOSTERONE) 200 MG/ML injection INJECT 2 ML INTO THE MUSCLE ONCE WEEKLY 10 mL 5     ALPRAZolam (XANAX) 0.5 MG tablet Take 1 tablet (0.5 mg) by mouth 3 times daily as needed for anxiety (Patient not taking: Reported on 2/4/2022) 30 tablet 0     atovaquone-proguanil (MALARONE) 250-100 MG tablet Take 1 tablet by mouth daily Start 2 days before travel and continue 7 days after return. (Patient not taking: Reported on 2/4/2022) 15 tablet 0     azithromycin (ZITHROMAX) 500 MG tablet Take one tablet daily for up to 3 days as needed for traveler's diarrhea (Patient not taking: Reported on  3/25/2022) 3 tablet 0     ciprofloxacin (CIPRO) 500 MG tablet TAKE 1 TABLET BY MOUTH 2 TIMES DAILY FOR 3 DAYS (Patient not taking: Reported on 2/4/2022)       COMPOUNDED NON-CONTROLLED SUBSTANCE (CMPD RX) - PHARMACY TO MIX COMPOUNDED MEDICATION Trimix #9. Dispense 1 vial plus diabetic U100 syringes 1 vial 11       Social History     Tobacco Use     Smoking status: Former Smoker     Types: Cigarettes     Smokeless tobacco: Never Used   Substance Use Topics     Alcohol use: No     Alcohol/week: 0.0 standard drinks     Comment: 14 drinks weekly, quit on 08/2017     Drug use: No       Jerome Hernandez, EMT  3/25/2022  7:08 AM    HPI:  Austyn Araujo is a 66 year old male being seen for Peyronie's disease.  Referral from [facility] urology Dr. Rao.    History of erectile dysfunction- failed high dose PDE5i, and has been using intracavernosal injections.  He had a traumatic injury about 3 months ago, felt a snap during coitus, and has developed a 90 degree dorsal curvature- up and to his right, in the last 3 month period.    No significant penile pain at this time.  This curve severely precludes his ability to have intercourse.  He's also noticing shortening.  This curve makes it more difficult to void, especially at night or morning if wakes with a partial erection.    He thinks the curve is slowly worsening recently , also.    Elevated PSA, negative prostate biopsy Nov 2021.      Past Medical History:   Diagnosis Date     Anxiety      Anxiety 1/31/2015     BPH (benign prostatic hyperplasia)      Degenerative disc disease      Depression      Diabetes mellitus (H)      Gastritis      Gastro-oesophageal reflux disease      H/O alcohol abuse      Hyperlipidemia      Hypertension      Low testosterone      MRSA (methicillin resistant staph aureus) culture positive 8/2013    skin infection     Mumps      PUD (peptic ulcer disease)      Sleep apnea      STD (sexually transmitted disease)           Exam:  Physical Exam  BP  "122/77   Pulse 73   Ht 1.88 m (6' 2\")   Wt 104.3 kg (230 lb)   BMI 29.53 kg/m      General: Alert, oriented, nad.  Pleasant and conversant.  Eyes: anicteric, EOMI.  Pulse: regular  Resps: normal, non-labored.  Abdomen:  Nondistended.  Neurological - no tremors  Skin - no discoloration/ lesions noted   exam   Phallus uncircumcised,  There is a dense dorsal scar running from just proxmial to the corona back to the proximal shaft, about 2x5cm.  Testes ++, anodular, nontender.  BEN deferred.     Review of Imaging:  The following imaging exam was reviewed :    8/2/21 Prostate MRI:  pirads 2         Review of Labs:  The following labs were reviewed by me and discussed with the patient:      Lab Results   Component Value Date    PSA 5.94 07/08/2021    PSA 5.30 11/16/2020    PSA 6.05 06/18/2020    PSA 5.66 02/20/2020    PSA 5.60 11/18/2019    PSA 5.11 02/05/2019    PSA 5.05 04/25/2018    PSA 4.39 11/29/2017    PSA 2.97 09/30/2015    PSA 2.45 05/27/2014     Prostate biopsy 11/16/21 no evidence of malignancy/ HGPIN.      Assessment & Plan   1. Elevated PSA, history of negative prostate biopsy recently.  Continue follow-up with Dr. Rao for this.  2. Peyronie's disease   3. Erectile dysfunction       Discussed that plaque excision and grafting is really the only recommended fix for his combination of ED and Peyronie's disease with such a severe curvature.  Plication is going to cause further shortening and not fix the ED.  Plaque excision and grafting is not recommended.    The risks of plaque excision and grafting were discussed (numbness, continued curvature, de tigre erectile dysfunction, pain, infection, possible plication also required). Discussed that IPP would be required as well, which has its own set of possible complications.    I discussed with him that I would refuse to operate until he has at least 6 months of stable disease without worsening of the curvature.  I gave him a few other names to get 2nd " opinions as well as he's anxious to get this repaired ASAP.  I advised against surgery in the active phase of the disease, however.    I would see him back as soon as 6 months to discuss his status.  In the meantime recommended he avoid intracavernosal injections, avoid intercourse, avoid penile trauma or traction device for fear of further worsening of the scar.       Christopher Cody MD  [facility] UROLOGY [facility] [place]      ==========================      Additional Coding Information:    Problems:  4 -- one acute complicated injury    Data Reviewed  3 or more studies reviewed, as listed above     Tests ordered: N/A     Level of risk:  3 -- low risk (e.g., OTC medication or observation, minor surgery without risks)    Time spent:  41 minutes spent on the date of the encounter doing chart review, history and exam, documentation and further activities per the note    Christopher Cody MD

## 2022-03-25 NOTE — PROGRESS NOTES
"Chief Complaint   Patient presents with     Follow Up     Peyronie's disease, \"due to an injury, has been this way for several months\"       Blood pressure 122/77, pulse 73, height 1.88 m (6' 2\"), weight 104.3 kg (230 lb). Body mass index is 29.53 kg/m .    Patient Active Problem List   Diagnosis     Advanced directives, counseling/discussion     Type 2 diabetes mellitus with peripheral autonomic neuropathy (H)     GERD (gastroesophageal reflux disease)     PUD (peptic ulcer disease)     Low testosterone     HTN (hypertension)     Mild major depression (H)     Hyperlipidemia LDL goal <100     Erectile dysfunction     BPH (benign prostatic hyperplasia)     Peripheral neuropathy     Cellulitis     Hypertension goal BP (blood pressure) < 140/90     MRSA (methicillin resistant staph aureus) culture positive     Anxiety     Health Care Home     Type 2 diabetes mellitus with diabetic neuropathy (H)     Hemoglobin A1c less than 7.0%     Abdominal pain     Pancreatitis     Controlled substance agreement signed     Lyme disease       No Known Allergies    Current Outpatient Medications   Medication Sig Dispense Refill     aspirin 81 MG tablet Take 1 tablet by mouth daily        atorvastatin (LIPITOR) 40 MG tablet TAKE 1 TABLET BY MOUTH EVERY DAY 90 tablet 2     B-D LUER-ROCHELLE SYRINGE 22G X 1-1/2\" 3 ML MISC USE WITH TESTOSTERONE 12 each 3     BD HYPODERMIC NEEDLE 18G X 1\" MISC USE TO ADMINISTER TESTOSTERONE. DUE FOR APPOINTMENT WITH YOUR DOCTOR 6 each 3     glimepiride (AMARYL) 4 MG tablet TAKE 1 TABLET BY MOUTH ONCE DAILY IN THE MORNING BEFORE BREAKFAST 90 tablet 0     glucosamine-chondroitinoitin 500-400 MG tablet Take 1 tablet by mouth daily       insulin pen needle (32G X 4 MM) 32G X 4 MM miscellaneous Use One pen needle daily or as directed. 100 each 3     JANUVIA 100 MG tablet TAKE 1 TABLET BY MOUTH EVERY DAY 90 tablet 1     LANTUS SOLOSTAR 100 UNIT/ML soln INJECT 8 UNITS UNDER THE SKIN ONCE DAILY AT BEDTIME (140 DAY " SUPPLY) 15 mL 0     lisinopril (ZESTRIL) 10 MG tablet Take 1 tablet (10 mg) by mouth daily 90 tablet 1     metFORMIN (GLUCOPHAGE-XR) 500 MG 24 hr tablet TAKE 4 TABLETS (2,000 MG) BY MOUTH DAILY (WITH DINNER) 360 tablet 0     Multiple Vitamins-Minerals (CENTRUM SILVER) per tablet Take 1 tablet by mouth daily 90 tablet 3     nitroGLYcerin (NITROSTAT) 0.4 MG sublingual tablet PLACE 1 TAB UNDER TONGUE EVERY 5 MINS X3 DOSES. IF SYMPTOMS PERSIST 5 MINS AFTER 1ST DOSE CALL 911. 25 tablet 1     omeprazole (PRILOSEC) 40 MG DR capsule TAKE 1 CAPSULE BY MOUTH EVERY DAY 90 capsule 3     ONETOUCH ULTRA test strip USE TO TEST BLOOD SUGARS ONE TIME DAILY OR AS DIRECTED. 100 strip 3     PHARMACIST CHOICE LANCETS MISC TEST BLOOD SUGARS 5 TIMES DAILY 100 each 3     tadalafil (CIALIS) 20 MG tablet Take 1 tablet (20 mg) by mouth daily Never use with nitroglycerin, terazosin or doxazosin. 88 tablet 3     tamsulosin (FLOMAX) 0.4 MG capsule TAKE 1 CAPSULE BY MOUTH DAILY AT BEDTIME (DUE FOR APPT) 90 capsule 3     testosterone cypionate (DEPOTESTOSTERONE) 200 MG/ML injection INJECT 2 ML INTO THE MUSCLE ONCE WEEKLY 10 mL 5     ALPRAZolam (XANAX) 0.5 MG tablet Take 1 tablet (0.5 mg) by mouth 3 times daily as needed for anxiety (Patient not taking: Reported on 2/4/2022) 30 tablet 0     atovaquone-proguanil (MALARONE) 250-100 MG tablet Take 1 tablet by mouth daily Start 2 days before travel and continue 7 days after return. (Patient not taking: Reported on 2/4/2022) 15 tablet 0     azithromycin (ZITHROMAX) 500 MG tablet Take one tablet daily for up to 3 days as needed for traveler's diarrhea (Patient not taking: Reported on 3/25/2022) 3 tablet 0     ciprofloxacin (CIPRO) 500 MG tablet TAKE 1 TABLET BY MOUTH 2 TIMES DAILY FOR 3 DAYS (Patient not taking: Reported on 2/4/2022)       COMPOUNDED NON-CONTROLLED SUBSTANCE (CMPD RX) - PHARMACY TO MIX COMPOUNDED MEDICATION Trimix #9. Dispense 1 vial plus diabetic U100 syringes 1 vial 11       Social  History     Tobacco Use     Smoking status: Former Smoker     Types: Cigarettes     Smokeless tobacco: Never Used   Substance Use Topics     Alcohol use: No     Alcohol/week: 0.0 standard drinks     Comment: 14 drinks weekly, quit on 08/2017     Drug use: No       Jerome Hernandez, EMT  3/25/2022  7:08 AM

## 2022-04-26 ENCOUNTER — MYC MEDICAL ADVICE (OUTPATIENT)
Dept: UROLOGY | Facility: CLINIC | Age: 67
End: 2022-04-26
Payer: COMMERCIAL

## 2022-04-26 NOTE — TELEPHONE ENCOUNTER
Bienvenido Cueto  You 1 hour ago (2:58 PM)     ZULEYMA Lubin - Drug, injection pricing and authorizations are through the [facility] Finance Team at [phone] or [employer] [email or web address]. You have to type in the entire e mail address. Thanks

## 2022-04-27 ENCOUNTER — TELEPHONE (OUTPATIENT)
Dept: UROLOGY | Facility: CLINIC | Age: 67
End: 2022-04-27
Payer: COMMERCIAL

## 2022-04-27 NOTE — TELEPHONE ENCOUNTER
Writer emailed infusion department and they stated:    Karen Raymond <[email or web address]>  Wed 4/27/2022 6:52 AM    Richardson Lubin,    Can you please have an order placed & let us know when that is complete?    Thank you,  Karen    Writer routing message to Dr Cody's nurse Yumi to assist.

## 2022-04-28 NOTE — TELEPHONE ENCOUNTER
Patient went to [facility] to see Dr Stewart and was calling the wrong office. He will call [facility]    Yumi Babb, RN, BSN  Care Coordinator Urology

## 2022-05-18 DIAGNOSIS — R79.89 LOW TESTOSTERONE: ICD-10-CM

## 2022-05-19 NOTE — TELEPHONE ENCOUNTER
Tadalafil (Cialis) 20 mg tablet  Routing refill request to provider for review/approval because:  Patient needs to be seen because it has been more than 1 year since last office visit.  Fails multiple items on protocol.  Drug interaction warning.      LOV 02/10/2021. [employer] message sent requesting appointment.

## 2022-05-23 ENCOUNTER — DOCUMENTATION ONLY (OUTPATIENT)
Dept: LAB | Facility: CLINIC | Age: 67
End: 2022-05-23
Payer: COMMERCIAL

## 2022-05-23 DIAGNOSIS — C61 PROSTATE CANCER (H): Primary | ICD-10-CM

## 2022-05-25 RX ORDER — TADALAFIL 20 MG/1
20 TABLET ORAL DAILY
Qty: 88 TABLET | Refills: 3 | Status: SHIPPED | OUTPATIENT
Start: 2022-05-25 | End: 2022-08-02

## 2022-06-02 ENCOUNTER — LAB (OUTPATIENT)
Dept: LAB | Facility: CLINIC | Age: 67
End: 2022-06-02
Payer: COMMERCIAL

## 2022-06-02 DIAGNOSIS — C61 PROSTATE CANCER (H): ICD-10-CM

## 2022-06-02 LAB — PSA SERPL-MCNC: 6.96 UG/L (ref 0–4)

## 2022-06-02 PROCEDURE — 36415 COLL VENOUS BLD VENIPUNCTURE: CPT

## 2022-06-02 PROCEDURE — 84153 ASSAY OF PSA TOTAL: CPT

## 2022-06-06 ENCOUNTER — VIRTUAL VISIT (OUTPATIENT)
Dept: UROLOGY | Facility: CLINIC | Age: 67
End: 2022-06-06
Payer: COMMERCIAL

## 2022-06-06 VITALS — BODY MASS INDEX: 29.52 KG/M2 | WEIGHT: 230 LBS | HEIGHT: 74 IN

## 2022-06-06 DIAGNOSIS — C61 PROSTATE CANCER (H): Primary | ICD-10-CM

## 2022-06-06 PROCEDURE — 99213 OFFICE O/P EST LOW 20 MIN: CPT | Mod: 95 | Performed by: UROLOGY

## 2022-06-06 ASSESSMENT — PAIN SCALES - GENERAL: PAINLEVEL: NO PAIN (0)

## 2022-06-06 NOTE — PROGRESS NOTES
SEND LINK TO CELL PHONE    David is a 66 year old who is being evaluated via a billable video visit.      How would you like to obtain your AVS? MyChart  If the video visit is dropped, the invitation should be resent by: Text to cell phone: [phone]  Will anyone else be joining your video visit? No         Office Visit Note  [facility]  [phone]    UROLOGIC DIAGNOSES:   Low risk prostate cancer  Enlarged prostate  Erectile dysfunction  Peyronie's disease    CURRENT INTERVENTIONS:   Active surveillance  Prostate biopsies x2  Trimix injections    HISTORY:   David is set up for virtual visit today for follow-up on his prostate cancer.  His most recent PSA went up to 6.96.  He continues to have no urinary symptoms or complaints.    With regards to his Peyronie's disease, he has sought consultation with both Dr. Cody as well as the [facility] and he has plans to start Xiaflex injections soon at the [facility].      PAST MEDICAL HISTORY:   Past Medical History:   Diagnosis Date     Anxiety      Anxiety 1/31/2015     BPH (benign prostatic hyperplasia)      Degenerative disc disease      Depression      Diabetes mellitus (H)      Gastritis      Gastro-oesophageal reflux disease      H/O alcohol abuse      Hyperlipidemia      Hypertension      Low testosterone      MRSA (methicillin resistant staph aureus) culture positive 8/2013    skin infection     Mumps      PUD (peptic ulcer disease)      Sleep apnea      STD (sexually transmitted disease)        PAST SURGICAL HISTORY:   Past Surgical History:   Procedure Laterality Date     COLONOSCOPY  11/11/2013    Procedure: COMBINED COLONOSCOPY, SINGLE BIOPSY/POLYPECTOMY BY BIOPSY;  Colonoscopy/ polypectomy x2 by cold forceps    ;  Surgeon: Juan Carlos Bellamy MD;  Location:  GI     ESOPHAGOSCOPY, GASTROSCOPY, DUODENOSCOPY (EGD), COMBINED N/A 9/22/2016    Procedure: COMBINED ESOPHAGOSCOPY, GASTROSCOPY, DUODENOSCOPY (EGD), BIOPSY SINGLE OR MULTIPLE;   Surgeon: Juan Carlos Barraza MD;  Location:  GI     ESOPHAGOSCOPY, GASTROSCOPY, DUODENOSCOPY (EGD), COMBINED N/A 2/19/2019    Procedure: ESOPHAGOSCOPY, GASTROSCOPY, DUODENOSCOPY (EGD) with cold forcep;  Surgeon: Juan Carlos Barraza MD;  Location:  GI     HERNIA REPAIR       ORTHOPEDIC SURGERY         FAMILY HISTORY:   Family History   Problem Relation Age of Onset     Pancreatic Cancer Mother      Pancreatic Cancer Paternal Grandfather      Diabetes Brother      Depression Brother      Suicide Brother      Substance Abuse Brother      Dementia Father      Parkinsonism Father      Family History Negative No family hx of      Colon Cancer No family hx of      Unknown/Adopted No family hx of      Anxiety Disorder No family hx of      Schizophrenia No family hx of      Bipolar Disorder No family hx of      [place] Disease No family hx of      Autism Spectrum Disorder No family hx of      Intellectual Disability (Mental Retardation) No family hx of      Mental Illness No family hx of        SOCIAL HISTORY:   Social History     Tobacco Use     Smoking status: Former Smoker     Types: Cigarettes     Smokeless tobacco: Never Used   Substance Use Topics     Alcohol use: No     Alcohol/week: 0.0 standard drinks     Comment: 14 drinks weekly, quit on 08/2017       REVIEW OF SYSTEMS:  Skin: No rash, pruritis, or skin pigmentation  Eyes: No changes in vision  Ears/Nose/Throat: No changes in hearing, no nosebleeds  Respiratory: No shortness of breath, dyspnea on exertion, cough, or hemoptysis  Cardiovascular: No chest pain or palpitations  Gastrointestinal: No diarrhea or constipation. No abdominal pain. No hematochezia  Genitourinary: see HPI  Musculoskeletal: No pain or swelling of joints, normal range of motion  Neurologic: No weakness or tremors  Psychiatric: No recent changes in memory or mood  Hematologic/Lymphatic/Immunologic: No easy bruising or enlarged lymph nodes  Endocrine: No weight gain or loss      PHYSICAL  EXAM:    General: Alert and oriented to time, place, and self. In NAD   HEENT: Head AT/NC, EOMI, CN Grossly intact   Lungs: no respiratory distress, or pursed lip breathing   Heart: No obvious jugular venous distension present   Musculoskeltal: Normal movements. Normal appearing musculature  Skin: no suspicious lesions or rashes   Neuro: Alert, oriented, speech and mentation normal; moving all 4 extremities equally.   Psych: affect and mood normal    Imaging: None    Urinalysis: UA RESULTS:  Recent Labs   Lab Test 01/11/21  0805 02/05/19  1413   COLOR Yellow Yellow   APPEARANCE Clear Clear   URINEGLC >=1000* 100*   URINEBILI Negative Negative   URINEKETONE Negative Negative   SG 1.025 1.020   UBLD Negative Negative   URINEPH 5.5 6.0   PROTEIN Negative Negative   UROBILINOGEN 0.2 0.2   NITRITE Negative Negative   LEUKEST Negative Small*   RBCU  --  O - 2   WBCU  --  5-10*       PSA: 6.96    Post Void Residual:     Other labs: None today      IMPRESSION:  Low risk prostate cancer  Enlarged prostate  Erectile dysfunction  Peyronie's disease    PLAN:  With regards to his prostate cancer, the PSA has risen in the past he year so I recommended that we follow the PSA more closely.  I recommended that we check his next PSA in 3 months.  In the meantime, he will begin Xiaflex injections at the [facility] shortly.    Mohsen Rao M.D.              Video Start Time: 12:06 PM  Video-Visit Details    Type of service:  Video Visit    Video End Time:12:10 PM    Originating Location (pt. Location): Home    Distant Location (provider location):  [facility] [place]     Platform used for Video Visit: IFTTT

## 2022-06-06 NOTE — LETTER
6/6/2022       RE: Austyn Araujo  [address]  [facility] [address]     Dear Colleague,    Thank you for referring your patient, Austyn Araujo, to the [facility] [place] at [facility]. Please see a copy of my visit note below.    SEND LINK TO CELL PHONE    David is a 66 year old who is being evaluated via a billable video visit.      How would you like to obtain your AVS? MyChart  If the video visit is dropped, the invitation should be resent by: Text to cell phone: [phone]  Will anyone else be joining your video visit? No         Office Visit Note  [facility]  [phone]    UROLOGIC DIAGNOSES:   Low risk prostate cancer  Enlarged prostate  Erectile dysfunction  Peyronie's disease    CURRENT INTERVENTIONS:   Active surveillance  Prostate biopsies x2  Trimix injections    HISTORY:   David is set up for virtual visit today for follow-up on his prostate cancer.  His most recent PSA went up to 6.96.  He continues to have no urinary symptoms or complaints.    With regards to his Peyronie's disease, he has sought consultation with both Dr. Cody as well as the [facility] and he has plans to start Xiaflex injections soon at the [facility].      PAST MEDICAL HISTORY:   Past Medical History:   Diagnosis Date     Anxiety      Anxiety 1/31/2015     BPH (benign prostatic hyperplasia)      Degenerative disc disease      Depression      Diabetes mellitus (H)      Gastritis      Gastro-oesophageal reflux disease      H/O alcohol abuse      Hyperlipidemia      Hypertension      Low testosterone      MRSA (methicillin resistant staph aureus) culture positive 8/2013    skin infection     Mumps      PUD (peptic ulcer disease)      Sleep apnea      STD (sexually transmitted disease)        PAST SURGICAL HISTORY:   Past Surgical History:   Procedure Laterality Date     COLONOSCOPY  11/11/2013    Procedure: COMBINED COLONOSCOPY,  SINGLE BIOPSY/POLYPECTOMY BY BIOPSY;  Colonoscopy/ polypectomy x2 by cold forceps    ;  Surgeon: Juan Carlos Bellamy MD;  Location:  GI     ESOPHAGOSCOPY, GASTROSCOPY, DUODENOSCOPY (EGD), COMBINED N/A 9/22/2016    Procedure: COMBINED ESOPHAGOSCOPY, GASTROSCOPY, DUODENOSCOPY (EGD), BIOPSY SINGLE OR MULTIPLE;  Surgeon: Juan Carlos Barraza MD;  Location:  GI     ESOPHAGOSCOPY, GASTROSCOPY, DUODENOSCOPY (EGD), COMBINED N/A 2/19/2019    Procedure: ESOPHAGOSCOPY, GASTROSCOPY, DUODENOSCOPY (EGD) with cold forcep;  Surgeon: Juan Carlos Barraza MD;  Location:  GI     HERNIA REPAIR       ORTHOPEDIC SURGERY         FAMILY HISTORY:   Family History   Problem Relation Age of Onset     Pancreatic Cancer Mother      Pancreatic Cancer Paternal Grandfather      Diabetes Brother      Depression Brother      Suicide Brother      Substance Abuse Brother      Dementia Father      Parkinsonism Father      Family History Negative No family hx of      Colon Cancer No family hx of      Unknown/Adopted No family hx of      Anxiety Disorder No family hx of      Schizophrenia No family hx of      Bipolar Disorder No family hx of      [place] Disease No family hx of      Autism Spectrum Disorder No family hx of      Intellectual Disability (Mental Retardation) No family hx of      Mental Illness No family hx of        SOCIAL HISTORY:   Social History     Tobacco Use     Smoking status: Former Smoker     Types: Cigarettes     Smokeless tobacco: Never Used   Substance Use Topics     Alcohol use: No     Alcohol/week: 0.0 standard drinks     Comment: 14 drinks weekly, quit on 08/2017       REVIEW OF SYSTEMS:  Skin: No rash, pruritis, or skin pigmentation  Eyes: No changes in vision  Ears/Nose/Throat: No changes in hearing, no nosebleeds  Respiratory: No shortness of breath, dyspnea on exertion, cough, or hemoptysis  Cardiovascular: No chest pain or palpitations  Gastrointestinal: No diarrhea or constipation. No abdominal pain. No  hematochezia  Genitourinary: see HPI  Musculoskeletal: No pain or swelling of joints, normal range of motion  Neurologic: No weakness or tremors  Psychiatric: No recent changes in memory or mood  Hematologic/Lymphatic/Immunologic: No easy bruising or enlarged lymph nodes  Endocrine: No weight gain or loss      PHYSICAL EXAM:    General: Alert and oriented to time, place, and self. In NAD   HEENT: Head AT/NC, EOMI, CN Grossly intact   Lungs: no respiratory distress, or pursed lip breathing   Heart: No obvious jugular venous distension present   Musculoskeltal: Normal movements. Normal appearing musculature  Skin: no suspicious lesions or rashes   Neuro: Alert, oriented, speech and mentation normal; moving all 4 extremities equally.   Psych: affect and mood normal    Imaging: None    Urinalysis: UA RESULTS:  Recent Labs   Lab Test 01/11/21  0805 02/05/19  1413   COLOR Yellow Yellow   APPEARANCE Clear Clear   URINEGLC >=1000* 100*   URINEBILI Negative Negative   URINEKETONE Negative Negative   SG 1.025 1.020   UBLD Negative Negative   URINEPH 5.5 6.0   PROTEIN Negative Negative   UROBILINOGEN 0.2 0.2   NITRITE Negative Negative   LEUKEST Negative Small*   RBCU  --  O - 2   WBCU  --  5-10*       PSA: 6.96    Post Void Residual:     Other labs: None today      IMPRESSION:  Low risk prostate cancer  Enlarged prostate  Erectile dysfunction  Peyronie's disease    PLAN:  With regards to his prostate cancer, the PSA has risen in the past he year so I recommended that we follow the PSA more closely.  I recommended that we check his next PSA in 3 months.  In the meantime, he will begin Xiaflex injections at the [facility] shortly.    Mohsen Rao M.D.              Video Start Time: 12:06 PM  Video-Visit Details    Type of service:  Video Visit    Video End Time:12:10 PM    Originating Location (pt. Location): Home    Distant Location (provider location):  [facility]     Platform used for  Video Visit: Luis

## 2022-06-12 ENCOUNTER — HEALTH MAINTENANCE LETTER (OUTPATIENT)
Age: 67
End: 2022-06-12

## 2022-06-22 DIAGNOSIS — Z71.84 ENCOUNTER FOR COUNSELING FOR TRAVEL: ICD-10-CM

## 2022-06-23 ENCOUNTER — MYC MEDICAL ADVICE (OUTPATIENT)
Dept: INTERNAL MEDICINE | Facility: CLINIC | Age: 67
End: 2022-06-23

## 2022-06-23 DIAGNOSIS — R79.89 LOW TESTOSTERONE: ICD-10-CM

## 2022-06-23 DIAGNOSIS — E11.43 TYPE 2 DIABETES MELLITUS WITH PERIPHERAL AUTONOMIC NEUROPATHY (H): ICD-10-CM

## 2022-06-23 DIAGNOSIS — K21.9 GASTROESOPHAGEAL REFLUX DISEASE WITHOUT ESOPHAGITIS: ICD-10-CM

## 2022-06-23 RX ORDER — TESTOSTERONE CYPIONATE 200 MG/ML
INJECTION, SOLUTION INTRAMUSCULAR
Qty: 10 ML | OUTPATIENT
Start: 2022-06-23

## 2022-06-23 RX ORDER — OMEPRAZOLE 40 MG/1
CAPSULE, DELAYED RELEASE ORAL
Qty: 90 CAPSULE | Refills: 3 | OUTPATIENT
Start: 2022-06-23

## 2022-06-23 RX ORDER — INSULIN GLARGINE 100 [IU]/ML
INJECTION, SOLUTION SUBCUTANEOUS
Qty: 5 ML | Refills: 0 | Status: SHIPPED | OUTPATIENT
Start: 2022-06-23 | End: 2022-11-04

## 2022-06-23 NOTE — TELEPHONE ENCOUNTER
Pending Prescriptions:                       Disp   Refills    testosterone cypionate (DEPOTESTOSTERONE) *10 mL           Sig: INJECT 2 ML INTO THE MUSCLE ONCE WEEKLY    Routing refill request to provider for review/approval because:  Refills for this classification require provider review    Please advise, thanks. Routed to covering provider(s).

## 2022-06-23 NOTE — TELEPHONE ENCOUNTER
Insulin Glargine (Lantus Solostar) 100 unit/ml pen  Routing refill request to provider for review/approval because:  Bernie given x1 and patient did not follow up, please advise  Labs not current:  Hgb A1c  Patient needs to be seen because it has been more than 1 year since last office visit.        Omeprazole (Prilosec) 40 mg dr cap  Routing refill request to provider for review/approval because:  Patient needs to be seen because it has been more than 1 year since last office visit.    LOV 02/2021    [employer] message sent in May 2022 requesting appointment. Message read, appointment not made.

## 2022-06-24 DIAGNOSIS — R79.89 LOW TESTOSTERONE: ICD-10-CM

## 2022-06-24 RX ORDER — SYRINGE WITH NEEDLE, 1 ML 25GX5/8"
SYRINGE, EMPTY DISPOSABLE MISCELLANEOUS
Qty: 12 EACH | Refills: 3 | Status: SHIPPED | OUTPATIENT
Start: 2022-06-24 | End: 2024-08-29

## 2022-06-27 NOTE — TELEPHONE ENCOUNTER
Patient has not seen myc message.  Called and left message to call back or check his myc message.

## 2022-06-28 RX ORDER — ATOVAQUONE AND PROGUANIL HYDROCHLORIDE 250; 100 MG/1; MG/1
1 TABLET, FILM COATED ORAL DAILY
Qty: 15 TABLET | Refills: 0 | OUTPATIENT
Start: 2022-06-28

## 2022-06-29 RX ORDER — NEEDLES, DISPOSABLE 25GX5/8"
NEEDLE, DISPOSABLE MISCELLANEOUS
Qty: 6 EACH | Refills: 3 | Status: SHIPPED | OUTPATIENT
Start: 2022-06-29 | End: 2022-08-18

## 2022-07-26 DIAGNOSIS — R79.89 LOW TESTOSTERONE: ICD-10-CM

## 2022-07-29 NOTE — TELEPHONE ENCOUNTER
Pending Prescriptions:                       Disp   Refills    tadalafil (CIALIS) 20 MG tablet            88 tab*3        Sig: Take 1 tablet (20 mg) by mouth daily Never use with           nitroglycerin, terazosin or doxazosin.    Routing refill request to provider for review/approval because:  Needs provider review

## 2022-08-02 RX ORDER — TADALAFIL 20 MG/1
20 TABLET ORAL DAILY
Qty: 88 TABLET | Refills: 3 | Status: SHIPPED | OUTPATIENT
Start: 2022-08-02 | End: 2022-08-16

## 2022-08-16 DIAGNOSIS — I20.89 STABLE ANGINA PECTORIS (H): ICD-10-CM

## 2022-08-16 DIAGNOSIS — K21.9 GASTROESOPHAGEAL REFLUX DISEASE WITHOUT ESOPHAGITIS: ICD-10-CM

## 2022-08-16 DIAGNOSIS — R79.89 LOW TESTOSTERONE: ICD-10-CM

## 2022-08-16 DIAGNOSIS — E11.43 TYPE 2 DIABETES MELLITUS WITH PERIPHERAL AUTONOMIC NEUROPATHY (H): ICD-10-CM

## 2022-08-16 DIAGNOSIS — I25.10 CAD IN NATIVE ARTERY: ICD-10-CM

## 2022-08-18 RX ORDER — TESTOSTERONE CYPIONATE 200 MG/ML
INJECTION, SOLUTION INTRAMUSCULAR
Qty: 10 ML | Refills: 5 | Status: SHIPPED | OUTPATIENT
Start: 2022-08-18 | End: 2023-02-20

## 2022-08-18 RX ORDER — NITROGLYCERIN 0.4 MG/1
TABLET SUBLINGUAL
Qty: 25 TABLET | Refills: 1 | Status: SHIPPED | OUTPATIENT
Start: 2022-08-18 | End: 2023-11-03

## 2022-08-18 RX ORDER — OMEPRAZOLE 40 MG/1
CAPSULE, DELAYED RELEASE ORAL
Qty: 90 CAPSULE | Refills: 3 | Status: SHIPPED | OUTPATIENT
Start: 2022-08-18 | End: 2023-09-01

## 2022-08-18 RX ORDER — NEEDLES, DISPOSABLE 25GX5/8"
NEEDLE, DISPOSABLE MISCELLANEOUS
Qty: 6 EACH | Refills: 3 | Status: SHIPPED | OUTPATIENT
Start: 2022-08-18

## 2022-08-18 NOTE — TELEPHONE ENCOUNTER
Omeprazole 40 mg cap  Routing refill request to provider for review/approval because:  Bernie given x1 and patient did not follow up, please advise  Patient needs to be seen because it has been more than 1 year since last office visit.    Testosterone Inj 200 mg/ml  Routing refill request to provider for review/approval because:  Bernie given x1 and patient did not follow up, please advise  Labs not current:  All  Patient needs to be seen because it has been more than 1 year since last office visit.    Needle  Routing refill request to provider for review/approval because:  Bernie given x1 and patient did not follow up, please advise  Patient needs to be seen because it has been more than 1 year since last office visit.    Nitroglycerin 0.4 mg tab  Routing refill request to provider for review/approval because:  Bernie given x1 and patient did not follow up, please advise  Labs not current:  All  Patient needs to be seen because it has been more than 1 year since last office visit.  Order needs review    Insulin Pen Needle  Routing refill request to provider for review/approval because:  Bernie given x1 and patient did not follow up, please advise  Labs not current:  All  Patient needs to be seen because it has been more than 1 year since last office visit.    LOV 02/10/2021

## 2022-09-07 ENCOUNTER — LAB (OUTPATIENT)
Dept: LAB | Facility: CLINIC | Age: 67
End: 2022-09-07
Payer: COMMERCIAL

## 2022-09-07 DIAGNOSIS — C61 PROSTATE CANCER (H): ICD-10-CM

## 2022-09-07 PROCEDURE — 36415 COLL VENOUS BLD VENIPUNCTURE: CPT

## 2022-09-07 PROCEDURE — 84153 ASSAY OF PSA TOTAL: CPT

## 2022-09-08 LAB — PSA SERPL-MCNC: 7.19 UG/L (ref 0–4)

## 2022-09-12 ENCOUNTER — VIRTUAL VISIT (OUTPATIENT)
Dept: UROLOGY | Facility: CLINIC | Age: 67
End: 2022-09-12
Payer: COMMERCIAL

## 2022-09-12 VITALS — WEIGHT: 225 LBS | BODY MASS INDEX: 29.82 KG/M2 | HEIGHT: 73 IN

## 2022-09-12 DIAGNOSIS — N52.9 ERECTILE DYSFUNCTION, UNSPECIFIED ERECTILE DYSFUNCTION TYPE: ICD-10-CM

## 2022-09-12 DIAGNOSIS — C61 PROSTATE CANCER (H): Primary | ICD-10-CM

## 2022-09-12 PROCEDURE — 99214 OFFICE O/P EST MOD 30 MIN: CPT | Mod: 95 | Performed by: UROLOGY

## 2022-09-12 ASSESSMENT — PAIN SCALES - GENERAL: PAINLEVEL: MILD PAIN (2)

## 2022-09-12 NOTE — LETTER
9/12/2022       RE: Austyn Araujo  [address]  [place] [address]     Dear Colleague,    Thank you for referring your patient, Austyn Araujo, to the [facility] [place] [facility]. Please see a copy of my visit note below.    Text link to cell phone.    David is a 66 year old who is being evaluated via a billable video visit.      How would you like to obtain your AVS? MyChart  If the video visit is dropped, the invitation should be resent by:   Will anyone else be joining your video visit? No        Office Visit Note  [facility]  [phone]    UROLOGIC DIAGNOSES:   Low risk prostate cancer  Enlarged prostate  Erectile dysfunction  Peyronie's disease    CURRENT INTERVENTIONS:   Active surveillance  Prostate biopsies x2  On flomax  Trimix injections  Xiaflex injections ([facility])    HISTORY:   David is set up for virtual visit today for continued active surveillance of his prostate cancer.  His PSA has risen further, currently at 7.19.  He has been receiving Xiaflex injections at the [facility] but has had no success with improvement of his curvature.  He is getting erections with Cialis.  He is having some bothersome urinary symptoms.      PAST MEDICAL HISTORY:   Past Medical History:   Diagnosis Date     Anxiety      Anxiety 1/31/2015     BPH (benign prostatic hyperplasia)      Degenerative disc disease      Depression      Diabetes mellitus (H)      Gastritis      Gastro-oesophageal reflux disease      H/O alcohol abuse      Hyperlipidemia      Hypertension      Low testosterone      MRSA (methicillin resistant staph aureus) culture positive 8/2013    skin infection     Mumps      PUD (peptic ulcer disease)      Sleep apnea      STD (sexually transmitted disease)        PAST SURGICAL HISTORY:   Past Surgical History:   Procedure Laterality Date     COLONOSCOPY  11/11/2013    Procedure: COMBINED COLONOSCOPY,  SINGLE BIOPSY/POLYPECTOMY BY BIOPSY;  Colonoscopy/ polypectomy x2 by cold forceps    ;  Surgeon: Juan Carlos Bellamy MD;  Location:  GI     ESOPHAGOSCOPY, GASTROSCOPY, DUODENOSCOPY (EGD), COMBINED N/A 9/22/2016    Procedure: COMBINED ESOPHAGOSCOPY, GASTROSCOPY, DUODENOSCOPY (EGD), BIOPSY SINGLE OR MULTIPLE;  Surgeon: Juan Carlos Barraza MD;  Location:  GI     ESOPHAGOSCOPY, GASTROSCOPY, DUODENOSCOPY (EGD), COMBINED N/A 2/19/2019    Procedure: ESOPHAGOSCOPY, GASTROSCOPY, DUODENOSCOPY (EGD) with cold forcep;  Surgeon: Juan Carlos Barraza MD;  Location:  GI     HERNIA REPAIR       ORTHOPEDIC SURGERY         FAMILY HISTORY:   Family History   Problem Relation Age of Onset     Pancreatic Cancer Mother      Pancreatic Cancer Paternal Grandfather      Diabetes Brother      Depression Brother      Suicide Brother      Substance Abuse Brother      Dementia Father      Parkinsonism Father      Family History Negative No family hx of      Colon Cancer No family hx of      Unknown/Adopted No family hx of      Anxiety Disorder No family hx of      Schizophrenia No family hx of      Bipolar Disorder No family hx of      [place] Disease No family hx of      Autism Spectrum Disorder No family hx of      Intellectual Disability (Mental Retardation) No family hx of      Mental Illness No family hx of        SOCIAL HISTORY:   Social History     Tobacco Use     Smoking status: Former Smoker     Types: Cigarettes     Smokeless tobacco: Never Used   Substance Use Topics     Alcohol use: No     Alcohol/week: 0.0 standard drinks     Comment: 14 drinks weekly, quit on 08/2017       REVIEW OF SYSTEMS:  Skin: No rash, pruritis, or skin pigmentation  Eyes: No changes in vision  Ears/Nose/Throat: No changes in hearing, no nosebleeds  Respiratory: No shortness of breath, dyspnea on exertion, cough, or hemoptysis  Cardiovascular: No chest pain or palpitations  Gastrointestinal: No diarrhea or constipation. No abdominal pain. No  hematochezia  Genitourinary: see HPI  Musculoskeletal: No pain or swelling of joints, normal range of motion  Neurologic: No weakness or tremors  Psychiatric: No recent changes in memory or mood  Hematologic/Lymphatic/Immunologic: No easy bruising or enlarged lymph nodes  Endocrine: No weight gain or loss      PHYSICAL EXAM:    General: Alert and oriented to time, place, and self. In NAD   HEENT: Head AT/NC, EOMI, CN Grossly intact   Lungs: no respiratory distress, or pursed lip breathing   Heart: No obvious jugular venous distension present   Musculoskeltal: Normal movements. Normal appearing musculature  Skin: no suspicious lesions or rashes   Neuro: Alert, oriented, speech and mentation normal; moving all 4 extremities equally.   Psych: affect and mood normal    Imaging: None    Urinalysis: UA RESULTS:  Recent Labs   Lab Test 01/11/21  0805 02/05/19  1413   COLOR Yellow Yellow   APPEARANCE Clear Clear   URINEGLC >=1000* 100*   URINEBILI Negative Negative   URINEKETONE Negative Negative   SG 1.025 1.020   UBLD Negative Negative   URINEPH 5.5 6.0   PROTEIN Negative Negative   UROBILINOGEN 0.2 0.2   NITRITE Negative Negative   LEUKEST Negative Small*   RBCU  --  O - 2   WBCU  --  5-10*       PSA: 7.19    Post Void Residual:     Other labs: None today      IMPRESSION:  Low risk prostate cancer, rising PSA  Nocturia    PLAN:  His PSA has risen over the past year. It has been less than one year since his most recent prostate biopsy.  We discussed options.    He is having bothersome urinary symptoms.  He could consider performing a robotic assisted laparoscopic radical prostatectomy in order to remove the prostate and relieve him of urinary symptoms and also remove the prostate cancer.  We discussed this as an option.    He could consider repeat prostate biopsy or MRI at this time as well.    We could also continue to follow the PSA.  However, I counseled him that if the PSA does continue to rise we will need to at  least repeat an MRI of the prostate or prostate biopsy.    He would like to continue to follow the PSA.  I will see him back in 6 months for another PSA check.      Mohsen Rao M.D.              Video-Visit Details    Video Start Time: 1:14 PM    Type of service:  Video Visit    Video End Time:1:40 PM    Originating Location (pt. Location): Home    Distant Location (provider location):  [facility] [place]     Platform used for Video Visit: [employer]

## 2022-09-12 NOTE — PROGRESS NOTES
Text link to cell phone.    David is a 66 year old who is being evaluated via a billable video visit.      How would you like to obtain your AVS? MyChart  If the video visit is dropped, the invitation should be resent by:   Will anyone else be joining your video visit? No        Office Visit Note  [facility]  [phone]    UROLOGIC DIAGNOSES:   Low risk prostate cancer  Enlarged prostate  Erectile dysfunction  Peyronie's disease    CURRENT INTERVENTIONS:   Active surveillance  Prostate biopsies x2  On flomax  Trimix injections  Xiaflex injections ([facility])    HISTORY:   David is set up for virtual visit today for continued active surveillance of his prostate cancer.  His PSA has risen further, currently at 7.19.  He has been receiving Xiaflex injections at the [facility] but has had no success with improvement of his curvature.  He is getting erections with Cialis.  He is having some bothersome urinary symptoms.      PAST MEDICAL HISTORY:   Past Medical History:   Diagnosis Date     Anxiety      Anxiety 1/31/2015     BPH (benign prostatic hyperplasia)      Degenerative disc disease      Depression      Diabetes mellitus (H)      Gastritis      Gastro-oesophageal reflux disease      H/O alcohol abuse      Hyperlipidemia      Hypertension      Low testosterone      MRSA (methicillin resistant staph aureus) culture positive 8/2013    skin infection     Mumps      PUD (peptic ulcer disease)      Sleep apnea      STD (sexually transmitted disease)        PAST SURGICAL HISTORY:   Past Surgical History:   Procedure Laterality Date     COLONOSCOPY  11/11/2013    Procedure: COMBINED COLONOSCOPY, SINGLE BIOPSY/POLYPECTOMY BY BIOPSY;  Colonoscopy/ polypectomy x2 by cold forceps    ;  Surgeon: Juan Carlos Bellamy MD;  Location:  GI     ESOPHAGOSCOPY, GASTROSCOPY, DUODENOSCOPY (EGD), COMBINED N/A 9/22/2016    Procedure: COMBINED ESOPHAGOSCOPY, GASTROSCOPY, DUODENOSCOPY (EGD), BIOPSY SINGLE OR MULTIPLE;   Surgeon: Juan Carlos Barraza MD;  Location:  GI     ESOPHAGOSCOPY, GASTROSCOPY, DUODENOSCOPY (EGD), COMBINED N/A 2/19/2019    Procedure: ESOPHAGOSCOPY, GASTROSCOPY, DUODENOSCOPY (EGD) with cold forcep;  Surgeon: Juan Carlos Barraza MD;  Location:  GI     HERNIA REPAIR       ORTHOPEDIC SURGERY         FAMILY HISTORY:   Family History   Problem Relation Age of Onset     Pancreatic Cancer Mother      Pancreatic Cancer Paternal Grandfather      Diabetes Brother      Depression Brother      Suicide Brother      Substance Abuse Brother      Dementia Father      Parkinsonism Father      Family History Negative No family hx of      Colon Cancer No family hx of      Unknown/Adopted No family hx of      Anxiety Disorder No family hx of      Schizophrenia No family hx of      Bipolar Disorder No family hx of      Parmele Disease No family hx of      Autism Spectrum Disorder No family hx of      Intellectual Disability (Mental Retardation) No family hx of      Mental Illness No family hx of        SOCIAL HISTORY:   Social History     Tobacco Use     Smoking status: Former Smoker     Types: Cigarettes     Smokeless tobacco: Never Used   Substance Use Topics     Alcohol use: No     Alcohol/week: 0.0 standard drinks     Comment: 14 drinks weekly, quit on 08/2017       REVIEW OF SYSTEMS:  Skin: No rash, pruritis, or skin pigmentation  Eyes: No changes in vision  Ears/Nose/Throat: No changes in hearing, no nosebleeds  Respiratory: No shortness of breath, dyspnea on exertion, cough, or hemoptysis  Cardiovascular: No chest pain or palpitations  Gastrointestinal: No diarrhea or constipation. No abdominal pain. No hematochezia  Genitourinary: see HPI  Musculoskeletal: No pain or swelling of joints, normal range of motion  Neurologic: No weakness or tremors  Psychiatric: No recent changes in memory or mood  Hematologic/Lymphatic/Immunologic: No easy bruising or enlarged lymph nodes  Endocrine: No weight gain or loss      PHYSICAL  EXAM:    General: Alert and oriented to time, place, and self. In NAD   HEENT: Head AT/NC, EOMI, CN Grossly intact   Lungs: no respiratory distress, or pursed lip breathing   Heart: No obvious jugular venous distension present   Musculoskeltal: Normal movements. Normal appearing musculature  Skin: no suspicious lesions or rashes   Neuro: Alert, oriented, speech and mentation normal; moving all 4 extremities equally.   Psych: affect and mood normal    Imaging: None    Urinalysis: UA RESULTS:  Recent Labs   Lab Test 01/11/21  0805 02/05/19  1413   COLOR Yellow Yellow   APPEARANCE Clear Clear   URINEGLC >=1000* 100*   URINEBILI Negative Negative   URINEKETONE Negative Negative   SG 1.025 1.020   UBLD Negative Negative   URINEPH 5.5 6.0   PROTEIN Negative Negative   UROBILINOGEN 0.2 0.2   NITRITE Negative Negative   LEUKEST Negative Small*   RBCU  --  O - 2   WBCU  --  5-10*       PSA: 7.19    Post Void Residual:     Other labs: None today      IMPRESSION:  Low risk prostate cancer, rising PSA  Nocturia    PLAN:  His PSA has risen over the past year. It has been less than one year since his most recent prostate biopsy.  We discussed options.    He is having bothersome urinary symptoms.  He could consider performing a robotic assisted laparoscopic radical prostatectomy in order to remove the prostate and relieve him of urinary symptoms and also remove the prostate cancer.  We discussed this as an option.    He could consider repeat prostate biopsy or MRI at this time as well.    We could also continue to follow the PSA.  However, I counseled him that if the PSA does continue to rise we will need to at least repeat an MRI of the prostate or prostate biopsy.    He would like to continue to follow the PSA.  I will see him back in 6 months for another PSA check.      Mohsen Rao M.D.              Video-Visit Details    Video Start Time: 1:14 PM    Type of service:  Video Visit    Video End Time:1:40 PM    Originating  Location (pt. Location): Home    Distant Location (provider location):  [facility] [place]     Platform used for Video Visit: Luis

## 2022-10-23 ENCOUNTER — HEALTH MAINTENANCE LETTER (OUTPATIENT)
Age: 67
End: 2022-10-23

## 2022-11-03 DIAGNOSIS — E11.43 TYPE 2 DIABETES MELLITUS WITH PERIPHERAL AUTONOMIC NEUROPATHY (H): ICD-10-CM

## 2022-11-04 RX ORDER — INSULIN GLARGINE 100 [IU]/ML
INJECTION, SOLUTION SUBCUTANEOUS
Qty: 15 ML | Refills: 3 | Status: SHIPPED | OUTPATIENT
Start: 2022-11-04 | End: 2023-02-07

## 2022-11-04 NOTE — TELEPHONE ENCOUNTER
Routing refill request to provider for review/approval because:  Patient has an upcoming appointment.    Can dispensed amount be increased?

## 2022-11-07 DIAGNOSIS — E11.43 TYPE 2 DIABETES MELLITUS WITH PERIPHERAL AUTONOMIC NEUROPATHY (H): ICD-10-CM

## 2022-11-09 NOTE — TELEPHONE ENCOUNTER
Pt has appt next week with Dr Villafana.     Creatinine   Date Value Ref Range Status   02/03/2021 0.99 0.66 - 1.25 mg/dL Final

## 2022-11-18 ENCOUNTER — OFFICE VISIT (OUTPATIENT)
Dept: INTERNAL MEDICINE | Facility: CLINIC | Age: 67
End: 2022-11-18
Payer: COMMERCIAL

## 2022-11-18 VITALS
WEIGHT: 223 LBS | DIASTOLIC BLOOD PRESSURE: 78 MMHG | HEART RATE: 101 BPM | HEIGHT: 73 IN | TEMPERATURE: 97.9 F | RESPIRATION RATE: 16 BRPM | SYSTOLIC BLOOD PRESSURE: 116 MMHG | OXYGEN SATURATION: 96 % | BODY MASS INDEX: 29.55 KG/M2

## 2022-11-18 DIAGNOSIS — D17.30 LIPOMA OF SKIN AND SUBCUTANEOUS TISSUE: ICD-10-CM

## 2022-11-18 DIAGNOSIS — M51.369 DDD (DEGENERATIVE DISC DISEASE), LUMBAR: ICD-10-CM

## 2022-11-18 DIAGNOSIS — I10 HYPERTENSION GOAL BP (BLOOD PRESSURE) < 140/90: ICD-10-CM

## 2022-11-18 DIAGNOSIS — E11.40 TYPE 2 DIABETES MELLITUS WITH DIABETIC NEUROPATHY, WITH LONG-TERM CURRENT USE OF INSULIN (H): ICD-10-CM

## 2022-11-18 DIAGNOSIS — Z13.220 SCREENING FOR HYPERLIPIDEMIA: ICD-10-CM

## 2022-11-18 DIAGNOSIS — Z79.4 TYPE 2 DIABETES MELLITUS WITH DIABETIC NEUROPATHY, WITH LONG-TERM CURRENT USE OF INSULIN (H): ICD-10-CM

## 2022-11-18 DIAGNOSIS — F32.0 MILD MAJOR DEPRESSION (H): ICD-10-CM

## 2022-11-18 DIAGNOSIS — Z00.00 ENCOUNTER FOR PREVENTATIVE ADULT HEALTH CARE EXAMINATION: Primary | ICD-10-CM

## 2022-11-18 DIAGNOSIS — Z12.5 SCREENING FOR PROSTATE CANCER: ICD-10-CM

## 2022-11-18 LAB
ALBUMIN UR-MCNC: NEGATIVE MG/DL
APPEARANCE UR: CLEAR
BACTERIA #/AREA URNS HPF: ABNORMAL /HPF
BILIRUB UR QL STRIP: NEGATIVE
CHOLEST SERPL-MCNC: 163 MG/DL
COLOR UR AUTO: YELLOW
ERYTHROCYTE [DISTWIDTH] IN BLOOD BY AUTOMATED COUNT: 14.6 % (ref 10–15)
GLUCOSE UR STRIP-MCNC: >=1000 MG/DL
HBA1C MFR BLD: 8.8 % (ref 0–5.6)
HCT VFR BLD AUTO: 44.9 % (ref 40–53)
HDLC SERPL-MCNC: 41 MG/DL
HGB BLD-MCNC: 14.5 G/DL (ref 13.3–17.7)
HGB UR QL STRIP: NEGATIVE
KETONES UR STRIP-MCNC: NEGATIVE MG/DL
LDLC SERPL CALC-MCNC: 67 MG/DL
LEUKOCYTE ESTERASE UR QL STRIP: NEGATIVE
MCH RBC QN AUTO: 23.8 PG (ref 26.5–33)
MCHC RBC AUTO-ENTMCNC: 32.3 G/DL (ref 31.5–36.5)
MCV RBC AUTO: 74 FL (ref 78–100)
NITRATE UR QL: NEGATIVE
NONHDLC SERPL-MCNC: 122 MG/DL
PH UR STRIP: 6 [PH] (ref 5–7)
PLATELET # BLD AUTO: 324 10E3/UL (ref 150–450)
PSA SERPL-MCNC: 5.19 NG/ML (ref 0–4.5)
RBC # BLD AUTO: 6.09 10E6/UL (ref 4.4–5.9)
RBC #/AREA URNS AUTO: ABNORMAL /HPF
SP GR UR STRIP: >=1.03 (ref 1–1.03)
SQUAMOUS #/AREA URNS AUTO: ABNORMAL /LPF
TRIGL SERPL-MCNC: 276 MG/DL
UROBILINOGEN UR STRIP-ACNC: 0.2 E.U./DL
WBC # BLD AUTO: 9.8 10E3/UL (ref 4–11)
WBC #/AREA URNS AUTO: ABNORMAL /HPF

## 2022-11-18 PROCEDURE — 36415 COLL VENOUS BLD VENIPUNCTURE: CPT | Performed by: INTERNAL MEDICINE

## 2022-11-18 PROCEDURE — 82043 UR ALBUMIN QUANTITATIVE: CPT | Performed by: INTERNAL MEDICINE

## 2022-11-18 PROCEDURE — G0103 PSA SCREENING: HCPCS | Performed by: INTERNAL MEDICINE

## 2022-11-18 PROCEDURE — 90750 HZV VACC RECOMBINANT IM: CPT | Performed by: INTERNAL MEDICINE

## 2022-11-18 PROCEDURE — 83036 HEMOGLOBIN GLYCOSYLATED A1C: CPT | Performed by: INTERNAL MEDICINE

## 2022-11-18 PROCEDURE — 80061 LIPID PANEL: CPT | Performed by: INTERNAL MEDICINE

## 2022-11-18 PROCEDURE — 80053 COMPREHEN METABOLIC PANEL: CPT | Performed by: INTERNAL MEDICINE

## 2022-11-18 PROCEDURE — 85027 COMPLETE CBC AUTOMATED: CPT | Performed by: INTERNAL MEDICINE

## 2022-11-18 PROCEDURE — 90471 IMMUNIZATION ADMIN: CPT | Performed by: INTERNAL MEDICINE

## 2022-11-18 PROCEDURE — 81001 URINALYSIS AUTO W/SCOPE: CPT | Performed by: INTERNAL MEDICINE

## 2022-11-18 PROCEDURE — 99397 PER PM REEVAL EST PAT 65+ YR: CPT | Mod: 25 | Performed by: INTERNAL MEDICINE

## 2022-11-18 PROCEDURE — 84443 ASSAY THYROID STIM HORMONE: CPT | Performed by: INTERNAL MEDICINE

## 2022-11-18 PROCEDURE — 99214 OFFICE O/P EST MOD 30 MIN: CPT | Mod: 25 | Performed by: INTERNAL MEDICINE

## 2022-11-18 RX ORDER — PROCHLORPERAZINE 25 MG/1
SUPPOSITORY RECTAL
Qty: 3 EACH | Refills: 11 | Status: SHIPPED | OUTPATIENT
Start: 2022-11-18 | End: 2023-11-03

## 2022-11-18 RX ORDER — PROCHLORPERAZINE 25 MG/1
SUPPOSITORY RECTAL
Qty: 1 EACH | Refills: 0 | Status: SHIPPED | OUTPATIENT
Start: 2022-11-18 | End: 2023-11-03

## 2022-11-18 ASSESSMENT — ANXIETY QUESTIONNAIRES
GAD7 TOTAL SCORE: 2
3. WORRYING TOO MUCH ABOUT DIFFERENT THINGS: NOT AT ALL
6. BECOMING EASILY ANNOYED OR IRRITABLE: SEVERAL DAYS
GAD7 TOTAL SCORE: 2
7. FEELING AFRAID AS IF SOMETHING AWFUL MIGHT HAPPEN: NOT AT ALL
IF YOU CHECKED OFF ANY PROBLEMS ON THIS QUESTIONNAIRE, HOW DIFFICULT HAVE THESE PROBLEMS MADE IT FOR YOU TO DO YOUR WORK, TAKE CARE OF THINGS AT HOME, OR GET ALONG WITH OTHER PEOPLE: NOT DIFFICULT AT ALL
5. BEING SO RESTLESS THAT IT IS HARD TO SIT STILL: NOT AT ALL
1. FEELING NERVOUS, ANXIOUS, OR ON EDGE: SEVERAL DAYS
2. NOT BEING ABLE TO STOP OR CONTROL WORRYING: NOT AT ALL

## 2022-11-18 ASSESSMENT — PATIENT HEALTH QUESTIONNAIRE - PHQ9
5. POOR APPETITE OR OVEREATING: NOT AT ALL
SUM OF ALL RESPONSES TO PHQ QUESTIONS 1-9: 9

## 2022-11-18 ASSESSMENT — PAIN SCALES - GENERAL: PAINLEVEL: MODERATE PAIN (5)

## 2022-11-18 ASSESSMENT — ACTIVITIES OF DAILY LIVING (ADL): CURRENT_FUNCTION: NO ASSISTANCE NEEDED

## 2022-11-18 NOTE — PROGRESS NOTES
{PROVIDER CHARTING PREFERENCE:238654}    Subjective   David is a 66 year old{ACCOMPANIED BY STATEMENT (Optional):766978}, presenting for the following health issues:  Arthritis      HPI     {SUPERLIST (Optional):001205}  {additonal problems for provider to add (Optional):687017}    Review of Systems   {ROS COMP (Optional):459439}      Objective    There were no vitals taken for this visit.  There is no height or weight on file to calculate BMI.  Physical Exam   {Exam List (Optional):911314}    {Diagnostic Test Results (Optional):212446}    {AMBULATORY ATTESTATION (Optional):678747}

## 2022-11-18 NOTE — NURSING NOTE
Prior to immunization administration, verified patients identity using patient s name and date of birth. Please see Immunization Activity for additional information.     Screening Questionnaire for Adult Immunization    Are you sick today?   No   Do you have allergies to medications, food, a vaccine component or latex?   No   Have you ever had a serious reaction after receiving a vaccination?   No   Do you have a long-term health problem with heart, lung, kidney, or metabolic disease (e.g., diabetes), asthma, a blood disorder, no spleen, complement component deficiency, a cochlear implant, or a spinal fluid leak?  Are you on long-term aspirin therapy?   No   Do you have cancer, leukemia, HIV/AIDS, or any other immune system problem?   No   Do you have a parent, brother, or sister with an immune system problem?   No   In the past 3 months, have you taken medications that affect  your immune system, such as prednisone, other steroids, or anticancer drugs; drugs for the treatment of rheumatoid arthritis, Crohn s disease, or psoriasis; or have you had radiation treatments?   No   Have you had a seizure, or a brain or other nervous system problem?   No   During the past year, have you received a transfusion of blood or blood    products, or been given immune (gamma) globulin or antiviral drug?   No   For women: Are you pregnant or is there a chance you could become       pregnant during the next month?   No   Have you received any vaccinations in the past 4 weeks?   No     Immunization questionnaire answers were all negative.        Per orders of Dr. Villafana, injection of Shingrix given by Amanda Parra CMA. Patient instructed to remain in clinic for 15 minutes afterwards, and to report any adverse reaction to me immediately.       Screening performed by Amanda Parra CMA on 11/18/2022 at 3:42 PM.

## 2022-11-18 NOTE — PROGRESS NOTES
"SUBJECTIVE:   David is a 66 year old who presents for Preventive Visit.      Are you in the first 12 months of your Medicare coverage?  No    Healthy Habits:    In general, how would you rate your overall health?  Fair    Frequency of exercise:  None    Duration of exercise:  Less than 15 minutes    Do you usually eat at least 4 servings of fruit and vegetables a day, include whole grains    & fiber and avoid regularly eating high fat or \"junk\" foods?  No    Taking medications regularly:  Yes    Barriers to taking medications:  None    Medication side effects:  None    Ability to successfully perform activities of daily living:  No assistance needed    Home Safety:  No safety concerns identified    Hearing Impairment:  No hearing concerns    In the past 6 months, have you been bothered by leaking of urine?  No    In general, how would you rate your overall mental or emotional health?  Excellent      PHQ-2 Total Score:    Additional concerns today:  Yes         Fall risk  Fallen 2 or more times in the past year?: No  Any fall with injury in the past year?: No    Cognitive Screening   1) Repeat 3 items (Leader, Season, Table)    2) Clock draw: NORMAL  3) 3 item recall: Recalls 2 objects   Results: NORMAL clock, 1-2 items recalled: COGNITIVE IMPAIRMENT LESS LIKELY    Mini-CogTM Copyright S Kalie. Licensed by the author for use in [facility]; reprinted with permission (dunia@.[facility]). All rights reserved.      Do you have sleep apnea, excessive snoring or daytime drowsiness?: yes    Reviewed and updated as needed this visit by clinical staff   Tobacco  Allergies  Meds  Problems  Med Hx  Surg Hx  Fam Hx          Reviewed and updated as needed this visit by Provider                 Social History     Tobacco Use     Smoking status: Former     Types: Cigarettes     Smokeless tobacco: Never   Substance Use Topics     Alcohol use: No     Alcohol/week: 0.0 standard drinks     Comment: 14 drinks weekly, quit " on 08/2017     If you drink alcohol do you typically have >3 drinks per day or >7 drinks per week? No    Alcohol Use 11/18/2022   Prescreen: >3 drinks/day or >7 drinks/week? No           PROBLEMS TO ADD ON...  Has H/O DM. On diet , exercise and oral treatment + Insulin. Blood sugars are not controlled. No parestesias. No hypoglycemias.  Has H/O hyperlipidemia. On medical treatment and diet. No side effects. No muscle weakness, myalgias or upset stomach.   Has h/o HTN. on medical treatment. BP has been controlled. No side effects from medications. No CP, HA, dizziness. good compliance with medications and low salt diet.  Has h/o depression. On medical treatment, controlled, no side effects. No depressive symptoms or suicidal ideation.  Concern for a lump on the forehead, getting bitter and painful.     Has had LBP, radiating to the legs, seen ortho. Has had MRI, needs surgery for DDD. Advised to do surgery once diabetic control is better.     Current providers sharing in care for this patient include:   Patient Care Team:  Javier Villafana MD as PCP - General (Internal Medicine)  Javier Villafana MD as Assigned PCP  Mohsen Rao MD as Assigned Surgical Provider  Christopher Cody MD as MD (Urology)  Yumi Babb, RN as Specialty Care Coordinator (Urology)    The following health maintenance items are reviewed in Epic and correct as of today:  Health Maintenance   Topic Date Due     DIABETIC FOOT EXAM  Never done     ANNUAL REVIEW OF  ORDERS  Never done     ZOSTER IMMUNIZATION (1 of 2) Never done     LUNG CANCER SCREENING  08/13/2014     EYE EXAM  12/01/2019     MEDICARE ANNUAL WELLNESS VISIT  02/05/2020     Pneumococcal Vaccine: 65+ Years (3 - PPSV23 if available, else PCV20) 02/05/2020     LIPID  02/20/2021     MICROALBUMIN  02/20/2021     A1C  05/16/2021     BMP  02/03/2022     PHQ-9  09/25/2022     FALL RISK ASSESSMENT  11/18/2023     ADVANCE CARE PLANNING  08/01/2024     COLORECTAL CANCER  "SCREENING  12/12/2028     DTAP/TDAP/TD IMMUNIZATION (3 - Td or Tdap) 09/29/2031     HEPATITIS C SCREENING  Completed     DEPRESSION ACTION PLAN  Completed     INFLUENZA VACCINE  Completed     AORTIC ANEURYSM SCREENING (SYSTEM ASSIGNED)  Completed     COVID-19 Vaccine  Completed     IPV IMMUNIZATION  Aged Out     MENINGITIS IMMUNIZATION  Aged Out     Lab work is in process  Labs reviewed in EPIC          Review of Systems  Constitutional, HEENT, cardiovascular, pulmonary, GI, , musculoskeletal, neuro, skin, endocrine and psych systems are negative, except as otherwise noted.    OBJECTIVE:   /78 (BP Location: Left arm, Cuff Size: Adult Regular)   Pulse 101   Temp 97.9  F (36.6  C) (Tympanic)   Resp 16   Ht 1.854 m (6' 1\")   Wt 101.2 kg (223 lb)   SpO2 96%   BMI 29.42 kg/m   Estimated body mass index is 29.42 kg/m  as calculated from the following:    Height as of this encounter: 1.854 m (6' 1\").    Weight as of this encounter: 101.2 kg (223 lb).  Physical Exam  GENERAL: healthy, alert and no distress  EYES: Eyes grossly normal to inspection, PERRL and conjunctivae and sclerae normal  HENT: ear canals and TM's normal, nose and mouth without ulcers or lesions  NECK: no adenopathy, no asymmetry, masses, or scars and thyroid normal to palpation  RESP: lungs clear to auscultation - no rales, rhonchi or wheezes  CV: regular rate and rhythm, normal S1 S2, no S3 or S4, no murmur, click or rub, no peripheral edema and peripheral pulses strong  ABDOMEN: soft, nontender, no hepatosplenomegaly, no masses and bowel sounds normal  MS: no gross musculoskeletal defects noted, no edema  SKIN: no suspicious lesions or rashes, right forehead subcutaneous mobile lump 2 cm diameter, mildly tender   NEURO: Normal strength and tone, mentation intact and speech normal  PSYCH: mentation appears normal, affect normal/bright    Diagnostic Test Results:  Labs reviewed in Epic    ASSESSMENT / PLAN:       ICD-10-CM    1. Encounter " for preventative adult health care examination  Z00.00 Continuous Blood Gluc Sensor (DEXCOM G6 SENSOR) MISC     Continuous Blood Gluc  (DEXCOM G6 ) ASHLIE     CBC with platelets     Comprehensive metabolic panel (BMP + Alb, Alk Phos, ALT, AST, Total. Bili, TP)     PSA, screen     TSH with free T4 reflex     UA with Microscopic reflex to Culture - lab collect      2. Type 2 diabetes mellitus with diabetic neuropathy, with long-term current use of insulin (H)  E11.40 Lipid panel reflex to direct LDL Non-fasting    Z79.4 Albumin Random Urine Quantitative with Creat Ratio     HEMOGLOBIN A1C     OFFICE/OUTPT VISIT,EST,LEVL III      3. Screening for hyperlipidemia  Z13.220 Lipid panel reflex to direct LDL Non-fasting      4. Hypertension goal BP (blood pressure) < 140/90  I10 Continuous Blood Gluc Sensor (DEXCOM G6 SENSOR) MISC     Continuous Blood Gluc  (DEXCOM G6 ) ASHLIE     CBC with platelets     Comprehensive metabolic panel (BMP + Alb, Alk Phos, ALT, AST, Total. Bili, TP)     TSH with free T4 reflex     UA with Microscopic reflex to Culture - lab collect     OFFICE/OUTPT VISIT,EST,LEVL III      5. Screening for prostate cancer  Z12.5 PSA, screen      6. Lipoma of skin and subcutaneous tissue  D17.30 Adult General Surg Referral     OFFICE/OUTPT VISIT,EST,LEVL III      7. DDD (degenerative disc disease), lumbar  M51.36 OFFICE/OUTPT VISIT,EST,LEVL III      8. Mild major depression (H)  F32.0 OFFICE/OUTPT VISIT,EST,LEVL III          Assess lab   Cont treatment   Refer to surgery for lipoma removal, symptomatic with pain   Increase Insulin gradually for improved diabetic control   Controlled HTN and depression     Patient has been advised of split billing requirements and indicates understanding: Yes      COUNSELING:  Reviewed preventive health counseling, as reflected in patient instructions       Regular exercise       Healthy diet/nutrition       Vision screening       Hearing screening      "  Dental care       Colon cancer screening       Prostate cancer screening      BMI:   Estimated body mass index is 29.42 kg/m  as calculated from the following:    Height as of this encounter: 1.854 m (6' 1\").    Weight as of this encounter: 101.2 kg (223 lb).   Weight management plan: Discussed healthy diet and exercise guidelines      He reports that he has quit smoking. His smoking use included cigarettes. He has never used smokeless tobacco.      Appropriate preventive services were discussed with this patient, including applicable screening as appropriate for cardiovascular disease, diabetes, osteopenia/osteoporosis, and glaucoma.  As appropriate for age/gender, discussed screening for colorectal cancer, prostate cancer, breast cancer, and cervical cancer. Checklist reviewing preventive services available has been given to the patient.    Reviewed patients plan of care and provided an AVS. The Intermediate Care Plan ( asthma action plan, low back pain action plan, and migraine action plan) for Austyn meets the Care Plan requirement. This Care Plan has been established and reviewed with the Patient.          Javier Villafana MD  [facility]    Identified Health Risks:  "

## 2022-11-18 NOTE — LETTER
November 21, 2022      David Araujo  [id number] [facility] [address]        Dear ,    We are writing to inform you of your test results. Your controlled diabetes is not well controlled. Try to improve diet and exercise. Increase the insulin dose as discussed - 2 units every 3 days until glucose levels are less than 180.   PSA is slightly elevated , recommend to follow up with your urologist.       Resulted Orders   Lipid panel reflex to direct LDL Non-fasting   Result Value Ref Range    Cholesterol 163 <200 mg/dL    Triglycerides 276 (H) <150 mg/dL    Direct Measure HDL 41 >=40 mg/dL    LDL Cholesterol Calculated 67 <=100 mg/dL    Non HDL Cholesterol 122 <130 mg/dL    Narrative    Cholesterol  Desirable:  <200 mg/dL    Triglycerides  Normal:  Less than 150 mg/dL  Borderline High:  150-199 mg/dL  High:  200-499 mg/dL  Very High:  Greater than or equal to 500 mg/dL    Direct Measure HDL  Female:  Greater than or equal to 50 mg/dL   Male:  Greater than or equal to 40 mg/dL    LDL Cholesterol  Desirable:  <100mg/dL  Above Desirable:  100-129 mg/dL   Borderline High:  130-159 mg/dL   High:  160-189 mg/dL   Very High:  >= 190 mg/dL    Non HDL Cholesterol  Desirable:  130 mg/dL  Above Desirable:  130-159 mg/dL  Borderline High:  160-189 mg/dL  High:  190-219 mg/dL  Very High:  Greater than or equal to 220 mg/dL   Albumin Random Urine Quantitative with Creat Ratio   Result Value Ref Range    Albumin Urine mg/L <12.0 mg/L      Comment:      The reference ranges have not been established in urine albumin. The results should be integrated into the clinical context for interpretation.    Albumin Urine mg/g Cr        Comment:      Unable to calculate, urine albumin and/or urine creatinine is outside detectable limits.  Microalbuminuria is defined as an albumin:creatinine ratio of 17 to 299 for males and 25 to 299 for females. A ratio of albumin:creatinine of 300 or higher is indicative of overt  proteinuria.  Due to biologic variability, positive results should be confirmed by a second, first-morning random or 24-hour timed urine specimen. If there is discrepancy, a third specimen is recommended. When 2 out of 3 results are in the microalbuminuria range, this is evidence for incipient nephropathy and warrants increased efforts at glucose control, blood pressure control, and institution of therapy with an angiotensin-converting-enzyme (ACE) inhibitor (if the patient can tolerate it).      Creatinine Urine mg/dL 218.0 mg/dL      Comment:      The reference ranges have not been established in urine creatinine. The results should be integrated into the clinical context for interpretation.   HEMOGLOBIN A1C   Result Value Ref Range    Hemoglobin A1C 8.8 (H) 0.0 - 5.6 %   CBC with platelets   Result Value Ref Range    WBC Count 9.8 4.0 - 11.0 10e3/uL    RBC Count 6.09 (H) 4.40 - 5.90 10e6/uL    Hemoglobin 14.5 13.3 - 17.7 g/dL    Hematocrit 44.9 40.0 - 53.0 %    MCV 74 (L) 78 - 100 fL    MCH 23.8 (L) 26.5 - 33.0 pg    MCHC 32.3 31.5 - 36.5 g/dL    RDW 14.6 10.0 - 15.0 %    Platelet Count 324 150 - 450 10e3/uL   Comprehensive metabolic panel (BMP + Alb, Alk Phos, ALT, AST, Total. Bili, TP)   Result Value Ref Range    Sodium 138 136 - 145 mmol/L    Potassium 4.2 3.4 - 5.3 mmol/L    Chloride 100 98 - 107 mmol/L    Carbon Dioxide (CO2) 25 22 - 29 mmol/L    Anion Gap 13 7 - 15 mmol/L    Urea Nitrogen 18.5 8.0 - 23.0 mg/dL    Creatinine 0.99 0.67 - 1.17 mg/dL    Calcium 9.7 8.8 - 10.2 mg/dL    Glucose 217 (H) 70 - 99 mg/dL    Alkaline Phosphatase 81 40 - 129 U/L    AST 24 10 - 50 U/L    ALT 23 10 - 50 U/L    Protein Total 7.5 6.4 - 8.3 g/dL    Albumin 4.6 3.5 - 5.2 g/dL    Bilirubin Total 0.5 <=1.2 mg/dL    GFR Estimate 83 >60 mL/min/1.73m2      Comment:      Effective December 21, 2021 eGFRcr in adults is calculated using the 2021 CKD-EPI creatinine equation which includes age and gender (Carole et al., NEJM, DOI:  10.1056/DAJVmo7603487)   PSA, screen   Result Value Ref Range    Prostate Specific Antigen Screen 5.19 (H) 0.00 - 4.50 ng/mL    Narrative    This result is obtained using the Roche Elecsys total PSA method on the aleshia e801 immunoassay analyzer. Results obtained with different assay methods or kits cannot be used interchangeably.   TSH with free T4 reflex   Result Value Ref Range    TSH 1.17 0.30 - 4.20 uIU/mL   UA with Microscopic reflex to Culture - lab collect   Result Value Ref Range    Color Urine Yellow Colorless, Straw, Light Yellow, Yellow    Appearance Urine Clear Clear    Glucose Urine >=1000 (A) Negative mg/dL    Bilirubin Urine Negative Negative    Ketones Urine Negative Negative mg/dL    Specific Gravity Urine >=1.030 1.003 - 1.035    Blood Urine Negative Negative    pH Urine 6.0 5.0 - 7.0    Protein Albumin Urine Negative Negative mg/dL    Urobilinogen Urine 0.2 0.2, 1.0 E.U./dL    Nitrite Urine Negative Negative    Leukocyte Esterase Urine Negative Negative   Urine Microscopic   Result Value Ref Range    Bacteria Urine Few (A) None Seen /HPF    RBC Urine 0-2 0-2 /HPF /HPF    WBC Urine 0-5 0-5 /HPF /HPF    Squamous Epithelials Urine Few (A) None Seen /LPF    Narrative    Urine Culture not indicated       If you have any questions or concerns, please call the clinic at the number listed above.       Sincerely,      Javier Villafana MD        león

## 2022-11-19 LAB
ALBUMIN SERPL BCG-MCNC: 4.6 G/DL (ref 3.5–5.2)
ALP SERPL-CCNC: 81 U/L (ref 40–129)
ALT SERPL W P-5'-P-CCNC: 23 U/L (ref 10–50)
ANION GAP SERPL CALCULATED.3IONS-SCNC: 13 MMOL/L (ref 7–15)
AST SERPL W P-5'-P-CCNC: 24 U/L (ref 10–50)
BILIRUB SERPL-MCNC: 0.5 MG/DL
BUN SERPL-MCNC: 18.5 MG/DL (ref 8–23)
CALCIUM SERPL-MCNC: 9.7 MG/DL (ref 8.8–10.2)
CHLORIDE SERPL-SCNC: 100 MMOL/L (ref 98–107)
CREAT SERPL-MCNC: 0.99 MG/DL (ref 0.67–1.17)
CREAT UR-MCNC: 218 MG/DL
DEPRECATED HCO3 PLAS-SCNC: 25 MMOL/L (ref 22–29)
GFR SERPL CREATININE-BSD FRML MDRD: 83 ML/MIN/1.73M2
GLUCOSE SERPL-MCNC: 217 MG/DL (ref 70–99)
MICROALBUMIN UR-MCNC: <12 MG/L
MICROALBUMIN/CREAT UR: NORMAL MG/G{CREAT}
POTASSIUM SERPL-SCNC: 4.2 MMOL/L (ref 3.4–5.3)
PROT SERPL-MCNC: 7.5 G/DL (ref 6.4–8.3)
SODIUM SERPL-SCNC: 138 MMOL/L (ref 136–145)
TSH SERPL DL<=0.005 MIU/L-ACNC: 1.17 UIU/ML (ref 0.3–4.2)

## 2022-11-20 ENCOUNTER — NURSE TRIAGE (OUTPATIENT)
Dept: NURSING | Facility: CLINIC | Age: 67
End: 2022-11-20

## 2022-11-20 DIAGNOSIS — E11.43 TYPE 2 DIABETES MELLITUS WITH PERIPHERAL AUTONOMIC NEUROPATHY (H): Primary | ICD-10-CM

## 2022-11-20 NOTE — TELEPHONE ENCOUNTER
Pt is phoning stating that he needs a prescription for the Dexcom G6 Transmitter     Pt states that the prior script only covered the  and the sensor     Pt can be reached at [phone] with any questions     No triage     Andree Cohen RN  [place] Nurse Advisor  11:43 AM 11/20/2022                Reason for Disposition    General information question, no triage required and triager able to answer question    Additional Information    Negative: [1] Caller is not with the adult (patient) AND [2] reporting urgent symptoms    Negative: Lab result questions    Negative: Medication questions    Negative: Caller can't be reached by phone    Negative: Caller has already spoken to PCP or another triager    Negative: Requesting regular office appointment    Negative: [1] Caller requesting NON-URGENT health information AND [2] PCP's office is the best resource    Negative: RN needs further essential information from caller in order to complete triage    Protocols used: INFORMATION ONLY CALL - NO TRIAGE-A-

## 2022-11-21 ENCOUNTER — OFFICE VISIT (OUTPATIENT)
Dept: SURGERY | Facility: CLINIC | Age: 67
End: 2022-11-21
Payer: COMMERCIAL

## 2022-11-21 ENCOUNTER — TELEPHONE (OUTPATIENT)
Dept: SURGERY | Facility: CLINIC | Age: 67
End: 2022-11-21

## 2022-11-21 VITALS
RESPIRATION RATE: 16 BRPM | DIASTOLIC BLOOD PRESSURE: 64 MMHG | HEIGHT: 73 IN | OXYGEN SATURATION: 96 % | WEIGHT: 223 LBS | HEART RATE: 90 BPM | SYSTOLIC BLOOD PRESSURE: 102 MMHG | BODY MASS INDEX: 29.55 KG/M2

## 2022-11-21 DIAGNOSIS — R22.0 SCALP MASS: Primary | ICD-10-CM

## 2022-11-21 PROCEDURE — 99203 OFFICE O/P NEW LOW 30 MIN: CPT | Performed by: SURGERY

## 2022-11-21 RX ORDER — PROCHLORPERAZINE 25 MG/1
1 SUPPOSITORY RECTAL
Qty: 1 EACH | Refills: 1 | Status: SHIPPED | OUTPATIENT
Start: 2022-11-21 | End: 2023-11-03

## 2022-11-21 NOTE — LETTER
[facility]    [address], [address]  [place], [place] [address]  Phone [phone]  Fax [phone]    [address], [address]  [place] [address]  Phone [phone]  Fax [phone]    [email or web address]   2022       Austyn Araujo    RE: [id number]  : 1955    Austyn Araujo has been scheduled for surgery on 22 at 1:00 PM at [facility] with Dr London Kirkpatrick.  We are located at [address], [address], [place] [address]       Please check in at 12:45 PM.      ? You can eat and drink up until surgery        You should shower before your surgery with Hibiclens or Exidine soap.  This can be found at your local pharmacy or you can pick it up from our office for free.  Please call our office if you have any questions.       It is sometimes necessary to adjust the surgery schedule due to emergencies and additions to the schedule.  If your surgery is affected by this, we greatly appreciate your flexibility and understanding in this matter      It is best if you call regarding post-operative questions between the hours of 8:00 am & 3:00 pm Monday-Friday, so you have access to the daytime care team that know you best.  ? Prescription refills are accepted during regular office hours only.        If you have questions or concerns, please contact our office at [phone].

## 2022-11-21 NOTE — PROGRESS NOTES
HPI:  Austyn presents today for a dermal cyst on his right temple for the past several years. He denies trauma at to the site.  He has had no drainage from the site in the past.  He has no history of  infection.  It is intermittently painful, especially as it is a pressure point when he is sleeping.  It's size is slowly increasing.    PE:  There were no vitals taken for this visit.  General appearance: well-nourished, no apparent distress  Skin: There is a 1.5 cm dermal cyst on the right temple along the hairline.  The overlying skin is  normal.  It is non-tender, soft and mobile.         Plan:  We will schedule excision at his convenience. This can readily be done under local in the clinic.    London Kirkpatrick MD    Please route or send letter to:  Primary Care Provider (PCP)

## 2022-11-21 NOTE — TELEPHONE ENCOUNTER
Type of surgery: EXCISION RIGHT TEMPORAL MASS   Location of surgery: Other: [place] OFFICE   Date and time of surgery: 12-1-22, 1 PM   Surgeon: DR. WILLIS   Pre-Op Appt Date: NA   Post-Op Appt Date: NA    Packet sent out: GIVEN TO PATIENT   Pre-cert/Authorization completed:  Not Applicable  Date: 11-21-22      *OFFICE SURGERY*  EXCISION RIGHT TEMPORAL MASS   LOCAL  30 MINS REQ  NON  [facility]

## 2022-11-21 NOTE — LETTER
2022       Re: Austyn AKERS [facility] - 1955    Austyn presents today for a dermal cyst on his right temple for the past several years. He denies trauma at to the site.  He has had no drainage from the site in the past.  He has no history of  infection. It is intermittently painful, especially as it is a pressure point when he is sleeping.  It's size is slowly increasing.     PE:  There were no vitals taken for this visit.  General appearance: well-nourished, no apparent distress  Skin: There is a 1.5 cm dermal cyst on the right temple along the hairline.  The overlying skin is  normal.  It is non-tender, soft and mobile.         Plan:  We will schedule excision at his convenience. This can readily be done under local in the clinic.     London Kirkpatrick MD

## 2022-11-28 ENCOUNTER — TELEPHONE (OUTPATIENT)
Dept: INTERNAL MEDICINE | Facility: CLINIC | Age: 67
End: 2022-11-28

## 2022-11-28 NOTE — TELEPHONE ENCOUNTER
Pt calling regarding his blood sugars   He has been taking Lantus 25 AM and then 25 in PM for a total 50 mg daily since 11/25/2022    His fasting morning sugars for the last 3 days have been around 110-130 but his CGM has been going off around 1 AM with high readings in the 300s almost consistently.      Please advise    Ed BRAUN RN, BSN

## 2022-11-28 NOTE — TELEPHONE ENCOUNTER
Patient advised to increase his PM Lantus to 27 units.  He is not confident that that will be enough of an increase but will try it and keep MD up to date on blood sugar readings. LAKESHIA Alonzo R.N.

## 2022-12-01 ENCOUNTER — OFFICE VISIT (OUTPATIENT)
Dept: SURGERY | Facility: CLINIC | Age: 67
End: 2022-12-01
Payer: COMMERCIAL

## 2022-12-01 ENCOUNTER — APPOINTMENT (OUTPATIENT)
Dept: LAB | Facility: CLINIC | Age: 67
End: 2022-12-01
Payer: COMMERCIAL

## 2022-12-01 VITALS
SYSTOLIC BLOOD PRESSURE: 114 MMHG | HEIGHT: 73 IN | RESPIRATION RATE: 16 BRPM | DIASTOLIC BLOOD PRESSURE: 64 MMHG | BODY MASS INDEX: 29.55 KG/M2 | HEART RATE: 85 BPM | OXYGEN SATURATION: 98 % | WEIGHT: 223 LBS

## 2022-12-01 DIAGNOSIS — R22.0 MASS OF FACE: Primary | ICD-10-CM

## 2022-12-01 PROCEDURE — 11441 EXC FACE-MM B9+MARG 0.6-1 CM: CPT | Performed by: SURGERY

## 2022-12-01 PROCEDURE — 88304 TISSUE EXAM BY PATHOLOGIST: CPT | Performed by: PATHOLOGY

## 2022-12-01 NOTE — PROGRESS NOTES
General Surgery Operative Note      Pre-operative diagnosis: Right temple subcutaneous mass   Post-operative diagnosis: same    Procedure: excision of right temple subcutaneous mass (1.0 cm)   Surgeon: London Escobedo MD   Assistant(s): NONE   Anesthesia: Local    Estimated blood loss: 1 cc     Specimens: Right temple mass for routine pathology.       The right temple area was prepped and draped in standard sterile fashion.  The skin overlying the mass was anesthetized with local anesthetic.  An incision was made with a length of 1.5 cm.  The incision was carried into the subcutaneous tissue and the mass was completely excised from surrounding tissues using sharp and blunt dissection, retracting a vein anteriorly for better exposure  The mass, which was 1.0 cm in diameter and had a fatty appearance, was then passed off the field as specimen.  Hemostasis was maintained throughout with direct pressure.  The wound was then irrigated with sterile saline and closed in two layers.  The subcutaneous tissue was closed with interrupted 3-0 chromic sutures an antibiotic ointment  The patient tolerated the procedure well.  Sponge and needle counts were correct at the end of the case.     London Escobedo MD

## 2022-12-01 NOTE — LETTER
2022       Re: Austyn AKERS [facility] - 1955    General Surgery Operative Note                            Pre-operative diagnosis: Right temple subcutaneous mass   Post-operative diagnosis: same    Procedure: excision of right temple subcutaneous mass (1.0 cm)   Surgeon: London Escobedo MD   Assistant(s): NONE   Anesthesia: Local    Estimated blood loss: 1 cc      Specimens: Right temple mass for routine pathology.         The right temple area was prepped and draped in standard sterile fashion.  The skin overlying the mass was anesthetized with local anesthetic.  An incision was made with a length of 1.5 cm.  The incision was carried into the subcutaneous tissue and the mass was completely excised from surrounding tissues using sharp and blunt dissection, retracting a vein anteriorly for better exposure  The mass, which was 1.0 cm in diameter and had a fatty appearance, was then passed off the field as specimen.  Hemostasis was maintained throughout with direct pressure.  The wound was then irrigated with sterile saline and closed in two layers.  The subcutaneous tissue was closed with interrupted 3-0 chromic sutures an antibiotic ointment  The patient tolerated the procedure well. Sponge and needle counts were correct at the end of the case.        London Escobedo MD

## 2022-12-05 LAB
PATH REPORT.COMMENTS IMP SPEC: NORMAL
PATH REPORT.COMMENTS IMP SPEC: NORMAL
PATH REPORT.FINAL DX SPEC: NORMAL
PATH REPORT.GROSS SPEC: NORMAL
PATH REPORT.MICROSCOPIC SPEC OTHER STN: NORMAL
PATH REPORT.RELEVANT HX SPEC: NORMAL
PHOTO IMAGE: NORMAL

## 2022-12-22 ENCOUNTER — TELEPHONE (OUTPATIENT)
Dept: INTERNAL MEDICINE | Facility: CLINIC | Age: 67
End: 2022-12-22

## 2022-12-22 DIAGNOSIS — E11.43 TYPE 2 DIABETES MELLITUS WITH PERIPHERAL AUTONOMIC NEUROPATHY (H): Primary | ICD-10-CM

## 2022-12-22 DIAGNOSIS — E11.43 TYPE 2 DIABETES MELLITUS WITH PERIPHERAL AUTONOMIC NEUROPATHY (H): ICD-10-CM

## 2022-12-22 RX ORDER — METFORMIN HCL 500 MG
2000 TABLET, EXTENDED RELEASE 24 HR ORAL
Qty: 360 TABLET | Refills: 1 | Status: SHIPPED | OUTPATIENT
Start: 2022-12-22 | End: 2023-05-11

## 2022-12-22 NOTE — TELEPHONE ENCOUNTER
Patient calls to check on status of metformin refill.     He then begins to say how his A1c is unmanaged, his numbers are 10 and 11 and no one ever called him to help him. He says his numbers at night spike to 350/400 so his meter alerts him and he wakes up to give himself insulin. He says his PCP is not calling him to check in him and that is unacceptable. He was informed that a letter did go out and he said that is not good enough. DORA pelayo he could choose a new provider if he is unhappy.     RN, can you pls call patient to confirm his lab values and to offer MTM and or diabetic education. He is on board for both of those.

## 2022-12-22 NOTE — TELEPHONE ENCOUNTER
Call to pt. He takes Lantus Solostar 25 in AM and 25 in PM.     At night, 2 -3 am he is still running at 350 BS.     -130 in AM     He ate pasta last night, so BS was elevated.    If he eats protein only for supper then BS is better at night.   Sometimes BS will be low, 70, if eats a lot of protein.     He is asking if he could go back to Dr Coker, Endocrin[facility] at [facility]   Pended referral.     No call back needed, he will schedule next week.     Lab Results   Component Value Date    A1C 8.8 11/18/2022    A1C 8.0 11/16/2020    A1C 7.9 02/20/2020    A1C 10.1 10/18/2019    A1C 8.6 02/05/2019    A1C 8.2 08/01/2018

## 2022-12-23 NOTE — TELEPHONE ENCOUNTER
Placed referral.   Recommend to start on regular insulin prior to each meal in addition to the long acting insulin.

## 2022-12-26 RX ORDER — INSULIN ASPART 100 [IU]/ML
INJECTION, SOLUTION INTRAVENOUS; SUBCUTANEOUS
Qty: 15 ML | Refills: 1 | Status: SHIPPED | OUTPATIENT
Start: 2022-12-26 | End: 2023-02-22

## 2022-12-26 NOTE — TELEPHONE ENCOUNTER
Call to pt and advised. Discussed started a short acting insulin.   He verbalized understanding.     Please fax in insulin.   Also he would like to see Diabetic Ed. Pended Referral.   His Endo appt is not until May.

## 2023-01-01 ENCOUNTER — TRANSFERRED RECORDS (OUTPATIENT)
Dept: MULTI SPECIALTY CLINIC | Facility: CLINIC | Age: 68
End: 2023-01-01

## 2023-01-01 LAB — RETINOPATHY: NORMAL

## 2023-01-02 ENCOUNTER — OFFICE VISIT (OUTPATIENT)
Dept: INTERNAL MEDICINE | Facility: CLINIC | Age: 68
End: 2023-01-02
Payer: COMMERCIAL

## 2023-01-02 VITALS
WEIGHT: 224 LBS | HEIGHT: 73 IN | HEART RATE: 75 BPM | DIASTOLIC BLOOD PRESSURE: 70 MMHG | SYSTOLIC BLOOD PRESSURE: 110 MMHG | RESPIRATION RATE: 16 BRPM | BODY MASS INDEX: 29.69 KG/M2 | TEMPERATURE: 97.5 F | OXYGEN SATURATION: 97 %

## 2023-01-02 DIAGNOSIS — K21.9 GASTROESOPHAGEAL REFLUX DISEASE, UNSPECIFIED WHETHER ESOPHAGITIS PRESENT: ICD-10-CM

## 2023-01-02 DIAGNOSIS — E11.40 TYPE 2 DIABETES MELLITUS WITH DIABETIC NEUROPATHY, WITH LONG-TERM CURRENT USE OF INSULIN (H): ICD-10-CM

## 2023-01-02 DIAGNOSIS — Z01.818 PRE-OP EXAM: Primary | ICD-10-CM

## 2023-01-02 DIAGNOSIS — E78.5 HYPERLIPIDEMIA LDL GOAL <100: ICD-10-CM

## 2023-01-02 DIAGNOSIS — I10 HYPERTENSION GOAL BP (BLOOD PRESSURE) < 140/90: ICD-10-CM

## 2023-01-02 DIAGNOSIS — M48.061 SPINAL STENOSIS OF LUMBAR REGION, UNSPECIFIED WHETHER NEUROGENIC CLAUDICATION PRESENT: ICD-10-CM

## 2023-01-02 DIAGNOSIS — Z79.4 TYPE 2 DIABETES MELLITUS WITH DIABETIC NEUROPATHY, WITH LONG-TERM CURRENT USE OF INSULIN (H): ICD-10-CM

## 2023-01-02 LAB
ANION GAP SERPL CALCULATED.3IONS-SCNC: 11 MMOL/L (ref 7–15)
BUN SERPL-MCNC: 19.7 MG/DL (ref 8–23)
CALCIUM SERPL-MCNC: 9.4 MG/DL (ref 8.8–10.2)
CHLORIDE SERPL-SCNC: 103 MMOL/L (ref 98–107)
CREAT SERPL-MCNC: 1.06 MG/DL (ref 0.67–1.17)
DEPRECATED HCO3 PLAS-SCNC: 25 MMOL/L (ref 22–29)
ERYTHROCYTE [DISTWIDTH] IN BLOOD BY AUTOMATED COUNT: 15.1 % (ref 10–15)
GFR SERPL CREATININE-BSD FRML MDRD: 76 ML/MIN/1.73M2
GLUCOSE SERPL-MCNC: 80 MG/DL (ref 70–99)
HBA1C MFR BLD: 8 % (ref 0–5.6)
HCT VFR BLD AUTO: 38.1 % (ref 40–53)
HGB BLD-MCNC: 12.3 G/DL (ref 13.3–17.7)
MCH RBC QN AUTO: 22.3 PG (ref 26.5–33)
MCHC RBC AUTO-ENTMCNC: 32.3 G/DL (ref 31.5–36.5)
MCV RBC AUTO: 69 FL (ref 78–100)
PLATELET # BLD AUTO: 301 10E3/UL (ref 150–450)
POTASSIUM SERPL-SCNC: 3.9 MMOL/L (ref 3.4–5.3)
RBC # BLD AUTO: 5.52 10E6/UL (ref 4.4–5.9)
SODIUM SERPL-SCNC: 139 MMOL/L (ref 136–145)
WBC # BLD AUTO: 7.6 10E3/UL (ref 4–11)

## 2023-01-02 PROCEDURE — 99214 OFFICE O/P EST MOD 30 MIN: CPT

## 2023-01-02 PROCEDURE — 93000 ELECTROCARDIOGRAM COMPLETE: CPT

## 2023-01-02 PROCEDURE — 36415 COLL VENOUS BLD VENIPUNCTURE: CPT

## 2023-01-02 PROCEDURE — 85027 COMPLETE CBC AUTOMATED: CPT

## 2023-01-02 PROCEDURE — 80048 BASIC METABOLIC PNL TOTAL CA: CPT

## 2023-01-02 PROCEDURE — 83036 HEMOGLOBIN GLYCOSYLATED A1C: CPT

## 2023-01-02 ASSESSMENT — PATIENT HEALTH QUESTIONNAIRE - PHQ9
SUM OF ALL RESPONSES TO PHQ QUESTIONS 1-9: 2
10. IF YOU CHECKED OFF ANY PROBLEMS, HOW DIFFICULT HAVE THESE PROBLEMS MADE IT FOR YOU TO DO YOUR WORK, TAKE CARE OF THINGS AT HOME, OR GET ALONG WITH OTHER PEOPLE: NOT DIFFICULT AT ALL
SUM OF ALL RESPONSES TO PHQ QUESTIONS 1-9: 2

## 2023-01-02 NOTE — PATIENT INSTRUCTIONS
-Metformin: Hold day of surgery  -Novolog: SKIP on morning of procedure  -Lantus: Will take 12 units at bedtime the night before and 12 units in the A.M. prior to procedure     Lisinopril and Januvia you will take morning of procedure as you normally would.  Can take statin at bedtime as normal on night before procedure.      Refrain from using any ibuprofen, aleve, aspirin, Vitamin A or Vitamin E, Omega 3's, Omega 6's or fish oil 7-10 days prior to your procedure. These can increase your risk of bleeding. Tylenol is okay to use if needed for pain.    Please let me know if you have any questions.    Thanks!  MARICEL Florez, CNP   [facility]

## 2023-01-02 NOTE — PROGRESS NOTES
[facility]  [address]  [address]  [facility] [address]  Phone: [phone]  Primary Provider: Javier Villafana  Pre-op Performing Provider: TONY BUCK      PREOPERATIVE EVALUATION:  Today's date: 1/2/2023    Austyn Araujo is a 67 year old male who presents for a preoperative evaluation.    Surgical Information:  Surgery/Procedure: Lumbar Fusion   Surgery Location: [facility]   Surgeon: Dr Snider   Surgery Date: 1/11/23  Time of Surgery: 8:00 am   Where patient plans to recover: At home with family  Fax number for surgical facility: [phone]       Type of Anesthesia Anticipated: to be determined    Assessment & Plan     The proposed surgical procedure is considered INTERMEDIATE risk.    (Z01.818) Pre-op exam  (primary encounter diagnosis)  Comment: Right bundle branch block seen on EKG today. Discussed this with patient and explained this does not require any follow up. Okay to proceed with procedure as planned.  Plan: EKG 12-lead complete w/read - Clinics, CBC with        platelets, Basic metabolic panel  (Ca, Cl, CO2,        Creat, Gluc, K, Na, BUN)           (M48.061) Spinal stenosis of lumbar region, unspecified whether neurogenic claudication present  Comment: Plan for procedure on 1/11/22.       (E11.40,  Z79.4) Type 2 diabetes mellitus with diabetic neuropathy, with long-term current use of insulin (H)  Comment: Last A1C in November was 8.8%. Unclear if Ortho required certain A1C or not prior to surgery. Patient was unsure. He was started on insulin about 6 weeks ago and would like to update A1C today even though it has only been about 6 weeks since last A1C. Readings are anywhere from . Has had very few readings if any  >200. He is taking Novolog. Lantus, Metformin XR 2,000MG, Januvia 100MG.     Plan: Hemoglobin A1c            (I10) Hypertension goal BP (blood pressure) < 140/90  Comment: BP at goal today at 110/70. Continue Lisinopril  10MG.    (E78.5) Hyperlipidemia LDL goal <100  Comment: LDL at goal at 67. Continue Lipitor 40MG.    (K21.9) Gastroesophageal reflux disease, unspecified whether esophagitis present  Comment: Symptoms managed with Omeprazole 40MG.        Possible Sleep Apnea: Has hx of MARSHAL but does not use CPAP due to inability to tolerate.      Risks and Recommendations:  The patient has the following additional risks and recommendations for perioperative complications:   - No identified additional risk factors other than previously addressed    Medication Instructions:   - Long acting insulin (e.g. glargine, detemir): Take 80% of the usual evening or morning dose before surgery.            -Metformin: Hold day of surgery  -Januvia: Continue without modification  -Novolog: SKIP on morning of procedure  -Lantus: take 80% of usual evening or morning dose. (normally taking 15 units at at bedtime and in A.M. Modification: Will take 12 units at bedtime and 12 units in the A.M.   -- short acting insulin (e.g. regular, lispro, aspart): HOLD on the morning of     RECOMMENDATION:  APPROVAL GIVEN to proceed with proposed procedure, without further diagnostic evaluation.        Subjective     HPI related to upcoming procedure: Lumbar spinal stenosis. Has done PT in the past without any improvement in symptoms. Has been following with [facility] in [place]. Was recommend to have procedure to help with pain control.     Preop Questions 1/2/2023   1. Have you ever had a heart attack or stroke? No   2. Have you ever had surgery on your heart or blood vessels, such as a stent placement, a coronary artery bypass, or surgery on an artery in your head, neck, heart, or legs? No   3. Do you have chest pain with activity? No   4. Do you have a history of  heart failure? No   5. Do you currently have a cold, bronchitis or symptoms of other infection? No   6. Do you have a cough, shortness of breath, or wheezing? No   7. Do you or anyone in your family have  previous history of blood clots? No   8. Do you or does anyone in your family have a serious bleeding problem such as prolonged bleeding following surgeries or cuts? No   9. Have you ever had problems with anemia or been told to take iron pills? No   10. Have you had any abnormal blood loss such as black, tarry or bloody stools? No   11. Have you ever had a blood transfusion? No   12. Are you willing to have a blood transfusion if it is medically needed before, during, or after your surgery? Yes   13. Have you or any of your relatives ever had problems with anesthesia? No   14. Do you have sleep apnea, excessive snoring or daytime drowsiness? YES - Does not use CPAP.    14a. Do you have a CPAP machine? No   15. Do you have any artifical heart valves or other implanted medical devices like a pacemaker, defibrillator, or continuous glucose monitor? No   16. Do you have artificial joints? No   17. Are you allergic to latex? No       Health Care Directive:  Patient does not have a Health Care Directive or Living Will: Discussed advance care planning with patient; however, patient declined at this time.    Preoperative Review of :   reviewed - no record of controlled substances prescribed.        Review of Systems  CONSTITUTIONAL: NEGATIVE for fever, chills, change in weight  INTEGUMENTARY/SKIN: NEGATIVE for worrisome rashes, moles or lesions  EYES: NEGATIVE for vision changes or irritation  ENT/MOUTH: NEGATIVE for ear, mouth and throat problems  RESP: NEGATIVE for significant cough or SOB  CV: NEGATIVE for chest pain, palpitations or peripheral edema  GI: NEGATIVE for nausea, abdominal pain, heartburn, or change in bowel habits  : NEGATIVE for frequency, dysuria, or hematuria  MUSCULOSKELETAL:back pain  NEURO: diabetic neuropathy in bilateral feet  ENDOCRINE: NEGATIVE for temperature intolerance, skin/hair changes  HEME: NEGATIVE for bleeding problems  PSYCHIATRIC: NEGATIVE for changes in mood or  affect    Patient Active Problem List    Diagnosis Date Noted     Lyme disease 07/08/2019     Priority: Medium     Controlled substance agreement signed 08/08/2016     Priority: Medium     Abdominal pain 08/01/2016     Priority: Medium     Pancreatitis 08/01/2016     Priority: Medium     Hemoglobin A1c less than 7.0% 09/24/2015     Priority: Medium     Type 2 diabetes mellitus with diabetic neuropathy (H) 09/20/2015     Priority: Medium     Health Care Home 04/23/2015     Priority: Medium       Status:  Accepted  Care Coordinator:  Mckenna Chang    See Letters for [facility] Care Plan  Date:  April 23, 2015           Anxiety 01/31/2015     Priority: Medium     Hypertension goal BP (blood pressure) < 140/90 08/21/2013     Priority: Medium     Cellulitis 08/12/2013     Priority: Medium     MRSA (methicillin resistant staph aureus) culture positive 08/01/2013     Priority: Medium     skin infection       Advanced directives, counseling/discussion 06/28/2013     Priority: Medium     Discussed Advance Directive planning with patient; information given to patient to review.       Type 2 diabetes mellitus with peripheral autonomic neuropathy (H) 06/28/2013     Priority: Medium     GERD (gastroesophageal reflux disease) 06/28/2013     Priority: Medium     PUD (peptic ulcer disease) 06/28/2013     Priority: Medium     Low testosterone 06/28/2013     Priority: Medium     HTN (hypertension) 06/28/2013     Priority: Medium     Hyperlipidemia LDL goal <100 06/28/2013     Priority: Medium     Erectile dysfunction 06/28/2013     Priority: Medium     BPH (benign prostatic hyperplasia) 06/28/2013     Priority: Medium     Peripheral neuropathy 06/28/2013     Priority: Medium     Mild major depression (H)      Priority: Medium      Past Medical History:   Diagnosis Date     Anxiety      Anxiety 1/31/2015     BPH (benign prostatic hyperplasia)      Degenerative disc disease      Depression      Diabetes mellitus (H)      Gastritis       "Gastro-oesophageal reflux disease      H/O alcohol abuse      Hyperlipidemia      Hypertension      Low testosterone      MRSA (methicillin resistant staph aureus) culture positive 8/2013    skin infection     Mumps      PUD (peptic ulcer disease)      Sleep apnea      STD (sexually transmitted disease)      Past Surgical History:   Procedure Laterality Date     COLONOSCOPY  11/11/2013    Procedure: COMBINED COLONOSCOPY, SINGLE BIOPSY/POLYPECTOMY BY BIOPSY;  Colonoscopy/ polypectomy x2 by cold forceps    ;  Surgeon: Juan Carlos Bellamy MD;  Location:  GI     ESOPHAGOSCOPY, GASTROSCOPY, DUODENOSCOPY (EGD), COMBINED N/A 9/22/2016    Procedure: COMBINED ESOPHAGOSCOPY, GASTROSCOPY, DUODENOSCOPY (EGD), BIOPSY SINGLE OR MULTIPLE;  Surgeon: Juan Carlos Barrzaa MD;  Location:  GI     ESOPHAGOSCOPY, GASTROSCOPY, DUODENOSCOPY (EGD), COMBINED N/A 2/19/2019    Procedure: ESOPHAGOSCOPY, GASTROSCOPY, DUODENOSCOPY (EGD) with cold forcep;  Surgeon: Juan Carlos Barraza MD;  Location:  GI     HERNIA REPAIR       ORTHOPEDIC SURGERY       Current Outpatient Medications   Medication Sig Dispense Refill     aspirin 81 MG tablet Take 1 tablet by mouth daily        atorvastatin (LIPITOR) 40 MG tablet TAKE 1 TABLET BY MOUTH EVERY DAY 90 tablet 2     B-D LUER-ROCHELLE SYRINGE 22G X 1-1/2\" 3 ML MISC USE WITH TESTOSTERONE 12 each 3     COMPOUNDED NON-CONTROLLED SUBSTANCE (CMPD RX) - PHARMACY TO MIX COMPOUNDED MEDICATION Trimix #9. Dispense 1 vial plus diabetic U100 syringes 1 vial 11     Continuous Blood Gluc  (DEXCOM G6 ) ASHLIE Use to read blood sugars as per 's instructions. 1 each 0     Continuous Blood Gluc Sensor (DEXCOM G6 SENSOR) MISC Change every 10 days. 3 each 11     Continuous Blood Gluc Transmit (DEXCOM G6 TRANSMITTER) MISC 1 each every 3 months Change every 3 months. 1 each 1     glucosamine-chondroitinoitin 500-400 MG tablet Take 1 tablet by mouth daily       insulin aspart (NOVOLOG FLEXPEN) 100 UNIT/ML " "pen Give before meals and before bed: For Pre-meal glucose: 140 - 239 give 1 unit, 240 - 339 give 2 units, > 340 give 3 units. For Bedtime Glucose: 200 - 339 give 1 unit, > 340 give 2 units. 15 mL 1     insulin pen needle (32G X 4 MM) 32G X 4 MM miscellaneous Use One pen needle daily or as directed. 100 each 3     LANTUS SOLOSTAR 100 UNIT/ML soln INJECT 8 UNITS UNDER THE SKIN ONCE DAILY AT BEDTIME (140 DAY SUPPLY) NEEDS APPT 15 mL 3     lisinopril (ZESTRIL) 10 MG tablet Take 1 tablet (10 mg) by mouth daily 90 tablet 1     metFORMIN (GLUCOPHAGE XR) 500 MG 24 hr tablet Take 4 tablets (2,000 mg) by mouth daily (with dinner) 360 tablet 1     Multiple Vitamins-Minerals (CENTRUM SILVER) per tablet Take 1 tablet by mouth daily 90 tablet 3     needle, disp, (BD HYPODERMIC NEEDLE) 18G X 1\" MISC USE TO ADMINISTER TESTOSTERONE. DUE FOR APPOINTMENT WITH YOUR DOCTOR 6 each 3     nitroGLYcerin (NITROSTAT) 0.4 MG sublingual tablet For chest pain place 1 tablet under the tongue every 5 minutes for 3 doses. If symptoms persist 5 minutes after 1st dose call 911. 25 tablet 1     omeprazole (PRILOSEC) 40 MG DR capsule TAKE 1 CAPSULE BY MOUTH EVERY DAY 90 capsule 3     ONETOUCH ULTRA test strip USE TO TEST BLOOD SUGARS ONE TIME DAILY OR AS DIRECTED. 100 strip 3     PHARMACIST CHOICE LANCETS [facility] TEST BLOOD SUGARS 5 TIMES DAILY 100 each 3     sitagliptin (JANUVIA) 100 MG tablet Take 1 tablet (100 mg) by mouth daily 90 tablet 1     tadalafil (CIALIS) 20 MG tablet Take 1 tablet (20 mg) by mouth daily Never use with nitroglycerin, terazosin or doxazosin. 88 tablet 3     testosterone cypionate (DEPOTESTOSTERONE) 200 MG/ML injection INJECT 2 ML INTO THE MUSCLE ONCE WEEKLY 10 mL 5       No Known Allergies     Social History     Tobacco Use     Smoking status: Former     Types: Cigarettes     Smokeless tobacco: Never   Substance Use Topics     Alcohol use: No     Alcohol/week: 0.0 standard drinks     Comment: 14 drinks weekly, quit on 08/2017 " "      History   Drug Use No         Objective     /70   Pulse 75   Temp 97.5  F (36.4  C) (Tympanic)   Resp 16   Ht 1.854 m (6' 1\")   Wt 101.6 kg (224 lb)   SpO2 97%   BMI 29.55 kg/m        Physical Exam  Constitutional:       General: He is not in acute distress.     Appearance: Normal appearance. He is not ill-appearing, toxic-appearing or diaphoretic.   HENT:      Head: Normocephalic and atraumatic.   Eyes:      Conjunctiva/sclera: Conjunctivae normal.   Cardiovascular:      Rate and Rhythm: Normal rate and regular rhythm.      Heart sounds: Normal heart sounds.   Pulmonary:      Effort: Pulmonary effort is normal.      Breath sounds: Normal breath sounds.   Skin:     General: Skin is warm and dry.   Neurological:      Mental Status: He is alert and oriented to person, place, and time.   Psychiatric:         Mood and Affect: Mood normal.         Behavior: Behavior normal.         Thought Content: Thought content normal.         Judgment: Judgment normal.       Recent Labs   Lab Test 11/18/22  1550 02/03/21  2144   HGB 14.5 13.6    310    136   POTASSIUM 4.2 4.0   CR 0.99 0.99   A1C 8.8*  --         Diagnostics:  Labs pending at this time.  Results will be reviewed when available.   EKG: normal axis, normal intervals, no acute ST/T changes c/w ischemia, no LVH by voltage criteria, Right Bundle Branch Block    Revised Cardiac Risk Index (RCRI):  The patient has the following serious cardiovascular risks for perioperative complications:   - Diabetes Mellitus (on Insulin) = 1 point     RCRI Interpretation: 1 point: Class II (low risk - 0.9% complication rate)           Signed Electronically by: MARICEL Florez CNP  Copy of this evaluation report is provided to requesting physician.    .    "

## 2023-01-03 ENCOUNTER — TELEPHONE (OUTPATIENT)
Dept: INTERNAL MEDICINE | Facility: CLINIC | Age: 68
End: 2023-01-03

## 2023-01-03 DIAGNOSIS — D64.9 ANEMIA, UNSPECIFIED TYPE: Primary | ICD-10-CM

## 2023-01-03 NOTE — TELEPHONE ENCOUNTER
Call received from patient as he received notification of his prop lab results via [employer]. Patient is concerned as there were may abnormals on the CBC. States he recently started insulin due to elevated A1c. A1c is decreased but he wonders if starting insulin may have affected some of the other labs. Please advise if these labs are of concern for surgery or otherwise.

## 2023-01-03 NOTE — TELEPHONE ENCOUNTER
Not critical values, Alba is back tomorrow, surgery on the 11th but he could call the surgeon to make sure that they are okay with proceeding with the surgery with the hemoglobin at 12.3 and hemoglobin A1c at 8.0.

## 2023-01-03 NOTE — TELEPHONE ENCOUNTER
Call to pt and advised. He states the surgeon told him the A1C of 8 is OK as long as his BS is less than 300 when he checks in for surgery.   He will call surgery about his Hgb results.     Will route to Alba for any further follow up.

## 2023-01-06 NOTE — TELEPHONE ENCOUNTER
Pt should follow up with his PCP on low-normal hemoglobin results. His abnormal CBC may be due to an iron deficiency. He should follow up with his PCP about this. It is not urgent and starting the insulin would not be responsible for any of these CBC abnormalities.     Please let me know if you have any questions.    Thanks!  MARICEL Florez, [facility]

## 2023-01-06 NOTE — TELEPHONE ENCOUNTER
Will route to Dr Villafana as FYI.     CBC RESULTS: Recent Labs   Lab Test 01/02/23  0914   WBC 7.6   RBC 5.52   HGB 12.3*   HCT 38.1*   MCV 69*   MCH 22.3*   MCHC 32.3   RDW 15.1*

## 2023-01-10 NOTE — TELEPHONE ENCOUNTER
Called patient and informed him that he would need to have his CBC rechecked within a month and that the orders were already placed.

## 2023-01-27 ENCOUNTER — TELEPHONE (OUTPATIENT)
Dept: INTERNAL MEDICINE | Facility: CLINIC | Age: 68
End: 2023-01-27

## 2023-01-27 NOTE — TELEPHONE ENCOUNTER
"Patient calls to say he needs to\"increase substantially\" his Lantus and Novolog.   He says he is now taking 40 units daily of Lantus, 20 units 2x daily. The Novalog is 15 units 3x daily (at each meal).     Patient saw Dr Avinash Nicholas at [facility] and he agreed , the original script needed to be adjusted.     Patient stated his pharmacy also stated a new script would be needed for him to get auto refill and for ins purposes.     Best number to call back [phone]   "

## 2023-02-02 NOTE — TELEPHONE ENCOUNTER
Patient is aware , will set up a VV with PCP to [facility] , transferred to scheduling TEDDY Blake LPN

## 2023-02-07 ENCOUNTER — VIRTUAL VISIT (OUTPATIENT)
Dept: INTERNAL MEDICINE | Facility: CLINIC | Age: 68
End: 2023-02-07
Payer: COMMERCIAL

## 2023-02-07 ENCOUNTER — TELEPHONE (OUTPATIENT)
Dept: INTERNAL MEDICINE | Facility: CLINIC | Age: 68
End: 2023-02-07

## 2023-02-07 DIAGNOSIS — E11.43 TYPE 2 DIABETES MELLITUS WITH PERIPHERAL AUTONOMIC NEUROPATHY (H): Primary | ICD-10-CM

## 2023-02-07 PROCEDURE — 99213 OFFICE O/P EST LOW 20 MIN: CPT | Mod: 95 | Performed by: INTERNAL MEDICINE

## 2023-02-07 RX ORDER — INSULIN GLARGINE 100 [IU]/ML
22 INJECTION, SOLUTION SUBCUTANEOUS 2 TIMES DAILY
Qty: 30 ML | Refills: 3 | Status: SHIPPED | OUTPATIENT
Start: 2023-02-07 | End: 2023-10-24

## 2023-02-07 NOTE — PROGRESS NOTES
David is a 67 year old who is being evaluated via a billable video visit.      How would you like to obtain your AVS? MyChart  If the video visit is dropped, the invitation should be resent by: Text to cell phone: [phone]  Will anyone else be joining your video visit? No          Assessment & Plan     Type 2 diabetes mellitus with peripheral autonomic neuropathy (H)  Increase Lantus to 22 units bid  Continue Novalog 15 units tid   Continue Metformin and Januvia   - insulin glargine (LANTUS SOLOSTAR) 100 UNIT/ML pen; Inject 22 Units Subcutaneous 2 times daily I  - Hemoglobin A1c FUTURE 3mo; Future             See Patient Instructions      Javier Villafana MD  [facility]   David is a 67 year old, presenting for the following health issues:  No chief complaint on file.      History of Present Illness       Diabetes:   He presents for follow up of diabetes.  He is checking home blood glucose with a continuous glucose monitor.  He checks blood glucose before and after meals and at bedtime.  Blood glucose is sometimes over 200 and never under 70. When his blood glucose is low, the patient is asymptomatic for confusion, blurred vision, lethargy and reports not feeling dizzy, shaky, or weak.  He is concerned about blood sugar frequently over 200 and other.  He is having numbness in feet, burning in feet and redness, sores, or blisters on feet. The patient has had a diabetic eye exam in the last 12 months. Eye exam performed on January, 2022. Location of last eye exam [facility].        He eats 0-1 servings of fruits and vegetables daily.He consumes 1 sweetened beverage(s) daily.He exercises with enough effort to increase his heart rate 10 to 19 minutes per day.  He exercises with enough effort to increase his heart rate 4 days per week.        Has H/O DM. On diet , exercise and oral treatment and insulin. Blood sugars are better controlled. Has chronic  parestesias. No  hypoglycemias.  Still has blood sugars of 180 in am and 200 post prandial , but less frequent, since on insulin.   Has h/o HTN. on medical treatment. BP has been controlled. No side effects from medications. No CP, HA, dizziness. good compliance with medications and low salt diet.        Review of Systems   Constitutional, HEENT, cardiovascular, pulmonary, gi and gu systems are negative, except as otherwise noted.      Objective    Vitals - Patient Reported  Systolic (Patient Reported): 122  Diastolic (Patient Reported): 72  Weight (Patient Reported): 101.2 kg (223 lb)  Temperature (Patient Reported): 98.2  F (36.8  C)  Pulse (Patient Reported): 72  Pain Score: No Pain (0)      Vitals:  No vitals were obtained today due to virtual visit.    Physical Exam   GENERAL: Healthy, alert and no distress  EYES: Eyes grossly normal to inspection.  No discharge or erythema, or obvious scleral/conjunctival abnormalities.  RESP: No audible wheeze, cough, or visible cyanosis.  No visible retractions or increased work of breathing.    SKIN: Visible skin clear. No significant rash, abnormal pigmentation or lesions.  NEURO: Cranial nerves grossly intact.  Mentation and speech appropriate for age.  PSYCH: Mentation appears normal, affect normal/bright, judgement and insight intact, normal speech and appearance well-groomed.    Office Visit on 01/02/2023   Component Date Value Ref Range Status     Hemoglobin A1C 01/02/2023 8.0 (H)  0.0 - 5.6 % Final    Normal <5.7%   Prediabetes 5.7-6.4%    Diabetes 6.5% or higher     Note: Adopted from ADA consensus guidelines.     WBC Count 01/02/2023 7.6  4.0 - 11.0 10e3/uL Final     RBC Count 01/02/2023 5.52  4.40 - 5.90 10e6/uL Final     Hemoglobin 01/02/2023 12.3 (L)  13.3 - 17.7 g/dL Final     Hematocrit 01/02/2023 38.1 (L)  40.0 - 53.0 % Final     MCV 01/02/2023 69 (L)  78 - 100 fL Final     MCH 01/02/2023 22.3 (L)  26.5 - 33.0 pg Final     MCHC 01/02/2023 32.3  31.5 - 36.5 g/dL Final     RDW  01/02/2023 15.1 (H)  10.0 - 15.0 % Final     Platelet Count 01/02/2023 301  150 - 450 10e3/uL Final     Sodium 01/02/2023 139  136 - 145 mmol/L Final     Potassium 01/02/2023 3.9  3.4 - 5.3 mmol/L Final     Chloride 01/02/2023 103  98 - 107 mmol/L Final     Carbon Dioxide (CO2) 01/02/2023 25  22 - 29 mmol/L Final     Anion Gap 01/02/2023 11  7 - 15 mmol/L Final     Urea Nitrogen 01/02/2023 19.7  8.0 - 23.0 mg/dL Final     Creatinine 01/02/2023 1.06  0.67 - 1.17 mg/dL Final     Calcium 01/02/2023 9.4  8.8 - 10.2 mg/dL Final     Glucose 01/02/2023 80  70 - 99 mg/dL Final     GFR Estimate 01/02/2023 76  >60 mL/min/1.73m2 Final    Effective December 21, 2021 eGFRcr in adults is calculated using the 2021 CKD-EPI creatinine equation which includes age and gender (Carole et al., NEJM, DOI: 10.1056/IFGKzp9231175)               Video-Visit Details    Type of service:  Video Visit     Originating Location (pt. Location): Home    Distant Location (provider location):  On-site  Platform used for Video Visit: Luis

## 2023-02-07 NOTE — TELEPHONE ENCOUNTER
Prior Authorization Retail Medication Request    Medication/Dose: insulin glargine (LANTUS SOLOSTAR) 100 UNIT/ML pen  ICD code (if different than what is on RX):  insulin glargine (LANTUS SOLOSTAR) 100 UNIT/ML pen  Previously Tried and Failed:    Rationale:      Insurance Name:    Insurance ID:        Pharmacy Information (if different than what is on RX)  Name:    Phone:

## 2023-02-10 NOTE — TELEPHONE ENCOUNTER
Prior Authorization Not Needed per Insurance    Medication: insulin glargine (LANTUS SOLOSTAR) 100 UNIT/ML pen - PA not needed  Insurance Company: [employer] - Phone [phone] Fax [phone]  Expected CoPay:      Pharmacy Filling the Rx: [id number] - [place], [place] - [address]  Pharmacy Notified:    Patient Notified:      Called pharmacy to confirm which plan needed PA/if PA was needed. Pharmacy advised they are getting a refill too soon rejection not a PA required rejection. No PA needed at this time.

## 2023-02-18 DIAGNOSIS — R79.89 LOW TESTOSTERONE: ICD-10-CM

## 2023-02-20 ENCOUNTER — TELEPHONE (OUTPATIENT)
Dept: INTERNAL MEDICINE | Facility: CLINIC | Age: 68
End: 2023-02-20
Payer: COMMERCIAL

## 2023-02-20 DIAGNOSIS — E11.43 TYPE 2 DIABETES MELLITUS WITH PERIPHERAL AUTONOMIC NEUROPATHY (H): Primary | ICD-10-CM

## 2023-02-20 DIAGNOSIS — E11.43 TYPE 2 DIABETES MELLITUS WITH PERIPHERAL AUTONOMIC NEUROPATHY (H): ICD-10-CM

## 2023-02-20 RX ORDER — TESTOSTERONE CYPIONATE 200 MG/ML
INJECTION, SOLUTION INTRAMUSCULAR
Qty: 10 ML | Refills: 0 | Status: SHIPPED | OUTPATIENT
Start: 2023-02-20 | End: 2023-03-23

## 2023-02-20 RX ORDER — ACYCLOVIR 400 MG/1
1 TABLET ORAL
Qty: 1 EACH | Refills: 3 | Status: SHIPPED | OUTPATIENT
Start: 2023-02-20

## 2023-02-20 RX ORDER — ACYCLOVIR 400 MG/1
1 TABLET ORAL
Qty: 4 EACH | Refills: 11 | Status: SHIPPED | OUTPATIENT
Start: 2023-02-20 | End: 2024-02-29

## 2023-02-20 RX ORDER — ACYCLOVIR 400 MG/1
1 TABLET ORAL
Qty: 1 EACH | Refills: 11 | Status: SHIPPED | OUTPATIENT
Start: 2023-02-20

## 2023-02-20 NOTE — TELEPHONE ENCOUNTER
Patient previous phone  and upgraded to a new phone. His Blood Glucose Monitor is not compatible with his new phone. Patient is requesting RX - Blood Glucose Monitor DEXCOM G7 Reciever/Sensor/Transmit.     Pharmacy - [facility] [address] in [place], [place].

## 2023-02-21 ENCOUNTER — TELEPHONE (OUTPATIENT)
Dept: INTERNAL MEDICINE | Facility: CLINIC | Age: 68
End: 2023-02-21
Payer: COMMERCIAL

## 2023-02-21 NOTE — TELEPHONE ENCOUNTER
Patient Quality Outreach    Patient is due for the following:   Diabetes -  Eye Exam and Foot Exam    Next Steps:   No follow up needed at this time.    Type of outreach:    Chart review performed, no outreach needed.      Questions for provider review:         Tana Canseco MA

## 2023-02-22 RX ORDER — INSULIN ASPART 100 [IU]/ML
INJECTION, SOLUTION INTRAVENOUS; SUBCUTANEOUS
Qty: 15 ML | Refills: 1 | Status: SHIPPED | OUTPATIENT
Start: 2023-02-22 | End: 2023-02-24

## 2023-02-23 ENCOUNTER — TELEPHONE (OUTPATIENT)
Dept: INTERNAL MEDICINE | Facility: CLINIC | Age: 68
End: 2023-02-23
Payer: COMMERCIAL

## 2023-02-23 DIAGNOSIS — E11.43 TYPE 2 DIABETES MELLITUS WITH PERIPHERAL AUTONOMIC NEUROPATHY (H): ICD-10-CM

## 2023-02-23 NOTE — TELEPHONE ENCOUNTER
"Message from [facility] received by fax     regarding insulin aspart (NOVOLOG FLEXPEN) 100 UNIT/ML pen    requesting alternate drug of \"Novolog 3 ML insulin aspart human 100 unit/ml pen injector\" qty: 15 milliliter     Please review and advise.   "

## 2023-02-23 NOTE — TELEPHONE ENCOUNTER
Prescription was sent to pharmacy without directions.    Please resend prescription with directions.    Thank you!

## 2023-02-24 RX ORDER — INSULIN ASPART 100 [IU]/ML
15 INJECTION, SOLUTION INTRAVENOUS; SUBCUTANEOUS
Qty: 15 ML | Refills: 1 | Status: SHIPPED | OUTPATIENT
Start: 2023-02-24 | End: 2023-05-11

## 2023-02-27 ENCOUNTER — TRANSFERRED RECORDS (OUTPATIENT)
Dept: HEALTH INFORMATION MANAGEMENT | Facility: CLINIC | Age: 68
End: 2023-02-27

## 2023-02-27 LAB — RETINOPATHY: NEGATIVE

## 2023-03-23 DIAGNOSIS — R79.89 LOW TESTOSTERONE: ICD-10-CM

## 2023-03-23 RX ORDER — TESTOSTERONE CYPIONATE 200 MG/ML
INJECTION, SOLUTION INTRAMUSCULAR
Qty: 10 ML | Refills: 0 | Status: SHIPPED | OUTPATIENT
Start: 2023-03-23 | End: 2023-04-27

## 2023-04-14 ENCOUNTER — LAB (OUTPATIENT)
Dept: LAB | Facility: CLINIC | Age: 68
End: 2023-04-14
Payer: COMMERCIAL

## 2023-04-14 DIAGNOSIS — E11.43 TYPE 2 DIABETES MELLITUS WITH PERIPHERAL AUTONOMIC NEUROPATHY (H): ICD-10-CM

## 2023-04-14 LAB — HBA1C MFR BLD: 6.1 % (ref 0–5.6)

## 2023-04-14 PROCEDURE — 36415 COLL VENOUS BLD VENIPUNCTURE: CPT

## 2023-04-14 PROCEDURE — 83036 HEMOGLOBIN GLYCOSYLATED A1C: CPT

## 2023-04-17 ENCOUNTER — NURSE TRIAGE (OUTPATIENT)
Dept: INTERNAL MEDICINE | Facility: CLINIC | Age: 68
End: 2023-04-17
Payer: COMMERCIAL

## 2023-04-17 NOTE — TELEPHONE ENCOUNTER
"S-(situation): tinnititis    B-(background): has been going on for several months    A-(assessment): high frequency ringing in mainly left ear, but feels like its in his head.  Having trouble sleeping due to the ringing.  Stated he is unsure if it came on suddenly or gradually, but thinks it happened more suddenly.  It has been constant since it started and he was hoping it would go away and it has not, so called to get some advice on if he should go see an ENT or see his primary care provider.  Denied any headache, dizziness, nausea, ear pain, ear pressure, fever or any other symptoms.    R-(recommendations): should be checked out today.  Patient wondering if he should go to an ENT.  Advised to call insurance and see if he needs a referral. If can get in to see ENT soon or go to  today.  Patient verbalized understanding.       Reason for Disposition    Hearing loss in one or both ears and sudden onset and present now    Additional Information    Negative: Followed an ear injury    Negative: Hearing loss is main symptom    Negative: Patient sounds very sick or weak to the triager    Negative: Taking aspirin and dosage sounds high (i.e., > 1500 mg/day)    Answer Assessment - Initial Assessment Questions  1. DESCRIPTION: \"Describe the sound you are hearing.\" (e.g., hissing, humming, pounding, ringing)      High frequency ringing  2. LOCATION: \"One or both ears?\" If one, ask: \"Which ear?\"      Left ear, but feels like the high frequency is \"right inside my head\"  3. SEVERITY: \"How bad is it?\"     - MILD - doesn't interfere with normal activities, only can hear in a quiet room     - MODERATE-SEVERE (Bothersome): interferes with work, school, sleep, or other activities       Moderate to severe-trouble sleeping  4. ONSET: \"When did this begin?\" \"Did it start suddenly or come on gradually?\"      Couple months ago.  He thinks it came on rather suddenly  5. PATTERN: \"Does this come and go, or has it been constant since it " "started?\"      constant  6. HEARING LOSS: \"Is your hearing decreased?\" (e.g., normal, decreased)        Slight decrease in hearing of left ear.    7. OTHER SYMPTOMS: \"Do you have any other symptoms?\" (e.g., dizziness, earache)      no  8. PREGNANCY: \"Is there any chance you are pregnant?\" \"When was your last menstrual period?\"      n/a    Protocols used: TINNITUS-A-OH      "

## 2023-04-24 DIAGNOSIS — E11.43 TYPE 2 DIABETES MELLITUS WITH PERIPHERAL AUTONOMIC NEUROPATHY (H): ICD-10-CM

## 2023-04-26 NOTE — TELEPHONE ENCOUNTER
Prescription approved per [facility] Refill Protocol.     Sofy Carrington RN  [facility]

## 2023-04-27 DIAGNOSIS — R79.89 LOW TESTOSTERONE: ICD-10-CM

## 2023-04-27 RX ORDER — TESTOSTERONE CYPIONATE 200 MG/ML
INJECTION, SOLUTION INTRAMUSCULAR
Qty: 10 ML | Refills: 0 | Status: SHIPPED | OUTPATIENT
Start: 2023-04-27 | End: 2023-06-06

## 2023-05-08 DIAGNOSIS — E78.5 HYPERLIPIDEMIA LDL GOAL <100: ICD-10-CM

## 2023-05-09 DIAGNOSIS — E11.43 TYPE 2 DIABETES MELLITUS WITH PERIPHERAL AUTONOMIC NEUROPATHY (H): ICD-10-CM

## 2023-05-09 DIAGNOSIS — E11.40 TYPE 2 DIABETES MELLITUS WITH DIABETIC NEUROPATHY (H): ICD-10-CM

## 2023-05-10 RX ORDER — ATORVASTATIN CALCIUM 40 MG/1
TABLET, FILM COATED ORAL
Qty: 90 TABLET | Refills: 1 | Status: SHIPPED | OUTPATIENT
Start: 2023-05-10 | End: 2023-10-24

## 2023-05-11 RX ORDER — INSULIN ASPART 100 [IU]/ML
15 INJECTION, SOLUTION INTRAVENOUS; SUBCUTANEOUS
Qty: 15 ML | Refills: 0 | Status: SHIPPED | OUTPATIENT
Start: 2023-05-11 | End: 2023-06-21

## 2023-05-11 RX ORDER — METFORMIN HCL 500 MG
2000 TABLET, EXTENDED RELEASE 24 HR ORAL
Qty: 360 TABLET | Refills: 0 | Status: SHIPPED | OUTPATIENT
Start: 2023-05-11 | End: 2023-10-09

## 2023-05-11 NOTE — TELEPHONE ENCOUNTER
Pending Prescriptions:                       Disp   Refills    blood glucose (ONETOUCH ULTRA) test strip 100 st*3            Sig: USE TO TEST BLOOD SUGARS ONE TIME DAILY OR AS           DIRECTED.    insulin aspart (NOVOLOG FLEXPEN) 100 UNIT*15 mL  1            Sig: Inject 15 Units Subcutaneous 3 times daily (with           meals) PLEASE SEE ATTACHED FOR DETAILED           DIRECTIONS    metFORMIN (GLUCOPHAGE XR) 500 MG 24 hr ta*360 ta*1            Sig: Take 4 tablets (2,000 mg) by mouth daily (with           dinner)    Metformin and Novolog medications are being filled for 1 time refill only due to:  pt has a future appt scheduled with Endocrin.    Routing test strip refill request to provider for review/approval because:  Drug not active on patient's medication list    Please advise, thanks.  Routed to covering provider - Dr. Dodge.

## 2023-05-22 ENCOUNTER — OFFICE VISIT (OUTPATIENT)
Dept: ENDOCRINOLOGY | Facility: CLINIC | Age: 68
End: 2023-05-22
Attending: INTERNAL MEDICINE
Payer: COMMERCIAL

## 2023-05-22 VITALS
HEIGHT: 73 IN | DIASTOLIC BLOOD PRESSURE: 74 MMHG | WEIGHT: 237.6 LBS | SYSTOLIC BLOOD PRESSURE: 122 MMHG | BODY MASS INDEX: 31.49 KG/M2 | HEART RATE: 76 BPM

## 2023-05-22 DIAGNOSIS — Z79.4 TYPE 2 DIABETES MELLITUS WITH DIABETIC NEUROPATHY, WITH LONG-TERM CURRENT USE OF INSULIN (H): Primary | ICD-10-CM

## 2023-05-22 DIAGNOSIS — E11.40 TYPE 2 DIABETES MELLITUS WITH DIABETIC NEUROPATHY, WITH LONG-TERM CURRENT USE OF INSULIN (H): Primary | ICD-10-CM

## 2023-05-22 PROCEDURE — 99244 OFF/OP CNSLTJ NEW/EST MOD 40: CPT | Performed by: INTERNAL MEDICINE

## 2023-05-22 PROCEDURE — 95251 CONT GLUC MNTR ANALYSIS I&R: CPT | Performed by: INTERNAL MEDICINE

## 2023-05-22 NOTE — PROGRESS NOTES
Name: Austyn Araujo is a 67 year old man, seen at the request of Dr. Javier Villafana for evaluation of     Chief Complaint   Patient presents with     Diabetes       HPI:  Recent issues:  Here for evaluation of diabetes.  Patient hoping to have back surgery and had been told his hgbA1c too high   Now planning back surgery at [facility] with L4-5 fusion 6/14/23  Reviewed medical history from patient and Epic chart record        Diagnosis of diabetes mellitus in his 20's  Symptoms of peripheral neuropathy and was found to be hyperglycemic.  Had seen Dr. Low Bolaños/[facility]  History of tobacco use... quit ~2000.  Began metformin medication  Also took glimeparide in the past  Hospitalized 3 times in the past for acute pancreatitis, circa 2012, 2014, 2016    Possible alcohol association with pancreatitis   Quit drinking alcohol after his last episode of pancreatitis   2014-15. Previous endocrinology evaluation years ago, at my former clinic ([facility]), details not available   Dismissed from [facility] 10/2015    ~2021. Addition of Januvia medication  12/2022. Started Lantus insulin, then addition of Novolog    1/10/23. Endocrinology evaluation with Dr. Dipak Da Silva/[facility]  Comprehensive diabetes management review, consideration to add a SGLT2-I med    Previous [facility] hgbA1c trends include:     Lab Test 04/14/23  1008 01/02/23  0914 11/18/22  1550 11/16/20  1614 02/20/20  0820   A1C 6.1* 8.0* 8.8* 8.0* 7.9*     Current DM medications:  MetforminXR 500 mg 2-tablets by mouth in morning and evening  Januvia 100 mg  1-tab in morning  Novolog Flexpen 15-20U subcutaneous with meals  Lantus Solostar 22U subcutaneous in morning and evening    Blood glucose (BG) meter:  ?name   Uses infrequently?    Uses DexcomG6 CGM, then changed to G7 mid 5/2023  Recent DexcomG7 data:            Fam Hx Diabetes:   T2DM- father, and maternal aunts and maternal uncles.     T1DM- brother  Recent [facility] labs  include:  Lab Results   Component Value Date    A1C 6.1 (H) 04/14/2023     01/02/2023    POTASSIUM 3.9 01/02/2023    CHLORIDE 103 01/02/2023    CO2 25 01/02/2023    ANIONGAP 11 01/02/2023    GLC 80 01/02/2023    BUN 19.7 01/02/2023    CR 1.06 01/02/2023    GFRESTIMATED 76 01/02/2023    GFRESTBLACK >90 02/03/2021    MANINDER 9.4 01/02/2023    CHOL 163 11/18/2022    TRIG 276 (H) 11/18/2022    HDL 41 11/18/2022    LDL 67 11/18/2022    NHDL 122 11/18/2022    UCRR 218.0 11/18/2022    MICROL <12.0 11/18/2022    UMALCR  11/18/2022      Comment:      Unable to calculate, urine albumin and/or urine creatinine is outside detectable limits.  Microalbuminuria is defined as an albumin:creatinine ratio of 17 to 299 for males and 25 to 299 for females. A ratio of albumin:creatinine of 300 or higher is indicative of overt proteinuria.  Due to biologic variability, positive results should be confirmed by a second, first-morning random or 24-hour timed urine specimen. If there is discrepancy, a third specimen is recommended. When 2 out of 3 results are in the microalbuminuria range, this is evidence for incipient nephropathy and warrants increased efforts at glucose control, blood pressure control, and institution of therapy with an angiotensin-converting-enzyme (ACE) inhibitor (if the patient can tolerate it).      TSH 1.17 11/18/2022     DM Complications:   Neuropathy:    Numbness, tingling, pins&needles, and pain at feet    Has not used gabapentin, Lyrica, or Cymbalta    Other chronic illnesses include:   Hypertension:   Takes lisinopril med, followed by PCP   Low testosterone: Takes testosterone med, followed by PCP   Hyperlipidemia: Takes atorvastatin med, followed by PCP        Lives in [place], [place], worked as   Sees Dr. Javier Villafana/[facility] for general medicine evaluations.  Also sees Dr. Yoni Witt/[facility]    PMH/PSH:  Past Medical History:   Diagnosis Date     Anxiety       Anxiety 01/31/2015     BPH (benign prostatic hyperplasia)      Degenerative disc disease      Depression      Gastritis      Gastro-oesophageal reflux disease      H/O alcohol abuse      Hyperlipidemia      Hypertension      Low testosterone      MRSA (methicillin resistant staph aureus) culture positive 08/2013    skin infection     Mumps      PUD (peptic ulcer disease)      Sleep apnea      STD (sexually transmitted disease)      Type 2 diabetes mellitus (H)     neuropathy     Past Surgical History:   Procedure Laterality Date     COLONOSCOPY  11/11/2013    Procedure: COMBINED COLONOSCOPY, SINGLE BIOPSY/POLYPECTOMY BY BIOPSY;  Colonoscopy/ polypectomy x2 by cold forceps    ;  Surgeon: Juan Carlos Bellamy MD;  Location:  GI     ESOPHAGOSCOPY, GASTROSCOPY, DUODENOSCOPY (EGD), COMBINED N/A 9/22/2016    Procedure: COMBINED ESOPHAGOSCOPY, GASTROSCOPY, DUODENOSCOPY (EGD), BIOPSY SINGLE OR MULTIPLE;  Surgeon: Juan Carlos Barraza MD;  Location:  GI     ESOPHAGOSCOPY, GASTROSCOPY, DUODENOSCOPY (EGD), COMBINED N/A 2/19/2019    Procedure: ESOPHAGOSCOPY, GASTROSCOPY, DUODENOSCOPY (EGD) with cold forcep;  Surgeon: Juan Carlos Barraza MD;  Location:  GI     HERNIA REPAIR       ORTHOPEDIC SURGERY         Family Hx:  Family History   Problem Relation Age of Onset     Pancreatic Cancer Mother      Pancreatic Cancer Paternal Grandfather      Diabetes Brother      Depression Brother      Suicide Brother      Substance Abuse Brother      Dementia Father      Parkinsonism Father      Family History Negative No family hx of      Colon Cancer No family hx of      Unknown/Adopted No family hx of      Anxiety Disorder No family hx of      Schizophrenia No family hx of      Bipolar Disorder No family hx of      Otis Disease No family hx of      Autism Spectrum Disorder No family hx of      Intellectual Disability (Mental Retardation) No family hx of      Mental Illness No family hx of          Social Hx:  Social History      Socioeconomic History     Marital status:      Spouse name: Not on file     Number of children: Not on file     Years of education: Not on file     Highest education level: Not on file   Occupational History     Not on file   Tobacco Use     Smoking status: Former     Types: Cigarettes     Smokeless tobacco: Never   Vaping Use     Vaping status: Not on file   Substance and Sexual Activity     Alcohol use: No     Alcohol/week: 0.0 standard drinks of alcohol     Comment: 14 drinks weekly, quit on 08/2017     Drug use: No     Sexual activity: Yes     Partners: Female   Other Topics Concern     Parent/sibling w/ CABG, MI or angioplasty before 65F 55M? Not Asked   Social History Narrative     Not on file     Social Determinants of Health     Financial Resource Strain: Not on file   Food Insecurity: Not on file   Transportation Needs: Not on file   Physical Activity: Not on file   Stress: Not on file   Social Connections: Not on file   Intimate Partner Violence: Not on file   Housing Stability: Not on file          MEDICATIONS:  has a current medication list which includes the following prescription(s): aspirin, atorvastatin, dexcom g7 sensor, novolog flexpen, lantus solostar, lisinopril, metformin, multivitamin, omeprazole, sitagliptin, tadalafil, testosterone cypionate, b-d luer-prateek syringe, blood glucose, compounded non-controlled substance, dexcom g6 , dexcom g7 , dexcom g7 , dexcom g6 sensor, dexcom g7 sensor, dexcom g6 transmitter, glucosamine-chondroitin, insulin pen needle, bd hypodermic needle, nitroglycerin, and pharmacist choice lancets, and the following Facility-Administered Medications: ropivacaine.    ROS:     ROS: 10 point ROS neg other than the symptoms noted above in the HPI.    GENERAL: some fatigue, wt stable; denies fevers, chills, night sweats.   HEENT: no dysphagia, odonophagia, diplopia, neck pain  THYROID:  no apparent hyper or hypothyroid symptoms  CV: no chest  "pain, pressure, palpitations  LUNGS: no SOB, POWERS, cough, wheezing   ABDOMEN: no diarrhea, constipation, abdominal pain  EXTREMITIES: no rashes, ulcers, edema  NEUROLOGY: decreased sensation, tingling at feet; no headaches, denies changes in vision  MSK: no muscle aches or pains, weakness  SKIN: no rashes or lesions  : occasional nocturia  PSYCH:  stable mood, no significant anxiety or depression  ENDOCRINE: no heat or cold intolerance    Physical Exam   VS: /74   Pulse 76   Ht 1.854 m (6' 1\")   Wt 107.8 kg (237 lb 9.6 oz)   BMI 31.35 kg/m    GENERAL: AXOX3, NAD, well dressed, answering questions appropriately, appears stated age.  THYROID:  normal gland, no apparent nodules or goiter  HEENT: neck non-tender, no exopthalmous, no proptosis, EOMI  CV: RRR, no rubs, gallops, no murmurs  LUNGS: CTAB, no wheezes, rales, or ronchi  ABDOMEN: soft, mildly obese, nontender, nondistended  EXTREMITIES: no edema, no lesions  NEUROLOGY: CN grossly intact, no tremors  MSK: grossly intact  SKIN: facial beard; no rashes, no lesions    LABS:    All pertinent notes, labs, and images personally reviewed by me.     A/P:  Encounter Diagnosis   Name Primary?     Type 2 diabetes mellitus with diabetic neuropathy, with long-term current use of insulin (H) Yes       Comments:  Reviewed health history and diabetes issues.  Recent glycemic control good though multiple diabetes medications used    Plan:  Reviewed general issues with the diabetes diagnosis and management  We discussed the hgbA1c test which reflects previous overall glucose levels or control  Discussed the importance of blood glucose (BG) testing to assess glucose trends  Provided general overview of the diabetes medication options and medication treatment plan  Reviewed recent DexcomG6 CGM glucose trend data, in detail.    Recommend:  Continue current metformin, Januvia, Novolog and Lantus insulin medications at this time  Needs more precise method for dosing " mealtime Novolog, depending on meal carb amount and premeal CGM level  Would not use a GLP1RA medication with history of pancreatitis  Will reassess Januvia (DPP4-I) med use and history of pancreatitis  Goal target premeal glucose  mg/dl.  Continue use of DexcomG6 vs G7 CGM  Continue atorvastatin med use, goal target fasting LDL <100 mg/dl  Need to reassess whether taking the lisinopril med currently  Keep focus on diet, exercise, weight management.  Advise having fasting lipid panel testing and dilated eye examination, at least annually  Will review diabetes management again at follow-up evaluation soon    Testosterone med dosing per PCP  Addressed patient questions today    There are no Patient Instructions on file for this visit.    Future labs ordered today:   Orders Placed This Encounter   Procedures     GLUCOSE MONITOR, 72 HOUR, PHYS INTERP     Radiology/Consults ordered today:     Total time spent on day of encounter:  48 min    Follow-up:  6/1/23 at 3:30 pm, Franco Lane MD, MS  Endocrinology  [facility]    CC: Javier Villafana

## 2023-05-24 DIAGNOSIS — R79.89 LOW TESTOSTERONE: ICD-10-CM

## 2023-05-25 NOTE — TELEPHONE ENCOUNTER
Pending Prescriptions:                       Disp   Refills    tadalafil (CIALIS) 20 MG tablet            88 tab*3        Sig: Take 1 tablet (20 mg) by mouth daily Never use with           nitroglycerin, terazosin or doxazosin.    Routing refill request to provider for review/approval because:  Nitroglycerin on medication list.    Please advise, thanks.

## 2023-05-31 RX ORDER — TADALAFIL 20 MG/1
20 TABLET ORAL DAILY
Qty: 88 TABLET | Refills: 3 | Status: SHIPPED | OUTPATIENT
Start: 2023-05-31 | End: 2023-08-18

## 2023-06-06 DIAGNOSIS — R79.89 LOW TESTOSTERONE: ICD-10-CM

## 2023-06-06 RX ORDER — TESTOSTERONE CYPIONATE 200 MG/ML
INJECTION, SOLUTION INTRAMUSCULAR
Qty: 10 ML | Refills: 0 | Status: SHIPPED | OUTPATIENT
Start: 2023-06-06 | End: 2023-07-09

## 2023-06-16 ENCOUNTER — TELEPHONE (OUTPATIENT)
Dept: INTERNAL MEDICINE | Facility: CLINIC | Age: 68
End: 2023-06-16
Payer: COMMERCIAL

## 2023-06-16 NOTE — TELEPHONE ENCOUNTER
"Patient calling.  Reports he had low back surgery (L4-L5) for \"a spur that was compressing his spinal cord\" on 6/14/23 at [facility].  Came home from the hospital 6/15/23.    Starting in the middle of the night, early this morning, c/o pain on the inside of his Left knee.  Had difficulty walking to the bathroom even with his walker.    He spoke with Dr. Malagon (surgeon) today who recommended patient call primary clinic to get an order for an ultrasound to rule out a blood clot.    This RN advised patient to go to the ER asap for evaluation of the Left knee pain.  Patient was apprehensive in going to the ER - \"last time I went there I had a bad experience and waited 8 hrs to be seen and they did nothing for me\".  This RN strongly encouraged patient to go to the ER - advised that having a blood clot (if that is what he has) is potentially life threatening.  Patient did agree to go.  "

## 2023-06-17 ENCOUNTER — HOSPITAL ENCOUNTER (EMERGENCY)
Facility: CLINIC | Age: 68
Discharge: HOME | End: 2023-06-17
Attending: EMERGENCY MEDICINE | Admitting: EMERGENCY MEDICINE
Payer: COMMERCIAL

## 2023-06-17 ENCOUNTER — APPOINTMENT (OUTPATIENT)
Dept: ULTRASOUND IMAGING | Facility: CLINIC | Age: 68
End: 2023-06-17
Attending: EMERGENCY MEDICINE
Payer: COMMERCIAL

## 2023-06-17 VITALS
TEMPERATURE: 97.8 F | RESPIRATION RATE: 20 BRPM | HEART RATE: 84 BPM | DIASTOLIC BLOOD PRESSURE: 74 MMHG | OXYGEN SATURATION: 96 % | SYSTOLIC BLOOD PRESSURE: 123 MMHG

## 2023-06-17 DIAGNOSIS — M79.662 PAIN OF LEFT LOWER LEG: ICD-10-CM

## 2023-06-17 LAB
ANION GAP SERPL CALCULATED.3IONS-SCNC: 13 MMOL/L (ref 7–15)
BASOPHILS # BLD AUTO: 0 10E3/UL (ref 0–0.2)
BASOPHILS NFR BLD AUTO: 0 %
BUN SERPL-MCNC: 22.7 MG/DL (ref 8–23)
CALCIUM SERPL-MCNC: 9.2 MG/DL (ref 8.8–10.2)
CHLORIDE SERPL-SCNC: 96 MMOL/L (ref 98–107)
CREAT SERPL-MCNC: 0.89 MG/DL (ref 0.67–1.17)
DEPRECATED HCO3 PLAS-SCNC: 24 MMOL/L (ref 22–29)
EOSINOPHIL # BLD AUTO: 0.1 10E3/UL (ref 0–0.7)
EOSINOPHIL NFR BLD AUTO: 0 %
ERYTHROCYTE [DISTWIDTH] IN BLOOD BY AUTOMATED COUNT: 16.9 % (ref 10–15)
GFR SERPL CREATININE-BSD FRML MDRD: >90 ML/MIN/1.73M2
GLUCOSE SERPL-MCNC: 186 MG/DL (ref 70–99)
HCT VFR BLD AUTO: 36.9 % (ref 40–53)
HGB BLD-MCNC: 11.4 G/DL (ref 13.3–17.7)
HOLD SPECIMEN: NORMAL
HOLD SPECIMEN: NORMAL
IMM GRANULOCYTES # BLD: 0.1 10E3/UL
IMM GRANULOCYTES NFR BLD: 0 %
LYMPHOCYTES # BLD AUTO: 1.7 10E3/UL (ref 0.8–5.3)
LYMPHOCYTES NFR BLD AUTO: 11 %
MCH RBC QN AUTO: 22.2 PG (ref 26.5–33)
MCHC RBC AUTO-ENTMCNC: 30.9 G/DL (ref 31.5–36.5)
MCV RBC AUTO: 72 FL (ref 78–100)
MONOCYTES # BLD AUTO: 1.1 10E3/UL (ref 0–1.3)
MONOCYTES NFR BLD AUTO: 7 %
NEUTROPHILS # BLD AUTO: 12.9 10E3/UL (ref 1.6–8.3)
NEUTROPHILS NFR BLD AUTO: 82 %
NRBC # BLD AUTO: 0 10E3/UL
NRBC BLD AUTO-RTO: 0 /100
PLATELET # BLD AUTO: 325 10E3/UL (ref 150–450)
POTASSIUM SERPL-SCNC: 4.5 MMOL/L (ref 3.4–5.3)
RBC # BLD AUTO: 5.13 10E6/UL (ref 4.4–5.9)
SODIUM SERPL-SCNC: 133 MMOL/L (ref 136–145)
WBC # BLD AUTO: 15.9 10E3/UL (ref 4–11)

## 2023-06-17 PROCEDURE — 85025 COMPLETE CBC W/AUTO DIFF WBC: CPT | Performed by: EMERGENCY MEDICINE

## 2023-06-17 PROCEDURE — 99284 EMERGENCY DEPT VISIT MOD MDM: CPT | Mod: 25

## 2023-06-17 PROCEDURE — 80048 BASIC METABOLIC PNL TOTAL CA: CPT | Performed by: EMERGENCY MEDICINE

## 2023-06-17 PROCEDURE — 36415 COLL VENOUS BLD VENIPUNCTURE: CPT | Performed by: EMERGENCY MEDICINE

## 2023-06-17 PROCEDURE — 93971 EXTREMITY STUDY: CPT | Mod: LT

## 2023-06-17 RX ORDER — CYCLOBENZAPRINE HCL 10 MG
10 TABLET ORAL 3 TIMES DAILY PRN
Qty: 15 TABLET | Refills: 0 | Status: SHIPPED | OUTPATIENT
Start: 2023-06-17 | End: 2023-11-03

## 2023-06-17 NOTE — ED TRIAGE NOTES
Had a decompression of L4 and L5 and a fusion on the 14th. C/O sharp pain behind left knee. Didn't have pain in left leg prior to the surgery. Denies pain coming down butt of thigh

## 2023-06-18 NOTE — ED PROVIDER NOTES
"  History     Chief Complaint:  Post-op Problem       The history is provided by the patient.      Austyn Araujo is a 67 year old male who presents with sharp pain behind the left knee when weight bearing.  He describes the pain to be like \"someone is jabbing a nail\" to the area.  He does not have pain when sitting or lying down.  He reports he had a L4-L5 decompression performed 3 days ago and was discharged home yesterday.  Prior to that, he had sciatica pain to the right leg but no pain to the left leg.  He has been taking oxycodone and valium since the surgery, which has not relieved the pain now.  He denies history of blood clots.      Independent Historian:   None - Patient Only        Medications:    Aspirin  Atorvastatin  Glucosamine  Insulin aspart  Insulin glargine  Lisinopril  Metformin  Nitroglycerin  Omeprazole  Oxycodone   Sitagliptin  Tadalafil  Testosterone     Past Medical History:    Anxiety   BPH   Degenerative disc disease  Depression  GERD  Hyperlipidemia  Hypertension  Low testosterone  MRSA  Type 2 diabetes     Past Surgical History:    Colonoscopy  EGD  Hernia repair  Decompression L4-L5     Physical Exam     Patient Vitals for the past 24 hrs:   BP Temp Temp src Pulse Resp SpO2   06/17/23 1729 123/74 97.8  F (36.6  C) Temporal 84 20 96 %        Physical Exam  Constitutional: Vital signs reviewed.  Pleasant.  HEENT: Moist mucous membranes  Cardiovascular: Regular rate and rhythm  Pulmonary/Chest: Breathing comfortably on room air.  No audible wheezing  Musculoskeletal/Extremities: Moves all 4 extremities without difficulty. Tenderness in the left popliteal fossa. No swelling or mass. No bony deformities. Normal distal pulse and sensation.   Neurological: Alert.  No focal deficits.  Endo: No pitting edema  Skin: No visible rash.  Psychiatric: Pleasant.     Emergency Department Course     Imaging:  US Lower Extremity Venous Duplex Left   Final Result   IMPRESSION:      No deep venous " thrombosis in the visualized left lower extremity.         Report per radiology    Laboratory:  Labs Ordered and Resulted from Time of ED Arrival to Time of ED Departure   BASIC METABOLIC PANEL - Abnormal       Result Value    Sodium 133 (*)     Potassium 4.5      Chloride 96 (*)     Carbon Dioxide (CO2) 24      Anion Gap 13      Urea Nitrogen 22.7      Creatinine 0.89      Calcium 9.2      Glucose 186 (*)     GFR Estimate >90     CBC WITH PLATELETS AND DIFFERENTIAL - Abnormal    WBC Count 15.9 (*)     RBC Count 5.13      Hemoglobin 11.4 (*)     Hematocrit 36.9 (*)     MCV 72 (*)     MCH 22.2 (*)     MCHC 30.9 (*)     RDW 16.9 (*)     Platelet Count 325      % Neutrophils 82      % Lymphocytes 11      % Monocytes 7      % Eosinophils 0      % Basophils 0      % Immature Granulocytes 0      NRBCs per 100 WBC 0      Absolute Neutrophils 12.9 (*)     Absolute Lymphocytes 1.7      Absolute Monocytes 1.1      Absolute Eosinophils 0.1      Absolute Basophils 0.0      Absolute Immature Granulocytes 0.1      Absolute NRBCs 0.0          Emergency Department Course & Assessments:    Assessments:  2012 Initial assessment. I gathered history and examined the patient as noted above.     Independent Interpretation (X-rays, CTs, rhythm strip):  Ultrasound negative for DVT or Baker's cyst per my interpretation.     Social Determinants of Health affecting care:   None    Disposition:  The patient was discharged to home.     Impression & Plan      Medical Decision Making:  Patient presents with left knee pain.  He did recently have extensive low back surgery and was having some right-sided sciatica before that.  The sciatica on the right seems to be getting better.  The back still hurts because he just a few days postop.  However now he has pain in the left popliteal fossa.  No history of DVT or PE but obviously has been immobilized.  Fortunately ultrasound was negative.  He has good distal pulse and normal sensation.  I suspect this  is musculoskeletal strain.  I will prescribe him Flexeril.  He can continue with NSAIDs as well.  Follow-up with his primary care doctor as needed.      Diagnosis:    ICD-10-CM    1. Pain of left lower leg  M79.662            Discharge Medications:  Discharge Medication List as of 6/17/2023  8:23 PM      START taking these medications    Details   cyclobenzaprine (FLEXERIL) 10 MG tablet Take 1 tablet (10 mg) by mouth 3 times daily as needed for muscle spasms, Disp-15 tablet, R-0, E-Prescribe                Scribe Disclosure:  I, Varsha Alaniz, am serving as a scribe at 8:20 PM on 6/17/2023 to document services personally performed by Alexis Ramírez MD based on my observations and the provider's statements to me.     6/17/2023   Alexis Ramírez MD Walters, Brent Aaron, MD  06/17/23 0329

## 2023-06-21 DIAGNOSIS — E11.43 TYPE 2 DIABETES MELLITUS WITH PERIPHERAL AUTONOMIC NEUROPATHY (H): ICD-10-CM

## 2023-06-21 RX ORDER — INSULIN ASPART 100 [IU]/ML
INJECTION, SOLUTION INTRAVENOUS; SUBCUTANEOUS
Qty: 45 ML | Refills: 0 | Status: SHIPPED | OUTPATIENT
Start: 2023-06-21 | End: 2023-10-24

## 2023-07-08 DIAGNOSIS — R79.89 LOW TESTOSTERONE: ICD-10-CM

## 2023-07-09 RX ORDER — TESTOSTERONE CYPIONATE 200 MG/ML
INJECTION, SOLUTION INTRAMUSCULAR
Qty: 10 ML | Refills: 0 | Status: SHIPPED | OUTPATIENT
Start: 2023-07-09 | End: 2023-08-21

## 2023-07-12 ENCOUNTER — ANCILLARY PROCEDURE (OUTPATIENT)
Dept: GENERAL RADIOLOGY | Facility: CLINIC | Age: 68
End: 2023-07-12
Attending: FAMILY MEDICINE
Payer: COMMERCIAL

## 2023-07-12 ENCOUNTER — OFFICE VISIT (OUTPATIENT)
Dept: ORTHOPEDICS | Facility: CLINIC | Age: 68
End: 2023-07-12
Payer: COMMERCIAL

## 2023-07-12 VITALS — HEIGHT: 73 IN | WEIGHT: 235 LBS | BODY MASS INDEX: 31.14 KG/M2

## 2023-07-12 DIAGNOSIS — M17.12 PRIMARY OSTEOARTHRITIS OF LEFT KNEE: ICD-10-CM

## 2023-07-12 DIAGNOSIS — M25.562 CHRONIC PAIN OF BOTH KNEES: ICD-10-CM

## 2023-07-12 DIAGNOSIS — M25.561 CHRONIC PAIN OF BOTH KNEES: ICD-10-CM

## 2023-07-12 DIAGNOSIS — G89.29 CHRONIC PAIN OF BOTH KNEES: ICD-10-CM

## 2023-07-12 DIAGNOSIS — M17.11 PRIMARY OSTEOARTHRITIS OF RIGHT KNEE: ICD-10-CM

## 2023-07-12 DIAGNOSIS — M25.562 CHRONIC PAIN OF BOTH KNEES: Primary | ICD-10-CM

## 2023-07-12 DIAGNOSIS — G89.29 CHRONIC PAIN OF BOTH KNEES: Primary | ICD-10-CM

## 2023-07-12 DIAGNOSIS — M25.561 CHRONIC PAIN OF BOTH KNEES: Primary | ICD-10-CM

## 2023-07-12 PROCEDURE — 99204 OFFICE O/P NEW MOD 45 MIN: CPT | Mod: 25 | Performed by: FAMILY MEDICINE

## 2023-07-12 PROCEDURE — 73562 X-RAY EXAM OF KNEE 3: CPT | Mod: TC | Performed by: FAMILY MEDICINE

## 2023-07-12 PROCEDURE — 20611 DRAIN/INJ JOINT/BURSA W/US: CPT | Mod: 50 | Performed by: FAMILY MEDICINE

## 2023-07-12 RX ORDER — ROPIVACAINE HYDROCHLORIDE 5 MG/ML
4 INJECTION, SOLUTION EPIDURAL; INFILTRATION; PERINEURAL
Status: SHIPPED | OUTPATIENT
Start: 2023-07-12

## 2023-07-12 RX ORDER — METHYLPREDNISOLONE ACETATE 40 MG/ML
40 INJECTION, SUSPENSION INTRA-ARTICULAR; INTRALESIONAL; INTRAMUSCULAR; SOFT TISSUE
Status: SHIPPED | OUTPATIENT
Start: 2023-07-12

## 2023-07-12 RX ADMIN — ROPIVACAINE HYDROCHLORIDE 4 ML: 5 INJECTION, SOLUTION EPIDURAL; INFILTRATION; PERINEURAL at 11:42

## 2023-07-12 RX ADMIN — METHYLPREDNISOLONE ACETATE 40 MG: 40 INJECTION, SUSPENSION INTRA-ARTICULAR; INTRALESIONAL; INTRAMUSCULAR; SOFT TISSUE at 11:42

## 2023-07-12 NOTE — PATIENT INSTRUCTIONS
1. Chronic pain of both knees    2. Primary osteoarthritis of left knee    3. Primary osteoarthritis of right knee      -Patient has chronic bilateral knee pain due to arthritis  -Patient tolerated bilateral knee intra-articular cortisone injection today without complications.  Patient was given postprocedure instructions  -Patient will follow up at his convenience for bilateral hip intra-articular cortisone injections.  -Patient will then start a home exercise program.  We will give the patient handouts at the next visit  -Call direct clinic number [[phone]] at any time with questions or concerns.    Albert Yeo MD [facility]  [facility] [place]

## 2023-07-12 NOTE — PROGRESS NOTES
"ASSESSMENT & PLAN  Patient Instructions     1. Chronic pain of both knees    2. Primary osteoarthritis of left knee    3. Primary osteoarthritis of right knee      -Patient has chronic bilateral knee pain due to arthritis  -Patient tolerated bilateral knee intra-articular cortisone injection today without complications.  Patient was given postprocedure instructions  -Patient will follow up at his convenience for bilateral hip intra-articular cortisone injections.  -Patient will then start a home exercise program.  We will give the patient handouts at the next visit  -Call direct clinic number [[phone]] at any time with questions or concerns.    Albert Yeo MD [facility] Orth[facility] and Sports Medicine  [facility] [place]          -----    SUBJECTIVE  Austyn Araujo is a/an 67 year old male who is seen as a self referral for evaluation of bilateral hip pain. The patient is seen by themselves.    Onset: Years. Reports insidious onset without acute precipitating event.  Location of Pain: bilateral lateral and anterior hips (L>R)  Rating of Pain at worst: 6/10  Rating of Pain Currently: 1/10  Worsened by: prolonged walking / activity  Better with: rest / activity avoidance  Treatments tried: rest/activity avoidance, Tylenol, ibuprofen and icy hot, aspercream, voltaren gel, bilateral hip intra articular cortisone injection (most recent: 7/20/20) which provided \"months\" relief, physical therapy in 2017  Associated symptoms: intermittent clicking  Orthopedic history: YES - patient also complains of bilateral anterior knee pain ongoing for ~ 2-3 years. Pain worsens with prolonged inactivity and improves with movement  Relevant surgical history: YES - lumbar laminectomy 6/26/23 in [place], [place]  Social history: social history: works - [employer]    Past Medical History:   Diagnosis Date     Anxiety      Anxiety 01/31/2015     BPH (benign prostatic hyperplasia)      Degenerative disc disease      " "Depression      Gastritis      Gastro-oesophageal reflux disease      H/O alcohol abuse      Hyperlipidemia      Hypertension      Low testosterone      MRSA (methicillin resistant staph aureus) culture positive 08/2013    skin infection     Mumps      PUD (peptic ulcer disease)      Sleep apnea      STD (sexually transmitted disease)      Type 2 diabetes mellitus (H)     neuropathy     Social History     Socioeconomic History     Marital status:    Tobacco Use     Smoking status: Former     Types: Cigarettes     Smokeless tobacco: Never   Substance and Sexual Activity     Alcohol use: No     Alcohol/week: 0.0 standard drinks of alcohol     Comment: 14 drinks weekly, quit on 08/2017     Drug use: No     Sexual activity: Yes     Partners: Female         Patient's past medical, surgical, social, and family histories were reviewed today and no changes are noted.    REVIEW OF SYSTEMS:  10 point ROS is negative other than symptoms noted above in HPI, Past Medical History or as stated below  Constitutional: NEGATIVE for fever, chills, change in weight  Skin: NEGATIVE for worrisome rashes, moles or lesions  GI/: NEGATIVE for bowel or bladder changes  Neuro: NEGATIVE for weakness, dizziness or paresthesias    OBJECTIVE:  Ht 1.854 m (6' 1\")   Wt 106.6 kg (235 lb)   BMI 31.00 kg/m     General: healthy, alert and in no distress  HEENT: no scleral icterus or conjunctival erythema  Skin: no suspicious lesions or rash. No jaundice.  CV: no pedal edema  Resp: normal respiratory effort without conversational dyspnea   Psych: normal mood and affect  Gait: normal steady gait with appropriate coordination and balance  Neuro: Normal light sensory exam of lower extremity  MSK:  BILATERALBILATERAL KNEE  Inspection:    Normal alignment; no edema, erythema, or ecchymosis present  Palpation:  bony and ligamentous landmarks are nontender.    No effusion is present    Patellofemoral crepitus is Absent  Range of Motion:     00 " extension to 1200 flexion  Strength:    Quadriceps grossly intact    Extensor mechanism intact  Special Tests:    Positive: none    Negative: MCL/valgus stress (0 & 30 deg), LCL/varus stress (0 & 30 deg), Lachman's, anterior drawer, posterior drawer, Paula's    Independent visualization of the below image:  Recent Results (from the past 24 hour(s))   XR Knee Bilateral 3 vw    Narrative    Right mild tricompartment joint space narrowing with small osteophytes on   the patella.  Left moderate medial and mild lateral and patellofemoral   joint space narrowing with small osteophytes in the medial and   patellofemoral compartments.  No acute fracture or dislocation   bilaterally.     PELVIS AND HIP BILATERAL ONE VIEW   7/20/2020 1:35 PM      HISTORY: Primary osteoarthritis of left hip.  Chronic right hip pain.     COMPARISON: None.                                                                   IMPRESSION: Hip joint spaces are well-preserved. Mild broad transition  between femoral head and neck bilaterally can predispose to femoral  acetabular impingement. No fracture or AVN.    Large Joint Injection/Arthocentesis: bilateral knee    Date/Time: 7/12/2023 11:42 AM    Performed by: Yeo, Albert, MD  Authorized by: Yeo, Albert, MD    Indications:  Pain and osteoarthritis  Needle Size:  22 G  Guidance: ultrasound    Approach:  Anterolateral  Location:  Knee  Laterality:  Bilateral      Medications (Right):  40 mg methylPREDNISolone 40 MG/ML; 4 mL ropivacaine 5 MG/ML  Medications (Left):  40 mg methylPREDNISolone 40 MG/ML; 4 mL ropivacaine 5 MG/ML  Outcome:  Tolerated well, no immediate complications  Procedure discussed: discussed risks, benefits, and alternatives    Consent Given by:  Patient  Timeout: timeout called immediately prior to procedure    Prep: patient was prepped and draped in usual sterile fashion     Ultrasound was used to ensure safe and accurate needle placement and injection. Ultrasound images of the  procedure were permanently stored.            Albert Yeo MD [facility]

## 2023-07-12 NOTE — LETTER
"    7/12/2023         RE: Austyn Araujo  [id number] [facility] [address]        Dear Colleague,    Thank you for referring your patient, Austyn Araujo, to the [facility] [place]. Please see a copy of my visit note below.    ASSESSMENT & PLAN  Patient Instructions     1. Chronic pain of both knees    2. Primary osteoarthritis of left knee    3. Primary osteoarthritis of right knee      -Patient has chronic bilateral knee pain due to arthritis  -Patient tolerated bilateral knee intra-articular cortisone injection today without complications.  Patient was given postprocedure instructions  -Patient will follow up at his convenience for bilateral hip intra-articular cortisone injections.  -Patient will then start a home exercise program.  We will give the patient handouts at the next visit  -Call direct clinic number [[phone]] at any time with questions or concerns.    Albert Yeo MD [facility] Orthopedics and Sports Medicine  [facility] [place]          -----    SUBJECTIVE  Austyn Araujo is a/an 67 year old male who is seen as a self referral for evaluation of bilateral hip pain. The patient is seen by themselves.    Onset: Years. Reports insidious onset without acute precipitating event.  Location of Pain: bilateral lateral and anterior hips (L>R)  Rating of Pain at worst: 6/10  Rating of Pain Currently: 1/10  Worsened by: prolonged walking / activity  Better with: rest / activity avoidance  Treatments tried: rest/activity avoidance, Tylenol, ibuprofen and icy hot, aspercream, voltaren gel, bilateral hip intra articular cortisone injection (most recent: 7/20/20) which provided \"months\" relief, physical therapy in 2017  Associated symptoms: intermittent clicking  Orthopedic history: YES - patient also complains of bilateral anterior knee pain ongoing for ~ 2-3 years. Pain worsens with prolonged inactivity and improves with movement  Relevant " "surgical history: YES - lumbar laminectomy 6/26/23 in [place], [place]  Social history: social history: works - [employer]    Past Medical History:   Diagnosis Date     Anxiety      Anxiety 01/31/2015     BPH (benign prostatic hyperplasia)      Degenerative disc disease      Depression      Gastritis      Gastro-oesophageal reflux disease      H/O alcohol abuse      Hyperlipidemia      Hypertension      Low testosterone      MRSA (methicillin resistant staph aureus) culture positive 08/2013    skin infection     Mumps      PUD (peptic ulcer disease)      Sleep apnea      STD (sexually transmitted disease)      Type 2 diabetes mellitus (H)     neuropathy     Social History     Socioeconomic History     Marital status:    Tobacco Use     Smoking status: Former     Types: Cigarettes     Smokeless tobacco: Never   Substance and Sexual Activity     Alcohol use: No     Alcohol/week: 0.0 standard drinks of alcohol     Comment: 14 drinks weekly, quit on 08/2017     Drug use: No     Sexual activity: Yes     Partners: Female         Patient's past medical, surgical, social, and family histories were reviewed today and no changes are noted.    REVIEW OF SYSTEMS:  10 point ROS is negative other than symptoms noted above in HPI, Past Medical History or as stated below  Constitutional: NEGATIVE for fever, chills, change in weight  Skin: NEGATIVE for worrisome rashes, moles or lesions  GI/: NEGATIVE for bowel or bladder changes  Neuro: NEGATIVE for weakness, dizziness or paresthesias    OBJECTIVE:  Ht 1.854 m (6' 1\")   Wt 106.6 kg (235 lb)   BMI 31.00 kg/m     General: healthy, alert and in no distress  HEENT: no scleral icterus or conjunctival erythema  Skin: no suspicious lesions or rash. No jaundice.  CV: no pedal edema  Resp: normal respiratory effort without conversational dyspnea   Psych: normal mood and affect  Gait: normal steady gait with appropriate coordination and balance  Neuro: Normal light sensory " exam of lower extremity  MSK:  BILATERALBILATERAL KNEE  Inspection:    Normal alignment; no edema, erythema, or ecchymosis present  Palpation:  bony and ligamentous landmarks are nontender.    No effusion is present    Patellofemoral crepitus is Absent  Range of Motion:     00 extension to 1200 flexion  Strength:    Quadriceps grossly intact    Extensor mechanism intact  Special Tests:    Positive: none    Negative: MCL/valgus stress (0 & 30 deg), LCL/varus stress (0 & 30 deg), Lachman's, anterior drawer, posterior drawer, Paula's    Independent visualization of the below image:  Recent Results (from the past 24 hour(s))   XR Knee Bilateral 3 vw    Narrative    Right mild tricompartment joint space narrowing with small osteophytes on   the patella.  Left moderate medial and mild lateral and patellofemoral   joint space narrowing with small osteophytes in the medial and   patellofemoral compartments.  No acute fracture or dislocation   bilaterally.     PELVIS AND HIP BILATERAL ONE VIEW   7/20/2020 1:35 PM      HISTORY: Primary osteoarthritis of left hip.  Chronic right hip pain.     COMPARISON: None.                                                                   IMPRESSION: Hip joint spaces are well-preserved. Mild broad transition  between femoral head and neck bilaterally can predispose to femoral  acetabular impingement. No fracture or AVN.    Large Joint Injection/Arthocentesis: bilateral knee    Date/Time: 7/12/2023 11:42 AM    Performed by: Yeo, Albert, MD  Authorized by: Yeo, Albert, MD    Indications:  Pain and osteoarthritis  Needle Size:  22 G  Guidance: ultrasound    Approach:  Anterolateral  Location:  Knee  Laterality:  Bilateral      Medications (Right):  40 mg methylPREDNISolone 40 MG/ML; 4 mL ropivacaine 5 MG/ML  Medications (Left):  40 mg methylPREDNISolone 40 MG/ML; 4 mL ropivacaine 5 MG/ML  Outcome:  Tolerated well, no immediate complications  Procedure discussed: discussed risks, benefits,  and alternatives    Consent Given by:  Patient  Timeout: timeout called immediately prior to procedure    Prep: patient was prepped and draped in usual sterile fashion     Ultrasound was used to ensure safe and accurate needle placement and injection. Ultrasound images of the procedure were permanently stored.            Albert Yeo MD [facility]        Again, thank you for allowing me to participate in the care of your patient.        Sincerely,        Albert Yeo, MD

## 2023-07-26 DIAGNOSIS — R79.89 LOW TESTOSTERONE: ICD-10-CM

## 2023-07-27 NOTE — TELEPHONE ENCOUNTER
Routing refill request to provider for review/approval because:  Fail protocol  Alba Sanchez RN

## 2023-08-18 DIAGNOSIS — R79.89 LOW TESTOSTERONE: ICD-10-CM

## 2023-08-18 RX ORDER — TADALAFIL 20 MG/1
20 TABLET ORAL DAILY
Qty: 88 TABLET | Refills: 0 | OUTPATIENT
Start: 2023-08-18

## 2023-08-18 RX ORDER — TADALAFIL 20 MG/1
20 TABLET ORAL DAILY
Qty: 88 TABLET | Refills: 0 | Status: SHIPPED | OUTPATIENT
Start: 2023-08-18 | End: 2023-10-17

## 2023-08-21 RX ORDER — TESTOSTERONE CYPIONATE 200 MG/ML
INJECTION, SOLUTION INTRAMUSCULAR
Qty: 10 ML | Refills: 0 | Status: SHIPPED | OUTPATIENT
Start: 2023-08-21 | End: 2023-09-20

## 2023-08-25 ENCOUNTER — OFFICE VISIT (OUTPATIENT)
Dept: ORTHOPEDICS | Facility: CLINIC | Age: 68
End: 2023-08-25
Payer: COMMERCIAL

## 2023-08-25 DIAGNOSIS — M16.11 PRIMARY OSTEOARTHRITIS OF RIGHT HIP: Primary | ICD-10-CM

## 2023-08-25 DIAGNOSIS — M16.12 PRIMARY LOCALIZED OSTEOARTHRITIS OF LEFT HIP: ICD-10-CM

## 2023-08-25 PROCEDURE — 20611 DRAIN/INJ JOINT/BURSA W/US: CPT | Mod: 50 | Performed by: FAMILY MEDICINE

## 2023-08-25 RX ORDER — METHYLPREDNISOLONE ACETATE 40 MG/ML
40 INJECTION, SUSPENSION INTRA-ARTICULAR; INTRALESIONAL; INTRAMUSCULAR; SOFT TISSUE
Status: SHIPPED | OUTPATIENT
Start: 2023-08-25

## 2023-08-25 RX ORDER — LIDOCAINE HYDROCHLORIDE 10 MG/ML
8 INJECTION, SOLUTION INFILTRATION; PERINEURAL
Status: SHIPPED | OUTPATIENT
Start: 2023-08-25

## 2023-08-25 RX ORDER — ROPIVACAINE HYDROCHLORIDE 5 MG/ML
4 INJECTION, SOLUTION EPIDURAL; INFILTRATION; PERINEURAL
Status: SHIPPED | OUTPATIENT
Start: 2023-08-25

## 2023-08-25 RX ADMIN — LIDOCAINE HYDROCHLORIDE 8 ML: 10 INJECTION, SOLUTION INFILTRATION; PERINEURAL at 13:58

## 2023-08-25 RX ADMIN — METHYLPREDNISOLONE ACETATE 40 MG: 40 INJECTION, SUSPENSION INTRA-ARTICULAR; INTRALESIONAL; INTRAMUSCULAR; SOFT TISSUE at 13:58

## 2023-08-25 RX ADMIN — ROPIVACAINE HYDROCHLORIDE 4 ML: 5 INJECTION, SOLUTION EPIDURAL; INFILTRATION; PERINEURAL at 13:58

## 2023-08-25 NOTE — LETTER
8/25/2023         RE: Austyn Araujo  [id number] [facility] [address]        Dear Colleague,    Thank you for referring your patient, Austyn Araujo, to the [facility] [place]. Please see a copy of my visit note below.    ASSESSMENT & PLAN  Patient Instructions     1. Primary osteoarthritis of right hip    2. Primary localized osteoarthritis of left hip      -Patient is following up for chronic bilateral hip pain due to arthritis  -Patient requested and tolerated bilateral hip intra-articular cortisone injection today without complications.  Patient was given postprocedure instructions  -Patient will follow-up when pain returns  -Call direct clinic number [[phone]] at any time with questions or concerns.    Albert Yeo MD [facility] Orthopedics and Sports Medicine  [facility]        -----    SUBJECTIVE:  Austyn Araujo is a 67 year old male who is seen for US guided bilateral hip intra articular cortisone injections.    Large Joint Injection/Arthocentesis: bilateral hip joint    Date/Time: 8/25/2023 1:58 PM    Performed by: Yeo, Albert, MD  Authorized by: Yeo, Albert, MD    Indications:  Pain and osteoarthritis  Needle Size:  22 G  Guidance: ultrasound    Approach:  Anterior  Location:  Hip  Laterality:  Bilateral      Site:  Bilateral hip joint  Medications (Right):  40 mg methylPREDNISolone 40 MG/ML; 8 mL lidocaine 1 %; 4 mL ROPivacaine 5 MG/ML  Medications (Left):  40 mg methylPREDNISolone 40 MG/ML; 8 mL lidocaine 1 %; 4 mL ROPivacaine 5 MG/ML  Outcome:  Tolerated well, no immediate complications  Procedure discussed: discussed risks, benefits, and alternatives    Consent Given by:  Patient  Timeout: timeout called immediately prior to procedure    Prep: patient was prepped and draped in usual sterile fashion     Ultrasound was used to ensure safe and accurate needle placement and injection. Ultrasound images of the procedure were  permanently stored.            Albert Yeo MD, [facility]      Again, thank you for allowing me to participate in the care of your patient.        Sincerely,        Albert Yeo, MD

## 2023-08-25 NOTE — PROGRESS NOTES
ASSESSMENT & PLAN  Patient Instructions     1. Primary osteoarthritis of right hip    2. Primary localized osteoarthritis of left hip      -Patient is following up for chronic bilateral hip pain due to arthritis  -Patient requested and tolerated bilateral hip intra-articular cortisone injection today without complications.  Patient was given postprocedure instructions  -Patient will follow-up when pain returns  -Call direct clinic number [[phone]] at any time with questions or concerns.    Albert Yeo MD [facility] Orthopedics and Sports Medicine  [facility]        -----    SUBJECTIVE:  Austyn Araujo is a 67 year old male who is seen for US guided bilateral hip intra articular cortisone injections.    Large Joint Injection/Arthocentesis: bilateral hip joint    Date/Time: 8/25/2023 1:58 PM    Performed by: Yeo, Albert, MD  Authorized by: Yeo, Albert, MD    Indications:  Pain and osteoarthritis  Needle Size:  22 G  Guidance: ultrasound    Approach:  Anterior  Location:  Hip  Laterality:  Bilateral      Site:  Bilateral hip joint  Medications (Right):  40 mg methylPREDNISolone 40 MG/ML; 8 mL lidocaine 1 %; 4 mL ROPivacaine 5 MG/ML  Medications (Left):  40 mg methylPREDNISolone 40 MG/ML; 8 mL lidocaine 1 %; 4 mL ROPivacaine 5 MG/ML  Outcome:  Tolerated well, no immediate complications  Procedure discussed: discussed risks, benefits, and alternatives    Consent Given by:  Patient  Timeout: timeout called immediately prior to procedure    Prep: patient was prepped and draped in usual sterile fashion     Ultrasound was used to ensure safe and accurate needle placement and injection. Ultrasound images of the procedure were permanently stored.            Albert Yeo MD, [facility]

## 2023-08-25 NOTE — PATIENT INSTRUCTIONS
1. Primary osteoarthritis of right hip    2. Primary localized osteoarthritis of left hip      -Patient is following up for chronic bilateral hip pain due to arthritis  -Patient requested and tolerated bilateral hip intra-articular cortisone injection today without complications.  Patient was given postprocedure instructions  -Patient will follow-up when pain returns  -Call direct clinic number [[phone]] at any time with questions or concerns.    Albert Yeo MD [facility]  [facility] [place]

## 2023-09-01 DIAGNOSIS — K21.9 GASTROESOPHAGEAL REFLUX DISEASE WITHOUT ESOPHAGITIS: ICD-10-CM

## 2023-09-01 RX ORDER — OMEPRAZOLE 40 MG/1
CAPSULE, DELAYED RELEASE ORAL
Qty: 90 CAPSULE | Refills: 0 | Status: SHIPPED | OUTPATIENT
Start: 2023-09-01 | End: 2023-10-24

## 2023-09-20 DIAGNOSIS — R79.89 LOW TESTOSTERONE: ICD-10-CM

## 2023-09-20 RX ORDER — TESTOSTERONE CYPIONATE 200 MG/ML
INJECTION, SOLUTION INTRAMUSCULAR
Qty: 10 ML | Refills: 0 | Status: SHIPPED | OUTPATIENT
Start: 2023-09-20 | End: 2023-10-30

## 2023-10-09 DIAGNOSIS — E11.43 TYPE 2 DIABETES MELLITUS WITH PERIPHERAL AUTONOMIC NEUROPATHY (H): ICD-10-CM

## 2023-10-09 RX ORDER — METFORMIN HCL 500 MG
2000 TABLET, EXTENDED RELEASE 24 HR ORAL
Qty: 360 TABLET | Refills: 0 | Status: SHIPPED | OUTPATIENT
Start: 2023-10-09 | End: 2024-01-22

## 2023-10-09 NOTE — TELEPHONE ENCOUNTER
Pending Prescriptions:                       Disp   Refills    metFORMIN (GLUCOPHAGE XR) 500 MG 24 hr tab*360 ta*0        Sig: Take 4 tablets (2,000 mg) by mouth daily (with           dinner)

## 2023-10-17 DIAGNOSIS — R79.89 LOW TESTOSTERONE: ICD-10-CM

## 2023-10-18 RX ORDER — TADALAFIL 20 MG/1
20 TABLET ORAL DAILY
Qty: 88 TABLET | Refills: 0 | Status: SHIPPED | OUTPATIENT
Start: 2023-10-18 | End: 2023-10-31

## 2023-10-24 DIAGNOSIS — R79.89 LOW TESTOSTERONE: ICD-10-CM

## 2023-10-24 DIAGNOSIS — E78.5 HYPERLIPIDEMIA LDL GOAL <100: ICD-10-CM

## 2023-10-24 DIAGNOSIS — K21.9 GASTROESOPHAGEAL REFLUX DISEASE WITHOUT ESOPHAGITIS: ICD-10-CM

## 2023-10-24 DIAGNOSIS — E11.43 TYPE 2 DIABETES MELLITUS WITH PERIPHERAL AUTONOMIC NEUROPATHY (H): ICD-10-CM

## 2023-10-24 RX ORDER — ATORVASTATIN CALCIUM 40 MG/1
TABLET, FILM COATED ORAL
Qty: 90 TABLET | Refills: 1 | Status: SHIPPED | OUTPATIENT
Start: 2023-10-24 | End: 2024-06-04

## 2023-10-24 RX ORDER — INSULIN ASPART 100 [IU]/ML
INJECTION, SOLUTION INTRAVENOUS; SUBCUTANEOUS
Qty: 15 ML | Refills: 3 | Status: SHIPPED | OUTPATIENT
Start: 2023-10-24 | End: 2024-08-29

## 2023-10-24 RX ORDER — OMEPRAZOLE 40 MG/1
CAPSULE, DELAYED RELEASE ORAL
Qty: 90 CAPSULE | Refills: 0 | Status: SHIPPED | OUTPATIENT
Start: 2023-10-24 | End: 2024-01-25

## 2023-10-24 RX ORDER — INSULIN GLARGINE 100 [IU]/ML
INJECTION, SOLUTION SUBCUTANEOUS
Qty: 15 ML | Refills: 3 | Status: SHIPPED | OUTPATIENT
Start: 2023-10-24 | End: 2023-11-03

## 2023-10-25 RX ORDER — TADALAFIL 20 MG/1
20 TABLET ORAL DAILY
Qty: 88 TABLET | Refills: 0 | OUTPATIENT
Start: 2023-10-25

## 2023-10-25 NOTE — TELEPHONE ENCOUNTER
Patient calls back, has many complaints about care he receives in [place]. Patient scheduled for follow up in .  Appointments in Next Year      Nov 03, 2023  1:00 PM  (Arrive by 12:40 PM)  Provider Visit with Majo Paul MD  [facility] ([facility] ) [phone]

## 2023-10-25 NOTE — TELEPHONE ENCOUNTER
Pt is due for OV. Please call to schedule.       Lab Results   Component Value Date    A1C 6.1 04/14/2023    A1C 8.0 01/02/2023    A1C 8.8 11/18/2022    A1C 8.0 11/16/2020    A1C 7.9 02/20/2020    A1C 10.1 10/18/2019    A1C 8.6 02/05/2019    A1C 8.2 08/01/2018

## 2023-10-29 DIAGNOSIS — R79.89 LOW TESTOSTERONE: ICD-10-CM

## 2023-10-30 RX ORDER — TESTOSTERONE CYPIONATE 200 MG/ML
INJECTION, SOLUTION INTRAMUSCULAR
Qty: 10 ML | Refills: 0 | Status: SHIPPED | OUTPATIENT
Start: 2023-10-30 | End: 2023-11-29

## 2023-10-31 DIAGNOSIS — R79.89 LOW TESTOSTERONE: ICD-10-CM

## 2023-10-31 RX ORDER — TADALAFIL 20 MG/1
20 TABLET ORAL DAILY
Qty: 88 TABLET | Refills: 0 | Status: SHIPPED | OUTPATIENT
Start: 2023-10-31 | End: 2023-11-14

## 2023-10-31 NOTE — TELEPHONE ENCOUNTER
Fax received from [facility] Tel:[phone]  Fax [phone] requesting refill:    Requested Prescriptions   Pending Prescriptions Disp Refills    tadalafil (CIALIS) 20 MG tablet  Last Written Prescription Date:  20/18/3    Last Fill Quantity: 88,  # refills: 0   Last office visit: 1/2/2023 ; last virtual visit: 2/7/2023 with prescribing provider:  VV 02/07/23   Future Office Visit:   Next 5 appointments (look out 90 days)      Nov 03, 2023  1:00 PM  (Arrive by 12:40 PM)  Provider Visit with Majo Paul MD  [facility] ([facility] ) [address] [place] [id number] [phone]                  88 tablet 0     Sig: Take 1 tablet (20 mg) by mouth daily Never use with nitroglycerin, terazosin or doxazosin.       There is no refill protocol information for this order

## 2023-11-01 ENCOUNTER — TELEPHONE (OUTPATIENT)
Dept: INTERNAL MEDICINE | Facility: CLINIC | Age: 68
End: 2023-11-01
Payer: COMMERCIAL

## 2023-11-01 NOTE — TELEPHONE ENCOUNTER
General Call    Contacts         Type Contact Phone/Fax    11/01/2023 02:30 PM CDT Phone (Incoming) David Araujo (Self) [phone] (M)          Reason for Call:  trying to return call to Dr. Villafana     What are your questions or concerns:  patient has been transferred a few times and keeps getting hung up on    Date of last appointment with provider:     Could we send this information to you in [facility] or would you prefer to receive a phone call?:   Patient would prefer a phone call   Okay to leave a detailed message?: Yes at Cell number on file:    Telephone Information:   Mobile [phone]

## 2023-11-03 ENCOUNTER — TELEPHONE (OUTPATIENT)
Dept: INTERNAL MEDICINE | Facility: CLINIC | Age: 68
End: 2023-11-03

## 2023-11-03 ENCOUNTER — OFFICE VISIT (OUTPATIENT)
Dept: FAMILY MEDICINE | Facility: CLINIC | Age: 68
End: 2023-11-03
Payer: COMMERCIAL

## 2023-11-03 VITALS
OXYGEN SATURATION: 97 % | SYSTOLIC BLOOD PRESSURE: 110 MMHG | DIASTOLIC BLOOD PRESSURE: 62 MMHG | TEMPERATURE: 97.9 F | HEIGHT: 73 IN | WEIGHT: 237 LBS | HEART RATE: 77 BPM | RESPIRATION RATE: 20 BRPM | BODY MASS INDEX: 31.41 KG/M2

## 2023-11-03 DIAGNOSIS — F32.0 MILD MAJOR DEPRESSION (H): ICD-10-CM

## 2023-11-03 DIAGNOSIS — E11.40 TYPE 2 DIABETES MELLITUS WITH DIABETIC NEUROPATHY, WITHOUT LONG-TERM CURRENT USE OF INSULIN (H): Primary | ICD-10-CM

## 2023-11-03 DIAGNOSIS — Z23 ENCOUNTER FOR IMMUNIZATION: ICD-10-CM

## 2023-11-03 DIAGNOSIS — N52.9 ERECTILE DYSFUNCTION, UNSPECIFIED ERECTILE DYSFUNCTION TYPE: ICD-10-CM

## 2023-11-03 DIAGNOSIS — C61 PROSTATE CANCER (H): ICD-10-CM

## 2023-11-03 DIAGNOSIS — D50.9 MICROCYTIC ANEMIA: ICD-10-CM

## 2023-11-03 LAB
ALBUMIN SERPL BCG-MCNC: 4.4 G/DL (ref 3.5–5.2)
ALP SERPL-CCNC: 71 U/L (ref 40–129)
ALT SERPL W P-5'-P-CCNC: 19 U/L (ref 0–70)
ANION GAP SERPL CALCULATED.3IONS-SCNC: 8 MMOL/L (ref 7–15)
AST SERPL W P-5'-P-CCNC: 20 U/L (ref 0–45)
BILIRUB SERPL-MCNC: 0.3 MG/DL
BUN SERPL-MCNC: 22.1 MG/DL (ref 8–23)
CALCIUM SERPL-MCNC: 9.4 MG/DL (ref 8.8–10.2)
CHLORIDE SERPL-SCNC: 106 MMOL/L (ref 98–107)
CHOLEST SERPL-MCNC: 132 MG/DL
CREAT SERPL-MCNC: 1 MG/DL (ref 0.67–1.17)
CREAT UR-MCNC: 155 MG/DL
DEPRECATED HCO3 PLAS-SCNC: 27 MMOL/L (ref 22–29)
EGFRCR SERPLBLD CKD-EPI 2021: 81 ML/MIN/1.73M2
ERYTHROCYTE [DISTWIDTH] IN BLOOD BY AUTOMATED COUNT: 17.6 % (ref 10–15)
FERRITIN SERPL-MCNC: 11 NG/ML (ref 31–409)
GLUCOSE SERPL-MCNC: 131 MG/DL (ref 70–99)
HBA1C MFR BLD: 6.7 % (ref 0–5.6)
HCT VFR BLD AUTO: 38.6 % (ref 40–53)
HDLC SERPL-MCNC: 38 MG/DL
HGB BLD-MCNC: 11.5 G/DL (ref 13.3–17.7)
IRON BINDING CAPACITY (ROCHE): 385 UG/DL (ref 240–430)
IRON SATN MFR SERPL: 3 % (ref 15–46)
IRON SERPL-MCNC: 11 UG/DL (ref 61–157)
LDLC SERPL CALC-MCNC: 61 MG/DL
MCH RBC QN AUTO: 20.4 PG (ref 26.5–33)
MCHC RBC AUTO-ENTMCNC: 29.8 G/DL (ref 31.5–36.5)
MCV RBC AUTO: 68 FL (ref 78–100)
MICROALBUMIN UR-MCNC: <12 MG/L
MICROALBUMIN/CREAT UR: NORMAL MG/G{CREAT}
NONHDLC SERPL-MCNC: 94 MG/DL
PLATELET # BLD AUTO: 319 10E3/UL (ref 150–450)
POTASSIUM SERPL-SCNC: 4.9 MMOL/L (ref 3.4–5.3)
PROT SERPL-MCNC: 7.1 G/DL (ref 6.4–8.3)
PSA SERPL DL<=0.01 NG/ML-MCNC: 5.59 NG/ML (ref 0–4.5)
RBC # BLD AUTO: 5.64 10E6/UL (ref 4.4–5.9)
SODIUM SERPL-SCNC: 141 MMOL/L (ref 135–145)
TRIGL SERPL-MCNC: 166 MG/DL
WBC # BLD AUTO: 8.4 10E3/UL (ref 4–11)

## 2023-11-03 PROCEDURE — 83036 HEMOGLOBIN GLYCOSYLATED A1C: CPT | Performed by: FAMILY MEDICINE

## 2023-11-03 PROCEDURE — 80053 COMPREHEN METABOLIC PANEL: CPT | Performed by: FAMILY MEDICINE

## 2023-11-03 PROCEDURE — G0103 PSA SCREENING: HCPCS | Performed by: FAMILY MEDICINE

## 2023-11-03 PROCEDURE — 83550 IRON BINDING TEST: CPT | Performed by: FAMILY MEDICINE

## 2023-11-03 PROCEDURE — 36415 COLL VENOUS BLD VENIPUNCTURE: CPT | Performed by: FAMILY MEDICINE

## 2023-11-03 PROCEDURE — 82728 ASSAY OF FERRITIN: CPT | Performed by: FAMILY MEDICINE

## 2023-11-03 PROCEDURE — 85027 COMPLETE CBC AUTOMATED: CPT | Performed by: FAMILY MEDICINE

## 2023-11-03 PROCEDURE — 90471 IMMUNIZATION ADMIN: CPT | Performed by: FAMILY MEDICINE

## 2023-11-03 PROCEDURE — 99214 OFFICE O/P EST MOD 30 MIN: CPT | Mod: 25 | Performed by: FAMILY MEDICINE

## 2023-11-03 PROCEDURE — 90662 IIV NO PRSV INCREASED AG IM: CPT | Performed by: FAMILY MEDICINE

## 2023-11-03 PROCEDURE — 90480 ADMN SARSCOV2 VAC 1/ONLY CMP: CPT | Performed by: FAMILY MEDICINE

## 2023-11-03 PROCEDURE — 99207 PR FOOT EXAM NO CHARGE: CPT | Performed by: FAMILY MEDICINE

## 2023-11-03 PROCEDURE — 83540 ASSAY OF IRON: CPT | Performed by: FAMILY MEDICINE

## 2023-11-03 PROCEDURE — 80061 LIPID PANEL: CPT | Performed by: FAMILY MEDICINE

## 2023-11-03 PROCEDURE — 82043 UR ALBUMIN QUANTITATIVE: CPT | Performed by: FAMILY MEDICINE

## 2023-11-03 PROCEDURE — 82570 ASSAY OF URINE CREATININE: CPT | Performed by: FAMILY MEDICINE

## 2023-11-03 PROCEDURE — 91320 SARSCV2 VAC 30MCG TRS-SUC IM: CPT | Performed by: FAMILY MEDICINE

## 2023-11-03 RX ORDER — SILDENAFIL 100 MG/1
100 TABLET, FILM COATED ORAL DAILY PRN
Qty: 30 TABLET | Refills: 4 | Status: SHIPPED | OUTPATIENT
Start: 2023-11-03 | End: 2023-11-03

## 2023-11-03 RX ORDER — SILDENAFIL 100 MG/1
100 TABLET, FILM COATED ORAL DAILY PRN
Qty: 90 TABLET | Refills: 4 | Status: SHIPPED | OUTPATIENT
Start: 2023-11-03 | End: 2024-08-29

## 2023-11-03 RX ORDER — INSULIN GLARGINE 100 [IU]/ML
15 INJECTION, SOLUTION SUBCUTANEOUS 2 TIMES DAILY
Start: 2023-11-03 | End: 2024-01-15

## 2023-11-03 ASSESSMENT — PATIENT HEALTH QUESTIONNAIRE - PHQ9
SUM OF ALL RESPONSES TO PHQ QUESTIONS 1-9: 5
SUM OF ALL RESPONSES TO PHQ QUESTIONS 1-9: 5
10. IF YOU CHECKED OFF ANY PROBLEMS, HOW DIFFICULT HAVE THESE PROBLEMS MADE IT FOR YOU TO DO YOUR WORK, TAKE CARE OF THINGS AT HOME, OR GET ALONG WITH OTHER PEOPLE: NOT DIFFICULT AT ALL

## 2023-11-03 NOTE — PROGRESS NOTES
"  Assessment & Plan   Works at [employer] .  Type 2 diabetes mellitus with diabetic neuropathy, without long-term current use of insulin (H)  Doing much better   Following with   - HEMOGLOBIN A1C  - Lipid panel reflex to direct LDL Non-fasting  - Albumin Random Urine Quantitative with Creat Ratio  - Comprehensive metabolic panel (BMP + Alb, Alk Phos, ALT, AST, Total. Bili, TP)    Prostate cancer (H)  Patient was seen by urologist in the past will follow   - PSA, screen    Mild major depression (H24)  Well controlled on life style modification .    Microcytic anemia  - CBC with platelets  - Ferritin  - Iron and iron binding capacity  Encounter for immunization    - INFLUENZA VACCINE 65+ (FLUZONE HD)  - COVID-19 12+ (2023-24) (PFIZER)    Erectile dysfunction, unspecified erectile dysfunction type  - sildenafil (VIAGRA) 100 MG tablet; Take 1 tablet (100 mg) by mouth daily as needed       BMI:   Estimated body mass index is 31.27 kg/m  as calculated from the following:    Height as of this encounter: 1.854 m (6' 1\").    Weight as of this encounter: 107.5 kg (237 lb).   Weight management plan: Discussed healthy diet and exercise guidelines        Majo Paul MD  [facility] is a 67 year old, presenting for the following health issues:  Back Pain and Diabetes        11/3/2023    12:48 PM   Additional Questions   Roomed by Erin       History of Present Illness       Back Pain:  He presents for follow up of back pain. Patient's back pain is a chronic problem.  Location of back pain:  Right lower back, left lower back, right middle of back, left middle of back, right hip and left hip  Description of back pain: dull ache  Back pain spreads: right buttocks, left buttocks, right knee and left knee    Since patient first noticed back pain, pain is: unchanged  Does back pain interfere with his job:  Not applicable       Diabetes:   He presents for follow up of diabetes.  He is " "checking home blood glucose four or more times daily.   He checks blood glucose before and after meals.  Blood glucose is sometimes over 200 and never under 70. He is aware of hypoglycemia symptoms including dizziness and blurred vision.      He is having numbness in feet, burning in feet and weight gain.  The patient has had a diabetic eye exam in the last 12 months. Eye exam performed on Jan 2023?. Location of last eye exam [facility].        Reason for visit:  General physical and diabetc checkup and blood test    He eats 2-3 servings of fruits and vegetables daily.He consumes 2 sweetened beverage(s) daily.He exercises with enough effort to increase his heart rate 9 or less minutes per day.  He exercises with enough effort to increase his heart rate 3 or less days per week. He is missing 1 dose(s) of medications per week.  He is not taking prescribed medications regularly due to remembering to take.             Review of Systems   Constitutional:  Negative for activity change and appetite change.   Respiratory:  Negative for cough.    Gastrointestinal:  Negative for abdominal distention and abdominal pain.   Genitourinary:  Negative for difficulty urinating and dysuria.   Musculoskeletal:  Negative for arthralgias and back pain.   Neurological:  Negative for headaches.   Psychiatric/Behavioral:  Negative for agitation and behavioral problems.             Objective    /62   Pulse 77   Temp 97.9  F (36.6  C) (Tympanic)   Resp 20   Ht 1.854 m (6' 1\")   Wt 107.5 kg (237 lb)   SpO2 97%   BMI 31.27 kg/m    Body mass index is 31.27 kg/m .  Physical Exam  HENT:      Head: Normocephalic.   Cardiovascular:      Rate and Rhythm: Normal rate.      Pulses: Normal pulses.   Pulmonary:      Effort: Pulmonary effort is normal.   Abdominal:      General: Abdomen is flat.   Musculoskeletal:         General: Normal range of motion.   Skin:     General: Skin is warm.   Neurological:      General: No focal deficit " present.      Mental Status: He is alert.   Psychiatric:         Mood and Affect: Mood normal.         Behavior: Behavior normal.

## 2023-11-06 DIAGNOSIS — D50.9 IRON DEFICIENCY ANEMIA, UNSPECIFIED IRON DEFICIENCY ANEMIA TYPE: Primary | ICD-10-CM

## 2023-11-07 ENCOUNTER — TELEPHONE (OUTPATIENT)
Dept: FAMILY MEDICINE | Facility: CLINIC | Age: 68
End: 2023-11-07
Payer: COMMERCIAL

## 2023-11-07 ASSESSMENT — ENCOUNTER SYMPTOMS
BACK PAIN: 0
ABDOMINAL DISTENTION: 0
HEADACHES: 0
APPETITE CHANGE: 0
DIFFICULTY URINATING: 0
ABDOMINAL PAIN: 0
ARTHRALGIAS: 0
COUGH: 0
DYSURIA: 0
AGITATION: 0
ACTIVITY CHANGE: 0

## 2023-11-07 NOTE — TELEPHONE ENCOUNTER
Summary:    Patient is due/failing the following:   Eye exam    Reviewed:    [] CARE EVERYWHERE  [] LAST OV NOTE   [] FYI TAB  [] MYCHART ACTIVE?  [] LAST PANEL ENCOUNTER  [] FUTURE APPTS  [] IMMUNIZATIONS  [] Media Tab  Action needed:   Received eye exam from [facility]  No retinopathy      Type of outreach:    Placed in Dr Paul's folder for approval                                                                               .lme

## 2023-11-08 DIAGNOSIS — R79.89 LOW TESTOSTERONE: ICD-10-CM

## 2023-11-08 RX ORDER — TADALAFIL 20 MG/1
20 TABLET ORAL DAILY
Qty: 88 TABLET | Refills: 0 | OUTPATIENT
Start: 2023-11-08

## 2023-11-08 NOTE — TELEPHONE ENCOUNTER
Central Prior Authorization Team   Phone: [phone]    PA Initiation    Medication: TADALAFIL 20 MG PO TABS  Insurance Company: [employer] - Phone [phone] Fax [phone]  Pharmacy Filling the Rx: [id number] - [place], [place] - [address] [facility]  Filling Pharmacy Phone: [phone]  Filling Pharmacy Fax:    Start Date: 11/8/2023

## 2023-11-08 NOTE — TELEPHONE ENCOUNTER
Pharmacy requested duplicate. Medication refused.     Script sent to the pharmacy on 10/31/23 for 88 tablets.     Tomeka Plaza RN

## 2023-11-10 ENCOUNTER — TELEPHONE (OUTPATIENT)
Dept: FAMILY MEDICINE | Facility: CLINIC | Age: 68
End: 2023-11-10

## 2023-11-10 ENCOUNTER — OFFICE VISIT (OUTPATIENT)
Dept: UROLOGY | Facility: CLINIC | Age: 68
End: 2023-11-10
Payer: COMMERCIAL

## 2023-11-10 VITALS
BODY MASS INDEX: 31.41 KG/M2 | HEIGHT: 73 IN | WEIGHT: 237 LBS | HEART RATE: 65 BPM | DIASTOLIC BLOOD PRESSURE: 68 MMHG | SYSTOLIC BLOOD PRESSURE: 118 MMHG

## 2023-11-10 DIAGNOSIS — C61 PROSTATE CANCER (H): Primary | ICD-10-CM

## 2023-11-10 PROCEDURE — 99213 OFFICE O/P EST LOW 20 MIN: CPT | Performed by: UROLOGY

## 2023-11-10 ASSESSMENT — PAIN SCALES - GENERAL: PAINLEVEL: NO PAIN (0)

## 2023-11-10 NOTE — TELEPHONE ENCOUNTER
Summary:    Patient is due/failing the following:   Eye exam    Reviewed:    [] CARE EVERYWHERE  [] LAST OV NOTE   [] FYI TAB  [] MYCHART ACTIVE?  [] LAST PANEL ENCOUNTER  [] FUTURE APPTS  [] IMMUNIZATIONS  [] Media Tab            Action needed:   Patient needs referral/order: eye exam    Type of outreach:    Received eye exam from clinic, negative for retinopathy, report sent to abstraction                                                                               Karla Peoples/HERB  [place]---[facility]

## 2023-11-10 NOTE — LETTER
11/10/2023       RE: Austyn Araujo  [id number]  [place] [place] [facility] [address]     Dear Colleague,    Thank you for referring your patient, Austyn Araujo, to the [facility] [place] [facility]. Please see a copy of my visit note below.    Office Visit Note  [facility]  [phone]    UROLOGIC DIAGNOSES:   Low risk prostate cancer  Enlarged prostate  Erectile dysfunction  Peyronie's disease    CURRENT INTERVENTIONS:   Active surveillance  Prostate biopsies x2  On Flomax  Trimix injections  Xiaflex injections ([facility])    HISTORY:   David returns to clinic today for prostate cancer follow-up.  After I saw him last year, the PSA improved.  The PSA went back down to 5.19 in November of last year and has been stable since then.  Most recently it was 5.59.  He continues to have no bothersome urinary symptoms or complaints.      PAST MEDICAL HISTORY:   Past Medical History:   Diagnosis Date    Anxiety     Anxiety 01/31/2015    BPH (benign prostatic hyperplasia)     Degenerative disc disease     Depression     Gastritis     Gastro-oesophageal reflux disease     H/O alcohol abuse     Hyperlipidemia     Hypertension     Low testosterone     MRSA (methicillin resistant staph aureus) culture positive 08/2013    skin infection    Mumps     PUD (peptic ulcer disease)     Sleep apnea     STD (sexually transmitted disease)     Type 2 diabetes mellitus (H)     neuropathy       PAST SURGICAL HISTORY:   Past Surgical History:   Procedure Laterality Date    COLONOSCOPY  11/11/2013    Procedure: COMBINED COLONOSCOPY, SINGLE BIOPSY/POLYPECTOMY BY BIOPSY;  Colonoscopy/ polypectomy x2 by cold forceps    ;  Surgeon: Juan Carlos Bellamy MD;  Location: [facility]    ESOPHAGOSCOPY, GASTROSCOPY, DUODENOSCOPY (EGD), COMBINED N/A 9/22/2016    Procedure: COMBINED ESOPHAGOSCOPY, GASTROSCOPY, DUODENOSCOPY (EGD), BIOPSY SINGLE OR MULTIPLE;  Surgeon:  Juan Carlos Barraza MD;  Location:  GI    ESOPHAGOSCOPY, GASTROSCOPY, DUODENOSCOPY (EGD), COMBINED N/A 2/19/2019    Procedure: ESOPHAGOSCOPY, GASTROSCOPY, DUODENOSCOPY (EGD) with cold forcep;  Surgeon: Juan Carlos Barraza MD;  Location:  GI    HERNIA REPAIR      ORTHOPEDIC SURGERY         FAMILY HISTORY:   Family History   Problem Relation Age of Onset    Pancreatic Cancer Mother     Pancreatic Cancer Paternal Grandfather     Diabetes Brother     Depression Brother     Suicide Brother     Substance Abuse Brother     Dementia Father     Parkinsonism Father     Family History Negative No family hx of     Colon Cancer No family hx of     Unknown/Adopted No family hx of     Anxiety Disorder No family hx of     Schizophrenia No family hx of     Bipolar Disorder No family hx of     [place] Disease No family hx of     Autism Spectrum Disorder No family hx of     Intellectual Disability (Mental Retardation) No family hx of     Mental Illness No family hx of        SOCIAL HISTORY:   Social History     Socioeconomic History    Marital status:    Tobacco Use    Smoking status: Former     Types: Cigarettes     Quit date: 10/6/2020     Years since quitting: 3.0    Smokeless tobacco: Never   Vaping Use    Vaping Use: Never used   Substance and Sexual Activity    Alcohol use: No     Alcohol/week: 0.0 standard drinks of alcohol     Comment: 14 drinks weekly, quit on 08/2017    Drug use: No    Sexual activity: Yes     Partners: Female     Social Determinants of Health     Financial Resource Strain: Low Risk  (11/3/2023)    Financial Resource Strain     Within the past 12 months, have you or your family members you live with been unable to get utilities (heat, electricity) when it was really needed?: No   Food Insecurity: Low Risk  (11/3/2023)    Food Insecurity     Within the past 12 months, did you worry that your food would run out before you got money to buy more?: No     Within the past 12 months, did the food you  bought just not last and you didn t have money to get more?: No   Transportation Needs: Low Risk  (11/3/2023)    Transportation Needs     Within the past 12 months, has lack of transportation kept you from medical appointments, getting your medicines, non-medical meetings or appointments, work, or from getting things that you need?: No   Interpersonal Safety: Low Risk  (11/3/2023)    Interpersonal Safety     Do you feel physically and emotionally safe where you currently live?: Yes     Within the past 12 months, have you been hit, slapped, kicked or otherwise physically hurt by someone?: No     Within the past 12 months, have you been humiliated or emotionally abused in other ways by your partner or ex-partner?: No   Housing Stability: Low Risk  (11/3/2023)    Housing Stability     Do you have housing? : Yes     Are you worried about losing your housing?: No       Review Of Systems:  Skin: No rash, pruritis, or skin pigmentation  Eyes: No changes in vision  Ears/Nose/Throat: No changes in hearing, no nosebleeds  Respiratory: No shortness of breath, dyspnea on exertion, cough, or hemoptysis  Cardiovascular: No chest pain or palpitations  Gastrointestinal: No diarrhea or constipation. No abdominal pain. No hematochezia  Genitourinary: see HPI  Musculoskeletal: No pain or swelling of joints, normal range of motion  Neurologic: No weakness or tremors  Psychiatric: No recent changes in memory or mood  Hematologic/Lymphatic/Immunologic: No easy bruising or enlarged lymph nodes  Endocrine: No weight gain or loss      PHYSICAL EXAM:    There were no vitals taken for this visit.    Constitutional: Well developed. Conversant and in no acute distress  Eyes: Anicteric sclera, conjunctiva clear, normal extraocular movements  ENT: Normocephalic and atraumatic,   Skin: Warm and dry. No rashes or lesions  Cardiac: No peripheral edema  Back/Flank: Not done  CNS/PNS: Normal musculature and movements, moves all extremities  normally  Respiratory: Normal non-labored breathing  Abdomen: Soft nontender and nondistended  Peripheral Vascular: No peripheral edema  Mental Status/Psych: Alert and Oriented x 3. Normal mood and affect    Penis: Not done  Scrotal Skin: Not done  Testicles: Not done  Epididymis: Not done  Digital Rectal Exam:     Cystoscopy: Not done    Imaging: None    Urinalysis: UA RESULTS:  Recent Labs   Lab Test 11/18/22  1553   COLOR Yellow   APPEARANCE Clear   URINEGLC >=1000*   URINEBILI Negative   URINEKETONE Negative   SG >=1.030   UBLD Negative   URINEPH 6.0   PROTEIN Negative   UROBILINOGEN 0.2   NITRITE Negative   LEUKEST Negative   RBCU 0-2   WBCU 0-5       PSA: 5.59    Post Void Residual:     Other labs: None today      IMPRESSION:  Low risk prostate cancer  Enlarged prostate    PLAN:  His PSA is back down to previous levels.  I recommended he continue with active surveillance with PSA checks every 6 months.  He normally sees his primary care provider in the fall.  Therefore I will see him in the spring for his next PSA, after that he can likely see me every spring and see his primary care provider every fall his PSA checks.        Mohsen Rao M.D.

## 2023-11-10 NOTE — PROGRESS NOTES
Office Visit Note  [facility]  [phone]    UROLOGIC DIAGNOSES:   Low risk prostate cancer  Enlarged prostate  Erectile dysfunction  Peyronie's disease    CURRENT INTERVENTIONS:   Active surveillance  Prostate biopsies x2  On Flomax  Trimix injections  Xiaflex injections ([facility])    HISTORY:   David returns to clinic today for prostate cancer follow-up.  After I saw him last year, the PSA improved.  The PSA went back down to 5.19 in November of last year and has been stable since then.  Most recently it was 5.59.  He continues to have no bothersome urinary symptoms or complaints.      PAST MEDICAL HISTORY:   Past Medical History:   Diagnosis Date    Anxiety     Anxiety 01/31/2015    BPH (benign prostatic hyperplasia)     Degenerative disc disease     Depression     Gastritis     Gastro-oesophageal reflux disease     H/O alcohol abuse     Hyperlipidemia     Hypertension     Low testosterone     MRSA (methicillin resistant staph aureus) culture positive 08/2013    skin infection    Mumps     PUD (peptic ulcer disease)     Sleep apnea     STD (sexually transmitted disease)     Type 2 diabetes mellitus (H)     neuropathy       PAST SURGICAL HISTORY:   Past Surgical History:   Procedure Laterality Date    COLONOSCOPY  11/11/2013    Procedure: COMBINED COLONOSCOPY, SINGLE BIOPSY/POLYPECTOMY BY BIOPSY;  Colonoscopy/ polypectomy x2 by cold forceps    ;  Surgeon: Juan Carlos Bellamy MD;  Location:  GI    ESOPHAGOSCOPY, GASTROSCOPY, DUODENOSCOPY (EGD), COMBINED N/A 9/22/2016    Procedure: COMBINED ESOPHAGOSCOPY, GASTROSCOPY, DUODENOSCOPY (EGD), BIOPSY SINGLE OR MULTIPLE;  Surgeon: Juan Carlos Barraza MD;  Location:  GI    ESOPHAGOSCOPY, GASTROSCOPY, DUODENOSCOPY (EGD), COMBINED N/A 2/19/2019    Procedure: ESOPHAGOSCOPY, GASTROSCOPY, DUODENOSCOPY (EGD) with cold forcep;  Surgeon: Juan Carlos Barraza MD;  Location:  GI    HERNIA REPAIR      ORTHOPEDIC SURGERY         FAMILY HISTORY:   Family History    Problem Relation Age of Onset    Pancreatic Cancer Mother     Pancreatic Cancer Paternal Grandfather     Diabetes Brother     Depression Brother     Suicide Brother     Substance Abuse Brother     Dementia Father     Parkinsonism Father     Family History Negative No family hx of     Colon Cancer No family hx of     Unknown/Adopted No family hx of     Anxiety Disorder No family hx of     Schizophrenia No family hx of     Bipolar Disorder No family hx of     [place] Disease No family hx of     Autism Spectrum Disorder No family hx of     Intellectual Disability (Mental Retardation) No family hx of     Mental Illness No family hx of        SOCIAL HISTORY:   Social History     Socioeconomic History    Marital status:    Tobacco Use    Smoking status: Former     Types: Cigarettes     Quit date: 10/6/2020     Years since quitting: 3.0    Smokeless tobacco: Never   Vaping Use    Vaping Use: Never used   Substance and Sexual Activity    Alcohol use: No     Alcohol/week: 0.0 standard drinks of alcohol     Comment: 14 drinks weekly, quit on 08/2017    Drug use: No    Sexual activity: Yes     Partners: Female     Social Determinants of Health     Financial Resource Strain: Low Risk  (11/3/2023)    Financial Resource Strain     Within the past 12 months, have you or your family members you live with been unable to get utilities (heat, electricity) when it was really needed?: No   Food Insecurity: Low Risk  (11/3/2023)    Food Insecurity     Within the past 12 months, did you worry that your food would run out before you got money to buy more?: No     Within the past 12 months, did the food you bought just not last and you didn t have money to get more?: No   Transportation Needs: Low Risk  (11/3/2023)    Transportation Needs     Within the past 12 months, has lack of transportation kept you from medical appointments, getting your medicines, non-medical meetings or appointments, work, or from getting things that  you need?: No   Interpersonal Safety: Low Risk  (11/3/2023)    Interpersonal Safety     Do you feel physically and emotionally safe where you currently live?: Yes     Within the past 12 months, have you been hit, slapped, kicked or otherwise physically hurt by someone?: No     Within the past 12 months, have you been humiliated or emotionally abused in other ways by your partner or ex-partner?: No   Housing Stability: Low Risk  (11/3/2023)    Housing Stability     Do you have housing? : Yes     Are you worried about losing your housing?: No       Review Of Systems:  Skin: No rash, pruritis, or skin pigmentation  Eyes: No changes in vision  Ears/Nose/Throat: No changes in hearing, no nosebleeds  Respiratory: No shortness of breath, dyspnea on exertion, cough, or hemoptysis  Cardiovascular: No chest pain or palpitations  Gastrointestinal: No diarrhea or constipation. No abdominal pain. No hematochezia  Genitourinary: see HPI  Musculoskeletal: No pain or swelling of joints, normal range of motion  Neurologic: No weakness or tremors  Psychiatric: No recent changes in memory or mood  Hematologic/Lymphatic/Immunologic: No easy bruising or enlarged lymph nodes  Endocrine: No weight gain or loss      PHYSICAL EXAM:    There were no vitals taken for this visit.    Constitutional: Well developed. Conversant and in no acute distress  Eyes: Anicteric sclera, conjunctiva clear, normal extraocular movements  ENT: Normocephalic and atraumatic,   Skin: Warm and dry. No rashes or lesions  Cardiac: No peripheral edema  Back/Flank: Not done  CNS/PNS: Normal musculature and movements, moves all extremities normally  Respiratory: Normal non-labored breathing  Abdomen: Soft nontender and nondistended  Peripheral Vascular: No peripheral edema  Mental Status/Psych: Alert and Oriented x 3. Normal mood and affect    Penis: Not done  Scrotal Skin: Not done  Testicles: Not done  Epididymis: Not done  Digital Rectal Exam:     Cystoscopy: Not  done    Imaging: None    Urinalysis: UA RESULTS:  Recent Labs   Lab Test 11/18/22  1553   COLOR Yellow   APPEARANCE Clear   URINEGLC >=1000*   URINEBILI Negative   URINEKETONE Negative   SG >=1.030   UBLD Negative   URINEPH 6.0   PROTEIN Negative   UROBILINOGEN 0.2   NITRITE Negative   LEUKEST Negative   RBCU 0-2   WBCU 0-5       PSA: 5.59    Post Void Residual:     Other labs: None today      IMPRESSION:  Low risk prostate cancer  Enlarged prostate    PLAN:  His PSA is back down to previous levels.  I recommended he continue with active surveillance with PSA checks every 6 months.  He normally sees his primary care provider in the fall.  Therefore I will see him in the spring for his next PSA, after that he can likely see me every spring and see his primary care provider every fall his PSA checks.        Mohsen Rao M.D.

## 2023-11-10 NOTE — NURSING NOTE
Chief Complaint   Patient presents with    Prostate Cancer     6 months with PSA      Norma Vu, CMA

## 2023-11-13 NOTE — TELEPHONE ENCOUNTER
Prior Authorization Not Needed per Insurance-Per [place] at [facility], there is no quantity limit exception available under patient's plan. He can get 6 tablets per 25 days. Any quantity above that would need to be paid by [employer].     Medication: TADALAFIL 20 MG PO TABS  Insurance Company: [employer] - Phone [phone] Fax [phone]  Expected CoPay: $    Ph[id number] [place], [place] - [address] [facility]  Pharmacy Notified: No  Patient Notified: No

## 2023-11-13 NOTE — TELEPHONE ENCOUNTER
Called and spoke to patient to relay message to patient. Patient says he will try the Viagra 100 mg that was send by Dr. Paul earlier this month. Patient says he will contact clinic if new ED med doesn't help.     Thank you,  Jose Emanuel, Triage RN Monserrat Irene  11:36 AM 11/13/2023

## 2023-11-14 DIAGNOSIS — R79.89 LOW TESTOSTERONE: ICD-10-CM

## 2023-11-14 RX ORDER — TADALAFIL 20 MG/1
20 TABLET ORAL DAILY
Qty: 88 TABLET | Refills: 0 | Status: SHIPPED | OUTPATIENT
Start: 2023-11-14 | End: 2024-08-14

## 2023-11-14 NOTE — TELEPHONE ENCOUNTER
Patient notified prescription ready for pickup. We cannot fax prescription to [place] pharmacy if that is where he chooses to get medication.  Joi Sharma,

## 2023-11-14 NOTE — TELEPHONE ENCOUNTER
Pt asking for different pharmacy     Please advise     Thank you     Kitty Wallace RN, BSN  [facility]

## 2023-11-17 ENCOUNTER — TELEPHONE (OUTPATIENT)
Dept: FAMILY MEDICINE | Facility: CLINIC | Age: 68
End: 2023-11-17
Payer: COMMERCIAL

## 2023-11-17 NOTE — TELEPHONE ENCOUNTER
Call received from [facility] requesting orders for upper endoscopy and colonoscopy. Advised there is an order for a gastroenterology consult in the chart. Inquired why these tests would not be ordered at the time of the consult. Vasquez states patient scheduled the procedures not a consult. Offered to to fax referral. Vasquez took the referral as a verbal order and will contact patient to schedule the consult.

## 2023-11-21 DIAGNOSIS — R79.89 LOW TESTOSTERONE: ICD-10-CM

## 2023-11-21 RX ORDER — TADALAFIL 20 MG/1
20 TABLET ORAL DAILY
Qty: 88 TABLET | Refills: 0 | Status: CANCELLED | OUTPATIENT
Start: 2023-11-21

## 2023-11-29 ENCOUNTER — TRANSFERRED RECORDS (OUTPATIENT)
Dept: HEALTH INFORMATION MANAGEMENT | Facility: CLINIC | Age: 68
End: 2023-11-29
Payer: COMMERCIAL

## 2023-11-29 DIAGNOSIS — R79.89 LOW TESTOSTERONE: ICD-10-CM

## 2023-11-29 RX ORDER — TESTOSTERONE CYPIONATE 200 MG/ML
INJECTION, SOLUTION INTRAMUSCULAR
Qty: 10 ML | Refills: 0 | Status: SHIPPED | OUTPATIENT
Start: 2023-11-29 | End: 2024-03-24

## 2023-12-15 ENCOUNTER — TRANSFERRED RECORDS (OUTPATIENT)
Dept: HEALTH INFORMATION MANAGEMENT | Facility: CLINIC | Age: 68
End: 2023-12-15
Payer: COMMERCIAL

## 2023-12-19 ENCOUNTER — OFFICE VISIT (OUTPATIENT)
Dept: URGENT CARE | Facility: URGENT CARE | Age: 68
End: 2023-12-19
Payer: COMMERCIAL

## 2023-12-19 VITALS
RESPIRATION RATE: 16 BRPM | SYSTOLIC BLOOD PRESSURE: 90 MMHG | BODY MASS INDEX: 30.88 KG/M2 | OXYGEN SATURATION: 99 % | TEMPERATURE: 98.4 F | HEART RATE: 69 BPM | WEIGHT: 233 LBS | DIASTOLIC BLOOD PRESSURE: 60 MMHG | HEIGHT: 73 IN

## 2023-12-19 DIAGNOSIS — L03.114 CELLULITIS OF LEFT UPPER EXTREMITY: ICD-10-CM

## 2023-12-19 DIAGNOSIS — R22.32 MASS OF LEFT FOREARM: Primary | ICD-10-CM

## 2023-12-19 PROCEDURE — 99214 OFFICE O/P EST MOD 30 MIN: CPT | Performed by: PHYSICIAN ASSISTANT

## 2023-12-19 RX ORDER — SULFAMETHOXAZOLE/TRIMETHOPRIM 800-160 MG
1 TABLET ORAL 2 TIMES DAILY
Qty: 20 TABLET | Refills: 0 | Status: SHIPPED | OUTPATIENT
Start: 2023-12-19 | End: 2023-12-29

## 2023-12-19 ASSESSMENT — PAIN SCALES - GENERAL: PAINLEVEL: NO PAIN (1)

## 2023-12-19 NOTE — PROGRESS NOTES
Assessment/Plan:    Area of swelling not consistent with olecranon bursitis. No concern for septic arthritis as pt has FROM and no pain with ROM. It is not fluctuant in a way that one would expect an abscess to be, feels like it may be filled with serous fluid. Question possible ganglion cyst or similar structure, with overlying cellulitis. Will Rx Bactrim as pt has a hx of MRSA. Advised heat and ACE bandage to the area, and follow up with orthopedics in next few days for consideration of aspiration/further management. Referral placed.     See patient instructions below.    At the end of the encounter, I discussed results, diagnosis, medications. Discussed red flags for immediate return to clinic/ER, as well as indications for follow up if no improvement. Patient understood and agreed to plan. Patient was stable for discharge.      ICD-10-CM    1. Mass of left forearm  R22.32 Orthopedic  Referral      2. Cellulitis of left upper extremity  L03.114 sulfamethoxazole-trimethoprim (BACTRIM DS) 800-160 MG tablet            Return in about 3 days (around 12/22/2023) for Follow up with orthopedics if symptoms persist.    CASTRO Turner, JOCELYN  [facility]  -----------------------------------------------------------------------------------------------------------------------------------------------------    HPI:  Austyn Araujo is a 68 year old male who presents for evaluation of lump on L posterior forearm onset 1 month ago. No known injury. He states recently this has become tender to the touch. He has no pain with ROM of the elbow. Patient reports no fever/chills, numbness, weakness, or any other symptoms.     Past Medical History:   Diagnosis Date    Anxiety     Anxiety 01/31/2015    BPH (benign prostatic hyperplasia)     Degenerative disc disease     Depression     Gastritis     Gastro-oesophageal reflux disease     H/O alcohol abuse     Hyperlipidemia     Hypertension   "   Low testosterone     MRSA (methicillin resistant staph aureus) culture positive 08/2013    skin infection    Mumps     PUD (peptic ulcer disease)     Sleep apnea     STD (sexually transmitted disease)     Type 2 diabetes mellitus (H)     neuropathy       Vitals:    12/19/23 0956   BP: 90/60   BP Location: Right arm   Patient Position: Sitting   Cuff Size: Adult Regular   Pulse: 69   Resp: 16   Temp: 98.4  F (36.9  C)   TempSrc: Oral   SpO2: 99%   Weight: 105.7 kg (233 lb)   Height: 1.854 m (6' 1\")       Physical Exam  Vitals and nursing note reviewed.   Pulmonary:      Effort: Pulmonary effort is normal.   Musculoskeletal:      Left elbow: Normal range of motion.        Arms:    Neurological:      Mental Status: He is alert.         Labs/Imaging:  No results found for this or any previous visit (from the past 24 hour(s)).  No results found for this or any previous visit (from the past 24 hour(s)).        Patient Instructions   -Complete antibiotics as prescribed. Take with food to help prevent stomach upset.   -Elevate the affected limb as much as possible. This will help keep the swelling down.  -Keep the infected area clean; warm water soak with mild soap for 10-15 minutes 3 times a day. Pat dry.  -Take Tylenol 650mg every 4 hours or ibuprofen 600mg every 6 hours by mouth for pain/fever.  Do not exceed 4000mg of acetaminophen or 2400mg of ibuprofen from any source in a 24 hour period.  Taking Tylenol and ibuprofen together may be helpful in reducing pain.   -If develop a fever, increased redness, pain, drainage or swelling from the area, should contact primary care clinic/provider.  -Symptoms should be improving within 48 hours and resolved in next 7-10 days.       "

## 2023-12-20 ENCOUNTER — TELEPHONE (OUTPATIENT)
Dept: ORTHOPEDICS | Facility: CLINIC | Age: 68
End: 2023-12-20
Payer: COMMERCIAL

## 2023-12-20 NOTE — TELEPHONE ENCOUNTER
- A call was placed to the patient.     - Appointment scheduled with Dr. Mccurdy tomorrow Thursday 12/21 @ 2:15.     - Address and parking instructions provided.     - Patient verbalized understanding of plan and all questions were answered. Call back number to clinic was given and patient was told to call if they had an further questions.

## 2023-12-20 NOTE — TELEPHONE ENCOUNTER
Patient has a referral for     Mass of left forearm       Please assist patient with scheduling appt.

## 2023-12-20 NOTE — TELEPHONE ENCOUNTER
DIAGNOSIS: LEFT FOREARM MASS   APPOINTMENT DATE:  12/21/2023   NOTES STATUS DETAILS   OFFICE NOTE from referring provider Internal 12/19/2023 - [facility]   MEDICATION LIST Internal      
Statement Selected

## 2023-12-21 ENCOUNTER — PRE VISIT (OUTPATIENT)
Dept: ORTHOPEDICS | Facility: CLINIC | Age: 68
End: 2023-12-21

## 2023-12-21 ENCOUNTER — OFFICE VISIT (OUTPATIENT)
Dept: ORTHOPEDICS | Facility: CLINIC | Age: 68
End: 2023-12-21
Payer: COMMERCIAL

## 2023-12-21 DIAGNOSIS — R22.32 MASS OF LEFT FOREARM: ICD-10-CM

## 2023-12-21 DIAGNOSIS — D17.9 INTRAMUSCULAR LIPOMA: Primary | ICD-10-CM

## 2023-12-21 PROCEDURE — 99203 OFFICE O/P NEW LOW 30 MIN: CPT | Performed by: ORTHOPAEDIC SURGERY

## 2023-12-21 NOTE — PATIENT INSTRUCTIONS
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    Plan: 1.  MRI scan left elbow as a preoperative imaging study.  2.  If the MRI confirms lipomatous tumor.  The patient would like it removed.  Case request form was submitted.

## 2023-12-21 NOTE — LETTER
12/21/2023       RE: Austyn Araujo  [address]  [facility] [address]    Dear Colleague,    Thank you for referring your patient, Austyn Araujo, to the [facility] [place]. Please see a copy of my visit note below.    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    Plan: 1.  MRI scan left elbow as a preoperative imaging study.  2.  If the MRI confirms lipomatous tumor.  The patient would like it removed.  Case request form was submitted.    David noticed a mass in the dorsal lateral portion of the left proximal forearm over Thanksgiving while cutting vegetables.  He had not noticed the mass previously and feels it has not changed in size since first noticed.    His past history includes recent treatment for insulin-dependent diabetes..    His medications are listed in the EHR.    On physical exam he has a subcutaneous mass is approximately 6 cm and is mobile.  It is nontender and has the consistency of a subcutaneous lipoma on exam.    I reviewed this impression with the patient I recommended an MRI scan.    I also discussed with him the possibility or likelihood that it is a benign tumor and it does not necessarily need to be excised unless it is bothering him and he would like it removed.  He stated that it is very clearly bothering him and he would like it removed.    We therefore discussed the risk and benefits of surgery.  These are understood and accepted.  Will proceed as outlined above.    Broderick Mccurdy MD

## 2023-12-21 NOTE — PROGRESS NOTES
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    Plan: 1.  MRI scan left elbow as a preoperative imaging study.  2.  If the MRI confirms lipomatous tumor.  The patient would like it removed.  Case request form was submitted.    David noticed a mass in the dorsal lateral portion of the left proximal forearm over Thanksgiving while cutting vegetables.  He had not noticed the mass previously and feels it has not changed in size since first noticed.    His past history includes recent treatment for insulin-dependent diabetes..    His medications are listed in the EHR.    On physical exam he has a subcutaneous mass is approximately 6 cm and is mobile.  It is nontender and has the consistency of a subcutaneous lipoma on exam.    I reviewed this impression with the patient I recommended an MRI scan.    I also discussed with him the possibility or likelihood that it is a benign tumor and it does not necessarily need to be excised unless it is bothering him and he would like it removed.  He stated that it is very clearly bothering him and he would like it removed.    We therefore discussed the risk and benefits of surgery.  These are understood and accepted.  Will proceed as outlined above.

## 2023-12-21 NOTE — NURSING NOTE
"Chief Complaint   Patient presents with    Consult     Left elbow mass // first noticed about 1 month ago and has gotten a little bigger and firmer       68 year old  1955    Primary MD: Majo Paul  Ref. MD: TODD Levine             Pain Assessment  Patient Currently in Pain: Yes  0-10 Pain Scale: 1 (can get up to 4 with touching)  Primary Pain Location:  (left elbow)             [id number] - [place], [place] - [address] [facility] - [place], [place] - [address] [facility] - [place], [place] - [address]   WRITTEN PRESCRIPTION REQUESTED        No Known Allergies        Current Outpatient Medications   Medication    aspirin 81 MG tablet    atorvastatin (LIPITOR) 40 MG tablet    B-D LUER-ROCHELLE SYRINGE 22G X 1-1/2\" 3 ML MISC    blood glucose (ONETOUCH ULTRA) test strip    COMPOUNDED NON-CONTROLLED SUBSTANCE (CMPD RX) - PHARMACY TO MIX COMPOUNDED MEDICATION    Continuous Blood Gluc  (DEXCOM G7 ) ASHLIE    Continuous Blood Gluc  (DEXCOM G7 ) ASHLIE    Continuous Blood Gluc Sensor (DEXCOM G7 SENSOR) MISC    Continuous Blood Gluc Sensor (DEXCOM G7 SENSOR) MISC    insulin aspart (NOVOLOG FLEXPEN) 100 UNIT/ML pen    insulin glargine (LANTUS SOLOSTAR) 100 UNIT/ML pen    insulin pen needle (32G X 4 MM) 32G X 4 MM miscellaneous    lisinopril (ZESTRIL) 10 MG tablet    metFORMIN (GLUCOPHAGE XR) 500 MG 24 hr tablet    Multiple Vitamins-Minerals (CENTRUM SILVER) per tablet    needle, disp, (BD HYPODERMIC NEEDLE) 18G X 1\" MISC    omeprazole (PRILOSEC) 40 MG DR capsule    PHARMACIST CHOICE LANCETS MISC    sildenafil (VIAGRA) 100 MG tablet    sitagliptin (JANUVIA) 100 MG tablet    sulfamethoxazole-trimethoprim (BACTRIM DS) 800-160 MG tablet    tadalafil (CIALIS) 20 MG tablet    testosterone cypionate (DEPOTESTOSTERONE) 200 MG/ML injection     Current Facility-Administered Medications   Medication    4 mL ropivacaine (NAROPIN) injection 5 mg/mL    4 mL " ropivacaine (NAROPIN) injection 5 mg/mL    4 mL ropivacaine (NAROPIN) injection 5 mg/mL    4 mL ropivacaine (NAROPIN) injection 5 mg/mL    lidocaine 1 % injection 8 mL    lidocaine 1 % injection 8 mL    methylPREDNISolone (DEPO-Medrol) injection 40 mg    methylPREDNISolone (DEPO-Medrol) injection 40 mg    methylPREDNISolone (DEPO-MEDROL) injection 40 mg    methylPREDNISolone (DEPO-MEDROL) injection 40 mg

## 2023-12-22 ENCOUNTER — TELEPHONE (OUTPATIENT)
Dept: ORTHOPEDICS | Facility: CLINIC | Age: 68
End: 2023-12-22
Payer: COMMERCIAL

## 2023-12-22 ENCOUNTER — HOSPITAL ENCOUNTER (OUTPATIENT)
Facility: CLINIC | Age: 68
End: 2023-12-22
Attending: ORTHOPAEDIC SURGERY | Admitting: ORTHOPAEDIC SURGERY
Payer: COMMERCIAL

## 2023-12-22 DIAGNOSIS — D17.9 INTRAMUSCULAR LIPOMA: Primary | ICD-10-CM

## 2023-12-22 RX ORDER — CEFAZOLIN SODIUM 2 G/50ML
2 SOLUTION INTRAVENOUS SEE ADMIN INSTRUCTIONS
Status: CANCELLED | OUTPATIENT
Start: 2023-12-22

## 2023-12-22 RX ORDER — CEFAZOLIN SODIUM 2 G/50ML
2 SOLUTION INTRAVENOUS
Status: CANCELLED | OUTPATIENT
Start: 2023-12-22

## 2023-12-22 NOTE — NURSING NOTE
Pre-Op Teaching was done in person at the clinic.    Teaching Flowsheet   Relevant Diagnosis: left elbow mass excision   Teaching Topic: Pre-Operative Teaching     Person(s) involved in teaching:   Patient     Motivation Level:  Asks Questions: Yes  Eager to Learn: Yes  Cooperative: Yes  Receptive (willing/able to accept information): Yes  Any cultural factors/Scientology beliefs that may influence understanding or compliance? No     Patient demonstrates understanding of the following:  Reason for the appointment, diagnosis and treatment plan: Yes  Knowledge of proper use of medications and conditions for which they are ordered (with special attention to potential side effects or drug interactions): Yes  Which situations necessitate calling provider and whom to contact: Yes- discussed the stoplight tool to help assist with this.      Teaching Concerns Addressed:      Proper use of surgical scrub explain and provided to patient: Yes  Nutritional needs and diet plan: Yes  Pain management techniques: Yes  Wound Care: Yes  How and/when to access community resources: Yes     Instructional Materials Used/Given: surgical packet and surgical soap  received in clinic.       - Important contact info/ phone numbers  - Map/ location of surgery  - Showering instructions  - Stop light tool    Additionally the following was discussed with patient:  - Patient informed responsible adult is required to drive him home and stay with him for 24 hours after surgery.        -Next step: Perioperative  to contact patient and schedule surgery/post ops., Patient to be evaluated by PAC. MR left elbow to be scheduled prior to surgery date.    Time spent with patient: 15 minutes.     Tara Holter, RNCC

## 2023-12-22 NOTE — TELEPHONE ENCOUNTER
Patient is scheduled for surgery with Dr. Mccurdy    Spoke with: patient    Date of Surgery: 1/19/24    Location: [facility]    POP: 3 weeks    Pre op with Provider complete    H&P: Scheduled with PAC 1/15/24    Additional imaging/appointments: MRI 1/15/24    Surgery packet: received in clinic     Additional comments: N/A        Mahi Meyer CMA on 12/22/2023 at 11:08 AM

## 2023-12-26 NOTE — TELEPHONE ENCOUNTER
FUTURE VISIT INFORMATION      SURGERY INFORMATION:  Date: 24  Location: [facility] or  Surgeon:  Broderick Mccurdy MD   Anesthesia Type:  general  Procedure: removal left elbow tumor   Consult: ov 23    RECORDS REQUESTED FROM:       Primary Care Provider: Javier Villafana MD - [facility]    Pertinent Medical History: hypertension    Most recent EKG+ Tracin23    Most recent ECHO: 20    Most recent Coronary Angiogram: 10/16/18

## 2024-01-11 ENCOUNTER — TELEPHONE (OUTPATIENT)
Dept: ORTHOPEDICS | Facility: CLINIC | Age: 69
End: 2024-01-11
Payer: COMMERCIAL

## 2024-01-11 DIAGNOSIS — M79.89 SOFT TISSUE MASS: Primary | ICD-10-CM

## 2024-01-11 NOTE — TELEPHONE ENCOUNTER
Rescheduled  Patient is scheduled for surgery with Dr. Mccurdy    Spoke with: David    Date of Surgery: 1/22/24    Location: [facility] OR    Informed patient they will need an adult  Yes    Pre op with Provider PAC, 1/15/24    Additional imaging/appointments: POP Rescheduled.      Additional comments: Rescheduled per PAC from 1/19/24 to 1/22/24.   Pt requesting call back from care team to discuss new lump on back.   Pt is asking if he could get it check out when he comes in for his MRI and PAC. Will route to team for further assistance as patient stated that its in close proximity as his tumor on elbow.       Lennie Quigley on 1/11/2024 at 10:12 AM

## 2024-01-11 NOTE — CONFIDENTIAL NOTE
Patient returning call. He reports his kids noticed a lump in his left periscapular region around Christmas. It is not painful. He is able to see the lump if he looks in a mirror. He would like to have an MRI of the mass when he comes in for his elbow MRI on Monday. Writer will discuss with provider.    Tara Holter, RNCC

## 2024-01-11 NOTE — CONFIDENTIAL NOTE
From: Luz Wilburn PA-C  Sent: 1/11/2024   8:49 AM CST  To: Tara Holter, RN; Lennie Edwards,    Just wanted to let you know that I was prepping for this patient I'm seeing on Monday and give you the heads up. He's currently scheduled with Dr. Mccurdy at the [facility] but based on exclusion criteria for severe untreated MARSHAL he cannot be done there. His location will need to be moved.     Thanks,  Maday

## 2024-01-11 NOTE — CONFIDENTIAL NOTE
Call placed to patient. Informed him his surgery needs to be be scheduled to at a different location due to his untreated sleep apnea. Patient was frustrated and did not understand how that influenced his surgery. Perioperative  will reach out to reschedule surgery date. He has pre-op with PAC 1/15. Provided address to [facility]. He verified understanding of plan. He will call if he has further questions.     Tara Holter, RNCC

## 2024-01-11 NOTE — CONFIDENTIAL NOTE
Lennie Quigley I   to Me   GB      1/11/24 10:15 AM  Izabela,  Pt wants to talk to Dr. Mccurdy/care team regarding a new lump on his back.  Pt is asking if he could get it check out when he comes in for his MRI and PAC.. stated that his request would be forwarded to care team for further assistance. Stated that its in close proximity as his tumor on elbow.    0Lupe

## 2024-01-14 ENCOUNTER — HEALTH MAINTENANCE LETTER (OUTPATIENT)
Age: 69
End: 2024-01-14

## 2024-01-15 ENCOUNTER — MYC MEDICAL ADVICE (OUTPATIENT)
Dept: FAMILY MEDICINE | Facility: CLINIC | Age: 69
End: 2024-01-15

## 2024-01-15 ENCOUNTER — MYC MEDICAL ADVICE (OUTPATIENT)
Dept: ORTHOPEDICS | Facility: CLINIC | Age: 69
End: 2024-01-15

## 2024-01-15 ENCOUNTER — ANESTHESIA EVENT (OUTPATIENT)
Dept: SURGERY | Facility: CLINIC | Age: 69
End: 2024-01-15
Payer: COMMERCIAL

## 2024-01-15 ENCOUNTER — ANCILLARY PROCEDURE (OUTPATIENT)
Dept: MRI IMAGING | Facility: CLINIC | Age: 69
End: 2024-01-15
Attending: ORTHOPAEDIC SURGERY
Payer: COMMERCIAL

## 2024-01-15 ENCOUNTER — PRE VISIT (OUTPATIENT)
Dept: SURGERY | Facility: CLINIC | Age: 69
End: 2024-01-15

## 2024-01-15 ENCOUNTER — OFFICE VISIT (OUTPATIENT)
Dept: SURGERY | Facility: CLINIC | Age: 69
End: 2024-01-15
Payer: COMMERCIAL

## 2024-01-15 ENCOUNTER — LAB (OUTPATIENT)
Dept: LAB | Facility: CLINIC | Age: 69
End: 2024-01-15
Payer: COMMERCIAL

## 2024-01-15 VITALS
RESPIRATION RATE: 16 BRPM | DIASTOLIC BLOOD PRESSURE: 60 MMHG | TEMPERATURE: 98.2 F | OXYGEN SATURATION: 96 % | WEIGHT: 237.9 LBS | SYSTOLIC BLOOD PRESSURE: 100 MMHG | HEIGHT: 73 IN | BODY MASS INDEX: 31.53 KG/M2 | HEART RATE: 76 BPM

## 2024-01-15 DIAGNOSIS — Z01.818 PRE-OPERATIVE EXAMINATION: Primary | ICD-10-CM

## 2024-01-15 DIAGNOSIS — D17.9 INTRAMUSCULAR LIPOMA: ICD-10-CM

## 2024-01-15 DIAGNOSIS — D64.9 ANEMIA, UNSPECIFIED TYPE: Primary | ICD-10-CM

## 2024-01-15 DIAGNOSIS — Z01.818 PRE-OPERATIVE EXAMINATION: ICD-10-CM

## 2024-01-15 LAB
ERYTHROCYTE [DISTWIDTH] IN BLOOD BY AUTOMATED COUNT: 21.6 % (ref 10–15)
FERRITIN SERPL-MCNC: 15 NG/ML (ref 31–409)
HCT VFR BLD AUTO: 38.5 % (ref 40–53)
HGB BLD-MCNC: 12.3 G/DL (ref 13.3–17.7)
HOLD SPECIMEN: NORMAL
IRON BINDING CAPACITY (ROCHE): 375 UG/DL (ref 240–430)
IRON SATN MFR SERPL: 4 % (ref 15–46)
IRON SERPL-MCNC: 16 UG/DL (ref 61–157)
MCH RBC QN AUTO: 21.2 PG (ref 26.5–33)
MCHC RBC AUTO-ENTMCNC: 31.9 G/DL (ref 31.5–36.5)
MCV RBC AUTO: 67 FL (ref 78–100)
PLATELET # BLD AUTO: 282 10E3/UL (ref 150–450)
RBC # BLD AUTO: 5.79 10E6/UL (ref 4.4–5.9)
WBC # BLD AUTO: 8.1 10E3/UL (ref 4–11)

## 2024-01-15 PROCEDURE — 85027 COMPLETE CBC AUTOMATED: CPT | Performed by: PATHOLOGY

## 2024-01-15 PROCEDURE — 83550 IRON BINDING TEST: CPT | Performed by: PATHOLOGY

## 2024-01-15 PROCEDURE — 99204 OFFICE O/P NEW MOD 45 MIN: CPT | Performed by: PHYSICIAN ASSISTANT

## 2024-01-15 PROCEDURE — 83540 ASSAY OF IRON: CPT | Performed by: PATHOLOGY

## 2024-01-15 PROCEDURE — 73223 MRI JOINT UPR EXTR W/O&W/DYE: CPT | Mod: LT | Performed by: RADIOLOGY

## 2024-01-15 PROCEDURE — 82728 ASSAY OF FERRITIN: CPT | Performed by: PATHOLOGY

## 2024-01-15 PROCEDURE — 36415 COLL VENOUS BLD VENIPUNCTURE: CPT | Performed by: PATHOLOGY

## 2024-01-15 PROCEDURE — A9585 GADOBUTROL INJECTION: HCPCS | Performed by: RADIOLOGY

## 2024-01-15 RX ORDER — GADOBUTROL 604.72 MG/ML
10 INJECTION INTRAVENOUS ONCE
Status: COMPLETED | OUTPATIENT
Start: 2024-01-15 | End: 2024-01-15

## 2024-01-15 RX ORDER — BACILLUS COAGULANS 1B CELL
1 CAPSULE ORAL DAILY
Qty: 90 TABLET | Refills: 1 | Status: SHIPPED | OUTPATIENT
Start: 2024-01-15 | End: 2024-05-26

## 2024-01-15 RX ADMIN — GADOBUTROL 10 ML: 604.72 INJECTION INTRAVENOUS at 08:03

## 2024-01-15 ASSESSMENT — PAIN SCALES - GENERAL: PAINLEVEL: NO PAIN (1)

## 2024-01-15 ASSESSMENT — ENCOUNTER SYMPTOMS: SEIZURES: 0

## 2024-01-15 ASSESSMENT — LIFESTYLE VARIABLES: TOBACCO_USE: 1

## 2024-01-15 NOTE — TELEPHONE ENCOUNTER
As discussed I have send the prescription of oral iron to the pharmacy .   I recommend clinic follow up in 6 weeks so we can recheck lab and check the neck .    Thanks   Majo

## 2024-01-15 NOTE — PATIENT INSTRUCTIONS
Preparing for Your Surgery      Name:  Austyn Araujo   MRN:  [id number]   :  1955   Today's Date:  1/15/2024       Arriving for surgery:  Surgery date:  24  Arrival time:  11:00 am  Surgery time: 1;30 pm    Please come to:     Please come to:      [facility] [address]  [address] [place], [place] [address]. [place]  [address]  The Green Ramp for patients and visitors is located beneath the [facility]. The parking facility entrance is at the intersection of [address] [place] [address] and [address]. Patients and visitors who self-park will receive the reduced hospital parking rate (no ticket validation needed).  [employer] parking, located at the [facility] main entrance on [address] [place] [address], is available Monday - Friday from 7 am to 3:30 pm.  Discounted parking pass options can be purchased from  attendants during business hours.  -Check in at the security desk in the [facility] Lobby. They will direct you to the correct elevators.  -Proceed to the 3rd floor, check in at the Adult Surgery Waiting Lounge. [phone]  If you are in need of directions, a wheelchair or escort please stop at the Information Desk in the lobby.  Inform the information person that you are here for surgery; a wheelchair and escort to [address] will be provided.   An escort to the Adult Surgery Waiting Lounge will be provided.    What can I eat or drink?  -  You may eat and drink normally up to 8 hours prior to arrival time. (Until 3:00 am)  -  You may have clear liquids until 2 hours prior to arrival time. (Until 9:00 am)    Examples of clear liquids:  Water  Clear broth  Juices (apple, white grape, white cranberry  and cider) without pulp  Noncarbonated, powder based beverages  (lemonade and Robb-Aid)  Sodas (Sprite, 7-Up, ginger ale and seltzer)  Coffee or tea (without milk or cream)  Gatorade    -   No Alcohol or cannabis products for at least 24 hours before surgery.     Which medicines can I take?    Hold Aspirin for 7 days before surgery.   Hold Multivitamins for 7 days before surgery.  Hold Supplements for 7 days before surgery.  Hold Ibuprofen (Advil, Motrin) for 1 day(s) before surgery--unless otherwise directed by surgeon.  Hold Naproxen (Aleve) for 4 days before surgery.    -  DO NOT take these medications the day of surgery:  Aspirin - see above  Novolog insulin  Lisinopril should not be taken within 12 hours before surgery  Metformin  Januvia  Multivitamin - see above  Viagra - stop 24 hours before surgery  Cialis - stop 3 days before surgery      -  PLEASE TAKE these medications per your usual routine:  Atorvastatin (Lipitor)  Omeprazole (Prilosec)  Take 80% of your usual dose of Lantus insulin. Take 12 units rather than your usual 15 units the morning of surgery.  Continue testosterone per your usual schedule    How do I prepare myself?  - Please take 2 showers (one the night prior to surgery and one the morning of surgery) using Scrubcare or Hibiclens soap.    Use this soap only from the neck to your toes.     Leave the soap on your skin for one minute--then rinse thoroughly.      You may use your own shampoo and conditioner. No other hair products.   - Please remove all jewelry and body piercings.  - No lotions, deodorants or fragrance.  - No makeup or fingernail polish.   - Bring your ID and insurance card.    -If you use a CPAP machine, please bring the CPAP machine, tubing, and mask to hospital.    -If you have a Deep Brain Stimulator, Spinal Cord Stimulator, or any Neuro Stimulator device---you must bring the remote control to the hospital.      ALL PATIENTS GOING HOME THE SAME DAY OF SURGERY ARE REQUIRED TO HAVE A RESPONSIBLE ADULT TO DRIVE AND BE IN ATTENDANCE WITH THEM FOR 24 HOURS FOLLOWING SURGERY.    Covid testing policy as of 12/06/2022  Your surgeon will notify and schedule you for a  COVID test if one is needed before surgery--please direct any questions or COVID symptoms to your surgeon      Questions or Concerns:    - For any questions regarding the day of surgery or your hospital stay, please contact the Pre Admission Nursing Office at [phone].       - If you have health changes between today and your surgery, please call your surgeon.       - For questions after surgery, please call your surgeons office.           Current Visitor Guidelines    You may have 2 visitors in the pre op area.    Visiting hours: 8 a.m. to 8:30 p.m.    Patients confirmed or suspected to have symptoms of COVID 19 or flu:     No visitors allowed for adult patients.   Children (under age 18) can have 1 named visitor.     People who are sick or showing symptoms of COVID 19 or flu:    Are not allowed to visit patients--we can only make exceptions in special situations.       Please follow these guidelines for your visit:          Please maintain social distance          Masking is optional--however at times you may be asked to wear a mask for the safety of yourself and others     Clean your hands with alcohol hand . Do this when you arrive at and leave the building and patient room,    And again after you touch your mask or anything in the room.     Go directly to and from the room you are visiting.     Stay in the patient s room during your visit. Limit going to other places in the hospital as much as possible     Leave bags and jackets at home or in the car.     For everyone s health, please don t come and go during your visit. That includes for smoking   during your visit.

## 2024-01-15 NOTE — CONFIDENTIAL NOTE
Attempted to call patient. LVM with image phone number to schedule MR tara. Clinic phone number provided if patient has questions.     Tara Holter, RNCC

## 2024-01-15 NOTE — DISCHARGE INSTRUCTIONS
MRI Contrast Discharge Instructions    The IV contrast you received today will pass out of your body in your  urine. This will happen in the next 24 hours. You will not feel this process.  Your urine will not change color.    Drink at least 4 extra glasses of water or juice today (unless your doctor  has restricted your fluids). This reduces the stress on your kidneys.  You may take your regular medicines.    If you are on dialysis: It is best to have dialysis today.    If you have a reaction: Most reactions happen right away. If you have  any new symptoms after leaving the hospital (such as hives or swelling),  call your hospital at the correct number below. Or call your family doctor.  If you have breathing distress or wheezing, call 911.    Special instructions: ***    I have read and understand the above information.    Signature:______________________________________ Date:___________    Staff:__________________________________________ Date:___________     Time:__________    [facility] Departments:    ___[place]: [phone]  ___[facility]: [phone]  ___[place]: [phone] ___[facility]: [phone]  ___[facility]: [phone]  ___[facility]: [phone]  ___[phone]  ___[facility]: [phone]  ___[phone]

## 2024-01-15 NOTE — H&P
Pre-Operative H & P     CC:  Preoperative exam to assess for increased cardiopulmonary risk while undergoing surgery and anesthesia.    Date of Encounter: 1/15/2024  Primary Care Physician:  Majo Paul     Reason for visit:   Encounter Diagnoses   Name Primary?    Pre-operative examination Yes    Intramuscular lipoma        HPI  Austyn Araujo is a 68 year old male who presents for pre-operative H & P in preparation for  Procedure Information       Case: [id number] Date/Time: 01/22/24 1330    Procedure: removal left elbow tumor (Left: Axilla)    Anesthesia type: General    Diagnosis: Intramuscular lipoma [D17.9]    Pre-op diagnosis: Intramuscular lipoma [D17.9]    [facility]: [facility]    Providers: Broderick Mccurdy MD            The patient is a 68-year-old man with a past medical history significant for hypertension, hyperlipidemia, severe obstructive sleep apnea intolerant to CPAP, former smoker, anemia, neuropathy, type 2 diabetes, hypergonadism, obesity, GERD, history of alcoholic pancreatitis, prostate cancer on active surveillance, erectile dysfunction, Peyronie's disease, BPH, history of alcohol abuse now sober, anxiety, status post L4-5 decompression and a new left elbow mass.  Given his elbow mass he has been seen by Dr. Mccurdy on 12/21/2023 and counseled for the procedure as above.    History is obtained from the patient and chart review    Hx of abnormal bleeding or anti-platelet use: ASA 81 mg       Past Medical History  Past Medical History:   Diagnosis Date    Anxiety     Anxiety 01/31/2015    BPH (benign prostatic hyperplasia)     Degenerative disc disease     Depression     Gastritis     Gastro-oesophageal reflux disease     H/O alcohol abuse     Hyperlipidemia     Hypertension     Low testosterone     MRSA (methicillin resistant staph aureus) culture positive 08/2013    skin infection    Mumps     PUD (peptic ulcer disease)     Sleep apnea     STD (sexually transmitted disease)      Type 2 diabetes mellitus (H)     neuropathy       Past Surgical History  Past Surgical History:   Procedure Laterality Date    COLONOSCOPY  11/11/2013    Procedure: COMBINED COLONOSCOPY, SINGLE BIOPSY/POLYPECTOMY BY BIOPSY;  Colonoscopy/ polypectomy x2 by cold forceps    ;  Surgeon: Juan Carlos Bellamy MD;  Location: [facility]    DECOMPRESSION POSTERIOR LUMBAR, ,L4-5 decompression  06/14/2023    ESOPHAGOSCOPY, GASTROSCOPY, DUODENOSCOPY (EGD), COMBINED N/A 09/22/2016    Procedure: COMBINED ESOPHAGOSCOPY, GASTROSCOPY, DUODENOSCOPY (EGD), BIOPSY SINGLE OR MULTIPLE;  Surgeon: Juan Carlos Barraza MD;  Location: [facility]    ESOPHAGOSCOPY, GASTROSCOPY, DUODENOSCOPY (EGD), COMBINED N/A 02/19/2019    Procedure: ESOPHAGOSCOPY, GASTROSCOPY, DUODENOSCOPY (EGD) with cold forcep;  Surgeon: Juan Carlos Barraza MD;  Location: [facility]    HERNIA REPAIR         Prior to Admission Medications  Current Outpatient Medications   Medication Sig Dispense Refill    aspirin 81 MG tablet Take 1 tablet by mouth every morning      atorvastatin (LIPITOR) 40 MG tablet TAKE 1 TABLET BY MOUTH EVERY DAY (Patient taking differently: Take 40 mg by mouth every morning) 90 tablet 1    insulin aspart (NOVOLOG FLEXPEN) 100 UNIT/ML pen INJECT 15 UNITS SUBCUTANEOUS 3 TIMES DAILY (WITH MEALS) 15 mL 3    insulin glargine (LANTUS PEN) 100 UNIT/ML pen Inject 15 Units Subcutaneous 2 times daily      lisinopril (ZESTRIL) 10 MG tablet Take 1 tablet (10 mg) by mouth daily (Patient taking differently: Take 10 mg by mouth every morning) 90 tablet 1    metFORMIN (GLUCOPHAGE XR) 500 MG 24 hr tablet Take 4 tablets (2,000 mg) by mouth daily (with dinner) (Patient taking differently: Take 1,000 mg by mouth 2 times daily) 360 tablet 0    Multiple Vitamins-Minerals (CENTRUM SILVER) per tablet Take 1 tablet by mouth daily (Patient taking differently: Take 1 tablet by mouth every morning) 90 tablet 3    omeprazole (PRILOSEC) 40 MG DR capsule TAKE 1 CAPSULE BY MOUTH EVERY DAY (Patient  "taking differently: Take 40 mg by mouth every morning TAKE 1 CAPSULE BY MOUTH EVERY DAY) 90 capsule 0    sildenafil (VIAGRA) 100 MG tablet Take 1 tablet (100 mg) by mouth daily as needed 90 tablet 4    sitagliptin (JANUVIA) 100 MG tablet Take 1 tablet (100 mg) by mouth daily (Patient taking differently: Take 100 mg by mouth every morning) 90 tablet 1    testosterone cypionate (DEPOTESTOSTERONE) 200 MG/ML injection INJECT 2 ML INTO THE MUSCLE ONCE WEEKLY (Patient taking differently: INJECT 2 ML INTO THE MUSCLE ONCE WEEKLY. ) 10 mL 0    B-D LUER-ROCHELLE SYRINGE 22G X 1-\" 3 ML MISC USE WITH TESTOSTERONE 12 each 3    blood glucose (ONETOUCH ULTRA) test strip USE TO TEST BLOOD SUGARS ONE TIME DAILY OR AS DIRECTED. 100 strip 1    Continuous Blood Gluc  (DEXCOM G7 ) ASHLIE 1 Device every 3 months 1 each 3    Continuous Blood Gluc  (DEXCOM G7 ) ASHLIE 1 Device every 14 days 1 each 11    Continuous Blood Gluc Sensor (DEXCOM G7 SENSOR) MISC 1 Device every 10 days 4 each 11    Continuous Blood Gluc Sensor (DEXCOM G7 SENSOR) MISC 1 Device every 14 days 1 each 11    needle, disp, (BD HYPODERMIC NEEDLE) 18G X 1\" MISC USE TO ADMINISTER TESTOSTERONE. DUE FOR APPOINTMENT WITH YOUR DOCTOR 6 each 3    PHARMACIST CHOICE LANCETS MISC TEST BLOOD SUGARS 5 TIMES DAILY 100 each 3    tadalafil (CIALIS) 20 MG tablet Take 1 tablet (20 mg) by mouth daily Never use with nitroglycerin, terazosin or doxazosin. (Patient not taking: Reported on 1/15/2024) 88 tablet 0       Allergies  No Known Allergies    Social History  Social History     Socioeconomic History    Marital status:      Spouse name: Not on file    Number of children: Not on file    Years of education: Not on file    Highest education level: Not on file   Occupational History    Not on file   Tobacco Use    Smoking status: Former     Types: Cigarettes     Quit date: 2000     Years since quittin.0    Smokeless tobacco: Never   Vaping Use    " Vaping Use: Never used   Substance and Sexual Activity    Alcohol use: No     Comment: sober since 2016    Drug use: No    Sexual activity: Yes     Partners: Female   Other Topics Concern    Parent/sibling w/ CABG, MI or angioplasty before 65F 55M? Not Asked   Social History Narrative    Not on file     Social Determinants of Health     Financial Resource Strain: Low Risk  (11/3/2023)    Financial Resource Strain     Within the past 12 months, have you or your family members you live with been unable to get utilities (heat, electricity) when it was really needed?: No   Food Insecurity: Low Risk  (11/3/2023)    Food Insecurity     Within the past 12 months, did you worry that your food would run out before you got money to buy more?: No     Within the past 12 months, did the food you bought just not last and you didn t have money to get more?: No   Transportation Needs: Low Risk  (11/3/2023)    Transportation Needs     Within the past 12 months, has lack of transportation kept you from medical appointments, getting your medicines, non-medical meetings or appointments, work, or from getting things that you need?: No   Physical Activity: Not on file   Stress: Not on file   Social Connections: Not on file   Interpersonal Safety: Low Risk  (11/3/2023)    Interpersonal Safety     Do you feel physically and emotionally safe where you currently live?: Yes     Within the past 12 months, have you been hit, slapped, kicked or otherwise physically hurt by someone?: No     Within the past 12 months, have you been humiliated or emotionally abused in other ways by your partner or ex-partner?: No   Housing Stability: Low Risk  (11/3/2023)    Housing Stability     Do you have housing? : Yes     Are you worried about losing your housing?: No       Family History  Family History   Problem Relation Age of Onset    Pancreatic Cancer Mother     Dementia Father     Parkinsonism Father     Diabetes Brother     Depression Brother      Suicide Brother     Substance Abuse Brother     Pancreatic Cancer Paternal Grandfather     Family History Negative No family hx of     Colon Cancer No family hx of     Unknown/Adopted No family hx of     Anxiety Disorder No family hx of     Schizophrenia No family hx of     Bipolar Disorder No family hx of     Brinklow Disease No family hx of     Autism Spectrum Disorder No family hx of     Intellectual Disability (Mental Retardation) No family hx of     Mental Illness No family hx of     Anesthesia Reaction No family hx of     Deep Vein Thrombosis (DVT) No family hx of        Review of Systems  The complete review of systems is negative other than noted in the HPI or here.   Anesthesia Evaluation   Pt has had prior anesthetic. Type: General.    No history of anesthetic complications       ROS/MED HX  ENT/Pulmonary:     (+) sleep apnea, severe, doesn't use CPAP,              tobacco use, Past use,                    (-) recent URI   Neurologic:     (+)    peripheral neuropathy, - legs and feet.                        (-) no seizures, no CVA and no TIA   Cardiovascular:     (+) Dyslipidemia hypertension- -   -  - -                                 Previous cardiac testing   Echo: Date: Results:    Stress Test:  Date: 2020 Results:  Interpretation Summary  1. Above average exercise capacity, 78% max HR achieved.  2. The patient exhibited no chest pain during exercise.  3. This was a normal stress EKG with no evidence of stress-induced ischemia.  4. Rest echo: Normal left ventricular function and wall motion at rest. The  visual ejection fraction is estimated at 55-60%.  5. Stress echo: This was a normal stress echocardiogram with no evidence of  stress-induced ischemia. The visual ejection fraction is estimated at 65-70%.     No changes from stress echo 5/2018.    ECG Reviewed:  Date: 6/13/23 Results:  Sinus rhythm with 1st degree A-V block   Blocked Premature atrial complexes   Right bundle branch block   with  "secondary ST-T abnormalities     Cath:  Date: 2018 Results:  IMPRESSION:     1. Total Agatston score 77.5, placing the patient in the 53rd  percentile when compared to age and gender matched control group.     2. Diffuse coronary artery disease. There are multiple mild to  moderate stenoses in the proximal and mid left anterior descending  artery. The first diagonal is a small vessel and I cannot completely  occluded a severe stenosis at its ostium. Circumflex is free of  significant disease. Luminal irregularities throughout the length of  the right coronary artery.      3.  Please review Radiology report for incidental noncardiac findings  that  will follow separately.      METS/Exercise Tolerance: 4 - Raking leaves, gardening    Hematologic:     (+)      anemia,          Musculoskeletal: Comment: Left elbow mass    S/p L4-5 decompression   (+)  arthritis,             GI/Hepatic:     (+) GERD, Asymptomatic on medication,                  Renal/Genitourinary: Comment: Peyronies disease    ED    (+)        BPH,      Endo: Comment: Hypogonadism     (+)  type II DM, Last HgA1c: 6.7, date: 11/3/23, Using insulin, - not using insulin pump.         Obesity,       Psychiatric/Substance Use:     (+) psychiatric history anxiety alcohol abuse (sober 2017)      Infectious Disease:  - neg infectious disease ROS     Malignancy:   (+) Malignancy, History of Prostate.Prostate CA Active status post.      Other:  - neg other ROS          /60 (BP Location: Right arm, Patient Position: Sitting, Cuff Size: Adult Regular)   Pulse 76   Temp 98.2  F (36.8  C) (Oral)   Resp 16   Ht 1.854 m (6' 1\")   Wt 107.9 kg (237 lb 14.4 oz)   SpO2 96%   BMI 31.39 kg/m      Physical Exam   Constitutional: Awake, alert, cooperative, no apparent distress, and appears stated age.  Eyes: Pupils equal, round and reactive to light, extra ocular muscles intact, sclera clear, conjunctiva normal.  HENT: Normocephalic, oral pharynx with moist mucus " membranes, good dentition - missing back teeth. No goiter appreciated.   Respiratory: Clear to auscultation bilaterally, no crackles or wheezing.  Cardiovascular: Regular rate and rhythm, normal S1 and S2, and very soft murmur noted.  Carotids +2, no bruits. No edema. Palpable pulses to radial  DP and PT arteries.   GI: Normal bowel sounds, soft, non-distended, non-tender, no masses palpated, no hepatosplenomegaly.    Lymph/Hematologic: No cervical lymphadenopathy and no supraclavicular lymphadenopathy.  Genitourinary:  defer  Skin: Warm and dry.  No rashes at anticipated surgical site.   Musculoskeletal: Limited ROM of neck. There is no redness, warmth, or swelling of the joints. Gross motor strength is normal.    Neurologic: Awake, alert, oriented to name, place and time. Cranial nerves II-XII are grossly intact. Gait is normal.   Neuropsychiatric: Calm, cooperative. Normal affect.     Prior Labs/Diagnostic Studies   All labs and imaging personally reviewed    Latest Reference Range & Units 11/03/23 13:57   Sodium 135 - 145 mmol/L 141   Potassium 3.4 - 5.3 mmol/L 4.9   Chloride 98 - 107 mmol/L 106   Carbon Dioxide (CO2) 22 - 29 mmol/L 27   Urea Nitrogen 8.0 - 23.0 mg/dL 22.1   Creatinine 0.67 - 1.17 mg/dL 1.00   GFR Estimate >60 mL/min/1.73m2 81   Calcium 8.8 - 10.2 mg/dL 9.4   Anion Gap 7 - 15 mmol/L 8   Albumin 3.5 - 5.2 g/dL 4.4   Protein Total 6.4 - 8.3 g/dL 7.1   Alkaline Phosphatase 40 - 129 U/L 71   ALT 0 - 70 U/L 19   AST 0 - 45 U/L 20   Bilirubin Total <=1.2 mg/dL 0.3   Cholesterol <200 mg/dL 132   Ferritin 31 - 409 ng/mL 11 (L)   Glucose 70 - 99 mg/dL 131 (H)   HDL Cholesterol >=40 mg/dL 38 (L)   Hemoglobin A1C 0.0 - 5.6 % 6.7 (H)   Iron 61 - 157 ug/dL 11 (L)   Iron Binding Capacity 240 - 430 ug/dL 385   Iron Sat Index 15 - 46 % 3 (L)   LDL Cholesterol Calculated <=100 mg/dL 61   Non HDL Cholesterol <130 mg/dL 94   PSA Tumor Marker 0.00 - 4.50 ng/mL 5.59 (H)   Triglycerides <150 mg/dL 166 (H)   WBC 4.0 -  11.0 10e3/uL 8.4   Hemoglobin 13.3 - 17.7 g/dL 11.5 (L)   Hematocrit 40.0 - 53.0 % 38.6 (L)   Platelet Count 150 - 450 10e3/uL 319   RBC Count 4.40 - 5.90 10e6/uL 5.64   MCV 78 - 100 fL 68 (L)   MCH 26.5 - 33.0 pg 20.4 (L)   MCHC 31.5 - 36.5 g/dL 29.8 (L)   RDW 10.0 - 15.0 % 17.6 (H)   (L): Data is abnormally low  (H): Data is abnormally high    EKG/ stress test - if available please see in ROS above       The patient's records and results personally reviewed by this provider.     Outside records reviewed from: Care Everywhere    LAB/DIAGNOSTIC STUDIES TODAY:     Latest Reference Range & Units 01/15/24 09:23   Ferritin 31 - 409 ng/mL 15 (L)   Iron 61 - 157 ug/dL 16 (L)   Iron Binding Capacity 240 - 430 ug/dL 375   Iron Sat Index 15 - 46 % 4 (L)   WBC 4.0 - 11.0 10e3/uL 8.1   Hemoglobin 13.3 - 17.7 g/dL 12.3 (L)   Hematocrit 40.0 - 53.0 % 38.5 (L)   Platelet Count 150 - 450 10e3/uL 282   RBC Count 4.40 - 5.90 10e6/uL 5.79   MCV 78 - 100 fL 67 (L)   MCH 26.5 - 33.0 pg 21.2 (L)   MCHC 31.5 - 36.5 g/dL 31.9   RDW 10.0 - 15.0 % 21.6 (H)     The patient does have iron deficiency but hemoglobin above 12. Recommend oral over the counter replacement and continued follow up with PCP.     Assessment    Austyn Araujo is a 68 year old male seen as a PAC referral for risk assessment and optimization for anesthesia.    Plan/Recommendations  Pt will be optimized for the proposed procedure.  See below for details on the assessment, risk, and preoperative recommendations    NEUROLOGY  - No history of TIA, CVA or seizure  - Neuropathy - feet and legs. No balance issues  -Post Op delirium risk factors:  High co-morbid index    ENT  - No current airway concerns.  Will need to be reassessed day of surgery.  Mallampati: I  TM: > 3    CARDIAC  - Hypertension  Well controlled  - Hyperlipidemia  Well controlled on home regimen  ~ CAD - The patient was seen by cardiology in 2018 after having symptoms of chest pain. He underwent stress  test and CTA angiogram. He followed up with Dr. Estevez on 11/6/18. He was found to have non-obstructive but diffuse disease on CTA angiogram. He was counseled to have repeat stress testing in 3-5 years. The patient had stress echo in 2020 without ischemia. Today the patient reports complete resolution of any chest pain. He has ongoing arthritis in hips and knees which cause pain. He does report he is able to go up a couple of flights of stairs without cardiac symptoms; he just has symptoms of knee and hip pain which limit activity.   - METS (Metabolic Equivalents)  Patient performs 4 or more METS exercise without symptoms            Total Score: 0      RCRI-Moderate risk: Class 3  6.6% complication rate            Total Score: 2    RCRI: CAD    RCRI: Diabetes    ~ The patient has soft murmur on exam. He denies any symptoms. Given perioperative guidelines for cardiac murmurs as the patient had stress echo in 2020 and found to have mild tricuspid regurgitation and currently has no symptoms with soft murmur, no further testing indicated. Discussed with the patient to continue to follow up with his PCP for routine exams.     PULMONARY  - Obstructive Sleep Apnea  MARSHAL without home CPAP use. The patient had testing and found to have severe MARSHAL. He has been intolerance of CPAP.     - Denies asthma or inhaler use  - Tobacco History    History   Smoking Status    Former    Types: Cigarettes    Quit date: 2000   Smokeless Tobacco    Never       GI  - GERD  Controlled on medications: Proton Pump Inhibitor  PONV Medium Risk  Total Score: 2           1 AN PONV: Patient is not a current smoker    1 AN PONV: Intended Post Op Opioids    ~ History of pancreatitis from alcohol. Denies any current issues.     /RENAL  - Baseline Creatinine  1.00  ~ Prostate cancer on active surveillance. Seen by Dr. Rao on 11/10/23  ~ ED - hold viagra 24 hours   ~ BPH    ENDOCRINE    - BMI: Estimated body mass index is 31.39 kg/m  as calculated  "from the following:    Height as of this encounter: 1.854 m (6' 1\").    Weight as of this encounter: 107.9 kg (237 lb 14.4 oz).  Obesity (BMI >30)  - Diabetes  Hemoglobin A1C (%)   Date Value   11/03/2023 6.7 (H)   11/16/2020 8.0 (H)     Diabetes Type 2, insulin dependent. Hold morning oral hypoglycemic medications and short acting insulin DOS. Take 80% of last scheduled dose of long-acting insulin prior to procedure.  Recommend close monitoring of the patient's blood glucose levels throughout the perioperative period. Hold Janvuia the DOS.   - Hypogonadism - continue testosterone    HEME  VTE High Risk 3%            Total Score: 8    VTE: Greater than 59 yrs old    VTE: Male    VTE: Current cancer      - Platelet disfunction second to Aspirin (Gerardo, many others)  - Mild anemia - patient reports he has been worked up for this with EGD and colonoscopy. In November 2023 he had iron studies which did show iron deficiency anemia. He was not placed on any iron replacement. Will check CBC with reflex to iron studies today.   Recommend perioperative use of blood conservation techniques intraoperatively and close monitoring for postoperative bleeding.  A type and screen has not been done and deferred to the team day of surgery    MSK  ~ S/p L4-5 decompression 6/2023  ~ Left elbow mass - procedure as above.   ~ OA - hips and knees.     PSYCH  - Anxiety - patient reports this is related to bad dreams and a previous death in the family.   ~history of alcohol abuse - now sober since 2016    Different anesthesia methods/types have been discussed with the patient, but they are aware that the final plan will be decided by the assigned anesthesia provider on the date of service.    Patient was discussed with Dr Falcon    The patient is optimized for their procedure. AVS with information on surgery time/arrival time, meds and NPO status given by nursing staff. No further diagnostic testing indicated.      On the day of service: "     Prep time: 12 minutes  Visit time: 21 minutes  Documentation time: 13 minutes  ------------------------------------------  Total time: 46 minutes      Luz Wilburn PA-C  Preoperative Assessment Center  [facility]  Phone: [phone]  Fax: [phone]

## 2024-01-18 NOTE — CONFIDENTIAL NOTE
Call placed to patient regarding MRI of his shoulder. He said Dr. Mccurdy called him yesterday. He was not able to schedule the MRI before his surgery on 1/22 and the plan is for Dr. Mccurdy to evaluate the mass day of surgery. He has noticed the mass has decreased in size. He will call if any questions arise.    Tara Holter, RNCC

## 2024-01-22 ENCOUNTER — HOSPITAL ENCOUNTER (OUTPATIENT)
Facility: CLINIC | Age: 69
Discharge: HOME | End: 2024-01-22
Attending: ORTHOPAEDIC SURGERY | Admitting: ORTHOPAEDIC SURGERY
Payer: COMMERCIAL

## 2024-01-22 ENCOUNTER — ANESTHESIA (OUTPATIENT)
Dept: SURGERY | Facility: CLINIC | Age: 69
End: 2024-01-22
Payer: COMMERCIAL

## 2024-01-22 VITALS
HEIGHT: 74 IN | OXYGEN SATURATION: 100 % | TEMPERATURE: 97.7 F | WEIGHT: 235.89 LBS | RESPIRATION RATE: 16 BRPM | HEART RATE: 68 BPM | BODY MASS INDEX: 30.27 KG/M2 | DIASTOLIC BLOOD PRESSURE: 71 MMHG | SYSTOLIC BLOOD PRESSURE: 100 MMHG

## 2024-01-22 DIAGNOSIS — E11.40 TYPE 2 DIABETES MELLITUS WITH DIABETIC NEUROPATHY (H): ICD-10-CM

## 2024-01-22 DIAGNOSIS — E11.43 TYPE 2 DIABETES MELLITUS WITH PERIPHERAL AUTONOMIC NEUROPATHY (H): ICD-10-CM

## 2024-01-22 DIAGNOSIS — D17.9 INTRAMUSCULAR LIPOMA: ICD-10-CM

## 2024-01-22 LAB
GLUCOSE BLDC GLUCOMTR-MCNC: 101 MG/DL (ref 70–99)
GLUCOSE BLDC GLUCOMTR-MCNC: 150 MG/DL (ref 70–99)

## 2024-01-22 PROCEDURE — 250N000011 HC RX IP 250 OP 636: Performed by: PHYSICIAN ASSISTANT

## 2024-01-22 PROCEDURE — 360N000084 HC SURGERY LEVEL 4 W/ FLUORO, PER MIN: Performed by: ORTHOPAEDIC SURGERY

## 2024-01-22 PROCEDURE — 82962 GLUCOSE BLOOD TEST: CPT

## 2024-01-22 PROCEDURE — 258N000003 HC RX IP 258 OP 636: Performed by: NURSE ANESTHETIST, CERTIFIED REGISTERED

## 2024-01-22 PROCEDURE — 999N000141 HC STATISTIC PRE-PROCEDURE NURSING ASSESSMENT: Performed by: ORTHOPAEDIC SURGERY

## 2024-01-22 PROCEDURE — 250N000009 HC RX 250: Performed by: ORTHOPAEDIC SURGERY

## 2024-01-22 PROCEDURE — 88307 TISSUE EXAM BY PATHOLOGIST: CPT | Mod: 26 | Performed by: PATHOLOGY

## 2024-01-22 PROCEDURE — 370N000017 HC ANESTHESIA TECHNICAL FEE, PER MIN: Performed by: ORTHOPAEDIC SURGERY

## 2024-01-22 PROCEDURE — 88307 TISSUE EXAM BY PATHOLOGIST: CPT | Mod: TC | Performed by: ORTHOPAEDIC SURGERY

## 2024-01-22 PROCEDURE — 250N000011 HC RX IP 250 OP 636: Performed by: NURSE ANESTHETIST, CERTIFIED REGISTERED

## 2024-01-22 PROCEDURE — 250N000009 HC RX 250: Performed by: NURSE ANESTHETIST, CERTIFIED REGISTERED

## 2024-01-22 PROCEDURE — 272N000001 HC OR GENERAL SUPPLY STERILE: Performed by: ORTHOPAEDIC SURGERY

## 2024-01-22 PROCEDURE — 250N000011 HC RX IP 250 OP 636: Performed by: ORTHOPAEDIC SURGERY

## 2024-01-22 PROCEDURE — 710N000012 HC RECOVERY PHASE 2, PER MINUTE: Performed by: ORTHOPAEDIC SURGERY

## 2024-01-22 RX ORDER — DEXMEDETOMIDINE HYDROCHLORIDE 4 UG/ML
INJECTION, SOLUTION INTRAVENOUS PRN
Status: DISCONTINUED | OUTPATIENT
Start: 2024-01-22 | End: 2024-01-22

## 2024-01-22 RX ORDER — HEPARIN SODIUM,PORCINE 10 UNIT/ML
5-10 VIAL (ML) INTRAVENOUS
Status: DISCONTINUED | OUTPATIENT
Start: 2024-01-22 | End: 2024-01-22 | Stop reason: HOSPADM

## 2024-01-22 RX ORDER — ACETAMINOPHEN 325 MG/1
650 TABLET ORAL
Status: CANCELLED | OUTPATIENT
Start: 2024-01-22

## 2024-01-22 RX ORDER — OXYCODONE HYDROCHLORIDE 5 MG/1
5 TABLET ORAL
Status: CANCELLED | OUTPATIENT
Start: 2024-01-22

## 2024-01-22 RX ORDER — FENTANYL CITRATE 50 UG/ML
INJECTION, SOLUTION INTRAMUSCULAR; INTRAVENOUS PRN
Status: DISCONTINUED | OUTPATIENT
Start: 2024-01-22 | End: 2024-01-22

## 2024-01-22 RX ORDER — LIDOCAINE HYDROCHLORIDE 20 MG/ML
INJECTION, SOLUTION INFILTRATION; PERINEURAL PRN
Status: DISCONTINUED | OUTPATIENT
Start: 2024-01-22 | End: 2024-01-22

## 2024-01-22 RX ORDER — AMOXICILLIN 250 MG
1-2 CAPSULE ORAL 2 TIMES DAILY
Qty: 30 TABLET | Refills: 0 | Status: SHIPPED | OUTPATIENT
Start: 2024-01-22

## 2024-01-22 RX ORDER — CEFAZOLIN SODIUM/WATER 2 G/20 ML
2 SYRINGE (ML) INTRAVENOUS
Status: COMPLETED | OUTPATIENT
Start: 2024-01-22 | End: 2024-01-22

## 2024-01-22 RX ORDER — CEFAZOLIN SODIUM/WATER 2 G/20 ML
2 SYRINGE (ML) INTRAVENOUS SEE ADMIN INSTRUCTIONS
Status: DISCONTINUED | OUTPATIENT
Start: 2024-01-22 | End: 2024-01-22 | Stop reason: HOSPADM

## 2024-01-22 RX ORDER — BUPIVACAINE HYDROCHLORIDE 5 MG/ML
INJECTION, SOLUTION PERINEURAL PRN
Status: DISCONTINUED | OUTPATIENT
Start: 2024-01-22 | End: 2024-01-22 | Stop reason: HOSPADM

## 2024-01-22 RX ORDER — HEPARIN SODIUM (PORCINE) LOCK FLUSH IV SOLN 100 UNIT/ML 100 UNIT/ML
5-10 SOLUTION INTRAVENOUS
Status: DISCONTINUED | OUTPATIENT
Start: 2024-01-22 | End: 2024-01-22 | Stop reason: HOSPADM

## 2024-01-22 RX ORDER — LIDOCAINE HYDROCHLORIDE 10 MG/ML
INJECTION, SOLUTION INFILTRATION; PERINEURAL PRN
Status: DISCONTINUED | OUTPATIENT
Start: 2024-01-22 | End: 2024-01-22 | Stop reason: HOSPADM

## 2024-01-22 RX ORDER — METFORMIN HCL 500 MG
2000 TABLET, EXTENDED RELEASE 24 HR ORAL
Qty: 360 TABLET | Refills: 0 | Status: SHIPPED | OUTPATIENT
Start: 2024-01-22 | End: 2024-04-29

## 2024-01-22 RX ORDER — ACETAMINOPHEN 325 MG/1
650 TABLET ORAL EVERY 4 HOURS PRN
Qty: 50 TABLET | Refills: 0 | Status: SHIPPED | OUTPATIENT
Start: 2024-01-22

## 2024-01-22 RX ORDER — ONDANSETRON 2 MG/ML
INJECTION INTRAMUSCULAR; INTRAVENOUS PRN
Status: DISCONTINUED | OUTPATIENT
Start: 2024-01-22 | End: 2024-01-22

## 2024-01-22 RX ORDER — SODIUM CHLORIDE, SODIUM LACTATE, POTASSIUM CHLORIDE, CALCIUM CHLORIDE 600; 310; 30; 20 MG/100ML; MG/100ML; MG/100ML; MG/100ML
INJECTION, SOLUTION INTRAVENOUS CONTINUOUS PRN
Status: DISCONTINUED | OUTPATIENT
Start: 2024-01-22 | End: 2024-01-22

## 2024-01-22 RX ORDER — PROPOFOL 10 MG/ML
INJECTION, EMULSION INTRAVENOUS CONTINUOUS PRN
Status: DISCONTINUED | OUTPATIENT
Start: 2024-01-22 | End: 2024-01-22

## 2024-01-22 RX ORDER — OXYCODONE HYDROCHLORIDE 5 MG/1
5 TABLET ORAL EVERY 6 HOURS PRN
Qty: 12 TABLET | Refills: 0 | Status: SHIPPED | OUTPATIENT
Start: 2024-01-22 | End: 2024-01-25

## 2024-01-22 RX ORDER — EPHEDRINE SULFATE 50 MG/ML
INJECTION, SOLUTION INTRAMUSCULAR; INTRAVENOUS; SUBCUTANEOUS PRN
Status: DISCONTINUED | OUTPATIENT
Start: 2024-01-22 | End: 2024-01-22

## 2024-01-22 RX ORDER — HEPARIN SODIUM,PORCINE 10 UNIT/ML
5-10 VIAL (ML) INTRAVENOUS EVERY 24 HOURS
Status: DISCONTINUED | OUTPATIENT
Start: 2024-01-22 | End: 2024-01-22 | Stop reason: HOSPADM

## 2024-01-22 RX ADMIN — MIDAZOLAM 2 MG: 1 INJECTION INTRAMUSCULAR; INTRAVENOUS at 12:29

## 2024-01-22 RX ADMIN — Medication 2 G: at 12:29

## 2024-01-22 RX ADMIN — LIDOCAINE HYDROCHLORIDE 40 MG: 20 INJECTION, SOLUTION INFILTRATION; PERINEURAL at 12:32

## 2024-01-22 RX ADMIN — ONDANSETRON 4 MG: 2 INJECTION INTRAMUSCULAR; INTRAVENOUS at 12:32

## 2024-01-22 RX ADMIN — FENTANYL CITRATE 25 MCG: 50 INJECTION INTRAMUSCULAR; INTRAVENOUS at 12:51

## 2024-01-22 RX ADMIN — PHENYLEPHRINE HYDROCHLORIDE 200 MCG: 10 INJECTION INTRAVENOUS at 13:13

## 2024-01-22 RX ADMIN — Medication 5 MG: at 13:18

## 2024-01-22 RX ADMIN — PHENYLEPHRINE HYDROCHLORIDE 100 MCG: 10 INJECTION INTRAVENOUS at 12:49

## 2024-01-22 RX ADMIN — DEXMEDETOMIDINE 20 MCG: 100 INJECTION, SOLUTION, CONCENTRATE INTRAVENOUS at 12:32

## 2024-01-22 RX ADMIN — SODIUM CHLORIDE, POTASSIUM CHLORIDE, SODIUM LACTATE AND CALCIUM CHLORIDE: 600; 310; 30; 20 INJECTION, SOLUTION INTRAVENOUS at 12:29

## 2024-01-22 RX ADMIN — FENTANYL CITRATE 25 MCG: 50 INJECTION INTRAMUSCULAR; INTRAVENOUS at 13:10

## 2024-01-22 RX ADMIN — PHENYLEPHRINE HYDROCHLORIDE 200 MCG: 10 INJECTION INTRAVENOUS at 13:28

## 2024-01-22 RX ADMIN — PROPOFOL 100 MCG/KG/MIN: 10 INJECTION, EMULSION INTRAVENOUS at 12:32

## 2024-01-22 RX ADMIN — Medication 10 MG: at 13:09

## 2024-01-22 RX ADMIN — PHENYLEPHRINE HYDROCHLORIDE 100 MCG: 10 INJECTION INTRAVENOUS at 12:58

## 2024-01-22 RX ADMIN — FENTANYL CITRATE 50 MCG: 50 INJECTION INTRAMUSCULAR; INTRAVENOUS at 12:32

## 2024-01-22 RX ADMIN — Medication 10 MG: at 13:16

## 2024-01-22 RX ADMIN — PHENYLEPHRINE HYDROCHLORIDE 100 MCG: 10 INJECTION INTRAVENOUS at 12:53

## 2024-01-22 ASSESSMENT — ACTIVITIES OF DAILY LIVING (ADL)
ADLS_ACUITY_SCORE: 31
ADLS_ACUITY_SCORE: 31

## 2024-01-22 NOTE — ANESTHESIA POSTPROCEDURE EVALUATION
Patient: Austyn Araujo    Procedure: Procedure(s):  Removal Left Elbow Tumor       Anesthesia Type:  MAC    Note:  Disposition: Outpatient   Postop Pain Control: Uneventful            Sign Out: Well controlled pain   PONV: No   Neuro/Psych: Uneventful            Sign Out: Acceptable/Baseline neuro status   Airway/Respiratory: Uneventful            Sign Out: Acceptable/Baseline resp. status   CV/Hemodynamics: Uneventful            Sign Out: Acceptable CV status; No obvious hypovolemia; No obvious fluid overload   Other NRE:    DID A NON-ROUTINE EVENT OCCUR?            Last vitals:  Vitals Value Taken Time   BP 90/48 01/22/24 1342   Temp 36.5  C (97.7  F) 01/22/24 1334   Pulse 72 01/22/24 1342   Resp 16 01/22/24 1334   SpO2 94 % 01/22/24 1344   Vitals shown include unfiled device data.    Electronically Signed By: Cheyanne Doe MD  January 22, 2024  1:45 PM

## 2024-01-22 NOTE — OR NURSING
Daily Note     Today's date: 3/6/2020  Patient name: Ignacio Nayak  : 1945  MRN: [id number]  Referring provider: Alee Hopkins 

## 2024-01-22 NOTE — DISCHARGE INSTRUCTIONS

## 2024-01-22 NOTE — OP NOTE
Preop diagnosis: Tumor left forearm    Postoperative diagnosis: Same, suspect benign process.    Procedure performed: Excision of subcutaneous tumor, 3 cm, left forearm    Surgeons: Broderick Mccurdy and [place]    Estimated blood loss: 10 cc    Pathology submitted: Tumor left forearm in formalin.    Patient was interviewed in the preoperative area risk and benefits have been reviewed.  Consent was signed.  The surgical site in the forearm was marked with my initials aligned and intended incision.    Preoperative briefing been performed.  The patient was taken the operative room received IV sedation in the supine position the left forearm were prepped and draped sterilely.    Surgical timeout was performed.  The skin was infiltrated with a mixture of 1% lidocaine and quarter percent Marcaine.  A 2 inch incision was made directly over the forearm mass.  The forearm mass was excised with circumferential blunt and cautery dissection.  Hemostasis was obtained.  The tumor was lifted from the wound.  The wound was irrigated.  Obtained and the wound was closed in standard fashion.  Postoperative debrief was performed.    Postoperative plan: Patient will follow-up at her previously scheduled visit.  The findings on histopathology will be in the MyChart.    Addendum: for clarification. This tumor was in the forearm and was removed through a forearm incision.

## 2024-01-22 NOTE — ANESTHESIA CARE TRANSFER NOTE
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    Anesthesia Type:   No value filed.     Note:    Oropharynx: oropharynx clear of all foreign objects and spontaneously breathing  Level of Consciousness: awake  Oxygen Supplementation: room air    Independent Airway: airway patency satisfactory and stable  Dentition: dentition unchanged  Vital Signs Stable: post-procedure vital signs reviewed and stable  Report to RN Given: handoff report given  Patient transferred to: Phase II    Handoff Report: Identifed the Patient, Identified the Reponsible Provider, Reviewed the pertinent medical history, Discussed the surgical course, Reviewed Intra-OP anesthesia mangement and issues during anesthesia, Set expectations for post-procedure period and Allowed opportunity for questions and acknowledgement of understanding      Vitals:  Vitals Value Taken Time   BP 84/50 01/22/24 1334   Temp 36.5  C (97.7  F) 01/22/24 1334   Pulse 75 01/22/24 1334   Resp 16 01/22/24 1334   SpO2 99 % 01/22/24 1342   Vitals shown include unfiled device data.    Electronically Signed By: MARICEL Polanco CRNA  January 22, 2024  1:43 PM

## 2024-01-22 NOTE — ANESTHESIA PREPROCEDURE EVALUATION
Anesthesia Pre-Procedure Evaluation    Patient: Austyn Araujo   MRN: [id number] : 1955        Procedure : Procedure(s):  Removal Left Elbow Tumor          Past Medical History:   Diagnosis Date     Anxiety      Anxiety 2015     BPH (benign prostatic hyperplasia)      Degenerative disc disease      Depression      Gastritis      Gastro-oesophageal reflux disease      H/O alcohol abuse      Hyperlipidemia      Hypertension      Low testosterone      MRSA (methicillin resistant staph aureus) culture positive 2013    skin infection     Mumps      PUD (peptic ulcer disease)      Sleep apnea      STD (sexually transmitted disease)      Type 2 diabetes mellitus (H)     neuropathy      Past Surgical History:   Procedure Laterality Date     COLONOSCOPY  2013    Procedure: COMBINED COLONOSCOPY, SINGLE BIOPSY/POLYPECTOMY BY BIOPSY;  Colonoscopy/ polypectomy x2 by cold forceps    ;  Surgeon: Juan Carlos Bellamy MD;  Location: [facility]     DECOMPRESSION POSTERIOR LUMBAR, ,L4-5 decompression  2023     ESOPHAGOSCOPY, GASTROSCOPY, DUODENOSCOPY (EGD), COMBINED N/A 2016    Procedure: COMBINED ESOPHAGOSCOPY, GASTROSCOPY, DUODENOSCOPY (EGD), BIOPSY SINGLE OR MULTIPLE;  Surgeon: Juan Carlos Barraza MD;  Location: [facility]     ESOPHAGOSCOPY, GASTROSCOPY, DUODENOSCOPY (EGD), COMBINED N/A 2019    Procedure: ESOPHAGOSCOPY, GASTROSCOPY, DUODENOSCOPY (EGD) with cold forcep;  Surgeon: Juan Carlos Barraza MD;  Location: [facility]     HERNIA REPAIR        No Known Allergies   Social History     Tobacco Use     Smoking status: Former     Types: Cigarettes     Quit date: 2000     Years since quittin.0     Smokeless tobacco: Never   Substance Use Topics     Alcohol use: No     Comment: sober since       Wt Readings from Last 1 Encounters:   24 107 kg (235 lb 14.3 oz)        Anesthesia Evaluation            ROS/MED HX  ENT/Pulmonary:     (+) sleep apnea,                                        Neurologic:  - neg neurologic ROS     Cardiovascular:     (+)  hypertension- -   -  - -                                      METS/Exercise Tolerance:     Hematologic:  - neg hematologic  ROS     Musculoskeletal:  - neg musculoskeletal ROS     GI/Hepatic:     (+) GERD,                   Renal/Genitourinary:       Endo:     (+)  type II DM,                    Psychiatric/Substance Use:  - neg psychiatric ROS     Infectious Disease:  - neg infectious disease ROS     Malignancy:       Other:  - neg other ROS          Physical Exam    Airway        Mallampati: II   TM distance: > 3 FB   Neck ROM: full   Mouth opening: > 3 cm    Respiratory Devices and Support         Dental       (+) Minor Abnormalities - some fillings, tiny chips      Cardiovascular   cardiovascular exam normal          Pulmonary   pulmonary exam normal              OUTSIDE LABS:  CBC:   Lab Results   Component Value Date    WBC 8.1 01/15/2024    WBC 8.4 11/03/2023    HGB 12.3 (L) 01/15/2024    HGB 11.5 (L) 11/03/2023    HCT 38.5 (L) 01/15/2024    HCT 38.6 (L) 11/03/2023     01/15/2024     11/03/2023     BMP:   Lab Results   Component Value Date     11/03/2023     (L) 06/17/2023    POTASSIUM 4.9 11/03/2023    POTASSIUM 4.5 06/17/2023    CHLORIDE 106 11/03/2023    CHLORIDE 96 (L) 06/17/2023    CO2 27 11/03/2023    CO2 24 06/17/2023    BUN 22.1 11/03/2023    BUN 22.7 06/17/2023    CR 1.00 11/03/2023    CR 0.89 06/17/2023     (H) 01/22/2024     (H) 01/22/2024     COAGS:   Lab Results   Component Value Date    INR 1.08 07/31/2016     POC:   Lab Results   Component Value Date     (H) 02/19/2019     HEPATIC:   Lab Results   Component Value Date    ALBUMIN 4.4 11/03/2023    PROTTOTAL 7.1 11/03/2023    ALT 19 11/03/2023    AST 20 11/03/2023    ALKPHOS 71 11/03/2023    BILITOTAL 0.3 11/03/2023     OTHER:   Lab Results   Component Value Date    LACT 2.2 (H) 08/02/2016    A1C 6.7 (H) 11/03/2023    MANINDER 9.4 11/03/2023  "   MAG 2.2 08/02/2016    LIPASE 193 02/03/2021    AMYLASE 35 07/31/2016    TSH 1.17 11/18/2022    SED 15 06/26/2019       Anesthesia Plan    ASA Status:  2    NPO Status:  NPO Appropriate    Anesthesia Type: MAC.              Consents    Anesthesia Plan(s) and associated risks, benefits, and realistic alternatives discussed. Questions answered and patient/representative(s) expressed understanding.     - Discussed: Risks, Benefits and Alternatives for the PROCEDURE were discussed     - Discussed with:  Patient, Spouse      - Extended Intubation/Ventilatory Support Discussed: No.      - Patient is DNR/DNI Status: No          Postoperative Care    Pain management: Multi-modal analgesia.   PONV prophylaxis: Dexamethasone or Solumedrol, Ondansetron (or other 5HT-3)     Comments:               Cheyanne Doe MD    I have reviewed the pertinent notes and labs in the chart from the past 30 days and (re)examined the patient.  Any updates or changes from those notes are reflected in this note.             # Drug Induced Platelet Defect: home medication list includes an antiplatelet medication  # DMII: A1C = 6.7 % (Ref range: 0.0 - 5.6 %) within past 6 months  # Obesity: Estimated body mass index is 30.7 kg/m  as calculated from the following:    Height as of this encounter: 1.867 m (6' 1.5\").    Weight as of this encounter: 107 kg (235 lb 14.3 oz).      "

## 2024-01-22 NOTE — PROVIDER NOTIFICATION
SHYAM Kinney notified immediately patient was not compliant with NPO times. Pashaobi to bedside. Plan to go ahead with surgery.     CRNA notified that patient had taken all T2DM medications, including long acting medications, this AM. Pre-op blood sugar was 150. CRNA will monitor closely.

## 2024-01-23 RX ORDER — PEN NEEDLE, DIABETIC 32GX 5/32"
NEEDLE, DISPOSABLE MISCELLANEOUS
Qty: 100 EACH | Refills: 3 | Status: SHIPPED | OUTPATIENT
Start: 2024-01-23

## 2024-01-23 RX ORDER — BLOOD SUGAR DIAGNOSTIC
STRIP MISCELLANEOUS
Qty: 100 STRIP | Refills: 1 | Status: SHIPPED | OUTPATIENT
Start: 2024-01-23 | End: 2024-06-27

## 2024-01-25 DIAGNOSIS — K21.9 GASTROESOPHAGEAL REFLUX DISEASE WITHOUT ESOPHAGITIS: ICD-10-CM

## 2024-01-25 RX ORDER — OMEPRAZOLE 40 MG/1
CAPSULE, DELAYED RELEASE ORAL
Qty: 90 CAPSULE | Refills: 2 | Status: SHIPPED | OUTPATIENT
Start: 2024-01-25

## 2024-02-01 ENCOUNTER — TRANSFERRED RECORDS (OUTPATIENT)
Dept: MULTI SPECIALTY CLINIC | Facility: CLINIC | Age: 69
End: 2024-02-01

## 2024-02-01 LAB — RETINOPATHY: NORMAL

## 2024-02-02 ENCOUNTER — TRANSFERRED RECORDS (OUTPATIENT)
Dept: HEALTH INFORMATION MANAGEMENT | Facility: CLINIC | Age: 69
End: 2024-02-02
Payer: COMMERCIAL

## 2024-02-11 LAB
PATH REPORT.COMMENTS IMP SPEC: NORMAL
PATH REPORT.FINAL DX SPEC: NORMAL
PATH REPORT.GROSS SPEC: NORMAL
PATH REPORT.MICROSCOPIC SPEC OTHER STN: NORMAL
PATH REPORT.RELEVANT HX SPEC: NORMAL
PHOTO IMAGE: NORMAL

## 2024-02-12 ENCOUNTER — TELEPHONE (OUTPATIENT)
Dept: ORTHOPEDICS | Facility: CLINIC | Age: 69
End: 2024-02-12
Payer: COMMERCIAL

## 2024-02-12 NOTE — TELEPHONE ENCOUNTER
- A call was placed to the patient.     - Confirmed patient saw the following result:  Soft tissue, left forearm, excision:  - Benign connective tissue with fibrosis, granulation tissue formation and hemorrhagic areas  - Negative for malignancy    - Patient was appreciative of call and confirmed follow up with Rolanda on Wednesday.     - Patient verbalized understanding of plan and all questions were answered. Call back number to clinic was given and patient was told to call if they had an further questions.

## 2024-02-14 ENCOUNTER — OFFICE VISIT (OUTPATIENT)
Dept: ORTHOPEDICS | Facility: CLINIC | Age: 69
End: 2024-02-14
Payer: COMMERCIAL

## 2024-02-14 DIAGNOSIS — M79.89 SOFT TISSUE MASS: Primary | ICD-10-CM

## 2024-02-14 PROCEDURE — 99024 POSTOP FOLLOW-UP VISIT: CPT | Performed by: PHYSICIAN ASSISTANT

## 2024-02-14 NOTE — LETTER
2/14/2024         RE: Austyn Araujo  [address]  [facility] [address]        Dear Colleague,    Thank you for referring your patient, Austyn Araujo, to the [facility] [place]. Please see a copy of my visit note below.    Chief Complaint:       Preop diagnosis: Tumor left forearm     1/22/24 Procedure performed: Excision of subcutaneous tumor, 3 cm, left forearm        HPI: David is a 68-year-old man who is here for follow-up 3 weeks status post above procedure by Dr. Mccurdy.  Patient reports that overall his arm is doing well.  He has no significant pain, just mild tenderness with pressure on the wound.  He denies any injury to this area in the past.  No other concerns.    Physical Exam: David is a pleasant 68-year-old man who is alert and oriented no apparent distress.  His left elbow incision is well-healed with no erythema or drainage.  Minimal swelling.  He has full left elbow range of motion.    Pathology:   Final Diagnosis   Soft tissue, left forearm, excision:  - Benign connective tissue with fibrosis, granulation tissue formation and hemorrhagic areas  - Negative for malignancy     Impression: 68-year-old man doing well status post excision of left elbow mass consistent with soft tissue trauma, no sign of tumor    Plan: David can progress back to all activities as tolerated.  Use vitamin E oil or Aquaphor to the scar.  I instructed him in scar massage.  We would not expect this to recur, as this was not really a tumor, but looked more like perhaps the result of an injury.  If for some reason he notices something in that area again, he can always give us a call.  Otherwise, he can follow-up as needed.  He agrees with the plan.  All questions answered.    Veronique Vargas PA-C

## 2024-02-14 NOTE — PROGRESS NOTES
Chief Complaint:       Preop diagnosis: Tumor left forearm     1/22/24 Procedure performed: Excision of subcutaneous tumor, 3 cm, left forearm        HPI: David is a 68-year-old man who is here for follow-up 3 weeks status post above procedure by Dr. Mccurdy.  Patient reports that overall his arm is doing well.  He has no significant pain, just mild tenderness with pressure on the wound.  He denies any injury to this area in the past.  No other concerns.    Physical Exam: David is a pleasant 68-year-old man who is alert and oriented no apparent distress.  His left elbow incision is well-healed with no erythema or drainage.  Minimal swelling.  He has full left elbow range of motion.    Pathology:   Final Diagnosis   Soft tissue, left forearm, excision:  - Benign connective tissue with fibrosis, granulation tissue formation and hemorrhagic areas  - Negative for malignancy     Impression: 68-year-old man doing well status post excision of left elbow mass consistent with soft tissue trauma, no sign of tumor    Plan: David can progress back to all activities as tolerated.  Use vitamin E oil or Aquaphor to the scar.  I instructed him in scar massage.  We would not expect this to recur, as this was not really a tumor, but looked more like perhaps the result of an injury.  If for some reason he notices something in that area again, he can always give us a call.  Otherwise, he can follow-up as needed.  He agrees with the plan.  All questions answered.

## 2024-02-14 NOTE — NURSING NOTE
Reason For Visit:   Chief Complaint   Patient presents with    Surgical Followup     3 wk post-op left elbow tumor removal DOS 1/22/24. Pt denies drainage       68 year old  1955    Primary MD: Majo Paul      There were no vitals taken for this visit.    Pain Assessment  Patient Currently in Pain: Yes  0-10 Pain Scale: 2 (with pressure)  Primary Pain Location: Elbow (left)      Cipriano Khan ATC

## 2024-02-22 ENCOUNTER — TELEPHONE (OUTPATIENT)
Dept: ORTHOPEDICS | Facility: CLINIC | Age: 69
End: 2024-02-22
Payer: COMMERCIAL

## 2024-02-22 NOTE — TELEPHONE ENCOUNTER
Surgical area is doing fine.  He has swelling over the pointy part of the elbow.  He has been resting his arm differently when working on the computer to avoid the incision.  I explained to him that this is likely olecranon bursitis, which is not a direct result of the surgery.  I explained to pad the elbow when he rests it, avoid hard surfaces, wrap it and ice it for the next 2 weeks and it should resolve.  He will call back if it's not improved by then.  All questions answered.

## 2024-02-22 NOTE — TELEPHONE ENCOUNTER
[facility] Call Center    Phone Message    May a detailed message be left on voicemail: yes     Reason for Call: Pt is calling about Right behind surgery site on elbow is swollen up and filled with fluid again. Would like a call back to discuss next steps     Action Taken: Other: [facility]    Travel Screening: Not Applicable

## 2024-02-29 DIAGNOSIS — E11.43 TYPE 2 DIABETES MELLITUS WITH PERIPHERAL AUTONOMIC NEUROPATHY (H): ICD-10-CM

## 2024-02-29 RX ORDER — ACYCLOVIR 400 MG/1
TABLET ORAL
Qty: 9 EACH | Refills: 5 | Status: SHIPPED | OUTPATIENT
Start: 2024-02-29

## 2024-03-11 DIAGNOSIS — I10 BENIGN ESSENTIAL HYPERTENSION: ICD-10-CM

## 2024-03-11 RX ORDER — LISINOPRIL 10 MG/1
10 TABLET ORAL EVERY MORNING
Qty: 90 TABLET | Refills: 1 | Status: SHIPPED | OUTPATIENT
Start: 2024-03-11 | End: 2024-06-26

## 2024-03-24 DIAGNOSIS — R79.89 LOW TESTOSTERONE: ICD-10-CM

## 2024-03-24 RX ORDER — TESTOSTERONE CYPIONATE 200 MG/ML
INJECTION, SOLUTION INTRAMUSCULAR
Qty: 10 ML | Refills: 0 | Status: SHIPPED | OUTPATIENT
Start: 2024-03-24

## 2024-03-27 ENCOUNTER — PREP FOR PROCEDURE (OUTPATIENT)
Dept: ORTHOPEDICS | Facility: CLINIC | Age: 69
End: 2024-03-27
Payer: COMMERCIAL

## 2024-04-04 NOTE — LETTER
RE: Austyn Araujo  [address] [facility] [address]     Dear Colleague,    Thank you for referring your patient, Austyn Araujo, to the [facility] [place] [facility]. Please see a copy of my visit note below.    Austyn Araujo is here for a transrectal altrasound guided needle biopsy of the prostate for a significant risk of potentially lethal prostate cancer.    The risks, benefits, of the procudure were discussed.  All questions were answered.  A written informed consent was obtained.      PSA   Date Value Ref Range Status   02/05/2019 5.11 (H) 0 - 4 ug/L Final     Comment:     Assay Method:  Chemiluminescence using Siemens Vista analyzer   04/25/2018 5.05 (H) 0 - 4 ug/L Final     Comment:     Assay Method:  Chemiluminescence using Siemens Vista analyzer   11/29/2017 4.39 (H) 0 - 4 ug/L Final     Comment:     Assay Method:  Chemiluminescence using Siemens Vista analyzer   09/30/2015 2.97 0 - 4 ug/L Final       An enema was completed and 500 mg of Cipro twice daily was started prior to the biopsy.  After obtaining informed consent patient was placed in lateral decubitus position.  The ultrasound probe was placed in the rectum.  The prostate was numbed using ultrasound guidance with 1% lidocaine 5 mls along each nerve bundle.      The volume was measured and estimated to be 57 cubic centimeters.      US images were used to guide the biopsies of the prostate.  12 cores were taken with 6 on each side, 2 at the base,  2 at the midgland and  2 at the apex.  The patient tolerated the procedure well.      We will follow up with the results in 7-10 days and contact patient with these results.      Again, thank you for allowing me to participate in the care of your patient.      Sincerely,    Mohsen Rao MD       Duration Of Freeze Thaw-Cycle (Seconds): 2 Number Of Freeze-Thaw Cycles: 2 freeze-thaw cycles Render Post-Care Instructions In Note?: no Show Applicator Variable?: Yes Detail Level: Detailed Post-Care Instructions: I reviewed with the patient in detail post-care instructions. Patient is to wear sunprotection, and avoid picking at any of the treated lesions. Pt may apply Vaseline to crusted or scabbing areas. Consent: The patient's consent was obtained including but not limited to risks of crusting, scabbing, blistering, scarring, darker or lighter pigmentary change, recurrence, incomplete removal and infection.

## 2024-04-29 DIAGNOSIS — E11.43 TYPE 2 DIABETES MELLITUS WITH PERIPHERAL AUTONOMIC NEUROPATHY (H): ICD-10-CM

## 2024-04-30 RX ORDER — METFORMIN HCL 500 MG
2000 TABLET, EXTENDED RELEASE 24 HR ORAL
Qty: 360 TABLET | Refills: 0 | Status: SHIPPED | OUTPATIENT
Start: 2024-04-30 | End: 2024-06-26

## 2024-05-01 ENCOUNTER — LAB (OUTPATIENT)
Dept: LAB | Facility: CLINIC | Age: 69
End: 2024-05-01
Payer: COMMERCIAL

## 2024-05-01 DIAGNOSIS — C61 PROSTATE CANCER (H): ICD-10-CM

## 2024-05-01 PROCEDURE — 84153 ASSAY OF PSA TOTAL: CPT

## 2024-05-01 PROCEDURE — 36415 COLL VENOUS BLD VENIPUNCTURE: CPT

## 2024-05-02 LAB — PSA SERPL DL<=0.01 NG/ML-MCNC: 5.8 NG/ML (ref 0–6.5)

## 2024-05-03 ENCOUNTER — OFFICE VISIT (OUTPATIENT)
Dept: UROLOGY | Facility: CLINIC | Age: 69
End: 2024-05-03
Payer: COMMERCIAL

## 2024-05-03 VITALS
SYSTOLIC BLOOD PRESSURE: 125 MMHG | DIASTOLIC BLOOD PRESSURE: 73 MMHG | BODY MASS INDEX: 29.52 KG/M2 | WEIGHT: 230 LBS | HEIGHT: 74 IN

## 2024-05-03 DIAGNOSIS — N40.0 ENLARGED PROSTATE: ICD-10-CM

## 2024-05-03 DIAGNOSIS — N52.9 ERECTILE DYSFUNCTION, UNSPECIFIED ERECTILE DYSFUNCTION TYPE: ICD-10-CM

## 2024-05-03 DIAGNOSIS — C61 PROSTATE CANCER (H): Primary | ICD-10-CM

## 2024-05-03 PROCEDURE — 99214 OFFICE O/P EST MOD 30 MIN: CPT | Performed by: UROLOGY

## 2024-05-03 ASSESSMENT — PAIN SCALES - GENERAL: PAINLEVEL: NO PAIN (1)

## 2024-05-03 NOTE — NURSING NOTE
Chief Complaint   Patient presents with    Prostate Cancer     6 mo PSA check     Pt reports no change in urination since last visit, though he has stopped taking his tamsulosin due to potential side effects.      Kristine Wells, EMT

## 2024-05-03 NOTE — LETTER
5/3/2024       RE: Austyn Araujo  [address]  [facility] [address]     Dear Colleague,    Thank you for referring your patient, Austyn Araujo, to the [facility] [place] at [facility]. Please see a copy of my visit note below.    Office Visit Note  [facility]  [phone]    UROLOGIC DIAGNOSES:  Enlarged prostate  Erectile dysfunction  Low risk prostate cancer  Peyronie's disease    CURRENT INTERVENTIONS:   Active surveillance  Prostate biopsy x 2  On Flomax  Cialis  Previous use of Trimix injections  Xiaflex injections ([facility])    HISTORY:   David returns to clinic today for urologic follow-up.  His PSA is overall stable at 5.8.    He reports being bothered by both urinary symptoms as well as erectile dysfunction.  He says he has stopped using the Trimix since he feels it causes penile curvature.  He says that the Xiaflex injections really did not help his penile curvature.  He has been using Cialis but says that he really does not get much in the way of an erection.    For his urinary symptoms, he reports a slow urinary stream as well as frequency during the day.  He only has nocturia x 2.  The urinary stream has become bothersome for him.      PAST MEDICAL HISTORY:   Past Medical History:   Diagnosis Date    Anxiety     Anxiety 01/31/2015    BPH (benign prostatic hyperplasia)     Degenerative disc disease     Depression     Gastritis     Gastro-oesophageal reflux disease     H/O alcohol abuse     Hyperlipidemia     Hypertension     Low testosterone     MRSA (methicillin resistant staph aureus) culture positive 08/2013    skin infection    Mumps     PUD (peptic ulcer disease)     Sleep apnea     STD (sexually transmitted disease)     Type 2 diabetes mellitus (H)     neuropathy       PAST SURGICAL HISTORY:   Past Surgical History:   Procedure Laterality Date    COLONOSCOPY  11/11/2013    Procedure: COMBINED  COLONOSCOPY, SINGLE BIOPSY/POLYPECTOMY BY BIOPSY;  Colonoscopy/ polypectomy x2 by cold forceps    ;  Surgeon: Juan Carlos Bellamy MD;  Location: [facility]    DECOMPRESSION POSTERIOR LUMBAR, ,L4-5 decompression  2023    ESOPHAGOSCOPY, GASTROSCOPY, DUODENOSCOPY (EGD), COMBINED N/A 2016    Procedure: COMBINED ESOPHAGOSCOPY, GASTROSCOPY, DUODENOSCOPY (EGD), BIOPSY SINGLE OR MULTIPLE;  Surgeon: Juan Carlos Barraza MD;  Location: [facility]    ESOPHAGOSCOPY, GASTROSCOPY, DUODENOSCOPY (EGD), COMBINED N/A 2019    Procedure: ESOPHAGOSCOPY, GASTROSCOPY, DUODENOSCOPY (EGD) with cold forcep;  Surgeon: Juan Carlos Barraza MD;  Location: [facility]    EXCISE SOFT TISSUE TUMOR ELBOW Left 2024    Procedure: Removal Left Elbow Tumor;  Surgeon: Broderick Mccurdy MD;  Location: [facility]    HERNIA REPAIR         FAMILY HISTORY:   Family History   Problem Relation Age of Onset    Pancreatic Cancer Mother     Dementia Father     Parkinsonism Father     Diabetes Brother     Depression Brother     Suicide Brother     Substance Abuse Brother     Pancreatic Cancer Paternal Grandfather     Family History Negative No family hx of     Colon Cancer No family hx of     Unknown/Adopted No family hx of     Anxiety Disorder No family hx of     Schizophrenia No family hx of     Bipolar Disorder No family hx of     Hampton Disease No family hx of     Autism Spectrum Disorder No family hx of     Intellectual Disability No family hx of     Mental Illness No family hx of     Anesthesia Reaction No family hx of     Deep Vein Thrombosis (DVT) No family hx of        SOCIAL HISTORY:   Social History     Socioeconomic History    Marital status:      Spouse name: None    Number of children: None    Years of education: None    Highest education level: None   Tobacco Use    Smoking status: Former     Current packs/day: 0.00     Types: Cigarettes     Quit date:      Years since quittin.3    Smokeless tobacco: Never   Vaping Use     Vaping status: Never Used   Substance and Sexual Activity    Alcohol use: No     Comment: sober since 2016    Drug use: No    Sexual activity: Yes     Partners: Female     Social Determinants of Health     Financial Resource Strain: Low Risk  (11/3/2023)    Financial Resource Strain     Within the past 12 months, have you or your family members you live with been unable to get utilities (heat, electricity) when it was really needed?: No   Food Insecurity: No Food Insecurity (1/31/2024)    Received from [facility]    Hunger Vital Sign     Worried About Running Out of Food in the Last Year: Never true     Ran Out of Food in the Last Year: Never true   Transportation Needs: No Transportation Needs (1/31/2024)    Received from [facility]    PRAPARE - Transportation     Lack of Transportation (Medical): No     Lack of Transportation (Non-Medical): No   Physical Activity: Insufficiently Active (1/31/2024)    Received from [facility]    Exercise Vital Sign     Days of Exercise per Week: 1 day     Minutes of Exercise per Session: 10 min   Stress: No Stress Concern Present (1/19/2023)    Received from [facility]    [employer] - Occupational Stress Questionnaire     Feeling of Stress : Only a little   Social Connections: Moderately Integrated (1/19/2023)    Received from [facility]    Social Connection and Isolation Panel [NHANES]     Frequency of Communication with Friends and Family: Twice a week     Frequency of Social Gatherings with Friends and Family: Once a week     Attends [employer] Services: More than 4 times per year     Active Member of Clubs or Organizations: Yes     Attends Club or Organization Meetings: More than 4 times per year     Marital Status:    Interpersonal Safety: Low Risk  (11/3/2023)    Interpersonal Safety     Do you feel physically and emotionally safe where you currently live?: Yes      "Within the past 12 months, have you been hit, slapped, kicked or otherwise physically hurt by someone?: No     Within the past 12 months, have you been humiliated or emotionally abused in other ways by your partner or ex-partner?: No   Housing Stability: Low Risk  (1/31/2024)    [facility]    Housing Stability     What is your living situation today?: I have a steady place to live       Review Of Systems:  Skin: No rash, pruritis, or skin pigmentation  Eyes: No changes in vision  Ears/Nose/Throat: No changes in hearing, no nosebleeds  Respiratory: No shortness of breath, dyspnea on exertion, cough, or hemoptysis  Cardiovascular: No chest pain or palpitations  Gastrointestinal: No diarrhea or constipation. No abdominal pain. No hematochezia  Genitourinary: see HPI  Musculoskeletal: No pain or swelling of joints, normal range of motion  Neurologic: No weakness or tremors  Psychiatric: No recent changes in memory or mood  Hematologic/Lymphatic/Immunologic: No easy bruising or enlarged lymph nodes  Endocrine: No weight gain or loss      PHYSICAL EXAM:    /73   Ht 1.867 m (6' 1.5\")   Wt 104.3 kg (230 lb)   BMI 29.93 kg/m      Constitutional: Well developed. Conversant and in no acute distress  Eyes: Anicteric sclera, conjunctiva clear, normal extraocular movements  ENT: Normocephalic and atraumatic,   Skin: Warm and dry. No rashes or lesions  Cardiac: No peripheral edema  Back/Flank: Not done  CNS/PNS: Normal musculature and movements, moves all extremities normally  Respiratory: Normal non-labored breathing  Abdomen: Soft nontender and nondistended  Peripheral Vascular: No peripheral edema  Mental Status/Psych: Alert and Oriented x 3. Normal mood and affect    Penis: Not done  Scrotal Skin: Not done  Testicles: Not done  Epididymis: Not done  Digital Rectal Exam:     Cystoscopy: Not done    Imaging: None    Urinalysis: UA RESULTS:  Recent Labs   Lab Test 11/18/22  1553   COLOR Yellow "   APPEARANCE Clear   URINEGLC >=1000*   URINEBILI Negative   URINEKETONE Negative   SG >=1.030   UBLD Negative   URINEPH 6.0   PROTEIN Negative   UROBILINOGEN 0.2   NITRITE Negative   LEUKEST Negative   RBCU 0-2   WBCU 0-5       PSA: 5.8    Post Void Residual:     Other labs: None today      IMPRESSION:  Enlarged prostate  Erectile dysfunction  Low risk prostate cancer    PLAN:  We discussed all of his urologic issues separately today.      He appears stable from a prostate cancer standpoint.  I recommended continued active surveillance with PSA.    We discussed treatment options for his enlarged prostate.  He thinks he may be interested in a surgery for his enlarged prostate at some point.  I counseled him that I would recommend an office cystoscopy before making recommendations regarding specific procedures.  He is interested in doing this but would like to wait until September.    We discussed treatment options for his erectile dysfunction.  At this point, I feel he is a good candidate for inflatable penile prosthesis.  We did discuss the procedure in some detail as well.  His penile curvature may make the case somewhat more complicated.  This is not a procedure I performed personally but I could refer him to a partner who does these procedures.  However, I did  him that if he is interested in BPH procedure he may think about doing the BPH procedure first before the prosthesis procedure.  He is in agreement with this plan.    I will see him back in September to recheck the PSA and also perform office cystoscopy.      Mohsen Rao M.D.

## 2024-05-03 NOTE — PROGRESS NOTES
Office Visit Note  [facility]  [phone]    UROLOGIC DIAGNOSES:  Enlarged prostate  Erectile dysfunction  Low risk prostate cancer  Peyronie's disease    CURRENT INTERVENTIONS:   Active surveillance  Prostate biopsy x 2  On Flomax  Cialis  Previous use of Trimix injections  Xiaflex injections ([facility])    HISTORY:   David returns to clinic today for urologic follow-up.  His PSA is overall stable at 5.8.    He reports being bothered by both urinary symptoms as well as erectile dysfunction.  He says he has stopped using the Trimix since he feels it causes penile curvature.  He says that the Xiaflex injections really did not help his penile curvature.  He has been using Cialis but says that he really does not get much in the way of an erection.    For his urinary symptoms, he reports a slow urinary stream as well as frequency during the day.  He only has nocturia x 2.  The urinary stream has become bothersome for him.      PAST MEDICAL HISTORY:   Past Medical History:   Diagnosis Date    Anxiety     Anxiety 01/31/2015    BPH (benign prostatic hyperplasia)     Degenerative disc disease     Depression     Gastritis     Gastro-oesophageal reflux disease     H/O alcohol abuse     Hyperlipidemia     Hypertension     Low testosterone     MRSA (methicillin resistant staph aureus) culture positive 08/2013    skin infection    Mumps     PUD (peptic ulcer disease)     Sleep apnea     STD (sexually transmitted disease)     Type 2 diabetes mellitus (H)     neuropathy       PAST SURGICAL HISTORY:   Past Surgical History:   Procedure Laterality Date    COLONOSCOPY  11/11/2013    Procedure: COMBINED COLONOSCOPY, SINGLE BIOPSY/POLYPECTOMY BY BIOPSY;  Colonoscopy/ polypectomy x2 by cold forceps    ;  Surgeon: Juan Carlos Bellamy MD;  Location: [facility]    DECOMPRESSION POSTERIOR LUMBAR, ,L4-5 decompression  06/14/2023    ESOPHAGOSCOPY, GASTROSCOPY, DUODENOSCOPY (EGD), COMBINED N/A 09/22/2016    Procedure: COMBINED  ESOPHAGOSCOPY, GASTROSCOPY, DUODENOSCOPY (EGD), BIOPSY SINGLE OR MULTIPLE;  Surgeon: Juan Carlos Barraza MD;  Location: [facility]    ESOPHAGOSCOPY, GASTROSCOPY, DUODENOSCOPY (EGD), COMBINED N/A 2019    Procedure: ESOPHAGOSCOPY, GASTROSCOPY, DUODENOSCOPY (EGD) with cold forcep;  Surgeon: Juan Carlos Barraza MD;  Location: [facility]    EXCISE SOFT TISSUE TUMOR ELBOW Left 2024    Procedure: Removal Left Elbow Tumor;  Surgeon: Broderick Mccurdy MD;  Location: [facility]    HERNIA REPAIR         FAMILY HISTORY:   Family History   Problem Relation Age of Onset    Pancreatic Cancer Mother     Dementia Father     Parkinsonism Father     Diabetes Brother     Depression Brother     Suicide Brother     Substance Abuse Brother     Pancreatic Cancer Paternal Grandfather     Family History Negative No family hx of     Colon Cancer No family hx of     Unknown/Adopted No family hx of     Anxiety Disorder No family hx of     Schizophrenia No family hx of     Bipolar Disorder No family hx of     Ambika Disease No family hx of     Autism Spectrum Disorder No family hx of     Intellectual Disability No family hx of     Mental Illness No family hx of     Anesthesia Reaction No family hx of     Deep Vein Thrombosis (DVT) No family hx of        SOCIAL HISTORY:   Social History     Socioeconomic History    Marital status:      Spouse name: None    Number of children: None    Years of education: None    Highest education level: None   Tobacco Use    Smoking status: Former     Current packs/day: 0.00     Types: Cigarettes     Quit date:      Years since quittin.3    Smokeless tobacco: Never   Vaping Use    Vaping status: Never Used   Substance and Sexual Activity    Alcohol use: No     Comment: sober since 2016    Drug use: No    Sexual activity: Yes     Partners: Female     Social Determinants of Health     Financial Resource Strain: Low Risk  (11/3/2023)    Financial Resource Strain     Within the past 12 months, have  you or your family members you live with been unable to get utilities (heat, electricity) when it was really needed?: No   Food Insecurity: No Food Insecurity (1/31/2024)    Received from [facility]    Hunger Vital Sign     Worried About Running Out of Food in the Last Year: Never true     Ran Out of Food in the Last Year: Never true   Transportation Needs: No Transportation Needs (1/31/2024)    Received from [facility]    PRAPARE - Transportation     Lack of Transportation (Medical): No     Lack of Transportation (Non-Medical): No   Physical Activity: Insufficiently Active (1/31/2024)    Received from [facility]    Exercise Vital Sign     Days of Exercise per Week: 1 day     Minutes of Exercise per Session: 10 min   Stress: No Stress Concern Present (1/19/2023)    Received from [facility]    [place] [employer] - Occupational Stress Questionnaire     Feeling of Stress : Only a little   Social Connections: Moderately Integrated (1/19/2023)    Received from [facility]    Social Connection and Isolation Panel [NHANES]     Frequency of Communication with Friends and Family: Twice a week     Frequency of Social Gatherings with Friends and Family: Once a week     Attends Holiness Services: More than 4 times per year     Active Member of Clubs or Organizations: Yes     Attends Club or Organization Meetings: More than 4 times per year     Marital Status:    Interpersonal Safety: Low Risk  (11/3/2023)    Interpersonal Safety     Do you feel physically and emotionally safe where you currently live?: Yes     Within the past 12 months, have you been hit, slapped, kicked or otherwise physically hurt by someone?: No     Within the past 12 months, have you been humiliated or emotionally abused in other ways by your partner or ex-partner?: No   Housing Stability: Low Risk  (1/31/2024)    Received from [facility]     "Housing Stability     What is your living situation today?: I have a steady place to live       Review Of Systems:  Skin: No rash, pruritis, or skin pigmentation  Eyes: No changes in vision  Ears/Nose/Throat: No changes in hearing, no nosebleeds  Respiratory: No shortness of breath, dyspnea on exertion, cough, or hemoptysis  Cardiovascular: No chest pain or palpitations  Gastrointestinal: No diarrhea or constipation. No abdominal pain. No hematochezia  Genitourinary: see HPI  Musculoskeletal: No pain or swelling of joints, normal range of motion  Neurologic: No weakness or tremors  Psychiatric: No recent changes in memory or mood  Hematologic/Lymphatic/Immunologic: No easy bruising or enlarged lymph nodes  Endocrine: No weight gain or loss      PHYSICAL EXAM:    /73   Ht 1.867 m (6' 1.5\")   Wt 104.3 kg (230 lb)   BMI 29.93 kg/m      Constitutional: Well developed. Conversant and in no acute distress  Eyes: Anicteric sclera, conjunctiva clear, normal extraocular movements  ENT: Normocephalic and atraumatic,   Skin: Warm and dry. No rashes or lesions  Cardiac: No peripheral edema  Back/Flank: Not done  CNS/PNS: Normal musculature and movements, moves all extremities normally  Respiratory: Normal non-labored breathing  Abdomen: Soft nontender and nondistended  Peripheral Vascular: No peripheral edema  Mental Status/Psych: Alert and Oriented x 3. Normal mood and affect    Penis: Not done  Scrotal Skin: Not done  Testicles: Not done  Epididymis: Not done  Digital Rectal Exam:     Cystoscopy: Not done    Imaging: None    Urinalysis: UA RESULTS:  Recent Labs   Lab Test 11/18/22  1553   COLOR Yellow   APPEARANCE Clear   URINEGLC >=1000*   URINEBILI Negative   URINEKETONE Negative   SG >=1.030   UBLD Negative   URINEPH 6.0   PROTEIN Negative   UROBILINOGEN 0.2   NITRITE Negative   LEUKEST Negative   RBCU 0-2   WBCU 0-5       PSA: 5.8    Post Void Residual:     Other labs: None today      IMPRESSION:  Enlarged " prostate  Erectile dysfunction  Low risk prostate cancer    PLAN:  We discussed all of his urologic issues separately today.      He appears stable from a prostate cancer standpoint.  I recommended continued active surveillance with PSA.    We discussed treatment options for his enlarged prostate.  He thinks he may be interested in a surgery for his enlarged prostate at some point.  I counseled him that I would recommend an office cystoscopy before making recommendations regarding specific procedures.  He is interested in doing this but would like to wait until September.    We discussed treatment options for his erectile dysfunction.  At this point, I feel he is a good candidate for inflatable penile prosthesis.  We did discuss the procedure in some detail as well.  His penile curvature may make the case somewhat more complicated.  This is not a procedure I performed personally but I could refer him to a partner who does these procedures.  However, I did  him that if he is interested in BPH procedure he may think about doing the BPH procedure first before the prosthesis procedure.  He is in agreement with this plan.    I will see him back in September to recheck the PSA and also perform office cystoscopy.      Mohsen Rao M.D.

## 2024-05-26 DIAGNOSIS — D64.9 ANEMIA, UNSPECIFIED TYPE: ICD-10-CM

## 2024-05-28 RX ORDER — BACILLUS COAGULANS 1B CELL
1 CAPSULE ORAL DAILY
Qty: 90 TABLET | Refills: 1 | Status: SHIPPED | OUTPATIENT
Start: 2024-05-28

## 2024-05-28 NOTE — CONFIDENTIAL NOTE
Attempted to call patient to discuss new lump on his back. LVM requesting callback. Clinic phone number provided.    Tara Holter, RNCC     Normal Normal Normal

## 2024-06-02 ENCOUNTER — HEALTH MAINTENANCE LETTER (OUTPATIENT)
Age: 69
End: 2024-06-02

## 2024-06-04 DIAGNOSIS — E78.5 HYPERLIPIDEMIA LDL GOAL <100: ICD-10-CM

## 2024-06-04 RX ORDER — ATORVASTATIN CALCIUM 40 MG/1
40 TABLET, FILM COATED ORAL DAILY
Qty: 90 TABLET | Refills: 1 | Status: SHIPPED | OUTPATIENT
Start: 2024-06-04 | End: 2024-09-12

## 2024-06-06 DIAGNOSIS — E11.43 TYPE 2 DIABETES MELLITUS WITH PERIPHERAL AUTONOMIC NEUROPATHY (H): ICD-10-CM

## 2024-06-07 RX ORDER — SITAGLIPTIN 100 MG/1
100 TABLET, FILM COATED ORAL DAILY
Qty: 90 TABLET | Refills: 0 | Status: SHIPPED | OUTPATIENT
Start: 2024-06-07 | End: 2024-08-29

## 2024-06-26 DIAGNOSIS — E11.40 TYPE 2 DIABETES MELLITUS WITH DIABETIC NEUROPATHY (H): ICD-10-CM

## 2024-06-26 DIAGNOSIS — I10 BENIGN ESSENTIAL HYPERTENSION: ICD-10-CM

## 2024-06-26 DIAGNOSIS — E11.43 TYPE 2 DIABETES MELLITUS WITH PERIPHERAL AUTONOMIC NEUROPATHY (H): ICD-10-CM

## 2024-06-27 DIAGNOSIS — Z79.4 TYPE 2 DIABETES MELLITUS WITHOUT COMPLICATION, WITH LONG-TERM CURRENT USE OF INSULIN (H): Primary | ICD-10-CM

## 2024-06-27 DIAGNOSIS — E11.9 TYPE 2 DIABETES MELLITUS WITHOUT COMPLICATION, WITH LONG-TERM CURRENT USE OF INSULIN (H): Primary | ICD-10-CM

## 2024-06-27 RX ORDER — BLOOD SUGAR DIAGNOSTIC
STRIP MISCELLANEOUS
Qty: 100 STRIP | Refills: 0 | Status: SHIPPED | OUTPATIENT
Start: 2024-06-27

## 2024-06-27 RX ORDER — LISINOPRIL 10 MG/1
10 TABLET ORAL EVERY MORNING
Qty: 90 TABLET | Refills: 0 | Status: SHIPPED | OUTPATIENT
Start: 2024-06-27 | End: 2024-08-29

## 2024-06-27 RX ORDER — METFORMIN HCL 500 MG
2000 TABLET, EXTENDED RELEASE 24 HR ORAL
Qty: 360 TABLET | Refills: 0 | Status: SHIPPED | OUTPATIENT
Start: 2024-06-27 | End: 2024-08-29

## 2024-07-28 ASSESSMENT — ACTIVITIES OF DAILY LIVING (ADL)
ADL_HIGHEST_POTENTIAL_SCORE: 64
HOW_WOULD_YOU_RATE_YOUR_CURRENT_LEVEL_OF_FUNCTION?: NEARLY NORMAL
KNEE_ACTIVITY_OF_DAILY_LIVING_SCORE: 70.77
RECREATIONAL ACTIVITIES: MODERATE DIFFICULTY
GOING DOWN 1 FLIGHT OF STAIRS: SLIGHT DIFFICULTY
RAW_SCORE: 49.54
SPORTS_COUNT: 9
SITTING_FOR_15_MINUTES: SLIGHT DIFFICULTY
ABILITY_TO_PERFORM_ACTIVITY_WITH_YOUR_NORMAL_TECHNIQUE: MODERATE DIFFICULTY
HOW_WOULD_YOU_RATE_THE_OVERALL_FUNCTION_OF_YOUR_KNEE_DURING_YOUR_USUAL_DAILY_ACTIVITIES?: ABNORMAL
STAND: ACTIVITY IS SOMEWHAT DIFFICULT
SWELLING: I HAVE THE SYMPTOM BUT IT DOES NOT AFFECT MY ACTIVITY
ROLLING_OVER_IN_BED: SLIGHT DIFFICULTY
WALKING_DOWN_STEEP_HILLS: MODERATE DIFFICULTY
HEAVY_WORK: MODERATE DIFFICULTY
WALKING_INITIALLY: NO DIFFICULTY AT ALL
GO DOWN STAIRS: ACTIVITY IS SOMEWHAT DIFFICULT
DEEP_SQUATTING: MODERATE DIFFICULTY
HOS_ADL_SCORE(%): 65.63
HOW_WOULD_YOU_RATE_THE_OVERALL_FUNCTION_OF_YOUR_KNEE_DURING_YOUR_USUAL_DAILY_ACTIVITIES?: ABNORMAL
WALKING_APPROXIMATELY_10_MINUTES: SLIGHT DIFFICULTY
GO UP STAIRS: ACTIVITY IS SOMEWHAT DIFFICULT
LOW_IMPACT_ACTIVITIES_LIKE_FAST_WALKING: MODERATE DIFFICULTY
KNEEL ON THE FRONT OF YOUR KNEE: ACTIVITY IS MINIMALLY DIFFICULT
SWINGING_OBJECTS_LIKE_A_GOLF_CLUB: MODERATE DIFFICULTY
GIVING WAY, BUCKLING OR SHIFTING OF KNEE: I DO NOT HAVE THE SYMPTOM
SIT WITH YOUR KNEE BENT: ACTIVITY IS MINIMALLY DIFFICULT
ROLLING OVER IN BED: SLIGHT DIFFICULTY
KNEEL ON THE FRONT OF YOUR KNEE: ACTIVITY IS MINIMALLY DIFFICULT
TWISTING/PIVOTING_ON_INVOLVED_LEG: SLIGHT DIFFICULTY
SQUAT: ACTIVITY IS SOMEWHAT DIFFICULT
GIVING WAY, BUCKLING OR SHIFTING OF KNEE: I DO NOT HAVE THE SYMPTOM
WALKING_FOR_APPROXIMATELY_10_MINUTES: SLIGHT DIFFICULTY
STANDING_FOR_15_MINUTES: SLIGHT DIFFICULTY
PLEASE_INDICATE_YOR_PRIMARY_REASON_FOR_REFERRAL_TO_THERAPY:: HIP
LANDING: EXTREME DIFFICULTY
HOS_ADL_ITEM_SCORE_TOTAL: 42
LIMPING: I DO NOT HAVE THE SYMPTOM
GETTING_INTO_AND_OUT_OF_AN_AVERAGE_CAR: SLIGHT DIFFICULTY
STARTING_AND_STOPPING_QUICKLY: MODERATE DIFFICULTY
STIFFNESS: THE SYMPTOM AFFECTS MY ACTIVITY MODERATELY
PLEASE_INDICATE_YOR_PRIMARY_REASON_FOR_REFERRAL_TO_THERAPY:: KNEE
GETTING INTO AND OUT OF AN AVERAGE CAR: SLIGHT DIFFICULTY
SPORTS_SCORE(%): 0
SWELLING: I HAVE THE SYMPTOM BUT IT DOES NOT AFFECT MY ACTIVITY
AS_A_RESULT_OF_YOUR_KNEE_INJURY,_HOW_WOULD_YOU_RATE_YOUR_CURRENT_LEVEL_OF_DAILY_ACTIVITY?: NEARLY NORMAL
RUNNING_ONE_MILE: UNABLE TO DO
WEAKNESS: I HAVE THE SYMPTOM BUT IT DOES NOT AFFECT MY ACTIVITY
GETTING_INTO_AND_OUT_OF_A_BATHTUB: SLIGHT DIFFICULTY
GOING UP 1 FLIGHT OF STAIRS: MODERATE DIFFICULTY
HOW_WOULD_YOU_RATE_THE_CURRENT_FUNCTION_OF_YOUR_KNEE_DURING_YOUR_USUAL_DAILY_ACTIVITIES_ON_A_SCALE_FROM_0_TO_100_WITH_100_BEING_YOUR_LEVEL_OF_KNEE_FUNCTION_PRIOR_TO_YOUR_INJURY_AND_0_BEING_THE_INABILITY_TO_PERFORM_ANY_OF_YOUR_USUAL_DAILY_ACTIVITIES?: 40
WALKING_INITIALLY: NO DIFFICULTY AT ALL
GOING_UP_1_FLIGHT_OF_STAIRS: MODERATE DIFFICULTY
STIFFNESS: THE SYMPTOM AFFECTS MY ACTIVITY MODERATELY
GO UP STAIRS: ACTIVITY IS SOMEWHAT DIFFICULT
GOING_DOWN_1_FLIGHT_OF_STAIRS: SLIGHT DIFFICULTY
RISE FROM A CHAIR: ACTIVITY IS MINIMALLY DIFFICULT
ADL_COUNT: 16
SIT WITH YOUR KNEE BENT: ACTIVITY IS MINIMALLY DIFFICULT
STEPPING_UP_AND_DOWN_CURBS: SLIGHT DIFFICULTY
LIMPING: I DO NOT HAVE THE SYMPTOM
SPORTS_TOTAL_ITEM_SCORE: 0
CUTTING/LATERAL_MOVEMENTS: MODERATE DIFFICULTY
ADL_TOTAL_ITEM_SCORE: INCOMPLETE
AS_A_RESULT_OF_YOUR_KNEE_INJURY,_HOW_WOULD_YOU_RATE_YOUR_CURRENT_LEVEL_OF_DAILY_ACTIVITY?: NEARLY NORMAL
HOS_ADL_HIGHEST_POTENTIAL_SCORE: 64
PAIN: THE SYMPTOM AFFECTS MY ACTIVITY MODERATELY
TWISTING/PIVOTING ON INVOLVED LEG: SLIGHT DIFFICULTY
JUMPING: UNABLE TO DO
SPORTS_HIGHEST_POTENTIAL_SCORE: 36
WEAKNESS: I HAVE THE SYMPTOM BUT IT DOES NOT AFFECT MY ACTIVITY
SQUAT: ACTIVITY IS SOMEWHAT DIFFICULT
RISE FROM A CHAIR: ACTIVITY IS MINIMALLY DIFFICULT
WALKING_15_MINUTES_OR_GREATER: MODERATE DIFFICULTY
WALKING_UP_STEEP_HILLS: MODERATE DIFFICULTY
GO DOWN STAIRS: ACTIVITY IS SOMEWHAT DIFFICULT
PUTTING_ON_SOCKS_AND_SHOES: NO DIFFICULTY AT ALL
WALKING_DOWN_STEEP_HILLS: MODERATE DIFFICULTY
GETTING_INTO_AND_OUT_OF_A_BATHTUB: SLIGHT DIFFICULTY
DEEP SQUATTING: MODERATE DIFFICULTY
ADL_SCORE(%): INCOMPLETE
HEAVY_WORK: MODERATE DIFFICULTY
HOW_WOULD_YOU_RATE_THE_CURRENT_FUNCTION_OF_YOUR_KNEE_DURING_YOUR_USUAL_DAILY_ACTIVITIES_ON_A_SCALE_FROM_0_TO_100_WITH_100_BEING_YOUR_LEVEL_OF_KNEE_FUNCTION_PRIOR_TO_YOUR_INJURY_AND_0_BEING_THE_INABILITY_TO_PERFORM_ANY_OF_YOUR_USUAL_DAILY_ACTIVITIES?: 40
WALKING_15_MINUTES_OR_GREATER: MODERATE DIFFICULTY
WALKING_UP_STEEP_HILLS: MODERATE DIFFICULTY
PUTTING ON SOCKS AND SHOES: NO DIFFICULTY AT ALL
STANDING FOR 15 MINUTES: SLIGHT DIFFICULTY
STAND: ACTIVITY IS SOMEWHAT DIFFICULT
SITTING FOR 15 MINUTES: SLIGHT DIFFICULTY
KNEE_ACTIVITY_OF_DAILY_LIVING_SUM: 46
STEPPING UP AND DOWN CURBS: SLIGHT DIFFICULTY
PAIN: THE SYMPTOM AFFECTS MY ACTIVITY MODERATELY
RECREATIONAL_ACTIVITIES: MODERATE DIFFICULTY

## 2024-07-29 ENCOUNTER — THERAPY VISIT (OUTPATIENT)
Dept: PHYSICAL THERAPY | Facility: CLINIC | Age: 69
End: 2024-07-29
Payer: COMMERCIAL

## 2024-07-29 DIAGNOSIS — M25.562 BILATERAL KNEE PAIN: ICD-10-CM

## 2024-07-29 DIAGNOSIS — M25.561 BILATERAL KNEE PAIN: ICD-10-CM

## 2024-07-29 DIAGNOSIS — M25.552 BILATERAL HIP PAIN: Primary | ICD-10-CM

## 2024-07-29 DIAGNOSIS — M25.551 BILATERAL HIP PAIN: Primary | ICD-10-CM

## 2024-07-29 PROCEDURE — 97110 THERAPEUTIC EXERCISES: CPT | Mod: GP | Performed by: PHYSICAL THERAPIST

## 2024-07-29 PROCEDURE — 97161 PT EVAL LOW COMPLEX 20 MIN: CPT | Mod: GP | Performed by: PHYSICAL THERAPIST

## 2024-07-29 NOTE — PROGRESS NOTES
PHYSICAL THERAPY EVALUATION  Type of Visit: Evaluation   Onset of bilateral hip/knee pain of unknown etiology 3/1/24. Pt is a self referred   Fall Risk Screen:  Fall screen completed by: PT  Have you fallen 2 or more times in the past year?: No  Have you fallen and had an injury in the past year?: No  Is patient a fall risk?: No    Subjective       Presenting condition or subjective complaint: pain in hips and knees.  Date of onset:      Relevant medical history:     Dates & types of surgery:      Prior diagnostic imaging/testing results:       Prior therapy history for the same diagnosis, illness or injury:        Prior Level of Function  Transfers: Independent  Ambulation: Independent  ADL: Independent  IADL: Driving, Housekeeping, Work    Living Environment  Social support:     Type of home:     Stairs to enter the home:         Ramp:     Stairs inside the home:         Help at home:    Equipment owned:       Employment:      Hobbies/Interests:      Patient goals for therapy: walk or hike without being in pain.    Pain assessment: Pain present  Location: bilateral hips /Ratin/10  Location: bilateral knees /Ratin/10     Objective   KNEE:    Standing Posture: genu varum          Gait: weak pelvic stabilzers     Functional:   - Squat/ SL Squat: limited and painful  - Lateral Step Down:             AROM: (* indicates patient's pain)   PROM:(over pressure)   L  R L R   Hyperextension   0 0 0 0   Extension   0 0 0 0   Flexion   125 125 130 130     Strength:   L R   HIP     Flex 4/5 4/5   Ext 5/5 5/5   ABd 4-/5 4-/5   KNEE     Flex 5/5 5/5   Ext 4/5 4/5     Hip PROM:     L R   Flexion 120 125   Extension     IR     ER     Breezy's     Hamstring 90-90     Kobe         Special tests:   L R   Sweep Test     Anterior Drawer     Dial Test     Posterior Drawer     Lachman's     Valgus 0 degrees     Valgus 30 degrees     Varus 0 degrees     Varus 30 degrees     Paula's     Appley's     Lateral Compression      Patellar Compression     SLR     Slump         Palpation: point tenderness bilateral greater trochanters and medial/lateral joint lines     Patellar tracking:     Decreased flexibility bilateral hamstrings, hip flexors and ITB       Assessment & Plan   CLINICAL IMPRESSIONS  Medical Diagnosis: bilateral hip/knee pain    Treatment Diagnosis: bilateral hip/knee pain, decreased strength and flexibility   Impression/Assessment: Patient is a 68 year old male with bilateral hip/knee  complaints.  The following significant findings have been identified: Pain, Decreased ROM/flexibility, and Decreased strength. These impairments interfere with their ability to perform self care tasks, work tasks, recreational activities, household chores, driving , household mobility, and community mobility as compared to previous level of function.     Clinical Decision Making (Complexity):  Clinical Presentation: Stable/Uncomplicated  Clinical Presentation Rationale: based on medical and personal factors listed in PT evaluation  Clinical Decision Making (Complexity): Low complexity    PLAN OF CARE  Treatment Interventions:  Modalities: Cryotherapy  Interventions: Therapeutic Exercise    Long Term Goals     PT Goal 1  Goal Identifier: stairs  Goal Description: pt able to ascend/ descend 10 steps pain level 1  Rationale: to maximize safety and independence with performance of ADLs and functional tasks;to maximize safety and independence within the home;to maximize safety and independence within the community;to maximize safety and independence with transportation;to maximize safety and independence with self cares  Target Date: 10/29/24      Frequency of Treatment: 2x/month  Duration of Treatment: 2 months    Recommended Referrals to Other Professionals:   Education Assessment:   Learner/Method: No Barriers to Learning    Risks and benefits of evaluation/treatment have been explained.   Patient/Family/caregiver agrees with Plan of Care.      Evaluation Time:     PT Guillermo, Low Complexity Minutes (57761): 20       Signing Clinician: Randy Shaw PT

## 2024-08-01 NOTE — TELEPHONE ENCOUNTER
Faxed Testosterone RX to [facility]   Stable  Continue residential support  Continue current management

## 2024-08-02 ENCOUNTER — TRANSCRIBE ORDERS (OUTPATIENT)
Dept: OTHER | Age: 69
End: 2024-08-02

## 2024-08-02 DIAGNOSIS — M54.9 BACK PAIN: Primary | ICD-10-CM

## 2024-08-02 DIAGNOSIS — M47.816 SPONDYLOSIS OF LUMBAR REGION WITHOUT MYELOPATHY OR RADICULOPATHY: ICD-10-CM

## 2024-08-14 DIAGNOSIS — R79.89 LOW TESTOSTERONE: ICD-10-CM

## 2024-08-14 NOTE — TELEPHONE ENCOUNTER
Overdue for follow up assist in scheduling , we cannot send prescription to [place] [facility] .  We can print prescription he can  in clinic .

## 2024-08-15 RX ORDER — TADALAFIL 20 MG/1
20 TABLET ORAL DAILY
Qty: 88 TABLET | Refills: 0 | Status: SHIPPED | OUTPATIENT
Start: 2024-08-15

## 2024-08-28 ENCOUNTER — LAB (OUTPATIENT)
Dept: LAB | Facility: CLINIC | Age: 69
End: 2024-08-28
Payer: COMMERCIAL

## 2024-08-28 DIAGNOSIS — C61 PROSTATE CANCER (H): ICD-10-CM

## 2024-08-28 PROCEDURE — 36415 COLL VENOUS BLD VENIPUNCTURE: CPT

## 2024-08-28 PROCEDURE — 84153 ASSAY OF PSA TOTAL: CPT

## 2024-08-29 ENCOUNTER — THERAPY VISIT (OUTPATIENT)
Dept: PHYSICAL THERAPY | Facility: CLINIC | Age: 69
End: 2024-08-29
Payer: COMMERCIAL

## 2024-08-29 ENCOUNTER — OFFICE VISIT (OUTPATIENT)
Dept: FAMILY MEDICINE | Facility: CLINIC | Age: 69
End: 2024-08-29
Payer: COMMERCIAL

## 2024-08-29 VITALS
TEMPERATURE: 98.8 F | HEART RATE: 56 BPM | WEIGHT: 224 LBS | OXYGEN SATURATION: 97 % | HEIGHT: 74 IN | DIASTOLIC BLOOD PRESSURE: 73 MMHG | RESPIRATION RATE: 16 BRPM | BODY MASS INDEX: 28.75 KG/M2 | SYSTOLIC BLOOD PRESSURE: 118 MMHG

## 2024-08-29 DIAGNOSIS — M25.562 PAIN IN BOTH KNEES, UNSPECIFIED CHRONICITY: ICD-10-CM

## 2024-08-29 DIAGNOSIS — M25.551 BILATERAL HIP PAIN: Primary | ICD-10-CM

## 2024-08-29 DIAGNOSIS — Z79.4 TYPE 2 DIABETES MELLITUS WITH DIABETIC POLYNEUROPATHY, WITH LONG-TERM CURRENT USE OF INSULIN (H): Primary | ICD-10-CM

## 2024-08-29 DIAGNOSIS — N52.1 ERECTILE DYSFUNCTION DUE TO DISEASES CLASSIFIED ELSEWHERE: ICD-10-CM

## 2024-08-29 DIAGNOSIS — Z23 ENCOUNTER FOR IMMUNIZATION: ICD-10-CM

## 2024-08-29 DIAGNOSIS — E11.42 TYPE 2 DIABETES MELLITUS WITH DIABETIC POLYNEUROPATHY, WITH LONG-TERM CURRENT USE OF INSULIN (H): Primary | ICD-10-CM

## 2024-08-29 DIAGNOSIS — I10 BENIGN ESSENTIAL HYPERTENSION: ICD-10-CM

## 2024-08-29 DIAGNOSIS — M25.552 BILATERAL HIP PAIN: Primary | ICD-10-CM

## 2024-08-29 DIAGNOSIS — R79.89 LOW TESTOSTERONE: ICD-10-CM

## 2024-08-29 DIAGNOSIS — M25.561 PAIN IN BOTH KNEES, UNSPECIFIED CHRONICITY: ICD-10-CM

## 2024-08-29 LAB
ANION GAP SERPL CALCULATED.3IONS-SCNC: 9 MMOL/L (ref 7–15)
BUN SERPL-MCNC: 14.2 MG/DL (ref 8–23)
CALCIUM SERPL-MCNC: 9.7 MG/DL (ref 8.8–10.4)
CHLORIDE SERPL-SCNC: 102 MMOL/L (ref 98–107)
CREAT SERPL-MCNC: 1.13 MG/DL (ref 0.67–1.17)
EGFRCR SERPLBLD CKD-EPI 2021: 70 ML/MIN/1.73M2
GLUCOSE SERPL-MCNC: 161 MG/DL (ref 70–99)
HBA1C MFR BLD: 6.8 % (ref 0–5.6)
HCO3 SERPL-SCNC: 28 MMOL/L (ref 22–29)
POTASSIUM SERPL-SCNC: 4.9 MMOL/L (ref 3.4–5.3)
PSA SERPL DL<=0.01 NG/ML-MCNC: 5.92 NG/ML (ref 0–6.5)
SODIUM SERPL-SCNC: 139 MMOL/L (ref 135–145)

## 2024-08-29 PROCEDURE — 97110 THERAPEUTIC EXERCISES: CPT | Mod: GP | Performed by: PHYSICAL THERAPIST

## 2024-08-29 PROCEDURE — 83036 HEMOGLOBIN GLYCOSYLATED A1C: CPT | Performed by: FAMILY MEDICINE

## 2024-08-29 PROCEDURE — 90472 IMMUNIZATION ADMIN EACH ADD: CPT | Performed by: FAMILY MEDICINE

## 2024-08-29 PROCEDURE — 90678 RSV VACC PREF BIVALENT IM: CPT | Performed by: FAMILY MEDICINE

## 2024-08-29 PROCEDURE — 99214 OFFICE O/P EST MOD 30 MIN: CPT | Mod: 25 | Performed by: FAMILY MEDICINE

## 2024-08-29 PROCEDURE — 36415 COLL VENOUS BLD VENIPUNCTURE: CPT | Performed by: FAMILY MEDICINE

## 2024-08-29 PROCEDURE — 90471 IMMUNIZATION ADMIN: CPT | Performed by: FAMILY MEDICINE

## 2024-08-29 PROCEDURE — 90677 PCV20 VACCINE IM: CPT | Performed by: FAMILY MEDICINE

## 2024-08-29 PROCEDURE — 80048 BASIC METABOLIC PNL TOTAL CA: CPT | Performed by: FAMILY MEDICINE

## 2024-08-29 RX ORDER — SYRINGE WITH NEEDLE, 1 ML 25GX5/8"
SYRINGE, EMPTY DISPOSABLE MISCELLANEOUS
Qty: 360 EACH | Refills: 3 | Status: SHIPPED | OUTPATIENT
Start: 2024-08-29 | End: 2024-09-09

## 2024-08-29 RX ORDER — INSULIN ASPART 100 [IU]/ML
15 INJECTION, SOLUTION INTRAVENOUS; SUBCUTANEOUS
Qty: 15 ML | Refills: 3 | Status: SHIPPED | OUTPATIENT
Start: 2024-08-29

## 2024-08-29 RX ORDER — LISINOPRIL 10 MG/1
10 TABLET ORAL EVERY MORNING
Qty: 90 TABLET | Refills: 0 | Status: SHIPPED | OUTPATIENT
Start: 2024-08-29

## 2024-08-29 RX ORDER — METFORMIN HCL 500 MG
2000 TABLET, EXTENDED RELEASE 24 HR ORAL
Qty: 360 TABLET | Refills: 0 | Status: SHIPPED | OUTPATIENT
Start: 2024-08-29

## 2024-08-29 RX ORDER — TADALAFIL 20 MG/1
20 TABLET ORAL EVERY 24 HOURS
Qty: 100 TABLET | Refills: 2 | Status: SHIPPED | OUTPATIENT
Start: 2024-08-29

## 2024-08-29 ASSESSMENT — ANXIETY QUESTIONNAIRES
4. TROUBLE RELAXING: NOT AT ALL
IF YOU CHECKED OFF ANY PROBLEMS ON THIS QUESTIONNAIRE, HOW DIFFICULT HAVE THESE PROBLEMS MADE IT FOR YOU TO DO YOUR WORK, TAKE CARE OF THINGS AT HOME, OR GET ALONG WITH OTHER PEOPLE: NOT DIFFICULT AT ALL
6. BECOMING EASILY ANNOYED OR IRRITABLE: NOT AT ALL
1. FEELING NERVOUS, ANXIOUS, OR ON EDGE: SEVERAL DAYS
5. BEING SO RESTLESS THAT IT IS HARD TO SIT STILL: NOT AT ALL
8. IF YOU CHECKED OFF ANY PROBLEMS, HOW DIFFICULT HAVE THESE MADE IT FOR YOU TO DO YOUR WORK, TAKE CARE OF THINGS AT HOME, OR GET ALONG WITH OTHER PEOPLE?: NOT DIFFICULT AT ALL
2. NOT BEING ABLE TO STOP OR CONTROL WORRYING: NOT AT ALL
7. FEELING AFRAID AS IF SOMETHING AWFUL MIGHT HAPPEN: NOT AT ALL
GAD7 TOTAL SCORE: 1
3. WORRYING TOO MUCH ABOUT DIFFERENT THINGS: NOT AT ALL
7. FEELING AFRAID AS IF SOMETHING AWFUL MIGHT HAPPEN: NOT AT ALL

## 2024-08-29 ASSESSMENT — PATIENT HEALTH QUESTIONNAIRE - PHQ9
SUM OF ALL RESPONSES TO PHQ QUESTIONS 1-9: 4
SUM OF ALL RESPONSES TO PHQ QUESTIONS 1-9: 4
10. IF YOU CHECKED OFF ANY PROBLEMS, HOW DIFFICULT HAVE THESE PROBLEMS MADE IT FOR YOU TO DO YOUR WORK, TAKE CARE OF THINGS AT HOME, OR GET ALONG WITH OTHER PEOPLE: SOMEWHAT DIFFICULT

## 2024-08-29 ASSESSMENT — PAIN SCALES - GENERAL: PAINLEVEL: NO PAIN (0)

## 2024-08-29 NOTE — PROGRESS NOTES
"  Assessment & Plan     (E11.42,  Z79.4) Type 2 diabetes mellitus with diabetic polyneuropathy, with long-term current use of insulin (H)  (primary encounter diagnosis)  Comment:   Lab Results   Component Value Date    A1C 6.8 08/29/2024    A1C 6.7 11/03/2023    A1C 6.1 04/14/2023    A1C 8.0 01/02/2023    A1C 8.8 11/18/2022    A1C 8.0 11/16/2020    A1C 7.9 02/20/2020    A1C 10.1 10/18/2019    A1C 8.6 02/05/2019    A1C 8.2 08/01/2018        Plan: Hemoglobin A1c, Basic metabolic panel  (Ca, Cl,        CO2, Creat, Gluc, K, Na, BUN)        Recommend to continue current medication   Will continue to monitor .  Recommend to continue diet and exercise .  Discussed diet modification .  Successfully lost 13 lb will monitor .     (I10) Benign essential hypertension  Comment:   Plan: lisinopril (ZESTRIL) 10 MG tablet        Will monitor blood pressure .     (R79.89) Low testosterone  Comment: will monitor the lab   Plan: syringe/needle, disp, (B-D LUER-ROCHELLE SYRINGE)         22G X 1-1/2\" 3 ML MISC            (Z23) Encounter for immunization  Comment:   Plan: RSV VACCINE (ABRYSVO), Pneumococcal 20 Valent         Conjugate (PCV20)            (N52.1) Erectile dysfunction due to diseases classified elsewhere  Comment:   Plan: tadalafil (ADCIRCA/CIALIS) 20 MG tablet                  BMI  Estimated body mass index is 29.15 kg/m  as calculated from the following:    Height as of this encounter: 1.867 m (6' 1.5\").    Weight as of this encounter: 101.6 kg (224 lb).   Weight management plan: Discussed healthy diet and exercise guidelines        Subjective   David is a 68 year old, presenting for the following health issues:  Diabetes (Here today for a follow up on his Diabetes. Fasting for labs. Has noticed a spike in his blood sugars - sometimes close to 300. )        8/29/2024     9:02 AM   Additional Questions   Roomed by Shanae Guerra CMA   Accompanied by Self     HPI     Medication Followup of Diabetes  Taking Medication as prescribed: " "yes  Side Effects:  None  Medication Helping Symptoms:  yes      Review of Systems  CONSTITUTIONAL: NEGATIVE for fever, chills, change in weight  INTEGUMENTARY/SKIN: NEGATIVE for worrisome rashes, moles or lesions  EYES: NEGATIVE for vision changes or irritation  ENT/MOUTH: NEGATIVE for ear, mouth and throat problems  RESP: NEGATIVE for significant cough or SOB  CV: NEGATIVE for chest pain, palpitations or peripheral edema  GI: NEGATIVE for nausea, abdominal pain, heartburn, or change in bowel habits  : NEGATIVE for frequency, dysuria, or hematuria  MUSCULOSKELETAL: NEGATIVE for significant arthralgias or myalgia  NEURO: NEGATIVE for weakness, dizziness or paresthesias  ENDOCRINE: NEGATIVE for temperature intolerance, skin/hair changes  HEME: NEGATIVE for bleeding problems  PSYCHIATRIC: NEGATIVE for changes in mood or affect      Objective    /73 (BP Location: Right arm, Patient Position: Sitting, Cuff Size: Adult Large)   Pulse 56   Temp 98.8  F (37.1  C) (Oral)   Resp 16   Ht 1.867 m (6' 1.5\")   Wt 101.6 kg (224 lb)   SpO2 97%   BMI 29.15 kg/m    Body mass index is 29.15 kg/m .  Physical Exam   GENERAL: alert and no distress  EYES: Eyes grossly normal to inspection, PERRL and conjunctivae and sclerae normal  HENT: ear canals and TM's normal, nose and mouth without ulcers or lesions  NECK: no adenopathy, no asymmetry, masses, or scars  RESP: lungs clear to auscultation - no rales, rhonchi or wheezes  CV: regular rate and rhythm, normal S1 S2, no S3 or S4, no murmur, click or rub, no peripheral edema  ABDOMEN: soft, nontender, no hepatosplenomegaly, no masses and bowel sounds normal  MS: no gross musculoskeletal defects noted, no edema  SKIN: no suspicious lesions or rashes  NEURO: Normal strength and tone, mentation intact and speech normal  PSYCH: mentation appears normal, affect normal/bright            Signed Electronically by: Majo Paul MD    Answers submitted by the patient for this " visit:  Patient Health Questionnaire (Submitted on 8/29/2024)  If you checked off any problems, how difficult have these problems made it for you to do your work, take care of things at home, or get along with other people?: Somewhat difficult  PHQ9 TOTAL SCORE: 4  Patient Health Questionnaire (G7) (Submitted on 8/29/2024)  CATA 7 TOTAL SCORE: 1

## 2024-09-03 NOTE — PROGRESS NOTES
Office Visit Note  [facility]  [phone]    UROLOGIC DIAGNOSES:   Enlarged prostate   Erectile dysfunction  Low risk prostate cancer  Peyronie's disease    CURRENT INTERVENTIONS:   Active surveillance  Prostate biopsy x 2  On Flomax  Cialis  Previous use of Trimix injections  Xiaflex injections at [facility]    HISTORY:   David returns to clinic today for prostate cancer follow-up and further evaluation of his lower urinary tract symptoms with a cystoscopy.  His PSA is stable at 5.92.      PAST MEDICAL HISTORY:   Past Medical History:   Diagnosis Date    Anxiety     Anxiety 01/31/2015    BPH (benign prostatic hyperplasia)     Degenerative disc disease     Depression     Gastritis     Gastro-oesophageal reflux disease     H/O alcohol abuse     Hyperlipidemia     Hypertension     Low testosterone     MRSA (methicillin resistant staph aureus) culture positive 08/2013    skin infection    Mumps     PUD (peptic ulcer disease)     Sleep apnea     STD (sexually transmitted disease)     Type 2 diabetes mellitus (H)     neuropathy       PAST SURGICAL HISTORY:   Past Surgical History:   Procedure Laterality Date    COLONOSCOPY  11/11/2013    Procedure: COMBINED COLONOSCOPY, SINGLE BIOPSY/POLYPECTOMY BY BIOPSY;  Colonoscopy/ polypectomy x2 by cold forceps    ;  Surgeon: Juan Carlos Bellamy MD;  Location:  GI    DECOMPRESSION POSTERIOR LUMBAR, ,L4-5 decompression  06/14/2023    ESOPHAGOSCOPY, GASTROSCOPY, DUODENOSCOPY (EGD), COMBINED N/A 09/22/2016    Procedure: COMBINED ESOPHAGOSCOPY, GASTROSCOPY, DUODENOSCOPY (EGD), BIOPSY SINGLE OR MULTIPLE;  Surgeon: Juan Carlos Barraza MD;  Location:  GI    ESOPHAGOSCOPY, GASTROSCOPY, DUODENOSCOPY (EGD), COMBINED N/A 02/19/2019    Procedure: ESOPHAGOSCOPY, GASTROSCOPY, DUODENOSCOPY (EGD) with cold forcep;  Surgeon: Juan Carlos Barraza MD;  Location:  GI    EXCISE SOFT TISSUE TUMOR ELBOW Left 1/22/2024    Procedure: Removal Left Elbow Tumor;  Surgeon: Broderick Mccurdy  MD Ever;  Location: [facility] OR    HERNIA REPAIR         FAMILY HISTORY:   Family History   Problem Relation Age of Onset    Pancreatic Cancer Mother     Dementia Father     Parkinsonism Father     Diabetes Brother     Depression Brother     Suicide Brother     Substance Abuse Brother     Pancreatic Cancer Paternal Grandfather     Family History Negative No family hx of     Colon Cancer No family hx of     Unknown/Adopted No family hx of     Anxiety Disorder No family hx of     Schizophrenia No family hx of     Bipolar Disorder No family hx of     Ambika Disease No family hx of     Autism Spectrum Disorder No family hx of     Intellectual Disability No family hx of     Mental Illness No family hx of     Anesthesia Reaction No family hx of     Deep Vein Thrombosis (DVT) No family hx of        SOCIAL HISTORY:   Social History     Socioeconomic History    Marital status:    Tobacco Use    Smoking status: Former     Current packs/day: 0.00     Types: Cigarettes     Quit date:      Years since quittin.6    Smokeless tobacco: Never   Vaping Use    Vaping status: Never Used   Substance and Sexual Activity    Alcohol use: No     Comment: sober since     Drug use: No    Sexual activity: Yes     Partners: Female     Social Determinants of Health     Financial Resource Strain: Low Risk  (11/3/2023)    Financial Resource Strain     Within the past 12 months, have you or your family members you live with been unable to get utilities (heat, electricity) when it was really needed?: No   Food Insecurity: No Food Insecurity (2024)    Received from [facility]    Hunger Vital Sign     Worried About Running Out of Food in the Last Year: Never true     Ran Out of Food in the Last Year: Never true   Transportation Needs: No Transportation Needs (2024)    Received from [facility]    PRAPARE - Transportation     Lack of Transportation (Medical): No     Lack of Transportation  (Non-Medical): No   Physical Activity: Insufficiently Active (1/31/2024)    [facility]    Exercise Vital Sign     Days of Exercise per Week: 1 day     Minutes of Exercise per Session: 10 min   Stress: No Stress Concern Present (1/19/2023)    Received from [facility]    [employer] [place] [employer] - Occupational Stress Questionnaire     Feeling of Stress : Only a little   Social Connections: Moderately Integrated (1/19/2023)    Received from [facility]    Social Connection and Isolation Panel [NHANES]     Frequency of Communication with Friends and Family: Twice a week     Frequency of Social Gatherings with Friends and Family: Once a week     Attends Confucianism Services: More than 4 times per year     Active Member of Clubs or Organizations: Yes     Attends Club or Organization Meetings: More than 4 times per year     Marital Status:    Interpersonal Safety: Low Risk  (8/29/2024)    Interpersonal Safety     Do you feel physically and emotionally safe where you currently live?: Yes     Within the past 12 months, have you been hit, slapped, kicked or otherwise physically hurt by someone?: No     Within the past 12 months, have you been humiliated or emotionally abused in other ways by your partner or ex-partner?: No   Housing Stability: Low Risk  (1/31/2024)    Received from [facility]    Housing Stability     What is your living situation today?: I have a steady place to live       Review Of Systems:  Skin: No rash, pruritis, or skin pigmentation  Eyes: No changes in vision  Ears/Nose/Throat: No changes in hearing, no nosebleeds  Respiratory: No shortness of breath, dyspnea on exertion, cough, or hemoptysis  Cardiovascular: No chest pain or palpitations  Gastrointestinal: No diarrhea or constipation. No abdominal pain. No hematochezia  Genitourinary: see HPI  Musculoskeletal: No pain or swelling of joints, normal range of  motion  Neurologic: No weakness or tremors  Psychiatric: No recent changes in memory or mood  Hematologic/Lymphatic/Immunologic: No easy bruising or enlarged lymph nodes  Endocrine: No weight gain or loss      PHYSICAL EXAM:    There were no vitals taken for this visit.    Constitutional: Well developed. Conversant and in no acute distress  Eyes: Anicteric sclera, conjunctiva clear, normal extraocular movements  ENT: Normocephalic and atraumatic,   Skin: Warm and dry. No rashes or lesions  Cardiac: No peripheral edema  Back/Flank: Not done  CNS/PNS: Normal musculature and movements, moves all extremities normally  Respiratory: Normal non-labored breathing  Abdomen: Soft nontender and nondistended  Peripheral Vascular: No peripheral edema  Mental Status/Psych: Alert and Oriented x 3. Normal mood and affect    Penis: Not done  Scrotal Skin: Not done  Testicles: Not done  Epididymis: Not done  Digital Rectal Exam:     Cystoscopy:  I performed flexible cystoscopy and the bladder showed trabeculations but otherwise was normal throughout.  On retroflexion of the scope he has a significantly and asymmetrically enlarged left lateral lobe of the prostate that is protruding into the bladder as an intravesical segment.  In pulling back the scope he has bilateral lateral lobe obstruction that is significant.    Imaging: None    Urinalysis: UA RESULTS:  Recent Labs   Lab Test 11/18/22  1553   COLOR Yellow   APPEARANCE Clear   URINEGLC >=1000*   URINEBILI Negative   URINEKETONE Negative   SG >=1.030   UBLD Negative   URINEPH 6.0   PROTEIN Negative   UROBILINOGEN 0.2   NITRITE Negative   LEUKEST Negative   RBCU 0-2   WBCU 0-5       PSA: 5.9    Post Void Residual:     Other labs: None today      IMPRESSION:  Enlarged prostate  Low risk prostate cancer      PLAN:  He has a low risk prostate cancer that was diagnosed over 5 years ago and has been stable since that time.  He also has an enlarged prostate leading to bouts of urinary  symptoms.  We discussed this clinical situation as well as  treatment options.  We discussed the option of adding another medication such as finasteride to his treatment regimen.  He was counseled that this would possibly improve his urinary symptoms slightly, but the finasteride be slow to act.  We discussed the option of proceeding with robotic assisted laparoscopic radical prostatectomy.  The advantage of this option as it would remove his cancer and omit the need for active surveillance and also remove the enlarged prostate that is likely causing his urinary symptoms.  We did discuss the risks of surgery and its expected recovery.  We also discussed the option of proceeding with various procedures for BPH such as TURP, aquablation, or Rezum procedure    Pertinent to our discussion is his desire to preserve erectile function and also preserve ejaculation.  He already does have compromised erectile function.  He also says he has reduced ejaculation with the Flomax but he would like to preserve as much ejaculation as possible.  At the end of our discussion he says he may be interested in Aquablation procedure as it theoretically has a higher success rate for preserving ejaculation.  However, he says that he would like to try finasteride first, understanding that it is slow to act.    I wrote him a prescription for finasteride.  He will begin the medication today.  I will see him back in 6 months for surveillance.  In the meantime if he is bothered by his urinary symptoms too much, I encouraged him to contact me and we can always discuss procedures again.        Total time spent today in review of outside records and test results, discussion with the patient, and documentation: 30 minutes      Mohsen Rao M.D.

## 2024-09-04 ENCOUNTER — OFFICE VISIT (OUTPATIENT)
Dept: UROLOGY | Facility: CLINIC | Age: 69
End: 2024-09-04
Payer: COMMERCIAL

## 2024-09-04 VITALS
BODY MASS INDEX: 28.75 KG/M2 | DIASTOLIC BLOOD PRESSURE: 70 MMHG | HEIGHT: 74 IN | WEIGHT: 224 LBS | SYSTOLIC BLOOD PRESSURE: 116 MMHG

## 2024-09-04 DIAGNOSIS — C61 PROSTATE CANCER (H): ICD-10-CM

## 2024-09-04 DIAGNOSIS — N40.0 ENLARGED PROSTATE: Primary | ICD-10-CM

## 2024-09-04 DIAGNOSIS — Z79.2 PROPHYLACTIC ANTIBIOTIC: ICD-10-CM

## 2024-09-04 DIAGNOSIS — N52.9 ERECTILE DYSFUNCTION, UNSPECIFIED ERECTILE DYSFUNCTION TYPE: ICD-10-CM

## 2024-09-04 LAB
ALBUMIN UR-MCNC: NEGATIVE MG/DL
APPEARANCE UR: CLEAR
BILIRUB UR QL STRIP: NEGATIVE
COLOR UR AUTO: YELLOW
GLUCOSE UR STRIP-MCNC: NEGATIVE MG/DL
HGB UR QL STRIP: NEGATIVE
KETONES UR STRIP-MCNC: NEGATIVE MG/DL
LEUKOCYTE ESTERASE UR QL STRIP: NEGATIVE
NITRATE UR QL: NEGATIVE
PH UR STRIP: 6.5 [PH] (ref 5–7)
SP GR UR STRIP: 1.02 (ref 1–1.03)
UROBILINOGEN UR STRIP-ACNC: 0.2 E.U./DL

## 2024-09-04 PROCEDURE — 81003 URINALYSIS AUTO W/O SCOPE: CPT | Mod: QW | Performed by: UROLOGY

## 2024-09-04 PROCEDURE — 52000 CYSTOURETHROSCOPY: CPT | Performed by: UROLOGY

## 2024-09-04 PROCEDURE — 99214 OFFICE O/P EST MOD 30 MIN: CPT | Mod: 25 | Performed by: UROLOGY

## 2024-09-04 RX ORDER — CIPROFLOXACIN 500 MG/1
500 TABLET, FILM COATED ORAL ONCE
Qty: 1 TABLET | Refills: 0 | Status: SHIPPED | OUTPATIENT
Start: 2024-09-04 | End: 2024-09-04

## 2024-09-04 RX ORDER — FINASTERIDE 5 MG/1
5 TABLET, FILM COATED ORAL DAILY
Qty: 90 TABLET | Refills: 3 | Status: SHIPPED | OUTPATIENT
Start: 2024-09-04 | End: 2025-09-04

## 2024-09-04 RX ORDER — LIDOCAINE HYDROCHLORIDE 20 MG/ML
JELLY TOPICAL ONCE
Status: COMPLETED | OUTPATIENT
Start: 2024-09-04 | End: 2024-09-04

## 2024-09-04 RX ADMIN — LIDOCAINE HYDROCHLORIDE 5 ML: 20 JELLY TOPICAL at 08:57

## 2024-09-04 ASSESSMENT — PAIN SCALES - GENERAL: PAINLEVEL: NO PAIN (0)

## 2024-09-04 NOTE — PATIENT INSTRUCTIONS

## 2024-09-04 NOTE — NURSING NOTE
Chief Complaint   Patient presents with    Enlarged prostate     Pt here for in office cystoscopy      Prior to the start of the procedure and with procedural staff participation, I verbally confirmed the patient s identity using two indicators, relevant allergies, that the procedure was appropriate and matched the consent or emergent situation, and that the correct equipment/implants were available. Immediately prior to starting the procedure I conducted the Time Out with the procedural staff and re-confirmed the patient s name, procedure, and site/side. I have wiped the patient off with the povidone-Iodine solution, draped them,  used Lidocaine hydrochloride jelly, and instilled sterile water into the bladder. (The Joint Commission universal protocol was followed.)  Yes    Sedation (Moderate or Deep): None     5mL 2% lidocaine hydrochloride Urojet instilled into urethra.    NDC# [address]-7  Lot #: [id number]  Expiration Date:  03/26    Norma Vu CMA

## 2024-09-04 NOTE — LETTER
9/4/2024       RE: Austyn Araujo  [address]  [facility] [address]     Dear Colleague,    Thank you for referring your patient, Austyn Araujo, to the [facility] [place] at [facility]. Please see a copy of my visit note below.    Office Visit Note  [facility]  [phone]    UROLOGIC DIAGNOSES:   Enlarged prostate   Erectile dysfunction  Low risk prostate cancer  Peyronie's disease    CURRENT INTERVENTIONS:   Active surveillance  Prostate biopsy x 2  On Flomax  Cialis  Previous use of Trimix injections  Xiaflex injections at [facility]    HISTORY:   David returns to clinic today for prostate cancer follow-up and further evaluation of his lower urinary tract symptoms with a cystoscopy.  His PSA is stable at 5.92.      PAST MEDICAL HISTORY:   Past Medical History:   Diagnosis Date     Anxiety      Anxiety 01/31/2015     BPH (benign prostatic hyperplasia)      Degenerative disc disease      Depression      Gastritis      Gastro-oesophageal reflux disease      H/O alcohol abuse      Hyperlipidemia      Hypertension      Low testosterone      MRSA (methicillin resistant staph aureus) culture positive 08/2013    skin infection     Mumps      PUD (peptic ulcer disease)      Sleep apnea      STD (sexually transmitted disease)      Type 2 diabetes mellitus (H)     neuropathy       PAST SURGICAL HISTORY:   Past Surgical History:   Procedure Laterality Date     COLONOSCOPY  11/11/2013    Procedure: COMBINED COLONOSCOPY, SINGLE BIOPSY/POLYPECTOMY BY BIOPSY;  Colonoscopy/ polypectomy x2 by cold forceps    ;  Surgeon: Juan Carlos Bellamy MD;  Location: [facility]     DECOMPRESSION POSTERIOR LUMBAR, ,L4-5 decompression  06/14/2023     ESOPHAGOSCOPY, GASTROSCOPY, DUODENOSCOPY (EGD), COMBINED N/A 09/22/2016    Procedure: COMBINED ESOPHAGOSCOPY, GASTROSCOPY, DUODENOSCOPY (EGD), BIOPSY SINGLE OR MULTIPLE;  Surgeon: Juan Carlos Barraza MD;   Location: [facility]     ESOPHAGOSCOPY, GASTROSCOPY, DUODENOSCOPY (EGD), COMBINED N/A 2019    Procedure: ESOPHAGOSCOPY, GASTROSCOPY, DUODENOSCOPY (EGD) with cold forcep;  Surgeon: Juan Carlos Barraza MD;  Location: [facility]     EXCISE SOFT TISSUE TUMOR ELBOW Left 2024    Procedure: Removal Left Elbow Tumor;  Surgeon: Broderick Mccurdy MD;  Location: [facility]     HERNIA REPAIR         FAMILY HISTORY:   Family History   Problem Relation Age of Onset     Pancreatic Cancer Mother      Dementia Father      Parkinsonism Father      Diabetes Brother      Depression Brother      Suicide Brother      Substance Abuse Brother      Pancreatic Cancer Paternal Grandfather      Family History Negative No family hx of      Colon Cancer No family hx of      Unknown/Adopted No family hx of      Anxiety Disorder No family hx of      Schizophrenia No family hx of      Bipolar Disorder No family hx of      [place] Disease No family hx of      Autism Spectrum Disorder No family hx of      Intellectual Disability No family hx of      Mental Illness No family hx of      Anesthesia Reaction No family hx of      Deep Vein Thrombosis (DVT) No family hx of        SOCIAL HISTORY:   Social History     Socioeconomic History     Marital status:    Tobacco Use     Smoking status: Former     Current packs/day: 0.00     Types: Cigarettes     Quit date: 2000     Years since quittin.6     Smokeless tobacco: Never   Vaping Use     Vaping status: Never Used   Substance and Sexual Activity     Alcohol use: No     Comment: sober since 2016     Drug use: No     Sexual activity: Yes     Partners: Female     Social Determinants of Health     Financial Resource Strain: Low Risk  (11/3/2023)    Financial Resource Strain      Within the past 12 months, have you or your family members you live with been unable to get utilities (heat, electricity) when it was really needed?: No   Food Insecurity: No Food Insecurity (2024)    Received from  [facility]    Hunger Vital Sign      Worried About Running Out of Food in the Last Year: Never true      Ran Out of Food in the Last Year: Never true   Transportation Needs: No Transportation Needs (1/31/2024)    Received from [facility]    PRAPARE - Transportation      Lack of Transportation (Medical): No      Lack of Transportation (Non-Medical): No   Physical Activity: Insufficiently Active (1/31/2024)    Received from [facility]    Exercise Vital Sign      Days of Exercise per Week: 1 day      Minutes of Exercise per Session: 10 min   Stress: No Stress Concern Present (1/19/2023)    Received from [facility]    [employer] - Occupational Stress Questionnaire      Feeling of Stress : Only a little   Social Connections: Moderately Integrated (1/19/2023)    Received from [facility]    Social Connection and Isolation Panel [NHANES]      Frequency of Communication with Friends and Family: Twice a week      Frequency of Social Gatherings with Friends and Family: Once a week      Attends Spiritism Services: More than 4 times per year      Active Member of Clubs or Organizations: Yes      Attends Club or Organization Meetings: More than 4 times per year      Marital Status:    Interpersonal Safety: Low Risk  (8/29/2024)    Interpersonal Safety      Do you feel physically and emotionally safe where you currently live?: Yes      Within the past 12 months, have you been hit, slapped, kicked or otherwise physically hurt by someone?: No      Within the past 12 months, have you been humiliated or emotionally abused in other ways by your partner or ex-partner?: No   Housing Stability: Low Risk  (1/31/2024)    Received from [facility]    Housing Stability      What is your living situation today?: I have a steady place to live       Review Of Systems:  Skin: No rash, pruritis, or skin pigmentation  Eyes: No  changes in vision  Ears/Nose/Throat: No changes in hearing, no nosebleeds  Respiratory: No shortness of breath, dyspnea on exertion, cough, or hemoptysis  Cardiovascular: No chest pain or palpitations  Gastrointestinal: No diarrhea or constipation. No abdominal pain. No hematochezia  Genitourinary: see HPI  Musculoskeletal: No pain or swelling of joints, normal range of motion  Neurologic: No weakness or tremors  Psychiatric: No recent changes in memory or mood  Hematologic/Lymphatic/Immunologic: No easy bruising or enlarged lymph nodes  Endocrine: No weight gain or loss      PHYSICAL EXAM:    There were no vitals taken for this visit.    Constitutional: Well developed. Conversant and in no acute distress  Eyes: Anicteric sclera, conjunctiva clear, normal extraocular movements  ENT: Normocephalic and atraumatic,   Skin: Warm and dry. No rashes or lesions  Cardiac: No peripheral edema  Back/Flank: Not done  CNS/PNS: Normal musculature and movements, moves all extremities normally  Respiratory: Normal non-labored breathing  Abdomen: Soft nontender and nondistended  Peripheral Vascular: No peripheral edema  Mental Status/Psych: Alert and Oriented x 3. Normal mood and affect    Penis: Not done  Scrotal Skin: Not done  Testicles: Not done  Epididymis: Not done  Digital Rectal Exam:     Cystoscopy:  I performed flexible cystoscopy and the bladder showed trabeculations but otherwise was normal throughout.  On retroflexion of the scope he has a significantly and asymmetrically enlarged left lateral lobe of the prostate that is protruding into the bladder as an intravesical segment.  In pulling back the scope he has bilateral lateral lobe obstruction that is significant.    Imaging: None    Urinalysis: UA RESULTS:  Recent Labs   Lab Test 11/18/22  1553   COLOR Yellow   APPEARANCE Clear   URINEGLC >=1000*   URINEBILI Negative   URINEKETONE Negative   SG >=1.030   UBLD Negative   URINEPH 6.0   PROTEIN Negative   UROBILINOGEN  0.2   NITRITE Negative   LEUKEST Negative   RBCU 0-2   WBCU 0-5       PSA: 5.9    Post Void Residual:     Other labs: None today      IMPRESSION:  Enlarged prostate  Low risk prostate cancer      PLAN:  He has a low risk prostate cancer that was diagnosed over 5 years ago and has been stable since that time.  He also has an enlarged prostate leading to bouts of urinary symptoms.  We discussed this clinical situation as well as  treatment options.  We discussed the option of adding another medication such as finasteride to his treatment regimen.  He was counseled that this would possibly improve his urinary symptoms slightly, but the finasteride be slow to act.  We discussed the option of proceeding with robotic assisted laparoscopic radical prostatectomy.  The advantage of this option as it would remove his cancer and omit the need for active surveillance and also remove the enlarged prostate that is likely causing his urinary symptoms.  We did discuss the risks of surgery and its expected recovery.  We also discussed the option of proceeding with various procedures for BPH such as TURP, aquablation, or Rezum procedure    Pertinent to our discussion is his desire to preserve erectile function and also preserve ejaculation.  He already does have compromised erectile function.  He also says he has reduced ejaculation with the Flomax but he would like to preserve as much ejaculation as possible.  At the end of our discussion he says he may be interested in Aquablation procedure as it theoretically has a higher success rate for preserving ejaculation.  However, he says that he would like to try finasteride first, understanding that it is slow to act.    I wrote him a prescription for finasteride.  He will begin the medication today.  I will see him back in 6 months for surveillance.  In the meantime if he is bothered by his urinary symptoms too much, I encouraged him to contact me and we can always discuss procedures  again.        Total time spent today in review of outside records and test results, discussion with the patient, and documentation: 30 minutes      Mohsen Rao M.D.              Again, thank you for allowing me to participate in the care of your patient.      Sincerely,    Mohsen Rao MD     101

## 2024-09-09 DIAGNOSIS — R79.89 LOW TESTOSTERONE: ICD-10-CM

## 2024-09-09 RX ORDER — SYRINGE WITH NEEDLE, 1 ML 25GX5/8"
SYRINGE, EMPTY DISPOSABLE MISCELLANEOUS
Qty: 12 EACH | Refills: 3 | Status: SHIPPED | OUTPATIENT
Start: 2024-09-09

## 2024-09-12 DIAGNOSIS — E78.5 HYPERLIPIDEMIA LDL GOAL <100: ICD-10-CM

## 2024-09-12 RX ORDER — ATORVASTATIN CALCIUM 40 MG/1
40 TABLET, FILM COATED ORAL DAILY
Qty: 90 TABLET | Refills: 0 | Status: SHIPPED | OUTPATIENT
Start: 2024-09-12

## 2024-09-16 DIAGNOSIS — R79.89 LOW TESTOSTERONE: ICD-10-CM

## 2024-09-16 RX ORDER — SYRINGE WITH NEEDLE, 1 ML 25GX5/8"
SYRINGE, EMPTY DISPOSABLE MISCELLANEOUS
Qty: 12 EACH | Refills: 3 | OUTPATIENT
Start: 2024-09-16

## 2024-09-16 NOTE — TELEPHONE ENCOUNTER
"Patient requesting a new script for 1\" needles to replace the 11/2\" needles.     Order Information    Ordered Status Priority Ordering User Department   09/09/24 Sent (none) Majo Paul MD [facility]     Order History  Outpatient  Date/Time Action Taken User Additional Information   09/09/24 1317 Sign Majo Paul MD Reorder from Order:[id number]   09/09/24 1317 Taking Flag Checked Majo Paul MD [id number]     Outpatient Medication Detail     Disp Refills Start End SHAHIDA   syringe/needle, disp, (B-D LUER-MESERET SYRINGE) 22G X 1-1/2\" 3 ML MISC 12 each 3 9/9/2024 -- No   Sig: Use weekly for testosterone injection .   Sent to pharmacy as: BD Luer-Meseret Syringe 22G X 1-1/2\" 3 ML (syringe/needle (disp))   Class: E-Prescribe   Order: [id number]   E-Prescribing Status: Receipt confirmed by pharmacy (9/9/2024  1:17 PM CDT)     Printout Tracking    External Result Report     Medication Administration Instructions    Use weekly for testosterone injection .     Pharmacy    [id number] - [place], [place] - [address]     Associated Diagnoses    Low testosterone [R79.89]        "

## 2024-10-02 DIAGNOSIS — R79.89 LOW TESTOSTERONE: ICD-10-CM

## 2024-10-02 DIAGNOSIS — R79.89 LOW TESTOSTERONE: Primary | ICD-10-CM

## 2024-10-02 RX ORDER — SYRINGE-NEEDLE,INSULIN,0.5 ML 27GX1/2"
SYRINGE, EMPTY DISPOSABLE MISCELLANEOUS
Qty: 100 EACH | Refills: 1 | Status: SHIPPED | OUTPATIENT
Start: 2024-10-02

## 2024-10-02 RX ORDER — SYRINGE-NEEDLE,INSULIN,0.5 ML 27GX1/2"
SYRINGE, EMPTY DISPOSABLE MISCELLANEOUS
Qty: 100 EACH | Refills: 1 | OUTPATIENT
Start: 2024-10-02

## 2024-10-02 NOTE — TELEPHONE ENCOUNTER
In epic I cannot find 21 only thing pulling up is 29 ,please confirm with pharmacy this is okay order .    Majo

## 2024-10-02 NOTE — TELEPHONE ENCOUNTER
"Pharmacy states they are unable to get the 22 G. Requesting a new RX for either 21 or 23 gauge instead ?    Order Information    Ordered Status Priority Ordering User Department   09/09/24 Sent (none) Majo Paul MD [facility]     Order History  Outpatient  Date/Time Action Taken User Additional Information   09/09/24 1317 Sign Majo Paul MD Reorder from Order:[id number]   09/09/24 1317 Taking Flag Checked Majo Paul MD [id number]   09/16/24 0651 Reorder Kitty Wallace RN To Order:[id number]     Outpatient Medication Detail     Disp Refills Start End SHAHIDA   syringe/needle, disp, (B-D LUER-MESERET SYRINGE) 22G X 1-1/2\" 3 ML MISC 12 each 3 9/9/2024 -- No   Sig: Use weekly for testosterone injection .   Sent to pharmacy as: BD Luer-Meseret Syringe 22G X 1-1/2\" 3 ML (syringe/needle (disp))   Class: E-Prescribe   Order: [id number]   E-Prescribing Status: Receipt confirmed by pharmacy (9/9/2024  1:17 PM CDT)     Printout Tracking    External Result Report     Medication Administration Instructions    Use weekly for testosterone injection .     Pharmacy    [id number] - [place], [place] - [address]     Associated Diagnoses    Low testosterone [R79.89]        "

## 2024-10-06 DIAGNOSIS — R79.89 LOW TESTOSTERONE: ICD-10-CM

## 2024-10-07 RX ORDER — SYRINGE-NEEDLE,INSULIN,0.5 ML 27GX1/2"
SYRINGE, EMPTY DISPOSABLE MISCELLANEOUS
Qty: 100 EACH | Refills: 1 | OUTPATIENT
Start: 2024-10-07

## 2024-10-18 DIAGNOSIS — E78.5 HYPERLIPIDEMIA LDL GOAL <100: ICD-10-CM

## 2024-10-18 DIAGNOSIS — D64.9 ANEMIA, UNSPECIFIED TYPE: ICD-10-CM

## 2024-10-18 DIAGNOSIS — E11.9 TYPE 2 DIABETES MELLITUS WITHOUT COMPLICATIONS (H): ICD-10-CM

## 2024-10-18 RX ORDER — ATORVASTATIN CALCIUM 40 MG/1
40 TABLET, FILM COATED ORAL DAILY
Qty: 90 TABLET | Refills: 0 | OUTPATIENT
Start: 2024-10-18

## 2024-10-18 RX ORDER — INSULIN GLARGINE 100 [IU]/ML
INJECTION, SOLUTION SUBCUTANEOUS
Qty: 15 ML | Refills: 0 | Status: SHIPPED | OUTPATIENT
Start: 2024-10-18

## 2024-10-18 RX ORDER — BACILLUS COAGULANS 1B CELL
1 CAPSULE ORAL DAILY
Qty: 90 TABLET | Refills: 1 | OUTPATIENT
Start: 2024-10-18

## 2024-12-13 DIAGNOSIS — E11.9 TYPE 2 DIABETES MELLITUS WITHOUT COMPLICATIONS (H): ICD-10-CM

## 2024-12-16 RX ORDER — INSULIN GLARGINE 100 [IU]/ML
INJECTION, SOLUTION SUBCUTANEOUS
Qty: 30 ML | Refills: 0 | Status: SHIPPED | OUTPATIENT
Start: 2024-12-16

## 2025-01-16 NOTE — TELEPHONE ENCOUNTER
Fax.   Patient is a 44y/o F, seperated, 2 children nages 13 and 9, domiciled with  and children, employed as a teacher, no significant PMH, PPHX of PTSD, MDD, CASEY, no prior hospitalizations, no prior SA/SIB, prescribed medications by PCP, has been on/off with therapist Elvia Sevilla for 8 years, reports social EtOH use and nicotine vape, denies othe substances, no prior history of aggression/violence, reports filing for divorce but no upcoming legal appointments, no access to firearms, who presents to [facility] from [facility] c/o worsening anxiety and PTSD symptoms. Pt was also seen in [facility] ED on 1/11/2025. Per chart review, Pt cited multiple stressors, including currently ongoing divorce, sudden death of a friend's , and ongoing trauma s/p son's death 8 years ago.     Collateral obtained by this Writer from Patient's therapist, Elvia and brother, Neal;  Elvia reports   As per Neal,

## 2025-01-18 DIAGNOSIS — E11.43 TYPE 2 DIABETES MELLITUS WITH PERIPHERAL AUTONOMIC NEUROPATHY (H): ICD-10-CM

## 2025-01-20 RX ORDER — PEN NEEDLE, DIABETIC 32GX 5/32"
NEEDLE, DISPOSABLE MISCELLANEOUS
Qty: 100 EACH | Refills: 2 | Status: SHIPPED | OUTPATIENT
Start: 2025-01-20

## 2025-01-26 ENCOUNTER — HEALTH MAINTENANCE LETTER (OUTPATIENT)
Age: 70
End: 2025-01-26

## 2025-02-05 SDOH — HEALTH STABILITY: PHYSICAL HEALTH: ON AVERAGE, HOW MANY DAYS PER WEEK DO YOU ENGAGE IN MODERATE TO STRENUOUS EXERCISE (LIKE A BRISK WALK)?: 1 DAY

## 2025-02-05 SDOH — HEALTH STABILITY: PHYSICAL HEALTH: ON AVERAGE, HOW MANY MINUTES DO YOU ENGAGE IN EXERCISE AT THIS LEVEL?: 10 MIN

## 2025-02-05 ASSESSMENT — PATIENT HEALTH QUESTIONNAIRE - PHQ9
SUM OF ALL RESPONSES TO PHQ QUESTIONS 1-9: 6
SUM OF ALL RESPONSES TO PHQ QUESTIONS 1-9: 6
10. IF YOU CHECKED OFF ANY PROBLEMS, HOW DIFFICULT HAVE THESE PROBLEMS MADE IT FOR YOU TO DO YOUR WORK, TAKE CARE OF THINGS AT HOME, OR GET ALONG WITH OTHER PEOPLE: EXTREMELY DIFFICULT

## 2025-02-05 ASSESSMENT — SOCIAL DETERMINANTS OF HEALTH (SDOH): HOW OFTEN DO YOU GET TOGETHER WITH FRIENDS OR RELATIVES?: ONCE A WEEK

## 2025-02-06 ENCOUNTER — OFFICE VISIT (OUTPATIENT)
Dept: FAMILY MEDICINE | Facility: CLINIC | Age: 70
End: 2025-02-06
Payer: COMMERCIAL

## 2025-02-06 VITALS
TEMPERATURE: 98 F | RESPIRATION RATE: 18 BRPM | OXYGEN SATURATION: 97 % | DIASTOLIC BLOOD PRESSURE: 72 MMHG | SYSTOLIC BLOOD PRESSURE: 111 MMHG | WEIGHT: 228.2 LBS | BODY MASS INDEX: 30.91 KG/M2 | HEIGHT: 72 IN | HEART RATE: 73 BPM

## 2025-02-06 DIAGNOSIS — N52.1 ERECTILE DYSFUNCTION DUE TO DISEASES CLASSIFIED ELSEWHERE: ICD-10-CM

## 2025-02-06 DIAGNOSIS — L30.9 ECZEMA, UNSPECIFIED TYPE: ICD-10-CM

## 2025-02-06 DIAGNOSIS — R79.89 LOW TESTOSTERONE: ICD-10-CM

## 2025-02-06 DIAGNOSIS — Z79.4 TYPE 2 DIABETES MELLITUS WITH DIABETIC NEUROPATHY, WITH LONG-TERM CURRENT USE OF INSULIN (H): Primary | ICD-10-CM

## 2025-02-06 DIAGNOSIS — R53.83 OTHER FATIGUE: ICD-10-CM

## 2025-02-06 DIAGNOSIS — E11.40 TYPE 2 DIABETES MELLITUS WITH DIABETIC NEUROPATHY, WITH LONG-TERM CURRENT USE OF INSULIN (H): Primary | ICD-10-CM

## 2025-02-06 DIAGNOSIS — Z23 ENCOUNTER FOR IMMUNIZATION: ICD-10-CM

## 2025-02-06 PROBLEM — C61 PROSTATE CANCER (H): Status: RESOLVED | Noted: 2023-11-03 | Resolved: 2025-02-06

## 2025-02-06 LAB
ALBUMIN SERPL BCG-MCNC: 4.3 G/DL (ref 3.5–5.2)
ALP SERPL-CCNC: 84 U/L (ref 40–150)
ALT SERPL W P-5'-P-CCNC: 35 U/L (ref 0–70)
ANION GAP SERPL CALCULATED.3IONS-SCNC: 14 MMOL/L (ref 7–15)
AST SERPL W P-5'-P-CCNC: 26 U/L (ref 0–45)
BILIRUB SERPL-MCNC: 0.5 MG/DL
BUN SERPL-MCNC: 16.8 MG/DL (ref 8–23)
CALCIUM SERPL-MCNC: 9.3 MG/DL (ref 8.8–10.4)
CHLORIDE SERPL-SCNC: 101 MMOL/L (ref 98–107)
CHOLEST SERPL-MCNC: 127 MG/DL
CREAT SERPL-MCNC: 0.92 MG/DL (ref 0.67–1.17)
CREAT UR-MCNC: 165 MG/DL
EGFRCR SERPLBLD CKD-EPI 2021: 89 ML/MIN/1.73M2
ERYTHROCYTE [DISTWIDTH] IN BLOOD BY AUTOMATED COUNT: 13.2 % (ref 10–15)
EST. AVERAGE GLUCOSE BLD GHB EST-MCNC: 157 MG/DL
FASTING STATUS PATIENT QL REPORTED: NO
FASTING STATUS PATIENT QL REPORTED: NO
GLUCOSE SERPL-MCNC: 185 MG/DL (ref 70–99)
HBA1C MFR BLD: 7.1 % (ref 0–5.6)
HCO3 SERPL-SCNC: 24 MMOL/L (ref 22–29)
HCT VFR BLD AUTO: 50.7 % (ref 40–53)
HDLC SERPL-MCNC: 36 MG/DL
HGB BLD-MCNC: 17.5 G/DL (ref 13.3–17.7)
LDLC SERPL CALC-MCNC: 56 MG/DL
MCH RBC QN AUTO: 29.6 PG (ref 26.5–33)
MCHC RBC AUTO-ENTMCNC: 34.5 G/DL (ref 31.5–36.5)
MCV RBC AUTO: 86 FL (ref 78–100)
MICROALBUMIN UR-MCNC: <12 MG/L
MICROALBUMIN/CREAT UR: NORMAL MG/G{CREAT}
NONHDLC SERPL-MCNC: 91 MG/DL
PLATELET # BLD AUTO: 247 10E3/UL (ref 150–450)
POTASSIUM SERPL-SCNC: 4.2 MMOL/L (ref 3.4–5.3)
PROT SERPL-MCNC: 7.2 G/DL (ref 6.4–8.3)
RBC # BLD AUTO: 5.92 10E6/UL (ref 4.4–5.9)
SODIUM SERPL-SCNC: 139 MMOL/L (ref 135–145)
TRIGL SERPL-MCNC: 177 MG/DL
TSH SERPL DL<=0.005 MIU/L-ACNC: 0.98 UIU/ML (ref 0.3–4.2)
WBC # BLD AUTO: 9.5 10E3/UL (ref 4–11)

## 2025-02-06 RX ORDER — TADALAFIL 20 MG/1
20 TABLET ORAL EVERY 24 HOURS
Qty: 100 TABLET | Refills: 2 | Status: SHIPPED | OUTPATIENT
Start: 2025-02-06

## 2025-02-06 RX ORDER — TRIAMCINOLONE ACETONIDE 1 MG/G
OINTMENT TOPICAL
Qty: 30 G | Refills: 0 | Status: SHIPPED | OUTPATIENT
Start: 2025-02-06

## 2025-02-06 RX ORDER — TADALAFIL 20 MG/1
20 TABLET ORAL DAILY
Qty: 88 TABLET | Refills: 1 | Status: SHIPPED | OUTPATIENT
Start: 2025-02-06

## 2025-02-06 RX ORDER — GABAPENTIN 100 MG/1
100 CAPSULE ORAL AT BEDTIME
Qty: 90 CAPSULE | Refills: 1 | Status: SHIPPED | OUTPATIENT
Start: 2025-02-06

## 2025-02-06 RX ORDER — TESTOSTERONE CYPIONATE 200 MG/ML
INJECTION, SOLUTION INTRAMUSCULAR
Qty: 10 ML | Refills: 0 | Status: SHIPPED | OUTPATIENT
Start: 2025-02-06

## 2025-02-06 NOTE — NURSING NOTE
Provider ordered Shingles vaccine but patient on Medicare, talked with patient and gave him information in regards to having his Shingles vaccine given at pharmacy, patient states he is still working and has other insurance and is still wanting to get Shingles vaccine today, vaccine given    Karla Peoples/[facility]---[facility]

## 2025-02-06 NOTE — PROGRESS NOTES
Preventive Care Visit  [facility]  Majo Paul MD, Family Medicine  Feb 6, 2025      Assessment & Plan     (E11.40,  Z79.4) Type 2 diabetes mellitus with diabetic neuropathy, with long-term current use of insulin (H)  (primary encounter diagnosis)  Comment:   Plan: Lipid panel reflex to direct LDL Non-fasting,         Albumin Random Urine Quantitative with Creat         Ratio, Hemoglobin A1c, gabapentin (NEURONTIN)         100 MG capsule            (R79.89) Low testosterone  Comment:   Plan: tadalafil (CIALIS) 20 MG tablet, testosterone         cypionate (DEPOTESTOSTERONE) 200 MG/ML         injection, Testosterone, total            (Z23) Encounter for immunization  Comment:   Plan: ZOSTER RECOMBINANT ADJUVANTED (SHINGRIX),         INFLUENZA HIGH DOSE, TRIVALENT, PF (FLUZONE),         COVID-19 12+ (PFIZER)            (R53.83) Other fatigue  Comment:   Plan: CBC with platelets, Comprehensive metabolic         panel (BMP + Alb, Alk Phos, ALT, AST, Total.         Bili, TP), TSH with free T4 reflex            (L30.9) Eczema, unspecified type  Comment:   Plan: triamcinolone (KENALOG) 0.1 % external ointment            (N52.1) Erectile dysfunction due to diseases classified elsewhere  Comment:   Plan: tadalafil (ADCIRCA/CIALIS) 20 MG tablet            We discussed to follow up with the urology for enlarged prostate .         Counseling  Appropriate preventive services were addressed with this patient via screening, questionnaire, or discussion as appropriate for fall prevention, nutrition, physical activity, Tobacco-use cessation, social engagement, weight loss and cognition.  Checklist reviewing preventive services available has been given to the patient.  Reviewed patient's diet, addressing concerns and/or questions.   He is at risk for lack of exercise and has been provided with information to increase physical activity for the benefit of his well-being.   He is at risk for psychosocial distress  and has been provided with information to reduce risk.   Discussed possible causes of fatigue. The patient was provided with written information regarding signs of hearing loss.   Information on urinary incontinence and treatment options given to patient.   The patient's PHQ-9 score is consistent with mild depression. He was provided with information regarding depression.       Bria Hernandez is a 69 year old, presenting for the following:  Wellness Visit        2/6/2025    11:10 AM   Additional Questions   Roomed by Erin        Via the Health Maintenance questionnaire, the patient has reported the following services have been completed -Eye Exam: [facility] 2024-02-15, this information has been sent to the abstraction team.    HPI      Wants to discuss meds  Tadalafil  finasteride    Health Care Directive  Patient does not have a Health Care Directive:       2/5/2025   General Health   How would you rate your overall physical health? (!) FAIR   Feel stress (tense, anxious, or unable to sleep) To some extent   (!) STRESS CONCERN      2/5/2025   Nutrition   Diet: Diabetic         2/5/2025   Exercise   Days per week of moderate/strenous exercise 1 day   Average minutes spent exercising at this level 10 min   (!) EXERCISE CONCERN      2/5/2025   Social Factors   Frequency of gathering with friends or relatives Once a week   Worry food won't last until get money to buy more No   Food not last or not have enough money for food? No   Do you have housing? (Housing is defined as stable permanent housing and does not include staying ouside in a car, in a tent, in an abandoned building, in an overnight shelter, or couch-surfing.) Yes   Are you worried about losing your housing? No   Lack of transportation? No   Unable to get utilities (heat,electricity)? No         2/5/2025   Fall Risk   Fallen 2 or more times in the past year? No   Trouble with walking or balance? No          2/5/2025   Activities of Daily Living-  Home Safety   Needs help with the following daily activites None of the above   Safety concerns in the home None of the above         2025   Dental   Dentist two times every year? Yes         2025   Hearing Screening   Hearing concerns? (!) IT'S HARD TO FOLLOW A CONVERSATION IN A NOISY RESTAURANT OR CROWDED ROOM.    (!) TROUBLE FOLLOWING DIALOGUE IN THE THEATHER.    (!) TROUBLE UNDERSTANDING SOFT OR WHISPERED SPEECH.       Multiple values from one day are sorted in reverse-chronological order         2025   Driving Risk Screening   Patient/family members have concerns about driving No         2025   General Alertness/Fatigue Screening   Have you been more tired than usual lately? (!) YES         2025   Urinary Incontinence Screening   Bothered by leaking urine in past 6 months Yes          Today's PHQ-9 Score:       2025     7:38 AM   PHQ-9 SCORE   PHQ-9 Total Score MyChart 6 (Mild depression)   PHQ-9 Total Score 6        Patient-reported         2025   Substance Use   Alcohol more than 3/day or more than 7/wk Not Applicable   Do you have a current opioid prescription? No   How severe/bad is pain from 1 to 10? 4/10   Do you use any other substances recreationally? No     Social History     Tobacco Use    Smoking status: Former     Current packs/day: 0.00     Types: Cigarettes     Quit date:      Years since quittin.    Smokeless tobacco: Never   Vaping Use    Vaping status: Never Used   Substance Use Topics    Alcohol use: No     Comment: sober since 2016    Drug use: No       ASCVD Risk   The 10-year ASCVD risk score (Navi DK, et al., 2019) is: 29.3%    Values used to calculate the score:      Age: 69 years      Sex: Male      Is Non- : No      Diabetic: Yes      Tobacco smoker: No      Systolic Blood Pressure: 111 mmHg      Is BP treated: Yes      HDL Cholesterol: 38 mg/dL      Total Cholesterol: 132 mg/dL          Reviewed and updated as  needed this visit by Provider                    Past Medical History:   Diagnosis Date    Anxiety     Anxiety 01/31/2015    BPH (benign prostatic hyperplasia)     Degenerative disc disease     Depression     Gastritis     Gastro-oesophageal reflux disease     H/O alcohol abuse     Hyperlipidemia     Hypertension     Low testosterone     MRSA (methicillin resistant staph aureus) culture positive 08/2013    skin infection    Mumps     PUD (peptic ulcer disease)     Sleep apnea     STD (sexually transmitted disease)     Type 2 diabetes mellitus (H)     neuropathy     Past Surgical History:   Procedure Laterality Date    APPENDECTOMY      COLONOSCOPY  11/11/2013    Procedure: COMBINED COLONOSCOPY, SINGLE BIOPSY/POLYPECTOMY BY BIOPSY;  Colonoscopy/ polypectomy x2 by cold forceps    ;  Surgeon: Juan Carlos Bellamy MD;  Location: [facility]    DECOMPRESSION POSTERIOR LUMBAR, ,L4-5 decompression  06/14/2023    ESOPHAGOSCOPY, GASTROSCOPY, DUODENOSCOPY (EGD), COMBINED N/A 09/22/2016    Procedure: COMBINED ESOPHAGOSCOPY, GASTROSCOPY, DUODENOSCOPY (EGD), BIOPSY SINGLE OR MULTIPLE;  Surgeon: Juan Carlos Barraza MD;  Location: [facility]    ESOPHAGOSCOPY, GASTROSCOPY, DUODENOSCOPY (EGD), COMBINED N/A 02/19/2019    Procedure: ESOPHAGOSCOPY, GASTROSCOPY, DUODENOSCOPY (EGD) with cold forcep;  Surgeon: Juan Carlos Barraza MD;  Location: [facility]    EXCISE SOFT TISSUE TUMOR ELBOW Left 01/22/2024    Procedure: Removal Left Elbow Tumor;  Surgeon: Broderick Mccurdy MD;  Location: [facility]    HERNIA REPAIR       Current providers sharing in care for this patient include:  Patient Care Team:  Majo Paul MD as PCP - General (Family Medicine)  Christopher Cody MD as MD (Urology)  Yumi Babb, RN as Specialty Care Coordinator (Urology)  Kobe Lane MD as MD (Endocrinology, Diabetes, and Metabolism)  Majo Paul MD as Assigned PCP  Mohsen Rao MD as Assigned Surgical Provider  Veronique Vargas PA-C as Assigned  Musculoskeletal Provider    The following health maintenance items are reviewed in Epic and correct as of today:  Health Maintenance   Topic Date Due    ZOSTER IMMUNIZATION (2 of 2) 01/13/2023    MEDICARE ANNUAL WELLNESS VISIT  11/18/2023    INFLUENZA VACCINE (1) 09/01/2024    COVID-19 Vaccine (6 - 2024-25 season) 09/01/2024    LIPID  11/03/2024    MICROALBUMIN  11/03/2024    DIABETIC FOOT EXAM  11/03/2024    EYE EXAM  02/01/2025    A1C  02/28/2025    PHQ-9  08/06/2025    BMP  08/29/2025    ANNUAL REVIEW OF HM ORDERS  08/29/2025    FALL RISK ASSESSMENT  02/06/2026    COLORECTAL CANCER SCREENING  12/15/2026    ADVANCE CARE PLANNING  01/02/2028    DTAP/TDAP/TD IMMUNIZATION (4 - Td or Tdap) 09/29/2031    HEPATITIS C SCREENING  Completed    DEPRESSION ACTION PLAN  Completed    Pneumococcal Vaccine: 50+ Years  Completed    RSV VACCINE  Completed    AORTIC ANEURYSM SCREENING (SYSTEM ASSIGNED)  Completed    HPV IMMUNIZATION  Aged Out    MENINGITIS IMMUNIZATION  Aged Out    LUNG CANCER SCREENING  Discontinued         Review of Systems  CONSTITUTIONAL: NEGATIVE for fever, chills, change in weight  INTEGUMENTARY/SKIN: NEGATIVE for worrisome rashes, moles or lesions  EYES: NEGATIVE for vision changes or irritation  ENT/MOUTH: NEGATIVE for ear, mouth and throat problems  RESP: NEGATIVE for significant cough or SOB  BREAST: NEGATIVE for masses, tenderness or discharge  CV: NEGATIVE for chest pain, palpitations or peripheral edema  GI: NEGATIVE for nausea, abdominal pain, heartburn, or change in bowel habits  : NEGATIVE for frequency, dysuria, or hematuria  MUSCULOSKELETAL: NEGATIVE for significant arthralgias or myalgia  NEURO: NEGATIVE for weakness, dizziness or paresthesias  ENDOCRINE: NEGATIVE for temperature intolerance, skin/hair changes  HEME: NEGATIVE for bleeding problems  PSYCHIATRIC: NEGATIVE for changes in mood or affect     Objective    Exam  /72   Pulse 73   Temp 98  F (36.7  C) (Tympanic)   Resp 18   Ht  "1.835 m (6' 0.25\")   Wt 103.5 kg (228 lb 3.2 oz)   SpO2 97%   BMI 30.74 kg/m     Estimated body mass index is 30.74 kg/m  as calculated from the following:    Height as of this encounter: 1.835 m (6' 0.25\").    Weight as of this encounter: 103.5 kg (228 lb 3.2 oz).    Physical Exam  GENERAL: alert and no distress  EYES: Eyes grossly normal to inspection, PERRL and conjunctivae and sclerae normal  HENT: ear canals and TM's normal, nose and mouth without ulcers or lesions  NECK: no adenopathy, no asymmetry, masses, or scars  RESP: lungs clear to auscultation - no rales, rhonchi or wheezes  CV: regular rate and rhythm, normal S1 S2, no S3 or S4, no murmur, click or rub, no peripheral edema  ABDOMEN: soft, nontender, no hepatosplenomegaly, no masses and bowel sounds normal  MS: no gross musculoskeletal defects noted, no edema  SKIN: no suspicious lesions or rashes  NEURO: Normal strength and tone, mentation intact and speech normal  PSYCH: mentation appears normal, affect normal/bright         2/6/2025   Mini Cog   Clock Draw Score 2 Normal   3 Item Recall 3 objects recalled   Mini Cog Total Score 5             Signed Electronically by: Majo Paul MD    Answers submitted by the patient for this visit:  Patient Health Questionnaire (Submitted on 2/5/2025)  If you checked off any problems, how difficult have these problems made it for you to do your work, take care of things at home, or get along with other people?: Extremely difficult  PHQ9 TOTAL SCORE: 6    "

## 2025-02-08 LAB — TESTOST SERPL-MCNC: 932 NG/DL (ref 240–950)

## 2025-02-10 ENCOUNTER — MYC MEDICAL ADVICE (OUTPATIENT)
Dept: FAMILY MEDICINE | Facility: CLINIC | Age: 70
End: 2025-02-10
Payer: COMMERCIAL

## 2025-02-10 DIAGNOSIS — R79.89 LOW TESTOSTERONE LEVEL IN MALE: Primary | ICD-10-CM

## 2025-02-10 NOTE — TELEPHONE ENCOUNTER
I have ordered the lab , we can recheck in 1 month ,   Levels greater than 400 increases the risk of coronary artery disease .   If you want to keep medication at the same level and recheck lab in 1 month it is okay .   Will decrease dose based on lab recheck .    Thanks  Majo

## 2025-02-25 ENCOUNTER — TRANSFERRED RECORDS (OUTPATIENT)
Dept: MULTI SPECIALTY CLINIC | Facility: CLINIC | Age: 70
End: 2025-02-25

## 2025-02-25 LAB — RETINOPATHY: NORMAL

## 2025-02-27 ENCOUNTER — OFFICE VISIT (OUTPATIENT)
Dept: FAMILY MEDICINE | Facility: CLINIC | Age: 70
End: 2025-02-27
Payer: COMMERCIAL

## 2025-02-27 VITALS
RESPIRATION RATE: 18 BRPM | HEIGHT: 72 IN | OXYGEN SATURATION: 98 % | HEART RATE: 66 BPM | DIASTOLIC BLOOD PRESSURE: 73 MMHG | SYSTOLIC BLOOD PRESSURE: 117 MMHG | WEIGHT: 227.4 LBS | TEMPERATURE: 97.3 F | BODY MASS INDEX: 30.8 KG/M2

## 2025-02-27 DIAGNOSIS — R79.89 LOW SERUM TESTOSTERONE: ICD-10-CM

## 2025-02-27 DIAGNOSIS — I10 PRIMARY HYPERTENSION: ICD-10-CM

## 2025-02-27 DIAGNOSIS — E78.5 HYPERLIPIDEMIA LDL GOAL <100: ICD-10-CM

## 2025-02-27 DIAGNOSIS — E11.40 TYPE 2 DIABETES MELLITUS WITH DIABETIC NEUROPATHY, WITH LONG-TERM CURRENT USE OF INSULIN (H): Primary | ICD-10-CM

## 2025-02-27 DIAGNOSIS — Z79.4 TYPE 2 DIABETES MELLITUS WITH DIABETIC NEUROPATHY, WITH LONG-TERM CURRENT USE OF INSULIN (H): Primary | ICD-10-CM

## 2025-02-27 NOTE — PROGRESS NOTES
"  Assessment & Plan     (E11.40,  Z79.4) Type 2 diabetes mellitus with diabetic neuropathy, with long-term current use of insulin (H)  (primary encounter diagnosis)  Comment: discussed healthy eating   We went through his cgm data , we discussed about cutting down on the sugar .  Recommend to continue Lantuss ,novolog and Januvia , will continue to monitor .  Plan: FOOT EXAM        Unable to feel monofilament left feet , discussed foot protection .    (E78.5) Hyperlipidemia LDL goal <100  Comment: on Atorvastatin , recommend to continue to monitor .   Discussed low cholesterol diet .     (I10) Primary hypertension  Comment: recommend to continue lisinopril  , will continue to monitor .    (R79.89) Low serum testosterone  Comment: as levels on the higher side will check labs again .  Plan: Testosterone, total               BMI  Estimated body mass index is 30.63 kg/m  as calculated from the following:    Height as of this encounter: 1.835 m (6' 0.25\").    Weight as of this encounter: 103.1 kg (227 lb 6.4 oz).   Weight management plan: Discussed healthy diet and exercise guidelines    Bria Hernandez is a 69 year old, presenting for the following health issues:  Lesion        2/27/2025     6:53 AM   Additional Questions   Roomed by Erin       Via the Health Maintenance questionnaire, the patient has reported the following services have been completed -Eye Exam: [facility] 2025-02-25, this information has been sent to the abstraction team.  History of Present Illness       Reason for visit:  Bloodwork Testosterone levels?    He eats 2-3 servings of fruits and vegetables daily.He consumes 1 sweetened beverage(s) daily.   He is taking medications regularly.           Review of Systems  CONSTITUTIONAL: NEGATIVE for fever, chills, change in weight  INTEGUMENTARY/SKIN: NEGATIVE for worrisome rashes, moles or lesions  EYES: NEGATIVE for vision changes or irritation  ENT/MOUTH: NEGATIVE for ear, mouth and throat " "problems  RESP: NEGATIVE for significant cough or SOB  BREAST: NEGATIVE for masses, tenderness or discharge  CV: NEGATIVE for chest pain, palpitations or peripheral edema  GI: NEGATIVE for nausea, abdominal pain, heartburn, or change in bowel habits  : NEGATIVE for frequency, dysuria, or hematuria  MUSCULOSKELETAL: NEGATIVE for significant arthralgias or myalgia  NEURO: NEGATIVE for weakness, dizziness or paresthesias  ENDOCRINE: NEGATIVE for temperature intolerance, skin/hair changes  HEME: NEGATIVE for bleeding problems  PSYCHIATRIC: NEGATIVE for changes in mood or affect      Objective    /73   Pulse 66   Temp 97.3  F (36.3  C) (Tympanic)   Resp 18   Ht 1.835 m (6' 0.25\")   Wt 103.1 kg (227 lb 6.4 oz)   SpO2 98%   BMI 30.63 kg/m    Body mass index is 30.63 kg/m .  Physical Exam   GENERAL: alert and no distress  EYES: Eyes grossly normal to inspection, PERRL and conjunctivae and sclerae normal  HENT: ear canals and TM's normal, nose and mouth without ulcers or lesions  NECK: no adenopathy, no asymmetry, masses, or scars  RESP: lungs clear to auscultation - no rales, rhonchi or wheezes  CV: regular rate and rhythm, normal S1 S2, no S3 or S4, no murmur, click or rub, no peripheral edema  ABDOMEN: soft, nontender, no hepatosplenomegaly, no masses and bowel sounds normal  MS: no gross musculoskeletal defects noted, no edema  Unable to feel monofilament left foot .    SKIN: no suspicious lesions or rashes  NEURO: Normal strength and tone, mentation intact and speech normal  PSYCH: mentation appears normal, affect normal/bright            Signed Electronically by: Majo Paul MD    "

## 2025-03-01 LAB — TESTOST SERPL-MCNC: 248 NG/DL (ref 240–950)

## 2025-03-03 ENCOUNTER — LAB (OUTPATIENT)
Dept: LAB | Facility: CLINIC | Age: 70
End: 2025-03-03
Payer: COMMERCIAL

## 2025-03-03 DIAGNOSIS — C61 PROSTATE CANCER (H): ICD-10-CM

## 2025-03-03 LAB — PSA SERPL DL<=0.01 NG/ML-MCNC: 3.86 NG/ML (ref 0–6.5)

## 2025-03-03 PROCEDURE — 36415 COLL VENOUS BLD VENIPUNCTURE: CPT

## 2025-03-03 PROCEDURE — 84153 ASSAY OF PSA TOTAL: CPT

## 2025-03-05 ENCOUNTER — OFFICE VISIT (OUTPATIENT)
Dept: UROLOGY | Facility: CLINIC | Age: 70
End: 2025-03-05
Payer: COMMERCIAL

## 2025-03-05 VITALS
SYSTOLIC BLOOD PRESSURE: 103 MMHG | DIASTOLIC BLOOD PRESSURE: 67 MMHG | HEART RATE: 73 BPM | HEIGHT: 72 IN | BODY MASS INDEX: 30.75 KG/M2 | WEIGHT: 227 LBS

## 2025-03-05 DIAGNOSIS — C61 PROSTATE CANCER (H): Primary | ICD-10-CM

## 2025-03-05 PROCEDURE — 3078F DIAST BP <80 MM HG: CPT | Performed by: UROLOGY

## 2025-03-05 PROCEDURE — 99214 OFFICE O/P EST MOD 30 MIN: CPT | Performed by: UROLOGY

## 2025-03-05 PROCEDURE — G2211 COMPLEX E/M VISIT ADD ON: HCPCS | Performed by: UROLOGY

## 2025-03-05 PROCEDURE — 3074F SYST BP LT 130 MM HG: CPT | Performed by: UROLOGY

## 2025-03-05 PROCEDURE — 1126F AMNT PAIN NOTED NONE PRSNT: CPT | Performed by: UROLOGY

## 2025-03-05 ASSESSMENT — PAIN SCALES - GENERAL: PAINLEVEL_OUTOF10: NO PAIN (0)

## 2025-03-05 NOTE — NURSING NOTE
Chief Complaint   Patient presents with    Prostate Cancer     PSA review     Pt has no new concerns with urination.  Pt denies gross hematuria or dysuria.      Kristine Wells, Clinic Assistant

## 2025-03-05 NOTE — LETTER
3/5/2025       RE: Austyn Araujo  [address]  [facility] [address]     Dear Colleague,    Thank you for referring your patient, Austyn Araujo, to the [facility] [place] at [facility]. Please see a copy of my visit note below.    Office Visit Note  [facility]  [phone]    UROLOGIC DIAGNOSES:   Low risk prostate cancer, diagnosed 2019  Enlarged prostate  Erectile dysfunction  Peyronie's disease    CURRENT INTERVENTIONS:   Active surveillance  Prostate biopsy x 2  Flomax and finasteride  Cialis  Previous use of Trimix injections  Keflex injections at [facility]  Office cystoscopy 2024    HISTORY:   David started finasteride 6 months ago and his PSA has gone down, currently at 3.86.  He says he does not think the finasteride has helped his symptoms much.  He is interested in discussing options for his urinary symptoms.  He complains mainly of a very slow urinary stream and difficulty starting his stream, especially in the mornings.  He has nocturia x 2.  He also has questions about inflatable penile prosthesis surgery today.      PAST MEDICAL HISTORY:   Past Medical History:   Diagnosis Date     Anxiety      Anxiety 01/31/2015     BPH (benign prostatic hyperplasia)      Degenerative disc disease      Depression      Gastritis      Gastro-oesophageal reflux disease      H/O alcohol abuse      Hyperlipidemia      Hypertension      Low testosterone      MRSA (methicillin resistant staph aureus) culture positive 08/2013    skin infection     Mumps      PUD (peptic ulcer disease)      Sleep apnea      STD (sexually transmitted disease)      Type 2 diabetes mellitus (H)     neuropathy       PAST SURGICAL HISTORY:   Past Surgical History:   Procedure Laterality Date     APPENDECTOMY       COLONOSCOPY  11/11/2013    Procedure: COMBINED COLONOSCOPY, SINGLE BIOPSY/POLYPECTOMY BY BIOPSY;  Colonoscopy/ polypectomy x2 by cold  forceps    ;  Surgeon: Juan Carlos Bellamy MD;  Location: [facility]     DECOMPRESSION POSTERIOR LUMBAR, ,L4-5 decompression  2023     ESOPHAGOSCOPY, GASTROSCOPY, DUODENOSCOPY (EGD), COMBINED N/A 2016    Procedure: COMBINED ESOPHAGOSCOPY, GASTROSCOPY, DUODENOSCOPY (EGD), BIOPSY SINGLE OR MULTIPLE;  Surgeon: Juan Carlos Barraza MD;  Location: [facility]     ESOPHAGOSCOPY, GASTROSCOPY, DUODENOSCOPY (EGD), COMBINED N/A 2019    Procedure: ESOPHAGOSCOPY, GASTROSCOPY, DUODENOSCOPY (EGD) with cold forcep;  Surgeon: Juan Carlos Barraza MD;  Location: [facility]     EXCISE SOFT TISSUE TUMOR ELBOW Left 2024    Procedure: Removal Left Elbow Tumor;  Surgeon: Broderick Mccurdy MD;  Location: [facility]     HERNIA REPAIR         FAMILY HISTORY:   Family History   Problem Relation Age of Onset     Pancreatic Cancer Mother      Dementia Father      Parkinsonism Father      Diabetes Brother      Depression Brother      Suicide Brother      Substance Abuse Brother      Pancreatic Cancer Paternal Grandfather      Family History Negative No family hx of      Colon Cancer No family hx of      Unknown/Adopted No family hx of      Anxiety Disorder No family hx of      Schizophrenia No family hx of      Bipolar Disorder No family hx of      [place] Disease No family hx of      Autism Spectrum Disorder No family hx of      Intellectual Disability No family hx of      Mental Illness No family hx of      Anesthesia Reaction No family hx of      Deep Vein Thrombosis (DVT) No family hx of        SOCIAL HISTORY:   Social History     Socioeconomic History     Marital status:    Tobacco Use     Smoking status: Former     Current packs/day: 0.00     Types: Cigarettes     Quit date:      Years since quittin.1     Smokeless tobacco: Never   Vaping Use     Vaping status: Never Used   Substance and Sexual Activity     Alcohol use: No     Comment: sober since      Drug use: No     Sexual activity: Yes     Partners: Female      Comment: I have Erectile Dysfunction and difficulty urinating     Social Drivers of Health     Financial Resource Strain: Low Risk  (2/5/2025)    Financial Resource Strain      Within the past 12 months, have you or your family members you live with been unable to get utilities (heat, electricity) when it was really needed?: No   Food Insecurity: Low Risk  (2/5/2025)    Food Insecurity      Within the past 12 months, did you worry that your food would run out before you got money to buy more?: No      Within the past 12 months, did the food you bought just not last and you didn t have money to get more?: No   Transportation Needs: Low Risk  (2/5/2025)    Transportation Needs      Within the past 12 months, has lack of transportation kept you from medical appointments, getting your medicines, non-medical meetings or appointments, work, or from getting things that you need?: No   Physical Activity: Insufficiently Active (2/5/2025)    Exercise Vital Sign      Days of Exercise per Week: 1 day      Minutes of Exercise per Session: 10 min   Stress: Stress Concern Present (2/5/2025)    [employer] - Occupational Stress Questionnaire      Feeling of Stress : To some extent   Social Connections: Unknown (2/5/2025)    Social Connection and Isolation Panel [NHANES]      Frequency of Social Gatherings with Friends and Family: Once a week   Interpersonal Safety: Low Risk  (2/6/2025)    Interpersonal Safety      Do you feel physically and emotionally safe where you currently live?: Yes      Within the past 12 months, have you been hit, slapped, kicked or otherwise physically hurt by someone?: No      Within the past 12 months, have you been humiliated or emotionally abused in other ways by your partner or ex-partner?: No   Housing Stability: Low Risk  (2/5/2025)    Housing Stability      Do you have housing? : Yes      Are you worried about losing your housing?: No       Review Of Systems:  Skin:  "No rash, pruritis, or skin pigmentation  Eyes: No changes in vision  Ears/Nose/Throat: No changes in hearing, no nosebleeds  Respiratory: No shortness of breath, dyspnea on exertion, cough, or hemoptysis  Cardiovascular: No chest pain or palpitations  Gastrointestinal: No diarrhea or constipation. No abdominal pain. No hematochezia  Genitourinary: see HPI  Musculoskeletal: No pain or swelling of joints, normal range of motion  Neurologic: No weakness or tremors  Psychiatric: No recent changes in memory or mood  Hematologic/Lymphatic/Immunologic: No easy bruising or enlarged lymph nodes  Endocrine: No weight gain or loss      PHYSICAL EXAM:    /67   Pulse 73   Ht 1.835 m (6' 0.25\")   Wt 103 kg (227 lb)   BMI 30.57 kg/m      Constitutional: Well developed. Conversant and in no acute distress  Eyes: Anicteric sclera, conjunctiva clear, normal extraocular movements  ENT: Normocephalic and atraumatic,   Skin: Warm and dry. No rashes or lesions  Cardiac: No peripheral edema  Back/Flank: Not done  CNS/PNS: Normal musculature and movements, moves all extremities normally  Respiratory: Normal non-labored breathing  Abdomen: Soft nontender and nondistended  Peripheral Vascular: No peripheral edema  Mental Status/Psych: Alert and Oriented x 3. Normal mood and affect    Penis: Not done  Scrotal Skin: Not done  Testicles: Not done  Epididymis: Not done  Digital Rectal Exam:     Cystoscopy: Not done    Imaging: None    Urinalysis: UA RESULTS:  Recent Labs   Lab Test 09/04/24  0844 11/18/22  1553   COLOR Yellow Yellow   APPEARANCE Clear Clear   URINEGLC Negative >=1000*   URINEBILI Negative Negative   URINEKETONE Negative Negative   SG 1.025 >=1.030   UBLD Negative Negative   URINEPH 6.5 6.0   PROTEIN Negative Negative   UROBILINOGEN 0.2 0.2   NITRITE Negative Negative   LEUKEST Negative Negative   RBCU  --  0-2   WBCU  --  0-5       PSA: 3.86    Post Void Residual:     Other labs: None today      IMPRESSION:  Low risk " prostate cancer  Enlarged prostate  Erectile dysfunction    PLAN:  We discussed his multiple urologic diagnoses and treatment options at this time.    He is stable with regards to his prostate cancer.  He says he actually started the finasteride less than 6 months ago and the PSA has gone down generally as expected.      We discussed inflatable penile prosthesis surgery.  This is not a surgery that I performed personally but I do have partners like to refer him to in order to have the procedure performed.  We discussed the risks of the procedure.  I did  him that he if he is thinking about having surgery on his prostate he should have the surgery on his prostate before proceeding with inflatable penile prosthesis.    We discussed treatment options for his enlarged prostate as well as prostate cancer.  For his enlarged prostate he would be a good candidate for either Aquablation or transurethral resection of the prostate.  He has been researching the aquablation procedure and is somewhat interested in this procedure and has multiple questions about the procedure today.  We discussed the procedure along with its risks and expected recovery.  He also understands that BPH procedures would not treat his prostate cancer and he would still need to be followed for prostate cancer postoperatively.  He still may need to be treated for prostate cancer in the future and he understands this, but he has been stable for 6 years now with regards to his prostate cancer.    We also discussed again the possibility of proceeding with robotic assisted laparoscopic radical prostatectomy.  This would both treat the prostate cancer and remove the enlarged prostate as well.  We discussed the risks of the procedure.    He would like to have some more time to think about things, he is leaning towards potentially proceeding with Aquablation of the prostate, for which he would be a good candidate.  He will contact me if he has any other  questions about the procedure would like to schedule procedure.  Otherwise I will tentatively plan on seeing him back again in 6 months for his next PSA check.    The longitudinal plan of care for the diagnosis(es)/condition(s) as documented were addressed during this visit. Due to the added complexity in care, I will continue to support David in the subsequent management and with ongoing continuity of care.        Mohsen Rao M.D.              Again, thank you for allowing me to participate in the care of your patient.      Sincerely,    Mohsen Rao MD

## 2025-03-05 NOTE — PROGRESS NOTES
Office Visit Note  [facility]  [phone]    UROLOGIC DIAGNOSES:   Low risk prostate cancer, diagnosed 2019  Enlarged prostate  Erectile dysfunction  Peyronie's disease    CURRENT INTERVENTIONS:   Active surveillance  Prostate biopsy x 2  Flomax and finasteride  Cialis  Previous use of Trimix injections  Keflex injections at [facility]  Office cystoscopy 2024    HISTORY:   David started finasteride 6 months ago and his PSA has gone down, currently at 3.86.  He says he does not think the finasteride has helped his symptoms much.  He is interested in discussing options for his urinary symptoms.  He complains mainly of a very slow urinary stream and difficulty starting his stream, especially in the mornings.  He has nocturia x 2.  He also has questions about inflatable penile prosthesis surgery today.      PAST MEDICAL HISTORY:   Past Medical History:   Diagnosis Date    Anxiety     Anxiety 01/31/2015    BPH (benign prostatic hyperplasia)     Degenerative disc disease     Depression     Gastritis     Gastro-oesophageal reflux disease     H/O alcohol abuse     Hyperlipidemia     Hypertension     Low testosterone     MRSA (methicillin resistant staph aureus) culture positive 08/2013    skin infection    Mumps     PUD (peptic ulcer disease)     Sleep apnea     STD (sexually transmitted disease)     Type 2 diabetes mellitus (H)     neuropathy       PAST SURGICAL HISTORY:   Past Surgical History:   Procedure Laterality Date    APPENDECTOMY      COLONOSCOPY  11/11/2013    Procedure: COMBINED COLONOSCOPY, SINGLE BIOPSY/POLYPECTOMY BY BIOPSY;  Colonoscopy/ polypectomy x2 by cold forceps    ;  Surgeon: Juan Carlos Bellamy MD;  Location: [facility]    DECOMPRESSION POSTERIOR LUMBAR, ,L4-5 decompression  06/14/2023    ESOPHAGOSCOPY, GASTROSCOPY, DUODENOSCOPY (EGD), COMBINED N/A 09/22/2016    Procedure: COMBINED ESOPHAGOSCOPY, GASTROSCOPY, DUODENOSCOPY (EGD), BIOPSY SINGLE OR MULTIPLE;  Surgeon: Juan Carlos Barraza MD;   Location: [facility]    ESOPHAGOSCOPY, GASTROSCOPY, DUODENOSCOPY (EGD), COMBINED N/A 2019    Procedure: ESOPHAGOSCOPY, GASTROSCOPY, DUODENOSCOPY (EGD) with cold forcep;  Surgeon: Juan Carlos Barraza MD;  Location: [facility]    EXCISE SOFT TISSUE TUMOR ELBOW Left 2024    Procedure: Removal Left Elbow Tumor;  Surgeon: Broderick Mccurdy MD;  Location: [facility]    HERNIA REPAIR         FAMILY HISTORY:   Family History   Problem Relation Age of Onset    Pancreatic Cancer Mother     Dementia Father     Parkinsonism Father     Diabetes Brother     Depression Brother     Suicide Brother     Substance Abuse Brother     Pancreatic Cancer Paternal Grandfather     Family History Negative No family hx of     Colon Cancer No family hx of     Unknown/Adopted No family hx of     Anxiety Disorder No family hx of     Schizophrenia No family hx of     Bipolar Disorder No family hx of     [place] Disease No family hx of     Autism Spectrum Disorder No family hx of     Intellectual Disability No family hx of     Mental Illness No family hx of     Anesthesia Reaction No family hx of     Deep Vein Thrombosis (DVT) No family hx of        SOCIAL HISTORY:   Social History     Socioeconomic History    Marital status:    Tobacco Use    Smoking status: Former     Current packs/day: 0.00     Types: Cigarettes     Quit date:      Years since quittin.1    Smokeless tobacco: Never   Vaping Use    Vaping status: Never Used   Substance and Sexual Activity    Alcohol use: No     Comment: sober since     Drug use: No    Sexual activity: Yes     Partners: Female     Comment: I have Erectile Dysfunction and difficulty urinating     Social Drivers of Health     Financial Resource Strain: Low Risk  (2025)    Financial Resource Strain     Within the past 12 months, have you or your family members you live with been unable to get utilities (heat, electricity) when it was really needed?: No   Food Insecurity: Low Risk   (2/5/2025)    Food Insecurity     Within the past 12 months, did you worry that your food would run out before you got money to buy more?: No     Within the past 12 months, did the food you bought just not last and you didn t have money to get more?: No   Transportation Needs: Low Risk  (2/5/2025)    Transportation Needs     Within the past 12 months, has lack of transportation kept you from medical appointments, getting your medicines, non-medical meetings or appointments, work, or from getting things that you need?: No   Physical Activity: Insufficiently Active (2/5/2025)    Exercise Vital Sign     Days of Exercise per Week: 1 day     Minutes of Exercise per Session: 10 min   Stress: Stress Concern Present (2/5/2025)    [employer] - Occupational Stress Questionnaire     Feeling of Stress : To some extent   Social Connections: Unknown (2/5/2025)    Social Connection and Isolation Panel [NHANES]     Frequency of Social Gatherings with Friends and Family: Once a week   Interpersonal Safety: Low Risk  (2/6/2025)    Interpersonal Safety     Do you feel physically and emotionally safe where you currently live?: Yes     Within the past 12 months, have you been hit, slapped, kicked or otherwise physically hurt by someone?: No     Within the past 12 months, have you been humiliated or emotionally abused in other ways by your partner or ex-partner?: No   Housing Stability: Low Risk  (2/5/2025)    Housing Stability     Do you have housing? : Yes     Are you worried about losing your housing?: No       Review Of Systems:  Skin: No rash, pruritis, or skin pigmentation  Eyes: No changes in vision  Ears/Nose/Throat: No changes in hearing, no nosebleeds  Respiratory: No shortness of breath, dyspnea on exertion, cough, or hemoptysis  Cardiovascular: No chest pain or palpitations  Gastrointestinal: No diarrhea or constipation. No abdominal pain. No hematochezia  Genitourinary: see HPI  Musculoskeletal:  "No pain or swelling of joints, normal range of motion  Neurologic: No weakness or tremors  Psychiatric: No recent changes in memory or mood  Hematologic/Lymphatic/Immunologic: No easy bruising or enlarged lymph nodes  Endocrine: No weight gain or loss      PHYSICAL EXAM:    /67   Pulse 73   Ht 1.835 m (6' 0.25\")   Wt 103 kg (227 lb)   BMI 30.57 kg/m      Constitutional: Well developed. Conversant and in no acute distress  Eyes: Anicteric sclera, conjunctiva clear, normal extraocular movements  ENT: Normocephalic and atraumatic,   Skin: Warm and dry. No rashes or lesions  Cardiac: No peripheral edema  Back/Flank: Not done  CNS/PNS: Normal musculature and movements, moves all extremities normally  Respiratory: Normal non-labored breathing  Abdomen: Soft nontender and nondistended  Peripheral Vascular: No peripheral edema  Mental Status/Psych: Alert and Oriented x 3. Normal mood and affect    Penis: Not done  Scrotal Skin: Not done  Testicles: Not done  Epididymis: Not done  Digital Rectal Exam:     Cystoscopy: Not done    Imaging: None    Urinalysis: UA RESULTS:  Recent Labs   Lab Test 09/04/24  0844 11/18/22  1553   COLOR Yellow Yellow   APPEARANCE Clear Clear   URINEGLC Negative >=1000*   URINEBILI Negative Negative   URINEKETONE Negative Negative   SG 1.025 >=1.030   UBLD Negative Negative   URINEPH 6.5 6.0   PROTEIN Negative Negative   UROBILINOGEN 0.2 0.2   NITRITE Negative Negative   LEUKEST Negative Negative   RBCU  --  0-2   WBCU  --  0-5       PSA: 3.86    Post Void Residual:     Other labs: None today      IMPRESSION:  Low risk prostate cancer  Enlarged prostate  Erectile dysfunction    PLAN:  We discussed his multiple urologic diagnoses and treatment options at this time.    He is stable with regards to his prostate cancer.  He says he actually started the finasteride less than 6 months ago and the PSA has gone down generally as expected.      We discussed inflatable penile prosthesis surgery.  " This is not a surgery that I performed personally but I do have partners like to refer him to in order to have the procedure performed.  We discussed the risks of the procedure.  I did  him that he if he is thinking about having surgery on his prostate he should have the surgery on his prostate before proceeding with inflatable penile prosthesis.    We discussed treatment options for his enlarged prostate as well as prostate cancer.  For his enlarged prostate he would be a good candidate for either Aquablation or transurethral resection of the prostate.  He has been researching the aquablation procedure and is somewhat interested in this procedure and has multiple questions about the procedure today.  We discussed the procedure along with its risks and expected recovery.  He also understands that BPH procedures would not treat his prostate cancer and he would still need to be followed for prostate cancer postoperatively.  He still may need to be treated for prostate cancer in the future and he understands this, but he has been stable for 6 years now with regards to his prostate cancer.    We also discussed again the possibility of proceeding with robotic assisted laparoscopic radical prostatectomy.  This would both treat the prostate cancer and remove the enlarged prostate as well.  We discussed the risks of the procedure.    He would like to have some more time to think about things, he is leaning towards potentially proceeding with Aquablation of the prostate, for which he would be a good candidate.  He will contact me if he has any other questions about the procedure would like to schedule procedure.  Otherwise I will tentatively plan on seeing him back again in 6 months for his next PSA check.    The longitudinal plan of care for the diagnosis(es)/condition(s) as documented were addressed during this visit. Due to the added complexity in care, I will continue to support David in the subsequent management  and with ongoing continuity of care.        Mohsen Rao M.D.

## 2025-03-06 ENCOUNTER — TELEPHONE (OUTPATIENT)
Dept: FAMILY MEDICINE | Facility: CLINIC | Age: 70
End: 2025-03-06
Payer: COMMERCIAL

## 2025-03-06 NOTE — TELEPHONE ENCOUNTER
Summary:    Patient is due/failing the following:   Eye exam    Reviewed:    [] CARE EVERYWHERE  [] LAST OV NOTE   [] FYI TAB  [] MYCHART ACTIVE?  [] LAST PANEL ENCOUNTER  [] FUTURE APPTS  [] IMMUNIZATIONS  [] Media Tab            Action needed:   Patient needs referral/order: eye exam    Type of outreach:    Received eye report from eye clinic and is negative for retinopathy, report sent to abstraction                                                                               Karla Peoples/[facility]---[facility]

## 2025-03-13 NOTE — TELEPHONE ENCOUNTER
Patient currently eye exam per quality    Karla Peoples/[facility]---[facility]

## 2025-03-29 DIAGNOSIS — I10 BENIGN ESSENTIAL HYPERTENSION: ICD-10-CM

## 2025-03-31 DIAGNOSIS — E11.43 TYPE 2 DIABETES MELLITUS WITH DIABETIC AUTONOMIC (POLY)NEUROPATHY (H): ICD-10-CM

## 2025-03-31 DIAGNOSIS — R79.89 LOW TESTOSTERONE: ICD-10-CM

## 2025-03-31 RX ORDER — SITAGLIPTIN 100 MG/1
100 TABLET, FILM COATED ORAL DAILY
Qty: 90 TABLET | Refills: 0 | Status: SHIPPED | OUTPATIENT
Start: 2025-03-31

## 2025-03-31 RX ORDER — LISINOPRIL 10 MG/1
10 TABLET ORAL EVERY MORNING
Qty: 90 TABLET | Refills: 0 | Status: SHIPPED | OUTPATIENT
Start: 2025-03-31

## 2025-03-31 NOTE — TELEPHONE ENCOUNTER
Clinic RN: Please investigate patient's chart or contact patient if the information cannot be found because patient should have run out of this medication on 1/2024. Confirm patient is taking this medication as prescribed. Document findings and route refill encounter to provider for approval or denial.

## 2025-03-31 NOTE — TELEPHONE ENCOUNTER
Per my chart - writer did send message back on importance of taking daily for BP     JAQUELINE Navarrete,  Yes, I'm supposed to take it daily, but with my ED, I sometimes skip it.  Yes, I SHOULD be taking it daily but I'm out of it.

## 2025-04-08 ENCOUNTER — TELEPHONE (OUTPATIENT)
Dept: FAMILY MEDICINE | Facility: CLINIC | Age: 70
End: 2025-04-08
Payer: COMMERCIAL

## 2025-04-08 NOTE — TELEPHONE ENCOUNTER
Patient Quality Outreach    Patient is due for the following:   Physical Annual Wellness Visit    Action(s) Taken:   No follow up needed at this time.    Type of outreach:    Phone, left message for patient/parent to call back.    Questions for provider review:    None         Manuelito Wong, [facility]  Chart routed to None.

## 2025-04-14 ENCOUNTER — TELEPHONE (OUTPATIENT)
Dept: UROLOGY | Facility: CLINIC | Age: 70
End: 2025-04-14
Payer: COMMERCIAL

## 2025-04-14 DIAGNOSIS — E11.43 TYPE 2 DIABETES MELLITUS WITH DIABETIC AUTONOMIC (POLY)NEUROPATHY (H): ICD-10-CM

## 2025-04-14 DIAGNOSIS — I10 BENIGN ESSENTIAL HYPERTENSION: ICD-10-CM

## 2025-04-14 DIAGNOSIS — L30.9 ECZEMA, UNSPECIFIED TYPE: ICD-10-CM

## 2025-04-15 RX ORDER — SITAGLIPTIN 100 MG/1
100 TABLET, FILM COATED ORAL DAILY
Qty: 90 TABLET | Refills: 0 | OUTPATIENT
Start: 2025-04-15

## 2025-04-15 RX ORDER — TRIAMCINOLONE ACETONIDE 1 MG/G
OINTMENT TOPICAL
Qty: 30 G | Refills: 0 | Status: SHIPPED | OUTPATIENT
Start: 2025-04-15

## 2025-04-15 RX ORDER — LISINOPRIL 10 MG/1
10 TABLET ORAL EVERY MORNING
Qty: 90 TABLET | Refills: 0 | OUTPATIENT
Start: 2025-04-15

## 2025-04-15 RX ORDER — METFORMIN HYDROCHLORIDE 500 MG/1
2000 TABLET, EXTENDED RELEASE ORAL
Qty: 360 TABLET | Refills: 0 | Status: SHIPPED | OUTPATIENT
Start: 2025-04-15

## 2025-04-17 ENCOUNTER — TELEPHONE (OUTPATIENT)
Dept: UROLOGY | Facility: CLINIC | Age: 70
End: 2025-04-17
Payer: COMMERCIAL

## 2025-04-17 NOTE — TELEPHONE ENCOUNTER
Scheduled surgery 5/20 with Dr. Rao. He is aware he needs a pre-op physical. Surgery packet mailed.

## 2025-04-29 DIAGNOSIS — R79.89 LOW TESTOSTERONE: ICD-10-CM

## 2025-04-29 RX ORDER — TESTOSTERONE CYPIONATE 200 MG/ML
INJECTION, SOLUTION INTRAMUSCULAR
Qty: 8 ML | Refills: 1 | Status: SHIPPED | OUTPATIENT
Start: 2025-04-29

## 2025-05-05 ENCOUNTER — OFFICE VISIT (OUTPATIENT)
Dept: FAMILY MEDICINE | Facility: CLINIC | Age: 70
End: 2025-05-05
Payer: COMMERCIAL

## 2025-05-05 VITALS
SYSTOLIC BLOOD PRESSURE: 100 MMHG | DIASTOLIC BLOOD PRESSURE: 62 MMHG | BODY MASS INDEX: 30.75 KG/M2 | HEIGHT: 72 IN | RESPIRATION RATE: 16 BRPM | WEIGHT: 227 LBS | HEART RATE: 84 BPM | TEMPERATURE: 98 F | OXYGEN SATURATION: 96 %

## 2025-05-05 DIAGNOSIS — K21.9 GASTROESOPHAGEAL REFLUX DISEASE WITHOUT ESOPHAGITIS: ICD-10-CM

## 2025-05-05 DIAGNOSIS — Z79.4 TYPE 2 DIABETES MELLITUS WITH DIABETIC NEUROPATHY, WITH LONG-TERM CURRENT USE OF INSULIN (H): ICD-10-CM

## 2025-05-05 DIAGNOSIS — R39.12 BENIGN PROSTATIC HYPERPLASIA WITH WEAK URINARY STREAM: ICD-10-CM

## 2025-05-05 DIAGNOSIS — Z01.818 PREOP GENERAL PHYSICAL EXAM: Primary | ICD-10-CM

## 2025-05-05 DIAGNOSIS — R30.0 DYSURIA: ICD-10-CM

## 2025-05-05 DIAGNOSIS — N40.1 BENIGN PROSTATIC HYPERPLASIA WITH WEAK URINARY STREAM: ICD-10-CM

## 2025-05-05 DIAGNOSIS — E11.40 TYPE 2 DIABETES MELLITUS WITH DIABETIC NEUROPATHY, WITH LONG-TERM CURRENT USE OF INSULIN (H): ICD-10-CM

## 2025-05-05 DIAGNOSIS — R79.89 LOW TESTOSTERONE: ICD-10-CM

## 2025-05-05 PROCEDURE — 99214 OFFICE O/P EST MOD 30 MIN: CPT | Performed by: FAMILY MEDICINE

## 2025-05-05 PROCEDURE — 93000 ELECTROCARDIOGRAM COMPLETE: CPT | Performed by: FAMILY MEDICINE

## 2025-05-05 PROCEDURE — 81003 URINALYSIS AUTO W/O SCOPE: CPT | Performed by: FAMILY MEDICINE

## 2025-05-05 PROCEDURE — 3074F SYST BP LT 130 MM HG: CPT | Performed by: FAMILY MEDICINE

## 2025-05-05 PROCEDURE — 3078F DIAST BP <80 MM HG: CPT | Performed by: FAMILY MEDICINE

## 2025-05-05 RX ORDER — TESTOSTERONE CYPIONATE 200 MG/ML
INJECTION, SOLUTION INTRAMUSCULAR
Qty: 10 ML | Refills: 2 | Status: SHIPPED | OUTPATIENT
Start: 2025-05-05

## 2025-05-05 RX ORDER — OMEPRAZOLE 40 MG/1
CAPSULE, DELAYED RELEASE ORAL
Qty: 90 CAPSULE | Refills: 2 | Status: CANCELLED | OUTPATIENT
Start: 2025-05-05

## 2025-05-05 RX ORDER — NEEDLES, DISPOSABLE 25GX5/8"
NEEDLE, DISPOSABLE MISCELLANEOUS
Qty: 6 EACH | Refills: 3 | Status: CANCELLED | OUTPATIENT
Start: 2025-05-05

## 2025-05-05 ASSESSMENT — PATIENT HEALTH QUESTIONNAIRE - PHQ9
10. IF YOU CHECKED OFF ANY PROBLEMS, HOW DIFFICULT HAVE THESE PROBLEMS MADE IT FOR YOU TO DO YOUR WORK, TAKE CARE OF THINGS AT HOME, OR GET ALONG WITH OTHER PEOPLE: SOMEWHAT DIFFICULT
SUM OF ALL RESPONSES TO PHQ QUESTIONS 1-9: 4
SUM OF ALL RESPONSES TO PHQ QUESTIONS 1-9: 4

## 2025-05-05 NOTE — PROGRESS NOTES
"Preoperative Evaluation  [facility]  [address] [facility] [address]  Phone: [phone]  Primary Provider: Majo Paul MD  Pre-op Performing Provider: Majo Paul MD  May 5, 2025           4/30/2025   Surgical Information   What procedure is being done? Robotic Procept Aquablation   Facility or Hospital where procedure/surgery will be performed: [place], [place]   Who is doing the procedure / surgery? Dr. Mohsen Rao   Date of surgery / procedure: May 20, 2025   Time of surgery / procedure: 7:45 AM check-in   Where do you plan to recover after surgery? at home with family     Fax number for surgical facility: Note does not need to be faxed, will be available electronically in Epic.    Assessment & Plan     The proposed surgical procedure is considered LOW risk.    Preop general physical exam  Undergoing low risk surgery   Physical activity greater than 4 mets   Low perioperative cardiac risk .   Patient optimized for surgery   - EKG 12-lead complete w/read - Clinics    Benign prostatic hyperplasia with weak urinary stream  Undergoing surgery for     Gastroesophageal reflux disease without esophagitis  Well controlled on Omeprazole will continue to monitor .    Low testosterone  - testosterone cypionate (DEPOTESTOSTERONE) 200 MG/ML injection  Dispense: 10 mL; Refill: 2  - Syringe/Needle, Disp, 26G X 5/8\" 3 ML MISC  Dispense: 90 each; Refill: 0    Dysuria  - UA Macroscopic with reflex to Microscopic and Culture - Lab Collect  Urine no signs of infection     Type 2 diabetes mellitus with diabetic neuropathy   Will hold Am Lantuss ,   Recommend to decrease night Lantuss to 10 units .  Will also hold am Januvia and metformin  , recommend to take after procedure          - No identified additional risk factors other than previously addressed        Recommendation  Approval given to proceed with proposed procedure, without further diagnostic evaluation.      Subjective "   David is a 69 year old, presenting for the following:  Pre-Op Exam  History of Present Illness-  David A [facility], 69-year-old male  - Chronic lower back pain for many years  - Current mid-back pain,   - Inability to sleep on the right side due to pain, which is the preferred sleeping position         5/5/2025     3:09 PM   Additional Questions   Roomed by Gt Hurd   Accompanied by self     HPI:           4/30/2025   Pre-Op Questionnaire   Have you ever had a heart attack or stroke? No   Have you ever had surgery on your heart or blood vessels, such as a stent placement, a coronary artery bypass, or surgery on an artery in your head, neck, heart, or legs? No   Do you have chest pain with activity? No   Do you have a history of heart failure? No   Do you currently have a cold, bronchitis or symptoms of other infection? No   Do you have a cough, shortness of breath, or wheezing? No   Do you or anyone in your family have previous history of blood clots? No   Do you or does anyone in your family have a serious bleeding problem such as prolonged bleeding following surgeries or cuts? No   Have you ever had problems with anemia or been told to take iron pills? (!) YES    Have you had any abnormal blood loss such as black, tarry or bloody stools? No   Have you ever had a blood transfusion? No   Are you willing to have a blood transfusion if it is medically needed before, during, or after your surgery? Yes   Have you or any of your relatives ever had problems with anesthesia? No   Do you have sleep apnea, excessive snoring or daytime drowsiness? (!) YES   Do you have a CPAP machine? Yes   Do you have any artifical heart valves or other implanted medical devices like a pacemaker, defibrillator, or continuous glucose monitor? No   Do you have artificial joints? No   Are you allergic to latex? No         Patient Active Problem List    Diagnosis Date Noted    Bilateral hip pain 07/29/2024     Priority: Medium    Bilateral knee  pain 07/29/2024     Priority: Medium    Intramuscular lipoma 12/21/2023     Priority: Medium    Lyme disease 07/08/2019     Priority: Medium    Controlled substance agreement signed 08/08/2016     Priority: Medium    Abdominal pain 08/01/2016     Priority: Medium    Pancreatitis 08/01/2016     Priority: Medium    Hemoglobin A1c less than 7.0% 09/24/2015     Priority: Medium    Type 2 diabetes mellitus with diabetic neuropathy (H) 09/20/2015     Priority: Medium    Anxiety 01/31/2015     Priority: Medium    Hypertension goal BP (blood pressure) < 140/90 08/21/2013     Priority: Medium    Cellulitis 08/12/2013     Priority: Medium    MRSA (methicillin resistant staph aureus) culture positive 08/01/2013     Priority: Medium     skin infection      Type 2 diabetes mellitus with peripheral autonomic neuropathy (H) 06/28/2013     Priority: Medium    GERD (gastroesophageal reflux disease) 06/28/2013     Priority: Medium    PUD (peptic ulcer disease) 06/28/2013     Priority: Medium    Low testosterone 06/28/2013     Priority: Medium    HTN (hypertension) 06/28/2013     Priority: Medium    Hyperlipidemia LDL goal <100 06/28/2013     Priority: Medium    Erectile dysfunction 06/28/2013     Priority: Medium    BPH (benign prostatic hyperplasia) 06/28/2013     Priority: Medium    Peripheral neuropathy 06/28/2013     Priority: Medium      Past Medical History:   Diagnosis Date    Anxiety     Anxiety 01/31/2015    BPH (benign prostatic hyperplasia)     Degenerative disc disease     Depression     Gastritis     Gastro-oesophageal reflux disease     H/O alcohol abuse     Hyperlipidemia     Hypertension     Low testosterone     MRSA (methicillin resistant staph aureus) culture positive 08/2013    skin infection    Mumps     PUD (peptic ulcer disease)     Sleep apnea     STD (sexually transmitted disease)     Type 2 diabetes mellitus (H)     neuropathy     Past Surgical History:   Procedure Laterality Date    APPENDECTOMY       COLONOSCOPY  11/11/2013    Procedure: COMBINED COLONOSCOPY, SINGLE BIOPSY/POLYPECTOMY BY BIOPSY;  Colonoscopy/ polypectomy x2 by cold forceps    ;  Surgeon: Juan Carlos Bellamy MD;  Location: [facility]    DECOMPRESSION POSTERIOR LUMBAR, ,L4-5 decompression  06/14/2023    ESOPHAGOSCOPY, GASTROSCOPY, DUODENOSCOPY (EGD), COMBINED N/A 09/22/2016    Procedure: COMBINED ESOPHAGOSCOPY, GASTROSCOPY, DUODENOSCOPY (EGD), BIOPSY SINGLE OR MULTIPLE;  Surgeon: Juan Carlos Barraza MD;  Location: [facility]    ESOPHAGOSCOPY, GASTROSCOPY, DUODENOSCOPY (EGD), COMBINED N/A 02/19/2019    Procedure: ESOPHAGOSCOPY, GASTROSCOPY, DUODENOSCOPY (EGD) with cold forcep;  Surgeon: Juan Carlos Barraza MD;  Location: [facility]    EXCISE SOFT TISSUE TUMOR ELBOW Left 01/22/2024    Procedure: Removal Left Elbow Tumor;  Surgeon: Broderick Mccurdy MD;  Location: [facility]    HERNIA REPAIR       Current Outpatient Medications   Medication Sig Dispense Refill    acetaminophen (TYLENOL) 325 MG tablet Take 2 tablets (650 mg) by mouth every 4 hours as needed for mild pain 50 tablet 0    aspirin 81 MG tablet Take 1 tablet by mouth every morning      atorvastatin (LIPITOR) 40 MG tablet TAKE 1 TABLET BY MOUTH EVERY DAY 90 tablet 2    blood glucose (ONETOUCH ULTRA) test strip USE TO TEST BLOOD SUGARS ONE TIME DAILY OR AS DIRECTED. 100 strip 0    Continuous Blood Gluc Sensor (DEXCOM G7 SENSOR) MISC 1 Device every 14 days 1 each 11    Continuous Glucose Sensor (DEXCOM G7 SENSOR) MISC CHANGE EVERY 10 DAYS 9 each 5    finasteride (PROSCAR) 5 MG tablet Take 1 tablet (5 mg) by mouth daily. 90 tablet 3    gabapentin (NEURONTIN) 100 MG capsule Take 1 capsule (100 mg) by mouth at bedtime. 90 capsule 1    insulin aspart (NOVOLOG FLEXPEN) 100 UNIT/ML pen Inject 15 Units subcutaneously 3 times daily (with meals). 15 mL 3    insulin glargine (LANTUS SOLOSTAR) 100 UNIT/ML pen INJECT 15 UNITS SUBCUTANEOUSLY 2 TIMES DAILY. 30 mL 0    insulin pen needle (BD RASHI U/F) 32G X 4 MM  "miscellaneous USE ONE PEN NEEDLE DAILY OR AS DIRECTED. (Patient not taking: Reported on 3/5/2025) 100 each 2    insulin syringe-needle U-100 (29G X 1/2\" 0.5 ML) 29G X 1/2\" 0.5 ML miscellaneous Use 1 syringes daily or as directed for testosterone use 100 each 1    JANUVIA 100 MG tablet TAKE 1 TABLET BY MOUTH EVERY DAY 90 tablet 0    lisinopril (ZESTRIL) 10 MG tablet TAKE 1 TABLET BY MOUTH EVERY MORNING 90 tablet 0    metFORMIN (GLUCOPHAGE XR) 500 MG 24 hr tablet TAKE 4 TABLETS BY MOUTH DAILY WITH DINNER. 360 tablet 0    Multiple Vitamins-Minerals (CENTRUM SILVER) per tablet Take 1 tablet by mouth daily 90 tablet 3    needle, disp, (BD HYPODERMIC NEEDLE) 18G X 1\" MISC USE TO ADMINISTER TESTOSTERONE. DUE FOR APPOINTMENT WITH YOUR DOCTOR 6 each 3    needle, disp, 21G X 1-1/2\" MISC 1 each once a week. 12 each 3    omeprazole (PRILOSEC) 40 MG DR capsule TAKE 1 CAPSULE BY MOUTH EVERY DAY 90 capsule 2    PHARMACIST CHOICE LANCETS MISC TEST BLOOD SUGARS 5 TIMES DAILY 100 each 3    syringe/needle, disp, (B-D LUER-ROCHELLE SYRINGE) 22G X 1-1/2\" 3 ML MISC Use weekly for testosterone injection . 12 each 3    tadalafil (ADCIRCA/CIALIS) 20 MG tablet Take 1 tablet (20 mg) by mouth every 24 hours. 100 tablet 2    testosterone cypionate (DEPOTESTOSTERONE) 200 MG/ML injection INJECT 2ML INTO THE MUSCLE ONCE A WEEK ON  8 mL 1    triamcinolone (KENALOG) 0.1 % external ointment APPLY TOPICALLY NIGHTLY AS NEEDED FOR IRRITATION. 30 g 0    VITRON-C  MG TABS tablet TAKE 1 TABLET BY MOUTH EVERY DAY 90 tablet 1       No Known Allergies     Social History     Tobacco Use    Smoking status: Former     Current packs/day: 0.00     Types: Cigarettes     Quit date:      Years since quittin.3    Smokeless tobacco: Never   Substance Use Topics    Alcohol use: No     Comment: sober since      Family History   Problem Relation Age of Onset    Pancreatic Cancer Mother     Dementia Father     Parkinsonism Father     Diabetes Brother  " "   Depression Brother     Suicide Brother     Substance Abuse Brother     Pancreatic Cancer Paternal Grandfather     Family History Negative No family hx of     Colon Cancer No family hx of     Unknown/Adopted No family hx of     Anxiety Disorder No family hx of     Schizophrenia No family hx of     Bipolar Disorder No family hx of     Ambika Disease No family hx of     Autism Spectrum Disorder No family hx of     Intellectual Disability No family hx of     Mental Illness No family hx of     Anesthesia Reaction No family hx of     Deep Vein Thrombosis (DVT) No family hx of      History   Drug Use No             Review of Systems  CONSTITUTIONAL: NEGATIVE for fever, chills, change in weight  INTEGUMENTARY/SKIN: NEGATIVE for worrisome rashes, moles or lesions  EYES: NEGATIVE for vision changes or irritation  ENT/MOUTH: NEGATIVE for ear, mouth and throat problems  RESP: NEGATIVE for significant cough or SOB  BREAST: NEGATIVE for masses, tenderness or discharge  CV: NEGATIVE for chest pain, palpitations or peripheral edema  GI: NEGATIVE for nausea, abdominal pain, heartburn, or change in bowel habits  : NEGATIVE for frequency, dysuria, or hematuria  MUSCULOSKELETAL: NEGATIVE for significant arthralgias or myalgia  NEURO: NEGATIVE for weakness, dizziness or paresthesias  ENDOCRINE: NEGATIVE for temperature intolerance, skin/hair changes  HEME: NEGATIVE for bleeding problems  PSYCHIATRIC: NEGATIVE for changes in mood or affect    Objective    There were no vitals taken for this visit.   Estimated body mass index is 30.57 kg/m  as calculated from the following:    Height as of 3/5/25: 1.835 m (6' 0.25\").    Weight as of 3/5/25: 103 kg (227 lb).  Physical Exam  GENERAL: alert and no distress  EYES: Eyes grossly normal to inspection, PERRL and conjunctivae and sclerae normal  HENT: ear canals and TM's normal, nose and mouth without ulcers or lesions  NECK: no adenopathy, no asymmetry, masses, or scars  RESP: lungs " clear to auscultation - no rales, rhonchi or wheezes  CV: regular rate and rhythm, normal S1 S2, no S3 or S4, no murmur, click or rub, no peripheral edema  ABDOMEN: soft, nontender, no hepatosplenomegaly, no masses and bowel sounds normal  MS: no gross musculoskeletal defects noted, no edema  SKIN: no suspicious lesions or rashes  NEURO: Normal strength and tone, mentation intact and speech normal  PSYCH: mentation appears normal, affect normal/bright    Recent Labs   Lab Test 02/06/25  1216 08/29/24  0900   HGB 17.5  --      --     139   POTASSIUM 4.2 4.9   CR 0.92 1.13   A1C 7.1* 6.8*        Diagnostics  Recent Results (from the past week)   UA Macroscopic with reflex to Microscopic and Culture - Lab Collect    Collection Time: 05/05/25  4:12 PM    Specimen: Urine, Midstream   Result Value Ref Range    Color Urine Yellow Colorless, Straw, Light Yellow, Yellow    Appearance Urine Clear Clear    Glucose Urine Negative Negative mg/dL    Bilirubin Urine Negative Negative    Ketones Urine Negative Negative mg/dL    Specific Gravity Urine 1.025 1.003 - 1.035    Blood Urine Negative Negative    pH Urine 6.5 5.0 - 7.0    Protein Albumin Urine Negative Negative mg/dL    Urobilinogen Urine 0.2 0.2, 1.0 E.U./dL    Nitrite Urine Negative Negative    Leukocyte Esterase Urine Negative Negative      EKG: appears normal, NSR, normal axis, normal intervals, no acute ST/T changes c/w ischemia, no LVH by voltage criteria, unchanged from previous tracings    Revised Cardiac Risk Index (RCRI)  The patient has the following serious cardiovascular risks for perioperative complications:   - No serious cardiac risks = 0 points     RCRI Interpretation: 0 points: Class I (very low risk - 0.4% complication rate)         Signed Electronically by: Majo Paul MD  A copy of this evaluation report is provided to the requesting physician.        Answers submitted by the patient for this visit:  Patient Health Questionnaire  (Submitted on 5/5/2025)  If you checked off any problems, how difficult have these problems made it for you to do your work, take care of things at home, or get along with other people?: Somewhat difficult  PHQ9 TOTAL SCORE: 4

## 2025-05-05 NOTE — PATIENT INSTRUCTIONS
Patient Education   Preparing for Your Surgery  For Adults  Getting started  In most cases, a nurse will call to review your health history and instructions. They will give you an arrival time based on your scheduled surgery time. Please be ready to share:  Your doctor's clinic name and phone number  Your medical, surgical, and anesthesia history  A list of allergies and sensitivities  A list of medicines, including herbal treatments and over-the-counter drugs  Whether the patient has a legal guardian (ask how to send us the papers in advance)  Note: You may not receive a call if you were seen at our PAC (Preoperative Assessment Center).  Please tell us if you're pregnant--or if there's any chance you might be pregnant. Some surgeries may injure a fetus (unborn baby), so they require a pregnancy test. Surgeries that are safe for a fetus don't always need a test, and you can choose whether to have one.   Preparing for surgery  Within 10 to 30 days of surgery: Have a pre-op exam (sometimes called an H&P, or History and Physical). This can be done at a clinic or pre-operative center.  If you're having a , you may not need this exam. Talk to your care team.  At your pre-op exam, talk to your care team about all medicines you take. (This includes CBD oil and any drugs, such as THC, marijuana, and other forms of cannabis.) If you need to stop any medicine before surgery, ask when to start taking it again.  This is for your safety. Many medicines and drugs can make you bleed too much during surgery. Some change how well surgery (anesthesia) drugs work.  Call your insurance company to let them know you're having surgery. (If you don't have insurance, call [phone].)  Call your clinic if there's any change in your health. This includes a scrape or scratch near the surgery site, or any signs of a cold (sore throat, runny nose, cough, rash, fever).  Eating and drinking guidelines  For your safety: Unless your  surgeon tells you otherwise, follow the guidelines below.  Eat and drink as normal until 8 hours before you arrive for surgery. After that, no food or milk. You can spit out gum when you arrive.  Drink clear liquids until 2 hours before you arrive. These are liquids you can see through, like water, Gatorade, and Propel Water. They also include plain black coffee and tea (no cream or milk).  No alcohol for 24 hours before you arrive. The night before surgery, stop any drinks that contain THC.  If your care team tells you to take medicine on the morning of surgery, it's okay to take it with a sip of water. No other medicines or drugs are allowed (including CBD oil)--follow your care team's instructions.  If you have questions the day of surgery, call your hospital or surgery center.   Preventing infection  Shower or bathe the night before and the morning of surgery. Follow the instructions your clinic gave you. (If no instructions, use regular soap.)  Don't shave or clip hair near your surgery site. We'll remove the hair if needed.  Don't smoke or vape the morning of surgery. No chewing tobacco for 6 hours before you arrive. A nicotine patch is okay. You may spit out nicotine gum when you arrive.  For some surgeries, the surgeon will tell you to fully quit smoking and nicotine.  We will make every effort to keep you safe from infection. We will:  Clean our hands often with soap and water (or an alcohol-based hand rub).  Clean the skin at your surgery site with a special soap that kills germs.  Give you a special gown to keep you warm. (Cold raises the risk of infection.)  Wear hair covers, masks, gowns, and gloves during surgery.  Give antibiotic medicine, if prescribed. Not all surgeries need this medicine.  What to bring on the day of surgery  Photo ID and insurance card  Copy of your health care directive, if you have one  Glasses and hearing aids (bring cases)  You can't wear contacts during surgery  Inhaler and  eye drops, if you use them (tell us about these when you arrive)  CPAP machine or breathing device, if you use them  A few personal items, if spending the night  If you have . . .  A pacemaker, ICD (cardiac defibrillator), or other implant: Bring the ID card.  An implanted stimulator: Bring the remote control.  A legal guardian: Bring a copy of the certified (court-stamped) guardianship papers.  Please remove any jewelry, including body piercings. Leave jewelry and other valuables at home.  If you're going home the day of surgery  You must have a responsible adult drive you home. They should stay with you overnight as well.  If you don't have someone to stay with you, and you aren't safe to go home alone, we may keep you overnight. Insurance often won't pay for this.  After surgery  If it's hard to control your pain or you need more pain medicine, please call your surgeon's office.  Questions?   If you have any questions for your care team, list them here:   ____________________________________________________________________________________________________________________________________________________________________________________________________________________________________________________________  For informational purposes only. Not to replace the advice of your health care provider. Copyright   2003, 2019 [employer]. All rights reserved. Clinically reviewed by Tomas He MD. [employer] [id number] - REV 08/24.

## 2025-05-08 DIAGNOSIS — R10.11 RIGHT UPPER QUADRANT PAIN: Primary | ICD-10-CM

## 2025-05-08 NOTE — PROGRESS NOTES
Team     Patient is experiencing pain right upper quadrant and right costoprenic angle ,   I ordered stat CT scan can we assist in scheduling .    Thanks   Majo Paul MD

## 2025-05-09 ENCOUNTER — RESULTS FOLLOW-UP (OUTPATIENT)
Dept: FAMILY MEDICINE | Facility: CLINIC | Age: 70
End: 2025-05-09

## 2025-05-09 ENCOUNTER — HOSPITAL ENCOUNTER (OUTPATIENT)
Dept: CT IMAGING | Facility: CLINIC | Age: 70
Discharge: HOME | End: 2025-05-09
Attending: FAMILY MEDICINE | Admitting: FAMILY MEDICINE
Payer: COMMERCIAL

## 2025-05-09 LAB
CREAT BLD-MCNC: 1 MG/DL (ref 0.7–1.2)
EGFRCR SERPLBLD CKD-EPI 2021: >60 ML/MIN/1.73M2

## 2025-05-09 PROCEDURE — 74177 CT ABD & PELVIS W/CONTRAST: CPT

## 2025-05-09 PROCEDURE — 250N000011 HC RX IP 250 OP 636: Performed by: FAMILY MEDICINE

## 2025-05-09 PROCEDURE — 250N000009 HC RX 250: Performed by: FAMILY MEDICINE

## 2025-05-09 PROCEDURE — 82565 ASSAY OF CREATININE: CPT

## 2025-05-09 RX ORDER — IOPAMIDOL 755 MG/ML
111 INJECTION, SOLUTION INTRAVASCULAR ONCE
Status: COMPLETED | OUTPATIENT
Start: 2025-05-09 | End: 2025-05-09

## 2025-05-09 RX ADMIN — IOPAMIDOL 111 ML: 755 INJECTION, SOLUTION INTRAVENOUS at 11:16

## 2025-05-09 RX ADMIN — SODIUM CHLORIDE 72 ML: 9 INJECTION, SOLUTION INTRAVENOUS at 11:16

## 2025-05-14 NOTE — PROGRESS NOTES
PTA medications updated by Medication Scribe prior to surgery via phone call with patient (last doses completed by Nurse)     Medication history sources: Patient, Surescripts, and H&P  In the past week, patient estimated taking medication this percent of the time: Greater than 90%      Significant changes made to the medication list:  Patient taking meds differently than prescribed; See PTA entries for: MNETFORMIN and Patient reports no longer taking the following meds (med scribe removed from PTA med list): [facility] OINTMENT ,TYLENOL      Additional medication history information:   None    Medication reconciliation completed by provider prior to medication history? N/A    Time spent in this activity: 45 MINUTES    The information provided in this note is only as accurate as the sources available at the time of update(s)      Prior to Admission medications    Medication Sig Last Dose Taking? Auth Provider Long Term End Date   aspirin 81 MG tablet Take 1 tablet by mouth every morning 5/9/2025 Morning Yes Reported, Patient     atorvastatin (LIPITOR) 40 MG tablet TAKE 1 TABLET BY MOUTH EVERY DAY  Yes Majo Paul MD Yes    finasteride (PROSCAR) 5 MG tablet Take 1 tablet (5 mg) by mouth daily.  Yes Mohsen Rao MD  9/4/25   gabapentin (NEURONTIN) 100 MG capsule Take 1 capsule (100 mg) by mouth at bedtime.  Yes Majo Paul MD Yes    insulin aspart (NOVOLOG FLEXPEN) 100 UNIT/ML pen Inject 15 Units subcutaneously 3 times daily (with meals).  Yes Majo Paul MD Yes    insulin glargine (LANTUS SOLOSTAR) 100 UNIT/ML pen INJECT 15 UNITS SUBCUTANEOUSLY 2 TIMES DAILY.  Yes Majo Paul MD Yes    JANUVIA 100 MG tablet TAKE 1 TABLET BY MOUTH EVERY DAY  Yes Majo Paul MD Yes    lisinopril (ZESTRIL) 10 MG tablet TAKE 1 TABLET BY MOUTH EVERY MORNING  Yes Majo Paul MD Yes    metFORMIN (GLUCOPHAGE XR) 500 MG 24 hr tablet TAKE 4 TABLETS BY MOUTH DAILY WITH DINNER.  Patient taking differently: Take 1,000 mg by mouth 2  "times daily (with meals). 2 X 500mg =1000mg   BID  Yes Majo Paul MD Yes    Multiple Vitamins-Minerals (CENTRUM SILVER) per tablet Take 1 tablet by mouth daily  Yes Javier Villafana MD     omeprazole (PRILOSEC) 40 MG DR capsule TAKE 1 CAPSULE BY MOUTH EVERY DAY  Yes Majo Paul MD     tadalafil (ADCIRCA/CIALIS) 20 MG tablet Take 1 tablet (20 mg) by mouth every 24 hours.  Yes Majo Paul MD Yes    testosterone cypionate (DEPOTESTOSTERONE) 200 MG/ML injection INJECT 2ML INTO THE MUSCLE ONCE A WEEK ON FRIDAYS  Yes Majo Paul MD Yes    VITRON-C  MG TABS tablet TAKE 1 TABLET BY MOUTH EVERY DAY  Patient taking differently: Take 1 tablet by mouth every other day.  Yes Majo Paul MD     blood glucose (ONETOUCH ULTRA) test strip USE TO TEST BLOOD SUGARS ONE TIME DAILY OR AS DIRECTED.   Majo Paul MD     Continuous Blood Gluc Sensor (DEXCOM G7 SENSOR) MISC 1 Device every 14 days   Javier Villafana MD     Continuous Glucose Sensor (DEXCOM G7 SENSOR) MISC CHANGE EVERY 10 DAYS   Majo Paul MD     insulin pen needle (BD RASHI U/F) 32G X 4 MM miscellaneous USE ONE PEN NEEDLE DAILY OR AS DIRECTED.   Majo Paul MD     insulin syringe-needle U-100 (29G X 1/2\" 0.5 ML) 29G X 1/2\" 0.5 ML miscellaneous Use 1 syringes daily or as directed for testosterone use   Majo Paul MD     needle, disp, (BD HYPODERMIC NEEDLE) 18G X 1\" MISC USE TO ADMINISTER TESTOSTERONE. DUE FOR APPOINTMENT WITH YOUR DOCTOR   Javier Villafana MD     needle, disp, 21G X 1-1/2\" MISC 1 each once a week.   Majo Paul MD     PHARMACIST CHOICE LANCETS MISC TEST BLOOD SUGARS 5 TIMES DAILY   Javier Villafana MD     syringe/needle, disp, (B-D LUER-ROCHELLE SYRINGE) 22G X 1-1/2\" 3 ML MISC Use weekly for testosterone injection .   Majo Paul MD     Syringe/Needle, Disp, 26G X 5/8\" 3 ML MISC 1 Application daily.   Majo Paul MD         Medication history completed by: Linda Acosta    "

## 2025-05-20 ENCOUNTER — HOSPITAL ENCOUNTER (INPATIENT)
Facility: CLINIC | Age: 70
End: 2025-05-20
Attending: UROLOGY | Admitting: UROLOGY
Payer: COMMERCIAL

## 2025-05-20 ENCOUNTER — ANESTHESIA EVENT (OUTPATIENT)
Dept: SURGERY | Facility: CLINIC | Age: 70
End: 2025-05-20
Payer: COMMERCIAL

## 2025-05-20 ENCOUNTER — ANESTHESIA (OUTPATIENT)
Dept: SURGERY | Facility: CLINIC | Age: 70
End: 2025-05-20
Payer: COMMERCIAL

## 2025-05-20 DIAGNOSIS — Z98.890 POSTOPERATIVE STATE: ICD-10-CM

## 2025-05-20 DIAGNOSIS — N40.0 ENLARGED PROSTATE: Primary | ICD-10-CM

## 2025-05-20 LAB
EST. AVERAGE GLUCOSE BLD GHB EST-MCNC: 140 MG/DL
GLUCOSE BLDC GLUCOMTR-MCNC: 120 MG/DL (ref 70–99)
GLUCOSE BLDC GLUCOMTR-MCNC: 139 MG/DL (ref 70–99)
GLUCOSE SERPL-MCNC: 164 MG/DL (ref 70–99)
HBA1C MFR BLD: 6.5 %
HGB BLD-MCNC: 17.4 G/DL (ref 13.3–17.7)
MCV RBC AUTO: 88 FL (ref 78–100)
POTASSIUM SERPL-SCNC: 4 MMOL/L (ref 3.4–5.3)

## 2025-05-20 PROCEDURE — 250N000009 HC RX 250: Performed by: UROLOGY

## 2025-05-20 PROCEDURE — 88305 TISSUE EXAM BY PATHOLOGIST: CPT | Mod: TC | Performed by: UROLOGY

## 2025-05-20 PROCEDURE — 258N000003 HC RX IP 258 OP 636: Performed by: STUDENT IN AN ORGANIZED HEALTH CARE EDUCATION/TRAINING PROGRAM

## 2025-05-20 PROCEDURE — 250N000009 HC RX 250: Performed by: NURSE ANESTHETIST, CERTIFIED REGISTERED

## 2025-05-20 PROCEDURE — 52601 PROSTATECTOMY (TURP): CPT | Performed by: UROLOGY

## 2025-05-20 PROCEDURE — 360N000079 HC SURGERY LEVEL 6, PER MIN: Performed by: UROLOGY

## 2025-05-20 PROCEDURE — 85018 HEMOGLOBIN: CPT | Performed by: STUDENT IN AN ORGANIZED HEALTH CARE EDUCATION/TRAINING PROGRAM

## 2025-05-20 PROCEDURE — 82962 GLUCOSE BLOOD TEST: CPT

## 2025-05-20 PROCEDURE — 258N000003 HC RX IP 258 OP 636: Performed by: NURSE ANESTHETIST, CERTIFIED REGISTERED

## 2025-05-20 PROCEDURE — 272N000002 HC OR SUPPLY OTHER OPNP: Performed by: UROLOGY

## 2025-05-20 PROCEDURE — 250N000012 HC RX MED GY IP 250 OP 636 PS 637: Performed by: UROLOGY

## 2025-05-20 PROCEDURE — 250N000013 HC RX MED GY IP 250 OP 250 PS 637: Performed by: ANESTHESIOLOGY

## 2025-05-20 PROCEDURE — 250N000011 HC RX IP 250 OP 636: Mod: JZ | Performed by: NURSE ANESTHETIST, CERTIFIED REGISTERED

## 2025-05-20 PROCEDURE — 250N000025 HC SEVOFLURANE, PER MIN: Performed by: UROLOGY

## 2025-05-20 PROCEDURE — 250N000011 HC RX IP 250 OP 636: Performed by: NURSE ANESTHETIST, CERTIFIED REGISTERED

## 2025-05-20 PROCEDURE — 250N000011 HC RX IP 250 OP 636: Performed by: UROLOGY

## 2025-05-20 PROCEDURE — 272N000001 HC OR GENERAL SUPPLY STERILE: Performed by: UROLOGY

## 2025-05-20 PROCEDURE — 83036 HEMOGLOBIN GLYCOSYLATED A1C: CPT | Performed by: UROLOGY

## 2025-05-20 PROCEDURE — 250N000011 HC RX IP 250 OP 636: Mod: JZ | Performed by: ANESTHESIOLOGY

## 2025-05-20 PROCEDURE — 710N000009 HC RECOVERY PHASE 1, LEVEL 1, PER MIN: Performed by: UROLOGY

## 2025-05-20 PROCEDURE — 84132 ASSAY OF SERUM POTASSIUM: CPT | Performed by: STUDENT IN AN ORGANIZED HEALTH CARE EDUCATION/TRAINING PROGRAM

## 2025-05-20 PROCEDURE — 88305 TISSUE EXAM BY PATHOLOGIST: CPT | Mod: 26 | Performed by: PATHOLOGY

## 2025-05-20 PROCEDURE — 36415 COLL VENOUS BLD VENIPUNCTURE: CPT | Performed by: UROLOGY

## 2025-05-20 PROCEDURE — 0V508ZZ DESTRUCTION OF PROSTATE, VIA NATURAL OR ARTIFICIAL OPENING ENDOSCOPIC: ICD-10-PCS | Performed by: UROLOGY

## 2025-05-20 PROCEDURE — 36415 COLL VENOUS BLD VENIPUNCTURE: CPT | Performed by: STUDENT IN AN ORGANIZED HEALTH CARE EDUCATION/TRAINING PROGRAM

## 2025-05-20 PROCEDURE — 250N000013 HC RX MED GY IP 250 OP 250 PS 637: Performed by: UROLOGY

## 2025-05-20 PROCEDURE — 258N000001 HC RX 258: Performed by: UROLOGY

## 2025-05-20 PROCEDURE — 82947 ASSAY GLUCOSE BLOOD QUANT: CPT | Performed by: STUDENT IN AN ORGANIZED HEALTH CARE EDUCATION/TRAINING PROGRAM

## 2025-05-20 PROCEDURE — 999N000141 HC STATISTIC PRE-PROCEDURE NURSING ASSESSMENT: Performed by: UROLOGY

## 2025-05-20 PROCEDURE — 370N000017 HC ANESTHESIA TECHNICAL FEE, PER MIN: Performed by: UROLOGY

## 2025-05-20 RX ORDER — NICOTINE POLACRILEX 4 MG
15-30 LOZENGE BUCCAL
Status: DISCONTINUED | OUTPATIENT
Start: 2025-05-20 | End: 2025-05-23 | Stop reason: HOSPADM

## 2025-05-20 RX ORDER — PANTOPRAZOLE SODIUM 20 MG/1
20 TABLET, DELAYED RELEASE ORAL
Status: DISCONTINUED | OUTPATIENT
Start: 2025-05-20 | End: 2025-05-23 | Stop reason: HOSPADM

## 2025-05-20 RX ORDER — HYDROMORPHONE HCL IN WATER/PF 6 MG/30 ML
0.2 PATIENT CONTROLLED ANALGESIA SYRINGE INTRAVENOUS EVERY 5 MIN PRN
Status: DISCONTINUED | OUTPATIENT
Start: 2025-05-20 | End: 2025-05-20 | Stop reason: HOSPADM

## 2025-05-20 RX ORDER — ONDANSETRON 4 MG/1
4 TABLET, ORALLY DISINTEGRATING ORAL EVERY 30 MIN PRN
Status: DISCONTINUED | OUTPATIENT
Start: 2025-05-20 | End: 2025-05-20 | Stop reason: HOSPADM

## 2025-05-20 RX ORDER — DEXTROSE MONOHYDRATE 25 G/50ML
25-50 INJECTION, SOLUTION INTRAVENOUS
Status: DISCONTINUED | OUTPATIENT
Start: 2025-05-20 | End: 2025-05-23 | Stop reason: HOSPADM

## 2025-05-20 RX ORDER — NALOXONE HYDROCHLORIDE 0.4 MG/ML
0.2 INJECTION, SOLUTION INTRAMUSCULAR; INTRAVENOUS; SUBCUTANEOUS
Status: DISCONTINUED | OUTPATIENT
Start: 2025-05-20 | End: 2025-05-23 | Stop reason: HOSPADM

## 2025-05-20 RX ORDER — LISINOPRIL 10 MG/1
10 TABLET ORAL EVERY MORNING
Status: DISCONTINUED | OUTPATIENT
Start: 2025-05-21 | End: 2025-05-23 | Stop reason: HOSPADM

## 2025-05-20 RX ORDER — TOLTERODINE 4 MG/1
4 CAPSULE, EXTENDED RELEASE ORAL DAILY
Status: DISCONTINUED | OUTPATIENT
Start: 2025-05-20 | End: 2025-05-23 | Stop reason: HOSPADM

## 2025-05-20 RX ORDER — MAGNESIUM HYDROXIDE 1200 MG/15ML
LIQUID ORAL PRN
Status: DISCONTINUED | OUTPATIENT
Start: 2025-05-20 | End: 2025-05-20 | Stop reason: HOSPADM

## 2025-05-20 RX ORDER — PROPOFOL 10 MG/ML
INJECTION, EMULSION INTRAVENOUS PRN
Status: DISCONTINUED | OUTPATIENT
Start: 2025-05-20 | End: 2025-05-20

## 2025-05-20 RX ORDER — ONDANSETRON 4 MG/1
4-8 TABLET, ORALLY DISINTEGRATING ORAL EVERY 8 HOURS PRN
Qty: 10 TABLET | Refills: 0 | Status: SHIPPED | OUTPATIENT
Start: 2025-05-20

## 2025-05-20 RX ORDER — ONDANSETRON 2 MG/ML
INJECTION INTRAMUSCULAR; INTRAVENOUS PRN
Status: DISCONTINUED | OUTPATIENT
Start: 2025-05-20 | End: 2025-05-20

## 2025-05-20 RX ORDER — SODIUM CHLORIDE, SODIUM LACTATE, POTASSIUM CHLORIDE, CALCIUM CHLORIDE 600; 310; 30; 20 MG/100ML; MG/100ML; MG/100ML; MG/100ML
INJECTION, SOLUTION INTRAVENOUS CONTINUOUS
Status: DISCONTINUED | OUTPATIENT
Start: 2025-05-20 | End: 2025-05-23 | Stop reason: HOSPADM

## 2025-05-20 RX ORDER — HYDROMORPHONE HCL IN WATER/PF 6 MG/30 ML
0.4 PATIENT CONTROLLED ANALGESIA SYRINGE INTRAVENOUS EVERY 5 MIN PRN
Status: DISCONTINUED | OUTPATIENT
Start: 2025-05-20 | End: 2025-05-20 | Stop reason: HOSPADM

## 2025-05-20 RX ORDER — ACETAMINOPHEN 650 MG/1
650 SUPPOSITORY RECTAL ONCE
Status: COMPLETED | OUTPATIENT
Start: 2025-05-20 | End: 2025-05-20

## 2025-05-20 RX ORDER — ACETAMINOPHEN 325 MG/1
975 TABLET ORAL ONCE
Status: COMPLETED | OUTPATIENT
Start: 2025-05-20 | End: 2025-05-20

## 2025-05-20 RX ORDER — NALOXONE HYDROCHLORIDE 0.4 MG/ML
0.1 INJECTION, SOLUTION INTRAMUSCULAR; INTRAVENOUS; SUBCUTANEOUS
Status: DISCONTINUED | OUTPATIENT
Start: 2025-05-20 | End: 2025-05-20 | Stop reason: HOSPADM

## 2025-05-20 RX ORDER — LIDOCAINE HYDROCHLORIDE 20 MG/ML
INJECTION, SOLUTION INFILTRATION; PERINEURAL PRN
Status: DISCONTINUED | OUTPATIENT
Start: 2025-05-20 | End: 2025-05-20

## 2025-05-20 RX ORDER — FAMOTIDINE 20 MG/1
20 TABLET, FILM COATED ORAL ONCE
Status: COMPLETED | OUTPATIENT
Start: 2025-05-20 | End: 2025-05-20

## 2025-05-20 RX ORDER — CEFAZOLIN SODIUM/WATER 2 G/20 ML
2 SYRINGE (ML) INTRAVENOUS SEE ADMIN INSTRUCTIONS
Status: DISCONTINUED | OUTPATIENT
Start: 2025-05-20 | End: 2025-05-20

## 2025-05-20 RX ORDER — NALOXONE HYDROCHLORIDE 0.4 MG/ML
0.4 INJECTION, SOLUTION INTRAMUSCULAR; INTRAVENOUS; SUBCUTANEOUS
Status: DISCONTINUED | OUTPATIENT
Start: 2025-05-20 | End: 2025-05-23 | Stop reason: HOSPADM

## 2025-05-20 RX ORDER — OXYCODONE HYDROCHLORIDE 5 MG/1
10 TABLET ORAL EVERY 4 HOURS PRN
Status: DISCONTINUED | OUTPATIENT
Start: 2025-05-20 | End: 2025-05-23 | Stop reason: HOSPADM

## 2025-05-20 RX ORDER — SODIUM CHLORIDE, SODIUM LACTATE, POTASSIUM CHLORIDE, CALCIUM CHLORIDE 600; 310; 30; 20 MG/100ML; MG/100ML; MG/100ML; MG/100ML
INJECTION, SOLUTION INTRAVENOUS CONTINUOUS
Status: DISCONTINUED | OUTPATIENT
Start: 2025-05-20 | End: 2025-05-20 | Stop reason: HOSPADM

## 2025-05-20 RX ORDER — ATROPA BELLADONNA AND OPIUM 16.2; 3 MG/1; MG/1
30 SUPPOSITORY RECTAL EVERY 8 HOURS PRN
Status: DISCONTINUED | OUTPATIENT
Start: 2025-05-20 | End: 2025-05-23 | Stop reason: HOSPADM

## 2025-05-20 RX ORDER — ACETAMINOPHEN 325 MG/1
650 TABLET ORAL EVERY 4 HOURS PRN
Qty: 100 TABLET | Refills: 0 | Status: SHIPPED | OUTPATIENT
Start: 2025-05-20

## 2025-05-20 RX ORDER — HYDROMORPHONE HYDROCHLORIDE 1 MG/ML
INJECTION, SOLUTION INTRAMUSCULAR; INTRAVENOUS; SUBCUTANEOUS PRN
Status: DISCONTINUED | OUTPATIENT
Start: 2025-05-20 | End: 2025-05-20

## 2025-05-20 RX ORDER — NICOTINE POLACRILEX 4 MG
15-30 LOZENGE BUCCAL
Status: DISCONTINUED | OUTPATIENT
Start: 2025-05-20 | End: 2025-05-20

## 2025-05-20 RX ORDER — DEXAMETHASONE SODIUM PHOSPHATE 4 MG/ML
4 INJECTION, SOLUTION INTRA-ARTICULAR; INTRALESIONAL; INTRAMUSCULAR; INTRAVENOUS; SOFT TISSUE
Status: DISCONTINUED | OUTPATIENT
Start: 2025-05-20 | End: 2025-05-20 | Stop reason: HOSPADM

## 2025-05-20 RX ORDER — KETAMINE HYDROCHLORIDE 10 MG/ML
INJECTION INTRAMUSCULAR; INTRAVENOUS PRN
Status: DISCONTINUED | OUTPATIENT
Start: 2025-05-20 | End: 2025-05-20

## 2025-05-20 RX ORDER — HYDROXYZINE HYDROCHLORIDE 10 MG/1
10 TABLET, FILM COATED ORAL EVERY 6 HOURS PRN
Qty: 30 TABLET | Refills: 0 | Status: SHIPPED | OUTPATIENT
Start: 2025-05-20

## 2025-05-20 RX ORDER — FENTANYL CITRATE 0.05 MG/ML
50 INJECTION, SOLUTION INTRAMUSCULAR; INTRAVENOUS EVERY 5 MIN PRN
Status: DISCONTINUED | OUTPATIENT
Start: 2025-05-20 | End: 2025-05-20 | Stop reason: HOSPADM

## 2025-05-20 RX ORDER — FENTANYL CITRATE 50 UG/ML
INJECTION, SOLUTION INTRAMUSCULAR; INTRAVENOUS PRN
Status: DISCONTINUED | OUTPATIENT
Start: 2025-05-20 | End: 2025-05-20

## 2025-05-20 RX ORDER — ATROPA BELLADONNA AND OPIUM 16.2; 3 MG/1; MG/1
SUPPOSITORY RECTAL PRN
Status: DISCONTINUED | OUTPATIENT
Start: 2025-05-20 | End: 2025-05-20 | Stop reason: HOSPADM

## 2025-05-20 RX ORDER — ONDANSETRON 2 MG/ML
4 INJECTION INTRAMUSCULAR; INTRAVENOUS EVERY 30 MIN PRN
Status: DISCONTINUED | OUTPATIENT
Start: 2025-05-20 | End: 2025-05-20 | Stop reason: HOSPADM

## 2025-05-20 RX ORDER — OXYCODONE HYDROCHLORIDE 5 MG/1
5 TABLET ORAL EVERY 4 HOURS PRN
Status: DISCONTINUED | OUTPATIENT
Start: 2025-05-20 | End: 2025-05-23 | Stop reason: HOSPADM

## 2025-05-20 RX ORDER — METFORMIN HYDROCHLORIDE 500 MG/1
1000 TABLET, EXTENDED RELEASE ORAL 2 TIMES DAILY WITH MEALS
Status: DISCONTINUED | OUTPATIENT
Start: 2025-05-21 | End: 2025-05-23 | Stop reason: HOSPADM

## 2025-05-20 RX ORDER — DEXTROSE MONOHYDRATE 25 G/50ML
25-50 INJECTION, SOLUTION INTRAVENOUS
Status: DISCONTINUED | OUTPATIENT
Start: 2025-05-20 | End: 2025-05-20

## 2025-05-20 RX ORDER — VECURONIUM BROMIDE 1 MG/ML
INJECTION, POWDER, LYOPHILIZED, FOR SOLUTION INTRAVENOUS PRN
Status: DISCONTINUED | OUTPATIENT
Start: 2025-05-20 | End: 2025-05-20

## 2025-05-20 RX ORDER — FENTANYL CITRATE 0.05 MG/ML
25 INJECTION, SOLUTION INTRAMUSCULAR; INTRAVENOUS EVERY 5 MIN PRN
Status: DISCONTINUED | OUTPATIENT
Start: 2025-05-20 | End: 2025-05-20 | Stop reason: HOSPADM

## 2025-05-20 RX ORDER — ACETAMINOPHEN 325 MG/1
650 TABLET ORAL EVERY 6 HOURS PRN
Status: DISCONTINUED | OUTPATIENT
Start: 2025-05-20 | End: 2025-05-23 | Stop reason: HOSPADM

## 2025-05-20 RX ORDER — CEFAZOLIN SODIUM/WATER 2 G/20 ML
2 SYRINGE (ML) INTRAVENOUS
Status: COMPLETED | OUTPATIENT
Start: 2025-05-20 | End: 2025-05-20

## 2025-05-20 RX ORDER — GABAPENTIN 100 MG/1
100 CAPSULE ORAL AT BEDTIME
Status: DISCONTINUED | OUTPATIENT
Start: 2025-05-20 | End: 2025-05-23 | Stop reason: HOSPADM

## 2025-05-20 RX ORDER — LIDOCAINE 40 MG/G
CREAM TOPICAL
Status: DISCONTINUED | OUTPATIENT
Start: 2025-05-20 | End: 2025-05-23 | Stop reason: HOSPADM

## 2025-05-20 RX ORDER — ATORVASTATIN CALCIUM 40 MG/1
40 TABLET, FILM COATED ORAL DAILY
Status: DISCONTINUED | OUTPATIENT
Start: 2025-05-21 | End: 2025-05-23 | Stop reason: HOSPADM

## 2025-05-20 RX ADMIN — LIDOCAINE HYDROCHLORIDE 100 MG: 20 INJECTION, SOLUTION INFILTRATION; PERINEURAL at 07:35

## 2025-05-20 RX ADMIN — SODIUM CHLORIDE 3000 ML: 900 IRRIGANT IRRIGATION at 18:35

## 2025-05-20 RX ADMIN — HYDROMORPHONE HYDROCHLORIDE 0.5 MG: 1 INJECTION, SOLUTION INTRAMUSCULAR; INTRAVENOUS; SUBCUTANEOUS at 08:57

## 2025-05-20 RX ADMIN — FAMOTIDINE 20 MG: 20 TABLET, FILM COATED ORAL at 07:09

## 2025-05-20 RX ADMIN — ROCURONIUM BROMIDE 50 MG: 50 INJECTION, SOLUTION INTRAVENOUS at 07:35

## 2025-05-20 RX ADMIN — SODIUM CHLORIDE 3000 ML: 900 IRRIGANT IRRIGATION at 17:33

## 2025-05-20 RX ADMIN — FENTANYL CITRATE 50 MCG: 0.05 INJECTION, SOLUTION INTRAMUSCULAR; INTRAVENOUS at 09:48

## 2025-05-20 RX ADMIN — Medication 20 MG: at 08:37

## 2025-05-20 RX ADMIN — ONDANSETRON 4 MG: 2 INJECTION INTRAMUSCULAR; INTRAVENOUS at 08:03

## 2025-05-20 RX ADMIN — FENTANYL CITRATE 50 MCG: 50 INJECTION INTRAMUSCULAR; INTRAVENOUS at 07:47

## 2025-05-20 RX ADMIN — ACETAMINOPHEN 650 MG: 325 TABLET, FILM COATED ORAL at 19:45

## 2025-05-20 RX ADMIN — GABAPENTIN 100 MG: 100 CAPSULE ORAL at 21:28

## 2025-05-20 RX ADMIN — HYDROMORPHONE HYDROCHLORIDE 0.4 MG: 0.2 INJECTION, SOLUTION INTRAMUSCULAR; INTRAVENOUS; SUBCUTANEOUS at 11:04

## 2025-05-20 RX ADMIN — SODIUM CHLORIDE 6000 ML: 900 IRRIGANT IRRIGATION at 22:12

## 2025-05-20 RX ADMIN — OXYCODONE HYDROCHLORIDE 10 MG: 5 TABLET ORAL at 15:34

## 2025-05-20 RX ADMIN — HYDROMORPHONE HYDROCHLORIDE 0.4 MG: 0.2 INJECTION, SOLUTION INTRAMUSCULAR; INTRAVENOUS; SUBCUTANEOUS at 10:14

## 2025-05-20 RX ADMIN — SODIUM CHLORIDE, SODIUM LACTATE, POTASSIUM CHLORIDE, AND CALCIUM CHLORIDE: .6; .31; .03; .02 INJECTION, SOLUTION INTRAVENOUS at 07:09

## 2025-05-20 RX ADMIN — SODIUM CHLORIDE, SODIUM LACTATE, POTASSIUM CHLORIDE, AND CALCIUM CHLORIDE: .6; .31; .03; .02 INJECTION, SOLUTION INTRAVENOUS at 08:51

## 2025-05-20 RX ADMIN — FENTANYL CITRATE 50 MCG: 50 INJECTION INTRAMUSCULAR; INTRAVENOUS at 07:35

## 2025-05-20 RX ADMIN — PANTOPRAZOLE SODIUM 20 MG: 20 TABLET, DELAYED RELEASE ORAL at 15:34

## 2025-05-20 RX ADMIN — SODIUM CHLORIDE 3000 ML: 900 IRRIGANT IRRIGATION at 18:37

## 2025-05-20 RX ADMIN — ATROPA BELLADONNA AND OPIUM 1 SUPPOSITORY: 16.2; 3 SUPPOSITORY RECTAL at 17:59

## 2025-05-20 RX ADMIN — Medication 2 G: at 07:27

## 2025-05-20 RX ADMIN — Medication 200 MG: at 08:52

## 2025-05-20 RX ADMIN — TOLTERODINE TARTRATE 4 MG: 4 CAPSULE, EXTENDED RELEASE ORAL at 17:30

## 2025-05-20 RX ADMIN — OXYCODONE HYDROCHLORIDE 10 MG: 5 TABLET ORAL at 19:45

## 2025-05-20 RX ADMIN — FENTANYL CITRATE 50 MCG: 0.05 INJECTION, SOLUTION INTRAMUSCULAR; INTRAVENOUS at 09:56

## 2025-05-20 RX ADMIN — PHENYLEPHRINE HYDROCHLORIDE 0.5 MCG/KG/MIN: 10 INJECTION INTRAVENOUS at 07:52

## 2025-05-20 RX ADMIN — MIDAZOLAM 2 MG: 1 INJECTION INTRAMUSCULAR; INTRAVENOUS at 07:30

## 2025-05-20 RX ADMIN — SODIUM CHLORIDE 6000 ML: 900 IRRIGANT IRRIGATION at 15:12

## 2025-05-20 RX ADMIN — INSULIN ASPART 15 UNITS: 100 INJECTION, SOLUTION INTRAVENOUS; SUBCUTANEOUS at 19:53

## 2025-05-20 RX ADMIN — PROPOFOL 200 MG: 10 INJECTION, EMULSION INTRAVENOUS at 07:35

## 2025-05-20 RX ADMIN — SODIUM CHLORIDE 6000 ML: 900 IRRIGANT IRRIGATION at 20:27

## 2025-05-20 RX ADMIN — ACETAMINOPHEN 975 MG: 325 TABLET, FILM COATED ORAL at 07:08

## 2025-05-20 RX ADMIN — VECURONIUM BROMIDE 3 MG: 1 INJECTION, POWDER, LYOPHILIZED, FOR SOLUTION INTRAVENOUS at 07:46

## 2025-05-20 ASSESSMENT — ACTIVITIES OF DAILY LIVING (ADL)
ADLS_ACUITY_SCORE: 28
ADLS_ACUITY_SCORE: 18
ADLS_ACUITY_SCORE: 18
ADLS_ACUITY_SCORE: 28
ADLS_ACUITY_SCORE: 41
ADLS_ACUITY_SCORE: 26
ADLS_ACUITY_SCORE: 18
ADLS_ACUITY_SCORE: 26
ADLS_ACUITY_SCORE: 18
ADLS_ACUITY_SCORE: 18
ADLS_ACUITY_SCORE: 28
ADLS_ACUITY_SCORE: 28
ADLS_ACUITY_SCORE: 18
ADLS_ACUITY_SCORE: 26
ADLS_ACUITY_SCORE: 28
ADLS_ACUITY_SCORE: 18

## 2025-05-20 NOTE — ANESTHESIA CARE TRANSFER NOTE
Patient: Austyn Araujo    Procedure: Procedure(s):  CYSTOSCOPY, AQUABLATION OF THE PROSTATE       Diagnosis: Enlarged prostate [N40.0]  Diagnosis Additional Information: No value filed.    Anesthesia Type:   General     Note:    Oropharynx: oropharynx clear of all foreign objects  Level of Consciousness: awake  Oxygen Supplementation: face mask  Level of Supplemental Oxygen (L/min / FiO2): 6  Independent Airway: airway patency satisfactory and stable  Dentition: dentition unchanged  Vital Signs Stable: post-procedure vital signs reviewed and stable  Report to RN Given: handoff report given  Patient transferred to: PACU    Handoff Report: Identifed the Patient, Identified the Reponsible Provider, Reviewed the pertinent medical history, Discussed the surgical course, Reviewed Intra-OP anesthesia mangement and issues during anesthesia, Set expectations for post-procedure period and Allowed opportunity for questions and acknowledgement of understanding      Vitals:  Vitals Value Taken Time   /78 05/20/25 09:12   Temp     Pulse 60 05/20/25 09:15   Resp 19 05/20/25 09:15   SpO2 96 % 05/20/25 09:15   Vitals shown include unfiled device data.    Electronically Signed By: MARICEL Rhodes CRNA  May 20, 2025  9:15 AM

## 2025-05-20 NOTE — OP NOTE
OPERATIVE REPORT     PATIENT: Austyn Araujo  : 1955, AGE: 69 year old    SURGEON:  Mohsen Rao MD    Circulator: Indio Guerrero RN  Scrub Person: SimonpriscaKervinwing DOHERTY    PROCEDURE(S) PERFORMED:    CYSTOSCOPY WITH AQUABLATION OF THE PROSTATE  2.   LIMITED TRANSURETHRAL RESECTION OF THE PROSTATE WITH FULGURATION OF THE PROSTATE  3.   TRANSRECTAL ULTRASOUND OF THE PROSTATE    PREOPERATIVE DIAGNOSIS:  BENIGN PROSTATIC HYPERPLASIA    POSTOP DIAGNOSIS:   BENIGN PROSTATIC HYPERPLASIA    ANESTHESIA:  General    COMPLICATIONS:   None    FINDINGS:  Mild bulbar urethral stricture present  Mild meatal stenosis present requiring calibration with the sounds    SPECIMEN:  Prostate chips    IMPLANT:   None    EBL:   100 mL    INDICATIONS:   This is a 69-year-old gentleman who presents for Aquablation due to his bothersome and refractory BPH/LUTS. This procedure, as well as its alternatives were discussed as treatment options. Its goals, risks and limitations were reviewed, and he elected to proceed.    TECHNIQUE:  The patient was brought to the operating room. The site and scope of surgery were confirmed with the patient. The patient s identity was confirmed.    General anesthesia was achieved without complications. The patient was then placed in a lithotomy position. Genitals were prepped  in standard sterile fashion. Appropriate time-out was performed. Intravenous antibiotic was given.     The TRUS Stepper was mounted to the Articulating Arm and secured to OR bed. The ultrasound probe was attached to the stepper. The ultrasound probe was aligned, and confirmation made that the prostate is centered and aligned using both transverse and sagittal views. The bladder neck, verumontanum and the central/transition zones were identified. A clinical assessment of the prostate was performed measuring dimensions to estimate prostate size and note prostate anatomy.    The patient was now draped after transrectal ultrasound  placement    I then attempted to pass the 24 Armenian aqua beam handpiece into the urethra but was unable to due to small caliber of the urethra.  I used sounds to dilate open the urethral meatus and then was able to insert the handpiece.  The 24F AQUABEAM Handpiece was inserted into the prostatic urethra and a complete cystoscopic evaluation was performed by inspecting the prostate, bladder, and identifying the location of the verumontanum/external sphincter.   Of important note, the the patient did have a mild bulbar urethral stricture present that was gently dilated with insertion of the AQUABEAM handpiece.    The AQUABEAM Handpiece was secured to the Handpiece Articulating Arm. Confirmed alignment of AQUABEAM Handpiece and TRUS Probe to be parallel and co-linear. Confirmation that AQUABEAM nozzle is centered and anterior of the bladder neck or the median lobe. The cystoscope was then retracted to visualize the verumontanum and external sphincter and the cystoscope tip was positioned just proximal to the external sphincter. Reconfirmed alignment of the TRUS probe with the AQUABEAM Handpiece and compression applied with TRUS probe. Horizontal alignment of the Handpiece waterjet nozzle was performed.     The Aquablation treatment zones were planned utilizing real-time TRUS to visualize the contour of the prostate and the depth and radial angles of resection were defined in the transverse view. In the sagittal view, the AQUABEAM nozzle was identified and its position registered with the software. The treatment contours were then adjusted to conform to the intended resection margins. The median lobe, bladder neck and verumontanum were marked and confirmed in the treatment contour.    The Aquablation Treatment was then started following the resection contour confirmed under ultrasound guidance.    Resection notes:   A second pass with the AQUABEAM was made after re-calibrating a new treatment plan based on  anatomy.    TOTAL AQUABLATION RESECTION TIME: 7 minutes    Once Aquablation resection was complete, cystoscopy and tissue/clot evacuation were performed using a 26F resectoscope until light pink.     Cautery was used sparingly, and mostly at the bladder neck for hemostasis.     A 24F 3-way Myers was inserted under ultrasound guidance. The balloon was inflated to 50cc. Balloon positioning was confirmed, and CBI started.    The ultrasound probe was then removed. The case was completed.    The patient was awoken from anesthesia and then brought to recovery in stable, good condition.    PLAN:  To PACU with Myers, and once criteria met, he will recover overnight in observation on CBI.    Mohsen Rao MD

## 2025-05-20 NOTE — DISCHARGE INSTRUCTIONS
Reach out to you Primary care physician that your blood pressure has been low and causing dizziness.   omeprazole is an over the counter medication that can help with your acid reflux, you do not need a prescription for this medica ition. It is advised not to use TUMS regularly.     Activity  - No strenuous exercise for 6 weeks.  - No lifting, pushing, pulling more than 10 pounds for 6 weeks.   - Do not strain with bowel movements.  - Do not drive until you can press the brake pedal quickly and fully without pain.   - Do not operate a motor vehicle while taking narcotic pain medications.     Urination  - Some light redness (up to a watermelon-like-pink in color) is expected in the upcoming 7-10days.   - If having hematuria (blood in the urine), make sure to increase your water intake and monitor for improvement  - If you are unable to urinate you should return urgently to the ED or call clinic to try to arrange for an urgent visit same day.   - You are going home with the following tubes or drains: yanes catheter.  Nurse/ to write care and instructions.  Treat the catheter like an extension of your body; do not let it get caught or snagged on anything.  Leave the catheter in place until your follow up. Call the numbers listed above for any concerns.   - Catheter to be removed in clinic on 5/27    Medications  - Transition from narcotic pain medications to tylenol (acetaminophen) as you are able.  Wean yourself off all pain medications as you are able.  - Some pain medications contain both tylenol (acetaminophen) and a narcotic (Norco, vicodin, percocet), do not take more than 4,000mg of Tylenol (acetaminophen) from all sources in any 24 hour period.  - Narcotics can make you constipated.  Take over the counter fiber (metamucil or benefiber) and stool softeners (miralax, docusate or senna) while taking narcotic pain medications, but stop if you develop diarrhea.  - No driving or operating machinery while  "taking narcotic pain medications     Follow-Up:  - Call your primary care provider to touch base regarding your recent admission.   - Call or return sooner than your regularly scheduled visit if you develop any of the following: fever (greater than 101.5), uncontrolled pain, uncontrolled nausea or vomiting, as well as increased redness, swelling, or drainage from your wound.     Phone numbers:   - Monday through Friday 8am to 4:30pm: Call [phone] with questions or to schedule or confirm appointment.    - Nights or weekends: call the after hours emergency pager - [phone] and tell the  \"I would like to page the Urology Resident on call.\"  - For emergencies, call [phone]   "

## 2025-05-20 NOTE — PROGRESS NOTES
Infection Prevention Progress Note  5/20/2025      [id number]  Admit Date: 5/20/2025    Infection Status as of 5/20/2025 8:55 AM: No active infections  Isolation Status as of 5/20/2025 8:55 AM: No active isolations     MDRO Discontinuation  Infection Prevention has reviewed this patient's chart per the MDRO D/C Policy and have taken the following action:    Patient meets all the criteria for discontinuation and Infection Prevention will resolve the MRSA infection status.    Contact Precautions discontinued for the following MDRO(s): MRSA    If you have any questions, please contact Infection Prevention.    Arcadio Encarnacion, Infection Prevention

## 2025-05-20 NOTE — PROGRESS NOTES
Pt. Reports takes his Lisinopril every other day due to low BP and dizziness. Educated to follow up with PCP on issue.   Pt. Reports has not been taking omeprazole due to no prescription. Educated that this medication is over the counter. Has been taking TUMS daily.

## 2025-05-20 NOTE — ANESTHESIA PREPROCEDURE EVALUATION
Anesthesia Pre-Procedure Evaluation    Patient: Austyn Araujo   MRN: [id number] : 1955          Procedure : Procedure(s):  CYSTOSCOPY, AQUABLATION OF THE PROSTATE         Past Medical History:   Diagnosis Date     Anxiety      Anxiety 2015     BPH (benign prostatic hyperplasia)      Degenerative disc disease      Depression      Gastritis      Gastro-oesophageal reflux disease      H/O alcohol abuse      Hyperlipidemia      Hypertension      Low testosterone      MRSA (methicillin resistant staph aureus) culture positive 2013    skin infection     Mumps      PUD (peptic ulcer disease)      Sleep apnea      STD (sexually transmitted disease)      Type 2 diabetes mellitus (H)     neuropathy      Past Surgical History:   Procedure Laterality Date     APPENDECTOMY       COLONOSCOPY  2013    Procedure: COMBINED COLONOSCOPY, SINGLE BIOPSY/POLYPECTOMY BY BIOPSY;  Colonoscopy/ polypectomy x2 by cold forceps    ;  Surgeon: Juan Carlos Bellamy MD;  Location:  GI     DECOMPRESSION POSTERIOR LUMBAR, ,L4-5 decompression  2023     ESOPHAGOSCOPY, GASTROSCOPY, DUODENOSCOPY (EGD), COMBINED N/A 2016    Procedure: COMBINED ESOPHAGOSCOPY, GASTROSCOPY, DUODENOSCOPY (EGD), BIOPSY SINGLE OR MULTIPLE;  Surgeon: Juan Carlos Barraza MD;  Location:  GI     ESOPHAGOSCOPY, GASTROSCOPY, DUODENOSCOPY (EGD), COMBINED N/A 2019    Procedure: ESOPHAGOSCOPY, GASTROSCOPY, DUODENOSCOPY (EGD) with cold forcep;  Surgeon: Juan Carlos Barraza MD;  Location:  GI     EXCISE SOFT TISSUE TUMOR ELBOW Left 2024    Procedure: Removal Left Elbow Tumor;  Surgeon: Broderick Mccurdy MD;  Location: [facility] OR     HERNIA REPAIR        No Known Allergies   Social History     Tobacco Use     Smoking status: Former     Current packs/day: 0.00     Types: Cigarettes     Quit date:      Years since quittin.4     Smokeless tobacco: Never   Substance Use Topics     Alcohol use: No     Comment: sober since        Wt Readings from Last 1 Encounters:   05/20/25 105.2 kg (232 lb)        Anesthesia Evaluation            ROS/MED HX  ENT/Pulmonary:     (+) sleep apnea, uses CPAP,                                      Neurologic:     (+)    peripheral neuropathy,                            Cardiovascular:     (+) Dyslipidemia hypertension- -   -  - -                                      METS/Exercise Tolerance:     Hematologic:     (+)      anemia,          Musculoskeletal:       GI/Hepatic: Comment: Pancreatitis     (+) GERD,                   Renal/Genitourinary:     (+)        BPH,      Endo:     (+)  type II DM,                    Psychiatric/Substance Use:     (+) psychiatric history anxiety and depression       Infectious Disease:       Malignancy:       Other:              Physical Exam  Airway  Mallampati: III  TM distance: >3 FB  Neck ROM: full    Cardiovascular - normal exam   Dental Comments: chipped      Pulmonary - normal exam      Neurological   Other Findings       OUTSIDE LABS:  CBC:   Lab Results   Component Value Date    WBC 9.5 02/06/2025    WBC 8.1 01/15/2024    HGB 17.4 05/20/2025    HGB 17.5 02/06/2025    HCT 50.7 02/06/2025    HCT 38.5 (L) 01/15/2024     02/06/2025     01/15/2024     BMP:   Lab Results   Component Value Date     02/06/2025     08/29/2024    POTASSIUM 4.0 05/20/2025    POTASSIUM 4.2 02/06/2025    CHLORIDE 101 02/06/2025    CHLORIDE 102 08/29/2024    CO2 24 02/06/2025    CO2 28 08/29/2024    BUN 16.8 02/06/2025    BUN 14.2 08/29/2024    CR 1.0 05/09/2025    CR 0.92 02/06/2025     (H) 05/20/2025     (H) 02/06/2025     COAGS:   Lab Results   Component Value Date    INR 1.08 07/31/2016     POC:   Lab Results   Component Value Date     (H) 02/19/2019     HEPATIC:   Lab Results   Component Value Date    ALBUMIN 4.3 02/06/2025    PROTTOTAL 7.2 02/06/2025    ALT 35 02/06/2025    AST 26 02/06/2025    ALKPHOS 84 02/06/2025    BILITOTAL 0.5 02/06/2025  "    OTHER:   Lab Results   Component Value Date    LACT 2.2 (H) 08/02/2016    A1C 7.1 (H) 02/06/2025    MANINDER 9.3 02/06/2025    MAG 2.2 08/02/2016    LIPASE 193 02/03/2021    AMYLASE 35 07/31/2016    TSH 0.98 02/06/2025    SED 15 06/26/2019       Anesthesia Plan    ASA Status:  2       Anesthesia Type: General.  Airway: supraglottic airway.   Techniques and Equipment:     - Airway:  Planned airway equipment includes supraglottic airway.     - Monitoring Plan: standard ASA monitoring     Consents            Postoperative Care         Comments:                 Jessa Pelletier I have reviewed the pertinent notes and labs in the chart from the past 30 days and (re)examined the patient.  Any updates or changes from those notes are reflected in this note.    Clinically Significant Risk Factors Present on Admission                 # Drug Induced Platelet Defect: home medication list includes an antiplatelet medication   # Hypertension: Noted on problem list          # DMII: A1C = N/A within past 6 months    # Obesity: Estimated body mass index is 31.42 kg/m  as calculated from the following:    Height as of this encounter: 1.83 m (6' 0.05\").    Weight as of this encounter: 105.2 kg (232 lb).                    "

## 2025-05-20 NOTE — ANESTHESIA PROCEDURE NOTES
Airway       Patient location during procedure: OR       Procedure Start/Stop Times: 5/20/2025 7:36 AM  Staff -        CRNA: Eliot Bhatia APRN CRNA       Performed By: CRNA  Consent for Airway        Urgency: elective  Indications and Patient Condition       Indications for airway management: rosanna-procedural       Induction type:intravenous       Mask difficulty assessment: 2 - vent by mask + OA or adjuvant +/- NMBA    Final Airway Details       Final airway type: endotracheal airway       Successful airway: ETT - single  Endotracheal Airway Details        ETT size (mm): 8.0       Cuffed: yes       Successful intubation technique: video laryngoscopy       VL Blade Size: Glidescope 4       Grade View of Cords: 1       Adjucts: stylet       Bite block used: None    Post intubation assessment        Placement verified by: capnometry, equal breath sounds and chest rise        Number of attempts at approach: 1       Secured with: tape       Ease of procedure: easy       Dentition: Intact and Unchanged    Medication(s) Administered   Medication Administration Time: 5/20/2025 7:36 AM

## 2025-05-20 NOTE — ANESTHESIA POSTPROCEDURE EVALUATION
Patient: Austyn Araujo    Procedure: Procedure(s):  CYSTOSCOPY, AQUABLATION OF THE PROSTATE       Anesthesia Type:  General    Note:  Disposition: Inpatient   Postop Pain Control: Uneventful            Sign Out: Well controlled pain   PONV: No   Neuro/Psych: Uneventful            Sign Out: Acceptable/Baseline neuro status   Airway/Respiratory: Uneventful            Sign Out: Acceptable/Baseline resp. status   CV/Hemodynamics: Uneventful            Sign Out: Acceptable CV status   Other NRE:    DID A NON-ROUTINE EVENT OCCUR? No       Last vitals:  Vitals Value Taken Time   /78 05/20/25 12:30   Temp 36.5  C (97.7  F) 05/20/25 11:30   Pulse 64 05/20/25 12:42   Resp 7 05/20/25 12:42   SpO2 97 % 05/20/25 12:42   Vitals shown include unfiled device data.    Electronically Signed By: Jessa Pelletier  May 20, 2025  12:43 PM

## 2025-05-20 NOTE — PLAN OF CARE
Goal Outcome Evaluation:      Plan of Care Reviewed With: patient    Date & Time: 5/20/25 2955  Surgery/POD#: POD 0, cystoscopy, aqua ablation of the prostate   Behavior & Aggression: anxious  Fall Risk: no  Orientation:alert and oriented   ABNL VS/O2:vss, lungs clear, room air, refused capno  ABNL Labs: see results   Pain Management:oxycodone, B&O suppository   Bowel/Bladder: passing flatus, small BM, CBI continues   Drains: CBI  Wounds/incisions SARAI  Diet:regular diet, tolerates well   Number of times OUT OF BED this shift up to bathroom 3 times this shift   Tests/Procedures:   Anticipated  DC Date: 5/21  Significant Information: CBI wide open per Dr Rao, had irrigated x2, no clots noted, pt complains of pain, detrol and B&O suppository given, some relief   noted

## 2025-05-21 LAB
ANION GAP SERPL CALCULATED.3IONS-SCNC: 10 MMOL/L (ref 7–15)
BUN SERPL-MCNC: 10.8 MG/DL (ref 8–23)
CALCIUM SERPL-MCNC: 8.8 MG/DL (ref 8.8–10.4)
CHLORIDE SERPL-SCNC: 101 MMOL/L (ref 98–107)
CREAT SERPL-MCNC: 1.1 MG/DL (ref 0.67–1.17)
EGFRCR SERPLBLD CKD-EPI 2021: 72 ML/MIN/1.73M2
ERYTHROCYTE [DISTWIDTH] IN BLOOD BY AUTOMATED COUNT: 13.3 % (ref 10–15)
GLUCOSE BLDC GLUCOMTR-MCNC: 106 MG/DL (ref 70–99)
GLUCOSE BLDC GLUCOMTR-MCNC: 164 MG/DL (ref 70–99)
GLUCOSE BLDC GLUCOMTR-MCNC: 166 MG/DL (ref 70–99)
GLUCOSE BLDC GLUCOMTR-MCNC: 93 MG/DL (ref 70–99)
GLUCOSE SERPL-MCNC: 111 MG/DL (ref 70–99)
HCO3 SERPL-SCNC: 27 MMOL/L (ref 22–29)
HCT VFR BLD AUTO: 45.1 % (ref 40–53)
HGB BLD-MCNC: 15.8 G/DL (ref 13.3–17.7)
MCH RBC QN AUTO: 31.3 PG (ref 26.5–33)
MCHC RBC AUTO-ENTMCNC: 35 G/DL (ref 31.5–36.5)
MCV RBC AUTO: 90 FL (ref 78–100)
PATH REPORT.COMMENTS IMP SPEC: NORMAL
PATH REPORT.COMMENTS IMP SPEC: NORMAL
PATH REPORT.FINAL DX SPEC: NORMAL
PATH REPORT.GROSS SPEC: NORMAL
PATH REPORT.MICROSCOPIC SPEC OTHER STN: NORMAL
PATH REPORT.RELEVANT HX SPEC: NORMAL
PHOTO IMAGE: NORMAL
PLATELET # BLD AUTO: 223 10E3/UL (ref 150–450)
POTASSIUM SERPL-SCNC: 3.9 MMOL/L (ref 3.4–5.3)
RBC # BLD AUTO: 5.04 10E6/UL (ref 4.4–5.9)
SODIUM SERPL-SCNC: 138 MMOL/L (ref 135–145)
WBC # BLD AUTO: 14 10E3/UL (ref 4–11)

## 2025-05-21 PROCEDURE — 250N000013 HC RX MED GY IP 250 OP 250 PS 637: Performed by: UROLOGY

## 2025-05-21 PROCEDURE — 85048 AUTOMATED LEUKOCYTE COUNT: CPT | Performed by: UROLOGY

## 2025-05-21 PROCEDURE — 258N000001 HC RX 258: Performed by: UROLOGY

## 2025-05-21 PROCEDURE — 82962 GLUCOSE BLOOD TEST: CPT

## 2025-05-21 PROCEDURE — 250N000009 HC RX 250: Performed by: UROLOGY

## 2025-05-21 PROCEDURE — 80048 BASIC METABOLIC PNL TOTAL CA: CPT | Performed by: UROLOGY

## 2025-05-21 PROCEDURE — 36415 COLL VENOUS BLD VENIPUNCTURE: CPT | Performed by: UROLOGY

## 2025-05-21 RX ORDER — LIDOCAINE HYDROCHLORIDE 20 MG/ML
JELLY TOPICAL EVERY 4 HOURS PRN
Status: DISCONTINUED | OUTPATIENT
Start: 2025-05-21 | End: 2025-05-23 | Stop reason: HOSPADM

## 2025-05-21 RX ADMIN — SODIUM CHLORIDE 3000 ML: 900 IRRIGANT IRRIGATION at 16:47

## 2025-05-21 RX ADMIN — INSULIN ASPART 15 UNITS: 100 INJECTION, SOLUTION INTRAVENOUS; SUBCUTANEOUS at 13:29

## 2025-05-21 RX ADMIN — SODIUM CHLORIDE 6000 ML: 900 IRRIGANT IRRIGATION at 04:35

## 2025-05-21 RX ADMIN — SODIUM CHLORIDE: 900 IRRIGANT IRRIGATION at 10:27

## 2025-05-21 RX ADMIN — SODIUM CHLORIDE 6000 ML: 900 IRRIGANT IRRIGATION at 01:09

## 2025-05-21 RX ADMIN — ATORVASTATIN CALCIUM 40 MG: 40 TABLET, FILM COATED ORAL at 08:10

## 2025-05-21 RX ADMIN — PANTOPRAZOLE SODIUM 20 MG: 20 TABLET, DELAYED RELEASE ORAL at 16:08

## 2025-05-21 RX ADMIN — LISINOPRIL 10 MG: 10 TABLET ORAL at 08:11

## 2025-05-21 RX ADMIN — LIDOCAINE: 40 CREAM TOPICAL at 16:09

## 2025-05-21 RX ADMIN — SODIUM CHLORIDE: 900 IRRIGANT IRRIGATION at 14:26

## 2025-05-21 RX ADMIN — METFORMIN ER 500 MG 1000 MG: 500 TABLET ORAL at 18:00

## 2025-05-21 RX ADMIN — ACETAMINOPHEN 650 MG: 325 TABLET, FILM COATED ORAL at 06:25

## 2025-05-21 RX ADMIN — TOLTERODINE TARTRATE 4 MG: 4 CAPSULE, EXTENDED RELEASE ORAL at 08:11

## 2025-05-21 RX ADMIN — INSULIN ASPART 15 UNITS: 100 INJECTION, SOLUTION INTRAVENOUS; SUBCUTANEOUS at 09:26

## 2025-05-21 RX ADMIN — OXYCODONE HYDROCHLORIDE 10 MG: 5 TABLET ORAL at 20:28

## 2025-05-21 RX ADMIN — GABAPENTIN 100 MG: 100 CAPSULE ORAL at 21:53

## 2025-05-21 RX ADMIN — SODIUM CHLORIDE: 900 IRRIGANT IRRIGATION at 11:15

## 2025-05-21 RX ADMIN — OXYCODONE HYDROCHLORIDE 10 MG: 5 TABLET ORAL at 16:09

## 2025-05-21 RX ADMIN — INSULIN ASPART 15 UNITS: 100 INJECTION, SOLUTION INTRAVENOUS; SUBCUTANEOUS at 18:04

## 2025-05-21 RX ADMIN — SODIUM CHLORIDE: 900 IRRIGANT IRRIGATION at 12:13

## 2025-05-21 RX ADMIN — METFORMIN ER 500 MG 1000 MG: 500 TABLET ORAL at 08:11

## 2025-05-21 RX ADMIN — OXYCODONE HYDROCHLORIDE 10 MG: 5 TABLET ORAL at 12:13

## 2025-05-21 RX ADMIN — OXYCODONE HYDROCHLORIDE 10 MG: 5 TABLET ORAL at 06:24

## 2025-05-21 RX ADMIN — SODIUM CHLORIDE: 900 IRRIGANT IRRIGATION at 08:16

## 2025-05-21 RX ADMIN — SODIUM CHLORIDE 6000 ML: 900 IRRIGANT IRRIGATION at 20:01

## 2025-05-21 RX ADMIN — SITAGLIPTIN 100 MG: 100 TABLET, FILM COATED ORAL at 08:11

## 2025-05-21 RX ADMIN — PANTOPRAZOLE SODIUM 20 MG: 20 TABLET, DELAYED RELEASE ORAL at 06:25

## 2025-05-21 ASSESSMENT — ACTIVITIES OF DAILY LIVING (ADL)
ADLS_ACUITY_SCORE: 28

## 2025-05-21 NOTE — PLAN OF CARE
Goal Outcome Evaluation:      Plan of Care Reviewed With: patient    Date & Time: 5/21/25 1500  Surgery/POD#: POD 1 cystoscopy, aqua ablation of the prostate   Behavior & Aggression: cooperative, anxious at times   Fall Risk: no  Orientation:alert and oriented   ABNL VS/O2:vss, lungs clear, room air   ABNL Labs: see results   Pain Management:oxycodone as needed for pain   Bowel/Bladder: CBI titrated to keep urine pink, passing flatus, denies BM this shift   Drains: CBI   Wounds/incisions   Diet:regular diet, tolerates well   Number of times OUT OF BED this shift ambulated in halls twice this shift, up to BR multiple times   Tests/Procedures:   Anticipated  DC Date: 5/22  Significant Information: blood sugars checked per orders

## 2025-05-21 NOTE — PLAN OF CARE
Surgery/POD#: POD #1 s/p cysto w/ aquablation of prostate  Orientation: A&Ox4  ABNL VS/O2: VSS on RA  ABNL Labs:  & 93  Pain Management: PRN Tylenol & Oxycodone  Bowel/Bladder: Myers w/ CBI at fast rate w/ light pink to cherry red output.  Drains: PIV infusing LR at 10 ml/hr  Diet: Regular  Activity Level: SBA  Anticipated  DC Date: Home pending

## 2025-05-21 NOTE — PROGRESS NOTES
"  Urology Progress Note    Name: Austyn Araujo    MRN: [id number]   YOB: 1955  Date of Admission: 5/20/2025               Impression and Plan:   Impression / Plan:   Austyn Araujo is a 69 year old male with Hx low risk prostate cancer on active surveillance, & LUTS related to enlarged prostate now POD1 Aquablation of the prostate for management of his refractory LUTS. Also found to have Mild bulbar urethral stricture & Mild meatal stenosis requiring sounds.     Tolerating PO, pain under good control w PO meds, passing flatulence, has been having hematuria up to grade IV, currently on CBI with light pink OP.     Exam  /52 (BP Location: Right arm)   Pulse 65   Temp 97.5  F (36.4  C) (Oral)   Resp 18   Ht 1.83 m (6' 0.05\")   Wt 105.2 kg (232 lb)   SpO2 92%   BMI 31.42 kg/m    No acute distress  Unlabored breathing  Abdomen soft, nontender, nondistended.  Myers with CBI running, urine in tubing    Labs  WBC 14  Hgb 15.8  Cr 1.1      - Continue CBI, titrate in accordance to graphic below.   - Hand irrigate PRN for Clots/obstruction.   - Continue tolterodine for bladder spasms.  - Pain control.   - Anticipate discharge home tomorrow after urine has continued to clear up.    -'      Discussed with Dr. Rao  Thank you for the opportunity to participate in the care of Austyn Araujo.     MARICEL Elder, CNP  [facility] - Department of Urology  Office: [phone]  After business hours: [phone]  Securely message with [employer]    "

## 2025-05-21 NOTE — PLAN OF CARE
Goal Outcome Evaluation:         Plan of Care Reviewed With: patient     Date & Time: 5/21/25 6219-7809  Surgery/POD#: POD 1 cystoscopy, aqua ablation of the prostate   Behavior & Aggression: Green, cooperative, anxious at times   Fall Risk: no  Orientation: A&O x4  ABNL VS/O2: VSS on RA  ABNL Labs: see results   Pain Management: oxycodone as needed for pain. Lidocaine cream applied to catheter insertion site  Bowel/Bladder: CBI titrated to keep urine pink.  Passing flatus, denies BM this shift   Drains: CBI   Wounds/incisions: N/A  Diet: Regular diet, tolerating well   Number of times OUT OF BED this shift: ambulated in halls twice this shift, up to BR multiple times   Tests/Procedures:   Anticipated  DC Date: 5/22  Significant Information: BS checks per orders

## 2025-05-22 VITALS
OXYGEN SATURATION: 93 % | WEIGHT: 232 LBS | DIASTOLIC BLOOD PRESSURE: 62 MMHG | RESPIRATION RATE: 18 BRPM | TEMPERATURE: 98.4 F | SYSTOLIC BLOOD PRESSURE: 96 MMHG | HEART RATE: 77 BPM | HEIGHT: 72 IN | BODY MASS INDEX: 31.42 KG/M2

## 2025-05-22 LAB
GLUCOSE BLDC GLUCOMTR-MCNC: 190 MG/DL (ref 70–99)
GLUCOSE BLDC GLUCOMTR-MCNC: 194 MG/DL (ref 70–99)
GLUCOSE BLDC GLUCOMTR-MCNC: 195 MG/DL (ref 70–99)
GLUCOSE BLDC GLUCOMTR-MCNC: 77 MG/DL (ref 70–99)
GLUCOSE BLDC GLUCOMTR-MCNC: 77 MG/DL (ref 70–99)

## 2025-05-22 PROCEDURE — 250N000013 HC RX MED GY IP 250 OP 250 PS 637: Performed by: NURSE PRACTITIONER

## 2025-05-22 PROCEDURE — 250N000009 HC RX 250: Performed by: UROLOGY

## 2025-05-22 PROCEDURE — 258N000001 HC RX 258: Performed by: UROLOGY

## 2025-05-22 PROCEDURE — 250N000013 HC RX MED GY IP 250 OP 250 PS 637: Performed by: UROLOGY

## 2025-05-22 PROCEDURE — 82962 GLUCOSE BLOOD TEST: CPT

## 2025-05-22 PROCEDURE — 250N000011 HC RX IP 250 OP 636: Performed by: UROLOGY

## 2025-05-22 PROCEDURE — 120N000001 HC R&B MED SURG/OB

## 2025-05-22 RX ORDER — POLYETHYLENE GLYCOL 3350 17 G/17G
17 POWDER, FOR SOLUTION ORAL DAILY PRN
Status: DISCONTINUED | OUTPATIENT
Start: 2025-05-22 | End: 2025-05-23 | Stop reason: HOSPADM

## 2025-05-22 RX ORDER — DOCUSATE SODIUM 100 MG/1
100 CAPSULE, LIQUID FILLED ORAL 2 TIMES DAILY
Status: DISCONTINUED | OUTPATIENT
Start: 2025-05-22 | End: 2025-05-23 | Stop reason: HOSPADM

## 2025-05-22 RX ORDER — ONDANSETRON 4 MG/1
4 TABLET, FILM COATED ORAL EVERY 6 HOURS PRN
Status: DISCONTINUED | OUTPATIENT
Start: 2025-05-22 | End: 2025-05-22

## 2025-05-22 RX ORDER — SENNOSIDES 8.6 MG
8.6 TABLET ORAL 2 TIMES DAILY PRN
Status: DISCONTINUED | OUTPATIENT
Start: 2025-05-22 | End: 2025-05-23 | Stop reason: HOSPADM

## 2025-05-22 RX ORDER — OXYBUTYNIN CHLORIDE 5 MG/1
5 TABLET ORAL 3 TIMES DAILY PRN
Status: DISCONTINUED | OUTPATIENT
Start: 2025-05-22 | End: 2025-05-23 | Stop reason: HOSPADM

## 2025-05-22 RX ORDER — ONDANSETRON 4 MG/1
4 TABLET, ORALLY DISINTEGRATING ORAL EVERY 6 HOURS PRN
Status: DISCONTINUED | OUTPATIENT
Start: 2025-05-22 | End: 2025-05-23 | Stop reason: HOSPADM

## 2025-05-22 RX ADMIN — SODIUM CHLORIDE 6000 ML: 900 IRRIGANT IRRIGATION at 04:02

## 2025-05-22 RX ADMIN — OXYCODONE HYDROCHLORIDE 10 MG: 5 TABLET ORAL at 20:02

## 2025-05-22 RX ADMIN — ONDANSETRON 4 MG: 4 TABLET, ORALLY DISINTEGRATING ORAL at 10:00

## 2025-05-22 RX ADMIN — DOCUSATE SODIUM 100 MG: 100 CAPSULE, LIQUID FILLED ORAL at 20:02

## 2025-05-22 RX ADMIN — OXYCODONE HYDROCHLORIDE 10 MG: 5 TABLET ORAL at 00:41

## 2025-05-22 RX ADMIN — METFORMIN ER 500 MG 1000 MG: 500 TABLET ORAL at 08:26

## 2025-05-22 RX ADMIN — SODIUM CHLORIDE 6000 ML: 900 IRRIGANT IRRIGATION at 17:44

## 2025-05-22 RX ADMIN — ACETAMINOPHEN 650 MG: 325 TABLET, FILM COATED ORAL at 20:02

## 2025-05-22 RX ADMIN — ACETAMINOPHEN 650 MG: 325 TABLET, FILM COATED ORAL at 00:41

## 2025-05-22 RX ADMIN — SODIUM CHLORIDE 6000 ML: 900 IRRIGANT IRRIGATION at 21:48

## 2025-05-22 RX ADMIN — PANTOPRAZOLE SODIUM 20 MG: 20 TABLET, DELAYED RELEASE ORAL at 05:20

## 2025-05-22 RX ADMIN — LIDOCAINE: 40 CREAM TOPICAL at 10:07

## 2025-05-22 RX ADMIN — ATROPA BELLADONNA AND OPIUM 1 SUPPOSITORY: 16.2; 3 SUPPOSITORY RECTAL at 10:07

## 2025-05-22 RX ADMIN — INSULIN ASPART 15 UNITS: 100 INJECTION, SOLUTION INTRAVENOUS; SUBCUTANEOUS at 19:00

## 2025-05-22 RX ADMIN — METFORMIN ER 500 MG 1000 MG: 500 TABLET ORAL at 17:41

## 2025-05-22 RX ADMIN — TOLTERODINE TARTRATE 4 MG: 4 CAPSULE, EXTENDED RELEASE ORAL at 08:26

## 2025-05-22 RX ADMIN — OXYCODONE HYDROCHLORIDE 10 MG: 5 TABLET ORAL at 10:00

## 2025-05-22 RX ADMIN — SENNOSIDES 8.6 MG: 8.6 TABLET, FILM COATED ORAL at 20:02

## 2025-05-22 RX ADMIN — PANTOPRAZOLE SODIUM 20 MG: 20 TABLET, DELAYED RELEASE ORAL at 17:41

## 2025-05-22 RX ADMIN — INSULIN ASPART 15 UNITS: 100 INJECTION, SOLUTION INTRAVENOUS; SUBCUTANEOUS at 10:08

## 2025-05-22 RX ADMIN — INSULIN ASPART 15 UNITS: 100 INJECTION, SOLUTION INTRAVENOUS; SUBCUTANEOUS at 14:53

## 2025-05-22 RX ADMIN — ONDANSETRON 4 MG: 4 TABLET, ORALLY DISINTEGRATING ORAL at 21:49

## 2025-05-22 RX ADMIN — GABAPENTIN 100 MG: 100 CAPSULE ORAL at 21:49

## 2025-05-22 RX ADMIN — ATORVASTATIN CALCIUM 40 MG: 40 TABLET, FILM COATED ORAL at 08:26

## 2025-05-22 RX ADMIN — SITAGLIPTIN 100 MG: 100 TABLET, FILM COATED ORAL at 08:26

## 2025-05-22 RX ADMIN — OXYBUTYNIN CHLORIDE 5 MG: 5 TABLET ORAL at 10:29

## 2025-05-22 RX ADMIN — LIDOCAINE: 40 CREAM TOPICAL at 17:41

## 2025-05-22 RX ADMIN — DOCUSATE SODIUM 100 MG: 100 CAPSULE, LIQUID FILLED ORAL at 08:26

## 2025-05-22 RX ADMIN — POLYETHYLENE GLYCOL 3350 17 G: 17 POWDER, FOR SOLUTION ORAL at 10:29

## 2025-05-22 RX ADMIN — ATROPA BELLADONNA AND OPIUM 1 SUPPOSITORY: 16.2; 3 SUPPOSITORY RECTAL at 20:03

## 2025-05-22 RX ADMIN — OXYBUTYNIN CHLORIDE 5 MG: 5 TABLET ORAL at 20:02

## 2025-05-22 ASSESSMENT — ACTIVITIES OF DAILY LIVING (ADL)
ADLS_ACUITY_SCORE: 27
ADLS_ACUITY_SCORE: 33
ADLS_ACUITY_SCORE: 33
ADLS_ACUITY_SCORE: 38
ADLS_ACUITY_SCORE: 33
ADLS_ACUITY_SCORE: 27
ADLS_ACUITY_SCORE: 33
ADLS_ACUITY_SCORE: 27
ADLS_ACUITY_SCORE: 33
ADLS_ACUITY_SCORE: 33
ADLS_ACUITY_SCORE: 27
ADLS_ACUITY_SCORE: 27
ADLS_ACUITY_SCORE: 33
ADLS_ACUITY_SCORE: 27
ADLS_ACUITY_SCORE: 33
ADLS_ACUITY_SCORE: 38
ADLS_ACUITY_SCORE: 28

## 2025-05-22 NOTE — PROVIDER NOTIFICATION
MD Notification    Notified Person: MD    Notified Person Name: Petra    Notification Date/Time: 5/21 @ 7:58pm     Notification Interaction: Answering Sx    Purpose of Notification: Can we get urogel ordered for catheter pain?     Orders Received: see orders    Comments:

## 2025-05-22 NOTE — PLAN OF CARE
Shift: 5/22/25 4869-2645  Surgery/POD#: POD 2 from a cystoscopy, aquablation of prostate.  Behavior & Aggression: Green  ABNL Labs: WBC 14.0  Pain Management: PRN oxy & tylenol   Bowel/Bladder: yanes w/ CBI at slow/moderate rate with pink output.  Per MD please run CBI overnight regardless of color.  Bowel sounds normoactive, passing flatus, no BM, stool softeners given.  Pt often has the feeling the he has to urinate, catheter is quite positional and will gush more fluid when changing position or standing frequently.  Catheter has been hand irrigated with no clots returned.  Taped traction on catheter keeps coming off, leg strap added with light traction.  Anticipated  DC Date: pending CBI improvement    Significant Information: B&O and oxybutinin given for bladder spasms, using lidocaine cream on urethral meatus.

## 2025-05-22 NOTE — PROGRESS NOTES
"  Urology Progress Note    Name: Austyn Araujo    MRN: [id number]   YOB: 1955  Date of Admission: 5/20/2025               Impression and Plan:   Impression / Plan:   Austyn Araujo is a 69 year old male with  Hx low risk prostate cancer on active surveillance, & LUTS related to enlarged prostate now POD2 Aquablation of the prostate for management of his refractory LUTS. Also found to have Mild bulbar urethral stricture & Mild meatal stenosis requiring sounds.     Today, having grade 3.5 hematuria without clots with CBI clamped. I irrigated him to clear, no clots. He is passing flatulence, reports feeling a bit constipated and having some nausea. Having catheter site insertion pain that responds well to lidocaine cream. Also having ongoing bladder spasms. Ambulating.     Vital signs:  Temp: 97.4  F (36.3  C) Temp src: Oral BP: 108/62 Pulse: 72   Resp: 18 SpO2: 93 % O2 Device: None (Room air)   Height: 183 cm (6' 0.05\") Weight: 105.2 kg (232 lb)  Estimated body mass index is 31.42 kg/m  as calculated from the following:    Height as of this encounter: 1.83 m (6' 0.05\").    Weight as of this encounter: 105.2 kg (232 lb).          -Continue Clamp trial. Will recheck on him later today.   -Oxybutynin TID PRN added for additional control of bladder spasms  -Zofran PRN for nausea.  -Lidocaine for insertion site discomfort.   -Miralax/Senna PRN added for constipation.  -Pain control.    Discussed with Dr. Rao    Thank you for the opportunity to participate in the care of Austyn Araujo.     MARICEL Elder, CNP  [facility] - Department of Urology  Office: [phone]  After business hours: [phone]  Securely message with [employer]    "

## 2025-05-22 NOTE — PROGRESS NOTES
Patient VSS are at baseline: MET - VSS on RA    Patient able to ambulate as they were prior to admission or with assist devices provided by therapies during their stay: MET - Ind    Patient able to tolerate oral intake and maintain hydration status: MET     Patient has adequate pain control using oral analgesics: MET     Patient must be voiding adequate amounts: NOT MET - yanes catheter w/ CBI    Number of times OUT OF BED this shift: Ind - ambulated in room multiple times     Hypercapnia, Hypoventilation or hypoxia resolved for at least 2 hours without supplemental oxygen: MET    Deficits in sensation, mobility or coordination have resolved if spinal or regional anesthesia used: MET     Patient has returned to baseline mental status: MET        Shift: 7p-7a  Surgery/POD#: POD 2 from a cystoscopy, aquablation of prostate.  Behavior & Aggression: Green, anxious  ABNL Labs: WBC 14.0  Pain Management: PRN oxy & tylenol   Bowel/Bladder: yanes w/ CBI at fast/moderately fast rate, bowel sounds normoactive, passing flatus, no BM overnight  Anticipated  DC Date: pending CBI  Significant Information:   Urology following. Hand irrigated x1.

## 2025-05-23 VITALS
RESPIRATION RATE: 18 BRPM | BODY MASS INDEX: 31.42 KG/M2 | WEIGHT: 232 LBS | TEMPERATURE: 98.1 F | HEART RATE: 77 BPM | DIASTOLIC BLOOD PRESSURE: 76 MMHG | HEIGHT: 72 IN | OXYGEN SATURATION: 95 % | SYSTOLIC BLOOD PRESSURE: 136 MMHG

## 2025-05-23 LAB
GLUCOSE BLDC GLUCOMTR-MCNC: 125 MG/DL (ref 70–99)
GLUCOSE BLDC GLUCOMTR-MCNC: 136 MG/DL (ref 70–99)
GLUCOSE BLDC GLUCOMTR-MCNC: 208 MG/DL (ref 70–99)

## 2025-05-23 PROCEDURE — 258N000001 HC RX 258: Performed by: UROLOGY

## 2025-05-23 PROCEDURE — 250N000011 HC RX IP 250 OP 636: Performed by: UROLOGY

## 2025-05-23 PROCEDURE — 250N000013 HC RX MED GY IP 250 OP 250 PS 637: Performed by: UROLOGY

## 2025-05-23 PROCEDURE — 250N000009 HC RX 250: Performed by: NURSE PRACTITIONER

## 2025-05-23 PROCEDURE — 250N000013 HC RX MED GY IP 250 OP 250 PS 637: Performed by: NURSE PRACTITIONER

## 2025-05-23 RX ORDER — OXYCODONE HYDROCHLORIDE 5 MG/1
5 TABLET ORAL EVERY 6 HOURS PRN
Qty: 12 TABLET | Refills: 0 | Status: ON HOLD | OUTPATIENT
Start: 2025-05-23 | End: 2025-05-29

## 2025-05-23 RX ORDER — DOCUSATE SODIUM 100 MG/1
100 CAPSULE, LIQUID FILLED ORAL 2 TIMES DAILY PRN
Qty: 20 CAPSULE | Refills: 0 | Status: SHIPPED | OUTPATIENT
Start: 2025-05-23

## 2025-05-23 RX ORDER — LIDOCAINE HYDROCHLORIDE 20 MG/ML
JELLY TOPICAL ONCE
Status: COMPLETED | OUTPATIENT
Start: 2025-05-23 | End: 2025-05-23

## 2025-05-23 RX ORDER — CIPROFLOXACIN 500 MG/1
500 TABLET, FILM COATED ORAL ONCE
Status: COMPLETED | OUTPATIENT
Start: 2025-05-23 | End: 2025-05-23

## 2025-05-23 RX ORDER — LIDOCAINE 40 MG/G
CREAM TOPICAL
Qty: 30 G | Refills: 0 | Status: SHIPPED | OUTPATIENT
Start: 2025-05-23

## 2025-05-23 RX ORDER — CEPHALEXIN 500 MG/1
500 CAPSULE ORAL 2 TIMES DAILY
Qty: 10 CAPSULE | Refills: 0 | Status: ON HOLD | OUTPATIENT
Start: 2025-05-23 | End: 2025-05-29

## 2025-05-23 RX ORDER — BACITRACIN ZINC 500 [USP'U]/G
OINTMENT TOPICAL 2 TIMES DAILY
Qty: 15 G | Refills: 0 | Status: SHIPPED | OUTPATIENT
Start: 2025-05-23

## 2025-05-23 RX ORDER — POLYETHYLENE GLYCOL 3350 17 G/17G
1 POWDER, FOR SOLUTION ORAL DAILY
Qty: 510 G | Refills: 0 | Status: SHIPPED | OUTPATIENT
Start: 2025-05-23

## 2025-05-23 RX ADMIN — OXYCODONE HYDROCHLORIDE 10 MG: 5 TABLET ORAL at 11:18

## 2025-05-23 RX ADMIN — INSULIN ASPART 15 UNITS: 100 INJECTION, SOLUTION INTRAVENOUS; SUBCUTANEOUS at 13:06

## 2025-05-23 RX ADMIN — PANTOPRAZOLE SODIUM 20 MG: 20 TABLET, DELAYED RELEASE ORAL at 06:15

## 2025-05-23 RX ADMIN — LIDOCAINE HYDROCHLORIDE: 20 JELLY TOPICAL at 10:41

## 2025-05-23 RX ADMIN — CIPROFLOXACIN HYDROCHLORIDE 500 MG: 500 TABLET, FILM COATED ORAL at 06:15

## 2025-05-23 RX ADMIN — LISINOPRIL 10 MG: 10 TABLET ORAL at 09:30

## 2025-05-23 RX ADMIN — ACETAMINOPHEN 650 MG: 325 TABLET, FILM COATED ORAL at 05:24

## 2025-05-23 RX ADMIN — ATORVASTATIN CALCIUM 40 MG: 40 TABLET, FILM COATED ORAL at 09:28

## 2025-05-23 RX ADMIN — SODIUM CHLORIDE 6000 ML: 900 IRRIGANT IRRIGATION at 03:14

## 2025-05-23 RX ADMIN — OXYCODONE HYDROCHLORIDE 10 MG: 5 TABLET ORAL at 05:23

## 2025-05-23 RX ADMIN — METFORMIN ER 500 MG 1000 MG: 500 TABLET ORAL at 09:27

## 2025-05-23 RX ADMIN — OXYCODONE HYDROCHLORIDE 10 MG: 5 TABLET ORAL at 00:41

## 2025-05-23 RX ADMIN — TOLTERODINE TARTRATE 4 MG: 4 CAPSULE, EXTENDED RELEASE ORAL at 09:27

## 2025-05-23 RX ADMIN — DOCUSATE SODIUM 100 MG: 100 CAPSULE, LIQUID FILLED ORAL at 09:29

## 2025-05-23 RX ADMIN — SITAGLIPTIN 100 MG: 100 TABLET, FILM COATED ORAL at 09:29

## 2025-05-23 RX ADMIN — OXYBUTYNIN CHLORIDE 5 MG: 5 TABLET ORAL at 05:24

## 2025-05-23 RX ADMIN — ONDANSETRON 4 MG: 4 TABLET, ORALLY DISINTEGRATING ORAL at 06:15

## 2025-05-23 ASSESSMENT — ACTIVITIES OF DAILY LIVING (ADL)
ADLS_ACUITY_SCORE: 33

## 2025-05-23 NOTE — PROGRESS NOTES
"  Urology Progress Note    Name: Austyn Araujo    MRN: [id number]   YOB: 1955  Date of Admission: 5/20/2025               Impression and Plan:   Impression / Plan:   Austyn Araujo is a 69 year old male with Hx low risk prostate cancer on active surveillance, & LUTS related to enlarged prostate now POD3 Aquablation of the prostate for management of his refractory LUTS. Also found to have Mild bulbar urethral stricture & Mild meatal stenosis requiring sounds.     Clear UOP after being placed on traction, yanes removed early this AM. Has been pushing fluids. Yet to void. Bladder scanned for 600+ cc, has strong urge to void and is very uncomfortable.    Vital signs:  Temp: 98.1  F (36.7  C) Temp src: Oral BP: 126/65 Pulse: 77   Resp: 18 SpO2: 95 % O2 Device: None (Room air)   Height: 183 cm (6' 0.05\") Weight: 105.2 kg (232 lb)  Estimated body mass index is 31.42 kg/m  as calculated from the following:    Height as of this encounter: 1.83 m (6' 0.05\").    Weight as of this encounter: 105.2 kg (232 lb).          -20 Fr coude catheter was placed successfully, using aseptic technique and sterile equipment. Urojet was instilled into the urethra prior to placement. Balloon inflated with 10 ml. The patient tolerated placement well. Yanes secured with statlock. 700 mL Grade III-IV hematuria without clots on return.   -Hand irrigate urethral yanes to clear Q shift & PRN for clots/obstruction.  -Will reassess later today. Possible discharge pending how his urine clears up.     Discussed with Dr. Rao    Thank you for the opportunity to participate in the care of Austyn Araujo.     MARICEL Elder, CNP  [facility] - Department of Urology  Office: [phone]  After business hours: [phone]  Securely message with [employer]    "

## 2025-05-23 NOTE — DISCHARGE SUMMARY
Urology Discharge Summary    Patient: Austyn Araujo    MRN: [id number]   : 1955         Date of Admission:  2025   Date of Discharge::  2025  Admitting Physician:  Mohsen Rao MD  Discharge Provider:  MARICEL Elder CNP          Admission Diagnoses:   Enlarged prostate [N40.0]  Past Medical History:   Diagnosis Date    Anxiety     Anxiety 2015    BPH (benign prostatic hyperplasia)     Degenerative disc disease     Depression     Gastritis     Gastro-oesophageal reflux disease     H/O alcohol abuse     Hyperlipidemia     Hypertension     Low testosterone     MRSA (methicillin resistant staph aureus) culture positive 2013    skin infection    Mumps     PUD (peptic ulcer disease)     Sleep apnea     STD (sexually transmitted disease)     Type 2 diabetes mellitus (H)     neuropathy             Discharge Diagnosis:     Enlarged prostate [N40.0]  Past Medical History:   Diagnosis Date    Anxiety     Anxiety 2015    BPH (benign prostatic hyperplasia)     Degenerative disc disease     Depression     Gastritis     Gastro-oesophageal reflux disease     H/O alcohol abuse     Hyperlipidemia     Hypertension     Low testosterone     MRSA (methicillin resistant staph aureus) culture positive 2013    skin infection    Mumps     PUD (peptic ulcer disease)     Sleep apnea     STD (sexually transmitted disease)     Type 2 diabetes mellitus (H)     neuropathy            Procedures:     Procedure(s): SC TRANSURETHRAL WATERJET ABLATION PROSTATE COMPL [0421T] (CYSTOSCOPY, AQUABLATION OF THE PROSTATE)               Medications Prior to Admission:     Medications Prior to Admission   Medication Sig Dispense Refill Last Dose/Taking    aspirin 81 MG tablet Take 1 tablet by mouth every morning   2025 Morning    atorvastatin (LIPITOR) 40 MG tablet TAKE 1 TABLET BY MOUTH EVERY DAY 90 tablet 2 2025 Morning    calcium carbonate (TUMS) 500 MG chewable tablet Take 2 chew tab by  "mouth daily as needed for heartburn.   Taking As Needed    finasteride (PROSCAR) 5 MG tablet Take 1 tablet (5 mg) by mouth daily. 90 tablet 3 5/18/2025    gabapentin (NEURONTIN) 100 MG capsule Take 1 capsule (100 mg) by mouth at bedtime. 90 capsule 1 More than a month    insulin aspart (NOVOLOG FLEXPEN) 100 UNIT/ML pen Inject 15 Units subcutaneously 3 times daily (with meals). 15 mL 3 Taking    insulin glargine (LANTUS SOLOSTAR) 100 UNIT/ML pen INJECT 15 UNITS SUBCUTANEOUSLY 2 TIMES DAILY. 30 mL 0 5/19/2025    JANUVIA 100 MG tablet TAKE 1 TABLET BY MOUTH EVERY DAY 90 tablet 0 5/19/2025 Morning    lisinopril (ZESTRIL) 10 MG tablet TAKE 1 TABLET BY MOUTH EVERY MORNING 90 tablet 0 5/18/2025    metFORMIN (GLUCOPHAGE XR) 500 MG 24 hr tablet TAKE 4 TABLETS BY MOUTH DAILY WITH DINNER. 360 tablet 0 5/19/2025 Morning    Multiple Vitamins-Minerals (CENTRUM SILVER) per tablet Take 1 tablet by mouth daily 90 tablet 3 Past Month    omeprazole (PRILOSEC) 40 MG DR capsule TAKE 1 CAPSULE BY MOUTH EVERY DAY 90 capsule 2 More than a month    tadalafil (ADCIRCA/CIALIS) 20 MG tablet Take 1 tablet (20 mg) by mouth every 24 hours. 100 tablet 2 Past Week    testosterone cypionate (DEPOTESTOSTERONE) 200 MG/ML injection INJECT 2ML INTO THE MUSCLE ONCE A WEEK ON FRIDAYS 10 mL 2 Taking    VITRON-C  MG TABS tablet TAKE 1 TABLET BY MOUTH EVERY DAY (Patient taking differently: Take 1 tablet by mouth every other day.) 90 tablet 1 Past Week    blood glucose (ONETOUCH ULTRA) test strip USE TO TEST BLOOD SUGARS ONE TIME DAILY OR AS DIRECTED. 100 strip 0     Continuous Blood Gluc Sensor (DEXCOM G7 SENSOR) MISC 1 Device every 14 days 1 each 11     Continuous Glucose Sensor (DEXCOM G7 SENSOR) MISC CHANGE EVERY 10 DAYS 9 each 5     insulin pen needle (BD RASHI U/F) 32G X 4 MM miscellaneous USE ONE PEN NEEDLE DAILY OR AS DIRECTED. 100 each 2     insulin syringe-needle U-100 (29G X 1/2\" 0.5 ML) 29G X 1/2\" 0.5 ML miscellaneous Use 1 syringes daily or " "as directed for testosterone use 100 each 1     needle, disp, (BD HYPODERMIC NEEDLE) 18G X 1\" MISC USE TO ADMINISTER TESTOSTERONE. DUE FOR APPOINTMENT WITH YOUR DOCTOR 6 each 3     needle, disp, 21G X 1-1/2\" MISC 1 each once a week. 12 each 3     PHARMACIST CHOICE LANCETS MISC TEST BLOOD SUGARS 5 TIMES DAILY 100 each 3     syringe/needle, disp, (B-D LUER-ROCHELLE SYRINGE) 22G X 1-1/2\" 3 ML MISC Use weekly for testosterone injection . 12 each 3     Syringe/Needle, Disp, 26G X 5/8\" 3 ML MISC 1 Application daily. 90 each 0              Discharge Medications:     Current Discharge Medication List        START taking these medications    Details   acetaminophen (TYLENOL) 325 MG tablet Take 2 tablets (650 mg) by mouth every 4 hours as needed for other (mild pain).  Qty: 100 tablet, Refills: 0    Associated Diagnoses: Enlarged prostate      bacitracin 500 UNIT/GM external ointment Apply topically 2 times daily. Apply to yanes insertion site twice daily as needed for insertion site discomfort  Qty: 15 g, Refills: 0    Associated Diagnoses: Postoperative state      cephALEXin (KEFLEX) 500 MG capsule Take 1 capsule (500 mg) by mouth 2 times daily.  Qty: 10 capsule, Refills: 0    Associated Diagnoses: Enlarged prostate      docusate sodium (COLACE) 100 MG capsule Take 1 capsule (100 mg) by mouth 2 times daily as needed for constipation.  Qty: 20 capsule, Refills: 0    Associated Diagnoses: Enlarged prostate      hydrOXYzine HCl (ATARAX) 10 MG tablet Take 1 tablet (10 mg) by mouth every 6 hours as needed for itching or anxiety (with pain, moderate pain).  Qty: 30 tablet, Refills: 0    Associated Diagnoses: Enlarged prostate      lidocaine (LMX4) 4 % external cream Apply topically once as needed for mild pain (Catheter insertion site discomfort).  Qty: 30 g, Refills: 0    Associated Diagnoses: Postoperative state      ondansetron (ZOFRAN ODT) 4 MG ODT tab Take 1-2 tablets (4-8 mg) by mouth every 8 hours as needed for nausea. Dissolve " ON the tongue.  Qty: 10 tablet, Refills: 0    Associated Diagnoses: Enlarged prostate      oxyCODONE (ROXICODONE) 5 MG tablet Take 1 tablet (5 mg) by mouth every 6 hours as needed for pain.  Qty: 12 tablet, Refills: 0    Associated Diagnoses: Enlarged prostate      polyethylene glycol (MIRALAX) 17 GM/Dose powder Take 17 g (1 Capful) by mouth daily.  Qty: 510 g, Refills: 0    Associated Diagnoses: Postoperative state           CONTINUE these medications which have NOT CHANGED    Details   aspirin 81 MG tablet Take 1 tablet by mouth every morning      atorvastatin (LIPITOR) 40 MG tablet TAKE 1 TABLET BY MOUTH EVERY DAY  Qty: 90 tablet, Refills: 2    Associated Diagnoses: Hyperlipidemia LDL goal <100      calcium carbonate (TUMS) 500 MG chewable tablet Take 2 chew tab by mouth daily as needed for heartburn.      finasteride (PROSCAR) 5 MG tablet Take 1 tablet (5 mg) by mouth daily.  Qty: 90 tablet, Refills: 3    Associated Diagnoses: Enlarged prostate      gabapentin (NEURONTIN) 100 MG capsule Take 1 capsule (100 mg) by mouth at bedtime.  Qty: 90 capsule, Refills: 1    Associated Diagnoses: Type 2 diabetes mellitus with diabetic neuropathy, with long-term current use of insulin (H)      insulin aspart (NOVOLOG FLEXPEN) 100 UNIT/ML pen Inject 15 Units subcutaneously 3 times daily (with meals).  Qty: 15 mL, Refills: 3      insulin glargine (LANTUS SOLOSTAR) 100 UNIT/ML pen INJECT 15 UNITS SUBCUTANEOUSLY 2 TIMES DAILY.  Qty: 30 mL, Refills: 0    Comments: If Lantus is not covered by insurance, may substitute Basaglar or Semglee or other insulin glargine product per insurance preference at same dose and frequency.    Associated Diagnoses: Type 2 diabetes mellitus without complications (H)      JANUVIA 100 MG tablet TAKE 1 TABLET BY MOUTH EVERY DAY  Qty: 90 tablet, Refills: 0    Associated Diagnoses: Type 2 diabetes mellitus with diabetic autonomic (poly)neuropathy (H)      lisinopril (ZESTRIL) 10 MG tablet TAKE 1 TABLET BY  MOUTH EVERY MORNING  Qty: 90 tablet, Refills: 0    Associated Diagnoses: Benign essential hypertension      metFORMIN (GLUCOPHAGE XR) 500 MG 24 hr tablet TAKE 4 TABLETS BY MOUTH DAILY WITH DINNER.  Qty: 360 tablet, Refills: 0    Associated Diagnoses: Type 2 diabetes mellitus with diabetic autonomic (poly)neuropathy (H)      Multiple Vitamins-Minerals (CENTRUM SILVER) per tablet Take 1 tablet by mouth daily  Qty: 90 tablet, Refills: 3    Associated Diagnoses: Type 2 diabetes mellitus with diabetic neuropathy (H); Essential hypertension with goal blood pressure less than 140/90      omeprazole (PRILOSEC) 40 MG DR capsule TAKE 1 CAPSULE BY MOUTH EVERY DAY  Qty: 90 capsule, Refills: 2    Associated Diagnoses: Gastroesophageal reflux disease without esophagitis      tadalafil (ADCIRCA/CIALIS) 20 MG tablet Take 1 tablet (20 mg) by mouth every 24 hours.  Qty: 100 tablet, Refills: 2    Associated Diagnoses: Erectile dysfunction due to diseases classified elsewhere      testosterone cypionate (DEPOTESTOSTERONE) 200 MG/ML injection INJECT 2ML INTO THE MUSCLE ONCE A WEEK ON FRIDAYS  Qty: 10 mL, Refills: 2    Associated Diagnoses: Low testosterone      VITRON-C  MG TABS tablet TAKE 1 TABLET BY MOUTH EVERY DAY  Qty: 90 tablet, Refills: 1    Associated Diagnoses: Anemia, unspecified type      blood glucose (ONETOUCH ULTRA) test strip USE TO TEST BLOOD SUGARS ONE TIME DAILY OR AS DIRECTED.  Qty: 100 strip, Refills: 0    Comments: Patient is over-due for an appointment.  Associated Diagnoses: Type 2 diabetes mellitus with diabetic neuropathy (H)      !! Continuous Blood Gluc Sensor (DEXCOM G7 SENSOR) MISC 1 Device every 14 days  Qty: 1 each, Refills: 11    Associated Diagnoses: Type 2 diabetes mellitus with peripheral autonomic neuropathy (H)      !! Continuous Glucose Sensor (DEXCOM G7 SENSOR) MISC CHANGE EVERY 10 DAYS  Qty: 9 each, Refills: 5    Associated Diagnoses: Type 2 diabetes mellitus with peripheral autonomic  "neuropathy (H)      insulin pen needle (BD RASHI U/F) 32G X 4 MM miscellaneous USE ONE PEN NEEDLE DAILY OR AS DIRECTED.  Qty: 100 each, Refills: 2    Associated Diagnoses: Type 2 diabetes mellitus with peripheral autonomic neuropathy (H)      insulin syringe-needle U-100 (29G X 1/2\" 0.5 ML) 29G X 1/2\" 0.5 ML miscellaneous Use 1 syringes daily or as directed for testosterone use  Qty: 100 each, Refills: 1    Associated Diagnoses: Low testosterone      needle, disp, (BD HYPODERMIC NEEDLE) 18G X 1\" MISC USE TO ADMINISTER TESTOSTERONE. DUE FOR APPOINTMENT WITH YOUR DOCTOR  Qty: 6 each, Refills: 3    Associated Diagnoses: Low testosterone      needle, disp, 21G X 1-1/2\" MISC 1 each once a week.  Qty: 12 each, Refills: 3    Associated Diagnoses: Low testosterone      PHARMACIST CHOICE LANCETS MISC TEST BLOOD SUGARS 5 TIMES DAILY  Qty: 100 each, Refills: 3    Associated Diagnoses: Type 2 diabetes mellitus with peripheral autonomic neuropathy (H)      syringe/needle, disp, (B-D LUER-ROCHELLE SYRINGE) 22G X 1-1/2\" 3 ML MISC Use weekly for testosterone injection .  Qty: 12 each, Refills: 3    Associated Diagnoses: Low testosterone      Syringe/Needle, Disp, 26G X 5/8\" 3 ML MISC 1 Application daily.  Qty: 90 each, Refills: 0    Associated Diagnoses: Low testosterone       !! - Potential duplicate medications found. Please discuss with provider.                Consultations:   Consultation during this admission received:   None          Brief History of Illness:   Reason for admission requiring a surgical or invasive procedure:   Enlarged prostate [N40.0]   The patient underwent the following procedure(s):   See above   There were no immediate complications during this procedure.    Please refer to the full operative summary for details.           Hospital Course:   The patient's hospital course was remarkable for failed TOV on POD3, yanes was replaced, 20 Fr coude, and he was having low grade hematuria at time of discharge.  Austyn " "DELPHINE Araujo recovered as anticipated and experienced no major post-operative complications.      On POD 3 he was ambulating without assitance, tolerating the discharge diet, had pain controlled with PO medications to go home with, and was requiring no IV medications or fluids. He was discharged to home with appropriate contact information, follow-up and instructions as seen below in the discharge paperwork.         Discharge Labs:     Lab Results   Component Value Date    PSA 3.86 03/03/2025    PSA 5.92 08/28/2024    PSA 5.80 05/01/2024    PSA 5.59 11/03/2023    PSA 5.19 11/18/2022    PSA 7.19 09/07/2022    PSA 6.96 06/02/2022    PSA 5.94 07/08/2021    PSA 5.30 11/16/2020    PSA 6.05 06/18/2020    PSA 5.66 02/20/2020    PSA 5.60 11/18/2019     Recent Labs   Lab 05/21/25  0729 05/20/25  0602   WBC 14.0*  --    HGB 15.8 17.4     --        Recent Labs   Lab 05/23/25  1149 05/23/25  0755 05/21/25  1059 05/21/25  0729 05/20/25  1122 05/20/25  0602   NA  --   --   --  138  --   --    POTASSIUM  --   --   --  3.9  --  4.0   CHLORIDE  --   --   --  101  --   --    CO2  --   --   --  27  --   --    BUN  --   --   --  10.8  --   --    CR  --   --   --  1.10  --   --    * 125*   < > 111*   < > 164*   MANINDER  --   --   --  8.8  --   --     < > = values in this interval not displayed.     No lab results found in last 7 days.    Invalid input(s): \"URINEBLOOD\"  No results found for this or any previous visit.         Discharge Instructions and Follow-Up:      Reach out to you Primary care physician that your blood pressure has been low and causing dizziness.   omeprazole is an over the counter medication that can help with your acid reflux, you do not need a prescription for this medica ition. It is advised not to use TUMS regularly.     Activity  - No strenuous exercise for 6 weeks.  - No lifting, pushing, pulling more than 10 pounds for 6 weeks.   - Do not strain with bowel movements.  - Do not drive until you can press " the brake pedal quickly and fully without pain.   - Do not operate a motor vehicle while taking narcotic pain medications.     Urination  - Some light redness (up to a watermelon-like-pink in color) is expected in the upcoming 7-10days.   - If having hematuria (blood in the urine), make sure to increase your water intake and monitor for improvement  - If you are unable to urinate you should return urgently to the ED or call clinic to try to arrange for an urgent visit same day.   - You are going home with the following tubes or drains: yanes catheter.  Nurse/ to write care and instructions.  Treat the catheter like an extension of your body; do not let it get caught or snagged on anything.  Leave the catheter in place until your follow up. Call the numbers listed above for any concerns.   - Catheter to be removed in clinic on 5/27    Medications  - Transition from narcotic pain medications to tylenol (acetaminophen) as you are able.  Wean yourself off all pain medications as you are able.  - Some pain medications contain both tylenol (acetaminophen) and a narcotic (Norco, vicodin, percocet), do not take more than 4,000mg of Tylenol (acetaminophen) from all sources in any 24 hour period.  - Narcotics can make you constipated.  Take over the counter fiber (metamucil or benefiber) and stool softeners (miralax, docusate or senna) while taking narcotic pain medications, but stop if you develop diarrhea.  - No driving or operating machinery while taking narcotic pain medications     Follow-Up:  - Call your primary care provider to touch base regarding your recent admission.   - Call or return sooner than your regularly scheduled visit if you develop any of the following: fever (greater than 101.5), uncontrolled pain, uncontrolled nausea or vomiting, as well as increased redness, swelling, or drainage from your wound.     Phone numbers:   - Monday through Friday 8am to 4:30pm: Call [phone] with questions  "or to schedule or confirm appointment.    - Nights or weekends: call the after hours emergency pager - [phone] and tell the  \"I would like to page the Urology Resident on call.\"  - For emergencies, call 911     Phone numbers:  - Nursing phone helpline at the Urology Clinic (8A-4:30P M-F):  [phone]. Call for questions, requests for medication refills, or to schedule or confirm an appointment.  - Nights, weekends, or holidays, call [phone] and ask the  to page the [facility] urologist on call. Typically, the on-call provider should return your call within 1 hr.  Please page the on-call provider again if you haven't been contacted as expected.   - For emergencies, always call [phone]          Discharge Disposition:     Discharged to home      Attestation: I have reviewed today's vital signs, notes, medications, labs and imaging.    MARICEL Elder, CNP  [facility] - Department of Urology  Office: [phone]  After business hours: [phone]  Securely message with [employer]  May 23, 2025         "

## 2025-05-23 NOTE — PLAN OF CARE
Goal Outcome Evaluation:    Discharge    Patient discharged to home via car with wife   Care plan note    Listed belongings gathered and given to patient (including from security/pharmacy). Yes  Care Plan and Patient education resolved: Yes  Prescriptions if needed, hard copies sent with patient  Yes  Medication Bin checked and emptied on discharge Yes  SW/care coordinator/charge RN aware of discharge: Yes

## 2025-05-23 NOTE — PLAN OF CARE
Surgery/POD#: POD 3 from a cystoscopy, aquablation of prostate.  Behavior & Aggression: Green  Fall Risk: No  Orientation:A&O x4  ABNL VS/O2:VSS on RA  ABNL Labs: see chart   Pain Management:PRN oxycodone and tylenol   Bowel/Bladder: Passing flatus, no BM, stool softeners given. Due to void, yanes removed by MD this AM.   Drains: PIV SL   Diet:Regular  Number of times OUT OF BED this shift: Up ad josh, ambulated frequently in room and to bathroom.   Anticipated  DC Date: Pending   Significant Information: B&O and oxybutinin given for bladder spasms

## 2025-05-25 ENCOUNTER — HOSPITAL ENCOUNTER (INPATIENT)
Facility: CLINIC | Age: 70
LOS: 2 days | Discharge: HOME | End: 2025-05-29
Attending: STUDENT IN AN ORGANIZED HEALTH CARE EDUCATION/TRAINING PROGRAM | Admitting: STUDENT IN AN ORGANIZED HEALTH CARE EDUCATION/TRAINING PROGRAM
Payer: COMMERCIAL

## 2025-05-25 DIAGNOSIS — K21.9 GASTROESOPHAGEAL REFLUX DISEASE WITHOUT ESOPHAGITIS: ICD-10-CM

## 2025-05-25 DIAGNOSIS — R31.0 GROSS HEMATURIA: Primary | ICD-10-CM

## 2025-05-25 DIAGNOSIS — E11.40 TYPE 2 DIABETES MELLITUS WITH DIABETIC NEUROPATHY, WITH LONG-TERM CURRENT USE OF INSULIN (H): ICD-10-CM

## 2025-05-25 DIAGNOSIS — Z98.890 POSTOPERATIVE STATE: ICD-10-CM

## 2025-05-25 DIAGNOSIS — H66.91 OTITIS, RIGHT: ICD-10-CM

## 2025-05-25 DIAGNOSIS — E11.43 TYPE 2 DIABETES MELLITUS WITH DIABETIC AUTONOMIC (POLY)NEUROPATHY (H): ICD-10-CM

## 2025-05-25 DIAGNOSIS — T83.9XXA FOLEY CATHETER PROBLEM, INITIAL ENCOUNTER: ICD-10-CM

## 2025-05-25 DIAGNOSIS — Z79.4 TYPE 2 DIABETES MELLITUS WITH DIABETIC NEUROPATHY, WITH LONG-TERM CURRENT USE OF INSULIN (H): ICD-10-CM

## 2025-05-25 DIAGNOSIS — I10 HYPERTENSION GOAL BP (BLOOD PRESSURE) < 140/90: ICD-10-CM

## 2025-05-25 LAB
ALBUMIN UR-MCNC: 30 MG/DL
ANION GAP SERPL CALCULATED.3IONS-SCNC: 11 MMOL/L (ref 7–15)
APPEARANCE UR: ABNORMAL
BACTERIA #/AREA URNS HPF: ABNORMAL /HPF
BASOPHILS # BLD AUTO: 0.1 10E3/UL (ref 0–0.2)
BASOPHILS NFR BLD AUTO: 0 %
BILIRUB UR QL STRIP: NEGATIVE
BUN SERPL-MCNC: 14 MG/DL (ref 8–23)
CALCIUM SERPL-MCNC: 9.8 MG/DL (ref 8.8–10.4)
CHLORIDE SERPL-SCNC: 102 MMOL/L (ref 98–107)
COLOR UR AUTO: ABNORMAL
CREAT SERPL-MCNC: 0.93 MG/DL (ref 0.67–1.17)
EGFRCR SERPLBLD CKD-EPI 2021: 88 ML/MIN/1.73M2
EOSINOPHIL # BLD AUTO: 0.5 10E3/UL (ref 0–0.7)
EOSINOPHIL NFR BLD AUTO: 4 %
ERYTHROCYTE [DISTWIDTH] IN BLOOD BY AUTOMATED COUNT: 12.7 % (ref 10–15)
GLUCOSE SERPL-MCNC: 90 MG/DL (ref 70–99)
GLUCOSE UR STRIP-MCNC: NEGATIVE MG/DL
HCO3 SERPL-SCNC: 28 MMOL/L (ref 22–29)
HCT VFR BLD AUTO: 46.3 % (ref 40–53)
HGB BLD-MCNC: 16.2 G/DL (ref 13.3–17.7)
HGB UR QL STRIP: ABNORMAL
HOLD SPECIMEN: NORMAL
HOLD SPECIMEN: NORMAL
IMM GRANULOCYTES # BLD: 0 10E3/UL
IMM GRANULOCYTES NFR BLD: 0 %
KETONES UR STRIP-MCNC: NEGATIVE MG/DL
LEUKOCYTE ESTERASE UR QL STRIP: ABNORMAL
LYMPHOCYTES # BLD AUTO: 2.3 10E3/UL (ref 0.8–5.3)
LYMPHOCYTES NFR BLD AUTO: 18 %
MCH RBC QN AUTO: 31.1 PG (ref 26.5–33)
MCHC RBC AUTO-ENTMCNC: 35 G/DL (ref 31.5–36.5)
MCV RBC AUTO: 89 FL (ref 78–100)
MONOCYTES # BLD AUTO: 1 10E3/UL (ref 0–1.3)
MONOCYTES NFR BLD AUTO: 8 %
MUCOUS THREADS #/AREA URNS LPF: PRESENT /LPF
NEUTROPHILS # BLD AUTO: 8.8 10E3/UL (ref 1.6–8.3)
NEUTROPHILS NFR BLD AUTO: 70 %
NITRATE UR QL: NEGATIVE
NRBC # BLD AUTO: 0 10E3/UL
NRBC BLD AUTO-RTO: 0 /100
PH UR STRIP: 7 [PH] (ref 5–7)
PLATELET # BLD AUTO: 300 10E3/UL (ref 150–450)
POTASSIUM SERPL-SCNC: 4 MMOL/L (ref 3.4–5.3)
RBC # BLD AUTO: 5.21 10E6/UL (ref 4.4–5.9)
RBC URINE: >182 /HPF
SODIUM SERPL-SCNC: 141 MMOL/L (ref 135–145)
SP GR UR STRIP: 1.01 (ref 1–1.03)
UROBILINOGEN UR STRIP-MCNC: NORMAL MG/DL
WBC # BLD AUTO: 12.6 10E3/UL (ref 4–11)
WBC URINE: 24 /HPF

## 2025-05-25 PROCEDURE — 87086 URINE CULTURE/COLONY COUNT: CPT | Performed by: STUDENT IN AN ORGANIZED HEALTH CARE EDUCATION/TRAINING PROGRAM

## 2025-05-25 PROCEDURE — 250N000011 HC RX IP 250 OP 636: Mod: JZ | Performed by: STUDENT IN AN ORGANIZED HEALTH CARE EDUCATION/TRAINING PROGRAM

## 2025-05-25 PROCEDURE — 250N000009 HC RX 250: Performed by: STUDENT IN AN ORGANIZED HEALTH CARE EDUCATION/TRAINING PROGRAM

## 2025-05-25 PROCEDURE — 81001 URINALYSIS AUTO W/SCOPE: CPT | Performed by: STUDENT IN AN ORGANIZED HEALTH CARE EDUCATION/TRAINING PROGRAM

## 2025-05-25 PROCEDURE — 36415 COLL VENOUS BLD VENIPUNCTURE: CPT | Performed by: STUDENT IN AN ORGANIZED HEALTH CARE EDUCATION/TRAINING PROGRAM

## 2025-05-25 PROCEDURE — 96374 THER/PROPH/DIAG INJ IV PUSH: CPT

## 2025-05-25 PROCEDURE — 80048 BASIC METABOLIC PNL TOTAL CA: CPT | Performed by: STUDENT IN AN ORGANIZED HEALTH CARE EDUCATION/TRAINING PROGRAM

## 2025-05-25 PROCEDURE — 99222 1ST HOSP IP/OBS MODERATE 55: CPT | Performed by: INTERNAL MEDICINE

## 2025-05-25 PROCEDURE — 99291 CRITICAL CARE FIRST HOUR: CPT | Mod: 25

## 2025-05-25 PROCEDURE — 85025 COMPLETE CBC W/AUTO DIFF WBC: CPT | Performed by: STUDENT IN AN ORGANIZED HEALTH CARE EDUCATION/TRAINING PROGRAM

## 2025-05-25 PROCEDURE — 258N000001 HC RX 258: Performed by: STUDENT IN AN ORGANIZED HEALTH CARE EDUCATION/TRAINING PROGRAM

## 2025-05-25 RX ORDER — LIDOCAINE HYDROCHLORIDE 20 MG/ML
10 JELLY TOPICAL ONCE
Status: COMPLETED | OUTPATIENT
Start: 2025-05-25 | End: 2025-05-25

## 2025-05-25 RX ORDER — HYDROMORPHONE HYDROCHLORIDE 1 MG/ML
0.5 INJECTION, SOLUTION INTRAMUSCULAR; INTRAVENOUS; SUBCUTANEOUS ONCE
Status: COMPLETED | OUTPATIENT
Start: 2025-05-25 | End: 2025-05-25

## 2025-05-25 RX ADMIN — LIDOCAINE HYDROCHLORIDE 10 ML: 20 JELLY TOPICAL at 22:35

## 2025-05-25 RX ADMIN — HYDROMORPHONE HYDROCHLORIDE 0.5 MG: 1 INJECTION, SOLUTION INTRAMUSCULAR; INTRAVENOUS; SUBCUTANEOUS at 22:47

## 2025-05-25 RX ADMIN — SODIUM CHLORIDE 3000 ML: 900 IRRIGANT IRRIGATION at 22:36

## 2025-05-25 ASSESSMENT — COLUMBIA-SUICIDE SEVERITY RATING SCALE - C-SSRS
1. IN THE PAST MONTH, HAVE YOU WISHED YOU WERE DEAD OR WISHED YOU COULD GO TO SLEEP AND NOT WAKE UP?: NO
6. HAVE YOU EVER DONE ANYTHING, STARTED TO DO ANYTHING, OR PREPARED TO DO ANYTHING TO END YOUR LIFE?: NO
2. HAVE YOU ACTUALLY HAD ANY THOUGHTS OF KILLING YOURSELF IN THE PAST MONTH?: NO

## 2025-05-25 ASSESSMENT — ACTIVITIES OF DAILY LIVING (ADL)
ADLS_ACUITY_SCORE: 54

## 2025-05-25 NOTE — LETTER
[facility] GENERAL SURGERY  [address] [facility] [address]  Phone: [phone]  Fax: [phone]    May 29, 2025        Austyn Araujo  [address] [facility] [address]          To whom it may concern:    RE: Austyn Zaman was admitted to the hospital from 5/25/25-5/29/25. Please excuse him from work during this time.       Please contact me for questions or concerns.    Sincerely,        Leanne Lopez MD  Hospitalist Service  [facility]

## 2025-05-26 ENCOUNTER — APPOINTMENT (OUTPATIENT)
Dept: ULTRASOUND IMAGING | Facility: CLINIC | Age: 70
End: 2025-05-26
Attending: UROLOGY
Payer: COMMERCIAL

## 2025-05-26 LAB
GLUCOSE BLDC GLUCOMTR-MCNC: 139 MG/DL (ref 70–99)
GLUCOSE BLDC GLUCOMTR-MCNC: 166 MG/DL (ref 70–99)
GLUCOSE BLDC GLUCOMTR-MCNC: 211 MG/DL (ref 70–99)
GLUCOSE BLDC GLUCOMTR-MCNC: 222 MG/DL (ref 70–99)
GLUCOSE BLDC GLUCOMTR-MCNC: 259 MG/DL (ref 70–99)
GLUCOSE BLDC GLUCOMTR-MCNC: 274 MG/DL (ref 70–99)
HGB BLD-MCNC: 15.7 G/DL (ref 13.3–17.7)
MCV RBC AUTO: 87 FL (ref 78–100)

## 2025-05-26 PROCEDURE — 250N000009 HC RX 250

## 2025-05-26 PROCEDURE — 96361 HYDRATE IV INFUSION ADD-ON: CPT

## 2025-05-26 PROCEDURE — 258N000001 HC RX 258: Performed by: INTERNAL MEDICINE

## 2025-05-26 PROCEDURE — 250N000009 HC RX 250: Performed by: UROLOGY

## 2025-05-26 PROCEDURE — 250N000013 HC RX MED GY IP 250 OP 250 PS 637: Performed by: UROLOGY

## 2025-05-26 PROCEDURE — 250N000012 HC RX MED GY IP 250 OP 636 PS 637: Performed by: INTERNAL MEDICINE

## 2025-05-26 PROCEDURE — G0378 HOSPITAL OBSERVATION PER HR: HCPCS

## 2025-05-26 PROCEDURE — 36415 COLL VENOUS BLD VENIPUNCTURE: CPT | Performed by: INTERNAL MEDICINE

## 2025-05-26 PROCEDURE — 76857 US EXAM PELVIC LIMITED: CPT

## 2025-05-26 PROCEDURE — 258N000001 HC RX 258: Performed by: STUDENT IN AN ORGANIZED HEALTH CARE EDUCATION/TRAINING PROGRAM

## 2025-05-26 PROCEDURE — 36415 COLL VENOUS BLD VENIPUNCTURE: CPT

## 2025-05-26 PROCEDURE — 96376 TX/PRO/DX INJ SAME DRUG ADON: CPT

## 2025-05-26 PROCEDURE — 250N000013 HC RX MED GY IP 250 OP 250 PS 637

## 2025-05-26 PROCEDURE — 250N000011 HC RX IP 250 OP 636: Mod: JZ | Performed by: INTERNAL MEDICINE

## 2025-05-26 PROCEDURE — 250N000013 HC RX MED GY IP 250 OP 250 PS 637: Performed by: INTERNAL MEDICINE

## 2025-05-26 PROCEDURE — 87040 BLOOD CULTURE FOR BACTERIA: CPT

## 2025-05-26 PROCEDURE — 99233 SBSQ HOSP IP/OBS HIGH 50: CPT

## 2025-05-26 PROCEDURE — 82962 GLUCOSE BLOOD TEST: CPT

## 2025-05-26 PROCEDURE — 85018 HEMOGLOBIN: CPT | Performed by: INTERNAL MEDICINE

## 2025-05-26 PROCEDURE — 99254 IP/OBS CNSLTJ NEW/EST MOD 60: CPT | Performed by: UROLOGY

## 2025-05-26 PROCEDURE — 258N000003 HC RX IP 258 OP 636: Performed by: INTERNAL MEDICINE

## 2025-05-26 RX ORDER — OXYBUTYNIN CHLORIDE 5 MG/1
5 TABLET, EXTENDED RELEASE ORAL AT BEDTIME
Status: DISCONTINUED | OUTPATIENT
Start: 2025-05-26 | End: 2025-05-29 | Stop reason: HOSPADM

## 2025-05-26 RX ORDER — GABAPENTIN 100 MG/1
100 CAPSULE ORAL AT BEDTIME
Status: DISCONTINUED | OUTPATIENT
Start: 2025-05-26 | End: 2025-05-29 | Stop reason: HOSPADM

## 2025-05-26 RX ORDER — NALOXONE HYDROCHLORIDE 0.4 MG/ML
0.2 INJECTION, SOLUTION INTRAMUSCULAR; INTRAVENOUS; SUBCUTANEOUS
Status: DISCONTINUED | OUTPATIENT
Start: 2025-05-26 | End: 2025-05-29 | Stop reason: HOSPADM

## 2025-05-26 RX ORDER — ACETAMINOPHEN 325 MG/1
650 TABLET ORAL EVERY 4 HOURS PRN
Status: DISCONTINUED | OUTPATIENT
Start: 2025-05-26 | End: 2025-05-26

## 2025-05-26 RX ORDER — PANTOPRAZOLE SODIUM 40 MG/1
40 TABLET, DELAYED RELEASE ORAL
Status: DISCONTINUED | OUTPATIENT
Start: 2025-05-26 | End: 2025-05-29 | Stop reason: HOSPADM

## 2025-05-26 RX ORDER — PROCHLORPERAZINE MALEATE 5 MG/1
5 TABLET ORAL EVERY 6 HOURS PRN
Status: DISCONTINUED | OUTPATIENT
Start: 2025-05-26 | End: 2025-05-29 | Stop reason: HOSPADM

## 2025-05-26 RX ORDER — ONDANSETRON 4 MG/1
4 TABLET, ORALLY DISINTEGRATING ORAL EVERY 6 HOURS PRN
Status: DISCONTINUED | OUTPATIENT
Start: 2025-05-26 | End: 2025-05-29 | Stop reason: HOSPADM

## 2025-05-26 RX ORDER — AMOXICILLIN 250 MG
2 CAPSULE ORAL 2 TIMES DAILY PRN
Status: DISCONTINUED | OUTPATIENT
Start: 2025-05-26 | End: 2025-05-29 | Stop reason: HOSPADM

## 2025-05-26 RX ORDER — OMEPRAZOLE 20 MG/1
40 CAPSULE, DELAYED RELEASE ORAL EVERY MORNING
Status: DISCONTINUED | OUTPATIENT
Start: 2025-05-26 | End: 2025-05-26

## 2025-05-26 RX ORDER — LISINOPRIL 5 MG/1
5 TABLET ORAL EVERY MORNING
Status: DISCONTINUED | OUTPATIENT
Start: 2025-05-27 | End: 2025-05-29 | Stop reason: HOSPADM

## 2025-05-26 RX ORDER — SODIUM CHLORIDE, SODIUM LACTATE, POTASSIUM CHLORIDE, CALCIUM CHLORIDE 600; 310; 30; 20 MG/100ML; MG/100ML; MG/100ML; MG/100ML
INJECTION, SOLUTION INTRAVENOUS CONTINUOUS
Status: DISCONTINUED | OUTPATIENT
Start: 2025-05-26 | End: 2025-05-26

## 2025-05-26 RX ORDER — DEXTROSE MONOHYDRATE 25 G/50ML
25-50 INJECTION, SOLUTION INTRAVENOUS
Status: DISCONTINUED | OUTPATIENT
Start: 2025-05-26 | End: 2025-05-29 | Stop reason: HOSPADM

## 2025-05-26 RX ORDER — NALOXONE HYDROCHLORIDE 0.4 MG/ML
0.4 INJECTION, SOLUTION INTRAMUSCULAR; INTRAVENOUS; SUBCUTANEOUS
Status: DISCONTINUED | OUTPATIENT
Start: 2025-05-26 | End: 2025-05-29 | Stop reason: HOSPADM

## 2025-05-26 RX ORDER — AMOXICILLIN 250 MG
1 CAPSULE ORAL 2 TIMES DAILY PRN
Status: DISCONTINUED | OUTPATIENT
Start: 2025-05-26 | End: 2025-05-29 | Stop reason: HOSPADM

## 2025-05-26 RX ORDER — HYDROMORPHONE HCL IN WATER/PF 6 MG/30 ML
0.2 PATIENT CONTROLLED ANALGESIA SYRINGE INTRAVENOUS
Status: DISCONTINUED | OUTPATIENT
Start: 2025-05-26 | End: 2025-05-29 | Stop reason: HOSPADM

## 2025-05-26 RX ORDER — ATROPA BELLADONNA AND OPIUM 16.2; 3 MG/1; MG/1
30 SUPPOSITORY RECTAL EVERY 8 HOURS PRN
Refills: 0 | Status: DISCONTINUED | OUTPATIENT
Start: 2025-05-26 | End: 2025-05-29 | Stop reason: HOSPADM

## 2025-05-26 RX ORDER — HYDROMORPHONE HCL IN WATER/PF 6 MG/30 ML
0.4 PATIENT CONTROLLED ANALGESIA SYRINGE INTRAVENOUS
Status: DISCONTINUED | OUTPATIENT
Start: 2025-05-26 | End: 2025-05-29 | Stop reason: HOSPADM

## 2025-05-26 RX ORDER — ACETAMINOPHEN 325 MG/1
975 TABLET ORAL EVERY 6 HOURS
Status: DISCONTINUED | OUTPATIENT
Start: 2025-05-26 | End: 2025-05-29 | Stop reason: HOSPADM

## 2025-05-26 RX ORDER — ONDANSETRON 2 MG/ML
4 INJECTION INTRAMUSCULAR; INTRAVENOUS EVERY 6 HOURS PRN
Status: DISCONTINUED | OUTPATIENT
Start: 2025-05-26 | End: 2025-05-29 | Stop reason: HOSPADM

## 2025-05-26 RX ORDER — FINASTERIDE 5 MG/1
5 TABLET, FILM COATED ORAL DAILY
Status: DISCONTINUED | OUTPATIENT
Start: 2025-05-26 | End: 2025-05-29 | Stop reason: HOSPADM

## 2025-05-26 RX ORDER — OXYCODONE HYDROCHLORIDE 5 MG/1
5-10 TABLET ORAL EVERY 4 HOURS PRN
Refills: 0 | Status: DISCONTINUED | OUTPATIENT
Start: 2025-05-26 | End: 2025-05-29 | Stop reason: HOSPADM

## 2025-05-26 RX ORDER — NICOTINE POLACRILEX 4 MG
15-30 LOZENGE BUCCAL
Status: DISCONTINUED | OUTPATIENT
Start: 2025-05-26 | End: 2025-05-29 | Stop reason: HOSPADM

## 2025-05-26 RX ORDER — ACETAMINOPHEN 650 MG/1
650 SUPPOSITORY RECTAL EVERY 6 HOURS
Status: DISCONTINUED | OUTPATIENT
Start: 2025-05-26 | End: 2025-05-29 | Stop reason: HOSPADM

## 2025-05-26 RX ORDER — KETOROLAC TROMETHAMINE 15 MG/ML
15 INJECTION, SOLUTION INTRAMUSCULAR; INTRAVENOUS EVERY 6 HOURS PRN
Status: DISCONTINUED | OUTPATIENT
Start: 2025-05-26 | End: 2025-05-29 | Stop reason: HOSPADM

## 2025-05-26 RX ORDER — ACETAMINOPHEN 650 MG/1
650 SUPPOSITORY RECTAL EVERY 4 HOURS PRN
Status: DISCONTINUED | OUTPATIENT
Start: 2025-05-26 | End: 2025-05-26

## 2025-05-26 RX ORDER — LIDOCAINE HYDROCHLORIDE 20 MG/ML
JELLY TOPICAL EVERY 4 HOURS PRN
Status: DISCONTINUED | OUTPATIENT
Start: 2025-05-26 | End: 2025-05-29 | Stop reason: HOSPADM

## 2025-05-26 RX ADMIN — OXYCODONE HYDROCHLORIDE 10 MG: 5 TABLET ORAL at 18:45

## 2025-05-26 RX ADMIN — INSULIN ASPART 2 UNITS: 100 INJECTION, SOLUTION INTRAVENOUS; SUBCUTANEOUS at 18:46

## 2025-05-26 RX ADMIN — OXYCODONE HYDROCHLORIDE 10 MG: 5 TABLET ORAL at 22:48

## 2025-05-26 RX ADMIN — INSULIN GLARGINE 7 UNITS: 100 INJECTION, SOLUTION SUBCUTANEOUS at 12:07

## 2025-05-26 RX ADMIN — HYDROMORPHONE HYDROCHLORIDE 0.4 MG: 0.2 INJECTION, SOLUTION INTRAMUSCULAR; INTRAVENOUS; SUBCUTANEOUS at 05:28

## 2025-05-26 RX ADMIN — HYDROMORPHONE HYDROCHLORIDE 0.4 MG: 0.2 INJECTION, SOLUTION INTRAMUSCULAR; INTRAVENOUS; SUBCUTANEOUS at 12:10

## 2025-05-26 RX ADMIN — INSULIN ASPART 3 UNITS: 100 INJECTION, SOLUTION INTRAVENOUS; SUBCUTANEOUS at 14:41

## 2025-05-26 RX ADMIN — OXYCODONE HYDROCHLORIDE 10 MG: 5 TABLET ORAL at 14:47

## 2025-05-26 RX ADMIN — OXYBUTYNIN CHLORIDE 5 MG: 5 TABLET, EXTENDED RELEASE ORAL at 23:34

## 2025-05-26 RX ADMIN — LIDOCAINE HYDROCHLORIDE: 20 JELLY TOPICAL at 15:05

## 2025-05-26 RX ADMIN — SODIUM CHLORIDE, SODIUM LACTATE, POTASSIUM CHLORIDE, AND CALCIUM CHLORIDE: .6; .31; .03; .02 INJECTION, SOLUTION INTRAVENOUS at 02:44

## 2025-05-26 RX ADMIN — GABAPENTIN 100 MG: 100 CAPSULE ORAL at 21:41

## 2025-05-26 RX ADMIN — SODIUM CHLORIDE 6000 ML: 900 IRRIGANT IRRIGATION at 07:18

## 2025-05-26 RX ADMIN — SODIUM CHLORIDE 3000 ML: 900 IRRIGANT IRRIGATION at 12:16

## 2025-05-26 RX ADMIN — INSULIN GLARGINE 7 UNITS: 100 INJECTION, SOLUTION SUBCUTANEOUS at 21:43

## 2025-05-26 RX ADMIN — PANTOPRAZOLE SODIUM 40 MG: 40 TABLET, DELAYED RELEASE ORAL at 09:12

## 2025-05-26 RX ADMIN — SODIUM CHLORIDE 3000 ML: 900 IRRIGANT IRRIGATION at 00:00

## 2025-05-26 RX ADMIN — HYDROMORPHONE HYDROCHLORIDE 0.4 MG: 0.2 INJECTION, SOLUTION INTRAMUSCULAR; INTRAVENOUS; SUBCUTANEOUS at 09:12

## 2025-05-26 RX ADMIN — HYDROMORPHONE HYDROCHLORIDE 0.4 MG: 0.2 INJECTION, SOLUTION INTRAMUSCULAR; INTRAVENOUS; SUBCUTANEOUS at 20:02

## 2025-05-26 RX ADMIN — INSULIN ASPART 2 UNITS: 100 INJECTION, SOLUTION INTRAVENOUS; SUBCUTANEOUS at 21:43

## 2025-05-26 RX ADMIN — INSULIN ASPART 3 UNITS: 100 INJECTION, SOLUTION INTRAVENOUS; SUBCUTANEOUS at 12:06

## 2025-05-26 RX ADMIN — SODIUM CHLORIDE 3000 ML: 900 IRRIGANT IRRIGATION at 12:14

## 2025-05-26 RX ADMIN — FINASTERIDE 5 MG: 5 TABLET, FILM COATED ORAL at 14:47

## 2025-05-26 RX ADMIN — ACETAMINOPHEN 975 MG: 325 TABLET, FILM COATED ORAL at 21:40

## 2025-05-26 RX ADMIN — ACETAMINOPHEN 975 MG: 325 TABLET, FILM COATED ORAL at 14:42

## 2025-05-26 RX ADMIN — ATROPA BELLADONNA AND OPIUM 1 SUPPOSITORY: 16.2; 3 SUPPOSITORY RECTAL at 17:11

## 2025-05-26 RX ADMIN — ACETAMINOPHEN 650 MG: 325 TABLET, FILM COATED ORAL at 09:12

## 2025-05-26 ASSESSMENT — ACTIVITIES OF DAILY LIVING (ADL)
ADLS_ACUITY_SCORE: 56
ADLS_ACUITY_SCORE: 54
ADLS_ACUITY_SCORE: 57
ADLS_ACUITY_SCORE: 56
ADLS_ACUITY_SCORE: 56
ADLS_ACUITY_SCORE: 57
ADLS_ACUITY_SCORE: 54
ADLS_ACUITY_SCORE: 57
ADLS_ACUITY_SCORE: 56
ADLS_ACUITY_SCORE: 56
ADLS_ACUITY_SCORE: 57
ADLS_ACUITY_SCORE: 54
ADLS_ACUITY_SCORE: 54
ADLS_ACUITY_SCORE: 57
ADLS_ACUITY_SCORE: 56

## 2025-05-26 NOTE — ED TRIAGE NOTES
Pt with recent aquablation for prostate earlier in the week reports many blood clots in catheter despite irrigation at home. C/o lower abdominal pain and pt states he is urinating around his catheter.      Triage Assessment (Adult)       Row Name 05/25/25 2001          Respiratory WDL    Respiratory WDL WDL        Cardiac WDL    Cardiac WDL WDL        Cognitive/Neuro/Behavioral WDL    Cognitive/Neuro/Behavioral WDL WDL

## 2025-05-26 NOTE — PROGRESS NOTES
[facility]    Medicine Progress Note - Hospitalist Service    Date of Admission:  5/25/2025    Assessment & Plan      Austyn Araujo is a 69 year old male admitted on 5/25/2025. He presents with hematuria    Hematuria  Bacteriuria    Aquablation of prostate 5/20  Hx BPH/ LUTS  [Needs rec- finasteride 5 mg daily  Underwent cysto w/ aquablation of prostate by Dr. Rao on  5/20. Failed TOV prior to discharge and yanes replaced. Had low grade hematuria. Since discharge has been dealing with blood clots in urine. Now with worsening lower abd pain. Yanes also stopped draining urine. In ED AFVSS. Hgb 16.2. WBC 12.6. UA w/ >182 RBC, 24 WBC, few bacteria.   -Urology consulted. Pt did not tolerate irrigation well 2/2 to spasms. Plan for bladder US this afternoon. IF organized clot plan for NPO at MD and OR cystoscopy clot evacuation 5/27  - B/O suppository ordered, helpful to have 30 min prior to irrigations  - Resumed PTA finasteride    - IVF discontinued as now toleration regular diet.  - CBI in place   - prn analgesics  - Hgb stable repeat hgb in am  - UA with bacteriuria; culture in progress   - Obtain blood cultures  - Monitor off abx for now      DM II  [lisinopril 10 mg daily (for renal protection),  lantus 15 units BID, novolog 15 units w/ meals, januvia 100 mg daily, metformin 2000 mg daily]  Recent Labs   Lab 05/26/25  1205 05/26/25  0943 05/26/25  0530 05/25/25  2232 05/23/25  1149   * 166* 139* 90 208*   - Pt reports lightheadedness and taking lisinopril every other day will halve dose to 5 mg daily   - hold orals  - q4 BS  - halved home lantus to 7 units BID while NPO  - med sliding scale insulin   - hold lisinopril for now    HLD  - hold statin in obs    Hx Etoh use disorder  Sober x many years, commended    Neuropathy- resume gabapentin 100 mg at HS  GERD- resume omeprazole with rec  Low testosterone- testosterone injections qFriday       Observation Goals: -diagnostic  "tests and consults completed and resulted, -vital signs normal or at patient baseline, -adequate pain control on oral analgesics, Nurse to notify provider when observation goals have been met and patient is ready for discharge.  Diet: Regular Diet Adult    DVT Prophylaxis: Pneumatic Compression Devices  Myers Catheter: PRESENT, indication: Acute retention or obstruction  Lines: None     Cardiac Monitoring: None  Code Status: Full Code      Clinically Significant Risk Factors Present on Admission                 # Drug Induced Platelet Defect: home medication list includes an antiplatelet medication   # Hypertension: Noted on problem list          # DMII: A1C = 6.5 % (Ref range: <5.7 %) within past 6 months    # Obesity: Estimated body mass index is 31.42 kg/m  as calculated from the following:    Height as of 5/20/25: 1.83 m (6' 0.05\").    Weight as of 5/20/25: 105.2 kg (232 lb).              Social Drivers of Health    Tobacco Use: Medium Risk (5/20/2025)    Patient History     Smoking Tobacco Use: Former     Smokeless Tobacco Use: Never   Physical Activity: Insufficiently Active (2/5/2025)    Exercise Vital Sign     Days of Exercise per Week: 1 day     Minutes of Exercise per Session: 10 min   Stress: Stress Concern Present (2/5/2025)    [employer] [place] [employer] - Occupational Stress Questionnaire     Feeling of Stress : To some extent   Social Connections: Unknown (2/5/2025)    Social Connection and Isolation Panel [NHANES]     Frequency of Social Gatherings with Friends and Family: Once a week          Disposition Plan     Medically Ready for Discharge: Anticipated Tomorrow           The patient's care was discussed with the Attending Physician, Dr. Talbot.    Fern Joshi NP  Hospitalist Service  [facility]  Securely message with [employer] (more info)  Text page via [facility] Paging/Directory " "  ______________________________________________________________________    Interval History   No acute events overnight. Reports intermittent \"hot flashes\" and mild lower abdominal pain. He reported painful bladder irrigation this morning with Urologist, likely bladder spasms. Ambulating up in room. Ready to eat breakfast. Will have bladder US later today. CBI running briskly.     Physical Exam   Vital Signs: Temp: 98  F (36.7  C) Temp src: Oral BP: (!) 141/87 Pulse: 72   Resp: 18 SpO2: 96 % O2 Device: None (Room air)    Weight: 0 lbs 0 oz    Physical Exam  Constitutional:       Appearance: Normal appearance. He is not diaphoretic.   HENT:      Mouth/Throat:      Mouth: Mucous membranes are moist.   Eyes:      Pupils: Pupils are equal, round, and reactive to light.   Cardiovascular:      Rate and Rhythm: Normal rate and regular rhythm.      Pulses: Normal pulses.      Heart sounds: Normal heart sounds.   Pulmonary:      Effort: Pulmonary effort is normal.      Breath sounds: Normal breath sounds.   Abdominal:      General: Bowel sounds are normal. There is no distension.      Palpations: Abdomen is soft.      Tenderness: There is no abdominal tenderness.   Genitourinary:     Comments: CBI in place with pink -> red urine   Musculoskeletal:         General: Normal range of motion.   Skin:     General: Skin is warm and dry.      Capillary Refill: Capillary refill takes less than 2 seconds.   Neurological:      Mental Status: He is alert and oriented to person, place, and time.   Psychiatric:         Mood and Affect: Mood normal.         Behavior: Behavior normal.         Thought Content: Thought content normal.           Medical Decision Making       60 MINUTES SPENT BY ME on the date of service doing chart review, history, exam, documentation & further activities per the note.      Data     I have personally reviewed the following data over the past 24 hrs:    12.6 (H)  \   15.7   / 300     141 102 14.0 /  274 (H) "   4.0 28 0.93 \       Imaging results reviewed over the past 24 hrs:   Recent Results (from the past 24 hours)   US Bladder Only    Narrative    EXAM: US BLADDER ONLY  LOCATION: [facility]  DATE: 5/26/2025    INDICATION: Assess for any organized blood clot within the bladder.  COMPARISON: CT abdomen pelvis 5/9/2025.  TECHNIQUE: Routine.    FINDINGS:    Bladder is partially decompressed by a Myers catheter. Heterogeneous and ill-defined mass along the anterosuperior bladder wall measuring approximately 4.8 x 2.4 x 4.1 cm in maximal dimensions is likely a blood clot; scattered echogenic foci within the   mass are likely gas bubbles. An additional blood clot and/or intraluminal debris is present along the posterior bladder wall measuring up to 1.7 cm.      Impression    IMPRESSION:    1.  Heterogeneous bladder mass along the anterosuperior bladder wall measuring up to 4.8 cm is likely a blood clot. There are presumed foci of gas within the clot.    2.  Additional clot and/or intraluminal debris along the posterior bladder wall measuring up to 1.7 cm.

## 2025-05-26 NOTE — H&P
"[facility]    History and Physical - Hospitalist Service       Date of Admission:  5/25/2025    Assessment & Plan      Austyn Araujo is a 69 year old male admitted on 5/25/2025. He presents with hematuria    Hematuria  Aquablation of prostate 5/20  Hx BPH/ LUTS  [Needs rec- finasteride 5 mg daily  Underwent cysto w/ aquablation of prostate by Dr. Rao on  5/20. Failed TOV prior to discharge and yanes replaced. Had low grade hematuria. Since discharge has been dealing with blood clots in urine. Now with worsening lower abd pain. Yanes also stopped draining urine. In ED AFVSS. Hgb 16.2. WBC 12.6. UA w/ >182 RBC, 24 WBC, few bacteria.   - NPO, IV fluids  - CBI  - prn analgesics  - urology consult  - repeat hgb in am    DM II  [Needs rec- lisinopril 10 mg daily (for renal protection),  lantus 15 units BID, novolog 15 units w/ meals, januvia 100 mg daily, metformin 2000 mg daily]  - hold orals  - q4 BS  - halve home lantus to 7 units BID while NPO  - med sliding scale insulin   - hold lisinopril for now    HLD  - hold statin in obs    Hx Etoh use disorder  Sober x many years, commended    Neuropathy- resume gabapentin 100 mg at HS  GERD- resume omeprazole with rec  Low testosterone- testosterone injections qFriday          Diet:  NPO, IV fluids  DVT Prophylaxis: Pneumatic Compression Devices  Yanes Catheter: PRESENT, indication:    Lines: None     Cardiac Monitoring: None  Code Status:  Full    Clinically Significant Risk Factors Present on Admission                 # Drug Induced Platelet Defect: home medication list includes an antiplatelet medication   # Hypertension: Noted on problem list          # DMII: A1C = 6.5 % (Ref range: <5.7 %) within past 6 months    # Obesity: Estimated body mass index is 31.42 kg/m  as calculated from the following:    Height as of 5/20/25: 1.83 m (6' 0.05\").    Weight as of 5/20/25: 105.2 kg (232 lb).              Disposition Plan     Medically Ready for " Diet as tolerated   - No lifting greater than 10 lbs.   - May shower in 24-48 hours    Care After Anesthesia or Sedation    After discharge  • Due to the medicine given, someone must drive you home. It is strongly recommended to have someone stay with you at home the day of discharge and the night after surgery.  • If you have infants or small children at home, please have someone help you for at least 24 hours after your surgery.  • Do not drive for at least 24 hours after surgery (or as told by your doctor).  • Rest for the remainder of the day. Go up and down stairs slowly.  • Do not smoke after surgery. Smoking can delay healing.  • Do not operate heavy or potential harmful equipment.  • Do not make legally binding decisions.  • Do not drink alcohol for 24 hours.    Diet  • Nausea may be expected for the first 24 to 48 hours. Start eating a bland diet (toast, gelatin, 7-up, hot cereal, crackers, sherbet, broth soup).  • Drink plenty of fluids (6 to 8 glasses of water a day).  • Resume your regular diet as able.  • Avoid greasy or spicy foods for 24 hours.    Urination  • The effects of anesthetics may cause some people to have trouble passing urine the day of surgery. Drink a lot of fluids to help prevent this.  • Try to urinate within 8 hours of surgery.  • If you are unable to pass urine and feel like you need to, call your doctor or the hospital.    Pain control  • Some incisions are injected with a long acting local anesthetic; it will wear off in 4 to 6 hours. You can expect to have some pain at this time.  • Treat your pain with the prescribed pain medicine before it wears off. Do not wait until your pain becomes severe.  • Ask your nurse when you had your last pain medicine, so you know when you can take another one after you get home.      Call your doctor if you have:  • Nausea and vomiting that does not stop  • Fever over 101° F  • Pain not relieved by pain medication  • If you are unable to pass your  Discharge: Anticipated in 2-4 Days           Yared Delatorre MD  Hospitalist Service  [facility]  Securely message with [employer] (more info)  Text page via [employer] Paging/Directory     ______________________________________________________________________    Chief Complaint   hematuria    History is obtained from the patient, electronic health record, and emergency department physician    History of Present Illness   Austyn Araujo is a 69 year old male who presents with hematuria and anuria.  He had a cystoscopy with aqua ablation of the prostate on May 20 by Dr. Rao.  He failed his trial of voiding and was sent home with a Myers.  Patient notes that there had been some blood in his urine throughout but at least she was making urine.  He went home on Friday, 2 days ago.  Throughout that time he had ongoing hematuria.  He then also noted pretty significant clots.  He decided to return to the emergency department when he stopped making urine.  He also noted that he had ongoing pain in his suprapubic region.  He had no other pain.  Denied chest pain or shortness of breath.  He was noticing he was hot and cold but he did check his temperature at home and it was normal.  He has no other abdominal pain.  Denies nausea or vomiting.  Currently has CBI going in the emergency department and is comfortable.      Past Medical History    Past Medical History:   Diagnosis Date    Anxiety     Anxiety 01/31/2015    BPH (benign prostatic hyperplasia)     Degenerative disc disease     Depression     Gastritis     Gastro-oesophageal reflux disease     H/O alcohol abuse     Hyperlipidemia     Hypertension     Low testosterone     MRSA (methicillin resistant staph aureus) culture positive 08/2013    skin infection    Mumps     PUD (peptic ulcer disease)     Sleep apnea     STD (sexually transmitted disease)     Type 2 diabetes mellitus (H)     neuropathy       Past Surgical History   Past Surgical  urine or you have not passed urine in the last 8 hours.  • Unusual changes in behavior  • Dizziness  • Hives  If you are not able to reach your doctor, you may call the emergency department.     "History:   Procedure Laterality Date    APPENDECTOMY      COLONOSCOPY  2013    Procedure: COMBINED COLONOSCOPY, SINGLE BIOPSY/POLYPECTOMY BY BIOPSY;  Colonoscopy/ polypectomy x2 by cold forceps    ;  Surgeon: Juan Carlos Bellamy MD;  Location:  GI    CYSTOSCOPY, TRANSURETHERAL WATERJET ABLATION PROSTATE, USING AQUABLATION N/A 2025    Procedure: CYSTOSCOPY, AQUABLATION OF THE PROSTATE;  Surgeon: Mohsen Rao MD;  Location: [facility] OR    DECOMPRESSION POSTERIOR LUMBAR, ,L4-5 decompression  2023    ESOPHAGOSCOPY, GASTROSCOPY, DUODENOSCOPY (EGD), COMBINED N/A 2016    Procedure: COMBINED ESOPHAGOSCOPY, GASTROSCOPY, DUODENOSCOPY (EGD), BIOPSY SINGLE OR MULTIPLE;  Surgeon: Juan Carlos Barraza MD;  Location:  GI    ESOPHAGOSCOPY, GASTROSCOPY, DUODENOSCOPY (EGD), COMBINED N/A 2019    Procedure: ESOPHAGOSCOPY, GASTROSCOPY, DUODENOSCOPY (EGD) with cold forcep;  Surgeon: Juan Carlos Barraza MD;  Location:  GI    EXCISE SOFT TISSUE TUMOR ELBOW Left 2024    Procedure: Removal Left Elbow Tumor;  Surgeon: Broderick Mccurdy MD;  Location: [facility] OR    HERNIA REPAIR         Prior to Admission Medications   Prior to Admission Medications   Prescriptions Last Dose Informant Patient Reported? Taking?   Continuous Blood Gluc Sensor (DEXCOM G7 SENSOR) MISC   No No   Si Device every 14 days   Continuous Glucose Sensor (DEXCOM G7 SENSOR) MISC   No No   Sig: CHANGE EVERY 10 DAYS   JANUVIA 100 MG tablet  Self No No   Sig: TAKE 1 TABLET BY MOUTH EVERY DAY   Multiple Vitamins-Minerals (CENTRUM SILVER) per tablet  Self No No   Sig: Take 1 tablet by mouth daily   PHARMACIST CHOICE LANCETS MISC   No No   Sig: TEST BLOOD SUGARS 5 TIMES DAILY   Syringe/Needle, Disp, 26G X 5/8\" 3 ML MISC   No No   Si Application daily.   VITRON-C  MG TABS tablet  Self No No   Sig: TAKE 1 TABLET BY MOUTH EVERY DAY   Patient taking differently: Take 1 tablet by mouth every other day.   acetaminophen (TYLENOL) " "325 MG tablet   No No   Sig: Take 2 tablets (650 mg) by mouth every 4 hours as needed for other (mild pain).   aspirin 81 MG tablet  Self Yes No   Sig: Take 1 tablet by mouth every morning   atorvastatin (LIPITOR) 40 MG tablet  Self No No   Sig: TAKE 1 TABLET BY MOUTH EVERY DAY   bacitracin 500 UNIT/GM external ointment   No No   Sig: Apply topically 2 times daily. Apply to yanes insertion site twice daily as needed for insertion site discomfort   blood glucose (ONETOUCH ULTRA) test strip   No No   Sig: USE TO TEST BLOOD SUGARS ONE TIME DAILY OR AS DIRECTED.   calcium carbonate (TUMS) 500 MG chewable tablet   Yes No   Sig: Take 2 chew tab by mouth daily as needed for heartburn.   cephALEXin (KEFLEX) 500 MG capsule   No No   Sig: Take 1 capsule (500 mg) by mouth 2 times daily.   docusate sodium (COLACE) 100 MG capsule   No No   Sig: Take 1 capsule (100 mg) by mouth 2 times daily as needed for constipation.   finasteride (PROSCAR) 5 MG tablet  Self No No   Sig: Take 1 tablet (5 mg) by mouth daily.   gabapentin (NEURONTIN) 100 MG capsule  Self No No   Sig: Take 1 capsule (100 mg) by mouth at bedtime.   hydrOXYzine HCl (ATARAX) 10 MG tablet   No No   Sig: Take 1 tablet (10 mg) by mouth every 6 hours as needed for itching or anxiety (with pain, moderate pain).   insulin aspart (NOVOLOG FLEXPEN) 100 UNIT/ML pen  Self No No   Sig: Inject 15 Units subcutaneously 3 times daily (with meals).   insulin glargine (LANTUS SOLOSTAR) 100 UNIT/ML pen  Self No No   Sig: INJECT 15 UNITS SUBCUTANEOUSLY 2 TIMES DAILY.   insulin pen needle (BD RASHI U/F) 32G X 4 MM miscellaneous   No No   Sig: USE ONE PEN NEEDLE DAILY OR AS DIRECTED.   insulin syringe-needle U-100 (29G X 1/2\" 0.5 ML) 29G X 1/2\" 0.5 ML miscellaneous   No No   Sig: Use 1 syringes daily or as directed for testosterone use   lidocaine (LMX4) 4 % external cream   No No   Sig: Apply topically once as needed for mild pain (Catheter insertion site discomfort).   lisinopril " "(ZESTRIL) 10 MG tablet  Self No No   Sig: TAKE 1 TABLET BY MOUTH EVERY MORNING   metFORMIN (GLUCOPHAGE XR) 500 MG 24 hr tablet  Self No No   Sig: TAKE 4 TABLETS BY MOUTH DAILY WITH DINNER.   needle, disp, (BD HYPODERMIC NEEDLE) 18G X 1\" MISC   No No   Sig: USE TO ADMINISTER TESTOSTERONE. DUE FOR APPOINTMENT WITH YOUR DOCTOR   needle, disp, 21G X 1-1/2\" MISC   No No   Si each once a week.   omeprazole (PRILOSEC) 40 MG DR capsule  Self No No   Sig: TAKE 1 CAPSULE BY MOUTH EVERY DAY   ondansetron (ZOFRAN ODT) 4 MG ODT tab   No No   Sig: Take 1-2 tablets (4-8 mg) by mouth every 8 hours as needed for nausea. Dissolve ON the tongue.   oxyCODONE (ROXICODONE) 5 MG tablet   No No   Sig: Take 1 tablet (5 mg) by mouth every 6 hours as needed for pain.   polyethylene glycol (MIRALAX) 17 GM/Dose powder   No No   Sig: Take 17 g (1 Capful) by mouth daily.   syringe/needle, disp, (B-D LUER-ROCHELLE SYRINGE) 22G X 1-1/2\" 3 ML MISC   No No   Sig: Use weekly for testosterone injection .   tadalafil (ADCIRCA/CIALIS) 20 MG tablet  Self No No   Sig: Take 1 tablet (20 mg) by mouth every 24 hours.   testosterone cypionate (DEPOTESTOSTERONE) 200 MG/ML injection  Self No No   Sig: INJECT 2ML INTO THE MUSCLE ONCE A WEEK ON       Facility-Administered Medications: None        Review of Systems    The 10 point Review of Systems is negative other than noted in the HPI or here.     Social History   I have reviewed this patient's social history and updated it with pertinent information if needed.  Social History     Tobacco Use    Smoking status: Former     Current packs/day: 0.00     Types: Cigarettes     Quit date: 2000     Years since quittin.4    Smokeless tobacco: Never   Vaping Use    Vaping status: Never Used   Substance Use Topics    Alcohol use: No     Comment: sober since 2016    Drug use: No        Physical Exam   Vital Signs: Temp: 97.1  F (36.2  C) Temp src: Temporal BP: 128/80 Pulse: 65   Resp: 18 SpO2: 94 % O2 Device: " None (Room air)    Weight: 0 lbs 0 oz    General Appearance: Alert, very pleasant, no distress  Respiratory: CTA B  Cardiovascular: RRR, no murmur. No edema  GI: soft, tender in suprapubic area  Skin: no rashes or lesions grossly   Other: CN grossly intact, APARICIO      Medical Decision Making       60 MINUTES SPENT BY ME on the date of service doing chart review, history, exam, documentation & further activities per the note.      Data   ------------------------- PAST 24 HR DATA REVIEWED -----------------------------------------------    I have personally reviewed the following data over the past 24 hrs:    12.6 (H)  \   16.2   / 300     141 102 14.0 /  90   4.0 28 0.93 \       Imaging results reviewed over the past 24 hrs:   No results found for this or any previous visit (from the past 24 hours).

## 2025-05-26 NOTE — ED NOTES
[facility]  ED Nurse Handoff Report    ED Chief complaint: Catheter Problem      ED Diagnosis:   Final diagnoses:   Yanes catheter problem, initial encounter       Code Status: Full Code    Allergies: No Known Allergies    Patient Story:     Austyn Araujo is a very pleasant 69 year old male presenting with catheter problem.  Patient underwent a transurethral water jet ablation of his prostate on 5/20/2025.  He was discharged from this hospital on 5/23/2025 with a Yanes catheter in place.  He notes that since then, he has been dealing with blood clots in his urine.  Today, he is having worsening discomfort in his lower abdomen, which feels distended.  He has had occasional pains in his back.  His Yanes catheter has stopped draining any urine at this time.       Focused Assessment:    A+Ox4; MAEW. PERRL  SBp's 130's  Sinus rhythm 70's bpm  >90% RA  3-way yanes catheter replaced from previous regular urethral catheter and CBI initiated;  Urine is bright red with stringy clots prior to CBI;  During CBI urine flow is watermelon red.  Penile ulceration and redness at the tip.    Treatments and/or interventions provided:   0.5 Mg Dilaudid x1  CBI initiated  Bladder scanner PRN    Patient's response to treatments and/or interventions:   Partial pain relief following dilaudid    To be done/followed up on inpatient unit:    Continue with plan of care    Does this patient have any cognitive concerns?: None observed in ED.    Activity level - Baseline/Home:  Independent  Activity Level - Current:   Independent    Patient's Preferred language: English   Needed?: No    Isolation: None  Infection: Not Applicable  Patient tested for COVID 19 prior to admission: NO  Bariatric?: No    Vital Signs:   Vitals:    05/25/25 2002   BP: 127/84   Pulse: 76   Resp: 18   Temp: 97.1  F (36.2  C)   TempSrc: Temporal   SpO2: 98%       Cardiac Rhythm:     Was the PSS-3 completed:   Yes  What interventions are  required if any?  None needed.             Family Comments: Spouse at bedside.  OBS brochure/video discussed/provided to patient/family: Yes    For the majority of the shift this patient's behavior was Green.   Behavioral interventions performed were None.    ED NURSE PHONE NUMBER: *[phone]

## 2025-05-26 NOTE — PHARMACY-ADMISSION MEDICATION HISTORY
Pharmacist Admission Medication History    Admission medication history is complete. The information provided in this note is only as accurate as the sources available at the time of the update.    Information Source(s): Patient, Family member, and [facility] via in-person and phone    Pertinent Information:   -last doses 5/25 AM or 5/24 per patient  -cephalexin: per family, first dose of outpatient Rx was 5/24 AM. 5-day course dispensed  -Lantus: per Mojeek, last dispensed 10/28 as 30-day supply. Patient reports he still has/takes this.  -lisinopril: pt reports changing this from daily --> every other day for the past 3-4 weeks as he wanted to experiment to see if this helped with his lower BPs and lightheadedness.  -omeprazole: per Mojeek, last dispensed September 2024. Pt reports he is out of this med and is working to get it refilled but is struggling to do so. States pharmacy is working on getting Rx but notes they state they haven't heard from prescriber. Marked as not taking.  -tadalafil: no fill records but pt states he takes this PRN    Changes made to PTA medication list:  Added: None  Deleted: None  Changed: bacitracin (BID/BID PRN --> TID PRN), calcium carbonate (500 mg tab, 2 daily PRN --> PO, 4-5 tabs BID PRN), docusate (added note - taking BID), gabapentin (100 mg at bedtime --> at bedtime PRN), lisinopril (QAM with note that taking every other day --> every other day, see note above), metformin (4 tabs with dinner --> 2 tabs BID), omeprazole (marked as not taking, pt is out - see note above), Miralax (daily --> daily PRN), tadalafil (every 24 hours --> daily PRN)    Allergies reviewed with patient and updates made in EHR: no    Medication History Completed By: Shahida Wang RPH 5/26/2025 1:40 PM    Prior to Admission medications    Medication Sig Last Dose Taking? Auth Provider Long Term End Date   acetaminophen (TYLENOL) 325 MG tablet Take 2 tablets (650 mg) by mouth every  4 hours as needed for other (mild pain).  Yes Mohsen Rao MD     aspirin 81 MG tablet Take 1 tablet by mouth every morning Past Week Yes Reported, Patient     atorvastatin (LIPITOR) 40 MG tablet TAKE 1 TABLET BY MOUTH EVERY DAY Past Week Yes Majo Paul MD Yes    bacitracin 500 UNIT/GM external ointment Apply topically 2 times daily. Apply to yanes insertion site twice daily as needed for insertion site discomfort  Patient taking differently: Apply topically 3 times daily as needed. Apply to yanes insertion site three times daily as needed for insertion site discomfort  Yes Lilli Diaz APRN CNP     CALCIUM CARBONATE ANTACID PO Take 4-5 chew tab by mouth 2 times daily as needed for heartburn.  Yes Reported, Patient     cephALEXin (KEFLEX) 500 MG capsule Take 1 capsule (500 mg) by mouth 2 times daily. 5/25/2025 Morning Yes Mohsen Rao MD     docusate sodium (COLACE) 100 MG capsule Take 1 capsule (100 mg) by mouth 2 times daily as needed for constipation.  Yes Mohsen Rao MD     finasteride (PROSCAR) 5 MG tablet Take 1 tablet (5 mg) by mouth daily. Past Week Yes Mohsen Rao MD  9/4/25   gabapentin (NEURONTIN) 100 MG capsule Take 1 capsule (100 mg) by mouth at bedtime.  Patient taking differently: Take 100 mg by mouth nightly as needed.  Yes Majo Paul MD Yes    hydrOXYzine HCl (ATARAX) 10 MG tablet Take 1 tablet (10 mg) by mouth every 6 hours as needed for itching or anxiety (with pain, moderate pain).  Yes Mohsen Rao MD     insulin aspart (NOVOLOG FLEXPEN) 100 UNIT/ML pen Inject 15 Units subcutaneously 3 times daily (with meals).  Yes Majo Paul MD Yes    insulin glargine (LANTUS SOLOSTAR) 100 UNIT/ML pen INJECT 15 UNITS SUBCUTANEOUSLY 2 TIMES DAILY. 5/25/2025 Morning Yes Majo Paul MD Yes    JANUVIA 100 MG tablet TAKE 1 TABLET BY MOUTH EVERY DAY Past Week Yes Majo Paul MD Yes    lidocaine (LMX4) 4 % external cream Apply topically  once as needed for mild pain (Catheter insertion site discomfort).  Yes Lilli Diaz APRN CNP     lisinopril (ZESTRIL) 10 MG tablet TAKE 1 TABLET BY MOUTH EVERY MORNING  Patient taking differently: Take 10 mg by mouth every other day. 5/25/2025 Yes Majo Paul MD Yes    metFORMIN (GLUCOPHAGE XR) 500 MG 24 hr tablet TAKE 4 TABLETS BY MOUTH DAILY WITH DINNER.  Patient taking differently: Take 1,000 mg by mouth 2 times daily (with meals). Past Week Yes Majo Paul MD Yes    Multiple Vitamins-Minerals (CENTRUM SILVER) per tablet Take 1 tablet by mouth daily Past Week Yes Javier Villafana MD     ondansetron (ZOFRAN ODT) 4 MG ODT tab Take 1-2 tablets (4-8 mg) by mouth every 8 hours as needed for nausea. Dissolve ON the tongue.  Yes Mohsen Rao MD     oxyCODONE (ROXICODONE) 5 MG tablet Take 1 tablet (5 mg) by mouth every 6 hours as needed for pain.  Yes Mohsen Rao MD  5/26/25   polyethylene glycol (MIRALAX) 17 GM/Dose powder Take 17 g (1 Capful) by mouth daily.  Patient taking differently: Take 1 Capful by mouth daily as needed.  Yes Lilli Diaz APRN CNP     tadalafil (ADCIRCA/CIALIS) 20 MG tablet Take 1 tablet (20 mg) by mouth every 24 hours.  Patient taking differently: Take 20 mg by mouth daily as needed.  Yes Majo Paul MD Yes    testosterone cypionate (DEPOTESTOSTERONE) 200 MG/ML injection INJECT 2ML INTO THE MUSCLE ONCE A WEEK ON FRIDAYS  Yes Majo Paul MD Yes    VITRON-C  MG TABS tablet TAKE 1 TABLET BY MOUTH EVERY DAY  Patient taking differently: Take 1 tablet by mouth every other day. Past Week Yes Majo Paul MD     blood glucose (ONETOUCH ULTRA) test strip USE TO TEST BLOOD SUGARS ONE TIME DAILY OR AS DIRECTED.   Majo Paul MD     Continuous Blood Gluc Sensor (DEXCOM G7 SENSOR) MISC 1 Device every 14 days   Javier Villafana MD     Continuous Glucose Sensor (DEXCOM G7 SENSOR) MISC CHANGE EVERY 10 DAYS   Majo Paul MD     insulin pen needle  "(BD RASHI U/F) 32G X 4 MM miscellaneous USE ONE PEN NEEDLE DAILY OR AS DIRECTED.   Majo Paul MD     insulin syringe-needle U-100 (29G X 1/2\" 0.5 ML) 29G X 1/2\" 0.5 ML miscellaneous Use 1 syringes daily or as directed for testosterone use   Majo Paul MD     needle, disp, (BD HYPODERMIC NEEDLE) 18G X 1\" MISC USE TO ADMINISTER TESTOSTERONE. DUE FOR APPOINTMENT WITH YOUR DOCTOR   Javier Villafana MD     needle, disp, 21G X 1-1/2\" MISC 1 each once a week.   Majo Paul MD     omeprazole (PRILOSEC) 40 MG DR capsule TAKE 1 CAPSULE BY MOUTH EVERY DAY  Patient not taking: Reported on 5/26/2025 Not Taking  Majo Paul MD     PHARMACIST CHOICE LANCETS MISC TEST BLOOD SUGARS 5 TIMES DAILY   Javier Villafana MD     syringe/needle, disp, (B-D LUER-ROCHELLE SYRINGE) 22G X 1-1/2\" 3 ML MISC Use weekly for testosterone injection .   Majo Paul MD     Syringe/Needle, Disp, 26G X 5/8\" 3 ML MISC 1 Application daily.   Majo Paul MD           "

## 2025-05-26 NOTE — DISCHARGE INSTRUCTIONS
Activity  - No strenuous exercise for 6 weeks.  - No lifting, pushing, pulling more than 10 pounds for 6 weeks.   - Do not strain with bowel movements.  - Do not drive until you can press the brake pedal quickly and fully without pain.   - Do not operate a motor vehicle while taking narcotic pain medications.     Urination  - Some light redness (up to a watermelon-like-pink in color) is expected in the upcoming 7-10days.   - If having hematuria (blood in the urine), make sure to increase your water intake and monitor for improvement  - If you are unable to urinate you should return urgently to the ED or call clinic to try to arrange for an urgent visit same day.   Bacitracin to urethral meatus BID PRN for discomfort.    Medications  -Augmentin for 10 days for ear infection/UTI coverage.   - Transition from narcotic pain medications to tylenol (acetaminophen) as you are able.  Wean yourself off all pain medications as you are able.  - Some pain medications contain both tylenol (acetaminophen) and a narcotic (Norco, vicodin, percocet), do not take more than 4,000mg of Tylenol (acetaminophen) from all sources in any 24 hour period.  - Narcotics can make you constipated.  Take over the counter fiber (metamucil or benefiber) and stool softeners (miralax, docusate or senna) while taking narcotic pain medications, but stop if you develop diarrhea.  - No driving or operating machinery while taking narcotic pain medications     Follow-Up:  - Call your primary care provider to touch base regarding your recent admission.   - Call or return sooner than your regularly scheduled visit if you develop any of the following: fever (greater than 101.5), uncontrolled pain, uncontrolled nausea or vomiting, as well as increased redness, swelling, or drainage from your wound.      Phone numbers:   - Monday through Friday 8am to 4:30pm: Call [phone] with questions or to schedule or confirm appointment.    - Nights or weekends: call  "the after hours emergency pager - [phone] and tell the  \"I would like to page the Urology Resident on call.\"  - For emergencies, call 911   "

## 2025-05-26 NOTE — PLAN OF CARE
Date & Time: 5/26/25 0700-1930  Summary: Hematuria  Behavior & Aggression: Green  Fall Risk: No  Orientation:A&Ox4  ABNL VS/O2:VSS on RA  ABNL Labs: , 259, 211  Pain Management:PRN Oxy, Lidocaine gel, B&O supp. Scheduled Tylenol  Bowel/Bladder: Up to BR  Drains: PIV SL. Myers with CBI  Wounds/incisions: Penile abrasions  Diet:Reg  Tests/Procedures: Bladder US. Hand irrigation x3  Anticipated  DC Date: Pending  Significant Information: Urology following

## 2025-05-26 NOTE — PLAN OF CARE
Observation goals:  -diagnostic tests and consults completed and resulted - Not met.  -vital signs normal or at patient baseline - Met.  -adequate pain control on oral analgesics - Met.    Date & Time: 5023-4355.  Summary: Ed admit here w/ yanes cath problems. Had a transurethral water jet ablation of prostate on (5/20).  Behavior & Aggression: can be anxious at times.  Fall Risk: No.  Orientation:A&Ox4.  ABNL VS/O2:VSS on RA. Denied N/V.  ABNL Labs: see chart.  & 139.  Pain Management: PRN Dilaudid.  Bowel/Bladder: Yanes in place running CBI @ mod/fast rate this shift; manually irrigated a couple of times w/ small string clots. No BM, passing gas per pt.  IV/Drains: PIV infusing LR @ 100ml/hr.  Wounds/incisions: Noted small penial abrasion/excoriation at tip from yanes.  Diet:Npo w/ ice chips.  Number of times OUT OF BED this shift - Up IND a couple of times this shift.  Tests/Procedures: N/A. Urology consulted.  Anticipated  DC Date: TBD.

## 2025-05-26 NOTE — PROGRESS NOTES
Notified provider about indwelling yanes catheter discussed removal or continued need.    Did provider choose to remove indwelling yanes catheter? No    Provider's yanes indication for keeping indwelling yanes catheter: Other - chronic indwelling yanes catheter. Plan to discharge with catheter in place.    Is the catheter for retention? No    *If there is a plan to keep yanes catheter in place at discharge daily notification with provider is not necessary, but please add a notation in the treatment team sticky note that the patient will be discharging with the catheter.

## 2025-05-26 NOTE — ED PROVIDER NOTES
Emergency Department Note      History of Present Illness     Chief Complaint   Catheter Problem      HPI   Austyn Araujo is a very pleasant 69 year old male presenting with catheter problem.  Patient underwent a transurethral water jet ablation of his prostate on 5/20/2025.  He was discharged from this hospital on 5/23/2025 with a Myers catheter in place.  He notes that since then, he has been dealing with blood clots in his urine.  Today, he is having worsening discomfort in his lower abdomen, which feels distended.  He has had occasional pains in his back.  His Myers catheter has stopped draining any urine at this time.    Independent Historian   Wife as detailed above.    Review of External Notes   I personally reviewed notes from the patient's discharge summary dated 5/23/25. This provided me with information regarding patient's recent clinical course.     Past Medical History     Medical History and Problem List   Past Medical History:   Diagnosis Date    Anxiety     Anxiety 01/31/2015    BPH (benign prostatic hyperplasia)     Degenerative disc disease     Depression     Gastritis     Gastro-oesophageal reflux disease     H/O alcohol abuse     Hyperlipidemia     Hypertension     Low testosterone     MRSA (methicillin resistant staph aureus) culture positive 08/2013    Mumps     PUD (peptic ulcer disease)     Sleep apnea     STD (sexually transmitted disease)     Type 2 diabetes mellitus (H)        Medications   acetaminophen (TYLENOL) 325 MG tablet  aspirin 81 MG tablet  atorvastatin (LIPITOR) 40 MG tablet  bacitracin 500 UNIT/GM external ointment  blood glucose (ONETOUCH ULTRA) test strip  calcium carbonate (TUMS) 500 MG chewable tablet  cephALEXin (KEFLEX) 500 MG capsule  Continuous Blood Gluc Sensor (DEXCOM G7 SENSOR) MISC  Continuous Glucose Sensor (DEXCOM G7 SENSOR) MISC  docusate sodium (COLACE) 100 MG capsule  finasteride (PROSCAR) 5 MG tablet  gabapentin (NEURONTIN) 100 MG capsule  hydrOXYzine  "HCl (ATARAX) 10 MG tablet  insulin aspart (NOVOLOG FLEXPEN) 100 UNIT/ML pen  insulin glargine (LANTUS SOLOSTAR) 100 UNIT/ML pen  insulin pen needle (BD RASHI U/F) 32G X 4 MM miscellaneous  insulin syringe-needle U-100 (29G X 1/2\" 0.5 ML) 29G X 1/2\" 0.5 ML miscellaneous  JANUVIA 100 MG tablet  lidocaine (LMX4) 4 % external cream  lisinopril (ZESTRIL) 10 MG tablet  metFORMIN (GLUCOPHAGE XR) 500 MG 24 hr tablet  Multiple Vitamins-Minerals (CENTRUM SILVER) per tablet  needle, disp, (BD HYPODERMIC NEEDLE) 18G X 1\" MISC  needle, disp, 21G X 1-1/2\" MISC  omeprazole (PRILOSEC) 40 MG DR capsule  ondansetron (ZOFRAN ODT) 4 MG ODT tab  oxyCODONE (ROXICODONE) 5 MG tablet  PHARMACIST CHOICE LANCETS MISC  polyethylene glycol (MIRALAX) 17 GM/Dose powder  syringe/needle, disp, (B-D LUER-ROCHELLE SYRINGE) 22G X 1-1/2\" 3 ML MISC  Syringe/Needle, Disp, 26G X 5/8\" 3 ML MISC  tadalafil (ADCIRCA/CIALIS) 20 MG tablet  testosterone cypionate (DEPOTESTOSTERONE) 200 MG/ML injection  VITRON-C  MG TABS tablet        Surgical History   Past Surgical History:   Procedure Laterality Date    APPENDECTOMY      COLONOSCOPY  11/11/2013    Procedure: COMBINED COLONOSCOPY, SINGLE BIOPSY/POLYPECTOMY BY BIOPSY;  Colonoscopy/ polypectomy x2 by cold forceps    ;  Surgeon: Juan Carlos Bellamy MD;  Location:  GI    CYSTOSCOPY, TRANSURETHERAL WATERJET ABLATION PROSTATE, USING AQUABLATION N/A 5/20/2025    Procedure: CYSTOSCOPY, AQUABLATION OF THE PROSTATE;  Surgeon: Mohsen Rao MD;  Location: [facility] OR    DECOMPRESSION POSTERIOR LUMBAR, ,L4-5 decompression  06/14/2023    ESOPHAGOSCOPY, GASTROSCOPY, DUODENOSCOPY (EGD), COMBINED N/A 09/22/2016    Procedure: COMBINED ESOPHAGOSCOPY, GASTROSCOPY, DUODENOSCOPY (EGD), BIOPSY SINGLE OR MULTIPLE;  Surgeon: Juan Carlos Barraza MD;  Location:  GI    ESOPHAGOSCOPY, GASTROSCOPY, DUODENOSCOPY (EGD), COMBINED N/A 02/19/2019    Procedure: ESOPHAGOSCOPY, GASTROSCOPY, DUODENOSCOPY (EGD) with cold forcep;  Surgeon: " Juan Carlos Barraza MD;  Location: [facility]    EXCISE SOFT TISSUE TUMOR ELBOW Left 01/22/2024    Procedure: Removal Left Elbow Tumor;  Surgeon: Broderick Mccurdy MD;  Location: [facility]    HERNIA REPAIR         Physical Exam     Patient Vitals for the past 24 hrs:   BP Temp Temp src Pulse Resp SpO2   05/25/25 2100 128/80 -- -- 65 -- 94 %   05/25/25 2002 127/84 97.1  F (36.2  C) Temporal 76 18 98 %     Physical Exam  Vitals and nursing note reviewed.   Constitutional:       Appearance: Normal appearance. He is not ill-appearing or diaphoretic.   HENT:      Mouth/Throat:      Mouth: Mucous membranes are moist.   Eyes:      Conjunctiva/sclera: Conjunctivae normal.   Cardiovascular:      Rate and Rhythm: Normal rate.   Pulmonary:      Effort: Pulmonary effort is normal.   Abdominal:      General: There is no distension.      Palpations: Abdomen is soft.      Tenderness: There is no abdominal tenderness. There is no guarding.   Skin:     General: Skin is warm and dry.      Coloration: Skin is not pale.   Neurological:      Mental Status: He is alert and oriented to person, place, and time.           Diagnostics     Lab Results   Labs Ordered and Resulted from Time of ED Arrival to Time of ED Departure   UA MACROSCOPIC WITH REFLEX TO MICRO AND CULTURE - Abnormal       Result Value    Color Urine Straw      Appearance Urine Slightly Cloudy (*)     Glucose Urine Negative      Bilirubin Urine Negative      Ketones Urine Negative      Specific Gravity Urine 1.006      Blood Urine Large (*)     pH Urine 7.0      Protein Albumin Urine 30 (*)     Urobilinogen Urine Normal      Nitrite Urine Negative      Leukocyte Esterase Urine Trace (*)     Bacteria Urine Few (*)     Mucus Urine Present (*)     RBC Urine >182 (*)     WBC Urine 24 (*)    CBC WITH PLATELETS AND DIFFERENTIAL - Abnormal    WBC Count 12.6 (*)     RBC Count 5.21      Hemoglobin 16.2      Hematocrit 46.3      MCV 89      MCH 31.1      MCHC 35.0      RDW 12.7       Platelet Count 300      % Neutrophils 70      % Lymphocytes 18      % Monocytes 8      % Eosinophils 4      % Basophils 0      % Immature Granulocytes 0      NRBCs per 100 WBC 0      Absolute Neutrophils 8.8 (*)     Absolute Lymphocytes 2.3      Absolute Monocytes 1.0      Absolute Eosinophils 0.5      Absolute Basophils 0.1      Absolute Immature Granulocytes 0.0      Absolute NRBCs 0.0     BASIC METABOLIC PANEL - Normal    Sodium 141      Potassium 4.0      Chloride 102      Carbon Dioxide (CO2) 28      Anion Gap 11      Urea Nitrogen 14.0      Creatinine 0.93      GFR Estimate 88      Calcium 9.8      Glucose 90     URINE CULTURE       Imaging   No orders to display       EKG   No ECG performed.     Independent Interpretation   None    ED Course      Medications Administered   Medications   sodium chloride 0.9% irrigation (bag) (3,000 mLs Irrigation $New Bag 5/25/25 2236)   lidocaine (XYLOCAINE) 2 % external gel 10 mL (10 mLs Urethral $Given 5/25/25 2235)   HYDROmorphone (PF) (DILAUDID) injection 0.5 mg (0.5 mg Intravenous $Given 5/25/25 4480)       Procedures   Procedures   None performed    Discussion of Management   Admitting Hospitalist, Dr Delatorre  Urology, Dr Gamino    ED Course   ED Course as of 05/26/25 0014   Sun May 25, 2025   2340 Consultation: I discussed the patient's presentation, assessment and plan with urologist Dr Gamino.         Additional Documentation  None    Medical Decision Making / Diagnosis     CMS Diagnoses: None    MIPS       None    MDM   Patient presenting with Myers catheter problem.  Vital signs are reassuring.  Patient does complain of some pain in the pelvis.  His abdomen is not distended.  We performed a bladder scan on the patient and there was only 30 mL of urine in his bladder, reassuring against urinary retention.  We remove the patient's Myers catheter, which had not been working properly.  We placed a three-way catheter and then started continuous bladder irrigation.   There were numerous clots and bright red urine.  Unfortunately, as soon as CBI is paused after clearing, patient resumes having bloody urinary output.  Hemoglobin is reassuring against significant blood loss.  Patient has mild leukocytosis, likely secondary to pain.  No evidence of renal insufficiency secondary to obstructive uropathy.  I discussed with urology.  They recommend having the patient stay in the hospital overnight for further evaluation.  Patient was registered to observation with the hospitalist.    Disposition   The patient was discharged.     Diagnosis     ICD-10-CM    1. Gross hematuria  R31.0       2. Myers catheter problem, initial encounter  T83.9XXA            Discharge Medications   New Prescriptions    No medications on file        Luigi Engel MD  05/26/25 0014

## 2025-05-26 NOTE — CONSULTS
Urology Consult History and Physical    Name: Austyn Araujo    MRN: [id number]   YOB: 1955       We were asked to see Austyn Araujo at the request of Dr. Delatorre for evaluation and treatment of gross hematuria.          Chief Complaint:   Gross hematuria    History is obtained from the patient          History of Present Illness:   Austyn Araujo is a 69 year old male who is status post aqua ablation of the prostate by Dr. Rao on 5/20/2025.  He failed his initial trial of void and was discharged with a Myers catheter in place on 5/23/2025.  He noted increasing blood clots within his catheter drainage bag and bladder spasms with leaking around the catheter and presented to the emergency room on 5/25/2025.  His Myers catheter was exchanged and he was started on continuous bladder irrigation with irrigation and removal of some blood clots.    Seen and examined.  He continues to have issues with bladder spasms and had manual irrigation a few times overnight with removal of small amount of blood clots.           Past Medical History:     Past Medical History:   Diagnosis Date    Anxiety     Anxiety 01/31/2015    BPH (benign prostatic hyperplasia)     Degenerative disc disease     Depression     Gastritis     Gastro-oesophageal reflux disease     H/O alcohol abuse     Hyperlipidemia     Hypertension     Low testosterone     MRSA (methicillin resistant staph aureus) culture positive 08/2013    skin infection    Mumps     PUD (peptic ulcer disease)     Sleep apnea     STD (sexually transmitted disease)     Type 2 diabetes mellitus (H)     neuropathy            Past Surgical History:     Past Surgical History:   Procedure Laterality Date    APPENDECTOMY      COLONOSCOPY  11/11/2013    Procedure: COMBINED COLONOSCOPY, SINGLE BIOPSY/POLYPECTOMY BY BIOPSY;  Colonoscopy/ polypectomy x2 by cold forceps    ;  Surgeon: Juan Carlos Bellamy MD;  Location:  GI    CYSTOSCOPY, TRANSURETHERAL WATERJET  ABLATION PROSTATE, USING AQUABLATION N/A 2025    Procedure: CYSTOSCOPY, AQUABLATION OF THE PROSTATE;  Surgeon: Mohsen Rao MD;  Location: [facility]    DECOMPRESSION POSTERIOR LUMBAR, ,L4-5 decompression  2023    ESOPHAGOSCOPY, GASTROSCOPY, DUODENOSCOPY (EGD), COMBINED N/A 2016    Procedure: COMBINED ESOPHAGOSCOPY, GASTROSCOPY, DUODENOSCOPY (EGD), BIOPSY SINGLE OR MULTIPLE;  Surgeon: Juan Carlos Barraza MD;  Location: [facility]    ESOPHAGOSCOPY, GASTROSCOPY, DUODENOSCOPY (EGD), COMBINED N/A 2019    Procedure: ESOPHAGOSCOPY, GASTROSCOPY, DUODENOSCOPY (EGD) with cold forcep;  Surgeon: Juan Carlos Barraza MD;  Location: [facility]    EXCISE SOFT TISSUE TUMOR ELBOW Left 2024    Procedure: Removal Left Elbow Tumor;  Surgeon: Broderick Mccurdy MD;  Location: [facility]    HERNIA REPAIR              Social History:     Social History     Tobacco Use    Smoking status: Former     Current packs/day: 0.00     Types: Cigarettes     Quit date:      Years since quittin.4    Smokeless tobacco: Never   Substance Use Topics    Alcohol use: No     Comment: sober since             Family History:     Family History   Problem Relation Age of Onset    Pancreatic Cancer Mother     Dementia Father     Parkinsonism Father     Diabetes Brother     Depression Brother     Suicide Brother     Substance Abuse Brother     Pancreatic Cancer Paternal Grandfather     Family History Negative No family hx of     Colon Cancer No family hx of     Unknown/Adopted No family hx of     Anxiety Disorder No family hx of     Schizophrenia No family hx of     Bipolar Disorder No family hx of     [place] Disease No family hx of     Autism Spectrum Disorder No family hx of     Intellectual Disability No family hx of     Mental Illness No family hx of     Anesthesia Reaction No family hx of     Deep Vein Thrombosis (DVT) No family hx of               Allergies:   No Known Allergies         Medications:     Current  Facility-Administered Medications   Medication Dose Route Frequency Provider Last Rate Last Admin    acetaminophen (TYLENOL) tablet 650 mg  650 mg Oral Q4H PRN Yared Delatorre MD   650 mg at 05/26/25 0912    Or    acetaminophen (TYLENOL) Suppository 650 mg  650 mg Rectal Q4H PRN Yared Delatorre MD        glucose gel 15-30 g  15-30 g Oral Q15 Min PRN Yared Delatorre MD        Or    dextrose 50 % injection 25-50 mL  25-50 mL Intravenous Q15 Min PRN Yared Delatorre MD        Or    glucagon injection 1 mg  1 mg Subcutaneous Q15 Min PRN Yared Delatorre MD        gabapentin (NEURONTIN) capsule 100 mg  100 mg Oral At Bedtime Yared Delatorre MD        HYDROmorphone (DILAUDID) injection 0.2 mg  0.2 mg Intravenous Q2H PRN Yared Delatorre MD        HYDROmorphone (DILAUDID) injection 0.4 mg  0.4 mg Intravenous Q2H PRN Yared Delatorre MD   0.4 mg at 05/26/25 0912    insulin aspart (NovoLOG) injection (RAPID ACTING)  1-7 Units Subcutaneous Q4H Yared Delatorre MD        insulin glargine (LANTUS PEN) injection 7 Units  7 Units Subcutaneous BID Yared Delatorre MD        lactated ringers infusion   Intravenous Continuous Yared Delatorre  mL/hr at 05/26/25 0244 New Bag at 05/26/25 0244    naloxone (NARCAN) injection 0.2 mg  0.2 mg Intravenous Q2 Min PRN Yared Delatorre MD        Or    naloxone (NARCAN) injection 0.4 mg  0.4 mg Intravenous Q2 Min PRN Yared Delatorre MD        Or    naloxone (NARCAN) injection 0.2 mg  0.2 mg Intramuscular Q2 Min PRN Yared Delatorre MD        Or    naloxone (NARCAN) injection 0.4 mg  0.4 mg Intramuscular Q2 Min PRN Yared Delatorre MD        ondansetron (ZOFRAN ODT) ODT tab 4 mg  4 mg Oral Q6H PRN Yared Delatorre MD        Or    ondansetron (ZOFRAN) injection 4 mg  4 mg Intravenous Q6H PRN Delatorre, MD herson Trotter (B&O SUPPRETTES)  30-16.2 MG per suppository 1 suppository  30 mg Rectal Q8H PRN Maninder Gamino MD        pantoprazole (PROTONIX) EC tablet 40 mg  40 mg Oral BID AC Yared Delatorre MD   40 mg at 05/26/25 0912    prochlorperazine (COMPAZINE) injection 5 mg  5 mg Intravenous Q6H PRN Yared Delatorre MD        Or    prochlorperazine (COMPAZINE) tablet 5 mg  5 mg Oral Q6H PRN Yared Delatorre MD        senna-docusate (SENOKOT-S/PERICOLACE) 8.6-50 MG per tablet 1 tablet  1 tablet Oral BID PRN Yared Delatorre MD        Or    senna-docusate (SENOKOT-S/PERICOLACE) 8.6-50 MG per tablet 2 tablet  2 tablet Oral BID PRN Yared Delatorre MD        sodium chloride 0.9% irrigation (bag)   Irrigation Continuous Yared Delatorre MD   6,000 mL at 05/26/25 0718             Review of Systems:    ROS: 10 point ROS neg other than the symptoms noted above in the HPI.          Physical Exam:   Patient Vitals for the past 24 hrs:   BP Temp Temp src Pulse Resp SpO2   05/26/25 0150 123/71 98.1  F (36.7  C) Oral 75 18 94 %   05/26/25 0100 120/71 -- -- 72 -- 93 %   05/26/25 0000 107/61 -- -- 74 -- 95 %   05/25/25 2100 128/80 -- -- 65 -- 94 %   05/25/25 2002 127/84 97.1  F (36.2  C) Temporal 76 18 98 %     General: age-appropriate appearing male in NAD  HEENT: Head AT/NC, EOMI, CN Grossly intact  Lungs: no respiratory distress, or pursed lip breathing  Heart: No obvious jugular venous distension present  Abdomen: soft, non-distended, non-tender.   : 22 Yemeni three-way catheter draining clear/light francie urine on slow drip CBI  Neuro: Alert, oriented, speech and mentation normal;  moving all 4 extremities equally.  Psych: affect and mood normal          Data:   All laboratory data reviewed:    Recent Labs   Lab 05/26/25  0706 05/25/25 2232 05/21/25  0729 05/20/25  0602   WBC  --  12.6* 14.0*  --    HGB 15.7 16.2 15.8 17.4   PLT  --  300 223  --        Recent Labs   Lab 05/26/25 0530 05/25/25 2232  05/23/25  1149 05/23/25  0755 05/21/25  1059 05/21/25  0729 05/20/25  1122 05/20/25  0602   NA  --  141  --   --   --  138  --   --    POTASSIUM  --  4.0  --   --   --  3.9  --  4.0   CHLORIDE  --  102  --   --   --  101  --   --    CO2  --  28  --   --   --  27  --   --    BUN  --  14.0  --   --   --  10.8  --   --    CR  --  0.93  --   --   --  1.10  --   --    * 90 208* 125*   < > 111*   < > 164*   MANINDER  --  9.8  --   --   --  8.8  --   --     < > = values in this interval not displayed.       Recent Labs   Lab 05/25/25  0649   COLOR Straw   APPEARANCE Slightly Cloudy*   URINEGLC Negative   URINEBILI Negative   URINEKETONE Negative   SG 1.006   URINEPH 7.0   PROTEIN 30*   NITRITE Negative   LEUKEST Trace*   RBCU >182*   WBCU 24*            Impression and Plan:   Impression:   69-year-old man who is status post a aqua ablation of the prostate on 5/20/2025 with some ongoing gross hematuria      Plan:   Gross hematuria  - I performed hand irrigation with 500 cc normal saline with no return of any blood clots  - Patient did not tolerate this terribly well with significant bladder spasms with instillation of only 30 cc normal saline  - Will plan for a bladder ultrasound to assess for any organized blood clot  - I reviewed his labs which are reassuring with a stable and normal hemoglobin as well as a nonconcerning serum creatinine  - Plan to continue the CBI today and work to slowly wean this  - If there is significant blood clot within the bladder, then will make him n.p.o. after midnight for possible OR cystoscopy clot evacuation tomorrow  - If there is not significant blood clot within the bladder, then could consider a trial of void tomorrow pending the need for any ongoing CBI  - B/O suppository ordered as needed for bladder spasms and should bedside irrigation be needed, this would be helpful to administer 30 minutes prior  - Urology will continue to follow and appreciate hospitalist pj Dickens  BONNIE Gamino MD   Urology  [facility]  Clinic Phone [phone]

## 2025-05-26 NOTE — PROGRESS NOTES
.RECEIVING UNIT ED HANDOFF REVIEW    ED Nurse Handoff Report was reviewed by: Jessi Riggs RN on May 26, 2025 at 1:15 AM

## 2025-05-26 NOTE — ED NOTES
Patient Symptoms: C/o urethral pain and fullness in bladder    Bladder Scanner Volume: 3 ml. CBI in progress at full flow.    Irrigation Solution: Sterile NS.    Irrigation Volume used: 40 ML total, (20 ml each time).    Urine characteristics Post irrigation: Bright red urine with a string of clot. Urine clearing when CBI restarted.    Patient comfort: c/o pain during irrigation attempts    Specimen: None collected.    Cathy Bella RN,.......................................... 5/25/2025   10:57 PM

## 2025-05-27 ENCOUNTER — ANESTHESIA (OUTPATIENT)
Dept: SURGERY | Facility: CLINIC | Age: 70
End: 2025-05-27
Payer: COMMERCIAL

## 2025-05-27 ENCOUNTER — ANESTHESIA EVENT (OUTPATIENT)
Dept: SURGERY | Facility: CLINIC | Age: 70
End: 2025-05-27
Payer: COMMERCIAL

## 2025-05-27 LAB
GLUCOSE BLDC GLUCOMTR-MCNC: 119 MG/DL (ref 70–99)
GLUCOSE BLDC GLUCOMTR-MCNC: 144 MG/DL (ref 70–99)
GLUCOSE BLDC GLUCOMTR-MCNC: 146 MG/DL (ref 70–99)
GLUCOSE BLDC GLUCOMTR-MCNC: 152 MG/DL (ref 70–99)
GLUCOSE BLDC GLUCOMTR-MCNC: 356 MG/DL (ref 70–99)

## 2025-05-27 PROCEDURE — G0378 HOSPITAL OBSERVATION PER HR: HCPCS

## 2025-05-27 PROCEDURE — 999N000141 HC STATISTIC PRE-PROCEDURE NURSING ASSESSMENT: Performed by: UROLOGY

## 2025-05-27 PROCEDURE — 250N000009 HC RX 250: Performed by: UROLOGY

## 2025-05-27 PROCEDURE — 370N000017 HC ANESTHESIA TECHNICAL FEE, PER MIN: Performed by: UROLOGY

## 2025-05-27 PROCEDURE — 99232 SBSQ HOSP IP/OBS MODERATE 35: CPT | Performed by: PHYSICIAN ASSISTANT

## 2025-05-27 PROCEDURE — 99222 1ST HOSP IP/OBS MODERATE 55: CPT | Mod: 24 | Performed by: PHYSICIAN ASSISTANT

## 2025-05-27 PROCEDURE — 250N000009 HC RX 250: Performed by: ANESTHESIOLOGY

## 2025-05-27 PROCEDURE — 258N000003 HC RX IP 258 OP 636: Performed by: ANESTHESIOLOGY

## 2025-05-27 PROCEDURE — 250N000009 HC RX 250

## 2025-05-27 PROCEDURE — 82962 GLUCOSE BLOOD TEST: CPT

## 2025-05-27 PROCEDURE — 52001 CYSTO W/IRRG&EVAC MLT CLOTS: CPT | Mod: 79 | Performed by: UROLOGY

## 2025-05-27 PROCEDURE — 250N000011 HC RX IP 250 OP 636: Mod: JZ | Performed by: ANESTHESIOLOGY

## 2025-05-27 PROCEDURE — 250N000013 HC RX MED GY IP 250 OP 250 PS 637: Performed by: HOSPITALIST

## 2025-05-27 PROCEDURE — 258N000001 HC RX 258: Performed by: UROLOGY

## 2025-05-27 PROCEDURE — 250N000011 HC RX IP 250 OP 636: Performed by: ANESTHESIOLOGY

## 2025-05-27 PROCEDURE — 250N000013 HC RX MED GY IP 250 OP 250 PS 637: Performed by: INTERNAL MEDICINE

## 2025-05-27 PROCEDURE — 360N000075 HC SURGERY LEVEL 2, PER MIN: Performed by: UROLOGY

## 2025-05-27 PROCEDURE — 258N000001 HC RX 258: Performed by: INTERNAL MEDICINE

## 2025-05-27 PROCEDURE — 250N000011 HC RX IP 250 OP 636: Mod: JZ | Performed by: INTERNAL MEDICINE

## 2025-05-27 PROCEDURE — 272N000001 HC OR GENERAL SUPPLY STERILE: Performed by: UROLOGY

## 2025-05-27 PROCEDURE — 250N000013 HC RX MED GY IP 250 OP 250 PS 637: Performed by: UROLOGY

## 2025-05-27 PROCEDURE — 250N000011 HC RX IP 250 OP 636: Mod: JZ | Performed by: UROLOGY

## 2025-05-27 PROCEDURE — 250N000025 HC SEVOFLURANE, PER MIN: Performed by: UROLOGY

## 2025-05-27 PROCEDURE — 710N000009 HC RECOVERY PHASE 1, LEVEL 1, PER MIN: Performed by: UROLOGY

## 2025-05-27 PROCEDURE — 120N000001 HC R&B MED SURG/OB

## 2025-05-27 PROCEDURE — 0W3R8ZZ CONTROL BLEEDING IN GENITOURINARY TRACT, VIA NATURAL OR ARTIFICIAL OPENING ENDOSCOPIC: ICD-10-PCS | Performed by: UROLOGY

## 2025-05-27 RX ORDER — FENTANYL CITRATE 50 UG/ML
50 INJECTION, SOLUTION INTRAMUSCULAR; INTRAVENOUS EVERY 5 MIN PRN
Status: DISCONTINUED | OUTPATIENT
Start: 2025-05-27 | End: 2025-05-27 | Stop reason: HOSPADM

## 2025-05-27 RX ORDER — FENTANYL CITRATE 50 UG/ML
INJECTION, SOLUTION INTRAMUSCULAR; INTRAVENOUS PRN
Status: DISCONTINUED | OUTPATIENT
Start: 2025-05-27 | End: 2025-05-27

## 2025-05-27 RX ORDER — PROPOFOL 10 MG/ML
INJECTION, EMULSION INTRAVENOUS PRN
Status: DISCONTINUED | OUTPATIENT
Start: 2025-05-27 | End: 2025-05-27

## 2025-05-27 RX ORDER — HYDROMORPHONE HCL IN WATER/PF 6 MG/30 ML
0.4 PATIENT CONTROLLED ANALGESIA SYRINGE INTRAVENOUS EVERY 5 MIN PRN
Status: DISCONTINUED | OUTPATIENT
Start: 2025-05-27 | End: 2025-05-27 | Stop reason: HOSPADM

## 2025-05-27 RX ORDER — SODIUM CHLORIDE, SODIUM LACTATE, POTASSIUM CHLORIDE, CALCIUM CHLORIDE 600; 310; 30; 20 MG/100ML; MG/100ML; MG/100ML; MG/100ML
INJECTION, SOLUTION INTRAVENOUS CONTINUOUS
Status: DISCONTINUED | OUTPATIENT
Start: 2025-05-27 | End: 2025-05-27 | Stop reason: HOSPADM

## 2025-05-27 RX ORDER — NALOXONE HYDROCHLORIDE 0.4 MG/ML
0.1 INJECTION, SOLUTION INTRAMUSCULAR; INTRAVENOUS; SUBCUTANEOUS
Status: DISCONTINUED | OUTPATIENT
Start: 2025-05-27 | End: 2025-05-27 | Stop reason: HOSPADM

## 2025-05-27 RX ORDER — PROPOFOL 10 MG/ML
INJECTION, EMULSION INTRAVENOUS CONTINUOUS PRN
Status: DISCONTINUED | OUTPATIENT
Start: 2025-05-27 | End: 2025-05-27

## 2025-05-27 RX ORDER — DEXAMETHASONE SODIUM PHOSPHATE 4 MG/ML
4 INJECTION, SOLUTION INTRA-ARTICULAR; INTRALESIONAL; INTRAMUSCULAR; INTRAVENOUS; SOFT TISSUE
Status: DISCONTINUED | OUTPATIENT
Start: 2025-05-27 | End: 2025-05-27 | Stop reason: HOSPADM

## 2025-05-27 RX ORDER — ONDANSETRON 2 MG/ML
INJECTION INTRAMUSCULAR; INTRAVENOUS PRN
Status: DISCONTINUED | OUTPATIENT
Start: 2025-05-27 | End: 2025-05-27

## 2025-05-27 RX ORDER — ONDANSETRON 4 MG/1
4 TABLET, ORALLY DISINTEGRATING ORAL EVERY 30 MIN PRN
Status: DISCONTINUED | OUTPATIENT
Start: 2025-05-27 | End: 2025-05-27 | Stop reason: HOSPADM

## 2025-05-27 RX ORDER — DEXAMETHASONE SODIUM PHOSPHATE 4 MG/ML
INJECTION, SOLUTION INTRA-ARTICULAR; INTRALESIONAL; INTRAMUSCULAR; INTRAVENOUS; SOFT TISSUE PRN
Status: DISCONTINUED | OUTPATIENT
Start: 2025-05-27 | End: 2025-05-27

## 2025-05-27 RX ORDER — CALCIUM CARBONATE 500 MG/1
1000 TABLET, CHEWABLE ORAL 3 TIMES DAILY PRN
Status: DISCONTINUED | OUTPATIENT
Start: 2025-05-27 | End: 2025-05-29 | Stop reason: HOSPADM

## 2025-05-27 RX ORDER — CALCIUM CARBONATE 500 MG/1
500 TABLET, CHEWABLE ORAL 3 TIMES DAILY PRN
Status: DISCONTINUED | OUTPATIENT
Start: 2025-05-27 | End: 2025-05-27

## 2025-05-27 RX ORDER — ONDANSETRON 2 MG/ML
4 INJECTION INTRAMUSCULAR; INTRAVENOUS EVERY 30 MIN PRN
Status: DISCONTINUED | OUTPATIENT
Start: 2025-05-27 | End: 2025-05-27 | Stop reason: HOSPADM

## 2025-05-27 RX ORDER — LIDOCAINE HYDROCHLORIDE 20 MG/ML
INJECTION, SOLUTION INFILTRATION; PERINEURAL PRN
Status: DISCONTINUED | OUTPATIENT
Start: 2025-05-27 | End: 2025-05-27

## 2025-05-27 RX ORDER — MEPERIDINE HYDROCHLORIDE 25 MG/ML
12.5 INJECTION INTRAMUSCULAR; INTRAVENOUS; SUBCUTANEOUS EVERY 5 MIN PRN
Status: DISCONTINUED | OUTPATIENT
Start: 2025-05-27 | End: 2025-05-27 | Stop reason: HOSPADM

## 2025-05-27 RX ORDER — LIDOCAINE 40 MG/G
CREAM TOPICAL
Status: DISCONTINUED | OUTPATIENT
Start: 2025-05-27 | End: 2025-05-27 | Stop reason: HOSPADM

## 2025-05-27 RX ORDER — CEFAZOLIN SODIUM/WATER 2 G/20 ML
SYRINGE (ML) INTRAVENOUS PRN
Status: DISCONTINUED | OUTPATIENT
Start: 2025-05-27 | End: 2025-05-27

## 2025-05-27 RX ORDER — HYDROMORPHONE HCL IN WATER/PF 6 MG/30 ML
0.2 PATIENT CONTROLLED ANALGESIA SYRINGE INTRAVENOUS EVERY 5 MIN PRN
Status: DISCONTINUED | OUTPATIENT
Start: 2025-05-27 | End: 2025-05-27 | Stop reason: HOSPADM

## 2025-05-27 RX ORDER — ATROPA BELLADONNA AND OPIUM 16.2; 3 MG/1; MG/1
SUPPOSITORY RECTAL PRN
Status: DISCONTINUED | OUTPATIENT
Start: 2025-05-27 | End: 2025-05-27 | Stop reason: HOSPADM

## 2025-05-27 RX ORDER — MAGNESIUM HYDROXIDE 1200 MG/15ML
LIQUID ORAL PRN
Status: DISCONTINUED | OUTPATIENT
Start: 2025-05-27 | End: 2025-05-27 | Stop reason: HOSPADM

## 2025-05-27 RX ORDER — FENTANYL CITRATE 50 UG/ML
25 INJECTION, SOLUTION INTRAMUSCULAR; INTRAVENOUS EVERY 5 MIN PRN
Status: DISCONTINUED | OUTPATIENT
Start: 2025-05-27 | End: 2025-05-27 | Stop reason: HOSPADM

## 2025-05-27 RX ADMIN — SODIUM CHLORIDE, SODIUM LACTATE, POTASSIUM CHLORIDE, AND CALCIUM CHLORIDE: .6; .31; .03; .02 INJECTION, SOLUTION INTRAVENOUS at 11:34

## 2025-05-27 RX ADMIN — GABAPENTIN 100 MG: 100 CAPSULE ORAL at 21:11

## 2025-05-27 RX ADMIN — ONDANSETRON 4 MG: 2 INJECTION INTRAMUSCULAR; INTRAVENOUS at 12:30

## 2025-05-27 RX ADMIN — ACETAMINOPHEN 975 MG: 325 TABLET, FILM COATED ORAL at 15:07

## 2025-05-27 RX ADMIN — HYDROMORPHONE HYDROCHLORIDE 0.4 MG: 0.2 INJECTION, SOLUTION INTRAMUSCULAR; INTRAVENOUS; SUBCUTANEOUS at 19:38

## 2025-05-27 RX ADMIN — SODIUM CHLORIDE 6000 ML: 900 IRRIGANT IRRIGATION at 08:29

## 2025-05-27 RX ADMIN — DEXMEDETOMIDINE HYDROCHLORIDE 8 MCG: 100 INJECTION, SOLUTION INTRAVENOUS at 12:22

## 2025-05-27 RX ADMIN — DEXMEDETOMIDINE HYDROCHLORIDE 8 MCG: 100 INJECTION, SOLUTION INTRAVENOUS at 12:36

## 2025-05-27 RX ADMIN — TRANEXAMIC ACID 1 G: 1 INJECTION, SOLUTION INTRAVENOUS at 12:46

## 2025-05-27 RX ADMIN — PANTOPRAZOLE SODIUM 40 MG: 40 TABLET, DELAYED RELEASE ORAL at 18:17

## 2025-05-27 RX ADMIN — ACETAMINOPHEN 975 MG: 325 TABLET, FILM COATED ORAL at 02:45

## 2025-05-27 RX ADMIN — PANTOPRAZOLE SODIUM 40 MG: 40 TABLET, DELAYED RELEASE ORAL at 08:27

## 2025-05-27 RX ADMIN — OXYBUTYNIN CHLORIDE 5 MG: 5 TABLET, EXTENDED RELEASE ORAL at 21:13

## 2025-05-27 RX ADMIN — FENTANYL CITRATE 50 MCG: 50 INJECTION INTRAMUSCULAR; INTRAVENOUS at 13:29

## 2025-05-27 RX ADMIN — OXYCODONE HYDROCHLORIDE 10 MG: 5 TABLET ORAL at 06:47

## 2025-05-27 RX ADMIN — OXYCODONE HYDROCHLORIDE 10 MG: 5 TABLET ORAL at 02:45

## 2025-05-27 RX ADMIN — PHENYLEPHRINE HYDROCHLORIDE 50 MCG: 10 INJECTION INTRAVENOUS at 12:30

## 2025-05-27 RX ADMIN — Medication 2 G: at 12:07

## 2025-05-27 RX ADMIN — OXYCODONE HYDROCHLORIDE 10 MG: 5 TABLET ORAL at 18:23

## 2025-05-27 RX ADMIN — PROPOFOL 200 MG: 10 INJECTION, EMULSION INTRAVENOUS at 12:16

## 2025-05-27 RX ADMIN — CALCIUM CARBONATE (ANTACID) CHEW TAB 500 MG 500 MG: 500 CHEW TAB at 02:45

## 2025-05-27 RX ADMIN — KETOROLAC TROMETHAMINE 15 MG: 15 INJECTION, SOLUTION INTRAMUSCULAR; INTRAVENOUS at 15:06

## 2025-05-27 RX ADMIN — ACETAMINOPHEN 975 MG: 325 TABLET, FILM COATED ORAL at 08:30

## 2025-05-27 RX ADMIN — DEXAMETHASONE SODIUM PHOSPHATE 4 MG: 4 INJECTION, SOLUTION INTRA-ARTICULAR; INTRALESIONAL; INTRAMUSCULAR; INTRAVENOUS; SOFT TISSUE at 12:20

## 2025-05-27 RX ADMIN — ATROPA BELLADONNA AND OPIUM 1 SUPPOSITORY: 16.2; 3 SUPPOSITORY RECTAL at 07:33

## 2025-05-27 RX ADMIN — LIDOCAINE HYDROCHLORIDE 100 MG: 20 INJECTION, SOLUTION INFILTRATION; PERINEURAL at 12:16

## 2025-05-27 RX ADMIN — FENTANYL CITRATE 50 MCG: 50 INJECTION INTRAMUSCULAR; INTRAVENOUS at 12:16

## 2025-05-27 RX ADMIN — PROPOFOL 30 MCG/KG/MIN: 10 INJECTION, EMULSION INTRAVENOUS at 12:20

## 2025-05-27 RX ADMIN — FENTANYL CITRATE 50 MCG: 50 INJECTION INTRAMUSCULAR; INTRAVENOUS at 12:29

## 2025-05-27 RX ADMIN — ACETAMINOPHEN 975 MG: 325 TABLET, FILM COATED ORAL at 21:11

## 2025-05-27 RX ADMIN — OXYCODONE HYDROCHLORIDE 10 MG: 5 TABLET ORAL at 14:20

## 2025-05-27 RX ADMIN — LIDOCAINE HYDROCHLORIDE: 20 JELLY TOPICAL at 11:00

## 2025-05-27 RX ADMIN — PHENYLEPHRINE HYDROCHLORIDE 50 MCG: 10 INJECTION INTRAVENOUS at 12:16

## 2025-05-27 ASSESSMENT — ACTIVITIES OF DAILY LIVING (ADL)
ADLS_ACUITY_SCORE: 57

## 2025-05-27 NOTE — ANESTHESIA POSTPROCEDURE EVALUATION
Patient: Austyn Araujo    Procedure: Procedure(s):  CYSTOSCOPY, WITH FULGURATION OF HEMORRHAGING BLOOD VESSEL AND THROMBUS REMOVAL       Anesthesia Type:  General    Note:  Disposition: Inpatient   Postop Pain Control: Uneventful            Sign Out: Well controlled pain   PONV: No   Neuro/Psych: Uneventful            Sign Out: Acceptable/Baseline neuro status   Airway/Respiratory: Uneventful            Sign Out: Acceptable/Baseline resp. status   CV/Hemodynamics: Uneventful            Sign Out: Acceptable CV status; No obvious hypovolemia; No obvious fluid overload   Other NRE: NONE   DID A NON-ROUTINE EVENT OCCUR?            Last vitals:  Vitals Value Taken Time   /79 05/27/25 13:45   Temp 35.9  C (96.62  F) 05/27/25 13:53   Pulse 60 05/27/25 13:52   Resp 13 05/27/25 13:52   SpO2 95 % 05/27/25 13:53   Vitals shown include unfiled device data.    Electronically Signed By: Edyta Gomez  May 27, 2025  1:57 PM

## 2025-05-27 NOTE — PLAN OF CARE
Surgery/POD#: POD7 prostate ablation, admit with hematuria. Now POD0 Cysto, fulguration and clot evac  Behavior & Aggression: green  Fall Risk: no  Orientation:A&O  ABNL VS/O2:WNL  ABNL Labs: WNL  Pain Management:Meatus pain  Bowel/Bladder: Myers, No BM  Drains: CBI slow  Diet:Reg  Number of times OUT OF BED this shift Multiple, up ind   Anticipated  DC Date: Pending

## 2025-05-27 NOTE — OP NOTE
OPERATIVE REPORT    PREOPERATIVE DIAGNOSIS:   Bladder clot and intraluminal prostate hemorrhage    POSTOPERATIVE DIAGNOSIS: Same    PROCEDURES PERFORMED:   Cystoscopy with clot evacuation and bladder fulguration    STAFF SURGEON: Be Jauregui MD  ASSISTANT(S): none  ANESTHESIA: general  ESTIMATED BLOOD LOSS: 10 ml  COMPLICATIONS: None.   SPECIMEN: none    SIGNIFICANT FINDINGS:   Bleeding at edge of aquablation at 2-3 o clock and 9-10 o clock near bladder neck, mildly resected and bleeders fulgurated once exposed  Bladder neck and posterior floor fulgurated  Left mid prostate resected and bleeding vessel underneath fulgurated  Bleeding at distal edge near butterfly cut on patient right side, gently fulgurated  Small area of significant mucosal bleeding on preserved left apical adenoma, gently fulgurated  Good preservation of apex and pericollicular structures    BRIEF OPERATIVE INDICATIONS: Austyn Araujo is a(n) 69 year old male who presented with persistent postoperative bleeding POD7 after aquablation.  After a discussion of all risks, benefits, and alternatives, the patient elected to proceed with cysto, clot evac and fulguration.    DESCRIPTION OF PROCEDURE:  After informed consent was obtained, the patient was transported to the operating room & placed supine on the table. After adequate anesthesia was induced, the patient was placed in lithotomy and prepped and draped in the usual sterile fashion. A timeout was taken to confirm correct patient, procedure and laterality. Pre-operative IV antibiotics were administered.     Urethra was dilated to 28 fr and 28 fr storz bipolar resectoscope sheath inserted without difficulty.  No obvious urethral strictures.  Sphincter area was irritated likely from scope/catheter placements.    Once in the fossa and bladder, minimal clot evaculated.  Bladder with catheter edema/inflammation.  Prostatic fossa had few areas of hemorrhage.  Resected and fulgurated underlying  vessels at bladder neck mucosal strip on either side, and left mid prostate. Focal bleeder in right butterfly cut and mucosal bleeder on left apical/collicular adenoma had area of bleeding that was lightly fulgurated.  The entire posterior bladder neck and fossa fulgurated.    At end of case, no bleeding with irrigation turned off.  CBI clear with minimal drip postop.  Did give 1g TXA after fulguration to reduce risk of rebleeding.    They were awakened from anesthesia and transferred to the PACU.       POSTOP PLAN:  Possible void trial in AM

## 2025-05-27 NOTE — ANESTHESIA PREPROCEDURE EVALUATION
Anesthesia Pre-Procedure Evaluation    Patient: Austyn Araujo   MRN: [id number] : 1955          Procedure : Procedure(s):  CYSTOSCOPY, WITH FULGURATION OF HEMORRHAGING BLOOD VESSEL AND THROMBUS REMOVAL         Past Medical History:   Diagnosis Date    Anxiety     Anxiety 2015    BPH (benign prostatic hyperplasia)     Degenerative disc disease     Depression     Gastritis     Gastro-oesophageal reflux disease     H/O alcohol abuse     Hyperlipidemia     Hypertension     Low testosterone     MRSA (methicillin resistant staph aureus) culture positive 2013    skin infection    Mumps     PUD (peptic ulcer disease)     Sleep apnea     STD (sexually transmitted disease)     Type 2 diabetes mellitus (H)     neuropathy      Past Surgical History:   Procedure Laterality Date    APPENDECTOMY      COLONOSCOPY  2013    Procedure: COMBINED COLONOSCOPY, SINGLE BIOPSY/POLYPECTOMY BY BIOPSY;  Colonoscopy/ polypectomy x2 by cold forceps    ;  Surgeon: Juan Carlos Bellamy MD;  Location:  GI    CYSTOSCOPY, TRANSURETHERAL WATERJET ABLATION PROSTATE, USING AQUABLATION N/A 2025    Procedure: CYSTOSCOPY, AQUABLATION OF THE PROSTATE;  Surgeon: Mohsen Rao MD;  Location: [facility]    DECOMPRESSION POSTERIOR LUMBAR, ,L4-5 decompression  2023    ESOPHAGOSCOPY, GASTROSCOPY, DUODENOSCOPY (EGD), COMBINED N/A 2016    Procedure: COMBINED ESOPHAGOSCOPY, GASTROSCOPY, DUODENOSCOPY (EGD), BIOPSY SINGLE OR MULTIPLE;  Surgeon: Juan Carlos Barraza MD;  Location:  GI    ESOPHAGOSCOPY, GASTROSCOPY, DUODENOSCOPY (EGD), COMBINED N/A 2019    Procedure: ESOPHAGOSCOPY, GASTROSCOPY, DUODENOSCOPY (EGD) with cold forcep;  Surgeon: Juan Carlos Barraza MD;  Location:  GI    EXCISE SOFT TISSUE TUMOR ELBOW Left 2024    Procedure: Removal Left Elbow Tumor;  Surgeon: Broderick Mccurdy MD;  Location: [facility]    HERNIA REPAIR        No Known Allergies   Social History     Tobacco Use    Smoking status:  Former     Current packs/day: 0.00     Types: Cigarettes     Quit date: 2000     Years since quittin.4    Smokeless tobacco: Never   Substance Use Topics    Alcohol use: No     Comment: sober since 2016      Wt Readings from Last 1 Encounters:   25 105.2 kg (232 lb)        Anesthesia Evaluation   Pt has had prior anesthetic.     History of anesthetic complications       ROS/MED HX  ENT/Pulmonary:     (+) sleep apnea,                                       Neurologic:       Cardiovascular:     (+)  hypertension- -   -  - -                                      METS/Exercise Tolerance:     Hematologic: Comments: Having blood clots with urination    (+)      anemia,          Musculoskeletal:       GI/Hepatic:     (+) GERD,                   Renal/Genitourinary:       Endo:     (+) type I DM,                     Psychiatric/Substance Use:       Infectious Disease:       Malignancy:       Other:              Physical Exam  Airway  Mallampati: II  TM distance: >3 FB  Neck ROM: limited  Mouth opening: >= 4 cm    Cardiovascular    Dental     Pulmonary       Neurological   Other Findings       OUTSIDE LABS:  CBC:   Lab Results   Component Value Date    WBC 12.6 (H) 2025    WBC 14.0 (H) 2025    HGB 15.7 2025    HGB 16.2 2025    HCT 46.3 2025    HCT 45.1 2025     2025     2025     BMP:   Lab Results   Component Value Date     2025     2025    POTASSIUM 4.0 2025    POTASSIUM 3.9 2025    CHLORIDE 102 2025    CHLORIDE 101 2025    CO2 28 2025    CO2 27 2025    BUN 14.0 2025    BUN 10.8 2025    CR 0.93 2025    CR 1.10 2025     (H) 2025     (H) 2025     COAGS:   Lab Results   Component Value Date    INR 1.08 2016     POC:   Lab Results   Component Value Date     (H) 2019     HEPATIC:   Lab Results   Component Value Date    ALBUMIN 4.3  "02/06/2025    PROTTOTAL 7.2 02/06/2025    ALT 35 02/06/2025    AST 26 02/06/2025    ALKPHOS 84 02/06/2025    BILITOTAL 0.5 02/06/2025     OTHER:   Lab Results   Component Value Date    LACT 2.2 (H) 08/02/2016    A1C 6.5 (H) 05/20/2025    MANINDER 9.8 05/25/2025    MAG 2.2 08/02/2016    LIPASE 193 02/03/2021    AMYLASE 35 07/31/2016    TSH 0.98 02/06/2025    SED 15 06/26/2019       Anesthesia Plan    ASA Status:  3      NPO Status: NPO Appropriate   Anesthesia Type: General.  Airway: oral.  Induction: intravenous.   Techniques and Equipment:       - Monitoring Plan: standard ASA monitoring     Consents    Anesthesia Plan(s) and associated risks, benefits, and realistic alternatives discussed. Questions answered and patient/representative(s) expressed understanding.     - Discussed: CRNA     - Discussed with:  Patient               Postoperative Care         Comments:    Other Comments: Last ate at 2000 on 5/26. Had some heartburn overnight from eating late, relieved by tums. No current GERD symptoms. LMA ok.               Amee Carlos MD    I have reviewed the pertinent notes and labs in the chart from the past 30 days and (re)examined the patient.  Any updates or changes from those notes are reflected in this note.    Clinically Significant Risk Factors Present on Admission                 # Drug Induced Platelet Defect: home medication list includes an antiplatelet medication   # Hypertension: Noted on problem list          # DMII: A1C = 6.5 % (Ref range: <5.7 %) within past 6 months    # Obesity: Estimated body mass index is 31.42 kg/m  as calculated from the following:    Height as of 5/20/25: 1.83 m (6' 0.05\").    Weight as of 5/20/25: 105.2 kg (232 lb).                    "

## 2025-05-27 NOTE — ANESTHESIA CARE TRANSFER NOTE
Patient: Austyn Araujo    Procedure: Procedure(s):  CYSTOSCOPY, WITH FULGURATION OF HEMORRHAGING BLOOD VESSEL AND THROMBUS REMOVAL       Diagnosis: Gross hematuria [R31.0]  Diagnosis Additional Information: No value filed.    Anesthesia Type:   General     Note:    Oropharynx: oropharynx clear of all foreign objects and spontaneously breathing  Level of Consciousness: drowsy and awake  Oxygen Supplementation: face mask  Level of Supplemental Oxygen (L/min / FiO2): 6  Independent Airway: airway patency satisfactory and stable  Dentition: dentition unchanged  Vital Signs Stable: post-procedure vital signs reviewed and stable  Report to RN Given: handoff report given  Patient transferred to: PACU    Handoff Report: Identifed the Patient, Identified the Reponsible Provider, Reviewed the pertinent medical history, Discussed the surgical course, Reviewed Intra-OP anesthesia mangement and issues during anesthesia, Set expectations for post-procedure period and Allowed opportunity for questions and acknowledgement of understanding      Vitals:  Vitals Value Taken Time   /76 05/27/25 13:11   Temp 35.7  C (96.26  F) 05/27/25 13:15   Pulse 55 05/27/25 13:15   Resp 8 05/27/25 13:15   SpO2 98 % 05/27/25 13:15   Vitals shown include unfiled device data.    Electronically Signed By: MARICEL Santiago CRNA  May 27, 2025  1:16 PM

## 2025-05-27 NOTE — PLAN OF CARE
Goal Outcome Evaluation:      Plan of Care Reviewed With: patient    Overall Patient Progress: no changeOverall Patient Progress: no change     Date & Time: 5/26/25 0198-1030  Summary: Hematuria  Behavior & Aggression: Green  Fall Risk: No  Orientation:A&Ox4  ABNL VS/O2:VSS on RA  ABNL Labs: , 152  Pain Management: PRN Oxy, Lidocaine gel, oxybutynin, and IV dilaudid given for bladder spasm pain. Has B&O supp PRN. Scheduled Tylenol  Bowel/Bladder: Up to BR, no BM. CBI running fast, irrigated x2 this shift.  Drains: PIV SL. Yanes with CBI running mod to fast, a few clots noted  Wounds/incisions: Penile abrasions  Diet: NPO since 2am for possible procedure  Number of times OUT OF BED this shift   Tests/Procedures: Hand irrigation of yanes cath x2  Anticipated  DC Date: Pending  Significant Information: Urology following. Tums given for heartburn, oxybutynin added daily for bladder spasms.

## 2025-05-27 NOTE — ANESTHESIA PROCEDURE NOTES
Airway       Patient location during procedure: OR  Staff -        CRNA: Cinthya Yoo APRN CRNA       Performed By: CRNAIndications and Patient Condition       Indications for airway management: rosanna-procedural       Induction type:intravenous       Mask difficulty assessment: 0 - not attempted    Final Airway Details       Final airway type: supraglottic airway    Supraglottic Airway Details        Type: LMA       Brand: I-Gel       LMA size: 5    Post intubation assessment        Placement verified by: capnometry, equal breath sounds and chest rise        Number of attempts at approach: 1       Number of other approaches attempted: 0       Secured with: tape       Ease of procedure: easy       Dentition: Unchanged

## 2025-05-27 NOTE — PROVIDER NOTIFICATION
MD Notification    Notified Person: MD    Notified Person Name: Daisha Garciabubbaadolfo MD    Notification Date/Time: 5/27/25 237am    Notification Interaction: vocera page    Purpose of Notification: Pt requesting tums for heartburn. Please order if ok    Orders Received: received order for tums 500mg TID PRN heartburn    Comments: pt states he takes 1000mg when he needs tums and that 500 may not be effective, is wondering if it could be increased, note left for provider.

## 2025-05-27 NOTE — PROGRESS NOTES
[facility]    Medicine Progress Note - Hospitalist Service    Date of Admission:  5/25/2025    Assessment & Plan   Austyn Araujo is a 69 year old male with PMH significant for BPH, LUTS, well controlled IDDM2, HLD, neuropathy, GERD, low testosterone on supplementation, and history of ETOH use disorder in remission who was admitted on 5/25/2025 with hematuria.    Hematuria  Bacteriuria    Aquablation of prostate 5/20  Hx BPH/ LUTS  Underwent cysto w/ aquablation of prostate by Dr. Rao on  5/20. Failed TOV prior to discharge and yanes replaced. Had low grade hematuria. Since discharge had ongoing blood clots in urine. Presented with worsening lower abd pain and Yanes stopped draining urine. In ED Hgb 16.2. WBC 12.6. UA w/ >182 RBC, 24 WBC, few bacteria. Started on IV fluids which has since been discontinued.   PTA regimen: finasteride 5mg/d  -Inpatient status   -[facility] following   -Pt did not tolerate irrigation well 2/2 to spasms. Bladder US revealing clots  -CBI, yanes --> OR for cystoscopy, clot evacuation and possible fulguration 05/27/25   - Has been using B/O suppository ordered, helpful to have 30 min prior to irrigations  - Continue PTA finasteride 5mg/d    - CBI in place   - prn analgesics  - UA with bacteriuria; culture in process  - Blood cultures x2 NGTD  - Monitor off abx for now   - HOLD PTA ASA 81mg/d   - Trend basic labs PRN    Insulin dependent type 2 diabetes  PTA Regimen: lisinopril 10 mg every other daily (for renal protection), lantus 15 units BID, novolog 15 units w/ meals, januvia 100 mg daily, metformin 1,000mg BID  Last HbA1c 6.5% 5/20/25  *Given Decadron intraoperatively, expect steroid induced hyperglycemia  - Hypoglycemia protocol  - Preprandial and HS fingerstick checks or Q 4 hours while NPO  - Hold PTA oral antiglycemics, resume on discharge,   - Sliding scale insulin Medium --> High  - Long acting regimen: Resume Lantus 10 units evening 05/27/25  "post cystoscopy, likely resume to PTA dosing 15 units BID when eating ok, possibly 5/28  - Consistent CHO diet - 75gm per meal  - hold PTA mealtime scheduled insulin  - hold lisinopril for now, monitor ability to resume, Pt reports lightheadedness and taking lisinopril every other day will halve dose to 5 mg daily     Recent Labs   Lab 05/27/25  1316 05/27/25  1003 05/27/25  0643 05/27/25  0152 05/26/25  2136 05/26/25  1741   * 144* 119* 152* 222* 211*       Chronic stable diagnoses and other pertinent medical history: Appropriate PTA medications will be resumed.   HLD: HOLD PTA statin, resume on discharge  Hx Etoh use disorder: Sober x many years, commended  Neuropathy: Continue PTA gabapentin 100 mg at HS  GERD: Continue PTA omeprazole  Low testosterone: testosterone injections q Friday        Diet: NPO for Medical/Clinical Reasons Except for: Meds    DVT Prophylaxis: Pneumatic Compression Devices and Ambulate every shift  Myers Catheter: PRESENT, indication: Surgical procedure, Gross hematuria  Lines: None     Cardiac Monitoring: ACTIVE order. Indication: Procedural area  Code Status: Full Code      Clinically Significant Risk Factors Present on Admission                 # Drug Induced Platelet Defect: home medication list includes an antiplatelet medication   # Hypertension: Noted on problem list          # DMII: A1C = 6.5 % (Ref range: <5.7 %) within past 6 months    # Obesity: Estimated body mass index is 31.42 kg/m  as calculated from the following:    Height as of 5/20/25: 1.83 m (6' 0.05\").    Weight as of 5/20/25: 105.2 kg (232 lb).              Social Drivers of Health    Tobacco Use: Medium Risk (5/20/2025)    Patient History     Smoking Tobacco Use: Former     Smokeless Tobacco Use: Never   Physical Activity: Insufficiently Active (2/5/2025)    Exercise Vital Sign     Days of Exercise per Week: 1 day     Minutes of Exercise per Session: 10 min   Stress: Stress Concern Present (2/5/2025)    " [employer] [place] [employer] - Occupational Stress Questionnaire     Feeling of Stress : To some extent   Social Connections: Unknown (2/5/2025)    Social Connection and Isolation Panel [NHANES]     Frequency of Social Gatherings with Friends and Family: Once a week          Disposition Plan     Medically Ready for Discharge: Anticipated Tomorrow once cleared by urology and clinically improved, post cysto/findings           The patient's care was discussed with the Attending Physician, Dr. Lopez, Bedside Nurse, Patient, and Patient's Family.    Kitty Glaser PA-C  Hospitalist Service  [facility]  Securely message with [employer] (more info)  Text page via [facility] Paging/Directory   ______________________________________________________________________    Interval History   Seen and examined. Some bladder spasms, improving. CBI, yanes, no real pain. No N/V. Afebrile. Discussed insulin. Plan for cysto today. Possible discharge tomorrow pending urology workup. Note about ear pain however patient did not mention this to me so assuming better, will check with nurse. Questions welcomed and answered to the best of my knowledge.    Physical Exam   Vital Signs: Temp: 97.4  F (36.3  C) Temp src: Temporal BP: 122/72 Pulse: 57   Resp: 14 SpO2: 96 % O2 Device: Nasal cannula Oxygen Delivery: 2 LPM  Weight: 0 lbs 0 oz    Physical Exam    General: Awake, alert, very pleasant man who appears stated age. Looks comfortable sitting up at edge of. No acute distress.  HEENT: Normocephalic, atraumatic. Extraocular movements intact  Respiratory: Clear to auscultation bilaterally, no rales, wheezing, or rhonchi.  Cardiovascular: Regular rate and rhythm, +S1 and S2, no murmur auscultated. No peripheral edema.   : yanes in place draining light peach colored urine, no obvious clots   Gastrointestinal: Soft, non-tender, non-distended. Bowel sounds present.  Skin: Warm, dry. No obvious rashes or  lesions on exposed skin. Dorsalis pedis pulses palpable bilaterally.  Musculoskeletal: No joint swelling, erythema or tenderness. Moves all extremities equally.  Neurologic: AAO x3.   Psychiatric: Appropriate mood and affect. No obvious anxiety or depression.      Medical Decision Making       >35 MINUTES SPENT BY ME on the date of service doing chart review, history, exam, documentation & further activities per the note.      Data         Imaging results reviewed over the past 24 hrs:   No results found for this or any previous visit (from the past 24 hours).

## 2025-05-27 NOTE — PROVIDER NOTIFICATION
MD Notification    Notified Person: MD    Notified Person Name: Dr. Gamino    Notification Date/Time: 5/26/25 11:19 pm    Notification Interaction: phone page    Purpose of Notification: pt continues to have severely painful bladder spasms, too soon for B&O suppository, can we get another PRN?    Orders Received: Give oxybutynin XL 5mg daily    Comments:

## 2025-05-27 NOTE — CONSULTS
[facility] Urology Progress Note          Assessment and Plan:     Active Problems:    Gross hematuria following acquablation.     Yanes catheter dysfunction    Assessment: Continues to require CBI and has had yanes dysfunction over night    Plan:   -NPO  -Add on to OR for cysto, clot evac and poss fulguration.   -Reviewed with Dr. Jauregui. OR timing TBD and awaiting a call back from the desk.    Corina Maharaj PA-C  [facility]  Office: [phone]  After business hours: [phone]               Interval History:     Frustrated, continues to pass clots and have Yanes dysfunction/bladder spasms, despite the urine being Grade I with moderated gtt. Bladder US with 5cm clot.               Medications:     Current Facility-Administered Medications   Medication Dose Route Frequency Provider Last Rate Last Admin    acetaminophen (TYLENOL) tablet 975 mg  975 mg Oral Q6H Maninder Gamino MD   975 mg at 05/27/25 0830    Or    acetaminophen (TYLENOL) Suppository 650 mg  650 mg Rectal Q6H Maninder Gamino MD        calcium carbonate (TUMS) chewable tablet 1,000 mg  1,000 mg Oral TID PRN Kitty Glaser PA-C        glucose gel 15-30 g  15-30 g Oral Q15 Min PRN Yared Delatorre MD        Or    dextrose 50 % injection 25-50 mL  25-50 mL Intravenous Q15 Min PRN Yared Delatorre MD        Or    glucagon injection 1 mg  1 mg Subcutaneous Q15 Min PRN Yared Delatorre MD        finasteride (PROSCAR) tablet 5 mg  5 mg Oral Daily Fern Joshi NP   5 mg at 05/26/25 1447    gabapentin (NEURONTIN) capsule 100 mg  100 mg Oral At Bedtime Yared Delatorre MD   100 mg at 05/26/25 2141    HYDROmorphone (DILAUDID) injection 0.2 mg  0.2 mg Intravenous Q2H PRN Yared Delatorre MD        HYDROmorphone (DILAUDID) injection 0.4 mg  0.4 mg Intravenous Q2H PRN Yared Delatorre MD   0.4 mg at 05/26/25 2002    insulin aspart (NovoLOG) injection (RAPID ACTING)  1-7  Units Subcutaneous Q4H Yared Delatorre MD   2 Units at 05/26/25 2143    [Held by provider] insulin glargine (LANTUS PEN) injection 7 Units  7 Units Subcutaneous BID Yared Delatorre MD   7 Units at 05/26/25 2143    ketorolac (TORADOL) injection 15 mg  15 mg Intravenous Q6H PRN Maninder Gamino MD        lidocaine (XYLOCAINE) 2 % external gel   Topical Q4H PRN Fern Joshi, NP   Given at 05/26/25 1505    lisinopril (ZESTRIL) tablet 5 mg  5 mg Oral QAM Fern Joshi, NP        naloxone (NARCAN) injection 0.2 mg  0.2 mg Intravenous Q2 Min PRN Yared Delatorre MD        Or    naloxone (NARCAN) injection 0.4 mg  0.4 mg Intravenous Q2 Min PRN Yared Delatorre MD        Or    naloxone (NARCAN) injection 0.2 mg  0.2 mg Intramuscular Q2 Min PRN Yared Delatorre MD        Or    naloxone (NARCAN) injection 0.4 mg  0.4 mg Intramuscular Q2 Min PRN Yared Delatorre MD        ondansetron (ZOFRAN ODT) ODT tab 4 mg  4 mg Oral Q6H PRN Yared Delatorre MD        Or    ondansetron (ZOFRAN) injection 4 mg  4 mg Intravenous Q6H PRN Yared Delatorre MD        opium-belladonna (B&O SUPPRETTES) 30-16.2 MG per suppository 1 suppository  30 mg Rectal Q8H PRN Maninder Gamino MD   1 suppository at 05/27/25 0733    oxyBUTYnin ER (DITROPAN XL) 24 hr tablet 5 mg  5 mg Oral At Bedtime Maninder Gamino MD   5 mg at 05/26/25 2334    oxyCODONE (ROXICODONE) tablet 5-10 mg  5-10 mg Oral Q4H PRN Maninder Gamino MD   10 mg at 05/27/25 0647    pantoprazole (PROTONIX) EC tablet 40 mg  40 mg Oral BID AC Yared Delatorre MD   40 mg at 05/27/25 0827    prochlorperazine (COMPAZINE) injection 5 mg  5 mg Intravenous Q6H PRN Yared Delatorre MD        Or    prochlorperazine (COMPAZINE) tablet 5 mg  5 mg Oral Q6H PRN Yared Delatorre MD        senna-docusate (SENOKOT-S/PERICOLACE) 8.6-50 MG per tablet 1 tablet  1 tablet Oral BID PRN Yared Delatorre MD         Or    senna-docusate (SENOKOT-S/PERICOLACE) 8.6-50 MG per tablet 2 tablet  2 tablet Oral BID PRN Yared Delatorre MD        sodium chloride 0.9% irrigation (bag)   Irrigation Continuous Yared Delatorre MD   6,000 mL at 05/27/25 0829                  Physical Exam:   Vitals were reviewed  Patient Vitals for the past 8 hrs:   BP Temp Temp src Pulse Resp SpO2   05/27/25 0749 109/60 97.8  F (36.6  C) Oral 62 18 98 %   05/27/25 0245 116/75 97.6  F (36.4  C) Oral 61 18 96 %     GEN: NAD, lying in bed  EYES: EOMI  : Clots passed around Myers, urine Grade I on mod gtt, clamped for some time this AM and had a large streak of sediment.            Data:     Lab Results   Component Value Date    NTBNPI 26 02/11/2009     Lab Results   Component Value Date    WBC 12.6 (H) 05/25/2025    WBC 14.0 (H) 05/21/2025    WBC 9.5 02/06/2025    HGB 15.7 05/26/2025    HGB 16.2 05/25/2025    HGB 15.8 05/21/2025    HCT 46.3 05/25/2025    HCT 45.1 05/21/2025    HCT 50.7 02/06/2025    MCV 87 05/26/2025    MCV 89 05/25/2025    MCV 90 05/21/2025     05/25/2025     05/21/2025     02/06/2025     Lab Results   Component Value Date    INR 1.08 07/31/2016    INR 1.01 12/22/2014    INR 1.12 12/14/2013          [employer]al

## 2025-05-28 LAB
ALBUMIN UR-MCNC: NEGATIVE MG/DL
ANION GAP SERPL CALCULATED.3IONS-SCNC: 10 MMOL/L (ref 7–15)
APPEARANCE UR: CLEAR
BACTERIA UR CULT: NO GROWTH
BILIRUB UR QL STRIP: NEGATIVE
BUN SERPL-MCNC: 18.3 MG/DL (ref 8–23)
CALCIUM SERPL-MCNC: 9.1 MG/DL (ref 8.8–10.4)
CHLORIDE SERPL-SCNC: 101 MMOL/L (ref 98–107)
COLOR UR AUTO: ABNORMAL
CREAT SERPL-MCNC: 0.94 MG/DL (ref 0.67–1.17)
EGFRCR SERPLBLD CKD-EPI 2021: 87 ML/MIN/1.73M2
ERYTHROCYTE [DISTWIDTH] IN BLOOD BY AUTOMATED COUNT: 12.5 % (ref 10–15)
GLUCOSE BLDC GLUCOMTR-MCNC: 141 MG/DL (ref 70–99)
GLUCOSE BLDC GLUCOMTR-MCNC: 164 MG/DL (ref 70–99)
GLUCOSE BLDC GLUCOMTR-MCNC: 165 MG/DL (ref 70–99)
GLUCOSE BLDC GLUCOMTR-MCNC: 227 MG/DL (ref 70–99)
GLUCOSE BLDC GLUCOMTR-MCNC: 254 MG/DL (ref 70–99)
GLUCOSE SERPL-MCNC: 174 MG/DL (ref 70–99)
GLUCOSE UR STRIP-MCNC: 200 MG/DL
HCO3 SERPL-SCNC: 25 MMOL/L (ref 22–29)
HCT VFR BLD AUTO: 41.5 % (ref 40–53)
HGB BLD-MCNC: 14.7 G/DL (ref 13.3–17.7)
HGB UR QL STRIP: ABNORMAL
HYALINE CASTS: 2 /LPF
KETONES UR STRIP-MCNC: NEGATIVE MG/DL
LEUKOCYTE ESTERASE UR QL STRIP: ABNORMAL
MCH RBC QN AUTO: 31.1 PG (ref 26.5–33)
MCHC RBC AUTO-ENTMCNC: 35.4 G/DL (ref 31.5–36.5)
MCV RBC AUTO: 88 FL (ref 78–100)
MUCOUS THREADS #/AREA URNS LPF: PRESENT /LPF
NITRATE UR QL: NEGATIVE
PH UR STRIP: 6 [PH] (ref 5–7)
PLATELET # BLD AUTO: 320 10E3/UL (ref 150–450)
POTASSIUM SERPL-SCNC: 4.2 MMOL/L (ref 3.4–5.3)
RBC # BLD AUTO: 4.72 10E6/UL (ref 4.4–5.9)
RBC URINE: 80 /HPF
SODIUM SERPL-SCNC: 136 MMOL/L (ref 135–145)
SP GR UR STRIP: 1.01 (ref 1–1.03)
UROBILINOGEN UR STRIP-MCNC: NORMAL MG/DL
WBC # BLD AUTO: 13.9 10E3/UL (ref 4–11)
WBC URINE: 64 /HPF

## 2025-05-28 PROCEDURE — 99232 SBSQ HOSP IP/OBS MODERATE 35: CPT | Performed by: STUDENT IN AN ORGANIZED HEALTH CARE EDUCATION/TRAINING PROGRAM

## 2025-05-28 PROCEDURE — 120N000001 HC R&B MED SURG/OB

## 2025-05-28 PROCEDURE — 87086 URINE CULTURE/COLONY COUNT: CPT | Performed by: INTERNAL MEDICINE

## 2025-05-28 PROCEDURE — 85027 COMPLETE CBC AUTOMATED: CPT | Performed by: PHYSICIAN ASSISTANT

## 2025-05-28 PROCEDURE — 250N000013 HC RX MED GY IP 250 OP 250 PS 637: Performed by: UROLOGY

## 2025-05-28 PROCEDURE — 258N000001 HC RX 258: Performed by: INTERNAL MEDICINE

## 2025-05-28 PROCEDURE — 36415 COLL VENOUS BLD VENIPUNCTURE: CPT | Performed by: PHYSICIAN ASSISTANT

## 2025-05-28 PROCEDURE — 81003 URINALYSIS AUTO W/O SCOPE: CPT | Performed by: INTERNAL MEDICINE

## 2025-05-28 PROCEDURE — 80048 BASIC METABOLIC PNL TOTAL CA: CPT | Performed by: PHYSICIAN ASSISTANT

## 2025-05-28 PROCEDURE — 250N000011 HC RX IP 250 OP 636: Performed by: INTERNAL MEDICINE

## 2025-05-28 PROCEDURE — 250N000009 HC RX 250: Performed by: PHYSICIAN ASSISTANT

## 2025-05-28 PROCEDURE — 250N000013 HC RX MED GY IP 250 OP 250 PS 637: Performed by: INTERNAL MEDICINE

## 2025-05-28 RX ORDER — GINSENG 100 MG
CAPSULE ORAL 4 TIMES DAILY PRN
Status: DISCONTINUED | OUTPATIENT
Start: 2025-05-28 | End: 2025-05-29 | Stop reason: HOSPADM

## 2025-05-28 RX ORDER — CEFTRIAXONE 2 G/1
2 INJECTION, POWDER, FOR SOLUTION INTRAMUSCULAR; INTRAVENOUS EVERY 24 HOURS
Status: DISCONTINUED | OUTPATIENT
Start: 2025-05-28 | End: 2025-05-29 | Stop reason: HOSPADM

## 2025-05-28 RX ADMIN — ACETAMINOPHEN 975 MG: 325 TABLET, FILM COATED ORAL at 02:27

## 2025-05-28 RX ADMIN — ACETAMINOPHEN 975 MG: 325 TABLET, FILM COATED ORAL at 15:41

## 2025-05-28 RX ADMIN — OXYCODONE HYDROCHLORIDE 10 MG: 5 TABLET ORAL at 20:12

## 2025-05-28 RX ADMIN — CEFTRIAXONE SODIUM 2 G: 2 INJECTION, POWDER, FOR SOLUTION INTRAMUSCULAR; INTRAVENOUS at 23:38

## 2025-05-28 RX ADMIN — OXYCODONE HYDROCHLORIDE 10 MG: 5 TABLET ORAL at 12:04

## 2025-05-28 RX ADMIN — OXYBUTYNIN CHLORIDE 5 MG: 5 TABLET, EXTENDED RELEASE ORAL at 22:23

## 2025-05-28 RX ADMIN — ACETAMINOPHEN 975 MG: 325 TABLET, FILM COATED ORAL at 22:23

## 2025-05-28 RX ADMIN — OXYCODONE HYDROCHLORIDE 10 MG: 5 TABLET ORAL at 02:26

## 2025-05-28 RX ADMIN — GABAPENTIN 100 MG: 100 CAPSULE ORAL at 22:23

## 2025-05-28 RX ADMIN — PANTOPRAZOLE SODIUM 40 MG: 40 TABLET, DELAYED RELEASE ORAL at 18:31

## 2025-05-28 RX ADMIN — OXYCODONE HYDROCHLORIDE 10 MG: 5 TABLET ORAL at 07:36

## 2025-05-28 RX ADMIN — BACITRACIN: 500 OINTMENT TOPICAL at 10:39

## 2025-05-28 RX ADMIN — PANTOPRAZOLE SODIUM 40 MG: 40 TABLET, DELAYED RELEASE ORAL at 07:36

## 2025-05-28 RX ADMIN — ACETAMINOPHEN 975 MG: 325 TABLET, FILM COATED ORAL at 09:52

## 2025-05-28 RX ADMIN — SODIUM CHLORIDE 6000 ML: 900 IRRIGANT IRRIGATION at 04:04

## 2025-05-28 ASSESSMENT — ACTIVITIES OF DAILY LIVING (ADL)
ADLS_ACUITY_SCORE: 56
ADLS_ACUITY_SCORE: 57
ADLS_ACUITY_SCORE: 56
ADLS_ACUITY_SCORE: 57
ADLS_ACUITY_SCORE: 57
ADLS_ACUITY_SCORE: 56
ADLS_ACUITY_SCORE: 56
ADLS_ACUITY_SCORE: 57
ADLS_ACUITY_SCORE: 56
ADLS_ACUITY_SCORE: 57
ADLS_ACUITY_SCORE: 56
ADLS_ACUITY_SCORE: 57
ADLS_ACUITY_SCORE: 56
ADLS_ACUITY_SCORE: 57
ADLS_ACUITY_SCORE: 57
ADLS_ACUITY_SCORE: 56

## 2025-05-28 NOTE — PROGRESS NOTES
[facility] Urology Progress Note          Assessment and Plan:     Active Problems:    Gross hematuria    Myers catheter problem, initial encounter    Assessment: POD 1 cysto, clot evac, fulguration of bleeders, 1g TXA after fulguration     Plan:   -CBI clamped with Grade I-II  -Will update Dr. Jauregui and likely plan for TOV.     Seen with Dr. Graham.    JOCELYN Roth [facility]  Office: [phone]  After business hours: [phone]               Interval History:     doing well. Myers clamped since 730am.              Medications:     Current Facility-Administered Medications   Medication Dose Route Frequency Provider Last Rate Last Admin    acetaminophen (TYLENOL) tablet 975 mg  975 mg Oral Q6H Maninder Gamino MD   975 mg at 05/28/25 0952    Or    acetaminophen (TYLENOL) Suppository 650 mg  650 mg Rectal Q6H Maninder Gamino MD        calcium carbonate (TUMS) chewable tablet 1,000 mg  1,000 mg Oral TID PRN Kitty Glaser PA-C        glucose gel 15-30 g  15-30 g Oral Q15 Min PRN Yared Delatorre MD        Or    dextrose 50 % injection 25-50 mL  25-50 mL Intravenous Q15 Min PRN Yared Delatorre MD        Or    glucagon injection 1 mg  1 mg Subcutaneous Q15 Min PRN Yared Delatorre MD        finasteride (PROSCAR) tablet 5 mg  5 mg Oral Daily Fern Joshi, NP   5 mg at 05/26/25 1447    gabapentin (NEURONTIN) capsule 100 mg  100 mg Oral At Bedtime Yared Delatorre MD   100 mg at 05/27/25 2111    HYDROmorphone (DILAUDID) injection 0.2 mg  0.2 mg Intravenous Q2H PRN Yared Delatorre MD        HYDROmorphone (DILAUDID) injection 0.4 mg  0.4 mg Intravenous Q2H PRN Yared Delatorre MD   0.4 mg at 05/27/25 1938    insulin aspart (NovoLOG) injection (RAPID ACTING)  1-10 Units Subcutaneous TID AC Kitty Glaser PA-C   1 Units at 05/28/25 0951    insulin aspart (NovoLOG) injection (RAPID ACTING)  1-7 Units Subcutaneous  At Bedtime Kitty Glaser PA-C   7 Units at 05/27/25 2122    insulin glargine (LANTUS PEN) injection 10 Units  10 Units Subcutaneous BID Kitty Glaser PA-C   10 Units at 05/28/25 0951    ketorolac (TORADOL) injection 15 mg  15 mg Intravenous Q6H PRN Maninder Gamino MD   15 mg at 05/27/25 1506    lidocaine (XYLOCAINE) 2 % external gel   Topical Q4H PRN Fern Joshi NP   Given at 05/27/25 1100    lisinopril (ZESTRIL) tablet 5 mg  5 mg Oral QAM Fern Joshi NP        naloxone (NARCAN) injection 0.2 mg  0.2 mg Intravenous Q2 Min PRN Yared Delatorre MD        Or    naloxone (NARCAN) injection 0.4 mg  0.4 mg Intravenous Q2 Min PRN Yared Delatorre MD        Or    naloxone (NARCAN) injection 0.2 mg  0.2 mg Intramuscular Q2 Min PRN Yared Delatorre MD        Or    naloxone (NARCAN) injection 0.4 mg  0.4 mg Intramuscular Q2 Min PRN Yared Delatorre MD        ondansetron (ZOFRAN ODT) ODT tab 4 mg  4 mg Oral Q6H PRN Yared Delatorre MD        Or    ondansetron (ZOFRAN) injection 4 mg  4 mg Intravenous Q6H PRN Yared Delatorre MD        opium-belladonna (B&O SUPPRETTES) 30-16.2 MG per suppository 1 suppository  30 mg Rectal Q8H PRN Maninder Gamino MD   1 suppository at 05/27/25 0733    oxyBUTYnin ER (DITROPAN XL) 24 hr tablet 5 mg  5 mg Oral At Bedtime Maninder Gamino MD   5 mg at 05/27/25 2113    oxyCODONE (ROXICODONE) tablet 5-10 mg  5-10 mg Oral Q4H PRN Maninder Gamino MD   10 mg at 05/28/25 0736    pantoprazole (PROTONIX) EC tablet 40 mg  40 mg Oral BID AC Yared Delatorre MD   40 mg at 05/28/25 0736    prochlorperazine (COMPAZINE) injection 5 mg  5 mg Intravenous Q6H PRN Yared Delatorre MD        Or    prochlorperazine (COMPAZINE) tablet 5 mg  5 mg Oral Q6H PRN Yared Delatorre MD        senna-docusate (SENOKOT-S/PERICOLACE) 8.6-50 MG per tablet 1 tablet  1 tablet Oral BID PRN Yared Delatorre MD        Or     senna-docusate (SENOKOT-S/PERICOLACE) 8.6-50 MG per tablet 2 tablet  2 tablet Oral BID PRN Yared Delatorre MD        sodium chloride 0.9% irrigation (bag)   Irrigation Continuous Yared Delatorre MD   6,000 mL at 05/28/25 0404                  Physical Exam:   Vitals were reviewed  Patient Vitals for the past 8 hrs:   BP Temp Temp src Pulse Resp SpO2   05/28/25 0722 133/68 97.4  F (36.3  C) Oral 64 16 97 %     GEN: NAD, lying in bed  EYES: EOMI  : Grade I-II in prox tubing           Data:     Lab Results   Component Value Date    NTBNPI 26 02/11/2009     Lab Results   Component Value Date    WBC 13.9 (H) 05/28/2025    WBC 12.6 (H) 05/25/2025    WBC 14.0 (H) 05/21/2025    HGB 14.7 05/28/2025    HGB 15.7 05/26/2025    HGB 16.2 05/25/2025    HCT 41.5 05/28/2025    HCT 46.3 05/25/2025    HCT 45.1 05/21/2025    MCV 88 05/28/2025    MCV 87 05/26/2025    MCV 89 05/25/2025     05/28/2025     05/25/2025     05/21/2025     Lab Results   Component Value Date    INR 1.08 07/31/2016    INR 1.01 12/22/2014    INR 1.12 12/14/2013

## 2025-05-28 NOTE — PLAN OF CARE
Goal Outcome Evaluation:    Date & Time : 5/28/ 2025  Summary : Hematuria   Behavior & Aggression: green   Fall Risk: Yes   Orientation:A&Ox4  ABNL VS/O2:VSS on RA  Pain Management: PRN Oxy given   Bowel/Bladder: yanes with CBI   Drains: CBI slow   Diet: regular diet   Number of times OUT OF BED this shift   Anticipated  DC Date: Pending   Significant Information: urology following

## 2025-05-28 NOTE — PROGRESS NOTES
[facility]  Hospitalist Progress Note    Assessment & Plan   Austyn Araujo is a 69 year old male with PMH significant for BPH, LUTS, well controlled IDDM2, HLD, neuropathy, GERD, low testosterone on supplementation, and history of ETOH use disorder in remission who was admitted on 5/25/2025 with hematuria.     Hematuria S/p Cystoscopy with Clot Evacuation and Bladder Fulguration with 1g TXA after Fulguration 5/27/25  Bacteriuria    Aquablation of prostate 5/20/25  Hx BPH/ LUTS  Underwent cysto w/ aquablation of prostate by Dr. Rao on  5/20. Failed TOV prior to discharge and yanes replaced. Had low grade hematuria. Since discharge had ongoing blood clots in urine. Presented with worsening lower abd pain and Yanes stopped draining urine. In ED Hgb 16.2. WBC 12.6. UA w/ >182 RBC, 24 WBC, few bacteria. Started on IV fluids which has since been discontinued.     PTA regimen: finasteride 5mg/d    - UA with bacteriuria  - Urine Cx: No growth   - Blood cultures x2 NGTD    - Continue PTA finasteride 5mg/d   - Monitor off abx for now   - HOLD PTA ASA 81mg/d   - prn analgesics  - Has been using B/O suppository ordered, helpful to have 30 min prior to irrigations    - [facility] consulted and following    - CBI clamped with Grade I-II colored urine    - Will update Dr. Jauregui and likely plan for TOV     Insulin dependent type 2 diabetes  PTA Regimen: lisinopril 10 mg every other daily (for renal protection), lantus 15 units BID, novolog 15 units w/ meals, januvia 100 mg daily, metformin 1,000mg BID  Last HbA1c 6.5% 5/20/25  - Hold PTA oral antiglycemics, resume on discharge,   *Given Decadron intraoperatively, expect steroid induced hyperglycemia  - Hypoglycemia protocol  - Preprandial and HS fingerstick checks or Q 4 hours while NPO  - HDSSI  - Long acting regimen: Resume Lantus 10 units evening 05/27/25 post cystoscopy, likely resume to PTA dosing 15 units BID   - Start aspart 7U with  "meals   - Consistent CHO diet - 75gm per meal  - Continue Lisinopril 5mg daily      Chronic stable diagnoses and other pertinent medical history: Appropriate PTA medications will be resumed.   HLD: HOLD PTA statin, resume on discharge  Hx Etoh use disorder: Sober x many years, commended  Neuropathy: Continue PTA gabapentin 100 mg at HS  GERD: Continue PTA omeprazole  Low testosterone: testosterone injections q Friday    Clinically Significant Risk Factors                   # Hypertension: Noted on problem list           # DMII: A1C = 6.5 % (Ref range: <5.7 %) within past 6 months, PRESENT ON ADMISSION  # Obesity: Estimated body mass index is 31.42 kg/m  as calculated from the following:    Height as of 5/20/25: 1.83 m (6' 0.05\").    Weight as of 5/20/25: 105.2 kg (232 lb)., PRESENT ON ADMISSION              Diet: Regular Diet Adult     DVT Prophylaxis: Ambulate every shift   Yanes Catheter: PRESENT, indication: Surgical procedure, Gross hematuria, Gross hematuria  Lines: None     Cardiac Monitoring: None  Code Status: Full Code      Disposition Plan      Expected Discharge Date: 05/29/2025              Entered: Leanne Lopez MD 05/28/2025, 11:46 AM     Notes Reviewed: Spoke to Urology in the room    Family Updated: Spoke to family at bedside on 5/28/25    Care Team Updated: Nursing updated     Disposition: Tentatively on 5/29/25 pending removal of yanes and TOV along with Urology recommendations      Medically Ready for Discharge: Anticipated Tomorrow        Leanne Lopez MD  Hospitalist Service   [facility]  Securely message with the Vocera Web Console (learn more here)         Medical Decision Making       45 MINUTES SPENT BY ME on the date of service doing chart review, history, exam, documentation & further activities per the note.           Interval History     No acute overnight events    This morning the patient states that he is doing okay. Asking questions about his " insulin dosing while he is admitted. Rounded with Urology this morning.    -Data reviewed today: I reviewed all new labs and imaging results over the last 24 hours.    Physical Exam   Temp: 97.4  F (36.3  C) Temp src: Oral BP: 133/68 Pulse: 64   Resp: 16 SpO2: 97 % O2 Device: None (Room air) Oxygen Delivery: 2 LPM  There were no vitals filed for this visit.  Vital Signs with Ranges  Temp:  [96.3  F (35.7  C)-98  F (36.7  C)] 97.4  F (36.3  C)  Pulse:  [55-75] 64  Resp:  [14-16] 16  BP: (105-141)/(49-76) 133/68  SpO2:  [95 %-98 %] 97 %  I/O last 3 completed shifts:  In: 1740 [P.O.:1140; I.V.:600]  Out: 3960 [Urine:3950; Blood:10]      Constitutional: Awake, alert, cooperative, no apparent distress.   HEENT: PERRL, Normocephalic, without obvious abnormality, atraumatic, oral pharynx with moist mucus membranes  Pulmonary: No increased work of breathing, good air exchange, clear to auscultation bilaterally, no crackles or wheezing.  Cardiovascular: Regular rate and rhythm, normal S1 and S2  GI: Normal bowel sounds, soft, non-distended, non-tender.  Skin/Integumen: Visualized skin appeared clear.  Neuro: CN II-XII grossly intact.  Psych:  Alert and oriented x 3. Normal affect.  Extremities: No lower extremity edema noted  : light pink urine in yanes tubing       Medications   Current Facility-Administered Medications   Medication Dose Route Frequency Provider Last Rate Last Admin    sodium chloride 0.9% irrigation (bag)   Irrigation Continuous Yared Delatorer MD   6,000 mL at 05/28/25 0404     Current Facility-Administered Medications   Medication Dose Route Frequency Provider Last Rate Last Admin    acetaminophen (TYLENOL) tablet 975 mg  975 mg Oral Q6H Maninder Gamino MD   975 mg at 05/28/25 0952    Or    acetaminophen (TYLENOL) Suppository 650 mg  650 mg Rectal Q6H Maninder Gamino MD        finasteride (PROSCAR) tablet 5 mg  5 mg Oral Daily Fern Joshi NP   5 mg at 05/26/25 1447    gabapentin  (NEURONTIN) capsule 100 mg  100 mg Oral At Bedtime Yared Delatorre MD   100 mg at 05/27/25 2111    insulin aspart (NovoLOG) injection (RAPID ACTING)  7 Units Subcutaneous TID w/meals Leanne Lopez MD        insulin aspart (NovoLOG) injection (RAPID ACTING)  1-10 Units Subcutaneous TID AC Kitty Glaser PA-C   4 Units at 05/27/25 1548    insulin aspart (NovoLOG) injection (RAPID ACTING)  1-7 Units Subcutaneous At Bedtime Kitty Glaser PA-C   7 Units at 05/27/25 2122    insulin glargine (LANTUS PEN) injection 10 Units  10 Units Subcutaneous BID Kitty Glaser PA-C   10 Units at 05/28/25 0951    lisinopril (ZESTRIL) tablet 5 mg  5 mg Oral QAM Fern Joshi, NP        oxyBUTYnin ER (DITROPAN XL) 24 hr tablet 5 mg  5 mg Oral At Bedtime Maninder Gamino MD   5 mg at 05/27/25 2113    pantoprazole (PROTONIX) EC tablet 40 mg  40 mg Oral BID AC Yared Delatorre MD   40 mg at 05/28/25 0736       Data   Recent Labs   Lab 05/28/25  0807 05/28/25  0652 05/28/25  0220 05/26/25  0943 05/26/25  0706 05/26/25  0530 05/25/25  2232   WBC  --  13.9*  --   --   --   --  12.6*   HGB  --  14.7  --   --  15.7  --  16.2   MCV  --  88  --   --  87  --  89   PLT  --  320  --   --   --   --  300   NA  --  136  --   --   --   --  141   POTASSIUM  --  4.2  --   --   --   --  4.0   CHLORIDE  --  101  --   --   --   --  102   CO2  --  25  --   --   --   --  28   BUN  --  18.3  --   --   --   --  14.0   CR  --  0.94  --   --   --   --  0.93   ANIONGAP  --  10  --   --   --   --  11   MANINDER  --  9.1  --   --   --   --  9.8   * 174* 254*   < >  --    < > 90    < > = values in this interval not displayed.       No results found for this or any previous visit (from the past 24 hours).

## 2025-05-29 VITALS
DIASTOLIC BLOOD PRESSURE: 87 MMHG | HEART RATE: 61 BPM | SYSTOLIC BLOOD PRESSURE: 153 MMHG | TEMPERATURE: 97.5 F | RESPIRATION RATE: 16 BRPM | OXYGEN SATURATION: 97 %

## 2025-05-29 LAB
BACTERIA SPEC CULT: NORMAL
BACTERIA SPEC CULT: NORMAL
BACTERIA UR CULT: NO GROWTH
ERYTHROCYTE [DISTWIDTH] IN BLOOD BY AUTOMATED COUNT: 12.5 % (ref 10–15)
GLUCOSE BLDC GLUCOMTR-MCNC: 144 MG/DL (ref 70–99)
GLUCOSE BLDC GLUCOMTR-MCNC: 173 MG/DL (ref 70–99)
GLUCOSE BLDC GLUCOMTR-MCNC: 263 MG/DL (ref 70–99)
HCT VFR BLD AUTO: 42.4 % (ref 40–53)
HGB BLD-MCNC: 14.7 G/DL (ref 13.3–17.7)
MCH RBC QN AUTO: 30.9 PG (ref 26.5–33)
MCHC RBC AUTO-ENTMCNC: 34.7 G/DL (ref 31.5–36.5)
MCV RBC AUTO: 89 FL (ref 78–100)
PLATELET # BLD AUTO: 334 10E3/UL (ref 150–450)
RBC # BLD AUTO: 4.76 10E6/UL (ref 4.4–5.9)
WBC # BLD AUTO: 13.7 10E3/UL (ref 4–11)

## 2025-05-29 PROCEDURE — 36415 COLL VENOUS BLD VENIPUNCTURE: CPT | Performed by: STUDENT IN AN ORGANIZED HEALTH CARE EDUCATION/TRAINING PROGRAM

## 2025-05-29 PROCEDURE — 99239 HOSP IP/OBS DSCHRG MGMT >30: CPT | Performed by: STUDENT IN AN ORGANIZED HEALTH CARE EDUCATION/TRAINING PROGRAM

## 2025-05-29 PROCEDURE — 250N000013 HC RX MED GY IP 250 OP 250 PS 637: Performed by: UROLOGY

## 2025-05-29 PROCEDURE — 85027 COMPLETE CBC AUTOMATED: CPT | Performed by: STUDENT IN AN ORGANIZED HEALTH CARE EDUCATION/TRAINING PROGRAM

## 2025-05-29 PROCEDURE — 250N000013 HC RX MED GY IP 250 OP 250 PS 637: Performed by: INTERNAL MEDICINE

## 2025-05-29 PROCEDURE — 250N000013 HC RX MED GY IP 250 OP 250 PS 637

## 2025-05-29 RX ORDER — METFORMIN HYDROCHLORIDE 500 MG/1
1000 TABLET, EXTENDED RELEASE ORAL 2 TIMES DAILY WITH MEALS
COMMUNITY
Start: 2025-05-29

## 2025-05-29 RX ORDER — GABAPENTIN 100 MG/1
100 CAPSULE ORAL
COMMUNITY
Start: 2025-05-29

## 2025-05-29 RX ORDER — OMEPRAZOLE 40 MG/1
CAPSULE, DELAYED RELEASE ORAL
Qty: 90 CAPSULE | Refills: 2 | Status: SHIPPED | OUTPATIENT
Start: 2025-05-29

## 2025-05-29 RX ORDER — PSYLLIUM SEED (WITH DEXTROSE)
2 POWDER (GRAM) ORAL DAILY
Qty: 48 WAFER | Refills: 1 | Status: SHIPPED | OUTPATIENT
Start: 2025-05-29

## 2025-05-29 RX ORDER — OXYBUTYNIN CHLORIDE 5 MG/1
5 TABLET ORAL 3 TIMES DAILY PRN
Qty: 15 TABLET | Refills: 0 | Status: SHIPPED | OUTPATIENT
Start: 2025-05-29

## 2025-05-29 RX ORDER — OFLOXACIN 3 MG/ML
5 SOLUTION AURICULAR (OTIC) DAILY
Qty: 5 ML | Refills: 0 | Status: SHIPPED | OUTPATIENT
Start: 2025-05-29 | End: 2025-06-08

## 2025-05-29 RX ORDER — OXYCODONE HYDROCHLORIDE 5 MG/1
5 TABLET ORAL EVERY 6 HOURS PRN
Qty: 12 TABLET | Refills: 0 | Status: SHIPPED | OUTPATIENT
Start: 2025-05-29 | End: 2025-06-01

## 2025-05-29 RX ORDER — LISINOPRIL 5 MG/1
5 TABLET ORAL EVERY MORNING
Qty: 30 TABLET | Refills: 0 | Status: SHIPPED | OUTPATIENT
Start: 2025-05-30

## 2025-05-29 RX ADMIN — PANTOPRAZOLE SODIUM 40 MG: 40 TABLET, DELAYED RELEASE ORAL at 08:30

## 2025-05-29 RX ADMIN — LISINOPRIL 5 MG: 5 TABLET ORAL at 08:30

## 2025-05-29 RX ADMIN — OXYCODONE HYDROCHLORIDE 10 MG: 5 TABLET ORAL at 08:38

## 2025-05-29 RX ADMIN — OXYCODONE HYDROCHLORIDE 10 MG: 5 TABLET ORAL at 03:30

## 2025-05-29 RX ADMIN — ACETAMINOPHEN 975 MG: 325 TABLET, FILM COATED ORAL at 05:03

## 2025-05-29 RX ADMIN — FINASTERIDE 5 MG: 5 TABLET, FILM COATED ORAL at 08:30

## 2025-05-29 RX ADMIN — ACETAMINOPHEN 975 MG: 325 TABLET, FILM COATED ORAL at 14:00

## 2025-05-29 ASSESSMENT — ACTIVITIES OF DAILY LIVING (ADL)
ADLS_ACUITY_SCORE: 58
ADLS_ACUITY_SCORE: 58
ADLS_ACUITY_SCORE: 56
ADLS_ACUITY_SCORE: 56
ADLS_ACUITY_SCORE: 58

## 2025-05-29 NOTE — DISCHARGE SUMMARY
[facility]  Hospitalist Discharge Summary     Admit Date:  5/25/2025  Discharge Date:     5/29/2025  Discharging Provider: Leanne Lopez MD    PRIMARY CARE PROVIDER:    Majo Paul     DISCHARGE DIAGNOSES:     Hematuria S/p Cystoscopy with Clot Evacuation and Bladder Fulguration with 1g TXA after Fulguration 5/27/25  Likely UTI   Aquablation of prostate 5/20/25  Hx BPH/ LUTS  Likely Otitis Externa   Possible Acute Otitis Media  Insulin dependent type 2 diabetes   HLD   Hx Etoh use disorder   Neuropathy   GERD   Low testosterone     BRIEF HISTORY OF PRESENT ILLNESS/HOSPITAL COURSE:   Austyn Araujo is a 69 year old male with PMH significant for BPH, LUTS, well controlled IDDM2, HLD, neuropathy, GERD, low testosterone on supplementation, and history of ETOH use disorder in remission who was admitted on 5/25/2025 with hematuria.     Hematuria S/p Cystoscopy with Clot Evacuation and Bladder Fulguration with 1g TXA after Fulguration 5/27/25  Likely UTI   Aquablation of prostate 5/20/25  Hx BPH/ LUTS  Underwent cysto w/ aquablation of prostate by Dr. Rao on  5/20. Failed TOV prior to discharge and yanes replaced. Had low grade hematuria. Since discharge had ongoing blood clots in urine. Presented with worsening lower abd pain and Yanes stopped draining urine. In ED Hgb 16.2. WBC 12.6. UA w/ >182 RBC, 24 WBC, few bacteria. Started on IV fluids which has since been discontinued.  Urology took patient to the OR for Cystoscopy with clot evacuation and bladder/prostate fulguration. Hematuria resolved. Yanes was taken out on /28/25. After yanes was taken out patient started having urinary frequency and urgency along plus drainage notice form meatus. Repeat UA looked positive for a UTI. He was started on IV Ceftriaxone. Urology recommended Augmentin at discharge.      PTA regimen: finasteride 5mg/d     - 5/25/25 UA with bacteriuria  - 5/25/25 Urine Cx: No growth   - Blood cultures x2  NGTD    - 5/28/25 UA: Large blood, large LE, 64 WBC   - 5/28/25 Urine Cx: Pending      - Discharge Medications:    - Finasteride 5mg    - Resume Asa 81mg daily    - PRN Oxybutynin 5mg      - [facility] Recommendations              - OK for discharge from Urologic standpoint. Discharge instructions updated.  - Recommend course of Augmentin for treatment of suspected acute otitis media, added to discharge meds. This should provide adequate coverage for UTI as well.      - Bacitracin to urethral meatus BID PRN for discomfort    Likely Otitis Externa   Possible Acute Otitis Media  Patient was complaining of righ ear pain. On exam the the external part of his right ear canal is swollen, erythematous and tender. TM is pearly grey, some cerumen is seen. Left ear is normal.   - Ofloxacin drops to cover for otitis externa   - Augmentin started by Urology    Insulin dependent type 2 diabetes  PTA Regimen: lisinopril 10 mg every other daily (for renal protection), lantus 15 units BID, novolog 15 units w/ meals, januvia 100 mg daily, metformin 1,000mg BID  - Last HbA1c 6.5% 5/20/25    - Continue home  lantus 15 units BID, novolog 15 units w/ meals, januvia 100 mg daily, metformin 1,000mg BID  - Decreased Lisinopril to 5mg daily as patient was having some dizziness with his home 10mg but has been doing well with 5mg.      Chronic stable diagnoses and other pertinent medical history: Appropriate PTA medications will be resumed.   HLD: HOLD PTA statin, resume on discharge  Hx Etoh use disorder: Sober x many years, commended  Neuropathy: Continue PTA gabapentin 100 mg at HS  GERD: Continue PTA omeprazole  Low testosterone: testosterone injections q Friday       Clinically Significant Risk Factors                   # Hypertension: Noted on problem list           # DMII: A1C = 6.5 % (Ref range: <5.7 %) within past 6 months, PRESENT ON ADMISSION  # Obesity: Estimated body mass index is 31.42 kg/m  as calculated from the  "following:    Height as of 5/20/25: 1.83 m (6' 0.05\").    Weight as of 5/20/25: 105.2 kg (232 lb)., PRESENT ON ADMISSION                 TOTAL DISCHARGE TIME:  Leanne VEGA MD, personally saw the patient today and spent greater than 30 minutes discharging this patient.    Leanne Lopez MD  [facility]  Hospitalist Service   Securely message with the Vocera Web Console (learn more here)  Text page via [employer] Paging/Directory        Significant Results and Procedures   As above    Pending Results   These results will be followed up by Hospitalist/Urology  Unresulted Labs Ordered in the Past 30 Days of this Admission       Date and Time Order Name Status Description    5/28/2025  8:58 PM Urine Culture In process     5/26/2025 10:59 AM Blood Culture Peripheral blood (BC) Hand, Right Preliminary     5/26/2025 10:59 AM Blood Culture Peripheral blood (BC) Arm, Right Preliminary             Code Status   Full Code       Primary Care Physician   Majo Paul    Physical Exam   Temp: 97.5  F (36.4  C) Temp src: Oral BP: (!) 153/87 Pulse: 61   Resp: 16 SpO2: 97 % O2 Device: None (Room air)    There were no vitals filed for this visit.  Vital Signs with Ranges  Temp:  [97.5  F (36.4  C)-98  F (36.7  C)] 97.5  F (36.4  C)  Pulse:  [58-69] 61  Resp:  [16] 16  BP: (121-153)/(67-87) 153/87  SpO2:  [97 %-98 %] 97 %  I/O last 3 completed shifts:  In: 240 [P.O.:240]  Out: 2565 [Urine:2565]    Constitutional: Awake, alert, cooperative, no apparent distress.   HEENT: PERRL, Normocephalic, without obvious abnormality, atraumatic, oral pharynx with moist mucus membranes,  right ear canal is swollen, erythematous and tender. TM is pearly grey, some cerumen is seen. Left ear is normal.   Pulmonary: No increased work of breathing, good air exchange, clear to auscultation bilaterally, no crackles or wheezing.  Cardiovascular: Regular rate and rhythm, normal S1 and S2  GI: Normal bowel sounds, soft, " non-distended, non-tender.  Skin/Integumen: Visualized skin appeared clear.  Neuro: CN II-XII grossly intact.  Psych:  Alert and oriented x 3. Normal affect.  Extremities: No lower extremity edema noted    Discharge Disposition   Discharged to home  Condition at discharge: Stable    Consultations This Hospital Stay   UROLOGY IP CONSULT      Discharge Orders      Reason for your hospital stay    You were admitted to the hospital for hematuria. Urology took you to the OR for Cystoscopy with Clot Evacuation and Bladder Fulguration with 1g TXA after Fulguration on 5/27/25. Catheter was taken out on 5/28/25.     Activity    Your activity upon discharge: activity as tolerated     Diet    Follow this diet upon discharge: Regular Diet Adult     Hospital Follow-up with Existing Primary Care Provider (PCP)          Discharge Medications   Current Discharge Medication List        START taking these medications    Details   amoxicillin-clavulanate (AUGMENTIN) 875-125 MG tablet Take 1 tablet by mouth 2 times daily for 10 days.  Qty: 20 tablet, Refills: 0    Associated Diagnoses: Otitis, right      ofloxacin (FLOXIN) 0.3 % otic solution Place 5 drops into the right ear daily for 10 days.  Qty: 5 mL, Refills: 0    Associated Diagnoses: Otitis, right      oxyBUTYnin (DITROPAN) 5 MG tablet Take 1 tablet (5 mg) by mouth 3 times daily as needed for bladder spasms.  Qty: 15 tablet, Refills: 0    Associated Diagnoses: Gross hematuria           CONTINUE these medications which have CHANGED    Details   gabapentin (NEURONTIN) 100 MG capsule Take 1 capsule (100 mg) by mouth nightly as needed.    Associated Diagnoses: Type 2 diabetes mellitus with diabetic neuropathy, with long-term current use of insulin (H)      lisinopril (ZESTRIL) 5 MG tablet Take 1 tablet (5 mg) by mouth every morning.  Qty: 30 tablet, Refills: 0    Associated Diagnoses: Hypertension goal BP (blood pressure) < 140/90      metFORMIN (GLUCOPHAGE XR) 500 MG 24 hr tablet  Take 2 tablets (1,000 mg) by mouth 2 times daily (with meals).    Associated Diagnoses: Type 2 diabetes mellitus with diabetic neuropathy, with long-term current use of insulin (H)      omeprazole (PRILOSEC) 40 MG DR capsule TAKE 1 CAPSULE BY MOUTH EVERY DAY  Qty: 90 capsule, Refills: 2    Associated Diagnoses: Gastroesophageal reflux disease without esophagitis      oxyCODONE (ROXICODONE) 5 MG tablet Take 1 tablet (5 mg) by mouth every 6 hours as needed for pain.  Qty: 12 tablet, Refills: 0    Associated Diagnoses: Gross hematuria           CONTINUE these medications which have NOT CHANGED    Details   acetaminophen (TYLENOL) 325 MG tablet Take 2 tablets (650 mg) by mouth every 4 hours as needed for other (mild pain).  Qty: 100 tablet, Refills: 0    Associated Diagnoses: Enlarged prostate      aspirin 81 MG tablet Take 1 tablet by mouth every morning      atorvastatin (LIPITOR) 40 MG tablet TAKE 1 TABLET BY MOUTH EVERY DAY  Qty: 90 tablet, Refills: 2    Associated Diagnoses: Hyperlipidemia LDL goal <100      bacitracin 500 UNIT/GM external ointment Apply topically 2 times daily. Apply to yanes insertion site twice daily as needed for insertion site discomfort  Qty: 15 g, Refills: 0    Associated Diagnoses: Postoperative state      CALCIUM CARBONATE ANTACID PO Take 4-5 chew tab by mouth 2 times daily as needed for heartburn.      docusate sodium (COLACE) 100 MG capsule Take 1 capsule (100 mg) by mouth 2 times daily as needed for constipation.  Qty: 20 capsule, Refills: 0    Associated Diagnoses: Enlarged prostate      finasteride (PROSCAR) 5 MG tablet Take 1 tablet (5 mg) by mouth daily.  Qty: 90 tablet, Refills: 3    Associated Diagnoses: Enlarged prostate      hydrOXYzine HCl (ATARAX) 10 MG tablet Take 1 tablet (10 mg) by mouth every 6 hours as needed for itching or anxiety (with pain, moderate pain).  Qty: 30 tablet, Refills: 0    Associated Diagnoses: Enlarged prostate      insulin aspart (NOVOLOG FLEXPEN) 100  UNIT/ML pen Inject 15 Units subcutaneously 3 times daily (with meals).  Qty: 15 mL, Refills: 3      insulin glargine (LANTUS SOLOSTAR) 100 UNIT/ML pen INJECT 15 UNITS SUBCUTANEOUSLY 2 TIMES DAILY.  Qty: 30 mL, Refills: 0    Comments: If Lantus is not covered by insurance, may substitute Basaglar or Semglee or other insulin glargine product per insurance preference at same dose and frequency.    Associated Diagnoses: Type 2 diabetes mellitus without complications (H)      JANUVIA 100 MG tablet TAKE 1 TABLET BY MOUTH EVERY DAY  Qty: 90 tablet, Refills: 0    Associated Diagnoses: Type 2 diabetes mellitus with diabetic autonomic (poly)neuropathy (H)      lidocaine (LMX4) 4 % external cream Apply topically once as needed for mild pain (Catheter insertion site discomfort).  Qty: 30 g, Refills: 0    Associated Diagnoses: Postoperative state      Multiple Vitamins-Minerals (CENTRUM SILVER) per tablet Take 1 tablet by mouth daily  Qty: 90 tablet, Refills: 3    Associated Diagnoses: Type 2 diabetes mellitus with diabetic neuropathy (H); Essential hypertension with goal blood pressure less than 140/90      ondansetron (ZOFRAN ODT) 4 MG ODT tab Take 1-2 tablets (4-8 mg) by mouth every 8 hours as needed for nausea. Dissolve ON the tongue.  Qty: 10 tablet, Refills: 0    Associated Diagnoses: Enlarged prostate      polyethylene glycol (MIRALAX) 17 GM/Dose powder Take 17 g (1 Capful) by mouth daily.  Qty: 510 g, Refills: 0    Associated Diagnoses: Postoperative state      tadalafil (ADCIRCA/CIALIS) 20 MG tablet Take 1 tablet (20 mg) by mouth every 24 hours.  Qty: 100 tablet, Refills: 2    Associated Diagnoses: Erectile dysfunction due to diseases classified elsewhere      testosterone cypionate (DEPOTESTOSTERONE) 200 MG/ML injection INJECT 2ML INTO THE MUSCLE ONCE A WEEK ON FRIDAYS  Qty: 10 mL, Refills: 2    Associated Diagnoses: Low testosterone      VITRON-C  MG TABS tablet TAKE 1 TABLET BY MOUTH EVERY DAY  Qty: 90 tablet,  "Refills: 1    Associated Diagnoses: Anemia, unspecified type      blood glucose (ONETOUCH ULTRA) test strip USE TO TEST BLOOD SUGARS ONE TIME DAILY OR AS DIRECTED.  Qty: 100 strip, Refills: 0    Comments: Patient is over-due for an appointment.  Associated Diagnoses: Type 2 diabetes mellitus with diabetic neuropathy (H)      !! Continuous Blood Gluc Sensor (DEXCOM G7 SENSOR) MISC 1 Device every 14 days  Qty: 1 each, Refills: 11    Associated Diagnoses: Type 2 diabetes mellitus with peripheral autonomic neuropathy (H)      !! Continuous Glucose Sensor (DEXCOM G7 SENSOR) MISC CHANGE EVERY 10 DAYS  Qty: 9 each, Refills: 5    Associated Diagnoses: Type 2 diabetes mellitus with peripheral autonomic neuropathy (H)      insulin pen needle (BD RASHI U/F) 32G X 4 MM miscellaneous USE ONE PEN NEEDLE DAILY OR AS DIRECTED.  Qty: 100 each, Refills: 2    Associated Diagnoses: Type 2 diabetes mellitus with peripheral autonomic neuropathy (H)      insulin syringe-needle U-100 (29G X 1/2\" 0.5 ML) 29G X 1/2\" 0.5 ML miscellaneous Use 1 syringes daily or as directed for testosterone use  Qty: 100 each, Refills: 1    Associated Diagnoses: Low testosterone      needle, disp, (BD HYPODERMIC NEEDLE) 18G X 1\" MISC USE TO ADMINISTER TESTOSTERONE. DUE FOR APPOINTMENT WITH YOUR DOCTOR  Qty: 6 each, Refills: 3    Associated Diagnoses: Low testosterone      needle, disp, 21G X 1-1/2\" MISC 1 each once a week.  Qty: 12 each, Refills: 3    Associated Diagnoses: Low testosterone      PHARMACIST CHOICE LANCETS MISC TEST BLOOD SUGARS 5 TIMES DAILY  Qty: 100 each, Refills: 3    Associated Diagnoses: Type 2 diabetes mellitus with peripheral autonomic neuropathy (H)      syringe/needle, disp, (B-D LUER-ROCHELLE SYRINGE) 22G X 1-1/2\" 3 ML MISC Use weekly for testosterone injection .  Qty: 12 each, Refills: 3    Associated Diagnoses: Low testosterone      Syringe/Needle, Disp, 26G X 5/8\" 3 ML MISC 1 Application daily.  Qty: 90 each, Refills: 0    Associated " Diagnoses: Low testosterone       !! - Potential duplicate medications found. Please discuss with provider.        STOP taking these medications       cephALEXin (KEFLEX) 500 MG capsule Comments:   Reason for Stopping:             Allergies   No Known Allergies  Data   Most Recent 3 CBC's:  Recent Labs   Lab Test 05/29/25  0736 05/28/25  0652 05/26/25  0706 05/25/25  2232   WBC 13.7* 13.9*  --  12.6*   HGB 14.7 14.7 15.7 16.2   MCV 89 88 87 89    320  --  300      Most Recent 3 BMP's:  Recent Labs   Lab Test 05/29/25  1149 05/29/25  0810 05/29/25  0204 05/28/25  0807 05/28/25  0652 05/26/25  0530 05/25/25  2232 05/21/25  1059 05/21/25  0729   NA  --   --   --   --  136  --  141  --  138   POTASSIUM  --   --   --   --  4.2  --  4.0  --  3.9   CHLORIDE  --   --   --   --  101  --  102  --  101   CO2  --   --   --   --  25  --  28  --  27   BUN  --   --   --   --  18.3  --  14.0  --  10.8   CR  --   --   --   --  0.94  --  0.93  --  1.10   ANIONGAP  --   --   --   --  10  --  11  --  10   MANINDER  --   --   --   --  9.1  --  9.8  --  8.8   * 173* 144*   < > 174*   < > 90   < > 111*    < > = values in this interval not displayed.     Most Recent 2 LFT's:  Recent Labs   Lab Test 02/06/25  1216 11/03/23  1357   AST 26 20   ALT 35 19   ALKPHOS 84 71   BILITOTAL 0.5 0.3     Most Recent INR's and Anticoagulation Dosing History:  Anticoagulation Dose History          Latest Ref Rng & Units 12/14/2013 12/22/2014 7/31/2016   Recent Dosing and Labs   INR 0.86 - 1.14 1.12  1.01  1.08      Most Recent 3 Troponin's:  Recent Labs   Lab Test 02/03/21  2144   TROPI <0.015     Most Recent Cholesterol Panel:  Recent Labs   Lab Test 02/06/25  1216   CHOL 127   LDL 56   HDL 36*   TRIG 177*     Most Recent 6 Bacteria Isolates From Any Culture (See EPIC Reports for Culture Details):No lab results found.  Most Recent TSH, T4 and A1c Labs:  Recent Labs   Lab Test 05/20/25  1920 02/06/25  1216   TSH  --  0.98   A1C 6.5* 7.1*      Results for orders placed or performed during the hospital encounter of 05/25/25   US Bladder Only    Narrative    EXAM: US BLADDER ONLY  LOCATION: [facility]  DATE: 5/26/2025    INDICATION: Assess for any organized blood clot within the bladder.  COMPARISON: CT abdomen pelvis 5/9/2025.  TECHNIQUE: Routine.    FINDINGS:    Bladder is partially decompressed by a Myers catheter. Heterogeneous and ill-defined mass along the anterosuperior bladder wall measuring approximately 4.8 x 2.4 x 4.1 cm in maximal dimensions is likely a blood clot; scattered echogenic foci within the   mass are likely gas bubbles. An additional blood clot and/or intraluminal debris is present along the posterior bladder wall measuring up to 1.7 cm.      Impression    IMPRESSION:    1.  Heterogeneous bladder mass along the anterosuperior bladder wall measuring up to 4.8 cm is likely a blood clot. There are presumed foci of gas within the clot.    2.  Additional clot and/or intraluminal debris along the posterior bladder wall measuring up to 1.7 cm.

## 2025-05-29 NOTE — PROGRESS NOTES
DATE & TIME: 05/28/25 [id number]   COGNITION/BEHAVIOR: A&O  Vital signs:  /67   Pulse 69   Temp 97.8  F (36.6  C) (Oral)   Resp 16   SpO2 98%       MOBILITY: ind  PAIN: oxy x1 for urethral meatus  DIET: reg, tolerating, good intake  RESP: wdl  CV: wdl  GI/: yanes removed 1155 today. C/o frequency/urgency/burning. Output was clear now has sediment. No odor. Pus-like discharge from meatus not present when yanes was removed.  SKIN: bruising  LINES: PIV SL  AGGRESSION STOP LIGHT: green

## 2025-05-29 NOTE — PROGRESS NOTES
Urology Progress Note    Name: Austyn Araujo    MRN: [id number]   YOB: 1955  Date of Admission: 5/25/2025               Impression and Plan:   Impression / Plan:   Austyn Araujo is a 69 year old male who presented with persistent postoperative bleeding POD7 after aquablation on 5/20/25. He failed his initial trial of void and was discharged with a Myers catheter in place on 5/23/2025.  Now POD2 Cystoscopy with clot evacuation and bladder/prostate fulguration.     Myers removed yesterday, voiding spontaneously, frequently, emptying satisfactorily, pushing fluids.   C/o frequency/urgency/burning - Repeat UA shows persistent gross hematuria, ? leukocyte esterase, ? pyuria, and few bacteria. While findings may reflect post-procedural inflammation, evolving early UTI cannot be excluded given the patient's ongoing irritative symptoms, empirically started on Rocephin.     Complaining of R Ear pain x 4d, gradually worsening, complaining that nobody has looked in his ear.    Exam  BP (!) 153/87 (BP Location: Left arm)   Pulse 61   Temp 97.5  F (36.4  C) (Oral)   Resp 16   SpO2 97%   No acute distress  Unlabored breathing  Abdomen soft, nontender, nondistended.  Urethral meatus appears irritated. No purulence.   L ear without exudate, normal TM  R ear canal/outer ear with mild tenderness and erythema, no exudate, TM partially obstructed by cerumen, portion of TM visible is without obvious effusion.     Labs  WBC 13.7  Hgb 14.7  Cr 0.94      - OK for discharge from Urologic standpoint. Discharge instructions updated.   - Recommend course of Augmentin for treatment of suspected acute otitis media, added to discharge meds. This should provide adequate coverage for UTI as well. Ofloxacin drops to cover for otitis externa sent as well.  - Bacitracin to urethral meatus BID PRN for discomfort.     Discussed with Dr. Rao  Thank you for the opportunity to participate in the care of Austyn Araujo.      MARICEL Elder, CNP  [facility] - Department of Urology  Office: [phone]  After business hours: [phone]  Securely message with [employer]

## 2025-05-29 NOTE — SIGNIFICANT EVENT
"Significant Event Note    Time of event: 9:43 PM May 28, 2025    Description of event:  Message from patient's nurse: \"POST OP day 1 for cysto fulguration , folly out earlier today per urology, voiding frequently since with minimal PVR , Pt complains of urgency, plus drainage notice form meatus from perfuse shift. Pt Concerned about UTI \"    Repeat UA shows persistent gross hematuria, ? leukocyte esterase, ? pyuria, and few bacteria. While findings may reflect post-procedural inflammation, evolving early UTI cannot be excluded given the patient's ongoing irritative symptoms.    Plan:  Initiate empiric antibiotics with ceftriaxone pending urine culture results.      Discussed with: bedside nurse    Diamond Alexander MD    "

## 2025-05-29 NOTE — PLAN OF CARE
Goal Outcome Evaluation:      Date & time : 5/28/25  Fall Risk: Yes   Orientation:A&Ox4   ABNL VS/O2: VSS on RA   Pain Management: Scheduled Tylenol , PRN Oxy given x1  Bowel/Bladder:  voiding frequency via bathroom.   Diet: regular   Number of times OUT OF BED this shift x4 this shift   Anticipated  DC Date: TBD  Significant Information: UA collected , ceftriaxone given once this shift. New IV placed ,

## 2025-05-29 NOTE — PLAN OF CARE
Goal Outcome Evaluation:  Hematuria S/p Cystoscopy with Clot Evacuation and Bladder Fulguration with 1g TXA after Fulguration 5/27/25  Bacteriuria    Aquablation of prostate 5/20/25  Hx BPH/ LUTS     Orientation: A/O x4 calm/cooperative, denies short of breath, abdominal discomfort, nausea , vomiting , chills and headache. But pt reports moderate on penis and suprapubic side also pt reports  urine urgency with pain     Vitals: vss on R A     IV Access/drains: PIV SL     Diet: Regular diet with good appetite, Glucose checks     Mobility: independent in room     GI/: Continent  both B/B with pain     Wound/Skin: dry skin, pale old scabs     Consults Urology     Discharge Plan: discharge home with spouse       See Flow sheets for assessment

## 2025-05-31 LAB
BACTERIA SPEC CULT: NO GROWTH
BACTERIA SPEC CULT: NO GROWTH

## 2025-06-04 ENCOUNTER — ALLIED HEALTH/NURSE VISIT (OUTPATIENT)
Dept: UROLOGY | Facility: CLINIC | Age: 70
End: 2025-06-04
Payer: COMMERCIAL

## 2025-06-04 DIAGNOSIS — R33.9 URINARY RETENTION: Primary | ICD-10-CM

## 2025-06-04 PROCEDURE — 99207 PR NO CHARGE NURSE ONLY: CPT

## 2025-06-04 NOTE — PROGRESS NOTES
Austyn Araujo comes into clinic today for Urinary Retention  at the request of Dr. Rao, Ordering Provider for Trial of Void.      Patient's catheter was disconnected from leg bag, a sterile tip syringe was attached to catheter end, and 250 mL of sterile water was instilled via gravity into the bladder.  Removed Myers catheter after deflating balloon completely.  Patient voided 225 mL     Patient allowed to go home without catheter.    Patient was instructed to call our office during office hours if unable to urinate, or to go to the ER if not during office hours.    Pt reports he is still taking his course of antibiotics.    This service provided today was under the supervising provider of the day Dr. Rao, who was available if needed.    Kristine Wells

## 2025-06-13 NOTE — TELEPHONE ENCOUNTER
Called patient to ask if he wa spicking up his script or if we are faxing it somewhere. Terra it gets faxed to a pharmacy in [place].    This TC located a ph number is [phone] but no fax number.     RN, does this get called In?     Paper script placed on keyboard of MICAELA PARSONS     yes

## 2025-06-14 ENCOUNTER — TELEPHONE (OUTPATIENT)
Dept: SURGERY | Facility: CLINIC | Age: 70
End: 2025-06-14
Payer: COMMERCIAL

## 2025-06-14 DIAGNOSIS — N35.911 STRICTURE OF URETHRAL MEATUS IN MALE, UNSPECIFIED STRICTURE TYPE: ICD-10-CM

## 2025-06-14 DIAGNOSIS — R30.0 DYSURIA: Primary | ICD-10-CM

## 2025-06-14 RX ORDER — TRIAMCINOLONE ACETONIDE 1 MG/G
CREAM TOPICAL 2 TIMES DAILY
Qty: 30 G | Refills: 0 | Status: SHIPPED | OUTPATIENT
Start: 2025-06-14 | End: 2026-06-14

## 2025-06-14 RX ORDER — CEPHALEXIN 500 MG/1
500 CAPSULE ORAL 2 TIMES DAILY
Qty: 10 CAPSULE | Refills: 0 | Status: SHIPPED | OUTPATIENT
Start: 2025-06-14 | End: 2025-06-19

## 2025-06-14 NOTE — TELEPHONE ENCOUNTER
Austyn AKERS [facility]  [id number]  June 14, 2025  11:29 AM    I returned a page from the patient.  He notes difficulty passing the urethral dilator until this morning and he had been in retention since yesterday.  He reports some thicker white drainage from the meatus.  I reviewed his prior urine cultures which were negative.  I reviewed the last note from Dr. Rao with noting recurrent meatal stenosis.  Prescription for Keflex and triamcinolone cream sent to the pharmacy.    Will have our RN team follow-up with him on Monday.    Maninder Gamino M.D.

## 2025-06-18 ENCOUNTER — OFFICE VISIT (OUTPATIENT)
Dept: UROLOGY | Facility: CLINIC | Age: 70
End: 2025-06-18
Payer: COMMERCIAL

## 2025-06-18 VITALS — SYSTOLIC BLOOD PRESSURE: 112 MMHG | DIASTOLIC BLOOD PRESSURE: 66 MMHG

## 2025-06-18 DIAGNOSIS — N40.0 ENLARGED PROSTATE: Primary | ICD-10-CM

## 2025-06-18 LAB
ALBUMIN UR-MCNC: 100 MG/DL
APPEARANCE UR: ABNORMAL
BILIRUB UR QL STRIP: NEGATIVE
COLOR UR AUTO: YELLOW
GLUCOSE UR STRIP-MCNC: NEGATIVE MG/DL
HGB UR QL STRIP: ABNORMAL
KETONES UR STRIP-MCNC: NEGATIVE MG/DL
LEUKOCYTE ESTERASE UR QL STRIP: ABNORMAL
NITRATE UR QL: NEGATIVE
PH UR STRIP: 7 [PH] (ref 5–7)
RESIDUAL VOLUME (RV) (EXTERNAL): 79
SP GR UR STRIP: 1.02 (ref 1–1.03)
UROBILINOGEN UR STRIP-ACNC: 0.2 E.U./DL

## 2025-06-18 PROCEDURE — 3078F DIAST BP <80 MM HG: CPT | Performed by: UROLOGY

## 2025-06-18 PROCEDURE — 99213 OFFICE O/P EST LOW 20 MIN: CPT | Mod: 25 | Performed by: UROLOGY

## 2025-06-18 PROCEDURE — 1126F AMNT PAIN NOTED NONE PRSNT: CPT | Performed by: UROLOGY

## 2025-06-18 PROCEDURE — 51798 US URINE CAPACITY MEASURE: CPT | Performed by: UROLOGY

## 2025-06-18 PROCEDURE — 3074F SYST BP LT 130 MM HG: CPT | Performed by: UROLOGY

## 2025-06-18 PROCEDURE — 81003 URINALYSIS AUTO W/O SCOPE: CPT | Mod: QW | Performed by: UROLOGY

## 2025-06-18 RX ORDER — LIDOCAINE HYDROCHLORIDE 20 MG/ML
JELLY TOPICAL ONCE
Status: COMPLETED | OUTPATIENT
Start: 2025-06-18 | End: 2025-06-18

## 2025-06-18 RX ADMIN — LIDOCAINE HYDROCHLORIDE: 20 JELLY TOPICAL at 11:18

## 2025-06-18 ASSESSMENT — PAIN SCALES - GENERAL: PAINLEVEL_OUTOF10: NO PAIN (0)

## 2025-06-18 NOTE — NURSING NOTE
Chief Complaint   Patient presents with    Benign Prostatic Hypertrophy     Prior to the start of the procedure and with procedural staff participation, I verbally confirmed the patient s identity using two indicators, relevant allergies, that the procedure was appropriate and matched the consent or emergent situation, and that the correct equipment/implants were available. Immediately prior to starting the procedure I conducted the Time Out with the procedural staff and re-confirmed the patient s name, procedure, and site/side. I have wiped the patient off with the povidone-Iodine solution, draped them, and used Lidocaine hydrochloride jelly. (The Joint Commission universal protocol was followed.)  Yes    Sedation (Moderate or Deep): None    5mL 2% lidocaine hydrochloride Urojet instilled into urethra.    NDC# [address]-3  Lot #: [id number]  Expiration Date:  2/27      Kristine Wells, Clinic Assistant

## 2025-06-18 NOTE — PATIENT INSTRUCTIONS

## 2025-06-18 NOTE — LETTER
6/18/2025       RE: Austyn Araujo  [id number]  [facility] [address]     Dear Colleague,    Thank you for referring your patient, Austyn Araujo, to the [facility] [place] at [facility]. Please see a copy of my visit note below.    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  Over this past weekend he called the on-call physician noting white material coming through the urethral meatus and he was started on Keflex.    David reports that he has been self dilating without difficulty daily.  He says he still does have a slow urinary stream.  We discussed options of observation versus further evaluation with a cystoscopy today and he wishes to undergo office cystoscopy today for further evaluation.      PAST MEDICAL HISTORY:   Past Medical History:   Diagnosis Date     Anxiety      Anxiety 01/31/2015     BPH (benign prostatic hyperplasia)      Degenerative disc disease      Depression      Gastritis      Gastro-oesophageal reflux disease      H/O alcohol abuse      Hyperlipidemia      Hypertension      Low testosterone      MRSA (methicillin resistant staph aureus) culture positive 08/2013    skin infection     Mumps      PUD (peptic ulcer disease)      Sleep apnea      STD (sexually transmitted disease)      Type 2 diabetes mellitus (H)     neuropathy       PAST SURGICAL HISTORY:   Past Surgical History:   Procedure Laterality Date     APPENDECTOMY       COLONOSCOPY  11/11/2013    Procedure: COMBINED COLONOSCOPY, SINGLE  BIOPSY/POLYPECTOMY BY BIOPSY;  Colonoscopy/ polypectomy x2 by cold forceps    ;  Surgeon: Juan Carlos Bellamy MD;  Location: [facility]     CYSTOSCOPY, FULGURATE BLEEDERS, EVACUATE CLOT(S), COMBINED N/A 5/27/2025    Procedure: CYSTOSCOPY, WITH FULGURATION OF HEMORRHAGING BLOOD VESSEL AND THROMBUS REMOVAL;  Surgeon: Be Jauregui MD;  Location: [facility]     CYSTOSCOPY, TRANSURETHERAL WATERJET ABLATION PROSTATE, USING AQUABLATION N/A 5/20/2025    Procedure: CYSTOSCOPY, AQUABLATION OF THE PROSTATE;  Surgeon: Mohsen Rao MD;  Location: [facility]     DECOMPRESSION POSTERIOR LUMBAR, ,L4-5 decompression  06/14/2023     ESOPHAGOSCOPY, GASTROSCOPY, DUODENOSCOPY (EGD), COMBINED N/A 09/22/2016    Procedure: COMBINED ESOPHAGOSCOPY, GASTROSCOPY, DUODENOSCOPY (EGD), BIOPSY SINGLE OR MULTIPLE;  Surgeon: Juan Carlos Barraza MD;  Location:  GI     ESOPHAGOSCOPY, GASTROSCOPY, DUODENOSCOPY (EGD), COMBINED N/A 02/19/2019    Procedure: ESOPHAGOSCOPY, GASTROSCOPY, DUODENOSCOPY (EGD) with cold forcep;  Surgeon: Juan Carlos Barraza MD;  Location: [facility]     EXCISE SOFT TISSUE TUMOR ELBOW Left 01/22/2024    Procedure: Removal Left Elbow Tumor;  Surgeon: Broderick Mccurdy MD;  Location: [facility] OR     HERNIA REPAIR         FAMILY HISTORY:   Family History   Problem Relation Age of Onset     Pancreatic Cancer Mother      Dementia Father      Parkinsonism Father      Diabetes Brother      Depression Brother      Suicide Brother      Substance Abuse Brother      Pancreatic Cancer Paternal Grandfather      Family History Negative No family hx of      Colon Cancer No family hx of      Unknown/Adopted No family hx of      Anxiety Disorder No family hx of      Schizophrenia No family hx of      Bipolar Disorder No family hx of      [place] Disease No family hx of      Autism Spectrum Disorder No family hx of      Intellectual Disability No family hx of      Mental Illness No family hx of      Anesthesia Reaction No family hx of      Deep Vein  Thrombosis (DVT) No family hx of        SOCIAL HISTORY:   Social History     Socioeconomic History     Marital status:    Tobacco Use     Smoking status: Former     Current packs/day: 0.00     Types: Cigarettes     Quit date: 2000     Years since quittin.4     Smokeless tobacco: Never   Vaping Use     Vaping status: Never Used   Substance and Sexual Activity     Alcohol use: No     Comment: sober since      Drug use: No     Sexual activity: Yes     Partners: Female     Comment: I have Erectile Dysfunction and difficulty urinating     Social Drivers of Health     Financial Resource Strain: Low Risk  (2025)    Financial Resource Strain      Within the past 12 months, have you or your family members you live with been unable to get utilities (heat, electricity) when it was really needed?: No   Food Insecurity: Low Risk  (2025)    Food Insecurity      Within the past 12 months, did you worry that your food would run out before you got money to buy more?: No      Within the past 12 months, did the food you bought just not last and you didn t have money to get more?: No   Transportation Needs: Low Risk  (2025)    Transportation Needs      Within the past 12 months, has lack of transportation kept you from medical appointments, getting your medicines, non-medical meetings or appointments, work, or from getting things that you need?: No   Physical Activity: Insufficiently Active (2025)    Exercise Vital Sign      Days of Exercise per Week: 1 day      Minutes of Exercise per Session: 10 min   Stress: Stress Concern Present (2025)    [employer] [place] [employer] - Occupational Stress Questionnaire      Feeling of Stress : To some extent   Social Connections: Unknown (2025)    Social Connection and Isolation Panel [NHANES]      Frequency of Social Gatherings with Friends and Family: Once a week   Interpersonal Safety: Low Risk  (2025)    Interpersonal Safety      Do  you feel physically and emotionally safe where you currently live?: Yes      Within the past 12 months, have you been hit, slapped, kicked or otherwise physically hurt by someone?: No      Within the past 12 months, have you been humiliated or emotionally abused in other ways by your partner or ex-partner?: No   Housing Stability: Low Risk  (5/21/2025)    Housing Stability      Do you have housing? : Yes      Are you worried about losing your housing?: No       Review Of Systems:  Skin: No rash, pruritis, or skin pigmentation  Eyes: No changes in vision  Ears/Nose/Throat: No changes in hearing, no nosebleeds  Respiratory: No shortness of breath, dyspnea on exertion, cough, or hemoptysis  Cardiovascular: No chest pain or palpitations  Gastrointestinal: No diarrhea or constipation. No abdominal pain. No hematochezia  Genitourinary: see HPI  Musculoskeletal: No pain or swelling of joints, normal range of motion  Neurologic: No weakness or tremors  Psychiatric: No recent changes in memory or mood  Hematologic/Lymphatic/Immunologic: No easy bruising or enlarged lymph nodes  Endocrine: No weight gain or loss      PHYSICAL EXAM:    There were no vitals taken for this visit.    Constitutional: Well developed. Conversant and in no acute distress  Eyes: Anicteric sclera, conjunctiva clear, normal extraocular movements  ENT: Normocephalic and atraumatic,   Skin: Warm and dry. No rashes or lesions  Cardiac: No peripheral edema  Back/Flank: Not done  CNS/PNS: Normal musculature and movements, moves all extremities normally  Respiratory: Normal non-labored breathing  Abdomen: Soft nontender and nondistended  Peripheral Vascular: No peripheral edema  Mental Status/Psych: Alert and Oriented x 3. Normal mood and affect    Penis: Not done  Scrotal Skin: Not done  Testicles: Not done  Epididymis: Not done  Digital Rectal Exam:     Cystoscopy: I performed flexible cystoscopy today.  At first I was not able to insert the cystoscope into  the meatus.  I used a meatal dilator to and to open the meatus a very small amount and then I was able to insert the scope.  The urethra was normal throughout.  The mild bulbar urethral stricture that had previously been seen was extremely mild and causing no obstruction.  The bladder showed trabeculation but otherwise was normal throughout.  On retroflexion of the scope there was a small amount of residual median lobe tissue but the prostatic fossa appeared well opened.  In pulling back the scope he still did have some residual right lateral lobe tissue and certainly the prostatic urethra showed evidence of active healing, but he had a good channel throughout the prostatic urethra.    Imaging: None    Urinalysis: UA RESULTS:  Recent Labs   Lab Test 06/06/25  1029 05/30/25  0911 05/28/25 2024   COLOR Dark Yellow*   < > Straw   APPEARANCE Cloudy*   < > Clear   URINEGLC Negative   < > 200*   URINEBILI Negative   < > Negative   URINEKETONE Negative   < > Negative   SG 1.020   < > 1.008   UBLD Large*   < > Large*   URINEPH 7.5*   < > 6.0   PROTEIN 100*   < > Negative   UROBILINOGEN 0.2   < >  --    NITRITE Negative   < > Negative   LEUKEST Trace*   < > Large*   RBCU  --   --  80*   WBCU  --   --  64*    < > = values in this interval not displayed.       PSA:     Post Void Residual: 79mL    Other labs: None today      IMPRESSION:  Urethral meatal stenosis  S/P aquablation of the prostate    PLAN:  We discussed my cystoscopy findings today.  It was found that his issue today is the urethral meatal stenosis.  He otherwise had a good channel for urination throughout the prostatic urethra and the rest of the urethra.  Any obstruction appears to be at the level of the meatus.  I recommended that he continue to use the meatal dilator on a daily basis.  I expect that his symptoms of frequency and urgency will improve as he continues to heal from this procedure.  I will see him back in follow-up to make certain his symptoms  resolved and to reevaluate the urethral meatus.      Mohsen Rao M.D.              Again, thank you for allowing me to participate in the care of your patient.      Sincerely,    Mohsen Rao MD

## 2025-06-18 NOTE — PROGRESS NOTES
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  Over this past weekend he called the on-call physician noting white material coming through the urethral meatus and he was started on Keflex.    David reports that he has been self dilating without difficulty daily.  He says he still does have a slow urinary stream.  We discussed options of observation versus further evaluation with a cystoscopy today and he wishes to undergo office cystoscopy today for further evaluation.      PAST MEDICAL HISTORY:   Past Medical History:   Diagnosis Date    Anxiety     Anxiety 01/31/2015    BPH (benign prostatic hyperplasia)     Degenerative disc disease     Depression     Gastritis     Gastro-oesophageal reflux disease     H/O alcohol abuse     Hyperlipidemia     Hypertension     Low testosterone     MRSA (methicillin resistant staph aureus) culture positive 08/2013    skin infection    Mumps     PUD (peptic ulcer disease)     Sleep apnea     STD (sexually transmitted disease)     Type 2 diabetes mellitus (H)     neuropathy       PAST SURGICAL HISTORY:   Past Surgical History:   Procedure Laterality Date    APPENDECTOMY      COLONOSCOPY  11/11/2013    Procedure: COMBINED COLONOSCOPY, SINGLE BIOPSY/POLYPECTOMY BY BIOPSY;  Colonoscopy/ polypectomy x2 by cold forceps    ;  Surgeon: Juan Carlos Bellamy MD;  Location:  GI    CYSTOSCOPY, FULGURATE BLEEDERS, EVACUATE CLOT(S), COMBINED N/A 5/27/2025    Procedure: CYSTOSCOPY, WITH FULGURATION OF HEMORRHAGING BLOOD VESSEL AND THROMBUS REMOVAL;  Surgeon: Be Jauregui MD;   Location: [facility]    CYSTOSCOPY, TRANSURETHERAL WATERJET ABLATION PROSTATE, USING AQUABLATION N/A 2025    Procedure: CYSTOSCOPY, AQUABLATION OF THE PROSTATE;  Surgeon: Mohsen Rao MD;  Location: [facility]    DECOMPRESSION POSTERIOR LUMBAR, ,L4-5 decompression  2023    ESOPHAGOSCOPY, GASTROSCOPY, DUODENOSCOPY (EGD), COMBINED N/A 2016    Procedure: COMBINED ESOPHAGOSCOPY, GASTROSCOPY, DUODENOSCOPY (EGD), BIOPSY SINGLE OR MULTIPLE;  Surgeon: Juan Carlos Barraza MD;  Location: [facility]    ESOPHAGOSCOPY, GASTROSCOPY, DUODENOSCOPY (EGD), COMBINED N/A 2019    Procedure: ESOPHAGOSCOPY, GASTROSCOPY, DUODENOSCOPY (EGD) with cold forcep;  Surgeon: Juan Carlos Barraza MD;  Location: [facility]    EXCISE SOFT TISSUE TUMOR ELBOW Left 2024    Procedure: Removal Left Elbow Tumor;  Surgeon: Broderick Mccurdy MD;  Location: [facility]    HERNIA REPAIR         FAMILY HISTORY:   Family History   Problem Relation Age of Onset    Pancreatic Cancer Mother     Dementia Father     Parkinsonism Father     Diabetes Brother     Depression Brother     Suicide Brother     Substance Abuse Brother     Pancreatic Cancer Paternal Grandfather     Family History Negative No family hx of     Colon Cancer No family hx of     Unknown/Adopted No family hx of     Anxiety Disorder No family hx of     Schizophrenia No family hx of     Bipolar Disorder No family hx of     [place] Disease No family hx of     Autism Spectrum Disorder No family hx of     Intellectual Disability No family hx of     Mental Illness No family hx of     Anesthesia Reaction No family hx of     Deep Vein Thrombosis (DVT) No family hx of        SOCIAL HISTORY:   Social History     Socioeconomic History    Marital status:    Tobacco Use    Smoking status: Former     Current packs/day: 0.00     Types: Cigarettes     Quit date:      Years since quittin.4    Smokeless tobacco: Never   Vaping Use    Vaping status: Never Used   Substance and Sexual  Activity    Alcohol use: No     Comment: sober since 2016    Drug use: No    Sexual activity: Yes     Partners: Female     Comment: I have Erectile Dysfunction and difficulty urinating     Social Drivers of Health     Financial Resource Strain: Low Risk  (5/21/2025)    Financial Resource Strain     Within the past 12 months, have you or your family members you live with been unable to get utilities (heat, electricity) when it was really needed?: No   Food Insecurity: Low Risk  (5/21/2025)    Food Insecurity     Within the past 12 months, did you worry that your food would run out before you got money to buy more?: No     Within the past 12 months, did the food you bought just not last and you didn t have money to get more?: No   Transportation Needs: Low Risk  (5/21/2025)    Transportation Needs     Within the past 12 months, has lack of transportation kept you from medical appointments, getting your medicines, non-medical meetings or appointments, work, or from getting things that you need?: No   Physical Activity: Insufficiently Active (2/5/2025)    Exercise Vital Sign     Days of Exercise per Week: 1 day     Minutes of Exercise per Session: 10 min   Stress: Stress Concern Present (2/5/2025)    [employer] [place] [employer] - Occupational Stress Questionnaire     Feeling of Stress : To some extent   Social Connections: Unknown (2/5/2025)    Social Connection and Isolation Panel [NHANES]     Frequency of Social Gatherings with Friends and Family: Once a week   Interpersonal Safety: Low Risk  (5/20/2025)    Interpersonal Safety     Do you feel physically and emotionally safe where you currently live?: Yes     Within the past 12 months, have you been hit, slapped, kicked or otherwise physically hurt by someone?: No     Within the past 12 months, have you been humiliated or emotionally abused in other ways by your partner or ex-partner?: No   Housing Stability: Low Risk  (5/21/2025)    Housing Stability      Do you have housing? : Yes     Are you worried about losing your housing?: No       Review Of Systems:  Skin: No rash, pruritis, or skin pigmentation  Eyes: No changes in vision  Ears/Nose/Throat: No changes in hearing, no nosebleeds  Respiratory: No shortness of breath, dyspnea on exertion, cough, or hemoptysis  Cardiovascular: No chest pain or palpitations  Gastrointestinal: No diarrhea or constipation. No abdominal pain. No hematochezia  Genitourinary: see HPI  Musculoskeletal: No pain or swelling of joints, normal range of motion  Neurologic: No weakness or tremors  Psychiatric: No recent changes in memory or mood  Hematologic/Lymphatic/Immunologic: No easy bruising or enlarged lymph nodes  Endocrine: No weight gain or loss      PHYSICAL EXAM:    There were no vitals taken for this visit.    Constitutional: Well developed. Conversant and in no acute distress  Eyes: Anicteric sclera, conjunctiva clear, normal extraocular movements  ENT: Normocephalic and atraumatic,   Skin: Warm and dry. No rashes or lesions  Cardiac: No peripheral edema  Back/Flank: Not done  CNS/PNS: Normal musculature and movements, moves all extremities normally  Respiratory: Normal non-labored breathing  Abdomen: Soft nontender and nondistended  Peripheral Vascular: No peripheral edema  Mental Status/Psych: Alert and Oriented x 3. Normal mood and affect    Penis: Not done  Scrotal Skin: Not done  Testicles: Not done  Epididymis: Not done  Digital Rectal Exam:     Cystoscopy: I performed flexible cystoscopy today.  At first I was not able to insert the cystoscope into the meatus.  I used a meatal dilator to and to open the meatus a very small amount and then I was able to insert the scope.  The urethra was normal throughout.  The mild bulbar urethral stricture that had previously been seen was extremely mild and causing no obstruction.  The bladder showed trabeculation but otherwise was normal throughout.  On retroflexion of the scope there  was a small amount of residual median lobe tissue but the prostatic fossa appeared well opened.  In pulling back the scope he still did have some residual right lateral lobe tissue and certainly the prostatic urethra showed evidence of active healing, but he had a good channel throughout the prostatic urethra.    Imaging: None    Urinalysis: UA RESULTS:  Recent Labs   Lab Test 06/06/25  1029 05/30/25  0911 05/28/25 2024   COLOR Dark Yellow*   < > Straw   APPEARANCE Cloudy*   < > Clear   URINEGLC Negative   < > 200*   URINEBILI Negative   < > Negative   URINEKETONE Negative   < > Negative   SG 1.020   < > 1.008   UBLD Large*   < > Large*   URINEPH 7.5*   < > 6.0   PROTEIN 100*   < > Negative   UROBILINOGEN 0.2   < >  --    NITRITE Negative   < > Negative   LEUKEST Trace*   < > Large*   RBCU  --   --  80*   WBCU  --   --  64*    < > = values in this interval not displayed.       PSA:     Post Void Residual: 79mL    Other labs: None today      IMPRESSION:  Urethral meatal stenosis  S/P aquablation of the prostate    PLAN:  We discussed my cystoscopy findings today.  It was found that his issue today is the urethral meatal stenosis.  He otherwise had a good channel for urination throughout the prostatic urethra and the rest of the urethra.  Any obstruction appears to be at the level of the meatus.  I recommended that he continue to use the meatal dilator on a daily basis.  I expect that his symptoms of frequency and urgency will improve as he continues to heal from this procedure.  I will see him back in follow-up to make certain his symptoms resolved and to reevaluate the urethral meatus.      Mohsen Rao M.D.

## 2025-07-09 ENCOUNTER — TELEPHONE (OUTPATIENT)
Dept: UROLOGY | Facility: CLINIC | Age: 70
End: 2025-07-09
Payer: COMMERCIAL

## 2025-07-09 DIAGNOSIS — E11.43 TYPE 2 DIABETES MELLITUS WITH DIABETIC AUTONOMIC (POLY)NEUROPATHY (H): ICD-10-CM

## 2025-07-09 DIAGNOSIS — N40.0 ENLARGED PROSTATE: ICD-10-CM

## 2025-07-09 RX ORDER — SITAGLIPTIN 100 MG/1
100 TABLET, FILM COATED ORAL DAILY
Qty: 90 TABLET | Refills: 0 | Status: SHIPPED | OUTPATIENT
Start: 2025-07-09

## 2025-07-09 RX ORDER — FINASTERIDE 5 MG/1
1 TABLET, FILM COATED ORAL DAILY
Qty: 90 TABLET | Refills: 3 | Status: SHIPPED | OUTPATIENT
Start: 2025-07-09

## 2025-07-09 RX ORDER — INSULIN ASPART 100 [IU]/ML
15 INJECTION, SOLUTION INTRAVENOUS; SUBCUTANEOUS
Qty: 15 ML | Refills: 0 | Status: SHIPPED | OUTPATIENT
Start: 2025-07-09

## 2025-07-09 NOTE — TELEPHONE ENCOUNTER
----- Message from Mohsen Rao sent at 7/9/2025  8:16 AM CDT -----  It looks like he currently has 2 identical follow-up appointments scheduled  He should keep 1 and cancel the other, really does not matter which one, thanks

## 2025-07-09 NOTE — TELEPHONE ENCOUNTER
LVM to see what one he would like he would need to move PSA appt if he picks the 1st one.    Courtney

## 2025-08-08 PROBLEM — M25.551 BILATERAL HIP PAIN: Status: RESOLVED | Noted: 2024-07-29 | Resolved: 2025-08-08

## 2025-08-08 PROBLEM — M25.562 BILATERAL KNEE PAIN: Status: RESOLVED | Noted: 2024-07-29 | Resolved: 2025-08-08

## 2025-08-08 PROBLEM — M25.552 BILATERAL HIP PAIN: Status: RESOLVED | Noted: 2024-07-29 | Resolved: 2025-08-08

## 2025-08-08 PROBLEM — M25.561 BILATERAL KNEE PAIN: Status: RESOLVED | Noted: 2024-07-29 | Resolved: 2025-08-08

## 2025-08-12 ENCOUNTER — LAB (OUTPATIENT)
Dept: LAB | Facility: CLINIC | Age: 70
End: 2025-08-12
Payer: COMMERCIAL

## 2025-08-12 ENCOUNTER — TELEPHONE (OUTPATIENT)
Dept: UROLOGY | Facility: CLINIC | Age: 70
End: 2025-08-12
Payer: COMMERCIAL

## 2025-08-12 DIAGNOSIS — C61 PROSTATE CANCER (H): ICD-10-CM

## 2025-08-12 DIAGNOSIS — R79.89 LOW TESTOSTERONE LEVEL IN MALE: ICD-10-CM

## 2025-08-12 PROCEDURE — 36415 COLL VENOUS BLD VENIPUNCTURE: CPT

## 2025-08-12 PROCEDURE — 84153 ASSAY OF PSA TOTAL: CPT

## 2025-08-13 LAB — PSA SERPL DL<=0.01 NG/ML-MCNC: 2.48 NG/ML (ref 0–6.5)

## 2025-08-18 ENCOUNTER — RESULTS FOLLOW-UP (OUTPATIENT)
Dept: FAMILY MEDICINE | Facility: CLINIC | Age: 70
End: 2025-08-18

## 2025-08-18 DIAGNOSIS — R79.89 ELEVATED TESTOSTERONE LEVEL: Primary | ICD-10-CM

## 2025-08-30 DIAGNOSIS — E11.43 TYPE 2 DIABETES MELLITUS WITH DIABETIC AUTONOMIC (POLY)NEUROPATHY (H): ICD-10-CM

## 2025-09-02 DIAGNOSIS — R79.89 LOW TESTOSTERONE: ICD-10-CM

## 2025-09-02 RX ORDER — INSULIN ASPART 100 [IU]/ML
15 INJECTION, SOLUTION INTRAVENOUS; SUBCUTANEOUS
Qty: 15 ML | Refills: 0 | Status: SHIPPED | OUTPATIENT
Start: 2025-09-02

## 2025-09-02 RX ORDER — TESTOSTERONE CYPIONATE 200 MG/ML
INJECTION, SOLUTION INTRAMUSCULAR
Qty: 10 ML | Refills: 2 | Status: SHIPPED | OUTPATIENT
Start: 2025-09-02

## 2025-09-04 ENCOUNTER — LAB (OUTPATIENT)
Dept: LAB | Facility: CLINIC | Age: 70
End: 2025-09-04
Payer: COMMERCIAL

## 2025-09-04 DIAGNOSIS — R79.89 ELEVATED TESTOSTERONE LEVEL: ICD-10-CM

## (undated) DEVICE — SU MONOCRYL 4-0 PS-2 18" UND Y496G

## (undated) DEVICE — AQUABEAM HANDPIECE

## (undated) DEVICE — EVACUATOR BLADDER UROVAC LATEX M0067301250

## (undated) DEVICE — PAD CHUX UNDERPAD 23X24" 7136

## (undated) DEVICE — LINEN BACK PACK 5440

## (undated) DEVICE — SUCTION MANIFOLD NEPTUNE 2 SYS 4 PORT 0702-020-000

## (undated) DEVICE — CATH HOLDER STRAP 36600

## (undated) DEVICE — DECANTER TRANSFER DEVICE 2008S

## (undated) DEVICE — DEVICE CATH STABILIZATION STATLOCK FOLEY 3-WAY FOL0105

## (undated) DEVICE — TOURNIQUET CUFF 18" REPRO RED 60-7070-103

## (undated) DEVICE — KIT TURNOVER FAIRVIEW SOUTHDALE FULL SP3889

## (undated) DEVICE — SU VICRYL 2-0 CT-2 27" UND J269H

## (undated) DEVICE — JELLY LUBRICATING SURGILUBE 2OZ TUBE

## (undated) DEVICE — LINEN TOWEL PACK X5 5464

## (undated) DEVICE — KIT ENDO TURNOVER/PROCEDURE W/CLEAN A SCOPE LINERS 103888

## (undated) DEVICE — SOL WATER IRRIG 1000ML BOTTLE 2F7114

## (undated) DEVICE — CAST PADDING 4" STERILE 9044S

## (undated) DEVICE — SYR 50ML CATH TIP W/O NDL 309620

## (undated) DEVICE — LIGHT HANDLE X2

## (undated) DEVICE — DRAPE STERI TOWEL LG 1010

## (undated) DEVICE — SOL NACL 0.9% IRRIG 1000ML BOTTLE 2F7124

## (undated) DEVICE — GEL ULTRASOUND AQUASONIC 20GM 01-01

## (undated) DEVICE — PACK TUR CUSTOM SBA15RUFSE

## (undated) DEVICE — Device

## (undated) DEVICE — SLING ARM LG 79-99157

## (undated) DEVICE — DRAPE GYN/UROLOGY FLUID POUCH TUR 29455

## (undated) DEVICE — KIT TURNOVER FAIRVIEW SOUTHDALE HALF SP3890

## (undated) DEVICE — CATH FOLEY 3WAY 24FR 30ML LATEX 0167SI24

## (undated) DEVICE — AQUABEAM DRAPE PACK

## (undated) DEVICE — DRSG ABDOMINAL 07 1/2X8" 7197D

## (undated) DEVICE — SOLUTION IV IRRIGATION 0.9% NACL 3L R8206

## (undated) DEVICE — ESU ELEC CUTTING LOOP 24/26FR .35MM BIPOLAR 27040GP1-S

## (undated) DEVICE — BAG DRAIN URO FOR SIEMENS 8MM ADAPTER NS CC164NS-A

## (undated) DEVICE — DRSG AQUACEL AG HYDROFIBER 3.5X6" 422604

## (undated) DEVICE — ENDO BITE BLOCK ADULT OLYMPUS LATEX FREE MAJ-1632

## (undated) DEVICE — BAG URINARY DRAIN 4000ML LF 153509

## (undated) DEVICE — STRAP KNEE/BODY 31143004

## (undated) DEVICE — GLOVE BIOGEL PI MICRO INDICATOR UNDERGLOVE SZ 8.0 48980

## (undated) DEVICE — TUBING SUCTION MEDI-VAC SOFT 3/16"X20' N520A

## (undated) DEVICE — GLOVE BIOGEL PI ULTRATOUCH G SZ 7.5 42175

## (undated) DEVICE — PREP CHLORAPREP 26ML TINTED HI-LITE ORANGE 930815

## (undated) DEVICE — ESU GROUND PAD UNIVERSAL W/O CORD

## (undated) DEVICE — TUBING SUCTION 12"X1/4" N612

## (undated) DEVICE — ENDO FORCEP ENDOJAW BIOPSY 2.8MMX160CM FB-220K

## (undated) DEVICE — GLOVE PROTEXIS MICRO 7.5  2D73PM75

## (undated) DEVICE — LINEN GOWN X4 5410

## (undated) DEVICE — SOLUTION IRRIGATION 0.9% NACL 1000ML BOTTLE R5200-01

## (undated) RX ORDER — ATROPA BELLADONNA AND OPIUM 16.2; 3 MG/1; MG/1
SUPPOSITORY RECTAL
Status: DISPENSED
Start: 2025-05-27

## (undated) RX ORDER — FENTANYL CITRATE 0.05 MG/ML
INJECTION, SOLUTION INTRAMUSCULAR; INTRAVENOUS
Status: DISPENSED
Start: 2025-05-20

## (undated) RX ORDER — BUPIVACAINE HYDROCHLORIDE 5 MG/ML
INJECTION, SOLUTION EPIDURAL; INTRACAUDAL
Status: DISPENSED
Start: 2024-01-22

## (undated) RX ORDER — DEXAMETHASONE SODIUM PHOSPHATE 4 MG/ML
INJECTION, SOLUTION INTRA-ARTICULAR; INTRALESIONAL; INTRAMUSCULAR; INTRAVENOUS; SOFT TISSUE
Status: DISPENSED
Start: 2024-01-22

## (undated) RX ORDER — HYDROMORPHONE HCL IN WATER/PF 6 MG/30 ML
PATIENT CONTROLLED ANALGESIA SYRINGE INTRAVENOUS
Status: DISPENSED
Start: 2025-05-20

## (undated) RX ORDER — ACETAMINOPHEN 325 MG/1
TABLET ORAL
Status: DISPENSED
Start: 2025-05-20

## (undated) RX ORDER — ONDANSETRON 2 MG/ML
INJECTION INTRAMUSCULAR; INTRAVENOUS
Status: DISPENSED
Start: 2025-05-20

## (undated) RX ORDER — METOPROLOL TARTRATE 1 MG/ML
INJECTION, SOLUTION INTRAVENOUS
Status: DISPENSED
Start: 2018-10-16

## (undated) RX ORDER — EPHEDRINE SULFATE 50 MG/ML
INJECTION, SOLUTION INTRAMUSCULAR; INTRAVENOUS; SUBCUTANEOUS
Status: DISPENSED
Start: 2024-01-22

## (undated) RX ORDER — FENTANYL CITRATE 50 UG/ML
INJECTION, SOLUTION INTRAMUSCULAR; INTRAVENOUS
Status: DISPENSED
Start: 2025-05-27

## (undated) RX ORDER — PROPOFOL 10 MG/ML
INJECTION, EMULSION INTRAVENOUS
Status: DISPENSED
Start: 2024-01-22

## (undated) RX ORDER — FENTANYL CITRATE-0.9 % NACL/PF 10 MCG/ML
PLASTIC BAG, INJECTION (ML) INTRAVENOUS
Status: DISPENSED
Start: 2024-01-22

## (undated) RX ORDER — NITROGLYCERIN 0.4 MG/1
TABLET SUBLINGUAL
Status: DISPENSED
Start: 2018-10-16

## (undated) RX ORDER — PROPOFOL 10 MG/ML
INJECTION, EMULSION INTRAVENOUS
Status: DISPENSED
Start: 2025-05-20

## (undated) RX ORDER — FENTANYL CITRATE 50 UG/ML
INJECTION, SOLUTION INTRAMUSCULAR; INTRAVENOUS
Status: DISPENSED
Start: 2019-02-19

## (undated) RX ORDER — HYDROMORPHONE HYDROCHLORIDE 1 MG/ML
INJECTION, SOLUTION INTRAMUSCULAR; INTRAVENOUS; SUBCUTANEOUS
Status: DISPENSED
Start: 2025-05-20

## (undated) RX ORDER — FENTANYL CITRATE 0.05 MG/ML
INJECTION, SOLUTION INTRAMUSCULAR; INTRAVENOUS
Status: DISPENSED
Start: 2025-05-27

## (undated) RX ORDER — METOPROLOL TARTRATE 50 MG
TABLET ORAL
Status: DISPENSED
Start: 2018-10-16

## (undated) RX ORDER — CEFAZOLIN SODIUM/WATER 2 G/20 ML
SYRINGE (ML) INTRAVENOUS
Status: DISPENSED
Start: 2024-01-22

## (undated) RX ORDER — FENTANYL CITRATE 50 UG/ML
INJECTION, SOLUTION INTRAMUSCULAR; INTRAVENOUS
Status: DISPENSED
Start: 2025-05-20

## (undated) RX ORDER — ATROPA BELLADONNA AND OPIUM 16.2; 3 MG/1; MG/1
SUPPOSITORY RECTAL
Status: DISPENSED
Start: 2025-05-20

## (undated) RX ORDER — HEPARIN SODIUM (PORCINE) LOCK FLUSH IV SOLN 100 UNIT/ML 100 UNIT/ML
SOLUTION INTRAVENOUS
Status: DISPENSED
Start: 2024-01-22

## (undated) RX ORDER — FAMOTIDINE 20 MG/1
TABLET, FILM COATED ORAL
Status: DISPENSED
Start: 2025-05-20

## (undated) RX ORDER — VECURONIUM BROMIDE 1 MG/ML
INJECTION, POWDER, LYOPHILIZED, FOR SOLUTION INTRAVENOUS
Status: DISPENSED
Start: 2025-05-20

## (undated) RX ORDER — LIDOCAINE HYDROCHLORIDE 10 MG/ML
INJECTION, SOLUTION EPIDURAL; INFILTRATION; INTRACAUDAL; PERINEURAL
Status: DISPENSED
Start: 2024-01-22

## (undated) RX ORDER — ONDANSETRON 2 MG/ML
INJECTION INTRAMUSCULAR; INTRAVENOUS
Status: DISPENSED
Start: 2024-01-22

## (undated) RX ORDER — FENTANYL CITRATE 50 UG/ML
INJECTION, SOLUTION INTRAMUSCULAR; INTRAVENOUS
Status: DISPENSED
Start: 2024-01-22